# Patient Record
Sex: MALE | Race: OTHER | Employment: OTHER | URBAN - METROPOLITAN AREA
[De-identification: names, ages, dates, MRNs, and addresses within clinical notes are randomized per-mention and may not be internally consistent; named-entity substitution may affect disease eponyms.]

---

## 2017-02-03 ENCOUNTER — APPOINTMENT (OUTPATIENT)
Dept: GENERAL RADIOLOGY | Age: 67
End: 2017-02-03
Payer: MEDICARE

## 2017-02-03 ENCOUNTER — HOSPITAL ENCOUNTER (EMERGENCY)
Age: 67
Discharge: HOME | End: 2017-02-03
Payer: MEDICARE

## 2017-02-03 VITALS
RESPIRATION RATE: 16 BRPM | BODY MASS INDEX: 25.49 KG/M2 | TEMPERATURE: 98.2 F | SYSTOLIC BLOOD PRESSURE: 163 MMHG | OXYGEN SATURATION: 100 % | WEIGHT: 153 LBS | HEART RATE: 73 BPM | DIASTOLIC BLOOD PRESSURE: 89 MMHG | HEIGHT: 65 IN

## 2017-02-03 DIAGNOSIS — F41.1 ANXIETY STATE: Primary | ICD-10-CM

## 2017-02-03 LAB
ALBUMIN SERPL-MCNC: 4.7 G/DL (ref 3.9–4.9)
ALP BLD-CCNC: 73 U/L (ref 35–104)
ALT SERPL-CCNC: 28 U/L (ref 0–41)
AMPHETAMINE SCREEN, URINE: ABNORMAL
ANION GAP SERPL CALCULATED.3IONS-SCNC: 12 MEQ/L (ref 7–13)
APTT: 26.6 SEC (ref 21.6–35.4)
AST SERPL-CCNC: 16 U/L (ref 0–40)
BARBITURATE SCREEN URINE: POSITIVE
BASOPHILS ABSOLUTE: 0.1 K/UL (ref 0–0.2)
BASOPHILS RELATIVE PERCENT: 0.9 %
BENZODIAZEPINE SCREEN, URINE: ABNORMAL
BILIRUB SERPL-MCNC: 0.6 MG/DL (ref 0–1.2)
BILIRUBIN URINE: NEGATIVE
BLOOD, URINE: NEGATIVE
BUN BLDV-MCNC: 23 MG/DL (ref 8–23)
CALCIUM SERPL-MCNC: 9.8 MG/DL (ref 8.6–10.2)
CANNABINOID SCREEN URINE: ABNORMAL
CHLORIDE BLD-SCNC: 103 MEQ/L (ref 98–107)
CK MB: 2.2 NG/ML (ref 0–6.7)
CLARITY: CLEAR
CO2: 24 MEQ/L (ref 22–29)
COCAINE METABOLITE SCREEN URINE: ABNORMAL
COLOR: YELLOW
CREAT SERPL-MCNC: 1.04 MG/DL (ref 0.7–1.2)
CREATINE KINASE-MB INDEX: 2.3 % (ref 0–3.5)
EOSINOPHILS ABSOLUTE: 0.1 K/UL (ref 0–0.7)
EOSINOPHILS RELATIVE PERCENT: 0.8 %
ETHANOL PERCENT: NORMAL G/DL
ETHANOL: <10 MG/DL (ref 0–0.08)
GFR AFRICAN AMERICAN: >60
GFR NON-AFRICAN AMERICAN: >60
GLOBULIN: 2.6 G/DL (ref 2.3–3.5)
GLUCOSE BLD-MCNC: 126 MG/DL (ref 74–109)
GLUCOSE URINE: 250 MG/DL
HCT VFR BLD CALC: 46.5 % (ref 42–52)
HEMOGLOBIN: 16 G/DL (ref 14–18)
INR BLD: 1
KETONES, URINE: NEGATIVE MG/DL
LEUKOCYTE ESTERASE, URINE: NEGATIVE
LYMPHOCYTES ABSOLUTE: 1.1 K/UL (ref 1–4.8)
LYMPHOCYTES RELATIVE PERCENT: 16.4 %
Lab: ABNORMAL
MCH RBC QN AUTO: 30.2 PG (ref 27–31.3)
MCHC RBC AUTO-ENTMCNC: 34.3 % (ref 33–37)
MCV RBC AUTO: 87.9 FL (ref 80–100)
MONOCYTES ABSOLUTE: 0.4 K/UL (ref 0.2–0.8)
MONOCYTES RELATIVE PERCENT: 6.6 %
NEUTROPHILS ABSOLUTE: 4.9 K/UL (ref 1.4–6.5)
NEUTROPHILS RELATIVE PERCENT: 75.3 %
NITRITE, URINE: NEGATIVE
OPIATE SCREEN URINE: ABNORMAL
PDW BLD-RTO: 14.3 % (ref 11.5–14.5)
PH UA: 6 (ref 5–9)
PHENCYCLIDINE SCREEN URINE: ABNORMAL
PLATELET # BLD: 198 K/UL (ref 130–400)
POTASSIUM SERPL-SCNC: 4.3 MEQ/L (ref 3.5–5.1)
PROTEIN UA: NEGATIVE MG/DL
PROTHROMBIN TIME: 11.1 SEC (ref 8.1–13.7)
RBC # BLD: 5.29 M/UL (ref 4.7–6.1)
SODIUM BLD-SCNC: 139 MEQ/L (ref 132–144)
SPECIFIC GRAVITY UA: 1.02 (ref 1–1.03)
TOTAL CK: 96 U/L (ref 0–190)
TOTAL PROTEIN: 7.3 G/DL (ref 6.4–8.1)
TROPONIN: <0.01 NG/ML (ref 0–0.01)
TSH SERPL DL<=0.05 MIU/L-ACNC: 2.29 UIU/ML (ref 0.27–4.2)
UROBILINOGEN, URINE: 1 E.U./DL
WBC # BLD: 6.5 K/UL (ref 4.8–10.8)

## 2017-02-03 PROCEDURE — 81003 URINALYSIS AUTO W/O SCOPE: CPT

## 2017-02-03 PROCEDURE — 82550 ASSAY OF CK (CPK): CPT

## 2017-02-03 PROCEDURE — 82553 CREATINE MB FRACTION: CPT

## 2017-02-03 PROCEDURE — 84443 ASSAY THYROID STIM HORMONE: CPT

## 2017-02-03 PROCEDURE — 6370000000 HC RX 637 (ALT 250 FOR IP): Performed by: PHYSICIAN ASSISTANT

## 2017-02-03 PROCEDURE — 99285 EMERGENCY DEPT VISIT HI MDM: CPT

## 2017-02-03 PROCEDURE — 80307 DRUG TEST PRSMV CHEM ANLYZR: CPT

## 2017-02-03 PROCEDURE — 93005 ELECTROCARDIOGRAM TRACING: CPT

## 2017-02-03 PROCEDURE — 85025 COMPLETE CBC W/AUTO DIFF WBC: CPT

## 2017-02-03 PROCEDURE — 36415 COLL VENOUS BLD VENIPUNCTURE: CPT

## 2017-02-03 PROCEDURE — 71010 XR CHEST PORTABLE: CPT

## 2017-02-03 PROCEDURE — G0480 DRUG TEST DEF 1-7 CLASSES: HCPCS

## 2017-02-03 PROCEDURE — 85730 THROMBOPLASTIN TIME PARTIAL: CPT

## 2017-02-03 PROCEDURE — 80053 COMPREHEN METABOLIC PANEL: CPT

## 2017-02-03 PROCEDURE — 85610 PROTHROMBIN TIME: CPT

## 2017-02-03 PROCEDURE — 84484 ASSAY OF TROPONIN QUANT: CPT

## 2017-02-03 RX ORDER — HYDROXYZINE PAMOATE 25 MG/1
25 CAPSULE ORAL 3 TIMES DAILY PRN
Qty: 30 CAPSULE | Refills: 0 | Status: SHIPPED | OUTPATIENT
Start: 2017-02-03 | End: 2017-02-17

## 2017-02-03 RX ORDER — HYDROXYZINE PAMOATE 25 MG/1
25 CAPSULE ORAL ONCE
Status: COMPLETED | OUTPATIENT
Start: 2017-02-03 | End: 2017-02-03

## 2017-02-03 RX ADMIN — HYDROXYZINE PAMOATE 25 MG: 25 CAPSULE ORAL at 12:45

## 2017-02-03 ASSESSMENT — ENCOUNTER SYMPTOMS
VOMITING: 0
RHINORRHEA: 0
COLOR CHANGE: 0
ABDOMINAL DISTENTION: 0
VOICE CHANGE: 0
CONSTIPATION: 0
SINUS PRESSURE: 0
ABDOMINAL PAIN: 0
BACK PAIN: 0
NAUSEA: 0
SORE THROAT: 0
TROUBLE SWALLOWING: 0
DIARRHEA: 0
EYE DISCHARGE: 0
RECTAL PAIN: 0
SHORTNESS OF BREATH: 0

## 2017-02-03 ASSESSMENT — PATIENT HEALTH QUESTIONNAIRE - PHQ9: SUM OF ALL RESPONSES TO PHQ QUESTIONS 1-9: 14

## 2017-02-03 ASSESSMENT — LIFESTYLE VARIABLES: HISTORY_ALCOHOL_USE: NO

## 2017-02-09 LAB
EKG ATRIAL RATE: 63 BPM
EKG P AXIS: 37 DEGREES
EKG P-R INTERVAL: 182 MS
EKG Q-T INTERVAL: 406 MS
EKG QRS DURATION: 104 MS
EKG QTC CALCULATION (BAZETT): 415 MS
EKG R AXIS: 36 DEGREES
EKG T AXIS: 63 DEGREES
EKG VENTRICULAR RATE: 63 BPM

## 2018-02-01 ENCOUNTER — HOSPITAL ENCOUNTER (EMERGENCY)
Age: 68
Discharge: PSYCHIATRIC OR SUBSTANCE USE FACILITY | End: 2018-02-02
Payer: MEDICARE

## 2018-02-01 ENCOUNTER — APPOINTMENT (OUTPATIENT)
Dept: CT IMAGING | Age: 68
End: 2018-02-01
Payer: MEDICARE

## 2018-02-01 DIAGNOSIS — F33.9 EPISODE OF RECURRENT MAJOR DEPRESSIVE DISORDER, UNSPECIFIED DEPRESSION EPISODE SEVERITY (HCC): Primary | ICD-10-CM

## 2018-02-01 LAB
ALBUMIN SERPL-MCNC: 4.6 G/DL (ref 3.9–4.9)
ALP BLD-CCNC: 91 U/L (ref 35–104)
ALT SERPL-CCNC: 17 U/L (ref 0–41)
AMPHETAMINE SCREEN, URINE: NORMAL
ANION GAP SERPL CALCULATED.3IONS-SCNC: 13 MEQ/L (ref 7–13)
AST SERPL-CCNC: 15 U/L (ref 0–40)
BARBITURATE SCREEN URINE: NORMAL
BASOPHILS ABSOLUTE: 0.2 K/UL (ref 0–0.2)
BASOPHILS RELATIVE PERCENT: 2 %
BENZODIAZEPINE SCREEN, URINE: NORMAL
BILIRUB SERPL-MCNC: 0.5 MG/DL (ref 0–1.2)
BILIRUBIN URINE: NEGATIVE
BLOOD, URINE: NEGATIVE
BUN BLDV-MCNC: 21 MG/DL (ref 8–23)
CALCIUM SERPL-MCNC: 9.8 MG/DL (ref 8.6–10.2)
CANNABINOID SCREEN URINE: NORMAL
CHLORIDE BLD-SCNC: 102 MEQ/L (ref 98–107)
CLARITY: CLEAR
CO2: 26 MEQ/L (ref 22–29)
COCAINE METABOLITE SCREEN URINE: NORMAL
COLOR: YELLOW
CREAT SERPL-MCNC: 0.93 MG/DL (ref 0.7–1.2)
EKG ATRIAL RATE: 56 BPM
EKG P AXIS: 62 DEGREES
EKG P-R INTERVAL: 186 MS
EKG Q-T INTERVAL: 436 MS
EKG QRS DURATION: 104 MS
EKG QTC CALCULATION (BAZETT): 420 MS
EKG R AXIS: 61 DEGREES
EKG T AXIS: 62 DEGREES
EKG VENTRICULAR RATE: 56 BPM
EOSINOPHILS ABSOLUTE: 0.1 K/UL (ref 0–0.7)
EOSINOPHILS RELATIVE PERCENT: 1.6 %
ETHANOL PERCENT: NORMAL G/DL
ETHANOL: <10 MG/DL (ref 0–0.08)
GFR AFRICAN AMERICAN: >60
GFR NON-AFRICAN AMERICAN: >60
GLOBULIN: 2.8 G/DL (ref 2.3–3.5)
GLUCOSE BLD-MCNC: 114 MG/DL (ref 74–109)
GLUCOSE BLD-MCNC: 179 MG/DL (ref 60–115)
GLUCOSE URINE: NEGATIVE MG/DL
HCT VFR BLD CALC: 46.5 % (ref 42–52)
HEMOGLOBIN: 15.9 G/DL (ref 14–18)
KETONES, URINE: NEGATIVE MG/DL
LEUKOCYTE ESTERASE, URINE: NEGATIVE
LYMPHOCYTES ABSOLUTE: 1.9 K/UL (ref 1–4.8)
LYMPHOCYTES RELATIVE PERCENT: 22.8 %
Lab: NORMAL
MCH RBC QN AUTO: 30.4 PG (ref 27–31.3)
MCHC RBC AUTO-ENTMCNC: 34.3 % (ref 33–37)
MCV RBC AUTO: 88.8 FL (ref 80–100)
MONOCYTES ABSOLUTE: 0.4 K/UL (ref 0.2–0.8)
MONOCYTES RELATIVE PERCENT: 5 %
NEUTROPHILS ABSOLUTE: 5.6 K/UL (ref 1.4–6.5)
NEUTROPHILS RELATIVE PERCENT: 68.6 %
NITRITE, URINE: NEGATIVE
OPIATE SCREEN URINE: NORMAL
PDW BLD-RTO: 14.1 % (ref 11.5–14.5)
PERFORMED ON: ABNORMAL
PH UA: 6 (ref 5–9)
PHENCYCLIDINE SCREEN URINE: NORMAL
PLATELET # BLD: 200 K/UL (ref 130–400)
POTASSIUM SERPL-SCNC: 4.4 MEQ/L (ref 3.5–5.1)
PROTEIN UA: NEGATIVE MG/DL
RBC # BLD: 5.23 M/UL (ref 4.7–6.1)
SODIUM BLD-SCNC: 141 MEQ/L (ref 132–144)
SPECIFIC GRAVITY UA: 1.02 (ref 1–1.03)
TOTAL CK: 45 U/L (ref 0–190)
TOTAL PROTEIN: 7.4 G/DL (ref 6.4–8.1)
TSH SERPL DL<=0.05 MIU/L-ACNC: 3.46 UIU/ML (ref 0.27–4.2)
URINE REFLEX TO CULTURE: NORMAL
UROBILINOGEN, URINE: 0.2 E.U./DL
WBC # BLD: 8.1 K/UL (ref 4.8–10.8)

## 2018-02-01 PROCEDURE — G0480 DRUG TEST DEF 1-7 CLASSES: HCPCS

## 2018-02-01 PROCEDURE — 82550 ASSAY OF CK (CPK): CPT

## 2018-02-01 PROCEDURE — 6370000000 HC RX 637 (ALT 250 FOR IP): Performed by: PHYSICIAN ASSISTANT

## 2018-02-01 PROCEDURE — 85025 COMPLETE CBC W/AUTO DIFF WBC: CPT

## 2018-02-01 PROCEDURE — 93005 ELECTROCARDIOGRAM TRACING: CPT

## 2018-02-01 PROCEDURE — 80053 COMPREHEN METABOLIC PANEL: CPT

## 2018-02-01 PROCEDURE — 81003 URINALYSIS AUTO W/O SCOPE: CPT

## 2018-02-01 PROCEDURE — 84443 ASSAY THYROID STIM HORMONE: CPT

## 2018-02-01 PROCEDURE — 99285 EMERGENCY DEPT VISIT HI MDM: CPT

## 2018-02-01 PROCEDURE — 70450 CT HEAD/BRAIN W/O DYE: CPT

## 2018-02-01 PROCEDURE — 80307 DRUG TEST PRSMV CHEM ANLYZR: CPT

## 2018-02-01 PROCEDURE — 36415 COLL VENOUS BLD VENIPUNCTURE: CPT

## 2018-02-01 RX ORDER — ZOLPIDEM TARTRATE 5 MG/1
5 TABLET ORAL ONCE
Status: COMPLETED | OUTPATIENT
Start: 2018-02-01 | End: 2018-02-02

## 2018-02-01 RX ORDER — CLONAZEPAM 0.5 MG/1
0.5 TABLET ORAL ONCE
Status: COMPLETED | OUTPATIENT
Start: 2018-02-01 | End: 2018-02-01

## 2018-02-01 RX ORDER — ZOLPIDEM TARTRATE 5 MG/1
5 TABLET ORAL NIGHTLY PRN
COMMUNITY
End: 2018-04-27 | Stop reason: ALTCHOICE

## 2018-02-01 RX ORDER — HYDROXYZINE PAMOATE 25 MG/1
25 CAPSULE ORAL ONCE
Status: COMPLETED | OUTPATIENT
Start: 2018-02-01 | End: 2018-02-01

## 2018-02-01 RX ORDER — CLONAZEPAM 0.5 MG/1
0.25 TABLET ORAL 2 TIMES DAILY PRN
Status: ON HOLD | COMMUNITY
End: 2018-03-30 | Stop reason: HOSPADM

## 2018-02-01 RX ADMIN — HYDROXYZINE PAMOATE 25 MG: 25 CAPSULE ORAL at 21:03

## 2018-02-01 RX ADMIN — CLONAZEPAM 0.5 MG: 0.5 TABLET ORAL at 21:58

## 2018-02-01 ASSESSMENT — ENCOUNTER SYMPTOMS
CHEST TIGHTNESS: 1
EYE DISCHARGE: 0
APNEA: 0
PHOTOPHOBIA: 0
VOICE CHANGE: 0
ANAL BLEEDING: 0
COUGH: 0
VOMITING: 0
SHORTNESS OF BREATH: 0
ABDOMINAL PAIN: 0
NAUSEA: 0
ABDOMINAL DISTENTION: 0

## 2018-02-02 VITALS
RESPIRATION RATE: 16 BRPM | BODY MASS INDEX: 23.32 KG/M2 | HEART RATE: 57 BPM | TEMPERATURE: 98.5 F | WEIGHT: 140 LBS | DIASTOLIC BLOOD PRESSURE: 71 MMHG | SYSTOLIC BLOOD PRESSURE: 114 MMHG | OXYGEN SATURATION: 96 % | HEIGHT: 65 IN

## 2018-02-02 PROCEDURE — 6370000000 HC RX 637 (ALT 250 FOR IP): Performed by: PHYSICIAN ASSISTANT

## 2018-02-02 RX ADMIN — ZOLPIDEM TARTRATE 5 MG: 5 TABLET ORAL at 00:05

## 2018-02-02 NOTE — ED NOTES
[facility] ambulance here to transport patient. Patient in no acute distress at discharge. All belongings sent with patient.      Abelardo Ross RN  02/02/18 9682

## 2018-02-02 NOTE — ED NOTES
Pt did not get any relief for anxiety from the Vistaril. Pt medicated with Klonopin 0.5 mg PO.  Will continue to monitor     Fabián Liu RN  02/01/18 8780

## 2018-02-02 NOTE — ED PROVIDER NOTES
[facility] ED  eMERGENCY dEPARTMENT eNCOUnter      Pt Name: Subha Michael  MRN: [id number]  [place] 1950  Date of evaluation: 2/1/2018  Provider: Shen Collier PA-C    CHIEF COMPLAINT       Chief Complaint   Patient presents with    Mental Health Problem         HISTORY OF PRESENT ILLNESS   (Location/Symptom, Timing/Onset, Context/Setting, Quality, Duration, Modifying Factors, Severity)  Note limiting factors. Subha Michael is a 69 y.o. male who presents to the emergency department Presents with increased stress and anxiety states his wife is ill he's been having increasing thoughts was seen at [facility] they stated it was hallucinations patient states spots have hallucinations. He does note he had a headache recently and his anxiety went out and had some chest pressure with it. Patient does state anxiety medicine and wanted [place] to change his medication or increased medication. For his anxiety and stress his wife is ill. Symptoms are mild to moderate in severity nothing improves or worsens symptoms patient's and [facility] emergency department. HPI    Nursing Notes were reviewed. REVIEW OF SYSTEMS    (2-9 systems for level 4, 10 or more for level 5)     Review of Systems   Constitutional: Negative for activity change, appetite change, chills, fever and unexpected weight change. HENT: Negative for ear discharge, nosebleeds and voice change. Eyes: Negative for photophobia and discharge. Respiratory: Positive for chest tightness. Negative for apnea, cough and shortness of breath. Cardiovascular: Negative for chest pain, palpitations and leg swelling. Gastrointestinal: Negative for abdominal distention, abdominal pain, anal bleeding, nausea and vomiting. Endocrine: Negative for cold intolerance, heat intolerance and polyphagia. Genitourinary: Negative for genital sores. Musculoskeletal: Negative for joint swelling. Skin: Negative for pallor.    Allergic/Immunologic: dictations but occasionally words are mis-transcribed.)    Paresh Arthur PA-C (electronically signed)  Attending Emergency Physician         Paresh Arthur PA-C  02/02/18 Jonathan Malave PA-C  02/02/18 5789

## 2018-02-02 NOTE — ED NOTES
Jan Escalona PA-C at bedside to see pt     Cristiane Palomo RN  02/01/18 2018       Cristiane Palomo RN  02/01/18 2019

## 2018-03-29 ENCOUNTER — HOSPITAL ENCOUNTER (OUTPATIENT)
Age: 68
Setting detail: OBSERVATION
Discharge: HOME | End: 2018-03-30
Attending: INTERNAL MEDICINE | Admitting: INTERNAL MEDICINE
Payer: MEDICARE

## 2018-03-29 ENCOUNTER — APPOINTMENT (OUTPATIENT)
Dept: GENERAL RADIOLOGY | Age: 68
End: 2018-03-29
Payer: MEDICARE

## 2018-03-29 DIAGNOSIS — R07.2 PRECORDIAL PAIN: ICD-10-CM

## 2018-03-29 DIAGNOSIS — R07.9 CHEST PAIN, UNSPECIFIED TYPE: Primary | ICD-10-CM

## 2018-03-29 PROBLEM — I10 HYPERTENSION: Status: ACTIVE | Noted: 2018-03-29

## 2018-03-29 LAB
ALBUMIN SERPL-MCNC: 4.7 G/DL (ref 3.9–4.9)
ALP BLD-CCNC: 74 U/L (ref 35–104)
ALT SERPL-CCNC: 22 U/L (ref 0–41)
AMPHETAMINE SCREEN, URINE: NORMAL
ANION GAP SERPL CALCULATED.3IONS-SCNC: 15 MEQ/L (ref 7–13)
APTT: 27.8 SEC (ref 21.6–35.4)
AST SERPL-CCNC: 17 U/L (ref 0–40)
BARBITURATE SCREEN URINE: NORMAL
BASOPHILS ABSOLUTE: 0.1 K/UL (ref 0–0.2)
BASOPHILS RELATIVE PERCENT: 1 %
BENZODIAZEPINE SCREEN, URINE: NORMAL
BILIRUB SERPL-MCNC: 0.7 MG/DL (ref 0–1.2)
BILIRUBIN URINE: NEGATIVE
BLOOD, URINE: NEGATIVE
BUN BLDV-MCNC: 17 MG/DL (ref 8–23)
CALCIUM SERPL-MCNC: 9.5 MG/DL (ref 8.6–10.2)
CANNABINOID SCREEN URINE: NORMAL
CHLORIDE BLD-SCNC: 100 MEQ/L (ref 98–107)
CLARITY: CLEAR
CO2: 20 MEQ/L (ref 22–29)
COCAINE METABOLITE SCREEN URINE: NORMAL
COLOR: YELLOW
CREAT SERPL-MCNC: 0.81 MG/DL (ref 0.7–1.2)
D DIMER: <0.19 MG/L FEU (ref 0–0.5)
EKG ATRIAL RATE: 64 BPM
EKG P AXIS: 61 DEGREES
EKG P-R INTERVAL: 182 MS
EKG Q-T INTERVAL: 430 MS
EKG QRS DURATION: 92 MS
EKG QTC CALCULATION (BAZETT): 443 MS
EKG R AXIS: 58 DEGREES
EKG T AXIS: 65 DEGREES
EKG VENTRICULAR RATE: 64 BPM
EOSINOPHILS ABSOLUTE: 0 K/UL (ref 0–0.7)
EOSINOPHILS RELATIVE PERCENT: 0.4 %
GFR AFRICAN AMERICAN: >60
GFR NON-AFRICAN AMERICAN: >60
GLOBULIN: 2.4 G/DL (ref 2.3–3.5)
GLUCOSE BLD-MCNC: 131 MG/DL (ref 60–115)
GLUCOSE BLD-MCNC: 161 MG/DL (ref 74–109)
GLUCOSE URINE: NEGATIVE MG/DL
HCT VFR BLD CALC: 44.8 % (ref 42–52)
HEMOGLOBIN: 15.7 G/DL (ref 14–18)
INR BLD: 1.1
KETONES, URINE: NEGATIVE MG/DL
LEUKOCYTE ESTERASE, URINE: NEGATIVE
LIPASE: 20 U/L (ref 13–60)
LYMPHOCYTES ABSOLUTE: 1.1 K/UL (ref 1–4.8)
LYMPHOCYTES RELATIVE PERCENT: 14.4 %
Lab: NORMAL
MCH RBC QN AUTO: 30.3 PG (ref 27–31.3)
MCHC RBC AUTO-ENTMCNC: 35 % (ref 33–37)
MCV RBC AUTO: 86.7 FL (ref 80–100)
MONOCYTES ABSOLUTE: 0.4 K/UL (ref 0.2–0.8)
MONOCYTES RELATIVE PERCENT: 5.1 %
NEUTROPHILS ABSOLUTE: 6.1 K/UL (ref 1.4–6.5)
NEUTROPHILS RELATIVE PERCENT: 79.1 %
NITRITE, URINE: NEGATIVE
OPIATE SCREEN URINE: NORMAL
PDW BLD-RTO: 14.5 % (ref 11.5–14.5)
PERFORMED ON: ABNORMAL
PH UA: 7.5 (ref 5–9)
PHENCYCLIDINE SCREEN URINE: NORMAL
PLATELET # BLD: 176 K/UL (ref 130–400)
POTASSIUM SERPL-SCNC: 3.9 MEQ/L (ref 3.5–5.1)
PROTEIN UA: NEGATIVE MG/DL
PROTHROMBIN TIME: 11.4 SEC (ref 8.1–13.7)
RBC # BLD: 5.16 M/UL (ref 4.7–6.1)
SODIUM BLD-SCNC: 135 MEQ/L (ref 132–144)
SPECIFIC GRAVITY UA: 1.01 (ref 1–1.03)
TOTAL CK: 103 U/L (ref 0–190)
TOTAL PROTEIN: 7.1 G/DL (ref 6.4–8.1)
TROPONIN: <0.01 NG/ML (ref 0–0.01)
TSH SERPL DL<=0.05 MIU/L-ACNC: 3.52 UIU/ML (ref 0.27–4.2)
URINE REFLEX TO CULTURE: NORMAL
UROBILINOGEN, URINE: 1 E.U./DL
WBC # BLD: 7.6 K/UL (ref 4.8–10.8)

## 2018-03-29 PROCEDURE — 80053 COMPREHEN METABOLIC PANEL: CPT

## 2018-03-29 PROCEDURE — 99285 EMERGENCY DEPT VISIT HI MDM: CPT

## 2018-03-29 PROCEDURE — 96374 THER/PROPH/DIAG INJ IV PUSH: CPT

## 2018-03-29 PROCEDURE — 84484 ASSAY OF TROPONIN QUANT: CPT

## 2018-03-29 PROCEDURE — 82550 ASSAY OF CK (CPK): CPT

## 2018-03-29 PROCEDURE — 6370000000 HC RX 637 (ALT 250 FOR IP): Performed by: HOSPITALIST

## 2018-03-29 PROCEDURE — 6360000002 HC RX W HCPCS: Performed by: PHYSICIAN ASSISTANT

## 2018-03-29 PROCEDURE — 71045 X-RAY EXAM CHEST 1 VIEW: CPT

## 2018-03-29 PROCEDURE — 80307 DRUG TEST PRSMV CHEM ANLYZR: CPT

## 2018-03-29 PROCEDURE — 85025 COMPLETE CBC W/AUTO DIFF WBC: CPT

## 2018-03-29 PROCEDURE — 85730 THROMBOPLASTIN TIME PARTIAL: CPT

## 2018-03-29 PROCEDURE — 2580000003 HC RX 258: Performed by: HOSPITALIST

## 2018-03-29 PROCEDURE — 84443 ASSAY THYROID STIM HORMONE: CPT

## 2018-03-29 PROCEDURE — 93005 ELECTROCARDIOGRAM TRACING: CPT

## 2018-03-29 PROCEDURE — 6360000002 HC RX W HCPCS: Performed by: HOSPITALIST

## 2018-03-29 PROCEDURE — G0378 HOSPITAL OBSERVATION PER HR: HCPCS

## 2018-03-29 PROCEDURE — 6370000000 HC RX 637 (ALT 250 FOR IP): Performed by: PHYSICIAN ASSISTANT

## 2018-03-29 PROCEDURE — 85610 PROTHROMBIN TIME: CPT

## 2018-03-29 PROCEDURE — 81003 URINALYSIS AUTO W/O SCOPE: CPT

## 2018-03-29 PROCEDURE — 96372 THER/PROPH/DIAG INJ SC/IM: CPT

## 2018-03-29 PROCEDURE — 83690 ASSAY OF LIPASE: CPT

## 2018-03-29 PROCEDURE — 36415 COLL VENOUS BLD VENIPUNCTURE: CPT

## 2018-03-29 PROCEDURE — 85379 FIBRIN DEGRADATION QUANT: CPT

## 2018-03-29 RX ORDER — M-VIT,TX,IRON,MINS/CALC/FOLIC 27MG-0.4MG
1 TABLET ORAL DAILY
Status: ON HOLD | COMMUNITY
End: 2019-01-25 | Stop reason: SDDI

## 2018-03-29 RX ORDER — DOCUSATE SODIUM 100 MG/1
100 CAPSULE, LIQUID FILLED ORAL 2 TIMES DAILY
Status: DISCONTINUED | OUTPATIENT
Start: 2018-03-29 | End: 2018-03-30

## 2018-03-29 RX ORDER — ZOLPIDEM TARTRATE 5 MG/1
5 TABLET ORAL NIGHTLY PRN
Status: DISCONTINUED | OUTPATIENT
Start: 2018-03-29 | End: 2018-03-30 | Stop reason: HOSPADM

## 2018-03-29 RX ORDER — FLUVOXAMINE MALEATE 100 MG
50 TABLET ORAL 3 TIMES DAILY
Status: ON HOLD | COMMUNITY
End: 2018-03-30 | Stop reason: HOSPADM

## 2018-03-29 RX ORDER — ACETAMINOPHEN 325 MG/1
650 TABLET ORAL EVERY 4 HOURS PRN
Status: DISCONTINUED | OUTPATIENT
Start: 2018-03-29 | End: 2018-03-30 | Stop reason: HOSPADM

## 2018-03-29 RX ORDER — MAGNESIUM OXIDE 400 MG/1
400 TABLET ORAL DAILY
Status: ON HOLD | COMMUNITY
End: 2021-09-07

## 2018-03-29 RX ORDER — ASPIRIN 81 MG/1
324 TABLET, CHEWABLE ORAL ONCE
Status: COMPLETED | OUTPATIENT
Start: 2018-03-29 | End: 2018-03-29

## 2018-03-29 RX ORDER — MORPHINE SULFATE 2 MG/ML
2 INJECTION, SOLUTION INTRAMUSCULAR; INTRAVENOUS EVERY 4 HOURS PRN
Status: DISCONTINUED | OUTPATIENT
Start: 2018-03-29 | End: 2018-03-30 | Stop reason: HOSPADM

## 2018-03-29 RX ORDER — LORAZEPAM 2 MG/ML
0.5 INJECTION INTRAMUSCULAR ONCE
Status: COMPLETED | OUTPATIENT
Start: 2018-03-29 | End: 2018-03-29

## 2018-03-29 RX ORDER — CLONAZEPAM 0.5 MG/1
0.25 TABLET ORAL 2 TIMES DAILY PRN
Status: DISCONTINUED | OUTPATIENT
Start: 2018-03-29 | End: 2018-03-30

## 2018-03-29 RX ORDER — ONDANSETRON 2 MG/ML
4 INJECTION INTRAMUSCULAR; INTRAVENOUS EVERY 6 HOURS PRN
Status: DISCONTINUED | OUTPATIENT
Start: 2018-03-29 | End: 2018-03-30 | Stop reason: HOSPADM

## 2018-03-29 RX ORDER — MULTIVIT-MIN/IRON/FOLIC ACID/K 18-600-40
5000 CAPSULE ORAL DAILY
COMMUNITY
End: 2021-11-05

## 2018-03-29 RX ORDER — NITROGLYCERIN 0.4 MG/1
0.4 TABLET SUBLINGUAL EVERY 5 MIN PRN
Status: DISCONTINUED | OUTPATIENT
Start: 2018-03-29 | End: 2018-03-30 | Stop reason: HOSPADM

## 2018-03-29 RX ORDER — FLUVOXAMINE MALEATE 50 MG/1
50 TABLET, COATED ORAL 3 TIMES DAILY
Status: DISCONTINUED | OUTPATIENT
Start: 2018-03-29 | End: 2018-03-30 | Stop reason: HOSPADM

## 2018-03-29 RX ORDER — M-VIT,TX,IRON,MINS/CALC/FOLIC 27MG-0.4MG
1 TABLET ORAL DAILY
Status: DISCONTINUED | OUTPATIENT
Start: 2018-03-29 | End: 2018-03-30 | Stop reason: HOSPADM

## 2018-03-29 RX ORDER — SODIUM CHLORIDE 0.9 % (FLUSH) 0.9 %
10 SYRINGE (ML) INJECTION PRN
Status: DISCONTINUED | OUTPATIENT
Start: 2018-03-29 | End: 2018-03-30 | Stop reason: HOSPADM

## 2018-03-29 RX ORDER — LORAZEPAM 0.5 MG/1
0.5 TABLET ORAL EVERY 6 HOURS PRN
Status: DISCONTINUED | OUTPATIENT
Start: 2018-03-29 | End: 2018-03-30

## 2018-03-29 RX ORDER — SODIUM CHLORIDE 0.9 % (FLUSH) 0.9 %
10 SYRINGE (ML) INJECTION EVERY 12 HOURS SCHEDULED
Status: DISCONTINUED | OUTPATIENT
Start: 2018-03-29 | End: 2018-03-30 | Stop reason: HOSPADM

## 2018-03-29 RX ORDER — CHLORAL HYDRATE 500 MG
2000 CAPSULE ORAL DAILY
Status: DISCONTINUED | OUTPATIENT
Start: 2018-03-29 | End: 2018-03-30 | Stop reason: HOSPADM

## 2018-03-29 RX ORDER — CHLORAL HYDRATE 500 MG
2000 CAPSULE ORAL DAILY
Status: ON HOLD | COMMUNITY
End: 2019-01-25

## 2018-03-29 RX ADMIN — VITAMIN D, TAB 1000IU (100/BT) 2000 UNITS: 25 TAB at 23:47

## 2018-03-29 RX ADMIN — ASPIRIN 81 MG 324 MG: 81 TABLET ORAL at 18:55

## 2018-03-29 RX ADMIN — ENOXAPARIN SODIUM 40 MG: 40 INJECTION SUBCUTANEOUS at 23:47

## 2018-03-29 RX ADMIN — NITROGLYCERIN 1 INCH: 20 OINTMENT TOPICAL at 19:11

## 2018-03-29 RX ADMIN — CLONAZEPAM 0.25 MG: 0.5 TABLET ORAL at 23:47

## 2018-03-29 RX ADMIN — LORAZEPAM 0.5 MG: 0.5 TABLET ORAL at 23:47

## 2018-03-29 RX ADMIN — METFORMIN HYDROCHLORIDE 500 MG: 500 TABLET, FILM COATED ORAL at 23:47

## 2018-03-29 RX ADMIN — ZOLPIDEM TARTRATE 5 MG: 5 TABLET ORAL at 23:47

## 2018-03-29 RX ADMIN — LORAZEPAM 0.5 MG: 2 INJECTION INTRAMUSCULAR; INTRAVENOUS at 17:48

## 2018-03-29 RX ADMIN — Medication 400 MG: at 23:47

## 2018-03-29 RX ADMIN — Medication 10 ML: at 23:49

## 2018-03-29 RX ADMIN — MULTIPLE VITAMINS W/ MINERALS TAB 1 TABLET: TAB at 23:47

## 2018-03-29 RX ADMIN — NITROGLYCERIN 0.4 MG: 0.4 TABLET SUBLINGUAL at 18:56

## 2018-03-29 ASSESSMENT — ENCOUNTER SYMPTOMS
CONSTIPATION: 0
NAUSEA: 1
COUGH: 1
RECTAL PAIN: 0
EYE DISCHARGE: 0
SHORTNESS OF BREATH: 1
COLOR CHANGE: 0
RHINORRHEA: 0
CHEST TIGHTNESS: 1
VOMITING: 0
ABDOMINAL PAIN: 0
SORE THROAT: 0
ABDOMINAL DISTENTION: 0

## 2018-03-29 ASSESSMENT — HEART SCORE: ECG: 0

## 2018-03-29 ASSESSMENT — PAIN DESCRIPTION - PROGRESSION: CLINICAL_PROGRESSION: GRADUALLY IMPROVING

## 2018-03-29 ASSESSMENT — PAIN SCALES - GENERAL
PAINLEVEL_OUTOF10: 7
PAINLEVEL_OUTOF10: 7

## 2018-03-29 ASSESSMENT — PAIN DESCRIPTION - LOCATION
LOCATION: CHEST
LOCATION: CHEST;HEAD

## 2018-03-29 ASSESSMENT — PAIN DESCRIPTION - DESCRIPTORS
DESCRIPTORS: PRESSURE
DESCRIPTORS: PRESSURE

## 2018-03-29 ASSESSMENT — PAIN DESCRIPTION - FREQUENCY: FREQUENCY: INTERMITTENT

## 2018-03-29 ASSESSMENT — PAIN DESCRIPTION - ONSET: ONSET: ON-GOING

## 2018-03-30 VITALS
BODY MASS INDEX: 23.78 KG/M2 | WEIGHT: 142.7 LBS | TEMPERATURE: 99.1 F | RESPIRATION RATE: 16 BRPM | HEART RATE: 63 BPM | OXYGEN SATURATION: 99 % | HEIGHT: 65 IN | DIASTOLIC BLOOD PRESSURE: 80 MMHG | SYSTOLIC BLOOD PRESSURE: 142 MMHG

## 2018-03-30 LAB
ANION GAP SERPL CALCULATED.3IONS-SCNC: 14 MEQ/L (ref 7–13)
BASOPHILS ABSOLUTE: 0.1 K/UL (ref 0–0.2)
BASOPHILS RELATIVE PERCENT: 0.9 %
BUN BLDV-MCNC: 17 MG/DL (ref 8–23)
CALCIUM SERPL-MCNC: 8.9 MG/DL (ref 8.6–10.2)
CHLORIDE BLD-SCNC: 102 MEQ/L (ref 98–107)
CK MB: 1.4 NG/ML (ref 0–6.7)
CK MB: 1.5 NG/ML (ref 0–6.7)
CO2: 23 MEQ/L (ref 22–29)
CREAT SERPL-MCNC: 0.83 MG/DL (ref 0.7–1.2)
CREATINE KINASE-MB INDEX: 2.1 % (ref 0–3.5)
CREATINE KINASE-MB INDEX: 2.3 % (ref 0–3.5)
EOSINOPHILS ABSOLUTE: 0.2 K/UL (ref 0–0.7)
EOSINOPHILS RELATIVE PERCENT: 2.8 %
GFR AFRICAN AMERICAN: >60
GFR NON-AFRICAN AMERICAN: >60
GLUCOSE BLD-MCNC: 119 MG/DL (ref 60–115)
GLUCOSE BLD-MCNC: 134 MG/DL (ref 60–115)
GLUCOSE BLD-MCNC: 90 MG/DL (ref 60–115)
GLUCOSE BLD-MCNC: 90 MG/DL (ref 74–109)
HCT VFR BLD CALC: 42.7 % (ref 42–52)
HEMOGLOBIN: 14.9 G/DL (ref 14–18)
LYMPHOCYTES ABSOLUTE: 0.9 K/UL (ref 1–4.8)
LYMPHOCYTES RELATIVE PERCENT: 13.3 %
MAGNESIUM: 2.3 MG/DL (ref 1.7–2.3)
MCH RBC QN AUTO: 30.5 PG (ref 27–31.3)
MCHC RBC AUTO-ENTMCNC: 34.8 % (ref 33–37)
MCV RBC AUTO: 87.7 FL (ref 80–100)
MONOCYTES ABSOLUTE: 0.5 K/UL (ref 0.2–0.8)
MONOCYTES RELATIVE PERCENT: 6.8 %
NEUTROPHILS ABSOLUTE: 5.2 K/UL (ref 1.4–6.5)
NEUTROPHILS RELATIVE PERCENT: 76.2 %
PDW BLD-RTO: 14.6 % (ref 11.5–14.5)
PERFORMED ON: ABNORMAL
PERFORMED ON: ABNORMAL
PERFORMED ON: NORMAL
PLATELET # BLD: 149 K/UL (ref 130–400)
POTASSIUM REFLEX MAGNESIUM: 4.1 MEQ/L (ref 3.5–5.1)
RBC # BLD: 4.87 M/UL (ref 4.7–6.1)
SODIUM BLD-SCNC: 139 MEQ/L (ref 132–144)
TOTAL CK: 66 U/L (ref 0–190)
TOTAL CK: 68 U/L (ref 0–190)
TROPONIN: <0.01 NG/ML (ref 0–0.01)
TROPONIN: <0.01 NG/ML (ref 0–0.01)
WBC # BLD: 6.9 K/UL (ref 4.8–10.8)

## 2018-03-30 PROCEDURE — 99204 OFFICE O/P NEW MOD 45 MIN: CPT | Performed by: CLINICAL NURSE SPECIALIST

## 2018-03-30 PROCEDURE — 6370000000 HC RX 637 (ALT 250 FOR IP): Performed by: HOSPITALIST

## 2018-03-30 PROCEDURE — 80048 BASIC METABOLIC PNL TOTAL CA: CPT

## 2018-03-30 PROCEDURE — 82550 ASSAY OF CK (CPK): CPT

## 2018-03-30 PROCEDURE — 2580000003 HC RX 258: Performed by: HOSPITALIST

## 2018-03-30 PROCEDURE — 82553 CREATINE MB FRACTION: CPT

## 2018-03-30 PROCEDURE — 99220 PR INITIAL OBSERVATION CARE/DAY 70 MINUTES: CPT | Performed by: INTERNAL MEDICINE

## 2018-03-30 PROCEDURE — 84484 ASSAY OF TROPONIN QUANT: CPT

## 2018-03-30 PROCEDURE — G0378 HOSPITAL OBSERVATION PER HR: HCPCS

## 2018-03-30 PROCEDURE — 85025 COMPLETE CBC W/AUTO DIFF WBC: CPT

## 2018-03-30 PROCEDURE — 83735 ASSAY OF MAGNESIUM: CPT

## 2018-03-30 PROCEDURE — 6370000000 HC RX 637 (ALT 250 FOR IP): Performed by: INTERNAL MEDICINE

## 2018-03-30 PROCEDURE — 36415 COLL VENOUS BLD VENIPUNCTURE: CPT

## 2018-03-30 PROCEDURE — 6360000002 HC RX W HCPCS: Performed by: HOSPITALIST

## 2018-03-30 PROCEDURE — 96372 THER/PROPH/DIAG INJ SC/IM: CPT

## 2018-03-30 RX ORDER — FAMOTIDINE 20 MG/1
20 TABLET, FILM COATED ORAL 2 TIMES DAILY
Status: DISCONTINUED | OUTPATIENT
Start: 2018-03-30 | End: 2018-03-30 | Stop reason: HOSPADM

## 2018-03-30 RX ORDER — CLONAZEPAM 0.5 MG/1
0.5 TABLET ORAL 2 TIMES DAILY PRN
Qty: 28 TABLET | Refills: 0 | Status: SHIPPED | OUTPATIENT
Start: 2018-03-30 | End: 2018-04-27 | Stop reason: SDUPTHER

## 2018-03-30 RX ORDER — CLONAZEPAM 0.5 MG/1
0.5 TABLET ORAL 2 TIMES DAILY PRN
Status: DISCONTINUED | OUTPATIENT
Start: 2018-03-30 | End: 2018-03-30 | Stop reason: HOSPADM

## 2018-03-30 RX ORDER — FAMOTIDINE 20 MG/1
20 TABLET, FILM COATED ORAL DAILY
Qty: 14 TABLET | Refills: 0 | Status: SHIPPED | OUTPATIENT
Start: 2018-03-30 | End: 2019-01-24 | Stop reason: ALTCHOICE

## 2018-03-30 RX ORDER — LISINOPRIL 10 MG/1
10 TABLET ORAL DAILY
Qty: 30 TABLET | Refills: 3 | Status: SHIPPED | OUTPATIENT
Start: 2018-03-30 | End: 2018-05-01

## 2018-03-30 RX ORDER — CLONAZEPAM 0.5 MG/1
0.5 TABLET ORAL 2 TIMES DAILY
Status: DISCONTINUED | OUTPATIENT
Start: 2018-03-30 | End: 2018-03-30 | Stop reason: HOSPADM

## 2018-03-30 RX ORDER — NITROGLYCERIN 0.4 MG/1
TABLET SUBLINGUAL
Qty: 25 TABLET | Refills: 3 | Status: SHIPPED | OUTPATIENT
Start: 2018-03-30 | End: 2018-06-16 | Stop reason: ALTCHOICE

## 2018-03-30 RX ORDER — ATORVASTATIN CALCIUM 20 MG/1
20 TABLET, FILM COATED ORAL DAILY
Qty: 30 TABLET | Refills: 3 | Status: SHIPPED | OUTPATIENT
Start: 2018-03-30 | End: 2018-05-09

## 2018-03-30 RX ADMIN — Medication 10 ML: at 08:02

## 2018-03-30 RX ADMIN — VITAMIN D, TAB 1000IU (100/BT) 2000 UNITS: 25 TAB at 08:01

## 2018-03-30 RX ADMIN — FAMOTIDINE 20 MG: 20 TABLET, FILM COATED ORAL at 11:21

## 2018-03-30 RX ADMIN — METFORMIN HYDROCHLORIDE 500 MG: 500 TABLET, FILM COATED ORAL at 16:23

## 2018-03-30 RX ADMIN — LORAZEPAM 0.5 MG: 0.5 TABLET ORAL at 08:06

## 2018-03-30 RX ADMIN — Medication 2000 MG: at 08:01

## 2018-03-30 RX ADMIN — METOPROLOL TARTRATE 12.5 MG: 25 TABLET ORAL at 11:21

## 2018-03-30 RX ADMIN — CLONAZEPAM 0.5 MG: 0.5 TABLET ORAL at 12:41

## 2018-03-30 RX ADMIN — METFORMIN HYDROCHLORIDE 500 MG: 500 TABLET, FILM COATED ORAL at 08:01

## 2018-03-30 RX ADMIN — MULTIPLE VITAMINS W/ MINERALS TAB 1 TABLET: TAB at 08:01

## 2018-03-30 RX ADMIN — ENOXAPARIN SODIUM 40 MG: 40 INJECTION SUBCUTANEOUS at 08:02

## 2018-03-30 RX ADMIN — Medication 400 MG: at 08:02

## 2018-03-30 RX ADMIN — FLUVOXAMINE MALEATE 50 MG: 50 TABLET, FILM COATED ORAL at 08:07

## 2018-03-30 RX ADMIN — FLUVOXAMINE MALEATE 50 MG: 50 TABLET, FILM COATED ORAL at 12:41

## 2018-03-30 ASSESSMENT — ENCOUNTER SYMPTOMS
SHORTNESS OF BREATH: 0
VOMITING: 0
EYES NEGATIVE: 1
ABDOMINAL PAIN: 0
HEMOPTYSIS: 0
ORTHOPNEA: 0
GASTROINTESTINAL NEGATIVE: 1
NAUSEA: 0
WHEEZING: 0

## 2018-03-30 ASSESSMENT — PAIN SCALES - GENERAL
PAINLEVEL_OUTOF10: 0

## 2018-04-02 PROCEDURE — 93010 ELECTROCARDIOGRAM REPORT: CPT | Performed by: INTERNAL MEDICINE

## 2018-04-17 ENCOUNTER — HOSPITAL ENCOUNTER (OUTPATIENT)
Dept: NON INVASIVE DIAGNOSTICS | Age: 68
Discharge: HOME | End: 2018-04-17
Payer: MEDICARE

## 2018-04-17 ENCOUNTER — HOSPITAL ENCOUNTER (OUTPATIENT)
Dept: NUCLEAR MEDICINE | Age: 68
Discharge: HOME | End: 2018-04-19
Payer: MEDICARE

## 2018-04-17 DIAGNOSIS — R07.2 PRECORDIAL PAIN: ICD-10-CM

## 2018-04-17 LAB
LV EF: 60 %
LVEF MODALITY: NORMAL

## 2018-04-17 PROCEDURE — 3430000000 HC RX DIAGNOSTIC RADIOPHARMACEUTICAL: Performed by: INTERNAL MEDICINE

## 2018-04-17 PROCEDURE — 2580000003 HC RX 258: Performed by: INTERNAL MEDICINE

## 2018-04-17 PROCEDURE — 93306 TTE W/DOPPLER COMPLETE: CPT

## 2018-04-17 PROCEDURE — A9502 TC99M TETROFOSMIN: HCPCS | Performed by: INTERNAL MEDICINE

## 2018-04-17 PROCEDURE — 78452 HT MUSCLE IMAGE SPECT MULT: CPT

## 2018-04-17 PROCEDURE — 93017 CV STRESS TEST TRACING ONLY: CPT

## 2018-04-17 RX ORDER — SODIUM CHLORIDE 0.9 % (FLUSH) 0.9 %
10 SYRINGE (ML) INJECTION PRN
Status: DISCONTINUED | OUTPATIENT
Start: 2018-04-17 | End: 2018-04-20 | Stop reason: HOSPADM

## 2018-04-17 RX ADMIN — TETROFOSMIN 12 MILLICURIE: 0.23 INJECTION, POWDER, LYOPHILIZED, FOR SOLUTION INTRAVENOUS at 07:55

## 2018-04-17 RX ADMIN — Medication 10 ML: at 09:54

## 2018-04-17 RX ADMIN — TETROFOSMIN 33.03 MILLICURIE: 0.23 INJECTION, POWDER, LYOPHILIZED, FOR SOLUTION INTRAVENOUS at 09:35

## 2018-04-17 RX ADMIN — Medication 10 ML: at 07:55

## 2018-04-19 PROCEDURE — 93018 CV STRESS TEST I&R ONLY: CPT | Performed by: INTERNAL MEDICINE

## 2018-04-27 ENCOUNTER — OFFICE VISIT (OUTPATIENT)
Dept: CARDIOLOGY CLINIC | Age: 68
End: 2018-04-27
Payer: MEDICARE

## 2018-04-27 ENCOUNTER — HOSPITAL ENCOUNTER (EMERGENCY)
Age: 68
Discharge: HOME | End: 2018-04-27
Attending: EMERGENCY MEDICINE
Payer: MEDICARE

## 2018-04-27 VITALS
SYSTOLIC BLOOD PRESSURE: 128 MMHG | DIASTOLIC BLOOD PRESSURE: 86 MMHG | HEIGHT: 65 IN | BODY MASS INDEX: 23.26 KG/M2 | HEART RATE: 67 BPM | TEMPERATURE: 97.5 F | OXYGEN SATURATION: 98 % | WEIGHT: 139.6 LBS | RESPIRATION RATE: 20 BRPM

## 2018-04-27 VITALS
RESPIRATION RATE: 18 BRPM | SYSTOLIC BLOOD PRESSURE: 166 MMHG | DIASTOLIC BLOOD PRESSURE: 88 MMHG | HEART RATE: 59 BPM | HEIGHT: 65 IN | BODY MASS INDEX: 23.32 KG/M2 | WEIGHT: 140 LBS | OXYGEN SATURATION: 99 % | TEMPERATURE: 98.2 F

## 2018-04-27 DIAGNOSIS — R07.2 PRECORDIAL PAIN: ICD-10-CM

## 2018-04-27 DIAGNOSIS — F41.9 ANXIETY: Primary | ICD-10-CM

## 2018-04-27 DIAGNOSIS — F41.1 ANXIETY STATE: Primary | ICD-10-CM

## 2018-04-27 DIAGNOSIS — I10 ESSENTIAL HYPERTENSION: ICD-10-CM

## 2018-04-27 LAB
ALBUMIN SERPL-MCNC: 4.7 G/DL (ref 3.9–4.9)
ALP BLD-CCNC: 86 U/L (ref 35–104)
ALT SERPL-CCNC: 15 U/L (ref 0–41)
ANION GAP SERPL CALCULATED.3IONS-SCNC: 17 MEQ/L (ref 7–13)
AST SERPL-CCNC: 13 U/L (ref 0–40)
BILIRUB SERPL-MCNC: 1.1 MG/DL (ref 0–1.2)
BUN BLDV-MCNC: 13 MG/DL (ref 8–23)
CALCIUM SERPL-MCNC: 9.4 MG/DL (ref 8.6–10.2)
CHLORIDE BLD-SCNC: 102 MEQ/L (ref 98–107)
CO2: 19 MEQ/L (ref 22–29)
CREAT SERPL-MCNC: 0.83 MG/DL (ref 0.7–1.2)
EKG ATRIAL RATE: 55 BPM
EKG ATRIAL RATE: 56 BPM
EKG P AXIS: 1 DEGREES
EKG P AXIS: 55 DEGREES
EKG P-R INTERVAL: 162 MS
EKG P-R INTERVAL: 172 MS
EKG Q-T INTERVAL: 466 MS
EKG Q-T INTERVAL: 484 MS
EKG QRS DURATION: 100 MS
EKG QRS DURATION: 98 MS
EKG QTC CALCULATION (BAZETT): 449 MS
EKG QTC CALCULATION (BAZETT): 463 MS
EKG R AXIS: 12 DEGREES
EKG R AXIS: 43 DEGREES
EKG T AXIS: -7 DEGREES
EKG T AXIS: 66 DEGREES
EKG VENTRICULAR RATE: 55 BPM
EKG VENTRICULAR RATE: 56 BPM
GFR AFRICAN AMERICAN: >60
GFR NON-AFRICAN AMERICAN: >60
GLOBULIN: 2.7 G/DL (ref 2.3–3.5)
GLUCOSE BLD-MCNC: 101 MG/DL (ref 74–109)
HCT VFR BLD CALC: 44.9 % (ref 42–52)
HEMOGLOBIN: 16.1 G/DL (ref 14–18)
MCH RBC QN AUTO: 30.8 PG (ref 27–31.3)
MCHC RBC AUTO-ENTMCNC: 35.9 % (ref 33–37)
MCV RBC AUTO: 85.6 FL (ref 80–100)
PDW BLD-RTO: 14.3 % (ref 11.5–14.5)
PLATELET # BLD: 213 K/UL (ref 130–400)
POTASSIUM SERPL-SCNC: 3.9 MEQ/L (ref 3.5–5.1)
RBC # BLD: 5.25 M/UL (ref 4.7–6.1)
SODIUM BLD-SCNC: 138 MEQ/L (ref 132–144)
TOTAL PROTEIN: 7.4 G/DL (ref 6.4–8.1)
TROPONIN: <0.01 NG/ML (ref 0–0.01)
WBC # BLD: 8.8 K/UL (ref 4.8–10.8)

## 2018-04-27 PROCEDURE — 36415 COLL VENOUS BLD VENIPUNCTURE: CPT

## 2018-04-27 PROCEDURE — 93005 ELECTROCARDIOGRAM TRACING: CPT

## 2018-04-27 PROCEDURE — 80053 COMPREHEN METABOLIC PANEL: CPT

## 2018-04-27 PROCEDURE — 93000 ELECTROCARDIOGRAM COMPLETE: CPT | Performed by: INTERNAL MEDICINE

## 2018-04-27 PROCEDURE — 85027 COMPLETE CBC AUTOMATED: CPT

## 2018-04-27 PROCEDURE — 99215 OFFICE O/P EST HI 40 MIN: CPT | Performed by: INTERNAL MEDICINE

## 2018-04-27 PROCEDURE — 6360000002 HC RX W HCPCS: Performed by: EMERGENCY MEDICINE

## 2018-04-27 PROCEDURE — 84484 ASSAY OF TROPONIN QUANT: CPT

## 2018-04-27 PROCEDURE — 96374 THER/PROPH/DIAG INJ IV PUSH: CPT

## 2018-04-27 PROCEDURE — 99283 EMERGENCY DEPT VISIT LOW MDM: CPT

## 2018-04-27 PROCEDURE — 93010 ELECTROCARDIOGRAM REPORT: CPT | Performed by: INTERNAL MEDICINE

## 2018-04-27 RX ORDER — CLONAZEPAM 0.5 MG/1
0.25 TABLET ORAL 2 TIMES DAILY PRN
Qty: 14 TABLET | Refills: 0 | Status: SHIPPED | OUTPATIENT
Start: 2018-04-27 | End: 2019-01-24 | Stop reason: SDUPTHER

## 2018-04-27 RX ORDER — LORAZEPAM 2 MG/ML
1 INJECTION INTRAMUSCULAR ONCE
Status: COMPLETED | OUTPATIENT
Start: 2018-04-27 | End: 2018-04-27

## 2018-04-27 RX ORDER — ISOSORBIDE MONONITRATE 30 MG/1
30 TABLET, EXTENDED RELEASE ORAL DAILY
Qty: 30 TABLET | Refills: 3 | Status: SHIPPED | OUTPATIENT
Start: 2018-04-27 | End: 2018-05-11

## 2018-04-27 RX ADMIN — LORAZEPAM 1 MG: 2 INJECTION INTRAMUSCULAR; INTRAVENOUS at 11:16

## 2018-04-27 ASSESSMENT — PAIN DESCRIPTION - DESCRIPTORS: DESCRIPTORS: TIGHTNESS

## 2018-04-27 ASSESSMENT — PAIN DESCRIPTION - LOCATION: LOCATION: CHEST

## 2018-04-27 ASSESSMENT — ENCOUNTER SYMPTOMS
COLOR CHANGE: 0
RHINORRHEA: 0
EYE DISCHARGE: 0
VOMITING: 0
CHEST TIGHTNESS: 1
SHORTNESS OF BREATH: 0
ABDOMINAL PAIN: 0
FACIAL SWELLING: 0

## 2018-04-27 ASSESSMENT — PAIN DESCRIPTION - PAIN TYPE: TYPE: ACUTE PAIN

## 2018-04-27 ASSESSMENT — PAIN DESCRIPTION - ORIENTATION: ORIENTATION: MID

## 2018-04-27 ASSESSMENT — PAIN SCALES - GENERAL: PAINLEVEL_OUTOF10: 10

## 2018-05-01 ENCOUNTER — OFFICE VISIT (OUTPATIENT)
Dept: INTERNAL MEDICINE CLINIC | Age: 68
End: 2018-05-01
Payer: MEDICARE

## 2018-05-01 VITALS
TEMPERATURE: 97.6 F | HEIGHT: 65 IN | HEART RATE: 59 BPM | OXYGEN SATURATION: 100 % | BODY MASS INDEX: 23.82 KG/M2 | SYSTOLIC BLOOD PRESSURE: 128 MMHG | DIASTOLIC BLOOD PRESSURE: 74 MMHG | WEIGHT: 143 LBS

## 2018-05-01 DIAGNOSIS — F41.1 GAD (GENERALIZED ANXIETY DISORDER): Primary | ICD-10-CM

## 2018-05-01 DIAGNOSIS — J34.89 NASAL CONGESTION WITH RHINORRHEA: ICD-10-CM

## 2018-05-01 DIAGNOSIS — G47.00 INSOMNIA, UNSPECIFIED TYPE: ICD-10-CM

## 2018-05-01 DIAGNOSIS — Z00.00 HEALTH MAINTENANCE EXAMINATION: ICD-10-CM

## 2018-05-01 DIAGNOSIS — R09.81 NASAL CONGESTION WITH RHINORRHEA: ICD-10-CM

## 2018-05-01 DIAGNOSIS — Z86.59 HISTORY OF PANIC ATTACKS: ICD-10-CM

## 2018-05-01 PROCEDURE — 99214 OFFICE O/P EST MOD 30 MIN: CPT | Performed by: FAMILY MEDICINE

## 2018-05-01 RX ORDER — ZOLPIDEM TARTRATE 5 MG/1
5 TABLET ORAL NIGHTLY PRN
COMMUNITY
End: 2018-05-09

## 2018-05-01 RX ORDER — LANCETS 33 GAUGE
EACH MISCELLANEOUS
Status: ON HOLD | COMMUNITY
Start: 2018-04-27

## 2018-05-01 RX ORDER — ESCITALOPRAM OXALATE 10 MG/1
TABLET ORAL
Qty: 30 TABLET | Refills: 0 | Status: SHIPPED | OUTPATIENT
Start: 2018-05-01 | End: 2018-05-29 | Stop reason: SDUPTHER

## 2018-05-01 RX ORDER — LORAZEPAM 0.5 MG/1
TABLET ORAL
Qty: 28 TABLET | Refills: 0 | Status: SHIPPED | OUTPATIENT
Start: 2018-05-01 | End: 2018-06-01

## 2018-05-01 RX ORDER — LORAZEPAM 0.5 MG/1
TABLET ORAL
Qty: 35 TABLET | Refills: 0 | Status: SHIPPED | OUTPATIENT
Start: 2018-05-01 | End: 2018-05-01 | Stop reason: SDUPTHER

## 2018-05-01 RX ORDER — HYDROXYZINE PAMOATE 25 MG/1
25 CAPSULE ORAL NIGHTLY PRN
Qty: 90 CAPSULE | Refills: 0 | Status: SHIPPED | OUTPATIENT
Start: 2018-05-01 | End: 2018-06-16 | Stop reason: ALTCHOICE

## 2018-05-01 RX ORDER — BLOOD SUGAR DIAGNOSTIC
STRIP MISCELLANEOUS
Status: ON HOLD | COMMUNITY
Start: 2018-04-27

## 2018-05-01 RX ORDER — FLUTICASONE PROPIONATE 50 MCG
2 SPRAY, SUSPENSION (ML) NASAL DAILY
Qty: 1 BOTTLE | Refills: 2 | Status: SHIPPED | OUTPATIENT
Start: 2018-05-01 | End: 2019-08-06 | Stop reason: ALTCHOICE

## 2018-05-01 RX ORDER — BLOOD-GLUCOSE METER
EACH MISCELLANEOUS
Status: ON HOLD | COMMUNITY
Start: 2018-04-27

## 2018-05-01 RX ORDER — LORAZEPAM 0.5 MG/1
TABLET ORAL
Qty: 28 TABLET | Refills: 0 | Status: SHIPPED | OUTPATIENT
Start: 2018-05-01 | End: 2018-05-01 | Stop reason: SDUPTHER

## 2018-05-01 ASSESSMENT — PATIENT HEALTH QUESTIONNAIRE - PHQ9
3. TROUBLE FALLING OR STAYING ASLEEP: 3
10. IF YOU CHECKED OFF ANY PROBLEMS, HOW DIFFICULT HAVE THESE PROBLEMS MADE IT FOR YOU TO DO YOUR WORK, TAKE CARE OF THINGS AT HOME, OR GET ALONG WITH OTHER PEOPLE: 0
4. FEELING TIRED OR HAVING LITTLE ENERGY: 0
1. LITTLE INTEREST OR PLEASURE IN DOING THINGS: 2
6. FEELING BAD ABOUT YOURSELF - OR THAT YOU ARE A FAILURE OR HAVE LET YOURSELF OR YOUR FAMILY DOWN: 0
2. FEELING DOWN, DEPRESSED OR HOPELESS: 0
7. TROUBLE CONCENTRATING ON THINGS, SUCH AS READING THE NEWSPAPER OR WATCHING TELEVISION: 1
8. MOVING OR SPEAKING SO SLOWLY THAT OTHER PEOPLE COULD HAVE NOTICED. OR THE OPPOSITE, BEING SO FIGETY OR RESTLESS THAT YOU HAVE BEEN MOVING AROUND A LOT MORE THAN USUAL: 0
9. THOUGHTS THAT YOU WOULD BE BETTER OFF DEAD, OR OF HURTING YOURSELF: 0
SUM OF ALL RESPONSES TO PHQ QUESTIONS 1-9: 6
SUM OF ALL RESPONSES TO PHQ9 QUESTIONS 1 & 2: 2
5. POOR APPETITE OR OVEREATING: 0

## 2018-05-01 ASSESSMENT — ENCOUNTER SYMPTOMS
CHEST TIGHTNESS: 1
SORE THROAT: 0
RHINORRHEA: 1
SINUS PRESSURE: 0
VOMITING: 0
EYE DISCHARGE: 0
SHORTNESS OF BREATH: 0

## 2018-05-09 ENCOUNTER — OFFICE VISIT (OUTPATIENT)
Dept: INTERNAL MEDICINE CLINIC | Age: 68
End: 2018-05-09
Payer: MEDICARE

## 2018-05-09 VITALS
BODY MASS INDEX: 23.93 KG/M2 | DIASTOLIC BLOOD PRESSURE: 77 MMHG | HEART RATE: 76 BPM | HEIGHT: 65 IN | TEMPERATURE: 98.5 F | WEIGHT: 143.6 LBS | OXYGEN SATURATION: 99 % | SYSTOLIC BLOOD PRESSURE: 116 MMHG

## 2018-05-09 DIAGNOSIS — G47.00 INSOMNIA, UNSPECIFIED TYPE: ICD-10-CM

## 2018-05-09 DIAGNOSIS — J30.9 ALLERGIC CONJUNCTIVITIS AND RHINITIS, BILATERAL: ICD-10-CM

## 2018-05-09 DIAGNOSIS — H61.21 RIGHT EAR IMPACTED CERUMEN: ICD-10-CM

## 2018-05-09 DIAGNOSIS — H10.13 ALLERGIC CONJUNCTIVITIS AND RHINITIS, BILATERAL: ICD-10-CM

## 2018-05-09 DIAGNOSIS — F41.1 GAD (GENERALIZED ANXIETY DISORDER): ICD-10-CM

## 2018-05-09 DIAGNOSIS — Z00.00 ENCOUNTER FOR HEALTH MAINTENANCE EXAMINATION: ICD-10-CM

## 2018-05-09 DIAGNOSIS — Z86.59 HISTORY OF PANIC ATTACKS: ICD-10-CM

## 2018-05-09 DIAGNOSIS — G47.00 INSOMNIA, UNSPECIFIED TYPE: Primary | ICD-10-CM

## 2018-05-09 DIAGNOSIS — F41.1 GENERALIZED ANXIETY DISORDER: ICD-10-CM

## 2018-05-09 LAB
AMPHETAMINE SCREEN, URINE: NORMAL
BARBITURATE SCREEN URINE: NORMAL
BENZODIAZEPINE SCREEN, URINE: NORMAL
CANNABINOID SCREEN URINE: NORMAL
COCAINE METABOLITE SCREEN URINE: NORMAL
Lab: NORMAL
OPIATE SCREEN URINE: NORMAL
PHENCYCLIDINE SCREEN URINE: NORMAL

## 2018-05-09 PROCEDURE — 99214 OFFICE O/P EST MOD 30 MIN: CPT | Performed by: FAMILY MEDICINE

## 2018-05-09 RX ORDER — OLOPATADINE HYDROCHLORIDE 2 MG/ML
1 SOLUTION/ DROPS OPHTHALMIC DAILY
Qty: 1 BOTTLE | Refills: 1 | Status: SHIPPED | OUTPATIENT
Start: 2018-05-09 | End: 2022-10-14

## 2018-05-09 ASSESSMENT — ENCOUNTER SYMPTOMS
COUGH: 1
SINUS PAIN: 0
SHORTNESS OF BREATH: 0
SORE THROAT: 0
SINUS PRESSURE: 0
RHINORRHEA: 1

## 2018-05-11 ENCOUNTER — OFFICE VISIT (OUTPATIENT)
Dept: CARDIOLOGY CLINIC | Age: 68
End: 2018-05-11
Payer: MEDICARE

## 2018-05-11 VITALS
SYSTOLIC BLOOD PRESSURE: 132 MMHG | BODY MASS INDEX: 23.49 KG/M2 | WEIGHT: 141 LBS | DIASTOLIC BLOOD PRESSURE: 74 MMHG | HEART RATE: 62 BPM | OXYGEN SATURATION: 94 % | HEIGHT: 65 IN | RESPIRATION RATE: 16 BRPM

## 2018-05-11 DIAGNOSIS — I10 ESSENTIAL HYPERTENSION: ICD-10-CM

## 2018-05-11 DIAGNOSIS — F41.9 ANXIETY: ICD-10-CM

## 2018-05-11 DIAGNOSIS — R07.2 PRECORDIAL PAIN: Primary | ICD-10-CM

## 2018-05-11 PROCEDURE — 99213 OFFICE O/P EST LOW 20 MIN: CPT | Performed by: INTERNAL MEDICINE

## 2018-05-14 ENCOUNTER — HOSPITAL ENCOUNTER (EMERGENCY)
Age: 68
Discharge: HOME | End: 2018-05-14
Attending: EMERGENCY MEDICINE
Payer: MEDICARE

## 2018-05-14 VITALS
DIASTOLIC BLOOD PRESSURE: 76 MMHG | RESPIRATION RATE: 16 BRPM | HEIGHT: 66 IN | BODY MASS INDEX: 22.5 KG/M2 | HEART RATE: 70 BPM | TEMPERATURE: 98 F | SYSTOLIC BLOOD PRESSURE: 105 MMHG | OXYGEN SATURATION: 95 % | WEIGHT: 140 LBS

## 2018-05-14 DIAGNOSIS — F41.1 ANXIETY STATE: Primary | ICD-10-CM

## 2018-05-14 LAB
ALBUMIN SERPL-MCNC: 4.6 G/DL (ref 3.9–4.9)
ALP BLD-CCNC: 84 U/L (ref 35–104)
ALT SERPL-CCNC: 12 U/L (ref 0–41)
ANION GAP SERPL CALCULATED.3IONS-SCNC: 17 MEQ/L (ref 7–13)
AST SERPL-CCNC: 12 U/L (ref 0–40)
BASOPHILS ABSOLUTE: 0.1 K/UL (ref 0–0.2)
BASOPHILS RELATIVE PERCENT: 0.9 %
BILIRUB SERPL-MCNC: 1.3 MG/DL (ref 0–1.2)
BUN BLDV-MCNC: 15 MG/DL (ref 8–23)
CALCIUM SERPL-MCNC: 9.1 MG/DL (ref 8.6–10.2)
CHLORIDE BLD-SCNC: 104 MEQ/L (ref 98–107)
CO2: 19 MEQ/L (ref 22–29)
CREAT SERPL-MCNC: 0.95 MG/DL (ref 0.7–1.2)
EOSINOPHILS ABSOLUTE: 0 K/UL (ref 0–0.7)
EOSINOPHILS RELATIVE PERCENT: 0.6 %
GFR AFRICAN AMERICAN: >60
GFR NON-AFRICAN AMERICAN: >60
GLOBULIN: 2.7 G/DL (ref 2.3–3.5)
GLUCOSE BLD-MCNC: 125 MG/DL (ref 74–109)
HCT VFR BLD CALC: 45 % (ref 42–52)
HEMOGLOBIN: 16 G/DL (ref 14–18)
LYMPHOCYTES ABSOLUTE: 1.3 K/UL (ref 1–4.8)
LYMPHOCYTES RELATIVE PERCENT: 16.9 %
MCH RBC QN AUTO: 31 PG (ref 27–31.3)
MCHC RBC AUTO-ENTMCNC: 35.6 % (ref 33–37)
MCV RBC AUTO: 87 FL (ref 80–100)
MONOCYTES ABSOLUTE: 0.5 K/UL (ref 0.2–0.8)
MONOCYTES RELATIVE PERCENT: 6.7 %
NEUTROPHILS ABSOLUTE: 5.8 K/UL (ref 1.4–6.5)
NEUTROPHILS RELATIVE PERCENT: 74.9 %
PDW BLD-RTO: 14.4 % (ref 11.5–14.5)
PLATELET # BLD: 216 K/UL (ref 130–400)
POTASSIUM SERPL-SCNC: 4 MEQ/L (ref 3.5–5.1)
RBC # BLD: 5.18 M/UL (ref 4.7–6.1)
SODIUM BLD-SCNC: 140 MEQ/L (ref 132–144)
TOTAL PROTEIN: 7.3 G/DL (ref 6.4–8.1)
WBC # BLD: 7.7 K/UL (ref 4.8–10.8)

## 2018-05-14 PROCEDURE — 96374 THER/PROPH/DIAG INJ IV PUSH: CPT

## 2018-05-14 PROCEDURE — 99283 EMERGENCY DEPT VISIT LOW MDM: CPT

## 2018-05-14 PROCEDURE — 6360000002 HC RX W HCPCS: Performed by: EMERGENCY MEDICINE

## 2018-05-14 PROCEDURE — 36415 COLL VENOUS BLD VENIPUNCTURE: CPT

## 2018-05-14 PROCEDURE — 85025 COMPLETE CBC W/AUTO DIFF WBC: CPT

## 2018-05-14 PROCEDURE — 80053 COMPREHEN METABOLIC PANEL: CPT

## 2018-05-14 RX ORDER — LORAZEPAM 2 MG/ML
2 INJECTION INTRAMUSCULAR ONCE
Status: COMPLETED | OUTPATIENT
Start: 2018-05-14 | End: 2018-05-14

## 2018-05-14 RX ADMIN — LORAZEPAM 2 MG: 2 INJECTION INTRAMUSCULAR; INTRAVENOUS at 03:32

## 2018-05-14 ASSESSMENT — ENCOUNTER SYMPTOMS
ABDOMINAL DISTENTION: 0
SHORTNESS OF BREATH: 0
SORE THROAT: 0
VOMITING: 0
PHOTOPHOBIA: 0
CHEST TIGHTNESS: 0
EYE DISCHARGE: 0
WHEEZING: 0
COUGH: 0
ABDOMINAL PAIN: 0

## 2018-05-15 ENCOUNTER — OFFICE VISIT (OUTPATIENT)
Dept: INTERNAL MEDICINE CLINIC | Age: 68
End: 2018-05-15
Payer: MEDICARE

## 2018-05-15 VITALS
OXYGEN SATURATION: 98 % | HEART RATE: 93 BPM | DIASTOLIC BLOOD PRESSURE: 86 MMHG | BODY MASS INDEX: 24.59 KG/M2 | HEIGHT: 65 IN | TEMPERATURE: 99 F | SYSTOLIC BLOOD PRESSURE: 138 MMHG | WEIGHT: 147.6 LBS

## 2018-05-15 DIAGNOSIS — E11.9 TYPE 2 DIABETES MELLITUS WITHOUT COMPLICATION, WITHOUT LONG-TERM CURRENT USE OF INSULIN (HCC): ICD-10-CM

## 2018-05-15 DIAGNOSIS — F41.1 GAD (GENERALIZED ANXIETY DISORDER): ICD-10-CM

## 2018-05-15 DIAGNOSIS — R09.81 NASAL CONGESTION WITH RHINORRHEA: ICD-10-CM

## 2018-05-15 DIAGNOSIS — F41.1 GAD (GENERALIZED ANXIETY DISORDER): Primary | ICD-10-CM

## 2018-05-15 DIAGNOSIS — G47.00 INSOMNIA, UNSPECIFIED TYPE: ICD-10-CM

## 2018-05-15 DIAGNOSIS — J34.89 NASAL CONGESTION WITH RHINORRHEA: ICD-10-CM

## 2018-05-15 PROCEDURE — 82948 REAGENT STRIP/BLOOD GLUCOSE: CPT | Performed by: FAMILY MEDICINE

## 2018-05-15 PROCEDURE — 1111F DSCHRG MED/CURRENT MED MERGE: CPT | Performed by: FAMILY MEDICINE

## 2018-05-15 PROCEDURE — 99214 OFFICE O/P EST MOD 30 MIN: CPT | Performed by: FAMILY MEDICINE

## 2018-05-15 RX ORDER — CLONAZEPAM 0.5 MG/1
0.5 TABLET ORAL 2 TIMES DAILY PRN
Qty: 30 TABLET | Refills: 0 | Status: SHIPPED | OUTPATIENT
Start: 2018-05-15 | End: 2018-05-29 | Stop reason: SDUPTHER

## 2018-05-16 ENCOUNTER — OFFICE VISIT (OUTPATIENT)
Dept: INTERNAL MEDICINE CLINIC | Age: 68
End: 2018-05-16
Payer: MEDICARE

## 2018-05-16 VITALS
BODY MASS INDEX: 22.49 KG/M2 | OXYGEN SATURATION: 98 % | HEART RATE: 88 BPM | TEMPERATURE: 99.2 F | SYSTOLIC BLOOD PRESSURE: 151 MMHG | DIASTOLIC BLOOD PRESSURE: 95 MMHG | WEIGHT: 135 LBS | HEIGHT: 65 IN

## 2018-05-16 DIAGNOSIS — J30.9 ALLERGIC CONJUNCTIVITIS AND RHINITIS, BILATERAL: ICD-10-CM

## 2018-05-16 DIAGNOSIS — F41.1 GAD (GENERALIZED ANXIETY DISORDER): ICD-10-CM

## 2018-05-16 DIAGNOSIS — F41.0 PANIC ATTACKS: Primary | ICD-10-CM

## 2018-05-16 DIAGNOSIS — H10.13 ALLERGIC CONJUNCTIVITIS AND RHINITIS, BILATERAL: ICD-10-CM

## 2018-05-16 PROCEDURE — 99213 OFFICE O/P EST LOW 20 MIN: CPT | Performed by: FAMILY MEDICINE

## 2018-05-16 RX ORDER — OLOPATADINE HYDROCHLORIDE 2 MG/ML
1 SOLUTION/ DROPS OPHTHALMIC DAILY
Qty: 1 BOTTLE | Refills: 1 | Status: CANCELLED | OUTPATIENT
Start: 2018-05-16

## 2018-05-16 ASSESSMENT — ENCOUNTER SYMPTOMS
EYE REDNESS: 1
EYE DISCHARGE: 1
COUGH: 0
EYE ITCHING: 1

## 2018-05-27 ENCOUNTER — HOSPITAL ENCOUNTER (EMERGENCY)
Age: 68
Discharge: HOME | End: 2018-05-27
Attending: EMERGENCY MEDICINE
Payer: MEDICARE

## 2018-05-27 ENCOUNTER — APPOINTMENT (OUTPATIENT)
Dept: GENERAL RADIOLOGY | Age: 68
End: 2018-05-27
Payer: MEDICARE

## 2018-05-27 VITALS
RESPIRATION RATE: 18 BRPM | DIASTOLIC BLOOD PRESSURE: 91 MMHG | OXYGEN SATURATION: 99 % | TEMPERATURE: 97.9 F | HEART RATE: 72 BPM | SYSTOLIC BLOOD PRESSURE: 164 MMHG

## 2018-05-27 DIAGNOSIS — F41.1 ANXIETY STATE: ICD-10-CM

## 2018-05-27 DIAGNOSIS — R07.9 CHEST PAIN, UNSPECIFIED TYPE: Primary | ICD-10-CM

## 2018-05-27 LAB
ALBUMIN SERPL-MCNC: 4.1 G/DL (ref 3.9–4.9)
ALP BLD-CCNC: 76 U/L (ref 35–104)
ALT SERPL-CCNC: 13 U/L (ref 0–41)
AMPHETAMINE SCREEN, URINE: NORMAL
ANION GAP SERPL CALCULATED.3IONS-SCNC: 19 MEQ/L (ref 7–13)
AST SERPL-CCNC: 12 U/L (ref 0–40)
BARBITURATE SCREEN URINE: NORMAL
BASOPHILS ABSOLUTE: 0.1 K/UL (ref 0–0.2)
BASOPHILS RELATIVE PERCENT: 0.7 %
BENZODIAZEPINE SCREEN, URINE: NORMAL
BILIRUB SERPL-MCNC: 0.7 MG/DL (ref 0–1.2)
BUN BLDV-MCNC: 16 MG/DL (ref 8–23)
CALCIUM SERPL-MCNC: 8.8 MG/DL (ref 8.6–10.2)
CANNABINOID SCREEN URINE: NORMAL
CHLORIDE BLD-SCNC: 98 MEQ/L (ref 98–107)
CO2: 19 MEQ/L (ref 22–29)
COCAINE METABOLITE SCREEN URINE: NORMAL
CREAT SERPL-MCNC: 0.97 MG/DL (ref 0.7–1.2)
EKG ATRIAL RATE: 66 BPM
EKG P AXIS: 25 DEGREES
EKG P-R INTERVAL: 144 MS
EKG Q-T INTERVAL: 418 MS
EKG QRS DURATION: 106 MS
EKG QTC CALCULATION (BAZETT): 438 MS
EKG R AXIS: 70 DEGREES
EKG T AXIS: 55 DEGREES
EKG VENTRICULAR RATE: 66 BPM
EOSINOPHILS ABSOLUTE: 0 K/UL (ref 0–0.7)
EOSINOPHILS RELATIVE PERCENT: 0.3 %
ETHANOL PERCENT: NORMAL G/DL
ETHANOL: <10 MG/DL (ref 0–0.08)
GFR AFRICAN AMERICAN: >60
GFR NON-AFRICAN AMERICAN: >60
GLOBULIN: 2.4 G/DL (ref 2.3–3.5)
GLUCOSE BLD-MCNC: 186 MG/DL (ref 74–109)
HCT VFR BLD CALC: 44.8 % (ref 42–52)
HEMOGLOBIN: 15.3 G/DL (ref 14–18)
LYMPHOCYTES ABSOLUTE: 0.8 K/UL (ref 1–4.8)
LYMPHOCYTES RELATIVE PERCENT: 9.4 %
Lab: NORMAL
MCH RBC QN AUTO: 29.9 PG (ref 27–31.3)
MCHC RBC AUTO-ENTMCNC: 34.2 % (ref 33–37)
MCV RBC AUTO: 87.6 FL (ref 80–100)
MONOCYTES ABSOLUTE: 0.4 K/UL (ref 0.2–0.8)
MONOCYTES RELATIVE PERCENT: 4.3 %
NEUTROPHILS ABSOLUTE: 7.2 K/UL (ref 1.4–6.5)
NEUTROPHILS RELATIVE PERCENT: 85.3 %
OPIATE SCREEN URINE: NORMAL
PDW BLD-RTO: 14.5 % (ref 11.5–14.5)
PHENCYCLIDINE SCREEN URINE: NORMAL
PLATELET # BLD: 188 K/UL (ref 130–400)
POTASSIUM SERPL-SCNC: 3.9 MEQ/L (ref 3.5–5.1)
RBC # BLD: 5.11 M/UL (ref 4.7–6.1)
SODIUM BLD-SCNC: 136 MEQ/L (ref 132–144)
TOTAL PROTEIN: 6.5 G/DL (ref 6.4–8.1)
TROPONIN: <0.01 NG/ML (ref 0–0.01)
TSH SERPL DL<=0.05 MIU/L-ACNC: 4.14 UIU/ML (ref 0.27–4.2)
WBC # BLD: 8.4 K/UL (ref 4.8–10.8)

## 2018-05-27 PROCEDURE — 84484 ASSAY OF TROPONIN QUANT: CPT

## 2018-05-27 PROCEDURE — 6360000002 HC RX W HCPCS: Performed by: EMERGENCY MEDICINE

## 2018-05-27 PROCEDURE — G0480 DRUG TEST DEF 1-7 CLASSES: HCPCS

## 2018-05-27 PROCEDURE — 71045 X-RAY EXAM CHEST 1 VIEW: CPT

## 2018-05-27 PROCEDURE — 96375 TX/PRO/DX INJ NEW DRUG ADDON: CPT

## 2018-05-27 PROCEDURE — 80307 DRUG TEST PRSMV CHEM ANLYZR: CPT

## 2018-05-27 PROCEDURE — 80053 COMPREHEN METABOLIC PANEL: CPT

## 2018-05-27 PROCEDURE — 36415 COLL VENOUS BLD VENIPUNCTURE: CPT

## 2018-05-27 PROCEDURE — 84443 ASSAY THYROID STIM HORMONE: CPT

## 2018-05-27 PROCEDURE — 99285 EMERGENCY DEPT VISIT HI MDM: CPT

## 2018-05-27 PROCEDURE — 6370000000 HC RX 637 (ALT 250 FOR IP): Performed by: EMERGENCY MEDICINE

## 2018-05-27 PROCEDURE — 85025 COMPLETE CBC W/AUTO DIFF WBC: CPT

## 2018-05-27 PROCEDURE — 93005 ELECTROCARDIOGRAM TRACING: CPT

## 2018-05-27 PROCEDURE — 96374 THER/PROPH/DIAG INJ IV PUSH: CPT

## 2018-05-27 RX ORDER — KETOROLAC TROMETHAMINE 30 MG/ML
30 INJECTION, SOLUTION INTRAMUSCULAR; INTRAVENOUS ONCE
Status: COMPLETED | OUTPATIENT
Start: 2018-05-27 | End: 2018-05-27

## 2018-05-27 RX ORDER — LORAZEPAM 2 MG/ML
1 INJECTION INTRAMUSCULAR ONCE
Status: COMPLETED | OUTPATIENT
Start: 2018-05-27 | End: 2018-05-27

## 2018-05-27 RX ORDER — LORAZEPAM 1 MG/1
1 TABLET ORAL ONCE
Status: COMPLETED | OUTPATIENT
Start: 2018-05-27 | End: 2018-05-27

## 2018-05-27 RX ORDER — ASPIRIN 81 MG/1
324 TABLET, CHEWABLE ORAL ONCE
Status: COMPLETED | OUTPATIENT
Start: 2018-05-27 | End: 2018-05-27

## 2018-05-27 RX ADMIN — LORAZEPAM 1 MG: 2 INJECTION INTRAMUSCULAR; INTRAVENOUS at 16:49

## 2018-05-27 RX ADMIN — LORAZEPAM 1 MG: 1 TABLET ORAL at 18:16

## 2018-05-27 RX ADMIN — ASPIRIN 81 MG 324 MG: 81 TABLET ORAL at 16:48

## 2018-05-27 RX ADMIN — KETOROLAC TROMETHAMINE 30 MG: 30 INJECTION, SOLUTION INTRAMUSCULAR at 17:30

## 2018-05-27 ASSESSMENT — ENCOUNTER SYMPTOMS
EYE PAIN: 0
SHORTNESS OF BREATH: 0
VOMITING: 0
CHEST TIGHTNESS: 0
NAUSEA: 0
COUGH: 0
ABDOMINAL PAIN: 0
SORE THROAT: 0

## 2018-05-27 ASSESSMENT — PAIN DESCRIPTION - LOCATION
LOCATION: CHEST
LOCATION: CHEST

## 2018-05-27 ASSESSMENT — PAIN SCALES - GENERAL
PAINLEVEL_OUTOF10: 9
PAINLEVEL_OUTOF10: 6
PAINLEVEL_OUTOF10: 9

## 2018-05-27 ASSESSMENT — PAIN DESCRIPTION - PAIN TYPE
TYPE: ACUTE PAIN;CHRONIC PAIN
TYPE: CHRONIC PAIN;ACUTE PAIN

## 2018-05-27 ASSESSMENT — PAIN DESCRIPTION - ORIENTATION
ORIENTATION: MID
ORIENTATION: MID

## 2018-05-29 ENCOUNTER — OFFICE VISIT (OUTPATIENT)
Dept: INTERNAL MEDICINE CLINIC | Age: 68
End: 2018-05-29
Payer: MEDICARE

## 2018-05-29 VITALS
BODY MASS INDEX: 23.19 KG/M2 | HEIGHT: 65 IN | DIASTOLIC BLOOD PRESSURE: 62 MMHG | OXYGEN SATURATION: 100 % | TEMPERATURE: 98.6 F | SYSTOLIC BLOOD PRESSURE: 112 MMHG | WEIGHT: 139.2 LBS | HEART RATE: 77 BPM

## 2018-05-29 DIAGNOSIS — F41.1 GAD (GENERALIZED ANXIETY DISORDER): ICD-10-CM

## 2018-05-29 DIAGNOSIS — F33.40 RECURRENT MAJOR DEPRESSIVE DISORDER, IN REMISSION (HCC): ICD-10-CM

## 2018-05-29 DIAGNOSIS — F41.9 INSOMNIA SECONDARY TO ANXIETY: ICD-10-CM

## 2018-05-29 DIAGNOSIS — F51.05 INSOMNIA SECONDARY TO ANXIETY: ICD-10-CM

## 2018-05-29 DIAGNOSIS — R06.02 SOB (SHORTNESS OF BREATH): Primary | ICD-10-CM

## 2018-05-29 PROCEDURE — 99214 OFFICE O/P EST MOD 30 MIN: CPT | Performed by: FAMILY MEDICINE

## 2018-05-29 RX ORDER — ESCITALOPRAM OXALATE 10 MG/1
10 TABLET ORAL DAILY
Qty: 30 TABLET | Refills: 0 | Status: SHIPPED | OUTPATIENT
Start: 2018-05-29 | End: 2019-01-24 | Stop reason: ALTCHOICE

## 2018-05-29 RX ORDER — CLONAZEPAM 0.5 MG/1
0.5 TABLET ORAL 2 TIMES DAILY PRN
Qty: 30 TABLET | Refills: 0 | Status: ON HOLD | OUTPATIENT
Start: 2018-05-29 | End: 2021-09-09 | Stop reason: HOSPADM

## 2018-05-29 ASSESSMENT — ENCOUNTER SYMPTOMS
NAUSEA: 0
COUGH: 0
DIARRHEA: 0
CONSTIPATION: 0
SHORTNESS OF BREATH: 1

## 2018-05-30 PROCEDURE — 93010 ELECTROCARDIOGRAM REPORT: CPT | Performed by: INTERNAL MEDICINE

## 2018-06-16 ENCOUNTER — HOSPITAL ENCOUNTER (EMERGENCY)
Age: 68
Discharge: HOME | End: 2018-06-16
Attending: STUDENT IN AN ORGANIZED HEALTH CARE EDUCATION/TRAINING PROGRAM
Payer: MEDICARE

## 2018-06-16 ENCOUNTER — APPOINTMENT (OUTPATIENT)
Dept: GENERAL RADIOLOGY | Age: 68
End: 2018-06-16
Payer: MEDICARE

## 2018-06-16 VITALS
HEIGHT: 65 IN | SYSTOLIC BLOOD PRESSURE: 124 MMHG | BODY MASS INDEX: 23.16 KG/M2 | DIASTOLIC BLOOD PRESSURE: 70 MMHG | WEIGHT: 139 LBS | RESPIRATION RATE: 16 BRPM | HEART RATE: 57 BPM | TEMPERATURE: 98.7 F | OXYGEN SATURATION: 98 %

## 2018-06-16 DIAGNOSIS — I20.9 ANGINA PECTORIS (HCC): Primary | ICD-10-CM

## 2018-06-16 DIAGNOSIS — R94.31 ABNORMAL EKG: ICD-10-CM

## 2018-06-16 DIAGNOSIS — F13.20 BENZODIAZEPINE DEPENDENCE, CONTINUOUS (HCC): ICD-10-CM

## 2018-06-16 LAB
ALBUMIN SERPL-MCNC: 4.3 G/DL (ref 3.9–4.9)
ALP BLD-CCNC: 81 U/L (ref 35–104)
ALT SERPL-CCNC: 14 U/L (ref 0–41)
ANION GAP SERPL CALCULATED.3IONS-SCNC: 17 MEQ/L (ref 7–13)
APTT: 26.4 SEC (ref 21.6–35.4)
AST SERPL-CCNC: 12 U/L (ref 0–40)
BASOPHILS ABSOLUTE: 0 K/UL (ref 0–0.2)
BASOPHILS RELATIVE PERCENT: 0.7 %
BILIRUB SERPL-MCNC: 0.9 MG/DL (ref 0–1.2)
BUN BLDV-MCNC: 18 MG/DL (ref 8–23)
C-REACTIVE PROTEIN, HIGH SENSITIVITY: 1.8 MG/L (ref 0–5)
CALCIUM SERPL-MCNC: 9.6 MG/DL (ref 8.6–10.2)
CHLORIDE BLD-SCNC: 101 MEQ/L (ref 98–107)
CHOLESTEROL, TOTAL: 196 MG/DL (ref 0–199)
CO2: 20 MEQ/L (ref 22–29)
CREAT SERPL-MCNC: 0.83 MG/DL (ref 0.7–1.2)
EKG ATRIAL RATE: 56 BPM
EKG ATRIAL RATE: 73 BPM
EKG P AXIS: 48 DEGREES
EKG P AXIS: 57 DEGREES
EKG P-R INTERVAL: 178 MS
EKG P-R INTERVAL: 194 MS
EKG Q-T INTERVAL: 432 MS
EKG Q-T INTERVAL: 480 MS
EKG QRS DURATION: 102 MS
EKG QRS DURATION: 106 MS
EKG QTC CALCULATION (BAZETT): 463 MS
EKG QTC CALCULATION (BAZETT): 475 MS
EKG R AXIS: 72 DEGREES
EKG R AXIS: 78 DEGREES
EKG T AXIS: 102 DEGREES
EKG T AXIS: 93 DEGREES
EKG VENTRICULAR RATE: 56 BPM
EKG VENTRICULAR RATE: 73 BPM
EOSINOPHILS ABSOLUTE: 0 K/UL (ref 0–0.7)
EOSINOPHILS RELATIVE PERCENT: 0.6 %
GFR AFRICAN AMERICAN: >60
GFR NON-AFRICAN AMERICAN: >60
GLOBULIN: 2.9 G/DL (ref 2.3–3.5)
GLUCOSE BLD-MCNC: 187 MG/DL (ref 74–109)
HCT VFR BLD CALC: 43.5 % (ref 42–52)
HDLC SERPL-MCNC: 34 MG/DL (ref 40–59)
HEMOGLOBIN: 15.3 G/DL (ref 14–18)
INR BLD: 1.1
LDL CHOLESTEROL CALCULATED: 124 MG/DL (ref 0–129)
LYMPHOCYTES ABSOLUTE: 1.1 K/UL (ref 1–4.8)
LYMPHOCYTES RELATIVE PERCENT: 16.8 %
MAGNESIUM: 2 MG/DL (ref 1.7–2.3)
MCH RBC QN AUTO: 31 PG (ref 27–31.3)
MCHC RBC AUTO-ENTMCNC: 35.2 % (ref 33–37)
MCV RBC AUTO: 88.2 FL (ref 80–100)
MONOCYTES ABSOLUTE: 0.4 K/UL (ref 0.2–0.8)
MONOCYTES RELATIVE PERCENT: 5.9 %
NEUTROPHILS ABSOLUTE: 5.1 K/UL (ref 1.4–6.5)
NEUTROPHILS RELATIVE PERCENT: 76 %
PDW BLD-RTO: 14.8 % (ref 11.5–14.5)
PLATELET # BLD: 196 K/UL (ref 130–400)
POTASSIUM SERPL-SCNC: 3.8 MEQ/L (ref 3.5–5.1)
PRO-BNP: 792 PG/ML
PROTHROMBIN TIME: 11 SEC (ref 9.6–12.3)
RBC # BLD: 4.94 M/UL (ref 4.7–6.1)
SODIUM BLD-SCNC: 138 MEQ/L (ref 132–144)
TOTAL CK: 46 U/L (ref 0–190)
TOTAL PROTEIN: 7.2 G/DL (ref 6.4–8.1)
TRIGL SERPL-MCNC: 190 MG/DL (ref 0–200)
TROPONIN: <0.01 NG/ML (ref 0–0.01)
TROPONIN: <0.01 NG/ML (ref 0–0.01)
TSH SERPL DL<=0.05 MIU/L-ACNC: 2.71 UIU/ML (ref 0.27–4.2)
WBC # BLD: 6.7 K/UL (ref 4.8–10.8)

## 2018-06-16 PROCEDURE — 83735 ASSAY OF MAGNESIUM: CPT

## 2018-06-16 PROCEDURE — 6370000000 HC RX 637 (ALT 250 FOR IP): Performed by: STUDENT IN AN ORGANIZED HEALTH CARE EDUCATION/TRAINING PROGRAM

## 2018-06-16 PROCEDURE — 99285 EMERGENCY DEPT VISIT HI MDM: CPT

## 2018-06-16 PROCEDURE — 6360000002 HC RX W HCPCS: Performed by: STUDENT IN AN ORGANIZED HEALTH CARE EDUCATION/TRAINING PROGRAM

## 2018-06-16 PROCEDURE — 80053 COMPREHEN METABOLIC PANEL: CPT

## 2018-06-16 PROCEDURE — 85610 PROTHROMBIN TIME: CPT

## 2018-06-16 PROCEDURE — 36415 COLL VENOUS BLD VENIPUNCTURE: CPT

## 2018-06-16 PROCEDURE — 71046 X-RAY EXAM CHEST 2 VIEWS: CPT

## 2018-06-16 PROCEDURE — 96375 TX/PRO/DX INJ NEW DRUG ADDON: CPT

## 2018-06-16 PROCEDURE — 80061 LIPID PANEL: CPT

## 2018-06-16 PROCEDURE — 93005 ELECTROCARDIOGRAM TRACING: CPT

## 2018-06-16 PROCEDURE — 96374 THER/PROPH/DIAG INJ IV PUSH: CPT

## 2018-06-16 PROCEDURE — 84484 ASSAY OF TROPONIN QUANT: CPT

## 2018-06-16 PROCEDURE — 85730 THROMBOPLASTIN TIME PARTIAL: CPT

## 2018-06-16 PROCEDURE — 86141 C-REACTIVE PROTEIN HS: CPT

## 2018-06-16 PROCEDURE — 85025 COMPLETE CBC W/AUTO DIFF WBC: CPT

## 2018-06-16 PROCEDURE — 82550 ASSAY OF CK (CPK): CPT

## 2018-06-16 PROCEDURE — 2500000003 HC RX 250 WO HCPCS: Performed by: STUDENT IN AN ORGANIZED HEALTH CARE EDUCATION/TRAINING PROGRAM

## 2018-06-16 PROCEDURE — 84443 ASSAY THYROID STIM HORMONE: CPT

## 2018-06-16 PROCEDURE — 83880 ASSAY OF NATRIURETIC PEPTIDE: CPT

## 2018-06-16 RX ORDER — ASPIRIN 81 MG/1
81 TABLET, CHEWABLE ORAL DAILY
Qty: 30 TABLET | Refills: 0 | Status: SHIPPED | OUTPATIENT
Start: 2018-06-16 | End: 2019-06-25 | Stop reason: ALTCHOICE

## 2018-06-16 RX ORDER — NITROGLYCERIN 0.4 MG/1
TABLET SUBLINGUAL
Qty: 25 TABLET | Refills: 0 | Status: ON HOLD | OUTPATIENT
Start: 2018-06-16 | End: 2021-10-28 | Stop reason: HOSPADM

## 2018-06-16 RX ORDER — ASPIRIN 81 MG/1
324 TABLET, CHEWABLE ORAL ONCE
Status: COMPLETED | OUTPATIENT
Start: 2018-06-16 | End: 2018-06-16

## 2018-06-16 RX ORDER — LORAZEPAM 2 MG/ML
0.5 INJECTION INTRAMUSCULAR ONCE
Status: COMPLETED | OUTPATIENT
Start: 2018-06-16 | End: 2018-06-16

## 2018-06-16 RX ORDER — HYDROXYZINE PAMOATE 25 MG/1
25 CAPSULE ORAL 3 TIMES DAILY PRN
Qty: 14 CAPSULE | Refills: 0 | Status: SHIPPED | OUTPATIENT
Start: 2018-06-16 | End: 2019-01-24 | Stop reason: ALTCHOICE

## 2018-06-16 RX ORDER — NITROGLYCERIN 0.4 MG/1
0.4 TABLET SUBLINGUAL EVERY 5 MIN PRN
Status: DISCONTINUED | OUTPATIENT
Start: 2018-06-16 | End: 2018-06-16 | Stop reason: HOSPADM

## 2018-06-16 RX ORDER — TRAZODONE HYDROCHLORIDE 100 MG/1
100 TABLET ORAL NIGHTLY
COMMUNITY
End: 2019-01-24 | Stop reason: ALTCHOICE

## 2018-06-16 RX ORDER — METOPROLOL TARTRATE 5 MG/5ML
5 INJECTION INTRAVENOUS ONCE
Status: COMPLETED | OUTPATIENT
Start: 2018-06-16 | End: 2018-06-16

## 2018-06-16 RX ADMIN — METOROPROLOL TARTRATE 5 MG: 5 INJECTION, SOLUTION INTRAVENOUS at 15:58

## 2018-06-16 RX ADMIN — LORAZEPAM 0.5 MG: 2 INJECTION INTRAMUSCULAR; INTRAVENOUS at 16:55

## 2018-06-16 RX ADMIN — ASPIRIN 324 MG: 81 TABLET, CHEWABLE ORAL at 15:31

## 2018-06-16 RX ADMIN — NITROGLYCERIN 0.4 MG: 0.4 TABLET SUBLINGUAL at 15:34

## 2018-06-16 ASSESSMENT — ENCOUNTER SYMPTOMS
COUGH: 0
ABDOMINAL PAIN: 0
BACK PAIN: 0
VOMITING: 0
TROUBLE SWALLOWING: 0
SHORTNESS OF BREATH: 0
CHEST TIGHTNESS: 0
SINUS PRESSURE: 0
DIARRHEA: 0

## 2018-06-16 ASSESSMENT — HEART SCORE: ECG: 1

## 2018-06-16 ASSESSMENT — PAIN SCALES - GENERAL
PAINLEVEL_OUTOF10: 0
PAINLEVEL_OUTOF10: 5
PAINLEVEL_OUTOF10: 0

## 2018-06-16 ASSESSMENT — PAIN DESCRIPTION - LOCATION: LOCATION: CHEST

## 2018-06-20 PROCEDURE — 93010 ELECTROCARDIOGRAM REPORT: CPT | Performed by: INTERNAL MEDICINE

## 2018-07-05 ENCOUNTER — HOSPITAL ENCOUNTER (EMERGENCY)
Age: 68
Discharge: HOME | End: 2018-07-05
Attending: EMERGENCY MEDICINE
Payer: MEDICARE

## 2018-07-05 VITALS
DIASTOLIC BLOOD PRESSURE: 81 MMHG | BODY MASS INDEX: 22.18 KG/M2 | HEART RATE: 66 BPM | TEMPERATURE: 98.4 F | WEIGHT: 138 LBS | OXYGEN SATURATION: 99 % | SYSTOLIC BLOOD PRESSURE: 149 MMHG | RESPIRATION RATE: 16 BRPM | HEIGHT: 66 IN

## 2018-07-05 DIAGNOSIS — F41.1 ANXIETY STATE: Primary | ICD-10-CM

## 2018-07-05 LAB
EKG ATRIAL RATE: 66 BPM
EKG P AXIS: 55 DEGREES
EKG P-R INTERVAL: 168 MS
EKG Q-T INTERVAL: 446 MS
EKG QRS DURATION: 94 MS
EKG QTC CALCULATION (BAZETT): 467 MS
EKG R AXIS: 76 DEGREES
EKG T AXIS: 95 DEGREES
EKG VENTRICULAR RATE: 66 BPM

## 2018-07-05 PROCEDURE — 93005 ELECTROCARDIOGRAM TRACING: CPT

## 2018-07-05 PROCEDURE — 99284 EMERGENCY DEPT VISIT MOD MDM: CPT

## 2018-07-05 RX ORDER — CLONAZEPAM 0.5 MG/1
0.5 TABLET ORAL 2 TIMES DAILY PRN
Qty: 8 TABLET | Refills: 0 | Status: SHIPPED | OUTPATIENT
Start: 2018-07-05 | End: 2018-07-05 | Stop reason: ALTCHOICE

## 2018-07-05 ASSESSMENT — ENCOUNTER SYMPTOMS
NAUSEA: 0
EYE PAIN: 0
SHORTNESS OF BREATH: 0
VOMITING: 0
SORE THROAT: 0
CHEST TIGHTNESS: 1
ABDOMINAL PAIN: 0

## 2018-07-05 NOTE — ED PROVIDER NOTES
AM      PATIENT REFERRED TO:  Morenita Joiner for assessment on July 9th          Micky Donald, [address]  [phone]    Schedule an appointment as soon as possible for a visit         DISCHARGE MEDICATIONS:  Current Discharge Medication List             (Please note that portions of this note were completed with a voice recognition program.  Efforts were made to edit the dictations but occasionally words are mis-transcribed.)    Yarelis Vega DO (electronically signed)  Attending Emergency Physician          Yarelis Vega DO  07/05/18 2120       Yarelis Vega DO  07/05/18 7586       Yarelis Vega DO  07/05/18 8583       Yarelis Vega DO  07/05/18 7913

## 2018-07-07 PROCEDURE — 93010 ELECTROCARDIOGRAM REPORT: CPT | Performed by: INTERNAL MEDICINE

## 2018-07-11 ENCOUNTER — HOSPITAL ENCOUNTER (EMERGENCY)
Age: 68
Discharge: ANOTHER HOSPITAL | End: 2018-07-12
Attending: EMERGENCY MEDICINE
Payer: MEDICARE

## 2018-07-11 DIAGNOSIS — F33.2 SEVERE EPISODE OF RECURRENT MAJOR DEPRESSIVE DISORDER, WITHOUT PSYCHOTIC FEATURES (HCC): Primary | ICD-10-CM

## 2018-07-11 LAB
ACETAMINOPHEN LEVEL: <5 UG/ML (ref 10–30)
ALBUMIN SERPL-MCNC: 4.9 G/DL (ref 3.9–4.9)
ALP BLD-CCNC: 93 U/L (ref 35–104)
ALT SERPL-CCNC: 10 U/L (ref 0–41)
AMPHETAMINE SCREEN, URINE: NORMAL
ANION GAP SERPL CALCULATED.3IONS-SCNC: 14 MEQ/L (ref 7–13)
AST SERPL-CCNC: 13 U/L (ref 0–40)
BARBITURATE SCREEN URINE: NORMAL
BASOPHILS ABSOLUTE: 0.1 K/UL (ref 0–0.2)
BASOPHILS RELATIVE PERCENT: 0.7 %
BENZODIAZEPINE SCREEN, URINE: NORMAL
BILIRUB SERPL-MCNC: 1.5 MG/DL (ref 0–1.2)
BUN BLDV-MCNC: 22 MG/DL (ref 8–23)
CALCIUM SERPL-MCNC: 10.1 MG/DL (ref 8.6–10.2)
CANNABINOID SCREEN URINE: NORMAL
CHLORIDE BLD-SCNC: 105 MEQ/L (ref 98–107)
CO2: 21 MEQ/L (ref 22–29)
COCAINE METABOLITE SCREEN URINE: NORMAL
CREAT SERPL-MCNC: 1.1 MG/DL (ref 0.7–1.2)
EKG ATRIAL RATE: 75 BPM
EKG P AXIS: 55 DEGREES
EKG P-R INTERVAL: 162 MS
EKG Q-T INTERVAL: 404 MS
EKG QRS DURATION: 94 MS
EKG QTC CALCULATION (BAZETT): 451 MS
EKG R AXIS: 76 DEGREES
EKG T AXIS: 84 DEGREES
EKG VENTRICULAR RATE: 75 BPM
EOSINOPHILS ABSOLUTE: 0 K/UL (ref 0–0.7)
EOSINOPHILS RELATIVE PERCENT: 0.3 %
ETHANOL PERCENT: NORMAL G/DL
ETHANOL: <10 MG/DL (ref 0–0.08)
GFR AFRICAN AMERICAN: >60
GFR NON-AFRICAN AMERICAN: >60
GLOBULIN: 3.6 G/DL (ref 2.3–3.5)
GLUCOSE BLD-MCNC: 117 MG/DL (ref 60–115)
GLUCOSE BLD-MCNC: 131 MG/DL (ref 74–109)
HCT VFR BLD CALC: 52.3 % (ref 42–52)
HEMOGLOBIN: 18.4 G/DL (ref 14–18)
LYMPHOCYTES ABSOLUTE: 1.4 K/UL (ref 1–4.8)
LYMPHOCYTES RELATIVE PERCENT: 13.8 %
Lab: NORMAL
MCH RBC QN AUTO: 31.2 PG (ref 27–31.3)
MCHC RBC AUTO-ENTMCNC: 35.1 % (ref 33–37)
MCV RBC AUTO: 88.9 FL (ref 80–100)
MONOCYTES ABSOLUTE: 0.6 K/UL (ref 0.2–0.8)
MONOCYTES RELATIVE PERCENT: 5.9 %
NEUTROPHILS ABSOLUTE: 8.3 K/UL (ref 1.4–6.5)
NEUTROPHILS RELATIVE PERCENT: 79.3 %
OPIATE SCREEN URINE: NORMAL
PDW BLD-RTO: 14.7 % (ref 11.5–14.5)
PERFORMED ON: ABNORMAL
PHENCYCLIDINE SCREEN URINE: NORMAL
PLATELET # BLD: 252 K/UL (ref 130–400)
POTASSIUM SERPL-SCNC: 4 MEQ/L (ref 3.5–5.1)
RBC # BLD: 5.88 M/UL (ref 4.7–6.1)
SALICYLATE, SERUM: <0.3 MG/DL (ref 15–30)
SODIUM BLD-SCNC: 140 MEQ/L (ref 132–144)
TOTAL PROTEIN: 8.5 G/DL (ref 6.4–8.1)
TSH SERPL DL<=0.05 MIU/L-ACNC: 2.76 UIU/ML (ref 0.27–4.2)
WBC # BLD: 10.5 K/UL (ref 4.8–10.8)

## 2018-07-11 PROCEDURE — 80307 DRUG TEST PRSMV CHEM ANLYZR: CPT

## 2018-07-11 PROCEDURE — 36415 COLL VENOUS BLD VENIPUNCTURE: CPT

## 2018-07-11 PROCEDURE — 84443 ASSAY THYROID STIM HORMONE: CPT

## 2018-07-11 PROCEDURE — 93005 ELECTROCARDIOGRAM TRACING: CPT

## 2018-07-11 PROCEDURE — 80053 COMPREHEN METABOLIC PANEL: CPT

## 2018-07-11 PROCEDURE — 99285 EMERGENCY DEPT VISIT HI MDM: CPT

## 2018-07-11 PROCEDURE — 85025 COMPLETE CBC W/AUTO DIFF WBC: CPT

## 2018-07-11 PROCEDURE — G0480 DRUG TEST DEF 1-7 CLASSES: HCPCS

## 2018-07-11 PROCEDURE — 6370000000 HC RX 637 (ALT 250 FOR IP): Performed by: PHYSICIAN ASSISTANT

## 2018-07-11 RX ORDER — CLONAZEPAM 0.5 MG/1
0.5 TABLET ORAL ONCE
Status: COMPLETED | OUTPATIENT
Start: 2018-07-11 | End: 2018-07-11

## 2018-07-11 RX ADMIN — METFORMIN HYDROCHLORIDE 500 MG: 500 TABLET, FILM COATED ORAL at 17:54

## 2018-07-11 RX ADMIN — CLONAZEPAM 0.5 MG: 0.5 TABLET ORAL at 17:53

## 2018-07-11 ASSESSMENT — ENCOUNTER SYMPTOMS
SORE THROAT: 0
NAUSEA: 0
EYE PAIN: 0
SHORTNESS OF BREATH: 0
CHEST TIGHTNESS: 0
VOMITING: 0
ABDOMINAL PAIN: 0

## 2018-07-11 ASSESSMENT — PAIN DESCRIPTION - ORIENTATION: ORIENTATION: MID

## 2018-07-11 ASSESSMENT — PAIN SCALES - GENERAL
PAINLEVEL_OUTOF10: 8
PAINLEVEL_OUTOF10: 0

## 2018-07-11 ASSESSMENT — PAIN DESCRIPTION - PAIN TYPE: TYPE: ACUTE PAIN

## 2018-07-11 ASSESSMENT — PATIENT HEALTH QUESTIONNAIRE - PHQ9: SUM OF ALL RESPONSES TO PHQ QUESTIONS 1-9: 17

## 2018-07-11 ASSESSMENT — PAIN DESCRIPTION - LOCATION: LOCATION: CHEST

## 2018-07-11 ASSESSMENT — PAIN DESCRIPTION - DESCRIPTORS: DESCRIPTORS: DISCOMFORT

## 2018-07-11 NOTE — ED NOTES
Report given to [facility] and Kvng Bailey RN on pt coming from zone 2 at [phone] and he needs his assessments completed and the psychiatrist called.   He is suicidal he wants to hit his head until he kills himself, Dr. Arleth Villegas aware he has chest pain and no new orders were ordered by Dr. Arleth Villegas  Pt did have an EKG completed and was medically cleared for psych     Marla Deleon RN  07/11/18 6935
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
## 2018-07-11 NOTE — ED TRIAGE NOTES
Pt resting in bed. No distress noted.  Electronically signed by Miky Shen LPN on 7/30/2818 at 3:29 PM

## 2018-07-11 NOTE — ED NOTES
Pt here from room 19 and is medically cleared. Pt was C/O chest pain and requested Ativan for anxiety. Dr. Bakari Taylor was consulted on pt and she stated he had an EKG and the doctor did not feel he needed Ativan no further orders, no medications ordered by Dr. Bakari Taylor.   Pt is aware of doctor not ordering Ativan for him  Pt was medically cleared by Dr. Bakari Taylor and his EKG was WNL per Dr. Bakari Taylor  Pt in bed area quiet and cooperative with no problems noted     Bobby Calle, RN  07/11/18

## 2018-07-11 NOTE — ED PROVIDER NOTES
[facility] ED  eMERGENCY dEPARTMENT eNCOUnter      Pt Name: Carlos Baptiste  MRN: [id number]  [place] 1950  Date of evaluation: 7/11/2018  Provider: Amy Jean Baptiste MD    [address] [place] [address]       Chief Complaint   Patient presents with    Abdominal Pain     upset stomach for 4 days, states he was here for same complaint, states also with chest pain.  Mental Health Problem     states he also called [facility] for having thoughts of harming himself, states he wants to hit himself in his head so he's unconscience         HISTORY OF PRESENT ILLNESS   (Location/Symptom, Timing/Onset, Context/Setting, Quality, Duration, Modifying Factors, Severity)  Note limiting factors. Carlos Baptiste is a 69 y.o. male who presents to the emergency department . Patient was sent here by [facility]. He called [facility] and told them that he was having bad thoughts and wanted to hit himself in the head with a pan. He wants to kill himself. He is also having some chest pain which he gets often with his anxiety. No history of suicide attempts. On standing history of anxiety and has not seen psychiatry yet. HPI    Nursing Notes were reviewed. REVIEW OF SYSTEMS    (2-9 systems for level 4, 10 or more for level 5)     Review of Systems   Constitutional: Negative for activity change, appetite change and fatigue. HENT: Negative for congestion and sore throat. Eyes: Negative for pain and visual disturbance. Respiratory: Negative for chest tightness and shortness of breath. Cardiovascular: Positive for chest pain. Gastrointestinal: Negative for abdominal pain, nausea and vomiting. Endocrine: Negative for polydipsia. Genitourinary: Negative for flank pain and urgency. Musculoskeletal: Negative for gait problem and neck stiffness. Skin: Negative for rash. Neurological: Negative for weakness, light-headedness and headaches. Psychiatric/Behavioral: Positive for sleep disturbance and suicidal ideas.  Negative for confusion. The patient is nervous/anxious. Except as noted above the remainder of the review of systems was reviewed and negative. PAST MEDICAL HISTORY     Past Medical History:   Diagnosis Date    Anxiety     Diabetes mellitus ([facility]     Hyperlipidemia     Hypertension     Type 2 diabetes mellitus without complication ([facility]          SURGICAL HISTORY       Past Surgical History:   Procedure Laterality Date    CARDIAC SURGERY  1973    EYE SURGERY           CURRENT MEDICATIONS       Previous Medications    ASPIRIN (ASPIRIN CHILDRENS) 81 MG CHEWABLE TABLET    Take 1 tablet by mouth daily    BLOOD GLUCOSE MONITORING SUPPL (ONETOUCH VERIO FLEX SYSTEM) W/DEVICE KIT        CHOLECALCIFEROL (VITAMIN D) 2000 UNITS CAPS CAPSULE    Take 2,000 Units by mouth daily    CLONAZEPAM (KLONOPIN) 0.5 MG TABLET    Take 0.5 tablets by mouth 2 times daily as needed for Anxiety for up to 7 days. Pepito Moser CLONAZEPAM (KLONOPIN) 0.5 MG TABLET    Take 1 tablet by mouth 2 times daily as needed for Anxiety for up to 15 days. .    ESCITALOPRAM (LEXAPRO) 10 MG TABLET    Take 1 tablet by mouth daily    FAMOTIDINE (PEPCID) 20 MG TABLET    Take 1 tablet by mouth daily for 14 days    FLUTICASONE (FLONASE) 50 MCG/ACT NASAL SPRAY    2 sprays by Nasal route daily    HYDROXYZINE (VISTARIL) 25 MG CAPSULE    Take 1 capsule by mouth 3 times daily as needed for Anxiety    MAGNESIUM OXIDE (MAG-OX) 400 MG TABLET    Take 400 mg by mouth daily    METFORMIN (GLUCOPHAGE) 500 MG TABLET    Take 500 mg by mouth 2 times daily (with meals) Pt states he takes only as needed. No record at [facility]. METOPROLOL TARTRATE (LOPRESSOR) 25 MG TABLET    Take 1 tablet by mouth 2 times daily    MULTIPLE VITAMINS-MINERALS (THERAPEUTIC MULTIVITAMIN-MINERALS) TABLET    Take 1 tablet by mouth daily    NITROGLYCERIN (NITROSTAT) 0.4 MG SL TABLET    up to max of 3 total doses. If no relief after 1 dose, call 911.     OLOPATADINE (PATADAY) 0.2 % SOLN OPHTHALMIC SOLUTION    Apply 1 drop to eye daily For allergies    OMEGA-3 FATTY ACIDS (FISH OIL) 1000 MG CAPS    Take 2,000 mg by mouth daily    ONETOUCH DELICA LANCETS 61L MISC        ONETOUCH VERIO STRIP        TRAZODONE (DESYREL) 100 MG TABLET    Take 100 mg by mouth nightly No record at [facility]       Family History   Problem Relation Age of Onset    Heart Disease Maternal Aunt     Cancer Maternal Aunt           SOCIAL HISTORY       Social History     Social History    Marital status:      Spouse name: N/A    Number of children: N/A    Years of education: N/A     Social History Main Topics    Smoking status: Never Smoker    Smokeless tobacco: Never Used    Alcohol use No    Drug use: No    Sexual activity: Not Asked     Other Topics Concern    None     Social History Narrative    None       SCREENINGS             PHYSICAL EXAM    (up to 7 for level 4, 8 or more for level 5)     ED Triage Vitals [07/11/18 1126]   BP Temp Temp src Pulse Resp SpO2 Height Weight   (!) 145/92 98.4 °F (36.9 °C) -- 84 -- 97 % 5' 6\" (1.676 m) 134 lb (60.8 kg)       Physical Exam   Constitutional: He is oriented to person, place, and time. He appears well-developed and well-nourished. No distress. HENT:   Head: Normocephalic and atraumatic. Right Ear: External ear normal.   Left Ear: External ear normal.   Mouth/Throat: No oropharyngeal exudate. Eyes: Conjunctivae are normal. Pupils are equal, round, and reactive to light. Neck: Normal range of motion. Neck supple. No JVD present. No tracheal deviation present. No thyromegaly present. Cardiovascular: Normal rate and normal heart sounds. No murmur heard. Pulmonary/Chest: Effort normal and breath sounds normal. No respiratory distress. He has no wheezes. Abdominal: Soft. Bowel sounds are normal. There is no tenderness. There is no guarding. Musculoskeletal: Normal range of motion.  He exhibits no edema. Neurological: He is alert and oriented to person, place, and time. No cranial nerve deficit. Skin: Skin is warm and dry. No rash noted. He is not diaphoretic. Psychiatric: He has a normal mood and affect. His behavior is normal.       DIAGNOSTIC RESULTS     EKG: All EKG's are interpreted by the Emergency Department Physician who either signs or Co-signs this chart in the absence of a cardiologist.    Normal sinus rhythm  Q waves laterally. No acute ischemia. 75 bpm    RADIOLOGY:   Non-plain film images such as CT, Ultrasound and MRI are read by the radiologist. Plain radiographic images are visualized and preliminarily interpreted by the emergency physician with the below findings:        Interpretation per the Radiologist below, if available at the time of this note:    No orders to display         ED BEDSIDE ULTRASOUND:   Performed by ED Physician - none    LABS:  Labs Reviewed   COMPREHENSIVE METABOLIC PANEL - Abnormal; Notable for the following:        Result Value    CO2 21 (*)     Anion Gap 14 (*)     Glucose 131 (*)     Total Protein 8.5 (*)     Total Bilirubin 1.5 (*)     Globulin 3.6 (*)     All other components within normal limits   CBC WITH AUTO DIFFERENTIAL - Abnormal; Notable for the following:     Hemoglobin 18.4 (*)     Hematocrit 52.3 (*)     RDW 14.7 (*)     Neutrophils # 8.3 (*)     All other components within normal limits   ACETAMINOPHEN LEVEL - Abnormal; Notable for the following:     Acetaminophen Level <5 (*)     All other components within normal limits   SALICYLATE LEVEL - Abnormal; Notable for the following:     Salicylate, Serum <1.8 (*)     All other components within normal limits   POCT GLUCOSE - Abnormal; Notable for the following:     POC Glucose 117 (*)     All other components within normal limits   URINE DRUG SCREEN   ETHANOL   TSH WITHOUT REFLEX       All other labs were within normal range or not returned as of this dictation.     EMERGENCY DEPARTMENT COURSE and DIFFERENTIAL DIAGNOSIS/MDM:   Vitals:    Vitals:    07/11/18 1126 07/11/18 1234 07/11/18 1415 07/11/18 1700   BP: (!) 145/92 137/79 123/84 (!) 146/87   Pulse: 84 79 84 71   Resp:  16 24 18   Temp: 98.4 °F (36.9 °C)  98.4 °F (36.9 °C) 98.7 °F (37.1 °C)   TempSrc:   Oral Oral   SpO2: 97% 99% 99% 97%   Weight: 134 lb (60.8 kg)      Height: 5' 6\" (1.676 m)          Patient medically cleared for behavioral health evaluation    MDM     Differential included major depression, anxiety      REASSESSMENT          CRITICAL CARE TIME   Total Critical Care time was  minutes, excluding separately reportable procedures. There was a high probability of clinically significant/life threatening deterioration in the patient's condition which required my urgent intervention. CONSULTS:  None    PROCEDURES:  Unless otherwise noted below, none     Procedures    FINAL IMPRESSION      1. Severe episode of recurrent major depressive disorder, without psychotic features [facility])          DISPOSITION/PLAN   DISPOSITION Decision To Transfer 07/12/2018 01:46:22 AM      PATIENT REFERRED TO:  No follow-up provider specified.     DISCHARGE MEDICATIONS:  New Prescriptions    No medications on file          (Please note that portions of this note were completed with a voice recognition program.  Efforts were made to edit the dictations but occasionally words are mis-transcribed.)    Jeffy Huddleston MD (electronically signed)  Attending Emergency Physician          Jeffy Huddleston MD  07/12/18 4013 [facility] Ezio Herndon MD  07/12/18 7103

## 2018-07-12 VITALS
HEART RATE: 73 BPM | TEMPERATURE: 97.8 F | DIASTOLIC BLOOD PRESSURE: 88 MMHG | SYSTOLIC BLOOD PRESSURE: 134 MMHG | OXYGEN SATURATION: 98 % | HEIGHT: 66 IN | RESPIRATION RATE: 20 BRPM | WEIGHT: 134 LBS | BODY MASS INDEX: 21.53 KG/M2

## 2018-07-12 PROCEDURE — 93010 ELECTROCARDIOGRAM REPORT: CPT | Performed by: INTERNAL MEDICINE

## 2018-07-12 PROCEDURE — 6370000000 HC RX 637 (ALT 250 FOR IP): Performed by: EMERGENCY MEDICINE

## 2018-07-12 RX ORDER — DIPHENHYDRAMINE HCL 25 MG
50 TABLET ORAL
Status: COMPLETED | OUTPATIENT
Start: 2018-07-12 | End: 2018-07-12

## 2018-07-12 RX ADMIN — DIPHENHYDRAMINE HCL 50 MG: 25 TABLET ORAL at 02:45

## 2018-07-12 NOTE — ED NOTES
Pt resting in bed. No distress noted.  Electronically signed by Elizabeth Albarado LPN on 6/84/5433 at 7:77 PM     Elizabeth Albarado LPN  62/81/93 7652

## 2018-07-12 NOTE — ED NOTES
Pt is alert and social with male peer. Wants nurse to know that \"the pill wore off and now I am anxious again\". Dr Armida Wolff had instructed pt to have no benzos; will offer alternatives.      Dolores French RN  07/11/18 0265

## 2018-07-12 NOTE — ED NOTES
Pt resting in bed; no distress noted.  Electronically signed by Maya Sharma LPN on 9/50/2394 at 7:79 PM     Maya Sharma LPN  90/42/09 9359

## 2018-07-12 NOTE — ED NOTES
Pt has positive effect from benadryl and is asleep with regular respirations and no S/S distress.      Agustín Peña RN  07/12/18 2630

## 2018-07-12 NOTE — ED NOTES
Per Peg at [facility], the pt is accepted by Dr Rl Cruz at [facility], to room 204.      Ericka Garcia RN  07/12/18 5524

## 2018-07-12 NOTE — ED NOTES
Pt is very anxious and asks for any kind of medication possible, other than trazadone and vistaril, which pt reports does not work.      Dolores French RN  07/12/18 9246

## 2018-07-12 NOTE — ED NOTES
Per [facility], message left with [facility] & have not heard back @ this time. Requested [facility] to attempt other facilities for placement.      Micky Chand RN  07/11/18 3111

## 2019-01-23 ENCOUNTER — APPOINTMENT (OUTPATIENT)
Dept: GENERAL RADIOLOGY | Age: 69
End: 2019-01-23
Payer: MEDICARE

## 2019-01-23 ENCOUNTER — HOSPITAL ENCOUNTER (EMERGENCY)
Age: 69
Discharge: HOME | End: 2019-01-23
Payer: MEDICARE

## 2019-01-23 VITALS
TEMPERATURE: 97.1 F | DIASTOLIC BLOOD PRESSURE: 87 MMHG | RESPIRATION RATE: 16 BRPM | SYSTOLIC BLOOD PRESSURE: 116 MMHG | HEIGHT: 65 IN | WEIGHT: 130 LBS | HEART RATE: 61 BPM | OXYGEN SATURATION: 98 % | BODY MASS INDEX: 21.66 KG/M2

## 2019-01-23 DIAGNOSIS — R07.9 CHEST PAIN, UNSPECIFIED TYPE: Primary | ICD-10-CM

## 2019-01-23 LAB
ALBUMIN SERPL-MCNC: 4.2 G/DL (ref 3.9–4.9)
ALP BLD-CCNC: 68 U/L (ref 35–104)
ALT SERPL-CCNC: 10 U/L (ref 0–41)
ANION GAP SERPL CALCULATED.3IONS-SCNC: 8 MEQ/L (ref 7–13)
AST SERPL-CCNC: 15 U/L (ref 0–40)
BASOPHILS ABSOLUTE: 0.1 K/UL (ref 0–0.2)
BASOPHILS RELATIVE PERCENT: 1 %
BILIRUB SERPL-MCNC: 0.7 MG/DL (ref 0–1.2)
BUN BLDV-MCNC: 24 MG/DL (ref 8–23)
CALCIUM SERPL-MCNC: 9.1 MG/DL (ref 8.6–10.2)
CHLORIDE BLD-SCNC: 104 MEQ/L (ref 98–107)
CO2: 24 MEQ/L (ref 22–29)
CREAT SERPL-MCNC: 0.86 MG/DL (ref 0.7–1.2)
EKG ATRIAL RATE: 67 BPM
EKG P AXIS: 62 DEGREES
EKG P-R INTERVAL: 176 MS
EKG Q-T INTERVAL: 410 MS
EKG QRS DURATION: 98 MS
EKG QTC CALCULATION (BAZETT): 433 MS
EKG R AXIS: 62 DEGREES
EKG T AXIS: 72 DEGREES
EKG VENTRICULAR RATE: 67 BPM
EOSINOPHILS ABSOLUTE: 0.1 K/UL (ref 0–0.7)
EOSINOPHILS RELATIVE PERCENT: 1.4 %
GFR AFRICAN AMERICAN: >60
GFR NON-AFRICAN AMERICAN: >60
GLOBULIN: 2.5 G/DL (ref 2.3–3.5)
GLUCOSE BLD-MCNC: 152 MG/DL (ref 74–109)
HCT VFR BLD CALC: 42.1 % (ref 42–52)
HEMOGLOBIN: 14.8 G/DL (ref 14–18)
INR BLD: 1.1
LYMPHOCYTES ABSOLUTE: 1.1 K/UL (ref 1–4.8)
LYMPHOCYTES RELATIVE PERCENT: 18.3 %
MAGNESIUM: 1.9 MG/DL (ref 1.7–2.3)
MCH RBC QN AUTO: 30.9 PG (ref 27–31.3)
MCHC RBC AUTO-ENTMCNC: 35.1 % (ref 33–37)
MCV RBC AUTO: 87.9 FL (ref 80–100)
MONOCYTES ABSOLUTE: 0.4 K/UL (ref 0.2–0.8)
MONOCYTES RELATIVE PERCENT: 6.3 %
NEUTROPHILS ABSOLUTE: 4.4 K/UL (ref 1.4–6.5)
NEUTROPHILS RELATIVE PERCENT: 73 %
PDW BLD-RTO: 14.7 % (ref 11.5–14.5)
PLATELET # BLD: 166 K/UL (ref 130–400)
POTASSIUM SERPL-SCNC: 4.1 MEQ/L (ref 3.5–5.1)
PROTHROMBIN TIME: 10.8 SEC (ref 9–11.5)
RBC # BLD: 4.79 M/UL (ref 4.7–6.1)
SODIUM BLD-SCNC: 136 MEQ/L (ref 132–144)
TOTAL CK: 55 U/L (ref 0–190)
TOTAL PROTEIN: 6.7 G/DL (ref 6.4–8.1)
TROPONIN: <0.01 NG/ML (ref 0–0.01)
WBC # BLD: 6 K/UL (ref 4.8–10.8)

## 2019-01-23 PROCEDURE — 6370000000 HC RX 637 (ALT 250 FOR IP): Performed by: NURSE PRACTITIONER

## 2019-01-23 PROCEDURE — 80053 COMPREHEN METABOLIC PANEL: CPT

## 2019-01-23 PROCEDURE — 99285 EMERGENCY DEPT VISIT HI MDM: CPT

## 2019-01-23 PROCEDURE — 83735 ASSAY OF MAGNESIUM: CPT

## 2019-01-23 PROCEDURE — 71045 X-RAY EXAM CHEST 1 VIEW: CPT

## 2019-01-23 PROCEDURE — 93005 ELECTROCARDIOGRAM TRACING: CPT

## 2019-01-23 PROCEDURE — 36415 COLL VENOUS BLD VENIPUNCTURE: CPT

## 2019-01-23 PROCEDURE — 85610 PROTHROMBIN TIME: CPT

## 2019-01-23 PROCEDURE — 84484 ASSAY OF TROPONIN QUANT: CPT

## 2019-01-23 PROCEDURE — 85025 COMPLETE CBC W/AUTO DIFF WBC: CPT

## 2019-01-23 PROCEDURE — 82550 ASSAY OF CK (CPK): CPT

## 2019-01-23 RX ORDER — NITROGLYCERIN 0.4 MG/1
0.4 TABLET SUBLINGUAL EVERY 5 MIN PRN
Status: DISCONTINUED | OUTPATIENT
Start: 2019-01-23 | End: 2019-01-23 | Stop reason: HOSPADM

## 2019-01-23 RX ORDER — ASPIRIN 81 MG/1
324 TABLET, CHEWABLE ORAL ONCE
Status: COMPLETED | OUTPATIENT
Start: 2019-01-23 | End: 2019-01-23

## 2019-01-23 RX ADMIN — ASPIRIN 81 MG 324 MG: 81 TABLET ORAL at 13:03

## 2019-01-23 RX ADMIN — NITROGLYCERIN 0.5 INCH: 20 OINTMENT TOPICAL at 13:04

## 2019-01-23 RX ADMIN — NITROGLYCERIN 0.4 MG: 0.4 TABLET, ORALLY DISINTEGRATING SUBLINGUAL at 13:48

## 2019-01-23 ASSESSMENT — ENCOUNTER SYMPTOMS
DIARRHEA: 0
SHORTNESS OF BREATH: 0
VOMITING: 0
NAUSEA: 0
COUGH: 0
SORE THROAT: 0
RHINORRHEA: 0
ABDOMINAL PAIN: 0
PHOTOPHOBIA: 0
EYE PAIN: 0
BACK PAIN: 0

## 2019-01-23 ASSESSMENT — PAIN SCALES - GENERAL
PAINLEVEL_OUTOF10: 8
PAINLEVEL_OUTOF10: 6

## 2019-01-23 ASSESSMENT — PAIN DESCRIPTION - DESCRIPTORS
DESCRIPTORS: TIGHTNESS
DESCRIPTORS: SQUEEZING

## 2019-01-23 ASSESSMENT — PAIN DESCRIPTION - FREQUENCY
FREQUENCY: INTERMITTENT
FREQUENCY: INTERMITTENT

## 2019-01-23 ASSESSMENT — PAIN DESCRIPTION - PAIN TYPE: TYPE: ACUTE PAIN

## 2019-01-23 ASSESSMENT — PAIN DESCRIPTION - PROGRESSION
CLINICAL_PROGRESSION: NOT CHANGED
CLINICAL_PROGRESSION: NOT CHANGED

## 2019-01-23 ASSESSMENT — PAIN DESCRIPTION - ONSET
ONSET: ON-GOING
ONSET: ON-GOING

## 2019-01-23 ASSESSMENT — PAIN DESCRIPTION - LOCATION
LOCATION: CHEST
LOCATION: CHEST

## 2019-01-23 ASSESSMENT — PAIN SCALES - WONG BAKER: WONGBAKER_NUMERICALRESPONSE: 2

## 2019-01-24 ENCOUNTER — OFFICE VISIT (OUTPATIENT)
Dept: CARDIOLOGY CLINIC | Age: 69
End: 2019-01-24
Payer: MEDICARE

## 2019-01-24 VITALS
DIASTOLIC BLOOD PRESSURE: 80 MMHG | WEIGHT: 133 LBS | BODY MASS INDEX: 22.16 KG/M2 | HEIGHT: 65 IN | SYSTOLIC BLOOD PRESSURE: 120 MMHG | HEART RATE: 66 BPM | OXYGEN SATURATION: 98 %

## 2019-01-24 DIAGNOSIS — I10 ESSENTIAL HYPERTENSION: ICD-10-CM

## 2019-01-24 DIAGNOSIS — R07.9 CHEST PAIN, UNSPECIFIED TYPE: Primary | ICD-10-CM

## 2019-01-24 PROCEDURE — 93010 ELECTROCARDIOGRAM REPORT: CPT | Performed by: INTERNAL MEDICINE

## 2019-01-24 PROCEDURE — 99214 OFFICE O/P EST MOD 30 MIN: CPT | Performed by: INTERNAL MEDICINE

## 2019-01-25 ENCOUNTER — HOSPITAL ENCOUNTER (OUTPATIENT)
Dept: CARDIAC CATH/INVASIVE PROCEDURES | Age: 69
Setting detail: OUTPATIENT SURGERY
Discharge: HOME | End: 2019-01-25
Attending: INTERNAL MEDICINE | Admitting: INTERNAL MEDICINE
Payer: MEDICARE

## 2019-01-25 VITALS
DIASTOLIC BLOOD PRESSURE: 70 MMHG | HEART RATE: 58 BPM | BODY MASS INDEX: 21.83 KG/M2 | RESPIRATION RATE: 13 BRPM | SYSTOLIC BLOOD PRESSURE: 115 MMHG | WEIGHT: 131 LBS | HEIGHT: 65 IN | TEMPERATURE: 97.5 F

## 2019-01-25 PROCEDURE — 6360000002 HC RX W HCPCS

## 2019-01-25 PROCEDURE — C1874 STENT, COATED/COV W/DEL SYS: HCPCS

## 2019-01-25 PROCEDURE — 93458 L HRT ARTERY/VENTRICLE ANGIO: CPT | Performed by: INTERNAL MEDICINE

## 2019-01-25 PROCEDURE — C1725 CATH, TRANSLUMIN NON-LASER: HCPCS

## 2019-01-25 PROCEDURE — 2580000003 HC RX 258

## 2019-01-25 PROCEDURE — 2709999900 HC NON-CHARGEABLE SUPPLY

## 2019-01-25 PROCEDURE — 2580000003 HC RX 258: Performed by: INTERNAL MEDICINE

## 2019-01-25 PROCEDURE — 2500000003 HC RX 250 WO HCPCS

## 2019-01-25 PROCEDURE — C1769 GUIDE WIRE: HCPCS

## 2019-01-25 PROCEDURE — 6370000000 HC RX 637 (ALT 250 FOR IP): Performed by: INTERNAL MEDICINE

## 2019-01-25 PROCEDURE — 6370000000 HC RX 637 (ALT 250 FOR IP)

## 2019-01-25 PROCEDURE — 92928 PRQ TCAT PLMT NTRAC ST 1 LES: CPT | Performed by: INTERNAL MEDICINE

## 2019-01-25 PROCEDURE — C1894 INTRO/SHEATH, NON-LASER: HCPCS

## 2019-01-25 PROCEDURE — C1887 CATHETER, GUIDING: HCPCS

## 2019-01-25 RX ORDER — RANOLAZINE 500 MG/1
500 TABLET, EXTENDED RELEASE ORAL ONCE
Status: DISCONTINUED | OUTPATIENT
Start: 2019-01-25 | End: 2019-01-27 | Stop reason: HOSPADM

## 2019-01-25 RX ORDER — ASPIRIN 81 MG/1
81 TABLET, CHEWABLE ORAL ONCE
Status: COMPLETED | OUTPATIENT
Start: 2019-01-25 | End: 2019-01-25

## 2019-01-25 RX ORDER — LISINOPRIL 5 MG/1
5 TABLET ORAL DAILY
Qty: 30 TABLET | Refills: 5 | Status: SHIPPED | OUTPATIENT
Start: 2019-01-25 | End: 2019-01-25 | Stop reason: SDUPTHER

## 2019-01-25 RX ORDER — ONDANSETRON 2 MG/ML
4 INJECTION INTRAMUSCULAR; INTRAVENOUS EVERY 6 HOURS PRN
Status: CANCELLED | OUTPATIENT
Start: 2019-01-25

## 2019-01-25 RX ORDER — ACETAMINOPHEN 325 MG/1
650 TABLET ORAL EVERY 4 HOURS PRN
Status: CANCELLED | OUTPATIENT
Start: 2019-01-25

## 2019-01-25 RX ORDER — SODIUM CHLORIDE 0.9 % (FLUSH) 0.9 %
10 SYRINGE (ML) INJECTION EVERY 12 HOURS SCHEDULED
Status: DISCONTINUED | OUTPATIENT
Start: 2019-01-25 | End: 2019-01-27 | Stop reason: HOSPADM

## 2019-01-25 RX ORDER — DIAZEPAM 5 MG/1
5 TABLET ORAL ONCE
Status: COMPLETED | OUTPATIENT
Start: 2019-01-25 | End: 2019-01-25

## 2019-01-25 RX ORDER — RANOLAZINE 500 MG/1
500 TABLET, EXTENDED RELEASE ORAL 2 TIMES DAILY
Status: ON HOLD | COMMUNITY
End: 2019-01-25 | Stop reason: HOSPADM

## 2019-01-25 RX ORDER — HYDRALAZINE HYDROCHLORIDE 20 MG/ML
10 INJECTION INTRAMUSCULAR; INTRAVENOUS EVERY 10 MIN PRN
Status: CANCELLED | OUTPATIENT
Start: 2019-01-25

## 2019-01-25 RX ORDER — LABETALOL HYDROCHLORIDE 5 MG/ML
10 INJECTION, SOLUTION INTRAVENOUS EVERY 30 MIN PRN
Status: CANCELLED | OUTPATIENT
Start: 2019-01-25

## 2019-01-25 RX ORDER — ATORVASTATIN CALCIUM 40 MG/1
40 TABLET, FILM COATED ORAL DAILY
Qty: 30 TABLET | Refills: 6 | Status: SHIPPED | OUTPATIENT
Start: 2019-01-25 | End: 2019-01-25 | Stop reason: SDUPTHER

## 2019-01-25 RX ORDER — SODIUM CHLORIDE 9 MG/ML
INJECTION, SOLUTION INTRAVENOUS CONTINUOUS
Status: DISCONTINUED | OUTPATIENT
Start: 2019-01-25 | End: 2019-01-27 | Stop reason: HOSPADM

## 2019-01-25 RX ORDER — SODIUM CHLORIDE 9 MG/ML
INJECTION, SOLUTION INTRAVENOUS CONTINUOUS
Status: CANCELLED | OUTPATIENT
Start: 2019-01-25 | End: 2019-01-25

## 2019-01-25 RX ADMIN — DIAZEPAM 5 MG: 5 TABLET ORAL at 13:07

## 2019-01-25 RX ADMIN — SODIUM CHLORIDE: 9 INJECTION, SOLUTION INTRAVENOUS at 13:08

## 2019-01-25 RX ADMIN — ASPIRIN 81 MG 81 MG: 81 TABLET ORAL at 13:07

## 2019-01-25 RX ADMIN — METOPROLOL TARTRATE 25 MG: 25 TABLET ORAL at 13:07

## 2019-01-27 ENCOUNTER — APPOINTMENT (OUTPATIENT)
Dept: GENERAL RADIOLOGY | Age: 69
End: 2019-01-27
Payer: MEDICARE

## 2019-01-27 ENCOUNTER — HOSPITAL ENCOUNTER (EMERGENCY)
Age: 69
Discharge: HOME | End: 2019-01-27
Attending: FAMILY MEDICINE
Payer: MEDICARE

## 2019-01-27 VITALS
TEMPERATURE: 98 F | BODY MASS INDEX: 21.63 KG/M2 | OXYGEN SATURATION: 100 % | RESPIRATION RATE: 16 BRPM | DIASTOLIC BLOOD PRESSURE: 71 MMHG | HEART RATE: 54 BPM | WEIGHT: 130 LBS | SYSTOLIC BLOOD PRESSURE: 109 MMHG

## 2019-01-27 DIAGNOSIS — F41.9 ANXIETY: Primary | ICD-10-CM

## 2019-01-27 DIAGNOSIS — R07.9 CHEST PAIN, UNSPECIFIED TYPE: ICD-10-CM

## 2019-01-27 LAB
ALBUMIN SERPL-MCNC: 4.2 G/DL (ref 3.9–4.9)
ALP BLD-CCNC: 73 U/L (ref 35–104)
ALT SERPL-CCNC: 11 U/L (ref 0–41)
ANION GAP SERPL CALCULATED.3IONS-SCNC: 12 MEQ/L (ref 7–13)
AST SERPL-CCNC: 12 U/L (ref 0–40)
BILIRUB SERPL-MCNC: 0.7 MG/DL (ref 0–1.2)
BUN BLDV-MCNC: 21 MG/DL (ref 8–23)
CALCIUM SERPL-MCNC: 8.8 MG/DL (ref 8.6–10.2)
CHLORIDE BLD-SCNC: 101 MEQ/L (ref 98–107)
CO2: 23 MEQ/L (ref 22–29)
CREAT SERPL-MCNC: 0.97 MG/DL (ref 0.7–1.2)
EKG ATRIAL RATE: 54 BPM
EKG P AXIS: 29 DEGREES
EKG P-R INTERVAL: 148 MS
EKG Q-T INTERVAL: 452 MS
EKG QRS DURATION: 92 MS
EKG QTC CALCULATION (BAZETT): 428 MS
EKG R AXIS: 44 DEGREES
EKG T AXIS: 75 DEGREES
EKG VENTRICULAR RATE: 54 BPM
GFR AFRICAN AMERICAN: >60
GFR NON-AFRICAN AMERICAN: >60
GLOBULIN: 2.4 G/DL (ref 2.3–3.5)
GLUCOSE BLD-MCNC: 95 MG/DL (ref 74–109)
POTASSIUM REFLEX MAGNESIUM: 4.1 MEQ/L (ref 3.5–5.1)
PRO-BNP: 161 PG/ML
SODIUM BLD-SCNC: 136 MEQ/L (ref 132–144)
TOTAL CK: 51 U/L (ref 0–190)
TOTAL PROTEIN: 6.6 G/DL (ref 6.4–8.1)
TROPONIN: <0.01 NG/ML (ref 0–0.01)
TSH SERPL DL<=0.05 MIU/L-ACNC: 4.68 UIU/ML (ref 0.27–4.2)

## 2019-01-27 PROCEDURE — 6370000000 HC RX 637 (ALT 250 FOR IP): Performed by: FAMILY MEDICINE

## 2019-01-27 PROCEDURE — 83880 ASSAY OF NATRIURETIC PEPTIDE: CPT

## 2019-01-27 PROCEDURE — 36415 COLL VENOUS BLD VENIPUNCTURE: CPT

## 2019-01-27 PROCEDURE — 84443 ASSAY THYROID STIM HORMONE: CPT

## 2019-01-27 PROCEDURE — 99285 EMERGENCY DEPT VISIT HI MDM: CPT

## 2019-01-27 PROCEDURE — 93005 ELECTROCARDIOGRAM TRACING: CPT

## 2019-01-27 PROCEDURE — 82550 ASSAY OF CK (CPK): CPT

## 2019-01-27 PROCEDURE — 84484 ASSAY OF TROPONIN QUANT: CPT

## 2019-01-27 PROCEDURE — 80053 COMPREHEN METABOLIC PANEL: CPT

## 2019-01-27 PROCEDURE — 71046 X-RAY EXAM CHEST 2 VIEWS: CPT

## 2019-01-27 RX ORDER — ALPRAZOLAM 0.5 MG/1
0.5 TABLET ORAL ONCE
Status: COMPLETED | OUTPATIENT
Start: 2019-01-27 | End: 2019-01-27

## 2019-01-27 RX ORDER — ASPIRIN 81 MG/1
81 TABLET, CHEWABLE ORAL ONCE
Status: COMPLETED | OUTPATIENT
Start: 2019-01-27 | End: 2019-01-27

## 2019-01-27 RX ORDER — ASPIRIN 81 MG/1
81 TABLET, CHEWABLE ORAL DAILY
Status: DISCONTINUED | OUTPATIENT
Start: 2019-01-28 | End: 2019-01-27

## 2019-01-27 RX ADMIN — ASPIRIN 81 MG 81 MG: 81 TABLET ORAL at 16:38

## 2019-01-27 RX ADMIN — ALPRAZOLAM 0.5 MG: 0.5 TABLET ORAL at 16:55

## 2019-01-27 ASSESSMENT — PAIN DESCRIPTION - LOCATION: LOCATION: CHEST

## 2019-01-27 ASSESSMENT — ENCOUNTER SYMPTOMS
APNEA: 0
SHORTNESS OF BREATH: 0
RESPIRATORY NEGATIVE: 1
GASTROINTESTINAL NEGATIVE: 1
COUGH: 0
CHEST TIGHTNESS: 0
WHEEZING: 0
STRIDOR: 0
CHOKING: 0
EYES NEGATIVE: 1

## 2019-01-27 ASSESSMENT — PAIN DESCRIPTION - PAIN TYPE: TYPE: ACUTE PAIN

## 2019-01-27 ASSESSMENT — PAIN SCALES - GENERAL: PAINLEVEL_OUTOF10: 5

## 2019-01-28 RX ORDER — ATORVASTATIN CALCIUM 40 MG/1
40 TABLET, FILM COATED ORAL DAILY
Qty: 90 TABLET | Refills: 3 | Status: SHIPPED | OUTPATIENT
Start: 2019-01-28 | End: 2019-02-15

## 2019-01-28 RX ORDER — LISINOPRIL 5 MG/1
5 TABLET ORAL DAILY
Qty: 90 TABLET | Refills: 3 | Status: SHIPPED | OUTPATIENT
Start: 2019-01-28 | End: 2019-06-25 | Stop reason: ALTCHOICE

## 2019-01-29 PROCEDURE — 93010 ELECTROCARDIOGRAM REPORT: CPT | Performed by: INTERNAL MEDICINE

## 2019-01-31 ENCOUNTER — TELEPHONE (OUTPATIENT)
Dept: CARDIOLOGY CLINIC | Age: 69
End: 2019-01-31

## 2019-02-06 ENCOUNTER — CLINICAL DOCUMENTATION (OUTPATIENT)
Dept: CARDIOLOGY CLINIC | Age: 69
End: 2019-02-06

## 2019-02-06 ENCOUNTER — TELEPHONE (OUTPATIENT)
Dept: CARDIOLOGY CLINIC | Age: 69
End: 2019-02-06

## 2019-02-06 DIAGNOSIS — I20.9 ANGINA, CLASS IV (HCC): Primary | ICD-10-CM

## 2019-02-06 RX ORDER — RANOLAZINE 500 MG/1
500 TABLET, EXTENDED RELEASE ORAL 2 TIMES DAILY
Qty: 60 TABLET | Refills: 3 | Status: SHIPPED | OUTPATIENT
Start: 2019-02-06 | End: 2019-02-15

## 2019-02-15 ENCOUNTER — OFFICE VISIT (OUTPATIENT)
Dept: CARDIOLOGY CLINIC | Age: 69
End: 2019-02-15
Payer: MEDICARE

## 2019-02-15 VITALS
HEIGHT: 65 IN | OXYGEN SATURATION: 99 % | DIASTOLIC BLOOD PRESSURE: 78 MMHG | SYSTOLIC BLOOD PRESSURE: 122 MMHG | BODY MASS INDEX: 21.73 KG/M2 | HEART RATE: 78 BPM | RESPIRATION RATE: 20 BRPM | WEIGHT: 130.4 LBS

## 2019-02-15 DIAGNOSIS — I20.9 ANGINA, CLASS IV (HCC): ICD-10-CM

## 2019-02-15 DIAGNOSIS — I10 ESSENTIAL HYPERTENSION: ICD-10-CM

## 2019-02-15 DIAGNOSIS — R07.9 CHEST PAIN, UNSPECIFIED TYPE: Primary | ICD-10-CM

## 2019-02-15 DIAGNOSIS — I25.10 CORONARY ARTERY DISEASE INVOLVING NATIVE CORONARY ARTERY OF NATIVE HEART WITHOUT ANGINA PECTORIS: ICD-10-CM

## 2019-02-15 PROCEDURE — 99214 OFFICE O/P EST MOD 30 MIN: CPT | Performed by: INTERNAL MEDICINE

## 2019-02-15 RX ORDER — LATANOPROST 50 UG/ML
SOLUTION/ DROPS OPHTHALMIC
COMMUNITY
Start: 2019-01-30 | End: 2022-10-14

## 2019-02-26 ENCOUNTER — TELEPHONE (OUTPATIENT)
Dept: CARDIOLOGY CLINIC | Age: 69
End: 2019-02-26

## 2019-03-06 ENCOUNTER — TELEPHONE (OUTPATIENT)
Dept: CARDIOLOGY CLINIC | Age: 69
End: 2019-03-06

## 2019-03-13 ENCOUNTER — HOSPITAL ENCOUNTER (OUTPATIENT)
Dept: CARDIAC REHAB | Age: 69
Setting detail: THERAPIES SERIES
Discharge: HOME | End: 2019-03-13
Payer: MEDICARE

## 2019-03-13 PROCEDURE — 93798 PHYS/QHP OP CAR RHAB W/ECG: CPT

## 2019-03-15 ENCOUNTER — HOSPITAL ENCOUNTER (OUTPATIENT)
Dept: CARDIAC REHAB | Age: 69
Setting detail: THERAPIES SERIES
Discharge: HOME | End: 2019-03-15
Payer: MEDICARE

## 2019-03-15 PROCEDURE — 93798 PHYS/QHP OP CAR RHAB W/ECG: CPT

## 2019-03-18 ENCOUNTER — HOSPITAL ENCOUNTER (OUTPATIENT)
Dept: CARDIAC REHAB | Age: 69
Setting detail: THERAPIES SERIES
Discharge: HOME | End: 2019-03-18
Payer: MEDICARE

## 2019-03-18 PROCEDURE — 93798 PHYS/QHP OP CAR RHAB W/ECG: CPT

## 2019-03-25 ENCOUNTER — HOSPITAL ENCOUNTER (OUTPATIENT)
Dept: CARDIAC REHAB | Age: 69
Setting detail: THERAPIES SERIES
Discharge: HOME | End: 2019-03-25
Payer: MEDICARE

## 2019-03-25 PROCEDURE — 93798 PHYS/QHP OP CAR RHAB W/ECG: CPT

## 2019-03-27 ENCOUNTER — HOSPITAL ENCOUNTER (OUTPATIENT)
Dept: CARDIAC REHAB | Age: 69
Setting detail: THERAPIES SERIES
Discharge: HOME | End: 2019-03-27
Payer: MEDICARE

## 2019-03-27 PROCEDURE — 93798 PHYS/QHP OP CAR RHAB W/ECG: CPT

## 2019-04-01 ENCOUNTER — HOSPITAL ENCOUNTER (OUTPATIENT)
Dept: CARDIAC REHAB | Age: 69
Setting detail: THERAPIES SERIES
Discharge: HOME | End: 2019-04-01
Payer: MEDICARE

## 2019-04-01 PROCEDURE — 93798 PHYS/QHP OP CAR RHAB W/ECG: CPT

## 2019-04-03 ENCOUNTER — HOSPITAL ENCOUNTER (OUTPATIENT)
Dept: CARDIAC REHAB | Age: 69
Setting detail: THERAPIES SERIES
Discharge: HOME | End: 2019-04-03
Payer: MEDICARE

## 2019-04-03 PROCEDURE — 93798 PHYS/QHP OP CAR RHAB W/ECG: CPT

## 2019-04-04 ENCOUNTER — HOSPITAL ENCOUNTER (EMERGENCY)
Age: 69
Discharge: HOME | End: 2019-04-04
Payer: MEDICARE

## 2019-04-04 ENCOUNTER — APPOINTMENT (OUTPATIENT)
Dept: GENERAL RADIOLOGY | Age: 69
End: 2019-04-04
Payer: MEDICARE

## 2019-04-04 VITALS
SYSTOLIC BLOOD PRESSURE: 127 MMHG | HEART RATE: 63 BPM | DIASTOLIC BLOOD PRESSURE: 71 MMHG | OXYGEN SATURATION: 95 % | RESPIRATION RATE: 19 BRPM | TEMPERATURE: 98 F

## 2019-04-04 DIAGNOSIS — R07.89 CHEST TIGHTNESS: Primary | ICD-10-CM

## 2019-04-04 DIAGNOSIS — F41.1 ANXIETY STATE: ICD-10-CM

## 2019-04-04 LAB
ALBUMIN SERPL-MCNC: 4.6 G/DL (ref 3.5–4.6)
ALP BLD-CCNC: 77 U/L (ref 35–104)
ALT SERPL-CCNC: 12 U/L (ref 0–41)
ANION GAP SERPL CALCULATED.3IONS-SCNC: 13 MEQ/L (ref 9–15)
APTT: 26.7 SEC (ref 21.6–35.4)
AST SERPL-CCNC: 17 U/L (ref 0–40)
BASOPHILS ABSOLUTE: 0 K/UL (ref 0–0.2)
BASOPHILS RELATIVE PERCENT: 0.5 %
BILIRUB SERPL-MCNC: 0.8 MG/DL (ref 0.2–0.7)
BUN BLDV-MCNC: 24 MG/DL (ref 8–23)
CALCIUM SERPL-MCNC: 9.8 MG/DL (ref 8.5–9.9)
CHLORIDE BLD-SCNC: 101 MEQ/L (ref 95–107)
CO2: 27 MEQ/L (ref 20–31)
CREAT SERPL-MCNC: 0.86 MG/DL (ref 0.7–1.2)
EKG ATRIAL RATE: 55 BPM
EKG P AXIS: 51 DEGREES
EKG P-R INTERVAL: 180 MS
EKG Q-T INTERVAL: 434 MS
EKG QRS DURATION: 102 MS
EKG QTC CALCULATION (BAZETT): 415 MS
EKG R AXIS: 66 DEGREES
EKG T AXIS: 76 DEGREES
EKG VENTRICULAR RATE: 55 BPM
EOSINOPHILS ABSOLUTE: 0.1 K/UL (ref 0–0.7)
EOSINOPHILS RELATIVE PERCENT: 1.2 %
GFR AFRICAN AMERICAN: >60
GFR NON-AFRICAN AMERICAN: >60
GLOBULIN: 2.8 G/DL (ref 2.3–3.5)
GLUCOSE BLD-MCNC: 103 MG/DL (ref 70–99)
HCT VFR BLD CALC: 44.7 % (ref 42–52)
HEMOGLOBIN: 15.7 G/DL (ref 14–18)
INR BLD: 1.1
LYMPHOCYTES ABSOLUTE: 1.3 K/UL (ref 1–4.8)
LYMPHOCYTES RELATIVE PERCENT: 15.6 %
MCH RBC QN AUTO: 31.5 PG (ref 27–31.3)
MCHC RBC AUTO-ENTMCNC: 35.1 % (ref 33–37)
MCV RBC AUTO: 89.6 FL (ref 80–100)
MONOCYTES ABSOLUTE: 0.5 K/UL (ref 0.2–0.8)
MONOCYTES RELATIVE PERCENT: 6 %
NEUTROPHILS ABSOLUTE: 6.3 K/UL (ref 1.4–6.5)
NEUTROPHILS RELATIVE PERCENT: 76.7 %
PDW BLD-RTO: 14.3 % (ref 11.5–14.5)
PLATELET # BLD: 171 K/UL (ref 130–400)
POTASSIUM SERPL-SCNC: 4.7 MEQ/L (ref 3.4–4.9)
PROTHROMBIN TIME: 11 SEC (ref 9–11.5)
RBC # BLD: 4.99 M/UL (ref 4.7–6.1)
SODIUM BLD-SCNC: 141 MEQ/L (ref 135–144)
TOTAL CK: 78 U/L (ref 0–190)
TOTAL PROTEIN: 7.4 G/DL (ref 6.3–8)
TROPONIN: <0.01 NG/ML (ref 0–0.01)
TROPONIN: <0.01 NG/ML (ref 0–0.01)
WBC # BLD: 8.2 K/UL (ref 4.8–10.8)

## 2019-04-04 PROCEDURE — 99285 EMERGENCY DEPT VISIT HI MDM: CPT

## 2019-04-04 PROCEDURE — 71045 X-RAY EXAM CHEST 1 VIEW: CPT

## 2019-04-04 PROCEDURE — 96374 THER/PROPH/DIAG INJ IV PUSH: CPT

## 2019-04-04 PROCEDURE — 93005 ELECTROCARDIOGRAM TRACING: CPT

## 2019-04-04 PROCEDURE — 82550 ASSAY OF CK (CPK): CPT

## 2019-04-04 PROCEDURE — 6360000002 HC RX W HCPCS: Performed by: NURSE PRACTITIONER

## 2019-04-04 PROCEDURE — 80053 COMPREHEN METABOLIC PANEL: CPT

## 2019-04-04 PROCEDURE — 84484 ASSAY OF TROPONIN QUANT: CPT

## 2019-04-04 PROCEDURE — 85730 THROMBOPLASTIN TIME PARTIAL: CPT

## 2019-04-04 PROCEDURE — 85025 COMPLETE CBC W/AUTO DIFF WBC: CPT

## 2019-04-04 PROCEDURE — 36415 COLL VENOUS BLD VENIPUNCTURE: CPT

## 2019-04-04 PROCEDURE — 85610 PROTHROMBIN TIME: CPT

## 2019-04-04 PROCEDURE — 6370000000 HC RX 637 (ALT 250 FOR IP): Performed by: NURSE PRACTITIONER

## 2019-04-04 RX ORDER — SODIUM CHLORIDE 0.9 % (FLUSH) 0.9 %
3 SYRINGE (ML) INJECTION EVERY 8 HOURS
Status: DISCONTINUED | OUTPATIENT
Start: 2019-04-04 | End: 2019-04-04 | Stop reason: HOSPADM

## 2019-04-04 RX ORDER — NITROGLYCERIN 0.4 MG/1
0.4 TABLET SUBLINGUAL EVERY 5 MIN PRN
Status: DISCONTINUED | OUTPATIENT
Start: 2019-04-04 | End: 2019-04-04 | Stop reason: HOSPADM

## 2019-04-04 RX ORDER — LORAZEPAM 2 MG/ML
1 INJECTION INTRAMUSCULAR ONCE
Status: COMPLETED | OUTPATIENT
Start: 2019-04-04 | End: 2019-04-04

## 2019-04-04 RX ORDER — LORAZEPAM 2 MG/ML
0.5 INJECTION INTRAMUSCULAR ONCE
Status: DISCONTINUED | OUTPATIENT
Start: 2019-04-04 | End: 2019-04-04

## 2019-04-04 RX ORDER — ASPIRIN 81 MG/1
324 TABLET, CHEWABLE ORAL ONCE
Status: COMPLETED | OUTPATIENT
Start: 2019-04-04 | End: 2019-04-04

## 2019-04-04 RX ADMIN — NITROGLYCERIN 0.4 MG: 0.4 TABLET, ORALLY DISINTEGRATING SUBLINGUAL at 15:54

## 2019-04-04 RX ADMIN — NITROGLYCERIN 0.4 MG: 0.4 TABLET, ORALLY DISINTEGRATING SUBLINGUAL at 15:59

## 2019-04-04 RX ADMIN — ASPIRIN 81 MG 324 MG: 81 TABLET ORAL at 15:53

## 2019-04-04 RX ADMIN — LORAZEPAM 1 MG: 2 INJECTION INTRAMUSCULAR; INTRAVENOUS at 17:49

## 2019-04-04 ASSESSMENT — ENCOUNTER SYMPTOMS
TROUBLE SWALLOWING: 0
DIARRHEA: 0
RHINORRHEA: 0
BACK PAIN: 0
EYE REDNESS: 0
ABDOMINAL PAIN: 0
EYE ITCHING: 0
EYE PAIN: 0
NAUSEA: 0
SORE THROAT: 0
COUGH: 0
WHEEZING: 0
VOICE CHANGE: 0
VOMITING: 0
SHORTNESS OF BREATH: 0
CHEST TIGHTNESS: 1
COLOR CHANGE: 0

## 2019-04-04 ASSESSMENT — HEART SCORE: ECG: 0

## 2019-04-04 ASSESSMENT — PAIN SCALES - GENERAL
PAINLEVEL_OUTOF10: 4
PAINLEVEL_OUTOF10: 0

## 2019-04-04 NOTE — ED NOTES
Pt remains alert, oriented  Continues to have pain in lump in his chest     April L Lilian Blank, RN  04/04/19 3490

## 2019-04-04 NOTE — ED PROVIDER NOTES
[facility] ED  eMERGENCY dEPARTMENT eNCOUnter      Pt Name: Pricilla Hernandez  MRN: [id number]  [place] 1950  Date of evaluation: 4/4/2019  Provider: RAVEN Garcia CNP    CHIEF COMPLAINT       Chief Complaint   Patient presents with    Other     pt states he has a \"lump in his chest\"  when questionbed further  he  said \" ITS LIKE A TIGHTNESS \"         HISTORY OF PRESENT ILLNESS   (Location/Symptom, Timing/Onset,Context/Setting, Quality, Duration, Modifying Factors, Severity)  Note limiting factors. Pricilla Hernandez is a 77 y.o. male who presents to the emergency department with complaints of left-sided \"tightness\" in his chest that he reports \"feels like a lump in his chest.\"  He states that it started this morning and became worse when he started getting nervous and worried and panicked about it. He reports that he has had this in the past intermittently for the last year. Upon chart review, patient has been seen in the ER for this and in the cardiology office for the same complaints of \"lump in his chest and tightness. \"  Patient does have a history of coronary artery disease and recently had a stent put in. Has been following cardiology for this. He does have a history of anxiety. In the past Klonopin has helped this tightness in his chest.  He states that he did take one this morning and only felt a little bit of relief and got worse as he started to get worried about it not being resolved with the Klonopin. He does report that he has been more increasingly anxious lately, especially today and has worsened as he became to get worried about the tightness in his chest.  He states that he has not taken any aspirin today despite his med list stating that he is supposed take 1 in the morning. He denies any shortness of breath or wheezing with this. Denies any dizziness or lightheadedness. Denies any headache or known changes in his blood pressure.   He states that the pain is constant and does not radiate. He states that the tightness there is in intensity But has been constant all day. Denies any nausea, vomiting, or diaphoresis. Denies any numbness or tingling in his extremities. Denies any visual changes. Denies any history of heart attack, stroke, DVT/PE, or irregular heartbeats. No known abnormal EKGs in the past per patient. Denies palpitations. Nursing Notes were reviewed. REVIEW OF SYSTEMS    (2-9 systems for level 4, 10 or more for level 5)     Review of Systems   Constitutional: Negative for activity change, appetite change, diaphoresis, fatigue and fever. HENT: Negative for congestion, ear discharge, ear pain, rhinorrhea, sore throat, trouble swallowing and voice change. Eyes: Negative for pain, redness and itching. Respiratory: Positive for chest tightness. Negative for cough, shortness of breath and wheezing. Cardiovascular: Positive for chest pain. Negative for palpitations. Gastrointestinal: Negative for abdominal pain, diarrhea, nausea and vomiting. Genitourinary: Negative for dysuria, flank pain and hematuria. Musculoskeletal: Negative for back pain. Skin: Negative for color change and rash. Neurological: Negative for dizziness, seizures, light-headedness, numbness and headaches. All other systems reviewed and are negative. Except as noted above the remainder of the review of systems was reviewed and negative.        PAST MEDICAL HISTORY     Past Medical History:   Diagnosis Date    Anxiety     Coronary artery disease involving native coronary artery of native heart without angina pectoris 2/15/2019    Diabetes mellitus ([facility]     Hyperlipidemia     Hypertension     Type 2 diabetes mellitus without complication ([facility]      Past Surgical History:   Procedure Laterality Date    CARDIAC SURGERY  1973    EYE SURGERY       Social History     Socioeconomic History    Marital status:      Spouse name: Not on file    Number of children: Not on file    Years of education: Not on file    Highest education level: Not on file   Occupational History    Not on file   Social Needs    Financial resource strain: Not on file    Food insecurity:     Worry: Not on file     Inability: Not on file    Transportation needs:     Medical: Not on file     Non-medical: Not on file   Tobacco Use    Smoking status: Never Smoker    Smokeless tobacco: Never Used   Substance and Sexual Activity    Alcohol use: No    Drug use: No    Sexual activity: Not on file   Lifestyle    Physical activity:     Days per week: Not on file     Minutes per session: Not on file    Stress: Not on file   Relationships    Social connections:     Talks on phone: Not on file     Gets together: Not on file     Attends Congregation service: Not on file     Active member of club or organization: Not on file     Attends meetings of clubs or organizations: Not on file     Relationship status: Not on file    Intimate partner violence:     Fear of current or ex partner: Not on file     Emotionally abused: Not on file     Physically abused: Not on file     Forced sexual activity: Not on file   Other Topics Concern    Not on file   Social History Narrative    Not on file       SCREENINGS    Tommy Coma Scale  Eye Opening: Spontaneous  Best Verbal Response: Oriented  Best Motor Response: Obeys commands  [place] Coma Scale Score: 15 @FLOW([id number])@      PHYSICAL EXAM    (up to 7 for level 4, 8 or more for level 5)     ED Triage Vitals [04/04/19 1456]   BP Temp Temp Source Pulse Resp SpO2 Height Weight   121/72 98 °F (36.7 °C) Oral 60 16 98 % -- --       Physical Exam   Constitutional: He is oriented to person, place, and time. He appears well-developed and well-nourished. No distress. HENT:   Head: Normocephalic and atraumatic. Eyes: Pupils are equal, round, and reactive to light. No scleral icterus. Neck: Normal range of motion. Neck supple.    Cardiovascular: Normal rate, regular rhythm, normal heart sounds and intact distal pulses. Exam reveals no gallop and no friction rub. No murmur heard. Pulses:       Radial pulses are 2+ on the right side. Femoral pulses are 2+ on the right side. Pulmonary/Chest: Effort normal and breath sounds normal. No stridor. No respiratory distress. He has no decreased breath sounds. He has no wheezes. He has no rhonchi. He has no rales. Abdominal: Soft. Bowel sounds are normal. He exhibits no distension. There is no tenderness. There is no rebound and no guarding. Musculoskeletal: He exhibits no edema. Neurological: He is alert and oriented to person, place, and time. Skin: Skin is warm and dry. Capillary refill takes less than 2 seconds. He is not diaphoretic. No pallor. Psychiatric: He has a normal mood and affect. His behavior is normal. Judgment and thought content normal.   Nursing note and vitals reviewed. DIAGNOSTIC RESULTS     EKG: All EKG's are interpreted by the Emergency Department Physician who either signs or Co-signsthis chart in the absence of a cardiologist.    EKG normal sinus rhythm with heart rate 55 bpm.  No ST segment elevation.     RADIOLOGY:   Non-plain filmimages such as CT, Ultrasound and MRI are read by the radiologist. Plain radiographic images are visualized and preliminarily interpreted by the emergency physician with the below findings:      Interpretation per the Radiologist below, if available at the time ofthis note:    XR CHEST PORTABLE   Final Result   NO ACUTE ACTIVE CARDIOPULMONARY PROCESS            ED BEDSIDE ULTRASOUND:   Performed by ED Physician - none    LABS:  Labs Reviewed   COMPREHENSIVE METABOLIC PANEL - Abnormal; Notable for the following components:       Result Value    Glucose 103 (*)     BUN 24 (*)     Total Bilirubin 0.8 (*)     All other components within normal limits   CBC WITH AUTO DIFFERENTIAL - Abnormal; Notable for the following components:    MCH 31.5 (*)     All other components within normal limits   TROPONIN   CK   APTT   PROTIME-INR   TROPONIN       All other labs were within normal range or not returned as of this dictation. EMERGENCY DEPARTMENT COURSE and DIFFERENTIAL DIAGNOSIS/MDM:   Vitals:    Vitals:    04/04/19 1456 04/04/19 1645 04/04/19 1757   BP: 121/72 106/73 134/74   Pulse: 60 59 58   Resp: 16 16    Temp: 98 °F (36.7 °C)     TempSrc: Oral     SpO2: 98% 100% 100%         ED Course as of Apr 04 1848   Thu Apr 04, 2019   1652 Per nursing staff, patient with no relief and chest tightness with nitro. EKG, chest x-ray, and lab work including cardiac enzymes all within normal limits. Suspect anxiety related chest discomfort. Will order IV Ativan and reevaluate.    [SB]   0278 Spoke with Dr. Francesco Berg, [facility] cardiology on-call, and discussed patient complaints and status. He was advised of patient's complaint of tightness in his chest without much relief after being given aspirin, nitro sublingual, and Ativan IV. He is instructed of patient's prior cardiac history and was advised of patient's heart score 4. Dr. Francesco Berg reviewed patient's chart and history an appendectomy as well on the phone with him. Dr. Francesco Berg does not feel the patient needs to be admitted at this time unless his repeat troponin is elevated. He advised the patient should follow-up with Dr. Nilay Nicholson in the office if his repeat troponin is negative and we'll discharge home. We'll admit if his repeat troponin is elevated. Patient remains resting comfortably in bed.  was notified of need for stat repeat troponin at this time    [SB]      ED Course User Index  [SB] Harper Vera, APRN - CNP     MDM     Patient presented the ER today with complaints of chest tightness/lump in his chest since this a.m. This has been an ongoing intermittent problem the patient is complained of and has been evaluated and seen in the ER for this and in the cardiology office before. Given patient's history, I will work him up to rule out any kind of cardiac etiology but anxiety related chest tightness is highly suspicious. Will medicate with 324 aspirin and give nitro for chest discomfort and will reevaluate. If no change or only minimal change with nitro, I will order Ativan to see if that changes his discomfort in his chest.    Patient with only mild improvement in chest pain after being given aspirin, nitro, and Ativan. I do feel like his symptoms could be related to anxiety given that his cardiac enzymes were all normal including a 2 hour cardiac enzyme as well as normal EKG and chest x-ray and remainder of laboratory. However, patient does have a cardiac history. Case was discussed with Dr. Alison Higgins who advised to do a 2 hour troponin and if was negative patient will be discharged and sent home with instructions to follow up Dr. Vishnu Farrell in office in the next 1-2 days. Patient was instructed on these discharge instructions and advised to return the ER if he develops any new or worsening chest pain, or development of shortness of breath, palpitations, dizziness, lightheadedness, fainting, or any other symptom he deems emerge. Patient was also advised to follow-up with family doctor to discuss his continued anxiety that is occasionally not resolved with his Klonopin. Patient verbalizes understanding of discharge planning care and denies any further questions at this time. CRITICAL CARE TIME       CONSULTS:  None    PROCEDURES:  Unless otherwise noted below, none     Procedures    FINAL IMPRESSION      1. Chest tightness    2.  Anxiety state          DISPOSITION/PLAN   DISPOSITION        PATIENT REFERRED TO:  Bakari Boyd MD  [address]  [phone]    Go in 2 days      Marly Argueta DO  [address] [place] [address] [place] [id number]  [phone]    Go in 1 day        DISCHARGE MEDICATIONS:  New Prescriptions    No medications on file          (Please notethat portions of this note were completed with a voice recognition program.  Efforts were made to edit the dictations but occasionally words are mis-transcribed.)    RAVEN Medina CNP (electronically signed)  Attending Emergency Physician          RAVEN Medina CNP  04/04/19 7048

## 2019-04-04 NOTE — ED NOTES
Pt was outside talking on phone   Came back in  [address] [facility] back to triage      Lewis Bedolla, RN  04/04/19 9014

## 2019-04-04 NOTE — DISCHARGE INSTR - COC
Continuity of Care Form    Patient Name: Rosetta Francois   :  [birth year]  MRN:  [id number]    Admit date:  2019  Discharge date:  ***    Code Status Order: Prior   Advance Directives:     Admitting Physician:  No admitting provider for patient encounter. PCP: Raquel Singleton MD    Discharging Nurse: [facility] Unit/Room#:   Discharging Unit Phone Number: ***    Emergency Contact:   Extended Emergency Contact Information  Primary Emergency Contact: Eva Rios  Address: [address] [place] [address], [address] [place] [address] Phone: [phone]  Work Phone: [phone]  Mobile Phone: [phone]  Relation: Spouse  Secondary Emergency Contact: Emory Gallo [address] Phone: [phone]  Work Phone: [phone]  Mobile Phone: [phone]  Relation: Child    Past Surgical History:  Past Surgical History:   Procedure Laterality Date    CARDIAC SURGERY      EYE SURGERY         Immunization History: There is no immunization history on file for this patient.     Active Problems:  Patient Active Problem List   Diagnosis Code    Chest pain R07.9    Hypertension I10    Anxiety F41.9    Recurrent major depressive disorder, in remission ([address] F33.40    Angina, class IV ([facility] I20.9    Coronary artery disease involving native coronary artery of native heart without angina pectoris I25.10       Isolation/Infection:   Isolation          No Isolation            Nurse Assessment:  Last Vital Signs: /74   Pulse 58   Temp 98 °F (36.7 °C) (Oral)   Resp 16   SpO2 100%     Last documented pain score (0-10 scale): Pain Level: 4  Last Weight:   Wt Readings from Last 1 Encounters:   02/15/19 130 lb 6.4 oz (59.1 kg)     Mental Status:  {IP PT MENTAL STATUS:32392}    IV Access:  { HAILY IV ACCESS:691805465}    Nursing Mobility/ADLs:  Walking   {CHP DME OWCK:703375470}  Transfer  {CHP DME JRPW:874889107}  Bathing  {CHP DME ZKHD:969679817}  Dressing {CHP DME KHXT:595071804}  Toileting  {CHP DME AQEX:355457522}  Feeding  {CHP DME XUAQ:118956911}  Med Admin  {P DME SYVC:549812362}  Med Delivery   { HAILY MED Delivery:463648041}    Wound Care Documentation and Therapy:        Elimination:  Continence:   · Bowel: {YES / TP:98320}  · Bladder: {YES / CC:56057}  Urinary Catheter: {Urinary Catheter:213040141}   Colostomy/Ileostomy/Ileal Conduit: {YES / UL:17766}       Date of Last BM: ***  No intake or output data in the 24 hours ending 19 1846  No intake/output data recorded.     Safety Concerns:     508 ROIÂ² Safety Concerns:086399733}    Impairments/Disabilities:      508 ROIÂ² Impairments/Disabilities:692148444}    Nutrition Therapy:  Current Nutrition Therapy:   508 ROIÂ² Diet List:840040634}    Routes of Feeding: {[facility] DME Other Feedings:593894747}  Liquids: {Slp liquid thickness:39869}  Daily Fluid Restriction: {CHP DME Yes amt example:905592111}  Last Modified Barium Swallow with Video (Video Swallowing Test): {Done Not Done ZNNL:925987324}    Treatments at the Time of Hospital Discharge:   Respiratory Treatments: ***  Oxygen Therapy:  {Therapy; copd oxygen:53849}  Ventilator:    { CC Vent RPLL:753278989}    Rehab Therapies: {THERAPEUTIC INTERVENTION:5712335469}  Weight Bearing Status/Restrictions: [address] [employer]  Weight Bearin}  Other Medical Equipment (for information only, NOT a DME order):  {EQUIPMENT:614237187}  Other Treatments: ***    Patient's personal belongings (please select all that are sent with patient):  {[facility] DME Belongings:932020925}    RN SIGNATURE:  {Esignature:309520941}    CASE MANAGEMENT/SOCIAL WORK SECTION    Inpatient Status Date: ***    Readmission Risk Assessment Score:  Readmission Risk              Risk of Unplanned Readmission:        0           Discharging to Facility/ Agency   · Name:   · Address:  · Phone:  · Fax:    Dialysis Facility (if applicable)   · Name:  · Address:  · Dialysis Schedule:  · Phone:  · Fax:    Case Manager/ signature: {Esignature:828371893}    PHYSICIAN SECTION    Prognosis: {Prognosis:7430279569}    Condition at Discharge: [address] Patient Condition:641571530}    Rehab Potential (if transferring to Rehab): {Prognosis:4113697963}    Recommended Labs or Other Treatments After Discharge: ***    Physician Certification: I certify the above information and transfer of Hollie Dawson  is necessary for the continuing treatment of the diagnosis listed and that he requires {Admit to Appropriate Level of Care:81255} for {GREATER/LESS:372615507} 30 days.      Update Admission H&P: {CHP DME Changes in WQCKX:520993510}    PHYSICIAN SIGNATURE:  {Esignature:849028562}

## 2019-04-04 NOTE — ED NOTES
Pt states that  Pain is now down to a  4 and he feels \" little bit better. \"     Nargis Witt RN  04/04/19 1749

## 2019-04-04 NOTE — ED NOTES
Labs sent     [facility] MENTAL [facility], RN  04/04/19 2523

## 2019-04-04 NOTE — ED NOTES
Called for patient  Patient on phone in the [facility] waived me off said he would be back in a minute      Lewis Carson RN  04/04/19 4883

## 2019-04-04 NOTE — ED NOTES
Pt texting on phone. No distress noted. Respirations remain unlabored. Skin warm dry color wnl. Pt remains on cardiac monitor.   nsb     April ROLANDA Garcia RN  04/04/19 9801

## 2019-04-05 PROCEDURE — 93010 ELECTROCARDIOGRAM REPORT: CPT | Performed by: INTERNAL MEDICINE

## 2019-04-15 ENCOUNTER — HOSPITAL ENCOUNTER (OUTPATIENT)
Dept: CARDIAC REHAB | Age: 69
Setting detail: THERAPIES SERIES
Discharge: HOME | End: 2019-04-15
Payer: MEDICARE

## 2019-04-15 PROCEDURE — 93798 PHYS/QHP OP CAR RHAB W/ECG: CPT

## 2019-04-17 ENCOUNTER — HOSPITAL ENCOUNTER (OUTPATIENT)
Dept: CARDIAC REHAB | Age: 69
Setting detail: THERAPIES SERIES
Discharge: HOME | End: 2019-04-17
Payer: MEDICARE

## 2019-04-17 PROCEDURE — 93798 PHYS/QHP OP CAR RHAB W/ECG: CPT

## 2019-04-19 ENCOUNTER — HOSPITAL ENCOUNTER (OUTPATIENT)
Dept: CARDIAC REHAB | Age: 69
Setting detail: THERAPIES SERIES
Discharge: HOME | End: 2019-04-19
Payer: MEDICARE

## 2019-04-19 PROCEDURE — 93798 PHYS/QHP OP CAR RHAB W/ECG: CPT

## 2019-04-24 ENCOUNTER — HOSPITAL ENCOUNTER (OUTPATIENT)
Dept: CARDIAC REHAB | Age: 69
Setting detail: THERAPIES SERIES
Discharge: HOME | End: 2019-04-24
Payer: MEDICARE

## 2019-04-24 PROCEDURE — 93798 PHYS/QHP OP CAR RHAB W/ECG: CPT

## 2019-04-26 ENCOUNTER — HOSPITAL ENCOUNTER (OUTPATIENT)
Dept: CARDIAC REHAB | Age: 69
Setting detail: THERAPIES SERIES
Discharge: HOME | End: 2019-04-26
Payer: MEDICARE

## 2019-04-26 PROCEDURE — 93798 PHYS/QHP OP CAR RHAB W/ECG: CPT

## 2019-04-29 ENCOUNTER — HOSPITAL ENCOUNTER (OUTPATIENT)
Dept: CARDIAC REHAB | Age: 69
Setting detail: THERAPIES SERIES
Discharge: HOME | End: 2019-04-29
Payer: MEDICARE

## 2019-04-29 PROCEDURE — 93798 PHYS/QHP OP CAR RHAB W/ECG: CPT

## 2019-05-01 ENCOUNTER — HOSPITAL ENCOUNTER (OUTPATIENT)
Dept: CARDIAC REHAB | Age: 69
Setting detail: THERAPIES SERIES
Discharge: HOME | End: 2019-05-01
Payer: MEDICARE

## 2019-05-01 PROCEDURE — 93798 PHYS/QHP OP CAR RHAB W/ECG: CPT

## 2019-05-10 ENCOUNTER — OFFICE VISIT (OUTPATIENT)
Dept: CARDIOLOGY CLINIC | Age: 69
End: 2019-05-10
Payer: MEDICARE

## 2019-05-10 VITALS
DIASTOLIC BLOOD PRESSURE: 70 MMHG | WEIGHT: 132.2 LBS | SYSTOLIC BLOOD PRESSURE: 130 MMHG | HEIGHT: 65 IN | BODY MASS INDEX: 22.02 KG/M2 | HEART RATE: 54 BPM

## 2019-05-10 DIAGNOSIS — R07.2 PRECORDIAL PAIN: Primary | ICD-10-CM

## 2019-05-10 DIAGNOSIS — I25.10 CORONARY ARTERY DISEASE INVOLVING NATIVE CORONARY ARTERY OF NATIVE HEART WITHOUT ANGINA PECTORIS: ICD-10-CM

## 2019-05-10 DIAGNOSIS — I10 ESSENTIAL HYPERTENSION: ICD-10-CM

## 2019-05-10 PROCEDURE — 99213 OFFICE O/P EST LOW 20 MIN: CPT | Performed by: INTERNAL MEDICINE

## 2019-05-10 PROCEDURE — 93000 ELECTROCARDIOGRAM COMPLETE: CPT | Performed by: INTERNAL MEDICINE

## 2019-05-10 RX ORDER — RANOLAZINE 500 MG/1
500 TABLET, EXTENDED RELEASE ORAL 2 TIMES DAILY
Qty: 60 TABLET | Refills: 6 | Status: SHIPPED | OUTPATIENT
Start: 2019-05-10 | End: 2019-06-10

## 2019-05-10 NOTE — PROGRESS NOTES
discoloration or ulcers. No LE edema. No CHF type symptoms. Lipid profile is normal. No recent hospitalization. No change in meds. 1-24-19: was in ER yesterday for midsternal chest pain, which was relieved with nitro and ASA. Does have anxiety as well, takes Klonopin for that. Pain occurred at rest, lasting more than 20 min. Had re occurrence of his CP today on the way over to my office. He gets tired with walking. Some SOB. + hx of DM, HTN and HLP. Pt denies nausea, vomiting, diarrhea, constipation, motor weakness, insomnia, weight loss, syncope, dizziness, lightheadedness, palpitations, PND, orthopnea, or claudication. no hx of smoking. 2-15-19: s/p LHC with PCI of 90% CX stenosis. Mild LAD and RCA disease, normal LVF. On DAPT, no bleeding issues. Pt denies chest pain, dyspnea, dyspnea on exertion, change in exercise capacity, fatigue,  nausea, vomiting, diarrhea, constipation, motor weakness, insomnia, weight loss, syncope, dizziness, lightheadedness, palpitations, PND, orthopnea. No nitro use. BP and hr are good. CAD is stable. No LE discoloration or ulcers. No LE edema. No CHF type symptoms. Lipid profile is normal. No recent hospitalization. No change in meds. Seeing an alternative medicine physician. Patient has underlying anxiety. 5-10-19: has anxiety issues. Does have some atypical CP from time to time. On Brilinta, not taking ASA. Pt denies dyspnea, dyspnea on exertion, change in exercise capacity, fatigue,  nausea, vomiting, diarrhea, constipation, motor weakness, insomnia, weight loss, syncope, dizziness, lightheadedness, palpitations, PND, orthopnea, or claudication. No nitro use. BP and hr are good. CAD is stable. No LE discoloration or ulcers. No LE edema. No CHF type symptoms. Lipid profile is normal. No recent hospitalization. No change in meds.              Patient Active Problem List   Diagnosis    Chest pain    Hypertension    Anxiety    [facility]urrent major depressive disorder, [facility] [address]    Angina, class IV ([facility] [address]    Coronary artery disease involving native coronary artery of native heart without angina pectoris       Past Surgical History:   Procedure Laterality Date    CARDIAC SURGERY  1973    EYE SURGERY         Social History     Socioeconomic History    Marital status:      Spouse name: Not on file    Number of children: Not on file    Years of education: Not on file    Highest education level: Not on file   Occupational History    Not on file   Social Needs    Financial resource strain: Not on file    Food insecurity:     Worry: Not on file     Inability: Not on file    Transportation needs:     Medical: Not on file     Non-medical: Not on file   Tobacco Use    Smoking status: Never Smoker    Smokeless tobacco: Never Used   Substance and Sexual Activity    Alcohol use: No    Drug use: No    Sexual activity: Not on file   Lifestyle    Physical activity:     Days per week: Not on file     Minutes per session: Not on file    Stress: Not on file   Relationships    Social connections:     Talks on phone: Not on file     Gets together: Not on file     Attends Worship service: Not on file     Active member of club or organization: Not on file     Attends meetings of clubs or organizations: Not on file     Relationship status: Not on file    Intimate partner violence:     Fear of current or ex partner: Not on file     Emotionally abused: Not on file     Physically abused: Not on file     Forced sexual activity: Not on file   Other Topics Concern    Not on file   Social History Narrative    Not on file       Family History   Problem Relation Age of Onset    Heart Disease Maternal Aunt     Cancer Maternal Aunt        Current Outpatient Medications   Medication Sig Dispense Refill    ticagrelor (BRILINTA) 90 MG TABS tablet Take 1 tablet by mouth 2 times daily 60 tablet 5    ranolazine (RANEXA) 500 MG extended release tablet Take 1 tablet by mouth 2 times daily 60 tablet 6    latanoprost (XALATAN) 0.005 % ophthalmic solution Use 1 Drop in both eyes daily at bedtime.  lisinopril (PRINIVIL;ZESTRIL) 5 MG tablet TAKE 1 TABLET BY MOUTH DAILY 90 tablet 3    metoprolol tartrate (LOPRESSOR) 25 MG tablet Take 1 tablet by mouth 2 times daily 60 tablet 5    aspirin (ASPIRIN CHILDRENS) 81 MG chewable tablet Take 1 tablet by mouth daily 30 tablet 0    nitroGLYCERIN (NITROSTAT) 0.4 MG SL tablet up to max of 3 total doses. If no relief after 1 dose, call 911. 25 tablet 0    olopatadine (PATADAY) 0.2 % SOLN ophthalmic solution Apply 1 drop to eye daily For allergies 1 Bottle 1    Blood Glucose Monitoring Suppl (ONETOUCH VERIO FLEX SYSTEM) w/Device KIT       ONETOUCH VERIO strip       ONETOUCH DELICA LANCETS 18Q MISC       fluticasone (FLONASE) 50 MCG/ACT nasal spray 2 sprays by Nasal route daily 1 Bottle 2    magnesium oxide (MAG-OX) 400 MG tablet Take 400 mg by mouth daily      Cholecalciferol (VITAMIN D) 2000 units CAPS capsule Take 2,000 Units by mouth daily      clonazePAM (KLONOPIN) 0.5 MG tablet Take 1 tablet by mouth 2 times daily as needed for Anxiety for up to 15 days. . 30 tablet 0     No current facility-administered medications for this visit. Seroquel [quetiapine]    Review of Systems:  General ROS: negative  Psychological ROS: negative  Hematological and Lymphatic ROS: No history of blood clots or bleeding disorder. Respiratory ROS: no cough, shortness of breath, or wheezing  Cardiovascular ROS: positive for - chest pain  Gastrointestinal ROS: no abdominal pain, change in bowel habits, or black or bloody stools  Genito-Urinary ROS: no dysuria, trouble voiding, or hematuria  Musculoskeletal ROS: negative  Neurological ROS: no TIA or stroke symptoms  Dermatological ROS: negative    VITALS:  Blood pressure 130/70, pulse 54, height 5' 5\" (1.651 m), weight 132 lb 3.2 oz (60 kg). Body mass index is 22 kg/m².     Physical Examination:  General appearance - alert, well appearing, and in no distress  Mental status - alert, oriented to person, place, and time  Neck - Neck is supple, no JVD or carotid bruits. No thyromegaly or adenopathy. Chest - clear to auscultation, no wheezes, rales or rhonchi, symmetric air entry  Heart - normal rate, regular rhythm, normal S1, S2, no murmurs, rubs, clicks or gallops  Abdomen - soft, nontender, nondistended, no masses or organomegaly  Neurological - alert, oriented, normal speech, no focal findings or movement disorder noted  Extremities - peripheral pulses normal, no pedal edema, no clubbing or cyanosis  Skin - normal coloration and turgor, no rashes, no suspicious skin lesions noted      EKG: normal sinus rhythm, nonspecific ST and T waves changes    Orders Placed This Encounter   Procedures    EKG 12 Lead       ASSESSMENT:     Diagnosis Orders   1. Precordial pain  EKG 12 Lead   2. Coronary artery disease involving native coronary artery of native heart without angina pectoris     3. Essential hypertension           PLAN:     Patient will need to continue to follow up with you for their general medical care     As always, aggressive risk factor modification is strongly recommended. We should adhere to the JNC VIII guidelines for HTN management and the NCEP ATP III guidelines for LDL-C management. Cardiac diet is always recommended with low fat, cholesterol, calories and sodium. Will continue to monitor patient clinically, if symptoms develop or worsen, they are to let me know ASAP or head to the nearest emergeny room. Continue medications at current doses. TAKE ASA as well. No  Statin per patient request.     Start Ranexa 500mg BID. Stressed importance of adhering to DAPT for one year. If he stops he is aware of high risk of stent thrombosis and death.      Will continue to monitor patient clinically, if symptoms develop or worsen, they are to let me know ASAP or head to the nearest emergeny

## 2019-05-14 ENCOUNTER — TELEPHONE (OUTPATIENT)
Dept: CARDIOLOGY CLINIC | Age: 69
End: 2019-05-14

## 2019-05-14 NOTE — TELEPHONE ENCOUNTER
Spoke with pt advised these medications would not cause palpitations. Asked patient if he is taking metoprolol 25mg bid and he said no because he is taking brilinta. Advised him that one does not replace the other and that is most likely why he is having palpitations. I asked him who advised him to stop and he said nobody he thought since he was given brilinta he did not have to take it.  Please advise

## 2019-05-14 NOTE — TELEPHONE ENCOUNTER
PT CALLED IN TO THE OFFICE BECAUSE HE IS HAVING PALPITATIONS. HE STATES DR HOLIDAY PUT HIM ON RANEXA 500 MG AND BRILINTA 90MG. HE IS ASKING IF IT IS FROM THIS MEDICATION. LOV-5/10/19      PLEASE ADVISE.

## 2019-05-15 ENCOUNTER — APPOINTMENT (OUTPATIENT)
Dept: GENERAL RADIOLOGY | Age: 69
End: 2019-05-15
Payer: MEDICARE

## 2019-05-15 ENCOUNTER — HOSPITAL ENCOUNTER (OUTPATIENT)
Age: 69
Setting detail: OBSERVATION
Discharge: HOME | End: 2019-05-16
Attending: EMERGENCY MEDICINE | Admitting: INTERNAL MEDICINE
Payer: MEDICARE

## 2019-05-15 DIAGNOSIS — R07.9 CHEST PAIN, UNSPECIFIED TYPE: Primary | ICD-10-CM

## 2019-05-15 LAB
ALBUMIN SERPL-MCNC: 4.6 G/DL (ref 3.5–4.6)
ALP BLD-CCNC: 75 U/L (ref 35–104)
ALT SERPL-CCNC: 12 U/L (ref 0–41)
ANION GAP SERPL CALCULATED.3IONS-SCNC: 16 MEQ/L (ref 9–15)
AST SERPL-CCNC: 11 U/L (ref 0–40)
BASOPHILS ABSOLUTE: 0.1 K/UL (ref 0–0.2)
BASOPHILS RELATIVE PERCENT: 0.9 %
BILIRUB SERPL-MCNC: 1.1 MG/DL (ref 0.2–0.7)
BUN BLDV-MCNC: 12 MG/DL (ref 8–23)
CALCIUM SERPL-MCNC: 9.5 MG/DL (ref 8.5–9.9)
CHLORIDE BLD-SCNC: 101 MEQ/L (ref 95–107)
CO2: 21 MEQ/L (ref 20–31)
CREAT SERPL-MCNC: 0.96 MG/DL (ref 0.7–1.2)
EKG ATRIAL RATE: 55 BPM
EKG ATRIAL RATE: 55 BPM
EKG ATRIAL RATE: 66 BPM
EKG P AXIS: 47 DEGREES
EKG P AXIS: 53 DEGREES
EKG P AXIS: 65 DEGREES
EKG P-R INTERVAL: 174 MS
EKG P-R INTERVAL: 180 MS
EKG P-R INTERVAL: 186 MS
EKG Q-T INTERVAL: 426 MS
EKG Q-T INTERVAL: 452 MS
EKG Q-T INTERVAL: 458 MS
EKG QRS DURATION: 102 MS
EKG QRS DURATION: 96 MS
EKG QRS DURATION: 96 MS
EKG QTC CALCULATION (BAZETT): 432 MS
EKG QTC CALCULATION (BAZETT): 438 MS
EKG QTC CALCULATION (BAZETT): 446 MS
EKG R AXIS: 60 DEGREES
EKG R AXIS: 64 DEGREES
EKG R AXIS: 65 DEGREES
EKG T AXIS: 66 DEGREES
EKG T AXIS: 69 DEGREES
EKG T AXIS: 72 DEGREES
EKG VENTRICULAR RATE: 55 BPM
EKG VENTRICULAR RATE: 55 BPM
EKG VENTRICULAR RATE: 66 BPM
EOSINOPHILS ABSOLUTE: 0 K/UL (ref 0–0.7)
EOSINOPHILS RELATIVE PERCENT: 0.4 %
GFR AFRICAN AMERICAN: >60
GFR NON-AFRICAN AMERICAN: >60
GLOBULIN: 3 G/DL (ref 2.3–3.5)
GLUCOSE BLD-MCNC: 108 MG/DL (ref 70–99)
GLUCOSE BLD-MCNC: 124 MG/DL (ref 60–115)
GLUCOSE BLD-MCNC: 129 MG/DL (ref 60–115)
HBA1C MFR BLD: 5.6 % (ref 4.8–5.9)
HCT VFR BLD CALC: 46.7 % (ref 42–52)
HEMOGLOBIN: 16.3 G/DL (ref 14–18)
LYMPHOCYTES ABSOLUTE: 0.8 K/UL (ref 1–4.8)
LYMPHOCYTES RELATIVE PERCENT: 11 %
MCH RBC QN AUTO: 30.6 PG (ref 27–31.3)
MCHC RBC AUTO-ENTMCNC: 34.8 % (ref 33–37)
MCV RBC AUTO: 88 FL (ref 80–100)
MONOCYTES ABSOLUTE: 0.4 K/UL (ref 0.2–0.8)
MONOCYTES RELATIVE PERCENT: 5.4 %
NEUTROPHILS ABSOLUTE: 6 K/UL (ref 1.4–6.5)
NEUTROPHILS RELATIVE PERCENT: 82.3 %
PDW BLD-RTO: 14.1 % (ref 11.5–14.5)
PERFORMED ON: ABNORMAL
PERFORMED ON: ABNORMAL
PLATELET # BLD: 183 K/UL (ref 130–400)
POTASSIUM SERPL-SCNC: 3.9 MEQ/L (ref 3.4–4.9)
PRO-BNP: 223 PG/ML
RBC # BLD: 5.31 M/UL (ref 4.7–6.1)
SODIUM BLD-SCNC: 138 MEQ/L (ref 135–144)
TOTAL PROTEIN: 7.6 G/DL (ref 6.3–8)
TROPONIN: <0.01 NG/ML (ref 0–0.01)
WBC # BLD: 7.2 K/UL (ref 4.8–10.8)

## 2019-05-15 PROCEDURE — 6370000000 HC RX 637 (ALT 250 FOR IP): Performed by: EMERGENCY MEDICINE

## 2019-05-15 PROCEDURE — 99219 PR INITIAL OBSERVATION CARE/DAY 50 MINUTES: CPT | Performed by: INTERNAL MEDICINE

## 2019-05-15 PROCEDURE — 93005 ELECTROCARDIOGRAM TRACING: CPT | Performed by: EMERGENCY MEDICINE

## 2019-05-15 PROCEDURE — 83880 ASSAY OF NATRIURETIC PEPTIDE: CPT

## 2019-05-15 PROCEDURE — G0378 HOSPITAL OBSERVATION PER HR: HCPCS

## 2019-05-15 PROCEDURE — 83036 HEMOGLOBIN GLYCOSYLATED A1C: CPT

## 2019-05-15 PROCEDURE — 93005 ELECTROCARDIOGRAM TRACING: CPT

## 2019-05-15 PROCEDURE — 71045 X-RAY EXAM CHEST 1 VIEW: CPT

## 2019-05-15 PROCEDURE — 84484 ASSAY OF TROPONIN QUANT: CPT

## 2019-05-15 PROCEDURE — 85025 COMPLETE CBC W/AUTO DIFF WBC: CPT

## 2019-05-15 PROCEDURE — 96372 THER/PROPH/DIAG INJ SC/IM: CPT

## 2019-05-15 PROCEDURE — 36415 COLL VENOUS BLD VENIPUNCTURE: CPT

## 2019-05-15 PROCEDURE — APPNB45 APP NON BILLABLE 31-45 MINUTES: Performed by: NURSE PRACTITIONER

## 2019-05-15 PROCEDURE — 99285 EMERGENCY DEPT VISIT HI MDM: CPT

## 2019-05-15 PROCEDURE — 2580000003 HC RX 258: Performed by: NURSE PRACTITIONER

## 2019-05-15 PROCEDURE — 6370000000 HC RX 637 (ALT 250 FOR IP): Performed by: NURSE PRACTITIONER

## 2019-05-15 PROCEDURE — 6360000002 HC RX W HCPCS: Performed by: NURSE PRACTITIONER

## 2019-05-15 PROCEDURE — 80053 COMPREHEN METABOLIC PANEL: CPT

## 2019-05-15 RX ORDER — LORAZEPAM 1 MG/1
0.5 TABLET ORAL ONCE
Status: COMPLETED | OUTPATIENT
Start: 2019-05-15 | End: 2019-05-15

## 2019-05-15 RX ORDER — TERAZOSIN 5 MG/1
5 CAPSULE ORAL NIGHTLY
Status: DISCONTINUED | OUTPATIENT
Start: 2019-05-15 | End: 2019-05-16 | Stop reason: HOSPADM

## 2019-05-15 RX ORDER — ALPRAZOLAM 0.5 MG/1
0.5 TABLET ORAL 3 TIMES DAILY PRN
Status: DISCONTINUED | OUTPATIENT
Start: 2019-05-15 | End: 2019-05-16 | Stop reason: HOSPADM

## 2019-05-15 RX ORDER — ASPIRIN 81 MG/1
324 TABLET, CHEWABLE ORAL ONCE
Status: COMPLETED | OUTPATIENT
Start: 2019-05-15 | End: 2019-05-15

## 2019-05-15 RX ORDER — SERTRALINE HYDROCHLORIDE 100 MG/1
100 TABLET, FILM COATED ORAL DAILY
Status: DISCONTINUED | OUTPATIENT
Start: 2019-05-15 | End: 2019-05-16

## 2019-05-15 RX ORDER — ONDANSETRON 2 MG/ML
4 INJECTION INTRAMUSCULAR; INTRAVENOUS EVERY 6 HOURS PRN
Status: DISCONTINUED | OUTPATIENT
Start: 2019-05-15 | End: 2019-05-16 | Stop reason: HOSPADM

## 2019-05-15 RX ORDER — DEXTROSE MONOHYDRATE 50 MG/ML
100 INJECTION, SOLUTION INTRAVENOUS PRN
Status: DISCONTINUED | OUTPATIENT
Start: 2019-05-15 | End: 2019-05-16 | Stop reason: HOSPADM

## 2019-05-15 RX ORDER — ASPIRIN 81 MG/1
81 TABLET, CHEWABLE ORAL DAILY
Status: DISCONTINUED | OUTPATIENT
Start: 2019-05-15 | End: 2019-05-16 | Stop reason: HOSPADM

## 2019-05-15 RX ORDER — RANOLAZINE 500 MG/1
500 TABLET, EXTENDED RELEASE ORAL 2 TIMES DAILY
Status: DISCONTINUED | OUTPATIENT
Start: 2019-05-15 | End: 2019-05-16 | Stop reason: HOSPADM

## 2019-05-15 RX ORDER — DEXTROSE MONOHYDRATE 25 G/50ML
12.5 INJECTION, SOLUTION INTRAVENOUS PRN
Status: DISCONTINUED | OUTPATIENT
Start: 2019-05-15 | End: 2019-05-16 | Stop reason: HOSPADM

## 2019-05-15 RX ORDER — LEVETIRACETAM 250 MG/1
250 TABLET ORAL 2 TIMES DAILY
Status: DISCONTINUED | OUTPATIENT
Start: 2019-05-15 | End: 2019-05-16

## 2019-05-15 RX ORDER — ACETAMINOPHEN 325 MG/1
650 TABLET ORAL EVERY 4 HOURS PRN
Status: DISCONTINUED | OUTPATIENT
Start: 2019-05-15 | End: 2019-05-16 | Stop reason: HOSPADM

## 2019-05-15 RX ORDER — SODIUM CHLORIDE 0.9 % (FLUSH) 0.9 %
10 SYRINGE (ML) INJECTION EVERY 12 HOURS SCHEDULED
Status: DISCONTINUED | OUTPATIENT
Start: 2019-05-15 | End: 2019-05-16 | Stop reason: HOSPADM

## 2019-05-15 RX ORDER — LISINOPRIL 5 MG/1
5 TABLET ORAL DAILY
Status: DISCONTINUED | OUTPATIENT
Start: 2019-05-15 | End: 2019-05-16 | Stop reason: HOSPADM

## 2019-05-15 RX ORDER — NITROGLYCERIN 0.4 MG/1
0.4 TABLET SUBLINGUAL EVERY 5 MIN PRN
Status: DISCONTINUED | OUTPATIENT
Start: 2019-05-15 | End: 2019-05-16 | Stop reason: HOSPADM

## 2019-05-15 RX ORDER — NICOTINE POLACRILEX 4 MG
15 LOZENGE BUCCAL PRN
Status: DISCONTINUED | OUTPATIENT
Start: 2019-05-15 | End: 2019-05-16 | Stop reason: HOSPADM

## 2019-05-15 RX ORDER — SODIUM CHLORIDE 0.9 % (FLUSH) 0.9 %
10 SYRINGE (ML) INJECTION PRN
Status: DISCONTINUED | OUTPATIENT
Start: 2019-05-15 | End: 2019-05-16 | Stop reason: HOSPADM

## 2019-05-15 RX ORDER — LATANOPROST 50 UG/ML
1 SOLUTION/ DROPS OPHTHALMIC NIGHTLY
Status: DISCONTINUED | OUTPATIENT
Start: 2019-05-15 | End: 2019-05-16 | Stop reason: HOSPADM

## 2019-05-15 RX ADMIN — LORAZEPAM 0.5 MG: 1 TABLET ORAL at 09:45

## 2019-05-15 RX ADMIN — ENOXAPARIN SODIUM 40 MG: 40 INJECTION SUBCUTANEOUS at 23:05

## 2019-05-15 RX ADMIN — Medication 10 ML: at 23:01

## 2019-05-15 RX ADMIN — TICAGRELOR 90 MG: 90 TABLET ORAL at 23:00

## 2019-05-15 RX ADMIN — ALPRAZOLAM 0.5 MG: 0.5 TABLET ORAL at 14:35

## 2019-05-15 RX ADMIN — RANOLAZINE 500 MG: 500 TABLET, FILM COATED, EXTENDED RELEASE ORAL at 23:00

## 2019-05-15 RX ADMIN — LATANOPROST 1 DROP: 50 SOLUTION OPHTHALMIC at 23:00

## 2019-05-15 RX ADMIN — ALPRAZOLAM 0.5 MG: 0.5 TABLET ORAL at 22:59

## 2019-05-15 RX ADMIN — ASPIRIN 81 MG 324 MG: 81 TABLET ORAL at 10:59

## 2019-05-15 RX ADMIN — NITROGLYCERIN 0.5 INCH: 20 OINTMENT TOPICAL at 10:59

## 2019-05-15 ASSESSMENT — ENCOUNTER SYMPTOMS
TROUBLE SWALLOWING: 0
WHEEZING: 0
ALLERGIC/IMMUNOLOGIC NEGATIVE: 1
EYES NEGATIVE: 1
SHORTNESS OF BREATH: 0
RHINORRHEA: 0
NAUSEA: 0
VOMITING: 0
COUGH: 0
ABDOMINAL PAIN: 0
APNEA: 0
GASTROINTESTINAL NEGATIVE: 1
CHOKING: 0
STRIDOR: 0
CHEST TIGHTNESS: 0

## 2019-05-15 ASSESSMENT — PAIN SCALES - GENERAL
PAINLEVEL_OUTOF10: 0
PAINLEVEL_OUTOF10: 9

## 2019-05-15 ASSESSMENT — PAIN DESCRIPTION - FREQUENCY: FREQUENCY: CONTINUOUS

## 2019-05-15 ASSESSMENT — PAIN DESCRIPTION - PAIN TYPE: TYPE: ACUTE PAIN

## 2019-05-15 ASSESSMENT — PAIN DESCRIPTION - DESCRIPTORS: DESCRIPTORS: TIGHTNESS

## 2019-05-15 ASSESSMENT — PAIN DESCRIPTION - LOCATION: LOCATION: CHEST

## 2019-05-15 NOTE — ED TRIAGE NOTES
A & Ox4. Skin pink warm and dry. States left sided chest tightness x 3-4 days. States hasn't slept in 3 days. Points to left side of chest for tightness. States gets sweaty at times. Denies N/V. States gets slight SOB at times. Lungs clear bilat. No pedal edema noted. No acute distress noted at this time.

## 2019-05-15 NOTE — ED NOTES
No change in patients complaints yet. Made aware the medication takes some time to kick in. Lights turned off. [place] given. Call light in hand.      Cynthia Phoenix RN  05/15/19 [id number]

## 2019-05-15 NOTE — ED PROVIDER NOTES
[facility] ED  eMERGENCY dEPARTMENT eNCOUnter      Pt Name: Matt Ascencio  MRN: [id number]  [place] 1950  Date of evaluation: 5/15/2019  Provider: Beatriz Card MD    [address] [place] [address]       Chief Complaint   Patient presents with    Chest Pain     x 2-3 days         HISTORY OF PRESENT ILLNESS   (Location/Symptom, Timing/Onset,Context/Setting, Quality, Duration, Modifying Factors, Severity)  Note limiting factors. Matt Ascencio is a 77 y.o. male who presents to the emergency department complaint of panic attack and chest pain. Patient admits to seem to have chest pain as starting last day or so. Patient describes an aching sensation over left chest.  Patient admits he has a hard time really describing what is that he is experiencing. Patient admits however this is the sensation seemed to increase over the night and was associated with panic and anxiety. Patient said he felt tremulous. Patient admits he took his routine medications without any overall improvement of symptoms. Patient denies acute shortness of breath. Patient denies nausea or vomiting. Patient admits he has a history of anxiety disorder. Patient admits his last stent placement was 2 months ago. HPI    NursingNotes were reviewed. REVIEW OF SYSTEMS    (2-9 systems for level 4, 10 or more for level 5)     Review of Systems   Constitutional: Positive for activity change and appetite change. Negative for chills and fever. HENT: Negative for congestion, ear pain, rhinorrhea and trouble swallowing. Eyes: Negative. Respiratory: Negative for shortness of breath, wheezing and stridor. Cardiovascular: Positive for chest pain. Negative for leg swelling. Gastrointestinal: Negative for abdominal pain, nausea and vomiting. Endocrine: Negative. Genitourinary: Negative for dysuria, frequency and hematuria. Musculoskeletal: Negative for gait problem and neck pain. Skin: Negative.     Allergic/Immunologic: Negative. Neurological: Negative for seizures, syncope, speech difficulty and light-headedness. Hematological: Negative. Psychiatric/Behavioral: Negative for hallucinations, self-injury and suicidal ideas. The patient is nervous/anxious. Except as noted above the remainder of the review of systems was reviewed and negative. PAST MEDICAL HISTORY     Past Medical History:   Diagnosis Date    Anxiety     Chest pain 3/29/2018    Coronary artery disease involving native coronary artery of native heart without angina pectoris 2/15/2019    Diabetes mellitus [facility] [address]     Hyperlipidemia     Hypertension     Type 2 diabetes mellitus without complication ([facility] [address]          SURGICALHISTORY       Past Surgical History:   Procedure Laterality Date    CARDIAC SURGERY  1973    EYE SURGERY           CURRENT MEDICATIONS       Previous Medications    ASPIRIN (ASPIRIN CHILDRENS) 81 MG CHEWABLE TABLET    Take 1 tablet by mouth daily    BLOOD GLUCOSE MONITORING SUPPL (ONETOUCH VERIO FLEX SYSTEM) W/DEVICE KIT        CHOLECALCIFEROL (VITAMIN D) 2000 UNITS CAPS CAPSULE    Take 2,000 Units by mouth daily    CLONAZEPAM (KLONOPIN) 0.5 MG TABLET    Take 1 tablet by mouth 2 times daily as needed for Anxiety for up to 15 days. Cydne Gui FLUTICASONE (FLONASE) 50 MCG/ACT NASAL SPRAY    2 sprays by Nasal route daily    LATANOPROST (XALATAN) 0.005 % OPHTHALMIC SOLUTION    Use 1 Drop in both eyes daily at bedtime. LEVETIRACETAM PO    Take by mouth    LISINOPRIL (PRINIVIL;ZESTRIL) 5 MG TABLET    TAKE 1 TABLET BY MOUTH DAILY    MAGNESIUM OXIDE (MAG-OX) 400 MG TABLET    Take 400 mg by mouth daily    METOPROLOL TARTRATE (LOPRESSOR) 25 MG TABLET    Take 1 tablet by mouth 2 times daily    NITROGLYCERIN (NITROSTAT) 0.4 MG SL TABLET    up to max of 3 total doses. If no relief after 1 dose, call 911.     OLOPATADINE (PATADAY) 0.2 % SOLN OPHTHALMIC SOLUTION    Apply 1 drop to eye daily For allergies    ONETOUCH DELICA LANCETS 75Y MISC Result Value    Lymphocytes # 0.8 (*)     All other components within normal limits   COMPREHENSIVE METABOLIC PANEL - Abnormal; Notable for the following components:    Anion Gap 16 (*)     Glucose 108 (*)     Total Bilirubin 1.1 (*)     All other components within normal limits   TROPONIN       All other labs were within normal range or not returned as of this dictation. EMERGENCY DEPARTMENT COURSE and DIFFERENTIAL DIAGNOSIS/MDM:   Vitals:    Vitals:    05/15/19 0934 05/15/19 1000   BP: (!) 147/84 (!) 140/79   Pulse: 53 53   Resp: 18 20   Temp: 98.1 °F (36.7 °C)    TempSrc: Oral    SpO2: 100% 98%   Weight: 130 lb (59 kg)    Height: 5' 6\" (1.676 m)        Consultation made with cardiology. Consideration for anxiety disorder versus cardiac disorder entertained. Patient notes that despite Ativan therapy he still has ongoing sense of heaviness over his left chest.  Given comorbid risk factors, age, previous stenting and known coronary disease, cardiology will assume care patient for further evaluation. MDM    CRITICAL CARE TIME   Total Critical Care time was - minutes, excluding separately reportableprocedures. There was a high probability of clinicallysignificant/life threatening deterioration in the patient's condition which required my urgent intervention.  -    CONSULTS:  None    PROCEDURES:  Unless otherwise noted below, none     Procedures    FINAL IMPRESSION      1. Chest pain, unspecified type        DISPOSITION/PLAN   DISPOSITION Decision To Admit 05/15/2019 10:27:22 AM      PATIENT REFERRED TO:  No follow-up provider specified.     DISCHARGE MEDICATIONS:  New Prescriptions    No medications on file          (Please note that portions of this note were completed with a voice recognitionprogram.  Efforts were made to edit the dictations but occasionally words are mis-transcribed.)    Chris Aguilar MD (electronically signed)  Attending Emergency Physician          Chris Aguilar MD  05/15/19

## 2019-05-15 NOTE — H&P
s/p normal ECHO     s/p nuclear stress test.    IMPRESSION:  1.  Slightly reduced exercise tolerance. 2.  Homogenous myocardial perfusion with no evidence of prior myocardial  infarction or ischemia. 3.  Normal left ventricular ejection fraction at 66%. 4.  TID ratio 0.9 which is within normal limits.        5-11-18: was sent to ER last office visit for anxiety. Was placed on meds and feeling a little better overall. Was also placed on Imdur and gave him a bid headache and left arm pain, so he stopped it. No more CP. Pt denies chest pain, dyspnea, dyspnea on exertion, change in exercise capacity, fatigue,  nausea, vomiting, diarrhea, constipation, motor weakness, insomnia, weight loss, syncope, dizziness, lightheadedness, palpitations, PND, orthopnea, or claudication. BP and hr are good. No LE discoloration or ulcers. No LE edema. No CHF type symptoms. Lipid profile is normal. No recent hospitalization. No change in meds.      1-24-19: was in ER yesterday for midsternal chest pain, which was relieved with nitro and ASA. Does have anxiety as well, takes Klonopin for that. Pain occurred at rest, lasting more than 20 min. Had re occurrence of his CP today on the way over to my office. He gets tired with walking. Some SOB. + hx of DM, HTN and HLP. Pt denies nausea, vomiting, diarrhea, constipation, motor weakness, insomnia, weight loss, syncope, dizziness, lightheadedness, palpitations, PND, orthopnea, or claudication. no hx of smoking.         2-15-19: s/p LHC with PCI of 90% CX stenosis. Mild LAD and RCA disease, normal LVF. On DAPT, no bleeding issues. Pt denies chest pain, dyspnea, dyspnea on exertion, change in exercise capacity, fatigue,  nausea, vomiting, diarrhea, constipation, motor weakness, insomnia, weight loss, syncope, dizziness, lightheadedness, palpitations, PND, orthopnea. No nitro use. BP and hr are good. CAD is stable. No LE discoloration or ulcers. No LE edema. No CHF type symptoms.  Lipid profile is normal. No recent hospitalization. No change in meds. Seeing an alternative medicine physician. Patient has underlying anxiety.      5-10-19: has anxiety issues. Does have some atypical CP from time to time. On Brilinta, not taking ASA. Pt denies dyspnea, dyspnea on exertion, change in exercise capacity, fatigue,  nausea, vomiting, diarrhea, constipation, motor weakness, insomnia, weight loss, syncope, dizziness, lightheadedness, palpitations, PND, orthopnea, or claudication. No nitro use. BP and hr are good. CAD is stable. No LE discoloration or ulcers. No LE edema. No CHF type symptoms. Lipid profile is normal. No recent hospitalization. No change in meds.        Allergies   Allergen Reactions    Seroquel [Quetiapine] Other (See Comments)     lethargy  weak       No current facility-administered medications for this encounter. Current Outpatient Medications   Medication Sig Dispense Refill    LEVETIRACETAM PO Take by mouth      SERTRALINE HCL PO Take by mouth      TERAZOSIN HCL PO Take by mouth      ticagrelor (BRILINTA) 90 MG TABS tablet Take 1 tablet by mouth 2 times daily 60 tablet 5    ranolazine (RANEXA) 500 MG extended release tablet Take 1 tablet by mouth 2 times daily 60 tablet 6    metoprolol tartrate (LOPRESSOR) 25 MG tablet Take 1 tablet by mouth 2 times daily 60 tablet 5    magnesium oxide (MAG-OX) 400 MG tablet Take 400 mg by mouth daily      latanoprost (XALATAN) 0.005 % ophthalmic solution Use 1 Drop in both eyes daily at bedtime.  lisinopril (PRINIVIL;ZESTRIL) 5 MG tablet TAKE 1 TABLET BY MOUTH DAILY 90 tablet 3    aspirin (ASPIRIN CHILDRENS) 81 MG chewable tablet Take 1 tablet by mouth daily 30 tablet 0    nitroGLYCERIN (NITROSTAT) 0.4 MG SL tablet up to max of 3 total doses. If no relief after 1 dose, call 911. 25 tablet 0    clonazePAM (KLONOPIN) 0.5 MG tablet Take 1 tablet by mouth 2 times daily as needed for Anxiety for up to 15 days. . 30 tablet 0    olopatadine (PATADAY) 0.2 % SOLN ophthalmic solution Apply 1 drop to eye daily For allergies 1 Bottle 1    Blood Glucose Monitoring Suppl (ONETOUCH VERIO FLEX SYSTEM) w/Device KIT       ONETOUCH VERIO strip       ONETOUCH DELICA LANCETS 26G MISC       fluticasone (FLONASE) 50 MCG/ACT nasal spray 2 sprays by Nasal route daily 1 Bottle 2    Cholecalciferol (VITAMIN D) 2000 units CAPS capsule Take 2,000 Units by mouth daily         PMHx:  Past Medical History:   Diagnosis Date    Anxiety     Chest pain 3/29/2018    Coronary artery disease involving native coronary artery of native heart without angina pectoris 2/15/2019    Diabetes mellitus ([facility] [address]     Hyperlipidemia     Hypertension     Type 2 diabetes mellitus without complication ([facility])        PSHx:  Past Surgical History:   Procedure Laterality Date    CARDIAC SURGERY  1973    EYE SURGERY         Social Hx:     Social History     Socioeconomic History    Marital status:      Spouse name: None    Number of children: None    Years of education: None    Highest education level: None   Occupational History    None   Social Needs    Financial resource strain: None    Food insecurity:     Worry: None     Inability: None    Transportation needs:     Medical: None     Non-medical: None   Tobacco Use    Smoking status: Never Smoker    Smokeless tobacco: Never Used   Substance and Sexual Activity    Alcohol use: No    Drug use: Never    Sexual activity: None   Lifestyle    Physical activity:     Days per week: None     Minutes per session: None    Stress: None   Relationships    Social connections:     Talks on phone: None     Gets together: None     Attends Faith service: None     Active member of club or organization: None     Attends meetings of clubs or organizations: None     Relationship status: None    Intimate partner violence:     Fear of current or ex partner: None     Emotionally abused: None     Physically abused: None Forced sexual activity: None   Other Topics Concern    None   Social History Narrative    None       Family Hx:  Family History   Problem Relation Age of Onset    Heart Disease Maternal Aunt     Cancer Maternal Aunt        Review ofSystems:   Review of Systems   Constitutional: Positive for fatigue. Negative for appetite change, chills, diaphoresis and fever. HENT: Negative. Eyes: Negative. Respiratory: Negative for apnea, cough, choking, chest tightness, shortness of breath, wheezing and stridor. Cardiovascular: Positive for chest pain. Negative for palpitations and leg swelling. Gastrointestinal: Negative. Endocrine: Negative. Genitourinary: Negative. Musculoskeletal: Negative. Allergic/Immunologic: Negative. Neurological: Negative. Hematological: Negative. Psychiatric/Behavioral: The patient is nervous/anxious. Physical Examination:    BP (!) 145/80   Pulse 53   Temp 98.1 °F (36.7 °C) (Oral)   Resp 25   Ht 5' 6\" (1.676 m)   Wt 130 lb (59 kg)   SpO2 100%   BMI 20.98 kg/m²    Physical Exam   Constitutional: He is oriented to person, place, and time. He appears well-developed and well-nourished. HENT:   Head: Normocephalic. Eyes: Pupils are equal, round, and reactive to light. Neck: No JVD present. No tracheal deviation present. No thyromegaly present. Cardiovascular: Normal rate, regular rhythm, normal heart sounds and intact distal pulses. Exam reveals no gallop and no friction rub. No murmur heard. Pulmonary/Chest: Effort normal and breath sounds normal. No stridor. No respiratory distress. He has no wheezes. He has no rales. He exhibits no tenderness. Abdominal: Soft. Bowel sounds are normal.   Musculoskeletal: Normal range of motion. He exhibits no edema or tenderness. Lymphadenopathy:     He has no cervical adenopathy. Neurological: He is alert and oriented to person, place, and time. Skin: Skin is warm and dry.    Psychiatric: He has a normal mood and affect. LABS:  CBC:   Lab Results   Component Value Date    WBC 7.2 05/15/2019    RBC 5.31 05/15/2019    HGB 16.3 05/15/2019    HCT 46.7 05/15/2019    MCV 88.0 05/15/2019    MCH 30.6 05/15/2019    MCHC 34.8 05/15/2019    RDW 14.1 05/15/2019     05/15/2019     CBC with Differential:   Lab Results   Component Value Date    WBC 7.2 05/15/2019    RBC 5.31 05/15/2019    HGB 16.3 05/15/2019    HCT 46.7 05/15/2019     05/15/2019    MCV 88.0 05/15/2019    MCH 30.6 05/15/2019    MCHC 34.8 05/15/2019    RDW 14.1 05/15/2019    LYMPHOPCT 11.0 05/15/2019    MONOPCT 5.4 05/15/2019    BASOPCT 0.9 05/15/2019    MONOSABS 0.4 05/15/2019    LYMPHSABS 0.8 05/15/2019    EOSABS 0.0 05/15/2019    BASOSABS 0.1 05/15/2019     CMP:    Lab Results   Component Value Date     05/15/2019    K 3.9 05/15/2019    K 4.1 01/27/2019     05/15/2019    CO2 21 05/15/2019    BUN 12 05/15/2019    CREATININE 0.96 05/15/2019    GFRAA >60.0 05/15/2019    LABGLOM >60.0 05/15/2019    GLUCOSE 108 05/15/2019    PROT 7.6 05/15/2019    LABALBU 4.6 05/15/2019    CALCIUM 9.5 05/15/2019    BILITOT 1.1 05/15/2019    ALKPHOS 75 05/15/2019    AST 11 05/15/2019    ALT 12 05/15/2019     BMP:    Lab Results   Component Value Date     05/15/2019    K 3.9 05/15/2019    K 4.1 01/27/2019     05/15/2019    CO2 21 05/15/2019    BUN 12 05/15/2019    LABALBU 4.6 05/15/2019    CREATININE 0.96 05/15/2019    CALCIUM 9.5 05/15/2019    GFRAA >60.0 05/15/2019    LABGLOM >60.0 05/15/2019    GLUCOSE 108 05/15/2019     Magnesium:    Lab Results   Component Value Date    MG 1.9 01/23/2019     Troponin:    Lab Results   Component Value Date    TROPONINI <0.010 05/15/2019     No results for input(s): PROBNP in the last 72 hours. No results for input(s): INR in the last 72 hours. EKG: sinus bradycardia      Assessment:    Active Hospital Problems    Diagnosis Date Noted    Chest pain [R07.9] 03/29/2018     1.   chest pain r/o 5/15/19 at 3:12 PM

## 2019-05-15 NOTE — ED NOTES
Pt speaking with nurse preoccupied with getting more Ativan asking multiple questions regarding Ativan      Onesimo Ayers RN  05/15/19 7402

## 2019-05-16 VITALS
WEIGHT: 128.7 LBS | OXYGEN SATURATION: 100 % | DIASTOLIC BLOOD PRESSURE: 62 MMHG | SYSTOLIC BLOOD PRESSURE: 119 MMHG | RESPIRATION RATE: 18 BRPM | HEART RATE: 50 BPM | TEMPERATURE: 97.9 F | BODY MASS INDEX: 20.68 KG/M2 | HEIGHT: 66 IN

## 2019-05-16 LAB
CHOLESTEROL, TOTAL: 179 MG/DL (ref 0–199)
GLUCOSE BLD-MCNC: 104 MG/DL (ref 60–115)
GLUCOSE BLD-MCNC: 108 MG/DL (ref 60–115)
GLUCOSE BLD-MCNC: 114 MG/DL (ref 60–115)
HCT VFR BLD CALC: 48.7 % (ref 42–52)
HDLC SERPL-MCNC: 34 MG/DL (ref 40–59)
HEMOGLOBIN: 16.6 G/DL (ref 14–18)
LDL CHOLESTEROL CALCULATED: 123 MG/DL (ref 0–129)
MAGNESIUM: 2.1 MG/DL (ref 1.7–2.4)
MCH RBC QN AUTO: 30.7 PG (ref 27–31.3)
MCHC RBC AUTO-ENTMCNC: 34 % (ref 33–37)
MCV RBC AUTO: 90.5 FL (ref 80–100)
PDW BLD-RTO: 14 % (ref 11.5–14.5)
PERFORMED ON: NORMAL
PLATELET # BLD: 178 K/UL (ref 130–400)
RBC # BLD: 5.39 M/UL (ref 4.7–6.1)
TRIGL SERPL-MCNC: 111 MG/DL (ref 0–150)
WBC # BLD: 6.7 K/UL (ref 4.8–10.8)

## 2019-05-16 PROCEDURE — 80061 LIPID PANEL: CPT

## 2019-05-16 PROCEDURE — 93005 ELECTROCARDIOGRAM TRACING: CPT

## 2019-05-16 PROCEDURE — 6370000000 HC RX 637 (ALT 250 FOR IP): Performed by: NURSE PRACTITIONER

## 2019-05-16 PROCEDURE — 36415 COLL VENOUS BLD VENIPUNCTURE: CPT

## 2019-05-16 PROCEDURE — 83735 ASSAY OF MAGNESIUM: CPT

## 2019-05-16 PROCEDURE — 85027 COMPLETE CBC AUTOMATED: CPT

## 2019-05-16 PROCEDURE — APPNB15 APP NON BILLABLE TIME 0-15 MINS: Performed by: NURSE PRACTITIONER

## 2019-05-16 PROCEDURE — G0378 HOSPITAL OBSERVATION PER HR: HCPCS

## 2019-05-16 PROCEDURE — 99225 PR SBSQ OBSERVATION CARE/DAY 25 MINUTES: CPT | Performed by: PSYCHIATRY & NEUROLOGY

## 2019-05-16 PROCEDURE — 6360000002 HC RX W HCPCS: Performed by: NURSE PRACTITIONER

## 2019-05-16 PROCEDURE — 96372 THER/PROPH/DIAG INJ SC/IM: CPT

## 2019-05-16 RX ORDER — TRAZODONE HYDROCHLORIDE 50 MG/1
50 TABLET ORAL NIGHTLY
Status: DISCONTINUED | OUTPATIENT
Start: 2019-05-16 | End: 2019-05-16 | Stop reason: HOSPADM

## 2019-05-16 RX ORDER — TRAZODONE HYDROCHLORIDE 50 MG/1
50 TABLET ORAL NIGHTLY
Qty: 30 TABLET | Refills: 1 | Status: SHIPPED | OUTPATIENT
Start: 2019-05-16 | End: 2019-05-16 | Stop reason: SDUPTHER

## 2019-05-16 RX ADMIN — ALPRAZOLAM 0.5 MG: 0.5 TABLET ORAL at 09:33

## 2019-05-16 RX ADMIN — METOPROLOL TARTRATE 25 MG: 25 TABLET ORAL at 09:33

## 2019-05-16 RX ADMIN — LEVETIRACETAM 250 MG: 250 TABLET ORAL at 09:33

## 2019-05-16 RX ADMIN — MAGNESIUM OXIDE TAB 400 MG (241.3 MG ELEMENTAL MG) 400 MG: 400 (241.3 MG) TAB at 09:33

## 2019-05-16 RX ADMIN — RANOLAZINE 500 MG: 500 TABLET, FILM COATED, EXTENDED RELEASE ORAL at 09:33

## 2019-05-16 RX ADMIN — SERTRALINE 100 MG: 100 TABLET, FILM COATED ORAL at 09:33

## 2019-05-16 RX ADMIN — ENOXAPARIN SODIUM 40 MG: 40 INJECTION SUBCUTANEOUS at 09:33

## 2019-05-16 RX ADMIN — TICAGRELOR 90 MG: 90 TABLET ORAL at 09:33

## 2019-05-16 RX ADMIN — ASPIRIN 81 MG 81 MG: 81 TABLET ORAL at 09:33

## 2019-05-16 RX ADMIN — LISINOPRIL 5 MG: 5 TABLET ORAL at 09:33

## 2019-05-16 NOTE — DISCHARGE INSTR - COC
Continuity of Care Form    Patient Name: Rei Medrano   :    MRN:  [id number]    Admit date:  5/15/2019  Discharge date:  ***    Code Status Order: Full Code   Advance Directives:   [facility] Documentation     Date/Time Healthcare Directive Type of Healthcare Directive Copy in [facility] [address] Agent's Name Healthcare Agent's Phone Number    05/15/19 1206  No, patient does not have an advance directive for healthcare treatment -- -- -- -- --          Admitting Physician:  Casey Melgar DO  PCP: Miranda Goodpasture, MD    Discharging Nurse: [facility] Phone Number: ***    Emergency Contact:   Extended Emergency Contact Information  Primary Emergency Contact: Eva Rios  Address: [address] [place] [address] Phone: [phone]  Work Phone: [phone]  Mobile Phone: [phone]  Relation: Spouse  Secondary Emergency Contact: Rhett Manzanares [address] Phone: [phone]  Work Phone: [phone]  Mobile Phone: [phone]  Relation: Child    Past Surgical History:  Past Surgical History:   Procedure Laterality Date    CARDIAC SURGERY      EYE SURGERY         Immunization History: There is no immunization history on file for this patient.     Active Problems:  Patient Active Problem List   Diagnosis Code    Chest pain R07.9    Hypertension I10    Anxiety F41.9    Recurrent major depressive disorder, in remission ([address] F33.40    Angina, class IV ([address] I20.9    Coronary artery disease involving native coronary artery of native heart without angina pectoris I25.10       Isolation/Infection:   Isolation          No Isolation            Nurse Assessment:  Last Vital Signs: BP (!) 141/82   Pulse 55   Temp 97.7 °F (36.5 °C)   Resp 18   Ht 5' 6\" (1.676 m)   Wt 128 lb 11.2 oz (58.4 kg)   SpO2 100%   BMI 20.77 kg/m²     Last documented pain score (0-10 scale): Pain Level: 0  Last Weight:   Wt Readings from Last 1 Encounters:   19 128 lb 11.2 oz (58.4 kg)     Mental Status:  {IP PT MENTAL STATUS:54980}    IV Access:  { HAILY IV ACCESS:445553110}    Nursing Mobility/ADLs:  Walking   {P DME XIFF:405071365}  Transfer  {CHP DME IIXT:167379782}  Bathing  {CHP DME DJX}  Dressing  {CHP DME ARLX:099283260}  Toileting  {CHP DME ZKNS:165742359}  Feeding  {P DME ZOUZ:248898182}  Med Admin  {P DME GCWZ:063270783}  Med Delivery   { HAILY MED Delivery:290902844}    Wound Care Documentation and Therapy:        Elimination:  Continence:   · Bowel: {YES / CA:12023}  · Bladder: {YES / BROWN:43463}  Urinary Catheter: {Urinary Catheter:168109799}   Colostomy/Ileostomy/Ileal Conduit: {YES / GM:08967}       Date of Last BM: ***  No intake or output data in the 24 hours ending 19 1709  No intake/output data recorded.     Safety Concerns:     [employer] Safety Concerns:485876282}    Impairments/Disabilities:      [employer] Impairments/Disabilities:691012473}    Nutrition Therapy:  Current Nutrition Therapy:   [employer] Diet List:228657170}    Routes of Feeding: {[facility] DME Other Feedings:856629682}  Liquids: {Slp liquid thickness:80083}  Daily Fluid Restriction: {CHP DME Yes amt example:952757231}  Last Modified Barium Swallow with Video (Video Swallowing Test): {Done Not Done FNBC:541617767}    Treatments at the Time of Hospital Discharge:   Respiratory Treatments: ***  Oxygen Therapy:  {Therapy; copd oxygen:32797}  Ventilator:    {[facility] Vent HLYS:997046806}    Rehab Therapies: {THERAPEUTIC INTERVENTION:8401894224}  Weight Bearing Status/Restrictions: [employer]  Weight Bearin}  Other Medical Equipment (for information only, NOT a DME order):  {EQUIPMENT:567387200}  Other Treatments: ***    Patient's personal belongings (please select all that are sent with patient):  {CRISTIAN DME Belongings:531270389}    RN SIGNATURE:  {Esignature:725272644}    CASE

## 2019-05-16 NOTE — CONSULTS
Department of Psychiatry  Behavioral Health Consult    REASON FOR CONSULT: Obsession, depression    CONSULTING PHYSICIAN:     History obtained from: Patient    HISTORY OF PRESENT ILLNESS:    The patient is a 77 y.o. male with significant past psychiatric history of OCD  Pt has been having severe obsessive thoughts recently  Blasphemous content  Try to distract his mind with little success  Pt has been feeling depressed  Not suicidal   No hopeless and worthless feelign  Pt is focused on getting his medication straight and get home  He is worried about his wife who is mentally ill and he is the main carer        The patient is currently receiving care for the above psychiatric illness.       Psychiatric Review of Systems       Depression: yes     Bernadette or Hypomania:  no     Panic Attacks:  no     Phobias:  no     Obsessions and Compulsions:  yes      PTSD : no     Hallucinations:  no     Delusions:  no      Substance Abuse History:  ETOH: no  Marijuana: no  Opiates: no  Other Drugs: no      Past Psychiatric History:  Prior Diagnosis: OCD  Psychiatrist: no  Therapist:no  Hospitalization: no  Hx of Suicidal Attempts: no  Hx of violence:  no  ECT: no    Past Medical History:        Diagnosis Date    Anxiety     Chest pain 3/29/2018    Coronary artery disease involving native coronary artery of native heart without angina pectoris 2/15/2019    [facility] [address]     Hyperlipidemia     Hypertension     Type 2 diabetes mellitus without complication ([facility] [address]        Past Surgical History:        Procedure Laterality Date    CARDIAC SURGERY  1973    EYE SURGERY         Medications Prior to Admission:   Medications Prior to Admission: LEVETIRACETAM PO, Take by mouth  SERTRALINE HCL PO, Take by mouth  TERAZOSIN HCL PO, Take by mouth  ticagrelor (BRILINTA) 90 MG TABS tablet, Take 1 tablet by mouth 2 times daily  ranolazine (RANEXA) 500 MG extended release tablet, Take 1 tablet by mouth 2 times daily  metoprolol tartrate toward examiner:  cooperative  Speech:  normal rate   Mood: anxious  Affect:  mood congruent  Thought processes:  slow   Thought content:  Obsessions/instrusive thoughts:  Cognition:  oriented to person, place, and time   Concentration intact  Memory intact  Mini Mental Status 30/30  Insight fair   Judgement fair   Fund of Knowledge adequate      DIAGNOSIS:     Obsessive-compulsive disorder   Depression NOS           RISK ASSESSMENT:        LABS: REVIEWED TODAY:  Recent Labs     05/15/19  0945 05/16/19  0608   WBC 7.2 6.7   HGB 16.3 16.6    178     Recent Labs     05/15/19  0945      K 3.9      CO2 21   BUN 12   CREATININE 0.96   GLUCOSE 108*     Recent Labs     05/15/19  0945   BILITOT 1.1*   ALKPHOS 75   AST 11   ALT 12     Lab Results   Component Value Date    LABAMPH Neg 07/11/2018    BARBSCNU Neg 07/11/2018    LABBENZ Neg 07/11/2018    OPIATESCREENURINE Neg 07/11/2018    PHENCYCLIDINESCREENURINE Neg 07/11/2018    ETOH <10 07/11/2018     Lab Results   Component Value Date    TSH 4.680 01/27/2019     No results found for: LITHIUM  No results found for: VALPROATE, CBMZ  No results found for: LITHIUM, VALPROATE    FURTHER LABS ORDERED :      Radiology   Xr Chest Portable    Result Date: 5/15/2019  EXAMINATION: XR CHEST PORTABLE CLINICAL HISTORY:  chest pain COMPARISONS: April 4, 2019 FINDINGS: Single AP portable view the chest was obtained on May 15, 2019 at 1001 hours. The heart is not enlarged. Mediastinum is not widened or shifted. Lungs are clear. The chest wall is unremarkable. No changes have occurred.  CONCLUSION: NO ACUTE PROCESS COMPARED TO APRIL 4, 2019      EKG: TRACING REVIEWED    RECOMMENDATIONS    Risk Management:  routine:  no special precautions necessary    Medications:  Zoloft increased to 150 mg  Discussed with the treating physician/ team about the patient and treatment plan  Reviewed the chart    Discussed with the patient risk, benefit, alternative and common side effects for the  proposed medication treatment. Patient is consenting to the treatment. F/U with [facility] or [facility] recommended  Thanks for the consult. Please call me if needed.     Electronically signed by Juan Carlos Pina MD on 5/16/2019 at 3:27 PM

## 2019-05-16 NOTE — FLOWSHEET NOTE
Discharge inst reviewed with the pt, understanding voiced. Pt drove himself here-is awaiting transport.

## 2019-05-16 NOTE — DISCHARGE SUMMARY
Cardiology Discharge Summary      Patient Identification:  Latonya Murphy  :   MRN: [id number]   Account: [de-identified]     Admit date: 5/15/2019  Discharge date: 19   Attending provider: Jayden Montana DO        Primary care provider: Сергей Medrano MD     Admission Diagnoses:  Chest pain         Discharge Diagnoses: Active Hospital Problems    Diagnosis Date Noted    Chest pain [R07.9] 2018          Hospital Course: Latonya Murphy is a63 y.o. male admitted to [facility] on 5/15/2019 for chest pain. Patient extremely anxious  trops negative  No ekg changes  Psych to see patient       Procedures:   none     Consults:   psych    Examination:  BP (!) 141/82   Pulse 55   Temp 97.7 °F (36.5 °C)   Resp 18   Ht 5' 6\" (1.676 m)   Wt 128 lb 11.2 oz (58.4 kg)   SpO2 100%   BMI 20.77 kg/m²    Physical Exam   Constitutional: He is oriented to person, place, and time. He appears well-developed and well-nourished. HENT:   Head: Normocephalic. Eyes: Pupils are equal, round, and reactive to light. Neck: No JVD present. No tracheal deviation present. No thyromegaly present. Cardiovascular: Normal rate, regular rhythm, normal heart sounds and intact distal pulses. Exam reveals no gallop and no friction rub. No murmur heard. Pulmonary/Chest: Effort normal and breath sounds normal. No respiratory distress. He has no wheezes. He has no rales. He exhibits no tenderness. Abdominal: Soft. Bowel sounds are normal.   Musculoskeletal: Normal range of motion. He exhibits no edema or tenderness. Lymphadenopathy:     He has no cervical adenopathy. Neurological: He is alert and oriented to person, place, and time. Skin: Skin is warm and dry. Psychiatric: His mood appears anxious.        Medications:  Current Discharge Medication List      CONTINUE these medications which have NOT CHANGED    Details   LEVETIRACETAM PO Take by mouth      SERTRALINE HCL PO Take by mouth      TERAZOSIN HCL PO Take by mouth      ticagrelor (BRILINTA) 90 MG TABS tablet Take 1 tablet by mouth 2 times daily  Qty: 60 tablet, Refills: 5      ranolazine (RANEXA) 500 MG extended release tablet Take 1 tablet by mouth 2 times daily  Qty: 60 tablet, Refills: 6      metoprolol tartrate (LOPRESSOR) 25 MG tablet Take 1 tablet by mouth 2 times daily  Qty: 60 tablet, Refills: 5      magnesium oxide (MAG-OX) 400 MG tablet Take 400 mg by mouth daily      latanoprost (XALATAN) 0.005 % ophthalmic solution Use 1 Drop in both eyes daily at bedtime. lisinopril (PRINIVIL;ZESTRIL) 5 MG tablet TAKE 1 TABLET BY MOUTH DAILY  Qty: 90 tablet, Refills: 3    Comments: **Patient requests 90 days supply**      aspirin (ASPIRIN CHILDRENS) 81 MG chewable tablet Take 1 tablet by mouth daily  Qty: 30 tablet, Refills: 0      nitroGLYCERIN (NITROSTAT) 0.4 MG SL tablet up to max of 3 total doses. If no relief after 1 dose, call 911. Qty: 25 tablet, Refills: 0      clonazePAM (KLONOPIN) 0.5 MG tablet Take 1 tablet by mouth 2 times daily as needed for Anxiety for up to 15 days. Charla Kraftes: 30 tablet, Refills: 0    Associated Diagnoses: SANDIP (generalized anxiety disorder); Insomnia secondary to anxiety; SOB (shortness of breath)      olopatadine (PATADAY) 0.2 % SOLN ophthalmic solution Apply 1 drop to eye daily For allergies  Qty: 1 Bottle, Refills: 1    Associated Diagnoses:  Allergic conjunctivitis and rhinitis, bilateral      Blood Glucose Monitoring Suppl (ONETOUCH VERIO FLEX SYSTEM) w/Device KIT       ONETOUCH VERIO strip       ONETOUCH DELICA LANCETS 71N MISC       fluticasone (FLONASE) 50 MCG/ACT nasal spray 2 sprays by Nasal route daily  Qty: 1 Bottle, Refills: 2    Associated Diagnoses: Nasal congestion with rhinorrhea      Cholecalciferol (VITAMIN D) 2000 units CAPS capsule Take 2,000 Units by mouth daily             Significant Diagnostics:   Radiology: Xr Chest Portable    Result Date: Pro- pg/mL   Hemoglobin A1c    Collection Time: 05/15/19 12:27 PM   Result Value Ref Range    Hemoglobin A1C 5.6 4.8 - 5.9 %   Troponin    Collection Time: 05/15/19 12:28 PM   Result Value Ref Range    Troponin <0.010 0.000 - 0.010 ng/mL   Troponin    Collection Time: 05/15/19  3:38 PM   Result Value Ref Range    Troponin <0.010 0.000 - 0.010 ng/mL   POCT Glucose    Collection Time: 05/15/19  3:51 PM   Result Value Ref Range    POC Glucose 124 (H) 60 - 115 mg/dl    Performed on ACCU-CHEK    EKG 12 Lead    Collection Time: 05/15/19  8:04 PM   Result Value Ref Range    Ventricular Rate 55 BPM    Atrial Rate 55 BPM    P-R Interval 180 ms    QRS Duration 96 ms    Q-T Interval 458 ms    QTc Calculation (Bazett) 438 ms    P Axis 47 degrees    R Axis 65 degrees    T Axis 66 degrees   POCT Glucose    Collection Time: 05/15/19  8:06 PM   Result Value Ref Range    POC Glucose 129 (H) 60 - 115 mg/dl    Performed on ACCU-CHEK    EKG 12 lead    Collection Time: 05/16/19  2:07 AM   Result Value Ref Range    Ventricular Rate 66 BPM    Atrial Rate 66 BPM    P-R Interval 186 ms    QRS Duration 102 ms    Q-T Interval 426 ms    QTc Calculation (Bazett) 446 ms    P Axis 65 degrees    R Axis 64 degrees    T Axis 69 degrees   POCT Glucose    Collection Time: 05/16/19  5:51 AM   Result Value Ref Range    POC Glucose 104 60 - 115 mg/dl    Performed on ACCU-CHEK    Magnesium    Collection Time: 05/16/19  6:08 AM   Result Value Ref Range    Magnesium 2.1 1.7 - 2.4 mg/dL   Lipid panel - fasting    Collection Time: 05/16/19  6:08 AM   Result Value Ref Range    Cholesterol, Total 179 0 - 199 mg/dL    Triglycerides 111 0 - 150 mg/dL    HDL 34 (L) 40 - 59 mg/dL    LDL Calculated 123 0 - 129 mg/dL   CBC    Collection Time: 05/16/19  6:08 AM   Result Value Ref Range    WBC 6.7 4.8 - 10.8 K/uL    RBC 5.39 4.70 - 6.10 M/uL    Hemoglobin 16.6 14.0 - 18.0 g/dL    Hematocrit 48.7 42.0 - 52.0 %    MCV 90.5 80.0 - 100.0 fL    MCH 30.7 27.0 - 31.3 pg    MCHC 34.0 33.0 - 37.0 %    RDW 14.0 11.5 - 14.5 %    Platelets 049 864 - 190 K/uL          normal sinus rhythm, nonspecific ST and T waves changes    Follow-up visits:   Dodie Robles MD  [place] [address] [address] [facility]  [address]  [address] [facility]  [phone]    In 2 weeks         Assessment:  Active Hospital Problems    Diagnosis Date Noted    Chest pain [R07.9] 03/29/2018   chest pain atypical  Anxiety  HTN  GERD      Plan:   1.  Okay to discharge home        Electronically signed by RAVEN York 5/16/2019 at 9:48 AM

## 2019-05-16 NOTE — DISCHARGE INSTR - DIET

## 2019-05-16 NOTE — BH NOTE
Pt meds adjusted.   Can go home from psych standpoint  Full report to be completed  Electronically signed by Sri Alexis MD on 5/16/2019 at 3:26 PM

## 2019-05-17 RX ORDER — TRAZODONE HYDROCHLORIDE 50 MG/1
TABLET ORAL
Qty: 90 TABLET | Refills: 1 | Status: SHIPPED | OUTPATIENT
Start: 2019-05-17 | End: 2019-08-06 | Stop reason: SINTOL

## 2019-05-20 ENCOUNTER — HOSPITAL ENCOUNTER (EMERGENCY)
Age: 69
Discharge: HOME | End: 2019-05-20
Payer: MEDICARE

## 2019-05-20 VITALS
TEMPERATURE: 99.6 F | BODY MASS INDEX: 20.89 KG/M2 | SYSTOLIC BLOOD PRESSURE: 143 MMHG | WEIGHT: 130 LBS | HEIGHT: 66 IN | OXYGEN SATURATION: 97 % | HEART RATE: 86 BPM | DIASTOLIC BLOOD PRESSURE: 87 MMHG | RESPIRATION RATE: 18 BRPM

## 2019-05-20 DIAGNOSIS — F41.1 ANXIETY STATE: Primary | ICD-10-CM

## 2019-05-20 LAB
EKG ATRIAL RATE: 75 BPM
EKG P AXIS: 62 DEGREES
EKG P-R INTERVAL: 160 MS
EKG Q-T INTERVAL: 416 MS
EKG QRS DURATION: 96 MS
EKG QTC CALCULATION (BAZETT): 464 MS
EKG R AXIS: 66 DEGREES
EKG T AXIS: 62 DEGREES
EKG VENTRICULAR RATE: 75 BPM

## 2019-05-20 PROCEDURE — 93005 ELECTROCARDIOGRAM TRACING: CPT

## 2019-05-20 PROCEDURE — 6370000000 HC RX 637 (ALT 250 FOR IP): Performed by: NURSE PRACTITIONER

## 2019-05-20 PROCEDURE — 99283 EMERGENCY DEPT VISIT LOW MDM: CPT

## 2019-05-20 RX ORDER — ALPRAZOLAM 0.5 MG/1
0.5 TABLET ORAL NIGHTLY PRN
Qty: 5 TABLET | Refills: 0 | Status: SHIPPED | OUTPATIENT
Start: 2019-05-20 | End: 2019-06-10

## 2019-05-20 RX ORDER — ALPRAZOLAM 0.5 MG/1
0.5 TABLET ORAL ONCE
Status: COMPLETED | OUTPATIENT
Start: 2019-05-20 | End: 2019-05-20

## 2019-05-20 RX ADMIN — ALPRAZOLAM 0.5 MG: 0.5 TABLET ORAL at 12:13

## 2019-05-20 ASSESSMENT — PAIN DESCRIPTION - DESCRIPTORS: DESCRIPTORS: DISCOMFORT

## 2019-05-20 ASSESSMENT — PAIN DESCRIPTION - PAIN TYPE: TYPE: ACUTE PAIN

## 2019-05-20 ASSESSMENT — PAIN SCALES - GENERAL: PAINLEVEL_OUTOF10: 8

## 2019-05-20 ASSESSMENT — ENCOUNTER SYMPTOMS
DIARRHEA: 0
RHINORRHEA: 0
ABDOMINAL PAIN: 0
VOMITING: 0
COUGH: 0
BACK PAIN: 0
SHORTNESS OF BREATH: 0
SORE THROAT: 0
PHOTOPHOBIA: 0
NAUSEA: 0
EYE PAIN: 0

## 2019-05-20 ASSESSMENT — PAIN DESCRIPTION - FREQUENCY: FREQUENCY: CONTINUOUS

## 2019-05-20 NOTE — ED NOTES
EKG completed and shown to Dr Taras Collet. Ok to wait in [place]. No protocol labs to be ordered per her until patient is seen. Pt appears anxious. Pt ambulated to waiting room to await open bed. Aware to advise us if he gets worse.      Royal Camilo RN  05/20/19 [id number]

## 2019-05-20 NOTE — ED TRIAGE NOTES
Pt states he was having increased anxiety after discharge from hospital. Pt states he was taking xanax in hospital but discharged with trazadone and states that med is making hime more anxious.  Pt skin warm pink and dry pt appears in no distress pt denies any chest pain or sob at this time pt denies any n/v/d fever or chills

## 2019-05-20 NOTE — ED PROVIDER NOTES
[address] [facility] ED  eMERGENCY dEPARTMENT eNCOUnter      Pt Name: Pepe Johnson  MRN: [id number]  Jessica 1950  Date of evaluation: 5/20/2019  Provider: RAVEN Lowe [id number]       Chief Complaint   Patient presents with    Other     Heat in his chest and twitching in abdomen. Discharged from here on 5/10/19 with dx of anxiety. The medication they gave him made him more anxious. Has appt tomorrow with psychiatrist but was told to come to ER today. Hasn't slept in 8 days         HISTORY OF PRESENT ILLNESS   (Location/Symptom, Timing/Onset,Context/Setting, Quality, Duration, Modifying Factors, Severity)  Note limiting factors. Pepe Johnson is a 77 y.o. male who presents to the emergency department with complaints of anxiety. He reports recent hospitalization in which he was provided xanax and did really well on it. He reports that he was discharged on trazadone which has made his anxiety worse. He has not slept well and feels shaky/nervous before sleep. He also reports odd dreams on trazadone. He does report previous warm sensation on his chest, this has not reoccurred. No sob or other c/o. Location/Symptom - anxiety  Timing/Onset - ongoing  Context/Setting - as above  Quality - anxious  Duration - ongoing  Modifying Factors - none, worse with trazadone  Severity - moderate    Nursing Notes were reviewed. REVIEW OF SYSTEMS    (2-9 systems for level 4, 10 or more for level 5)     Review of Systems   Constitutional: Negative for chills, diaphoresis, fatigue and fever. HENT: Negative for congestion, rhinorrhea and sore throat. Eyes: Negative for photophobia and pain. Respiratory: Negative for cough and shortness of breath. Cardiovascular: Negative for chest pain and palpitations. Gastrointestinal: Negative for abdominal pain, diarrhea, nausea and vomiting. Genitourinary: Negative for dysuria and flank pain. Musculoskeletal: Negative for back pain. ULTRASOUND:   Performed by ED Physician - none    LABS:  Labs Reviewed - No data to display    All other labs were within normal range or not returned as of this dictation. EMERGENCY DEPARTMENT COURSE and DIFFERENTIAL DIAGNOSIS/MDM:   Vitals:    Vitals:    05/20/19 1103   BP: (!) 143/87   Pulse: 86   Resp: 18   Temp: 99.6 °F (37.6 °C)   TempSrc: Oral   SpO2: 97%   Weight: 130 lb (59 kg)   Height: 5' 6\" (1.676 m)            MDM pt requesting to be placed on xanax, he has apmnt with psych tomorrow. States trazadone making anxiety worse. Will provide limited supply of xanax, advised to discuss with psych. Standard anticipatory guidance given to patient upon discharge. Have given them a specific time frame in which to follow-up and who to follow-up with. I have also advised them that they should return to the emergency department if they get worse, or not getting better or develop any new or concerning symptoms. Patient demonstrates understanding and all questions were answered. CRITICAL CARE TIME       CONSULTS:  None    PROCEDURES:  Unless otherwise noted below, none     Procedures    FINAL IMPRESSION      1. Anxiety state          DISPOSITION/PLAN   DISPOSITION Decision To Discharge 05/20/2019 12:05:16 PM      PATIENT REFERRED TO:  Dodie Robles MD  [address] [place] [address]  [phone]    Call in 1 day  For continued evaluation and management    pyschiatrist    In 1 day  As scheduled, For continued evaluation and management      DISCHARGE MEDICATIONS:  New Prescriptions    ALPRAZOLAM (XANAX) 0.5 MG TABLET    Take 1 tablet by mouth nightly as needed for Sleep for up to 30 days.           (Please notethat portions of this note were completed with a voice recognition program.  Efforts were made to edit the dictations but occasionally words are mis-transcribed.)    RAVEN Patterson CNP (electronically signed)  Attending Emergency Physician          RAVEN Patterson CNP  05/20/19

## 2019-05-22 ENCOUNTER — TELEPHONE (OUTPATIENT)
Dept: CARDIOLOGY CLINIC | Age: 69
End: 2019-05-22

## 2019-05-22 NOTE — TELEPHONE ENCOUNTER
PT CALLED IN TO THE OFFICE BECAUSE HE WANTS TO STOP HIS RANEXA BECAUSE IT IS NOT HELPING. HE ALSO TAKES XANAX AND THIS DOES NOT HELP HIM. HE STATES HE HAS A LOT OF ANXIETY AND DEPRESSION. HE WANTS TO START TAKING CBD OIL AND TO MAKE SURE THIS IS OKAY WITH DR HOLIDAY.       PT IS ALSO ASKING FOR DR ZACARIAS TO REVIEW HIS LABS FROM 5/16/19 WHEN HE WAS IN THE HOSP.        LOV-5/10/19      PLEASE ADVISE

## 2019-05-24 PROCEDURE — 93010 ELECTROCARDIOGRAM REPORT: CPT | Performed by: INTERNAL MEDICINE

## 2019-06-10 ENCOUNTER — OFFICE VISIT (OUTPATIENT)
Dept: CARDIOLOGY CLINIC | Age: 69
End: 2019-06-10
Payer: MEDICARE

## 2019-06-10 VITALS
RESPIRATION RATE: 18 BRPM | BODY MASS INDEX: 20.41 KG/M2 | HEART RATE: 60 BPM | WEIGHT: 127 LBS | SYSTOLIC BLOOD PRESSURE: 129 MMHG | DIASTOLIC BLOOD PRESSURE: 75 MMHG | HEIGHT: 66 IN | OXYGEN SATURATION: 99 %

## 2019-06-10 DIAGNOSIS — I25.10 CORONARY ARTERY DISEASE INVOLVING NATIVE CORONARY ARTERY OF NATIVE HEART WITHOUT ANGINA PECTORIS: ICD-10-CM

## 2019-06-10 DIAGNOSIS — K21.9 GASTROESOPHAGEAL REFLUX DISEASE WITHOUT ESOPHAGITIS: ICD-10-CM

## 2019-06-10 DIAGNOSIS — I10 ESSENTIAL HYPERTENSION: ICD-10-CM

## 2019-06-10 DIAGNOSIS — F41.9 ANXIETY: ICD-10-CM

## 2019-06-10 DIAGNOSIS — I20.9 ANGINA, CLASS IV (HCC): Primary | ICD-10-CM

## 2019-06-10 PROCEDURE — 99214 OFFICE O/P EST MOD 30 MIN: CPT | Performed by: NURSE PRACTITIONER

## 2019-06-10 RX ORDER — OMEPRAZOLE 20 MG/1
20 CAPSULE, DELAYED RELEASE ORAL
Qty: 30 CAPSULE | Refills: 5 | Status: ON HOLD | OUTPATIENT
Start: 2019-06-10 | End: 2021-09-07

## 2019-06-10 ASSESSMENT — ENCOUNTER SYMPTOMS
COUGH: 0
WHEEZING: 0
CHOKING: 0
APNEA: 0
GASTROINTESTINAL NEGATIVE: 1
STRIDOR: 0
SHORTNESS OF BREATH: 0
ALLERGIC/IMMUNOLOGIC NEGATIVE: 1
EYES NEGATIVE: 1
CHEST TIGHTNESS: 0

## 2019-06-10 NOTE — PROGRESS NOTES
Patient: Joshua Rios  YOB: 1950  MRN: [id number]    Chief Complaint:  Chief Complaint   Patient presents with    Follow-Up from Hospital    Chest Pain    Anxiety    Coronary Artery Disease    Hypertension         Subjective/HPI   70-year-old male recent was admitted to the hospital for chest pain. He back in January had a drug-eluting stent placed to his circumflex artery. He does have a lot of anxiety and he has a lot of issues discussing taking any medications including statins at this time. He does have acid reflux he was complaining more of stomach pains. I did make an agreement that we would stop his Ranexa but is very important and we can start him on a cholesterol. No fever, chills, nausea vomiting, PND, orthopnea, claudication      Allergies   Allergen Reactions    Seroquel [Quetiapine] Other (See Comments)     lethargy  weak       Current Outpatient Medications   Medication Sig Dispense Refill    pitavastatin (LIVALO) 1 MG TABS tablet Take 1 tablet by mouth nightly 30 tablet 5    omeprazole (PRILOSEC) 20 MG delayed release capsule Take 1 capsule by mouth every morning (before breakfast) 30 capsule 5    traZODone (DESYREL) 50 MG tablet TAKE 1 TABLET BY MOUTH EVERY NIGHT 90 tablet 1    LEVETIRACETAM PO Take by mouth      TERAZOSIN HCL PO Take by mouth      ticagrelor (BRILINTA) 90 MG TABS tablet Take 1 tablet by mouth 2 times daily 60 tablet 5    latanoprost (XALATAN) 0.005 % ophthalmic solution Use 1 Drop in both eyes daily at bedtime.  aspirin (ASPIRIN CHILDRENS) 81 MG chewable tablet Take 1 tablet by mouth daily 30 tablet 0    nitroGLYCERIN (NITROSTAT) 0.4 MG SL tablet up to max of 3 total doses.  If no relief after 1 dose, call 911. 25 tablet 0    olopatadine (PATADAY) 0.2 % SOLN ophthalmic solution Apply 1 drop to eye daily For allergies 1 Bottle 1    Blood Glucose Monitoring Suppl (ONETOUCH VERIO FLEX SYSTEM) w/Device KIT       ONETOUCH VERIO strip       ONETOUCH DELICA LANCETS 13A MISC       fluticasone (FLONASE) 50 MCG/ACT nasal spray 2 sprays by Nasal route daily 1 Bottle 2    magnesium oxide (MAG-OX) 400 MG tablet Take 400 mg by mouth daily      Cholecalciferol (VITAMIN D) 2000 units CAPS capsule Take 2,000 Units by mouth daily      sertraline (ZOLOFT) 50 MG tablet TAKE 3 TABLETS BY MOUTH DAILY 270 tablet 2    lisinopril (PRINIVIL;ZESTRIL) 5 MG tablet TAKE 1 TABLET BY MOUTH DAILY 90 tablet 3    metoprolol tartrate (LOPRESSOR) 25 MG tablet Take 1 tablet by mouth 2 times daily 60 tablet 5    clonazePAM (KLONOPIN) 0.5 MG tablet Take 1 tablet by mouth 2 times daily as needed for Anxiety for up to 15 days. . 30 tablet 0     No current facility-administered medications for this visit.         Past Medical History:   Diagnosis Date    Anxiety     Chest pain 3/29/2018    Coronary artery disease involving native coronary artery of native heart without angina pectoris 2/15/2019    Diabetes mellitus ([facility] [address]     Hyperlipidemia     Hypertension     Type 2 diabetes mellitus without complication ([facility])        Past Surgical History:   Procedure Laterality Date    CARDIAC SURGERY  1973    EYE SURGERY         Social History     Socioeconomic History    Marital status:      Spouse name: Not on file    Number of children: Not on file    Years of education: Not on file    Highest education level: Not on file   Occupational History    Not on file   Social Needs    Financial resource strain: Not on file    Food insecurity:     Worry: Not on file     Inability: Not on file    Transportation needs:     Medical: Not on file     Non-medical: Not on file   Tobacco Use    Smoking status: Never Smoker    Smokeless tobacco: Never Used   Substance and Sexual Activity    Alcohol use: No    Drug use: Never    Sexual activity: Not on file   Lifestyle    Physical activity:     Days per week: Not on file     Minutes per session: Not on file    Stress: Not on file Relationships    Social connections:     Talks on phone: Not on file     Gets together: Not on file     Attends [employer] service: Not on file     Active member of club or organization: Not on file     Attends meetings of clubs or organizations: Not on file     Relationship status: Not on file    Intimate partner violence:     Fear of current or ex partner: Not on file     Emotionally abused: Not on file     Physically abused: Not on file     Forced sexual activity: Not on file   Other Topics Concern    Not on file   Social History Narrative    Not on file       Family History   Problem Relation Age of Onset    Heart Disease Maternal Aunt     Cancer Maternal Aunt          Review of Systems:   Review of Systems   Constitutional: Positive for fatigue. Negative for appetite change, chills, diaphoresis and fever. HENT: Negative. Eyes: Negative. Respiratory: Negative for apnea, cough, choking, chest tightness, shortness of breath, wheezing and stridor. Cardiovascular: Negative for chest pain, palpitations and leg swelling. Gastrointestinal: Negative. Endocrine: Negative. Genitourinary: Negative. Musculoskeletal: Negative. Allergic/Immunologic: Negative. Neurological: Negative. Hematological: Negative. Psychiatric/Behavioral: The patient is nervous/anxious. Physical Examination:    /75 (Site: Right Upper Arm, Position: Sitting, Cuff Size: Medium Adult)   Pulse 60   Resp 18   Ht 5' 6\" (1.676 m)   Wt 127 lb (57.6 kg)   SpO2 99%   BMI 20.50 kg/m²    Physical Exam   Constitutional: He is oriented to person, place, and time. He appears well-developed and well-nourished. HENT:   Head: Normocephalic. Eyes: Pupils are equal, round, and reactive to light. Neck: No JVD present. No tracheal deviation present. No thyromegaly present. Cardiovascular: Normal rate, regular rhythm, normal heart sounds and intact distal pulses.  Exam reveals no gallop and no friction rub.   No murmur heard. Pulmonary/Chest: Effort normal and breath sounds normal. No stridor. No respiratory distress. He has no wheezes. He has no rales. He exhibits no tenderness. Abdominal: Soft. Bowel sounds are normal.   Musculoskeletal: Normal range of motion. He exhibits no edema or tenderness. Lymphadenopathy:     He has no cervical adenopathy. Neurological: He is alert and oriented to person, place, and time. Skin: Skin is warm and dry. Psychiatric: He has a normal mood and affect.        LABS:  CBC:   Lab Results   Component Value Date    WBC 6.7 05/16/2019    RBC 5.39 05/16/2019    HGB 16.6 05/16/2019    HCT 48.7 05/16/2019    MCV 90.5 05/16/2019    MCH 30.7 05/16/2019    MCHC 34.0 05/16/2019    RDW 14.0 05/16/2019     05/16/2019     Lipids:  Lab Results   Component Value Date    CHOL 179 05/16/2019    CHOL 196 06/16/2018     Lab Results   Component Value Date    TRIG 111 05/16/2019    TRIG 190 06/16/2018     Lab Results   Component Value Date    HDL 34 (L) 05/16/2019    HDL 34 (L) 06/16/2018     Lab Results   Component Value Date    LDLCALC 123 05/16/2019    LDLCALC 124 06/16/2018     No results found for: LABVLDL, VLDL  No results found for: CHOLHDLRATIO  CMP:    Lab Results   Component Value Date     05/15/2019    K 3.9 05/15/2019    K 4.1 01/27/2019     05/15/2019    CO2 21 05/15/2019    BUN 12 05/15/2019    CREATININE 0.96 05/15/2019    GFRAA >60.0 05/15/2019    LABGLOM >60.0 05/15/2019    GLUCOSE 108 05/15/2019    PROT 7.6 05/15/2019    LABALBU 4.6 05/15/2019    CALCIUM 9.5 05/15/2019    BILITOT 1.1 05/15/2019    ALKPHOS 75 05/15/2019    AST 11 05/15/2019    ALT 12 05/15/2019     BMP:    Lab Results   Component Value Date     05/15/2019    K 3.9 05/15/2019    K 4.1 01/27/2019     05/15/2019    CO2 21 05/15/2019    BUN 12 05/15/2019    LABALBU 4.6 05/15/2019    CREATININE 0.96 05/15/2019    CALCIUM 9.5 05/15/2019    GFRAA >60.0 05/15/2019    LABGLOM >60.0 05/15/2019    GLUCOSE 108 05/15/2019     Magnesium:    Lab Results   Component Value Date    MG 2.1 05/16/2019     TSH:  Lab Results   Component Value Date    TSH 4.680 (H) 01/27/2019     . result  No results for input(s): PROBNP in the last 72 hours. No results for input(s): INR in the last 72 hours. Patient Active Problem List   Diagnosis    Chest pain    Hypertension    Anxiety    Recurrent major depressive disorder, in remission ([address]    Angina, class IV ([address]    Coronary artery disease involving native coronary artery of native heart without angina pectoris    Mixed obsessional thoughts and acts       Assessment/Plan:     Diagnosis Orders   1. Angina, class IV ([address]  stable   2. Essential hypertension  stable   3. Anxiety  controlled   4. Coronary artery disease involving native coronary artery of native heart without angina pectoris  No chest pain   5. Gastroesophageal reflux disease without esophagitis  prilosec 20 mg daily   Dyslipidemia- add livalo 1 mg daily    Counseling: The importance of daily weights, dietary sodium restriction, and contact with cardiology if weight is increased more than 3 lbs in any 48 hour period was stressed. The patient has been advised to contact us if theyexperience progressive SOB, orthopnea, PND or progressive edema.

## 2019-06-18 ENCOUNTER — OFFICE VISIT (OUTPATIENT)
Dept: ENDOCRINOLOGY | Age: 69
End: 2019-06-18
Payer: MEDICARE

## 2019-06-18 VITALS
HEIGHT: 66 IN | SYSTOLIC BLOOD PRESSURE: 127 MMHG | BODY MASS INDEX: 19.93 KG/M2 | DIASTOLIC BLOOD PRESSURE: 75 MMHG | HEART RATE: 63 BPM | WEIGHT: 124 LBS

## 2019-06-18 DIAGNOSIS — E29.1 HYPOGONADISM MALE: Primary | ICD-10-CM

## 2019-06-18 PROCEDURE — 99202 OFFICE O/P NEW SF 15 MIN: CPT | Performed by: INTERNAL MEDICINE

## 2019-06-18 ASSESSMENT — ENCOUNTER SYMPTOMS: RESPIRATORY NEGATIVE: 1

## 2019-06-18 NOTE — PROGRESS NOTES
Subjective:      Patient ID: Raúl Alex is a 77 y.o. male. And referred here for hypogonadism  Other   This is a new (hypogonadism) problem. The current episode started more than 1 year ago. The problem has been gradually improving. Associated symptoms include chest pain and fatigue. Pertinent negatives include no anorexia or headaches. Nothing aggravates the symptoms. He has tried nothing for the symptoms.          Reviewed most current testosterone level that was normal at 411 did have low testosterone done in 2011 at [facility] it was 274 patient has not had been on replacement    Lab work done 5/23/2019 testosterone total was 411    She still complains of chest pain angina could be from anxiety has seen cardiologist in the past also sees psychologist psychiatrist    Patient Active Problem List   Diagnosis    Chest pain    Hypertension    Anxiety    Recurrent major depressive disorder, in remission ([address]    Angina, class IV ([address]    Coronary artery disease involving native coronary artery of native heart without angina pectoris    Mixed obsessional thoughts and acts     Social History     Socioeconomic History    Marital status:      Spouse name: Not on file    Number of children: Not on file    Years of education: Not on file    Highest education level: Not on file   Occupational History    Not on file   Social Needs    Financial resource strain: Not on file    Food insecurity:     Worry: Not on file     Inability: Not on file    Transportation needs:     Medical: Not on file     Non-medical: Not on file   Tobacco Use    Smoking status: Never Smoker    Smokeless tobacco: Never Used   Substance and Sexual Activity    Alcohol use: No    Drug use: Never    Sexual activity: Not on file   Lifestyle    Physical activity:     Days per week: Not on file     Minutes per session: Not on file    Stress: Not on file   Relationships    Social connections:     Talks on phone: Not on file Gets together: Not on file     Attends Judaism service: Not on file     Active member of club or organization: Not on file     Attends meetings of clubs or organizations: Not on file     Relationship status: Not on file    Intimate partner violence:     Fear of current or ex partner: Not on file     Emotionally abused: Not on file     Physically abused: Not on file     Forced sexual activity: Not on file   Other Topics Concern    Not on file   Social History Narrative    Not on file     Past Surgical History:   Procedure Laterality Date   [address]       Family History   Problem Relation Age of Onset    Heart Disease Maternal Aunt     Cancer Maternal Aunt      Allergies   Allergen Reactions    Seroquel [Quetiapine] Other (See Comments)     lethargy  weak       Current Outpatient Medications:     pitavastatin (LIVALO) 1 MG TABS tablet, Take 1 tablet by mouth nightly, Disp: 30 tablet, Rfl: 5    omeprazole (PRILOSEC) 20 MG delayed release capsule, Take 1 capsule by mouth every morning (before breakfast), Disp: 30 capsule, Rfl: 5    traZODone (DESYREL) 50 MG tablet, TAKE 1 TABLET BY MOUTH EVERY NIGHT, Disp: 90 tablet, Rfl: 1    sertraline (ZOLOFT) 50 MG tablet, TAKE 3 TABLETS BY MOUTH DAILY, Disp: 270 tablet, Rfl: 2    LEVETIRACETAM PO, Take by mouth, Disp: , Rfl:     TERAZOSIN HCL PO, Take by mouth, Disp: , Rfl:     ticagrelor (BRILINTA) 90 MG TABS tablet, Take 1 tablet by mouth 2 times daily, Disp: 60 tablet, Rfl: 5    latanoprost (XALATAN) 0.005 % ophthalmic solution, Use 1 Drop in both eyes daily at bedtime. , Disp: , Rfl:     lisinopril (PRINIVIL;ZESTRIL) 5 MG tablet, TAKE 1 TABLET BY MOUTH DAILY, Disp: 90 tablet, Rfl: 3    metoprolol tartrate (LOPRESSOR) 25 MG tablet, Take 1 tablet by mouth 2 times daily, Disp: 60 tablet, Rfl: 5    aspirin (ASPIRIN CHILDRENS) 81 MG chewable tablet, Take 1 tablet by mouth daily, Disp: 30 tablet, Rfl: 0    nitroGLYCERIN (NITROSTAT) 0.4 MG SL tablet, up to max of 3 total doses. If no relief after 1 dose, call 911., Disp: 25 tablet, Rfl: 0    olopatadine (PATADAY) 0.2 % SOLN ophthalmic solution, Apply 1 drop to eye daily For allergies, Disp: 1 Bottle, Rfl: 1    Blood Glucose Monitoring Suppl (ONETOUCH VERIO FLEX SYSTEM) w/Device KIT, , Disp: , Rfl:     ONETOUCH VERIO strip, , Disp: , Rfl:     ONETOUCH DELICA LANCETS 07O MISC, , Disp: , Rfl:     fluticasone (FLONASE) 50 MCG/ACT nasal spray, 2 sprays by Nasal route daily, Disp: 1 Bottle, Rfl: 2    magnesium oxide (MAG-OX) 400 MG tablet, Take 400 mg by mouth daily, Disp: , Rfl:     Cholecalciferol (VITAMIN D) 2000 units CAPS capsule, Take 2,000 Units by mouth daily, Disp: , Rfl:     clonazePAM (KLONOPIN) 0.5 MG tablet, Take 1 tablet by mouth 2 times daily as needed for Anxiety for up to 15 days. ., Disp: 30 tablet, Rfl: 0      Review of Systems   Constitutional: Positive for fatigue. Respiratory: Negative. Cardiovascular: Positive for chest pain. Gastrointestinal: Negative for anorexia. Endocrine: Negative. Allergic/Immunologic: Positive for environmental allergies. Neurological: Negative for headaches. Psychiatric/Behavioral: Positive for dysphoric mood. All other systems reviewed and are negative. Vitals:    06/18/19 1129   BP: 127/75   Pulse: 63   Weight: 124 lb (56.2 kg)   Height: 5' 6\" (1.676 m)       Objective:   Physical Exam   Constitutional: He appears well-developed and well-nourished. HENT:   Head: Normocephalic and atraumatic. Right Ear: External ear normal.   Left Ear: External ear normal.   Eyes: EOM are normal.   Neck: Neck supple. No thyromegaly present. Cardiovascular: Normal rate, regular rhythm and normal heart sounds. Pulmonary/Chest: Breath sounds normal.   Musculoskeletal: Normal range of motion. Lymphadenopathy:     He has no cervical adenopathy. Neurological: He is alert. Skin: Skin is warm.    Psychiatric: His mood appears anxious. Assessment:       Diagnosis Orders   1.  Hypogonadism male  Testosterone Free And Total Male           Plan:      Orders Placed This Encounter   Procedures    Testosterone Free And Total Male     Standing Status:   Future     Standing Expiration Date:   6/18/2020       Will hold off any testosterone replacement in view of his age comorbid condition including cardiovascular disease follow-up only as needed more than 50% of 20 minutes spent in patient education counseling thank you for the referral        Cristel So MD

## 2019-06-25 ENCOUNTER — OFFICE VISIT (OUTPATIENT)
Dept: CARDIOLOGY CLINIC | Age: 69
End: 2019-06-25
Payer: MEDICARE

## 2019-06-25 ENCOUNTER — OFFICE VISIT (OUTPATIENT)
Dept: GASTROENTEROLOGY | Age: 69
End: 2019-06-25
Payer: MEDICARE

## 2019-06-25 VITALS
WEIGHT: 130 LBS | SYSTOLIC BLOOD PRESSURE: 124 MMHG | OXYGEN SATURATION: 98 % | HEART RATE: 52 BPM | RESPIRATION RATE: 18 BRPM | DIASTOLIC BLOOD PRESSURE: 82 MMHG | HEIGHT: 66 IN | BODY MASS INDEX: 20.89 KG/M2

## 2019-06-25 VITALS
BODY MASS INDEX: 20.79 KG/M2 | WEIGHT: 129.4 LBS | RESPIRATION RATE: 12 BRPM | HEIGHT: 66 IN | OXYGEN SATURATION: 98 % | HEART RATE: 52 BPM

## 2019-06-25 DIAGNOSIS — I20.9 ANGINA, CLASS IV (HCC): ICD-10-CM

## 2019-06-25 DIAGNOSIS — I25.10 CORONARY ARTERY DISEASE INVOLVING NATIVE CORONARY ARTERY OF NATIVE HEART WITHOUT ANGINA PECTORIS: ICD-10-CM

## 2019-06-25 DIAGNOSIS — R07.9 CHEST PAIN, UNSPECIFIED TYPE: Primary | ICD-10-CM

## 2019-06-25 DIAGNOSIS — R10.13 DYSPEPSIA: Primary | ICD-10-CM

## 2019-06-25 DIAGNOSIS — I10 ESSENTIAL HYPERTENSION: ICD-10-CM

## 2019-06-25 PROCEDURE — 99203 OFFICE O/P NEW LOW 30 MIN: CPT | Performed by: SPECIALIST

## 2019-06-25 PROCEDURE — 99214 OFFICE O/P EST MOD 30 MIN: CPT | Performed by: INTERNAL MEDICINE

## 2019-06-25 RX ORDER — SIMVASTATIN 10 MG
10 TABLET ORAL NIGHTLY
Qty: 90 TABLET | Refills: 1 | Status: SHIPPED | OUTPATIENT
Start: 2019-06-25 | End: 2019-07-08 | Stop reason: SINTOL

## 2019-06-25 ASSESSMENT — ENCOUNTER SYMPTOMS
ABDOMINAL PAIN: 1
CHEST TIGHTNESS: 1
CONSTIPATION: 0
VOMITING: 0
ANAL BLEEDING: 0
BLOOD IN STOOL: 0
RECTAL PAIN: 0
ABDOMINAL DISTENTION: 0
EYES NEGATIVE: 1
DIARRHEA: 0
NAUSEA: 0

## 2019-06-25 NOTE — PROGRESS NOTES
Chief Complaint   Patient presents with    Follow-up     EKG 5/10/19    Chest Pain     Chest Tightness - Still has tightness from May and just has not gone away       3-30-18: Patient is a 69 y.o. male who presents with a chief complaint of chest pain. Patient is followed on a regular basis by Dr. Ayesha Page primary care provider on file. . States he felt a lump in his chest and a burning sensation in his esophagus while at home resting. Relived with klonopin he states. His BP was high on arrival in 170's. Denies any associated SOB, N/V, or diaphoresis. No hx of MI, CHF or arrhythmia. Top are negative and EKG without any acute ischemia. Hx of [facility] in 1970[facility] he states. CP free at this time. Does admit to having DM, HTN and HLP. No smoking. 4-27-18: Patient presents for initial medical evaluation. Patient is followed on a regular basis by Dr. Miranda Goodpasture, MD. Having extreme anxiety and panic attack now. He was seen in ER yesterday and was sent to [facility]. He was not given any meds. Has not slept in 4 days. He says only klonopin works for him. Feels a heaviness in his chest that is terrible per patient, says he almost passed out. His wife is very sick and states he cannot stay in the hospital.     s/p normal ECHO    s/p nuclear stress test.    IMPRESSION:  1. Slightly reduced exercise tolerance. 2.  Homogenous myocardial perfusion with no evidence of prior myocardial  infarction or ischemia. 3.  Normal left ventricular ejection fraction at 66%. 4.  TID ratio 0.9 which is within normal limits. 5-11-18: was sent to ER last office visit for anxiety. Was placed on meds and feeling a little better overall. Was also placed on Imdur and gave him a bid headache and left arm pain, so he stopped it. No more CP.  Pt denies chest pain, dyspnea, dyspnea on exertion, change in exercise capacity, fatigue,  nausea, vomiting, diarrhea, constipation, motor weakness, insomnia, weight loss, syncope, dizziness, lightheadedness, palpitations, PND, orthopnea, or claudication. BP and hr are good. No LE discoloration or ulcers. No LE edema. No CHF type symptoms. Lipid profile is normal. No recent hospitalization. No change in meds. 1-24-19: was in ER yesterday for midsternal chest pain, which was relieved with nitro and ASA. Does have anxiety as well, takes Klonopin for that. Pain occurred at rest, lasting more than 20 min. Had re occurrence of his CP today on the way over to my office. He gets tired with walking. Some SOB. + hx of DM, HTN and HLP. Pt denies nausea, vomiting, diarrhea, constipation, motor weakness, insomnia, weight loss, syncope, dizziness, lightheadedness, palpitations, PND, orthopnea, or claudication. no hx of smoking. 2-15-19: s/p LHC with PCI of 90% CX stenosis. Mild LAD and RCA disease, normal LVF. On DAPT, no bleeding issues. Pt denies chest pain, dyspnea, dyspnea on exertion, change in exercise capacity, fatigue,  nausea, vomiting, diarrhea, constipation, motor weakness, insomnia, weight loss, syncope, dizziness, lightheadedness, palpitations, PND, orthopnea. No nitro use. BP and hr are good. CAD is stable. No LE discoloration or ulcers. No LE edema. No CHF type symptoms. Lipid profile is normal. No recent hospitalization. No change in meds. Seeing an alternative medicine physician. Patient has underlying anxiety. 5-10-19: has anxiety issues. Does have some atypical CP from time to time. On Brilinta, not taking ASA. Pt denies dyspnea, dyspnea on exertion, change in exercise capacity, fatigue,  nausea, vomiting, diarrhea, constipation, motor weakness, insomnia, weight loss, syncope, dizziness, lightheadedness, palpitations, PND, orthopnea, or claudication. No nitro use. BP and hr are good. CAD is stable. No LE discoloration or ulcers. No LE edema. No CHF type symptoms. Lipid profile is normal. No recent hospitalization. No change in meds.        1115-19:  23-year-old male who is very well-known to our cardiac service presented to the emergency Department complaint left-sided chest pain atypical in nature states is been in the left side of his chest for about 3 days constantly states at 9 out of 10 on the pain scale. He is extremely anxious he states that he is not sure if it does heart orifice anxiety. He's got a history of CAD, diabetes, hypertension, dyslipidemia, anxiety. His last ejection fraction year ago was normal.  He denies fever, chills, PND, thymic, claudications. He recently back in February had a stent placed to his circumflex artery      6-25-19: s/p recent hospitalization for CP, was thought to be related to anxiety and treated medically. On Brilinta, not taking aspirin. Was tried on Livalo but denied by insurance. Not on Ranexa.              Patient Active Problem List   Diagnosis    Chest pain    Hypertension    Anxiety    Recurrent major depressive disorder, in remission ([address]    Angina, class IV ([address]    Coronary artery disease involving native coronary artery of native heart without angina pectoris    Mixed obsessional thoughts and acts       Past Surgical History:   Procedure Laterality Date    CARDIAC SURGERY  1973    EYE SURGERY         Social History     Socioeconomic History    Marital status:      Spouse name: None    Number of children: None    Years of education: None    Highest education level: None   Occupational History    None   Social Needs    Financial resource strain: None    Food insecurity:     Worry: None     Inability: None    Transportation needs:     Medical: None     Non-medical: None   Tobacco Use    Smoking status: Never Smoker    Smokeless tobacco: Never Used   Substance and Sexual Activity    Alcohol use: No    Drug use: Never    Sexual activity: None   Lifestyle    Physical activity:     Days per week: None     Minutes per session: None    Stress: None   Relationships    Social connections:     Talks on phone: None     Gets together: None     Attends Cheondoism service: None     Active member of club or organization: None     Attends meetings of clubs or organizations: None     Relationship status: None    Intimate partner violence:     Fear of current or ex partner: None     Emotionally abused: None     Physically abused: None     Forced sexual activity: None   Other Topics Concern    None   Social History Narrative    None       Family History   Problem Relation Age of Onset    Heart Disease Maternal Aunt     Cancer Maternal Aunt        Current Outpatient Medications   Medication Sig Dispense Refill    simvastatin (ZOCOR) 10 MG tablet Take 1 tablet by mouth nightly 90 tablet 1    omeprazole (PRILOSEC) 20 MG delayed release capsule Take 1 capsule by mouth every morning (before breakfast) 30 capsule 5    traZODone (DESYREL) 50 MG tablet TAKE 1 TABLET BY MOUTH EVERY NIGHT 90 tablet 1    sertraline (ZOLOFT) 50 MG tablet TAKE 3 TABLETS BY MOUTH DAILY 270 tablet 2    LEVETIRACETAM PO Take by mouth      TERAZOSIN HCL PO Take by mouth      ticagrelor (BRILINTA) 90 MG TABS tablet Take 1 tablet by mouth 2 times daily 60 tablet 5    latanoprost (XALATAN) 0.005 % ophthalmic solution Use 1 Drop in both eyes daily at bedtime.  nitroGLYCERIN (NITROSTAT) 0.4 MG SL tablet up to max of 3 total doses. If no relief after 1 dose, call 911. 25 tablet 0    clonazePAM (KLONOPIN) 0.5 MG tablet Take 1 tablet by mouth 2 times daily as needed for Anxiety for up to 15 days. . (Patient taking differently: Take 2 mg by mouth 2 times daily as needed for Anxiety. ) 30 tablet 0    olopatadine (PATADAY) 0.2 % SOLN ophthalmic solution Apply 1 drop to eye daily For allergies 1 Bottle 1    Blood Glucose Monitoring Suppl (ONETOUCH VERIO FLEX SYSTEM) w/Device KIT       ONETOUCH VERIO strip       ONETOUCH DELICA LANCETS 08Z MISC       fluticasone (FLONASE) 50 MCG/ACT nasal spray 2 sprays by Nasal route daily 1 Bottle 2    magnesium oxide

## 2019-06-25 NOTE — PROGRESS NOTES
Results   Component Value Date    ALT 12 05/15/2019    AST 11 05/15/2019    ALKPHOS 75 05/15/2019    BILITOT 1.1 (H) 05/15/2019     No results found. Endoscopic investigations:     Assessment and Plan:  Shaista Lai 77 y.o. male for follow up. History of dyspepsia. Rule out ulcer disease or gastritis. Will schedule for EGD if the patient can come off Brilinta. He will discuss this with his cardiologist and then we can schedule EGD after that. Will  Call us after he discuss this with his cardiologist.   Diagnosis Orders   1. Dyspepsia         Return if symptoms worsen or fail to improve, for Follow Up Visit. Serjio Michael MD   StaffGastroenterologist  [facility]    Please note this report has been partially produced using speech recognitionsoftware  and may cause contain errors related to that system including grammar, punctuation and spelling as well as words andphrases that may seem inappropriate. If there are questions or concerns please feel free to contact me to clarify.

## 2019-06-26 ENCOUNTER — TELEPHONE (OUTPATIENT)
Dept: CARDIOLOGY CLINIC | Age: 69
End: 2019-06-26

## 2019-06-26 DIAGNOSIS — I25.10 CORONARY ARTERY DISEASE INVOLVING NATIVE CORONARY ARTERY OF NATIVE HEART WITHOUT ANGINA PECTORIS: Primary | ICD-10-CM

## 2019-06-27 ENCOUNTER — TELEPHONE (OUTPATIENT)
Dept: GASTROENTEROLOGY | Age: 69
End: 2019-06-27

## 2019-06-28 ENCOUNTER — HOSPITAL ENCOUNTER (OUTPATIENT)
Dept: NUCLEAR MEDICINE | Age: 69
Discharge: HOME | End: 2019-06-30
Payer: MEDICARE

## 2019-06-28 DIAGNOSIS — R07.9 CHEST PAIN, UNSPECIFIED TYPE: ICD-10-CM

## 2019-06-28 PROCEDURE — 3430000000 HC RX DIAGNOSTIC RADIOPHARMACEUTICAL: Performed by: INTERNAL MEDICINE

## 2019-06-28 PROCEDURE — 2580000003 HC RX 258: Performed by: INTERNAL MEDICINE

## 2019-06-28 PROCEDURE — 93017 CV STRESS TEST TRACING ONLY: CPT

## 2019-06-28 PROCEDURE — 6360000002 HC RX W HCPCS: Performed by: INTERNAL MEDICINE

## 2019-06-28 PROCEDURE — 78452 HT MUSCLE IMAGE SPECT MULT: CPT

## 2019-06-28 PROCEDURE — A9502 TC99M TETROFOSMIN: HCPCS | Performed by: INTERNAL MEDICINE

## 2019-06-28 RX ORDER — SODIUM CHLORIDE 0.9 % (FLUSH) 0.9 %
10 SYRINGE (ML) INJECTION PRN
Status: DISCONTINUED | OUTPATIENT
Start: 2019-06-28 | End: 2019-07-01 | Stop reason: HOSPADM

## 2019-06-28 RX ADMIN — TETROFOSMIN 10.1 MILLICURIE: 1.38 INJECTION, POWDER, LYOPHILIZED, FOR SOLUTION INTRAVENOUS at 08:52

## 2019-06-28 RX ADMIN — Medication 10 ML: at 08:52

## 2019-06-28 RX ADMIN — TETROFOSMIN 30 MILLICURIE: 1.38 INJECTION, POWDER, LYOPHILIZED, FOR SOLUTION INTRAVENOUS at 10:18

## 2019-06-28 RX ADMIN — Medication 10 ML: at 10:17

## 2019-06-28 RX ADMIN — Medication 10 ML: at 10:18

## 2019-06-28 RX ADMIN — REGADENOSON 0.4 MG: 0.08 INJECTION, SOLUTION INTRAVENOUS at 10:17

## 2019-06-30 PROCEDURE — 93018 CV STRESS TEST I&R ONLY: CPT | Performed by: INTERNAL MEDICINE

## 2019-07-03 ENCOUNTER — TELEPHONE (OUTPATIENT)
Dept: CARDIOLOGY CLINIC | Age: 69
End: 2019-07-03

## 2019-07-08 ENCOUNTER — TELEPHONE (OUTPATIENT)
Dept: CARDIOLOGY CLINIC | Age: 69
End: 2019-07-08

## 2019-07-08 RX ORDER — CLOPIDOGREL BISULFATE 75 MG/1
75 TABLET ORAL DAILY
Qty: 90 TABLET | Refills: 1 | Status: ON HOLD | OUTPATIENT
Start: 2019-07-08 | End: 2019-08-01

## 2019-07-08 NOTE — TELEPHONE ENCOUNTER
----- Message from Marry Albarado MA sent at 6/27/2019  1:23 PM EDT -----  PER DR HOLIDAY NOTIFY OF STRESS TEST

## 2019-07-31 ENCOUNTER — ANESTHESIA EVENT (OUTPATIENT)
Dept: ENDOSCOPY | Age: 69
End: 2019-07-31
Payer: MEDICARE

## 2019-08-01 ENCOUNTER — ANESTHESIA (OUTPATIENT)
Dept: ENDOSCOPY | Age: 69
End: 2019-08-01
Payer: MEDICARE

## 2019-08-01 ENCOUNTER — HOSPITAL ENCOUNTER (OUTPATIENT)
Age: 69
Setting detail: OUTPATIENT SURGERY
Discharge: HOME | End: 2019-08-01
Attending: SPECIALIST | Admitting: SPECIALIST
Payer: MEDICARE

## 2019-08-01 VITALS
OXYGEN SATURATION: 95 % | DIASTOLIC BLOOD PRESSURE: 73 MMHG | RESPIRATION RATE: 9 BRPM | SYSTOLIC BLOOD PRESSURE: 128 MMHG

## 2019-08-01 VITALS
HEART RATE: 64 BPM | OXYGEN SATURATION: 96 % | SYSTOLIC BLOOD PRESSURE: 117 MMHG | HEIGHT: 65 IN | DIASTOLIC BLOOD PRESSURE: 74 MMHG | BODY MASS INDEX: 20.83 KG/M2 | TEMPERATURE: 98.3 F | WEIGHT: 125 LBS | RESPIRATION RATE: 16 BRPM

## 2019-08-01 PROCEDURE — 3700000000 HC ANESTHESIA ATTENDED CARE: Performed by: SPECIALIST

## 2019-08-01 PROCEDURE — 2500000003 HC RX 250 WO HCPCS: Performed by: NURSE ANESTHETIST, CERTIFIED REGISTERED

## 2019-08-01 PROCEDURE — 3609017100 HC EGD: Performed by: SPECIALIST

## 2019-08-01 PROCEDURE — 6360000002 HC RX W HCPCS: Performed by: NURSE ANESTHETIST, CERTIFIED REGISTERED

## 2019-08-01 PROCEDURE — 43239 EGD BIOPSY SINGLE/MULTIPLE: CPT | Performed by: SPECIALIST

## 2019-08-01 PROCEDURE — 2580000003 HC RX 258: Performed by: SPECIALIST

## 2019-08-01 PROCEDURE — 7100000010 HC PHASE II RECOVERY - FIRST 15 MIN: Performed by: SPECIALIST

## 2019-08-01 PROCEDURE — 7100000011 HC PHASE II RECOVERY - ADDTL 15 MIN: Performed by: SPECIALIST

## 2019-08-01 PROCEDURE — 88342 IMHCHEM/IMCYTCHM 1ST ANTB: CPT

## 2019-08-01 PROCEDURE — 88305 TISSUE EXAM BY PATHOLOGIST: CPT

## 2019-08-01 RX ORDER — SODIUM CHLORIDE 0.9 % (FLUSH) 0.9 %
10 SYRINGE (ML) INJECTION PRN
Status: DISCONTINUED | OUTPATIENT
Start: 2019-08-01 | End: 2019-08-01 | Stop reason: HOSPADM

## 2019-08-01 RX ORDER — GLYCOPYRROLATE 1 MG/5 ML
SYRINGE (ML) INTRAVENOUS PRN
Status: DISCONTINUED | OUTPATIENT
Start: 2019-08-01 | End: 2019-08-01 | Stop reason: SDUPTHER

## 2019-08-01 RX ORDER — LIDOCAINE HYDROCHLORIDE 20 MG/ML
INJECTION, SOLUTION INFILTRATION; PERINEURAL PRN
Status: DISCONTINUED | OUTPATIENT
Start: 2019-08-01 | End: 2019-08-01 | Stop reason: SDUPTHER

## 2019-08-01 RX ORDER — SODIUM CHLORIDE 9 MG/ML
INJECTION, SOLUTION INTRAVENOUS CONTINUOUS
Status: DISCONTINUED | OUTPATIENT
Start: 2019-08-01 | End: 2019-08-01 | Stop reason: HOSPADM

## 2019-08-01 RX ORDER — SODIUM CHLORIDE 0.9 % (FLUSH) 0.9 %
10 SYRINGE (ML) INJECTION EVERY 12 HOURS SCHEDULED
Status: DISCONTINUED | OUTPATIENT
Start: 2019-08-01 | End: 2019-08-01 | Stop reason: HOSPADM

## 2019-08-01 RX ORDER — LIDOCAINE HYDROCHLORIDE 10 MG/ML
1 INJECTION, SOLUTION EPIDURAL; INFILTRATION; INTRACAUDAL; PERINEURAL
Status: DISCONTINUED | OUTPATIENT
Start: 2019-08-01 | End: 2019-08-01 | Stop reason: HOSPADM

## 2019-08-01 RX ORDER — PROPOFOL 10 MG/ML
INJECTION, EMULSION INTRAVENOUS PRN
Status: DISCONTINUED | OUTPATIENT
Start: 2019-08-01 | End: 2019-08-01 | Stop reason: SDUPTHER

## 2019-08-01 RX ADMIN — LIDOCAINE HYDROCHLORIDE 50 MG: 20 INJECTION, SOLUTION INFILTRATION; PERINEURAL at 10:24

## 2019-08-01 RX ADMIN — SODIUM CHLORIDE: 9 INJECTION, SOLUTION INTRAVENOUS at 09:16

## 2019-08-01 RX ADMIN — PROPOFOL 100 MG: 10 INJECTION, EMULSION INTRAVENOUS at 10:24

## 2019-08-01 RX ADMIN — Medication 0.2 MG: at 10:22

## 2019-08-01 NOTE — ANESTHESIA PRE PROCEDURE
Department of Anesthesiology  Preprocedure Note       Name:  Aziza Chinchilla   Age:  77 y.o.  :  1950                                          MRN:  [id number]         Date:  2019      Surgeon: Lana Gonsales):  Olimpia Ku MD    Procedure: EGD ESOPHAGOGASTRODUODENOSCOPY (N/A )    Medications prior to admission:   Prior to Admission medications    Medication Sig Start Date End Date Taking? Authorizing Provider   clopidogrel (PLAVIX) 75 MG tablet Take 1 tablet by mouth daily 19   Rian Vega, DO   aspirin 81 MG tablet Take 81 mg by mouth daily    Historical Provider, MD   omeprazole (PRILOSEC) 20 MG delayed release capsule Take 1 capsule by mouth every morning (before breakfast) 6/10/19   RAVEN Chin CNP   traZODone (DESYREL) 50 MG tablet TAKE 1 TABLET BY MOUTH EVERY NIGHT 19   Mayur Franco MD   sertraline (ZOLOFT) 50 MG tablet TAKE 3 TABLETS BY MOUTH DAILY 19   Mayur Franco MD   LEVETIRACETAM PO Take by mouth    Historical Provider, MD   TERAZOSIN HCL PO Take by mouth    Historical Provider, MD   latanoprost (XALATAN) 0.005 % ophthalmic solution Use 1 Drop in both eyes daily at bedtime. 19   Historical Provider, MD   nitroGLYCERIN (NITROSTAT) 0.4 MG SL tablet up to max of 3 total doses. If no relief after 1 dose, call 911. 18   Crystal Maki, DO   clonazePAM (KLONOPIN) 0.5 MG tablet Take 1 tablet by mouth 2 times daily as needed for Anxiety for up to 15 days. .  Patient taking differently: Take 2 mg by mouth 2 times daily as needed for Anxiety.   18  Yoel Slater MD   olopatadine (PATADAY) 0.2 % SOLN ophthalmic solution Apply 1 drop to eye daily For allergies 18   Yoel Slater MD   Blood Glucose Monitoring Suppl ([address]) w/Device KIT  18   Historical Provider, MD Gonzales Allen strip  18   Historical Provider, MD Kory Fisher [facility] [id number] [facility]  18   Historical Provider, MD   fluticasone lb (59 kg)   06/18/19 124 lb (56.2 kg)     There is no height or weight on file to calculate BMI.    CBC:   Lab Results   Component Value Date    WBC 6.7 05/16/2019    RBC 5.39 05/16/2019    HGB 16.6 05/16/2019    HCT 48.7 05/16/2019    MCV 90.5 05/16/2019    RDW 14.0 05/16/2019     05/16/2019       CMP:   Lab Results   Component Value Date     05/15/2019    K 3.9 05/15/2019    K 4.1 01/27/2019     05/15/2019    CO2 21 05/15/2019    BUN 12 05/15/2019    CREATININE 0.96 05/15/2019    GFRAA >60.0 05/15/2019    LABGLOM >60.0 05/15/2019    GLUCOSE 108 05/15/2019    PROT 7.6 05/15/2019    CALCIUM 9.5 05/15/2019    BILITOT 1.1 05/15/2019    ALKPHOS 75 05/15/2019    AST 11 05/15/2019    ALT 12 05/15/2019       POC Tests: No results for input(s): POCGLU, POCNA, POCK, POCCL, POCBUN, POCHEMO, POCHCT in the last 72 hours. Coags:   Lab Results   Component Value Date    PROTIME 11.0 04/04/2019    INR 1.1 04/04/2019    APTT 26.7 04/04/2019       HCG (If Applicable): No results found for: PREGTESTUR, PREGSERUM, HCG, HCGQUANT     ABGs: No results found for: PHART, PO2ART, OEM3VAW, QBX4AQP, BEART, A0CHMTQX     Type & Screen (If Applicable):  No results found for: LABABO, [address]    Anesthesia Evaluation  Patient summary reviewed and Nursing notes reviewed  Airway: Mallampati: II  TM distance: >3 FB   Neck ROM: full  Mouth opening: > = 3 FB Dental: normal exam         Pulmonary:Negative Pulmonary ROS and normal exam                               Cardiovascular:    (+) hypertension:, CAD:,                   Neuro/Psych:   Negative Neuro/Psych ROS  (+) psychiatric history: stable with treatment            GI/Hepatic/Renal: Neg GI/Hepatic/Renal ROS            Endo/Other:    (+) DiabetesType II DM, well controlled, , .                 Abdominal:           Vascular: negative vascular ROS. Anesthesia Plan      MAC     ASA 3       Induction: intravenous.       Anesthetic plan and risks

## 2019-08-02 ENCOUNTER — OFFICE VISIT (OUTPATIENT)
Dept: UROLOGY | Age: 69
End: 2019-08-02
Payer: MEDICARE

## 2019-08-02 ENCOUNTER — HOSPITAL ENCOUNTER (EMERGENCY)
Age: 69
Discharge: HOME | End: 2019-08-02
Attending: EMERGENCY MEDICINE
Payer: MEDICARE

## 2019-08-02 VITALS
HEART RATE: 50 BPM | WEIGHT: 125 LBS | DIASTOLIC BLOOD PRESSURE: 68 MMHG | BODY MASS INDEX: 20.09 KG/M2 | SYSTOLIC BLOOD PRESSURE: 102 MMHG | HEIGHT: 66 IN

## 2019-08-02 VITALS
OXYGEN SATURATION: 100 % | RESPIRATION RATE: 16 BRPM | WEIGHT: 125 LBS | HEART RATE: 51 BPM | TEMPERATURE: 97.3 F | HEIGHT: 66 IN | SYSTOLIC BLOOD PRESSURE: 117 MMHG | BODY MASS INDEX: 20.09 KG/M2 | DIASTOLIC BLOOD PRESSURE: 70 MMHG

## 2019-08-02 DIAGNOSIS — N52.9 ERECTILE DYSFUNCTION, UNSPECIFIED ERECTILE DYSFUNCTION TYPE: Primary | ICD-10-CM

## 2019-08-02 DIAGNOSIS — R00.1 BRADYCARDIA: Primary | ICD-10-CM

## 2019-08-02 LAB
ALBUMIN SERPL-MCNC: 4.1 G/DL (ref 3.5–4.6)
ALP BLD-CCNC: 83 U/L (ref 35–104)
ALT SERPL-CCNC: 8 U/L (ref 0–41)
ANION GAP SERPL CALCULATED.3IONS-SCNC: 11 MEQ/L (ref 9–15)
AST SERPL-CCNC: 9 U/L (ref 0–40)
BILIRUB SERPL-MCNC: 0.5 MG/DL (ref 0.2–0.7)
BILIRUBIN, POC: ABNORMAL
BLOOD URINE, POC: ABNORMAL
BUN BLDV-MCNC: 18 MG/DL (ref 8–23)
CALCIUM SERPL-MCNC: 9.6 MG/DL (ref 8.5–9.9)
CHLORIDE BLD-SCNC: 103 MEQ/L (ref 95–107)
CLARITY, POC: CLEAR
CO2: 28 MEQ/L (ref 20–31)
COLOR, POC: YELLOW
CREAT SERPL-MCNC: 0.93 MG/DL (ref 0.7–1.2)
EKG ATRIAL RATE: 44 BPM
EKG P AXIS: 59 DEGREES
EKG P-R INTERVAL: 188 MS
EKG Q-T INTERVAL: 480 MS
EKG QRS DURATION: 104 MS
EKG QTC CALCULATION (BAZETT): 410 MS
EKG R AXIS: 71 DEGREES
EKG T AXIS: 78 DEGREES
EKG VENTRICULAR RATE: 44 BPM
GFR AFRICAN AMERICAN: >60
GFR NON-AFRICAN AMERICAN: >60
GLOBULIN: 3.3 G/DL (ref 2.3–3.5)
GLUCOSE BLD-MCNC: 119 MG/DL (ref 70–99)
GLUCOSE URINE, POC: ABNORMAL
HCT VFR BLD CALC: 42.4 % (ref 42–52)
HEMOGLOBIN: 14.9 G/DL (ref 14–18)
KETONES, POC: ABNORMAL
LEUKOCYTE EST, POC: ABNORMAL
MCH RBC QN AUTO: 31.5 PG (ref 27–31.3)
MCHC RBC AUTO-ENTMCNC: 35.2 % (ref 33–37)
MCV RBC AUTO: 89.4 FL (ref 80–100)
NITRITE, POC: ABNORMAL
PDW BLD-RTO: 14.1 % (ref 11.5–14.5)
PH, POC: 7.5
PLATELET # BLD: 175 K/UL (ref 130–400)
POTASSIUM SERPL-SCNC: 4.2 MEQ/L (ref 3.4–4.9)
PROTEIN, POC: ABNORMAL
RBC # BLD: 4.74 M/UL (ref 4.7–6.1)
SODIUM BLD-SCNC: 142 MEQ/L (ref 135–144)
SPECIFIC GRAVITY, POC: 1.02
TOTAL CK: 38 U/L (ref 0–190)
TOTAL PROTEIN: 7.4 G/DL (ref 6.3–8)
TROPONIN: <0.01 NG/ML (ref 0–0.01)
UROBILINOGEN, POC: 1
WBC # BLD: 6.1 K/UL (ref 4.8–10.8)

## 2019-08-02 PROCEDURE — 81003 URINALYSIS AUTO W/O SCOPE: CPT | Performed by: UROLOGY

## 2019-08-02 PROCEDURE — 99284 EMERGENCY DEPT VISIT MOD MDM: CPT

## 2019-08-02 PROCEDURE — 84484 ASSAY OF TROPONIN QUANT: CPT

## 2019-08-02 PROCEDURE — 93005 ELECTROCARDIOGRAM TRACING: CPT | Performed by: EMERGENCY MEDICINE

## 2019-08-02 PROCEDURE — 93010 ELECTROCARDIOGRAM REPORT: CPT | Performed by: INTERNAL MEDICINE

## 2019-08-02 PROCEDURE — 85027 COMPLETE CBC AUTOMATED: CPT

## 2019-08-02 PROCEDURE — 99204 OFFICE O/P NEW MOD 45 MIN: CPT | Performed by: UROLOGY

## 2019-08-02 PROCEDURE — 80053 COMPREHEN METABOLIC PANEL: CPT

## 2019-08-02 PROCEDURE — 82550 ASSAY OF CK (CPK): CPT

## 2019-08-02 PROCEDURE — 36415 COLL VENOUS BLD VENIPUNCTURE: CPT

## 2019-08-02 ASSESSMENT — ENCOUNTER SYMPTOMS
VOMITING: 0
DIARRHEA: 0
SHORTNESS OF BREATH: 0
COLOR CHANGE: 0
ABDOMINAL PAIN: 0
ALLERGIC/IMMUNOLOGIC NEGATIVE: 1
FACIAL SWELLING: 0
EYE DISCHARGE: 0
VOMITING: 0
RHINORRHEA: 0
ABDOMINAL PAIN: 0
SHORTNESS OF BREATH: 0
NAUSEA: 0
ABDOMINAL DISTENTION: 0
RESPIRATORY NEGATIVE: 1

## 2019-08-02 ASSESSMENT — PAIN SCALES - GENERAL: PAINLEVEL_OUTOF10: 8

## 2019-08-02 ASSESSMENT — PAIN DESCRIPTION - PAIN TYPE: TYPE: CHRONIC PAIN

## 2019-08-02 ASSESSMENT — PAIN DESCRIPTION - LOCATION: LOCATION: CHEST

## 2019-08-02 NOTE — PROGRESS NOTES
Sexual Activity    Alcohol use: No    Drug use: Never    Sexual activity: None   Lifestyle    Physical activity:     Days per week: None     Minutes per session: None    Stress: None   Relationships    Social connections:     Talks on phone: None     Gets together: None     Attends Jain service: None     Active member of club or organization: None     Attends meetings of clubs or organizations: None     Relationship status: None    Intimate partner violence:     Fear of current or ex partner: None     Emotionally abused: None     Physically abused: None     Forced sexual activity: None   Other Topics Concern    None   Social History Narrative    None     Family History   Problem Relation Age of Onset    Heart Disease Maternal Aunt     Cancer Maternal Aunt      Current Outpatient Medications   Medication Sig Dispense Refill    ticagrelor (BRILINTA) 90 MG TABS tablet Take 90 mg by mouth 2 times daily      omeprazole (PRILOSEC) 20 MG delayed release capsule Take 1 capsule by mouth every morning (before breakfast) 30 capsule 5    traZODone (DESYREL) 50 MG tablet TAKE 1 TABLET BY MOUTH EVERY NIGHT 90 tablet 1    latanoprost (XALATAN) 0.005 % ophthalmic solution Use 1 Drop in both eyes daily at bedtime.  nitroGLYCERIN (NITROSTAT) 0.4 MG SL tablet up to max of 3 total doses.  If no relief after 1 dose, call 911. 25 tablet 0    olopatadine (PATADAY) 0.2 % SOLN ophthalmic solution Apply 1 drop to eye daily For allergies 1 Bottle 1    Blood Glucose Monitoring Suppl (ONETOUCH VERIO FLEX SYSTEM) w/Device KIT       ONETOUCH VERIO strip       ONETOUCH DELICA LANCETS 36I MISC       magnesium oxide (MAG-OX) 400 MG tablet Take 400 mg by mouth daily      Cholecalciferol (VITAMIN D) 2000 units CAPS capsule Take 2,000 Units by mouth daily      aspirin 81 MG tablet Take 81 mg by mouth daily      sertraline (ZOLOFT) 50 MG tablet TAKE 3 TABLETS BY MOUTH DAILY 270 tablet 2    LEVETIRACETAM PO Take by mouth confirmed negative in the right inguinal area and confirmed negative in the left inguinal area. Genitourinary: Penis normal. Rectal exam shows no external hemorrhoid. Prostate is enlarged. Prostate is not tender. Right testis shows no mass, no swelling and no tenderness. Right testis is descended. Left testis shows no mass, no swelling and no tenderness. Left testis is descended. No phimosis, paraphimosis, hypospadias, penile erythema or penile tenderness. No discharge found. Genitourinary Comments: The Lt testicle is slightly smaller than the Rt but non-tender. The prostate is 2 + and without nodules   Neurological: He is alert. Skin: Skin is warm. Psychiatric: He has a normal mood and affect. His behavior is normal.         8/2/2019 10:01 AM - [facility], [facility]     Component Collected Lab   Color, UA 08/02/2019 10:00 AM Unknown   yellow    Clarity, UA 08/02/2019 10:00 AM Unknown   clear    Glucose, UA POC 08/02/2019 10:00 AM Unknown   1000 mg/dL    Bilirubin, UA 08/02/2019 10:00 AM Unknown   neg    Ketones, UA 08/02/2019 10:00 AM Unknown   neg    Spec Grav, UA 08/02/2019 10:00 AM Unknown   1.020    Blood, UA POC 08/02/2019 10:00 AM Unknown   neg    pH, UA 08/02/2019 10:00 AM Unknown   7.5    Protein, UA POC 08/02/2019 10:00 AM Unknown   neg    Urobilinogen, UA 08/02/2019 10:00 AM Unknown   1.0    Leukocytes, UA 08/02/2019 10:00 AM Unknown   neg    Nitrite, UA 08/02/2019 10:00 AM Unknown   neg    Lab and Collection         Assessment: This is a 76 yo male with h/o DM, CAD with recent stent on Brilinta (Dr Lucita Holguin),  HTN, Anxiety/depression and with ED for some time which is likely of organic etiology given risk factors. I reviewed the available treatment options for ED in detail including risks/benefits of oral ED meds, BRADLEY, Penile Injections, Muse and Penile Prothesis. He would like to consider Viagra but need the cardiac ok for this and he understands can not use with NTG.  He is having chest pain

## 2019-08-02 NOTE — ED PROVIDER NOTES
[address] [facility] ED  eMERGENCY dEPARTMENT eNCOUnter      Pt Name: Carolina Arauz  MRN: [id number]  [place] 1950  Date of evaluation: 8/2/2019  Provider: Court Nyhan, [address]       Chief Complaint   Patient presents with    Chest Pain     pt states he is having the same chest discomfort as he has had for the last 2 years. pt states he took his klonopin this morning but stats it did not help today         HISTORY OF PRESENT ILLNESS   (Location/Symptom, Timing/Onset,Context/Setting, Quality, Duration, Modifying Factors, Severity)  Note limiting factors. Carolina Arauz is a 77 y.o. male who presents to the emergency department chief complaint of chest discomfort. Patient has been having this chest discomfort consistently for 2 years and typically gets relief with Klonopin. He had taken his Klonopin this morning and went to a routine appointment at his neurologist office. He told the intake nurse that he was having chest discomfort when she asked and she sent him to the ER. He denies any change in the chest discomfort from previous and personally was not concerned. He does state that since starting a new prescription medicine for depression, Trintellix, the last couple of days, he has noticed a slow heart rate. Denies any lightheadedness, dizziness, nausea, sweats, or other symptoms including any increased fatigue or otherwise. HPI    NursingNotes were reviewed. REVIEW OF SYSTEMS    (2-9 systems for level 4, 10 or more for level 5)     Review of Systems   Constitutional: Negative for activity change and appetite change. HENT: Negative for congestion, facial swelling and rhinorrhea. Eyes: Negative for discharge. Respiratory: Negative for shortness of breath. Cardiovascular: Negative for chest pain. Gastrointestinal: Negative for abdominal pain and vomiting. Endocrine: Negative for cold intolerance. Musculoskeletal: Negative for arthralgias and myalgias.    Skin: mouth every morning (before breakfast)    ONETOUCH DELICA LANCETS 21Q MISC        ONETOUCH VERIO STRIP        SERTRALINE (ZOLOFT) 50 MG TABLET    TAKE 3 TABLETS BY MOUTH DAILY    TERAZOSIN HCL PO    Take by mouth    TICAGRELOR (BRILINTA) 90 MG TABS TABLET    Take 90 mg by mouth 2 times daily    TRAZODONE (DESYREL) 50 MG TABLET    TAKE 1 TABLET BY MOUTH EVERY NIGHT    VORTIOXETINE (TRINTELLIX) 5 MG TABLET    Take 5 mg by mouth daily       ALLERGIES     Seroquel [quetiapine]    FAMILY HISTORY       Family History   Problem Relation Age of Onset    Heart Disease Maternal Aunt     Cancer Maternal Aunt           SOCIAL HISTORY       Social History     Socioeconomic History    Marital status:      Spouse name: None    Number of children: None    Years of education: None    Highest education level: None   Occupational History    None   Social Needs    Financial resource strain: None    Food insecurity:     Worry: None     Inability: None    Transportation needs:     Medical: None     Non-medical: None   Tobacco Use    Smoking status: Never Smoker    Smokeless tobacco: Never Used   Substance and Sexual Activity    Alcohol use: No    Drug use: Never    Sexual activity: None   Lifestyle    Physical activity:     Days per week: None     Minutes per session: None    Stress: None   Relationships    Social connections:     Talks on phone: None     Gets together: None     Attends Advent service: None     Active member of club or organization: None     Attends meetings of clubs or organizations: None     Relationship status: None    Intimate partner violence:     Fear of current or ex partner: None     Emotionally abused: None     Physically abused: None     Forced sexual activity: None   Other Topics Concern    None   Social History Narrative    None       SCREENINGS      @FLOW([id number])@      PHYSICAL EXAM    (up to 7 for level 4, 8 or more for level 5)     ED Triage Vitals [08/02/19 1044]   BP Temp Temp Source Pulse Resp SpO2 Height Weight   111/68 97.3 °F (36.3 °C) Oral (!) 46 16 100 % 5' 6\" (1.676 m) 125 lb (56.7 kg)       Physical Exam   Constitutional: He is oriented to person, place, and time. He appears well-developed and well-nourished. HENT:   Head: Normocephalic and atraumatic. Eyes: Pupils are equal, round, and reactive to light. Conjunctivae and EOM are normal.   Neck: Normal range of motion. Neck supple. Cardiovascular: Exam reveals no gallop and no friction rub. No murmur heard. bradycardic   Pulmonary/Chest: Effort normal.   Abdominal: Soft. Bowel sounds are normal.   Musculoskeletal: Normal range of motion. Neurological: He is alert and oriented to person, place, and time. He has normal reflexes. Skin: Skin is warm and dry. Psychiatric: He has a normal mood and affect. DIAGNOSTIC RESULTS     EKG: All EKG's are interpreted by the Emergency Department Physician who either signs or Co-signsthis chart in the absence of a cardiologist.    Sinus bradycardia at 44 bpm with no acute ST segment elevation or T wave inversion    RADIOLOGY:   Non-plain filmimages such as CT, Ultrasound and MRI are read by the radiologist. Plain radiographic images are visualized and preliminarily interpreted by the emergency physician with the below findings:        Interpretation per the Radiologist below, if available at the time ofthis note:    No orders to display         ED BEDSIDE ULTRASOUND:   Performed by ED Physician - none    LABS:  Labs Reviewed   CBC - Abnormal; Notable for the following components:       Result Value    MCH 31.5 (*)     All other components within normal limits   COMPREHENSIVE METABOLIC PANEL - Abnormal; Notable for the following components:    Glucose 119 (*)     All other components within normal limits   TROPONIN   CK       All other labs were within normal range or not returned as of this dictation.     EMERGENCY DEPARTMENT COURSE and DIFFERENTIAL DIAGNOSIS/MDM: Vitals:    Vitals:    08/02/19 1044   BP: 111/68   Pulse: (!) 46   Resp: 16   Temp: 97.3 °F (36.3 °C)   TempSrc: Oral   SpO2: 100%   Weight: 125 lb (56.7 kg)   Height: 5' 6\" (1.676 m)       Patient really has no complaints while he is here other than his routine chest pain. I discuss his bradycardia with the cardiology on call for [facility] and he is asymptomatic and safe for discharge. Patient is to follow up with them next week. He is also to bring up the medicine side effects with his psychiatrist in the event that this may be contributing. I do look it up and can't find any literature relating Trintella with slow heart rate. He is discharged in stable condition without any medication changes instituted at this time. MDM    CRITICAL CARE TIME   Total Critical Care time was 0 minutes, excluding separately reportableprocedures. There was a high probability of clinicallysignificant/life threatening deterioration in the patient's condition which required my urgent intervention. CONSULTS:  None    PROCEDURES:  Unless otherwise noted below, none     Procedures    FINAL IMPRESSION      1.  Bradycardia          DISPOSITION/PLAN   DISPOSITION Decision To Discharge 08/02/2019 01:38:43 PM      PATIENT REFERRED TO:  [address] DO  [address] [place] [address]  [phone]      call Monday for an appointment      DISCHARGE MEDICATIONS:  New Prescriptions    No medications on file          (Please note that portions of this note were completed with a voice recognition program.  Efforts were made to edit the dictations but occasionally words are mis-transcribed.)    Evangelist Dunaway DO (electronically signed)  Attending Emergency Physician          Evangelist Dunaway DO  08/02/19 7512

## 2019-08-05 DIAGNOSIS — E29.1 HYPOGONADISM MALE: ICD-10-CM

## 2019-08-05 DIAGNOSIS — R10.13 DYSPEPSIA: ICD-10-CM

## 2019-08-06 ENCOUNTER — OFFICE VISIT (OUTPATIENT)
Dept: CARDIOLOGY CLINIC | Age: 69
End: 2019-08-06
Payer: MEDICARE

## 2019-08-06 VITALS
DIASTOLIC BLOOD PRESSURE: 78 MMHG | BODY MASS INDEX: 20.66 KG/M2 | OXYGEN SATURATION: 98 % | RESPIRATION RATE: 14 BRPM | SYSTOLIC BLOOD PRESSURE: 118 MMHG | WEIGHT: 128 LBS | HEART RATE: 58 BPM

## 2019-08-06 DIAGNOSIS — I25.10 CORONARY ARTERY DISEASE INVOLVING NATIVE CORONARY ARTERY OF NATIVE HEART WITHOUT ANGINA PECTORIS: ICD-10-CM

## 2019-08-06 DIAGNOSIS — I10 ESSENTIAL HYPERTENSION: ICD-10-CM

## 2019-08-06 DIAGNOSIS — I20.9 ANGINA, CLASS IV (HCC): Primary | ICD-10-CM

## 2019-08-06 PROCEDURE — 99214 OFFICE O/P EST MOD 30 MIN: CPT | Performed by: INTERNAL MEDICINE

## 2019-08-06 RX ORDER — RANOLAZINE 1000 MG/1
500 TABLET, EXTENDED RELEASE ORAL 2 TIMES DAILY
Qty: 60 TABLET | Refills: 5 | Status: SHIPPED | OUTPATIENT
Start: 2019-08-06 | End: 2021-06-22 | Stop reason: DRUGHIGH

## 2019-08-06 RX ORDER — RANOLAZINE 500 MG/1
500 TABLET, EXTENDED RELEASE ORAL 2 TIMES DAILY
COMMUNITY
End: 2019-08-06 | Stop reason: DRUGHIGH

## 2019-08-06 RX ORDER — RANOLAZINE 1000 MG/1
500 TABLET, EXTENDED RELEASE ORAL 2 TIMES DAILY
COMMUNITY
End: 2019-08-06 | Stop reason: SDUPTHER

## 2019-08-06 NOTE — PROGRESS NOTES
ESOPHAGOGASTRODUODENOSCOPY performed by Cristhian Kline MD at [facility] Marital status:      Spouse name: None    Number of children: None    Years of education: None    Highest education level: None   Occupational History    None   Social Needs    Financial resource strain: None    Food insecurity:     Worry: None     Inability: None    Transportation needs:     Medical: None     Non-medical: None   Tobacco Use    Smoking status: Never Smoker    Smokeless tobacco: Never Used   Substance and Sexual Activity    Alcohol use: No    Drug use: Never    Sexual activity: None   Lifestyle    Physical activity:     Days per week: None     Minutes per session: None    Stress: None   Relationships    Social connections:     Talks on phone: None     Gets together: None     Attends Rastafari service: None     Active member of club or organization: None     Attends meetings of clubs or organizations: None     Relationship status: None    Intimate partner violence:     Fear of current or ex partner: None     Emotionally abused: None     Physically abused: None     Forced sexual activity: None   Other Topics Concern    None   Social History Narrative    None       Family History   Problem Relation Age of Onset    Heart Disease Maternal Aunt     Cancer Maternal Aunt        Current Outpatient Medications   Medication Sig Dispense Refill    ranolazine (RANEXA) 500 MG extended release tablet Take 500 mg by mouth 2 times daily      VORTIoxetine (TRINTELLIX) 5 MG tablet Take 5 mg by mouth daily      ticagrelor (BRILINTA) 90 MG TABS tablet Take 90 mg by mouth 2 times daily      aspirin 81 MG tablet Take 81 mg by mouth daily      latanoprost (XALATAN) 0.005 % ophthalmic solution Use 1 Drop in both eyes daily at bedtime.  nitroGLYCERIN (NITROSTAT) 0.4 MG SL tablet up to max of 3 total doses.  If no relief after 1 dose, call 721. 25 tablet 0    S2, no murmurs, rubs, clicks or gallops  Abdomen - soft, nontender, nondistended, no masses or organomegaly  Neurological - alert, oriented, normal speech, no focal findings or movement disorder noted  Extremities - peripheral pulses normal, no pedal edema, no clubbing or cyanosis  Skin - normal coloration and turgor, no rashes, no suspicious skin lesions noted      EKG: normal sinus rhythm, nonspecific ST and T waves changes    No orders of the defined types were placed in this encounter. ASSESSMENT:     Diagnosis Orders   1. Angina, class IV ([address]     2. Essential hypertension     3. Coronary artery disease involving native coronary artery of native heart without angina pectoris           PLAN:     Patient will need to continue to follow up with you for their general medical care     As always, aggressive risk factor modification is strongly recommended. We should adhere to the JNC VIII guidelines for HTN management and the NCEP ATP III guidelines for LDL-C management. Cardiac diet is always recommended with low fat, cholesterol, calories and sodium. Will continue to monitor patient clinically, if symptoms develop or worsen, they are to let me know ASAP or head to the nearest emergeny room. Continue medications at current doses. TAKE ASA as well.     zocor 10mg daily. Stressed importance of adhering to DAPT for one year. If he stops he is aware of high risk of stent thrombosis and death. ranexa to 1000mg BID    Had a long talk with the patient regarding their symptoms, test results and the different treatment options available which include cardiac cath, alternate imaging modality and medical therapy. Patient would like to proceed with cardiac catheterization and understands the risks and benefits of the procedure. Will continue to monitor patient clinically, if symptoms develop or worsen, they are to let me know ASAP or head to the nearest emergeny room.     Patient was advised and

## 2019-08-07 LAB
SEX HORMONE BINDING GLOBULIN: 39 NMOL/L (ref 11–80)
TESTOSTERONE FREE PERCENT: 1.7 % (ref 1.6–2.9)
TESTOSTERONE FREE, CALC: 72 PG/ML (ref 47–244)
TESTOSTERONE TOTAL-MALE: 424 NG/DL (ref 300–720)

## 2019-08-12 ENCOUNTER — HOSPITAL ENCOUNTER (OUTPATIENT)
Dept: CARDIAC CATH/INVASIVE PROCEDURES | Age: 69
Discharge: HOME | End: 2019-08-12
Attending: INTERNAL MEDICINE | Admitting: INTERNAL MEDICINE
Payer: MEDICARE

## 2019-08-12 VITALS
SYSTOLIC BLOOD PRESSURE: 133 MMHG | TEMPERATURE: 96.6 F | DIASTOLIC BLOOD PRESSURE: 78 MMHG | HEART RATE: 52 BPM | HEIGHT: 66 IN | RESPIRATION RATE: 10 BRPM | OXYGEN SATURATION: 96 % | BODY MASS INDEX: 19.93 KG/M2 | WEIGHT: 124 LBS

## 2019-08-12 PROCEDURE — C1769 GUIDE WIRE: HCPCS

## 2019-08-12 PROCEDURE — 2709999900 HC NON-CHARGEABLE SUPPLY

## 2019-08-12 PROCEDURE — 6370000000 HC RX 637 (ALT 250 FOR IP)

## 2019-08-12 PROCEDURE — 93459 L HRT ART/GRFT ANGIO: CPT | Performed by: INTERNAL MEDICINE

## 2019-08-12 PROCEDURE — 93458 L HRT ARTERY/VENTRICLE ANGIO: CPT | Performed by: INTERNAL MEDICINE

## 2019-08-12 PROCEDURE — C1725 CATH, TRANSLUMIN NON-LASER: HCPCS

## 2019-08-12 PROCEDURE — 6370000000 HC RX 637 (ALT 250 FOR IP): Performed by: INTERNAL MEDICINE

## 2019-08-12 PROCEDURE — C1894 INTRO/SHEATH, NON-LASER: HCPCS

## 2019-08-12 PROCEDURE — 2580000003 HC RX 258

## 2019-08-12 PROCEDURE — 6360000002 HC RX W HCPCS

## 2019-08-12 PROCEDURE — 6360000004 HC RX CONTRAST MEDICATION: Performed by: INTERNAL MEDICINE

## 2019-08-12 PROCEDURE — 2500000003 HC RX 250 WO HCPCS

## 2019-08-12 RX ORDER — ACETAMINOPHEN 325 MG/1
650 TABLET ORAL EVERY 4 HOURS PRN
Status: CANCELLED | OUTPATIENT
Start: 2019-08-12

## 2019-08-12 RX ORDER — LABETALOL 20 MG/4 ML (5 MG/ML) INTRAVENOUS SYRINGE
10 EVERY 30 MIN PRN
Status: CANCELLED | OUTPATIENT
Start: 2019-08-12

## 2019-08-12 RX ORDER — ASPIRIN 81 MG/1
81 TABLET, CHEWABLE ORAL ONCE
Status: COMPLETED | OUTPATIENT
Start: 2019-08-12 | End: 2019-08-12

## 2019-08-12 RX ORDER — SODIUM CHLORIDE 9 MG/ML
INJECTION, SOLUTION INTRAVENOUS CONTINUOUS
Status: DISCONTINUED | OUTPATIENT
Start: 2019-08-12 | End: 2019-08-12 | Stop reason: HOSPADM

## 2019-08-12 RX ORDER — HYDRALAZINE HYDROCHLORIDE 20 MG/ML
10 INJECTION INTRAMUSCULAR; INTRAVENOUS EVERY 10 MIN PRN
Status: CANCELLED | OUTPATIENT
Start: 2019-08-12

## 2019-08-12 RX ORDER — ONDANSETRON 2 MG/ML
4 INJECTION INTRAMUSCULAR; INTRAVENOUS EVERY 6 HOURS PRN
Status: CANCELLED | OUTPATIENT
Start: 2019-08-12

## 2019-08-12 RX ORDER — SODIUM CHLORIDE 0.9 % (FLUSH) 0.9 %
10 SYRINGE (ML) INJECTION EVERY 12 HOURS SCHEDULED
Status: DISCONTINUED | OUTPATIENT
Start: 2019-08-12 | End: 2019-08-12 | Stop reason: HOSPADM

## 2019-08-12 RX ORDER — SODIUM CHLORIDE 0.9 % (FLUSH) 0.9 %
10 SYRINGE (ML) INJECTION PRN
Status: DISCONTINUED | OUTPATIENT
Start: 2019-08-12 | End: 2019-08-12 | Stop reason: HOSPADM

## 2019-08-12 RX ADMIN — IOVERSOL 70 ML: 678 INJECTION INTRA-ARTERIAL; INTRAVENOUS at 16:38

## 2019-08-12 RX ADMIN — TICAGRELOR 90 MG: 90 TABLET ORAL at 13:05

## 2019-08-12 RX ADMIN — ASPIRIN 81 MG 81 MG: 81 TABLET ORAL at 13:05

## 2019-08-12 NOTE — DISCHARGE INSTR - ACTIVITY
No Driving for 24 hours. No lifting greater than 5-8 pounds for 48 hours. May shower tomorrow and remove dressing at right wrist. Cleanse with soap and water. If bleeding, sit down and apply pressure for 15 min. May need to return to hospital.  If pain/numbness in right fingers or swelling at right wrist or large area of bruising to wrist, call Dr. Cecilio Lee at #[phone].  May need to return to hospital.

## 2019-08-12 NOTE — BRIEF OP NOTE
Section of Cardiology  Adult Brief Cardiac Cath Procedure Note        Procedure(s):  LHC, b/l coronary angio    Pre-operative Diagnosis:  Class IV angina    H&P Status: Completed and reviewed. Post-operative Diagnosis:  Patent CX stent, ostial LAD 30%, mid RCA 40%. Normal LVF, dilated aortic root. Findings:  See full report    Complications:  none    Primary Proceduralist:   Dr.Wes Vega DO    Plan    Max med rx  CTA of chest in future for dilated aortic root.        Full procedure note to follow

## 2019-08-22 ENCOUNTER — TELEPHONE (OUTPATIENT)
Dept: CARDIOLOGY CLINIC | Age: 69
End: 2019-08-22

## 2019-08-22 ENCOUNTER — OFFICE VISIT (OUTPATIENT)
Dept: GASTROENTEROLOGY | Age: 69
End: 2019-08-22
Payer: MEDICARE

## 2019-08-22 VITALS
RESPIRATION RATE: 16 BRPM | WEIGHT: 123 LBS | BODY MASS INDEX: 19.85 KG/M2 | HEART RATE: 63 BPM | OXYGEN SATURATION: 98 %

## 2019-08-22 DIAGNOSIS — K29.60 OTHER GASTRITIS WITHOUT HEMORRHAGE, UNSPECIFIED CHRONICITY: Primary | ICD-10-CM

## 2019-08-22 PROCEDURE — 99212 OFFICE O/P EST SF 10 MIN: CPT | Performed by: SPECIALIST

## 2019-08-22 RX ORDER — METRONIDAZOLE 500 MG/1
500 TABLET ORAL 2 TIMES DAILY
Qty: 28 TABLET | Refills: 0 | Status: ON HOLD | OUTPATIENT
Start: 2019-08-22 | End: 2019-09-06

## 2019-08-22 RX ORDER — AMOXICILLIN 250 MG/1
500 CAPSULE ORAL 4 TIMES DAILY
Qty: 56 CAPSULE | Refills: 0 | Status: SHIPPED | OUTPATIENT
Start: 2019-08-22 | End: 2019-09-05 | Stop reason: ALTCHOICE

## 2019-08-22 RX ORDER — OMEPRAZOLE 20 MG/1
20 CAPSULE, DELAYED RELEASE ORAL
Qty: 60 CAPSULE | Refills: 0 | Status: SHIPPED | OUTPATIENT
Start: 2019-08-22 | End: 2019-09-05 | Stop reason: SDUPTHER

## 2019-08-22 ASSESSMENT — ENCOUNTER SYMPTOMS
BLOOD IN STOOL: 0
CONSTIPATION: 0
NAUSEA: 0
DIARRHEA: 0
ABDOMINAL PAIN: 1
VOMITING: 0
EYES NEGATIVE: 1
ANAL BLEEDING: 0
ABDOMINAL DISTENTION: 0
RESPIRATORY NEGATIVE: 1
RECTAL PAIN: 0

## 2019-08-23 ENCOUNTER — OFFICE VISIT (OUTPATIENT)
Dept: UROLOGY | Age: 69
End: 2019-08-23
Payer: MEDICARE

## 2019-08-23 ENCOUNTER — OFFICE VISIT (OUTPATIENT)
Dept: CARDIOLOGY CLINIC | Age: 69
End: 2019-08-23

## 2019-08-23 VITALS
HEART RATE: 59 BPM | DIASTOLIC BLOOD PRESSURE: 70 MMHG | WEIGHT: 124 LBS | HEIGHT: 66 IN | SYSTOLIC BLOOD PRESSURE: 104 MMHG | BODY MASS INDEX: 19.93 KG/M2

## 2019-08-23 DIAGNOSIS — I25.10 CORONARY ARTERY DISEASE INVOLVING NATIVE CORONARY ARTERY OF NATIVE HEART WITHOUT ANGINA PECTORIS: Primary | ICD-10-CM

## 2019-08-23 DIAGNOSIS — N52.9 ERECTILE DYSFUNCTION, UNSPECIFIED ERECTILE DYSFUNCTION TYPE: Primary | ICD-10-CM

## 2019-08-23 PROCEDURE — 99214 OFFICE O/P EST MOD 30 MIN: CPT | Performed by: UROLOGY

## 2019-08-23 RX ORDER — CLOPIDOGREL BISULFATE 75 MG/1
75 TABLET ORAL DAILY
Qty: 90 TABLET | Refills: 1 | Status: SHIPPED | OUTPATIENT
Start: 2019-08-23 | End: 2020-04-06 | Stop reason: SDUPTHER

## 2019-08-23 RX ORDER — SILDENAFIL 50 MG/1
50 TABLET, FILM COATED ORAL PRN
Qty: 6 TABLET | Refills: 0 | Status: ON HOLD | OUTPATIENT
Start: 2019-08-23 | End: 2019-09-05

## 2019-08-23 ASSESSMENT — ENCOUNTER SYMPTOMS: ABDOMINAL PAIN: 0

## 2019-08-23 NOTE — PROGRESS NOTES
None    Intimate partner violence:     Fear of current or ex partner: None     Emotionally abused: None     Physically abused: None     Forced sexual activity: None   Other Topics Concern    None   Social History Narrative    None     Family History   Problem Relation Age of Onset    Heart Disease Maternal Aunt     Cancer Maternal Aunt      Current Outpatient Medications   Medication Sig Dispense Refill    sildenafil (VIAGRA) 50 MG tablet Take 1 tablet by mouth as needed for Erectile Dysfunction 6 tablet 0    omeprazole (PRILOSEC) 20 MG delayed release capsule Take 1 capsule by mouth 2 times daily (before meals) 60 capsule 0    ranolazine (RANEXA) 1000 MG extended release tablet Take 1 tablet by mouth 2 times daily 60 tablet 5    VORTIoxetine (TRINTELLIX) 5 MG tablet Take 5 mg by mouth daily      ticagrelor (BRILINTA) 90 MG TABS tablet Take 90 mg by mouth 2 times daily      aspirin 81 MG tablet Take 81 mg by mouth daily      omeprazole (PRILOSEC) 20 MG delayed release capsule Take 1 capsule by mouth every morning (before breakfast) 30 capsule 5    LEVETIRACETAM PO Take by mouth      latanoprost (XALATAN) 0.005 % ophthalmic solution Use 1 Drop in both eyes daily at bedtime.  nitroGLYCERIN (NITROSTAT) 0.4 MG SL tablet up to max of 3 total doses.  If no relief after 1 dose, call 911. 25 tablet 0    olopatadine (PATADAY) 0.2 % SOLN ophthalmic solution Apply 1 drop to eye daily For allergies 1 Bottle 1    Blood Glucose Monitoring Suppl (ONETOUCH VERIO FLEX SYSTEM) w/Device KIT       ONETOUCH VERIO strip       ONETOUCH DELICA LANCETS 22B MISC       magnesium oxide (MAG-OX) 400 MG tablet Take 400 mg by mouth daily      Cholecalciferol (VITAMIN D) 2000 units CAPS capsule Take 2,000 Units by mouth daily      amoxicillin (AMOXIL) 250 MG capsule Take 2 capsules by mouth 4 times daily for 14 days (Patient not taking: Reported on 8/23/2019) 56 capsule 0    metroNIDAZOLE (FLAGYL) 500 MG tablet Take 1 REFERENCE INTERVAL: Sex Hormone Binding Globulin   Access complete set of age- and/or gender-specific reference intervals   for   this test in the [facility] Test Directory ([employer]). Testing Performed By     ChemaGeoffrey Vacay Robyn Name Director Address Valid Date Range   [facility]          Assessment: This is a 76 yo male with h/o DM, CAD with recent stent on Brilinta (Dr Sandeep Osorio),  HTN, Anxiety/depression and ED with normal testosterone. I again reviewed the available treatment options for ED in detail including risks/benefits of oral ED meds, BRADLEY, Penile Injections, Muse and Penile Prothesis. He wants to try Viagra and will start at 25 mg dosing. The proper dosing and SE of Viagra were reviewed including but not limited to facial flushing, headache, stomach upset, priapism, blind and deaf and pt wants to proceed. He knows not to use Viagra and NTG at the same time as well. He will call for a long term script. Will have him seen at [facility] office today for the SOB. I spent 25 minutes of which > 50% of the visit was spent counseling and coordinating care wrt the ED and treatment options. Plan:      1. F/U prn  2.    Orders Placed This Encounter   Medications    sildenafil (VIAGRA) 50 MG tablet     Sig: Take 1 tablet by mouth as needed for Erectile Dysfunction     Dispense:  6 tablet     Refill:  0             Zina Raygoza MD

## 2019-09-04 ENCOUNTER — TELEPHONE (OUTPATIENT)
Dept: GASTROENTEROLOGY | Age: 69
End: 2019-09-04

## 2019-09-05 ENCOUNTER — HOSPITAL ENCOUNTER (OUTPATIENT)
Age: 69
Setting detail: OBSERVATION
Discharge: HOME | End: 2019-09-06
Attending: STUDENT IN AN ORGANIZED HEALTH CARE EDUCATION/TRAINING PROGRAM | Admitting: INTERNAL MEDICINE
Payer: MEDICARE

## 2019-09-05 ENCOUNTER — APPOINTMENT (OUTPATIENT)
Dept: CT IMAGING | Age: 69
End: 2019-09-05
Payer: MEDICARE

## 2019-09-05 DIAGNOSIS — R06.09 EXERTIONAL DYSPNEA: Primary | ICD-10-CM

## 2019-09-05 DIAGNOSIS — I20.8 ANGINAL EQUIVALENT (HCC): ICD-10-CM

## 2019-09-05 PROBLEM — I20.89 ANGINAL EQUIVALENT: Status: ACTIVE | Noted: 2019-09-05

## 2019-09-05 LAB
ALBUMIN SERPL-MCNC: 4 G/DL (ref 3.5–4.6)
ALP BLD-CCNC: 59 U/L (ref 35–104)
ALT SERPL-CCNC: 13 U/L (ref 0–41)
ANION GAP SERPL CALCULATED.3IONS-SCNC: 11 MEQ/L (ref 9–15)
APTT: 30.1 SEC (ref 24.4–36.8)
AST SERPL-CCNC: 13 U/L (ref 0–40)
BASOPHILS ABSOLUTE: 0.1 K/UL (ref 0–0.2)
BASOPHILS RELATIVE PERCENT: 1.3 %
BILIRUB SERPL-MCNC: 0.8 MG/DL (ref 0.2–0.7)
BUN BLDV-MCNC: 24 MG/DL (ref 8–23)
C-REACTIVE PROTEIN, HIGH SENSITIVITY: 0.5 MG/L (ref 0–5)
CALCIUM SERPL-MCNC: 9.2 MG/DL (ref 8.5–9.9)
CHLORIDE BLD-SCNC: 108 MEQ/L (ref 95–107)
CHOLESTEROL, TOTAL: 161 MG/DL (ref 0–199)
CO2: 19 MEQ/L (ref 20–31)
CREAT SERPL-MCNC: 1.3 MG/DL (ref 0.7–1.2)
EOSINOPHILS ABSOLUTE: 0.1 K/UL (ref 0–0.7)
EOSINOPHILS RELATIVE PERCENT: 1.3 %
GFR AFRICAN AMERICAN: >60
GFR AFRICAN AMERICAN: >60
GFR NON-AFRICAN AMERICAN: 50
GFR NON-AFRICAN AMERICAN: 54.4
GLOBULIN: 3.2 G/DL (ref 2.3–3.5)
GLUCOSE BLD-MCNC: 93 MG/DL (ref 70–99)
HCT VFR BLD CALC: 42.9 % (ref 42–52)
HDLC SERPL-MCNC: 37 MG/DL (ref 40–59)
HEMOGLOBIN: 15.1 G/DL (ref 14–18)
INR BLD: 1.1
LACTIC ACID: 1.5 MMOL/L (ref 0.5–2.2)
LDL CHOLESTEROL CALCULATED: 84 MG/DL (ref 0–129)
LYMPHOCYTES ABSOLUTE: 1.3 K/UL (ref 1–4.8)
LYMPHOCYTES RELATIVE PERCENT: 27.4 %
MAGNESIUM: 2.1 MG/DL (ref 1.7–2.4)
MCH RBC QN AUTO: 31.5 PG (ref 27–31.3)
MCHC RBC AUTO-ENTMCNC: 35.2 % (ref 33–37)
MCV RBC AUTO: 89.4 FL (ref 80–100)
MONOCYTES ABSOLUTE: 0.4 K/UL (ref 0.2–0.8)
MONOCYTES RELATIVE PERCENT: 7.9 %
NEUTROPHILS ABSOLUTE: 3 K/UL (ref 1.4–6.5)
NEUTROPHILS RELATIVE PERCENT: 62.1 %
PDW BLD-RTO: 14.3 % (ref 11.5–14.5)
PERFORMED ON: ABNORMAL
PLATELET # BLD: 185 K/UL (ref 130–400)
POC CREATININE WHOLE BLOOD: 1.4
POC CREATININE: 1.4 MG/DL (ref 0.8–1.3)
POC SAMPLE TYPE: ABNORMAL
POTASSIUM SERPL-SCNC: 3.8 MEQ/L (ref 3.4–4.9)
PRO-BNP: 138 PG/ML
PRO-BNP: 149 PG/ML
PROTHROMBIN TIME: 14.3 SEC (ref 12.3–14.9)
RBC # BLD: 4.8 M/UL (ref 4.7–6.1)
SODIUM BLD-SCNC: 138 MEQ/L (ref 135–144)
TOTAL CK: 54 U/L (ref 0–190)
TOTAL PROTEIN: 7.2 G/DL (ref 6.3–8)
TRIGL SERPL-MCNC: 199 MG/DL (ref 0–150)
TROPONIN: <0.01 NG/ML (ref 0–0.01)
TSH SERPL DL<=0.05 MIU/L-ACNC: 3.06 UIU/ML (ref 0.44–3.86)
WBC # BLD: 4.8 K/UL (ref 4.8–10.8)

## 2019-09-05 PROCEDURE — 93005 ELECTROCARDIOGRAM TRACING: CPT | Performed by: STUDENT IN AN ORGANIZED HEALTH CARE EDUCATION/TRAINING PROGRAM

## 2019-09-05 PROCEDURE — 71275 CT ANGIOGRAPHY CHEST: CPT

## 2019-09-05 PROCEDURE — G0378 HOSPITAL OBSERVATION PER HR: HCPCS

## 2019-09-05 PROCEDURE — 2580000003 HC RX 258: Performed by: INTERNAL MEDICINE

## 2019-09-05 PROCEDURE — 83880 ASSAY OF NATRIURETIC PEPTIDE: CPT

## 2019-09-05 PROCEDURE — 6370000000 HC RX 637 (ALT 250 FOR IP): Performed by: INTERNAL MEDICINE

## 2019-09-05 PROCEDURE — 99215 OFFICE O/P EST HI 40 MIN: CPT | Performed by: INTERNAL MEDICINE

## 2019-09-05 PROCEDURE — 82550 ASSAY OF CK (CPK): CPT

## 2019-09-05 PROCEDURE — 84484 ASSAY OF TROPONIN QUANT: CPT

## 2019-09-05 PROCEDURE — 6360000002 HC RX W HCPCS: Performed by: STUDENT IN AN ORGANIZED HEALTH CARE EDUCATION/TRAINING PROGRAM

## 2019-09-05 PROCEDURE — 85730 THROMBOPLASTIN TIME PARTIAL: CPT

## 2019-09-05 PROCEDURE — 96372 THER/PROPH/DIAG INJ SC/IM: CPT

## 2019-09-05 PROCEDURE — 86141 C-REACTIVE PROTEIN HS: CPT

## 2019-09-05 PROCEDURE — 83735 ASSAY OF MAGNESIUM: CPT

## 2019-09-05 PROCEDURE — 85610 PROTHROMBIN TIME: CPT

## 2019-09-05 PROCEDURE — 99285 EMERGENCY DEPT VISIT HI MDM: CPT

## 2019-09-05 PROCEDURE — 87040 BLOOD CULTURE FOR BACTERIA: CPT

## 2019-09-05 PROCEDURE — 6360000004 HC RX CONTRAST MEDICATION: Performed by: STUDENT IN AN ORGANIZED HEALTH CARE EDUCATION/TRAINING PROGRAM

## 2019-09-05 PROCEDURE — 36415 COLL VENOUS BLD VENIPUNCTURE: CPT

## 2019-09-05 PROCEDURE — 83605 ASSAY OF LACTIC ACID: CPT

## 2019-09-05 PROCEDURE — 80053 COMPREHEN METABOLIC PANEL: CPT

## 2019-09-05 PROCEDURE — 80061 LIPID PANEL: CPT

## 2019-09-05 PROCEDURE — 85025 COMPLETE CBC W/AUTO DIFF WBC: CPT

## 2019-09-05 PROCEDURE — 6370000000 HC RX 637 (ALT 250 FOR IP): Performed by: STUDENT IN AN ORGANIZED HEALTH CARE EDUCATION/TRAINING PROGRAM

## 2019-09-05 PROCEDURE — 84443 ASSAY THYROID STIM HORMONE: CPT

## 2019-09-05 RX ORDER — CHLORAL HYDRATE 500 MG
3000 CAPSULE ORAL EVERY OTHER DAY
Status: DISCONTINUED | OUTPATIENT
Start: 2019-09-05 | End: 2019-09-06 | Stop reason: HOSPADM

## 2019-09-05 RX ORDER — LATANOPROST 50 UG/ML
1 SOLUTION/ DROPS OPHTHALMIC DAILY
Status: DISCONTINUED | OUTPATIENT
Start: 2019-09-05 | End: 2019-09-06 | Stop reason: HOSPADM

## 2019-09-05 RX ORDER — CLOPIDOGREL BISULFATE 75 MG/1
75 TABLET ORAL DAILY
Status: DISCONTINUED | OUTPATIENT
Start: 2019-09-05 | End: 2019-09-06 | Stop reason: HOSPADM

## 2019-09-05 RX ORDER — ATORVASTATIN CALCIUM 20 MG/1
20 TABLET, FILM COATED ORAL NIGHTLY
Status: DISCONTINUED | OUTPATIENT
Start: 2019-09-05 | End: 2019-09-06 | Stop reason: HOSPADM

## 2019-09-05 RX ORDER — ONDANSETRON 2 MG/ML
4 INJECTION INTRAMUSCULAR; INTRAVENOUS EVERY 6 HOURS PRN
Status: DISCONTINUED | OUTPATIENT
Start: 2019-09-05 | End: 2019-09-06 | Stop reason: HOSPADM

## 2019-09-05 RX ORDER — SODIUM CHLORIDE 0.9 % (FLUSH) 0.9 %
10 SYRINGE (ML) INJECTION PRN
Status: DISCONTINUED | OUTPATIENT
Start: 2019-09-05 | End: 2019-09-06 | Stop reason: HOSPADM

## 2019-09-05 RX ORDER — CLONAZEPAM 0.5 MG/1
0.5 TABLET ORAL 2 TIMES DAILY PRN
Status: DISCONTINUED | OUTPATIENT
Start: 2019-09-05 | End: 2019-09-06 | Stop reason: HOSPADM

## 2019-09-05 RX ORDER — ASPIRIN 81 MG/1
81 TABLET, CHEWABLE ORAL DAILY
Status: DISCONTINUED | OUTPATIENT
Start: 2019-09-06 | End: 2019-09-06 | Stop reason: HOSPADM

## 2019-09-05 RX ORDER — CHLORAL HYDRATE 500 MG
3000 CAPSULE ORAL EVERY OTHER DAY
COMMUNITY
End: 2022-05-06 | Stop reason: ALTCHOICE

## 2019-09-05 RX ORDER — SODIUM CHLORIDE 0.9 % (FLUSH) 0.9 %
10 SYRINGE (ML) INJECTION EVERY 12 HOURS SCHEDULED
Status: DISCONTINUED | OUTPATIENT
Start: 2019-09-05 | End: 2019-09-06 | Stop reason: HOSPADM

## 2019-09-05 RX ORDER — RANOLAZINE 500 MG/1
500 TABLET, EXTENDED RELEASE ORAL 2 TIMES DAILY
Status: DISCONTINUED | OUTPATIENT
Start: 2019-09-05 | End: 2019-09-06 | Stop reason: HOSPADM

## 2019-09-05 RX ORDER — OLOPATADINE HYDROCHLORIDE 1 MG/ML
1 SOLUTION/ DROPS OPHTHALMIC DAILY
Status: DISCONTINUED | OUTPATIENT
Start: 2019-09-05 | End: 2019-09-06 | Stop reason: HOSPADM

## 2019-09-05 RX ORDER — NITROGLYCERIN 0.4 MG/1
0.4 TABLET SUBLINGUAL EVERY 5 MIN PRN
Status: DISCONTINUED | OUTPATIENT
Start: 2019-09-05 | End: 2019-09-06 | Stop reason: HOSPADM

## 2019-09-05 RX ORDER — METRONIDAZOLE 500 MG/1
500 TABLET ORAL 2 TIMES DAILY
Status: DISCONTINUED | OUTPATIENT
Start: 2019-09-05 | End: 2019-09-06 | Stop reason: HOSPADM

## 2019-09-05 RX ORDER — NITROGLYCERIN 0.4 MG/1
0.4 TABLET SUBLINGUAL ONCE
Status: COMPLETED | OUTPATIENT
Start: 2019-09-05 | End: 2019-09-05

## 2019-09-05 RX ADMIN — ENOXAPARIN SODIUM 60 MG: 60 INJECTION SUBCUTANEOUS at 13:56

## 2019-09-05 RX ADMIN — RANOLAZINE 500 MG: 500 TABLET, EXTENDED RELEASE ORAL at 20:46

## 2019-09-05 RX ADMIN — NITROGLYCERIN 0.4 MG: 0.4 TABLET, ORALLY DISINTEGRATING SUBLINGUAL at 13:30

## 2019-09-05 RX ADMIN — Medication 10 ML: at 20:46

## 2019-09-05 RX ADMIN — IOPAMIDOL 100 ML: 612 INJECTION, SOLUTION INTRAVENOUS at 12:01

## 2019-09-05 RX ADMIN — CLONAZEPAM 0.5 MG: 0.5 TABLET ORAL at 20:46

## 2019-09-05 RX ADMIN — METRONIDAZOLE 500 MG: 500 TABLET ORAL at 20:46

## 2019-09-05 ASSESSMENT — PAIN DESCRIPTION - DESCRIPTORS: DESCRIPTORS: ACHING

## 2019-09-05 ASSESSMENT — ENCOUNTER SYMPTOMS
STRIDOR: 0
COUGH: 0
CHEST TIGHTNESS: 1
SHORTNESS OF BREATH: 0
DIARRHEA: 0
ABDOMINAL PAIN: 0
NAUSEA: 0
TROUBLE SWALLOWING: 0
BLOOD IN STOOL: 0
SHORTNESS OF BREATH: 1
RESPIRATORY NEGATIVE: 1
CHEST TIGHTNESS: 0
GASTROINTESTINAL NEGATIVE: 1
SINUS PRESSURE: 0
VOMITING: 0
WHEEZING: 0
BACK PAIN: 0
EYES NEGATIVE: 1

## 2019-09-05 ASSESSMENT — PAIN DESCRIPTION - FREQUENCY: FREQUENCY: INTERMITTENT

## 2019-09-05 ASSESSMENT — PAIN DESCRIPTION - ONSET: ONSET: ON-GOING

## 2019-09-05 ASSESSMENT — PAIN DESCRIPTION - PAIN TYPE: TYPE: ACUTE PAIN

## 2019-09-05 ASSESSMENT — PAIN DESCRIPTION - LOCATION: LOCATION: CHEST

## 2019-09-05 ASSESSMENT — PAIN DESCRIPTION - PROGRESSION: CLINICAL_PROGRESSION: GRADUALLY WORSENING

## 2019-09-05 ASSESSMENT — PAIN DESCRIPTION - ORIENTATION: ORIENTATION: LEFT;RIGHT

## 2019-09-05 ASSESSMENT — PAIN SCALES - GENERAL: PAINLEVEL_OUTOF10: 7

## 2019-09-05 ASSESSMENT — HEART SCORE: ECG: 1

## 2019-09-05 NOTE — H&P
 Stress: Not on file   Relationships    Social connections:     Talks on phone: Not on file     Gets together: Not on file     Attends Caodaism service: Not on file     Active member of club or organization: Not on file     Attends meetings of clubs or organizations: Not on file     Relationship status: Not on file    Intimate partner violence:     Fear of current or ex partner: Not on file     Emotionally abused: Not on file     Physically abused: Not on file     Forced sexual activity: Not on file   Other Topics Concern    Not on file   Social History Narrative    Not on file       Family Hx:  Family History   Problem Relation Age of Onset    Heart Disease Maternal Aunt     Cancer Maternal Aunt        Allergies   Allergen Reactions    Seroquel [Quetiapine] Other (See Comments)     lethargy  weak       Current Facility-Administered Medications   Medication Dose Route Frequency Provider Last Rate Last Dose    magnesium oxide (MAG-OX) tablet 400 mg  400 mg Oral Daily Grant Escalera MD        vitamin D (CHOLECALCIFEROL) tablet 2,000 Units  2,000 Units Oral Daily Grant Escalera MD        olopatadine (PATANOL) 0.1 % ophthalmic solution 1 drop  1 drop Ophthalmic Daily Grant Escalera MD        clonazePAM Anmol Fred) tablet 0.5 mg  0.5 mg Oral BID PRN Grant Escalera MD        latanoprost (XALATAN) 0.005 % ophthalmic solution 1 drop  1 drop Both Eyes Daily Grant Escalera MD        ranolazine [facility]) extended release tablet 500 mg  500 mg Oral BID Grant Escalera MD        metroNIDAZOLE (FLAGYL) tablet 500 mg  500 mg Oral BID Grant Escalera MD        clopidogrel (PLAVIX) tablet 75 mg  75 mg Oral Daily Grant Escalera MD        fish oil capsule 3,000 mg  3,000 mg Oral Every Other Day Grant Escalera MD        sodium chloride flush 0.9 % injection 10 mL  10 mL Intravenous 2 times per day Grant Escalera MD        sodium chloride flush 0.9 % injection 10 mL  10 mL Intravenous PRN Grant Escalera MD        magnesium hydroxide (MILK OF MAGNESIA) 400 MG/5ML suspension 30 mL  30 mL Oral Daily PRN Constantin Gallagher MD        ondansetron [facility]) injection 4 mg  4 mg Intravenous Q6H PRN Constantin Gallagher MD        atorvastatin (LIPITOR) tablet 20 mg  20 mg Oral Nightly Constantin Gallagher MD        [START ON 9/6/2019] aspirin chewable tablet 81 mg  81 mg Oral Daily Constantin Gallagher MD        nitroGLYCERIN (NITROSTAT) SL tablet 0.4 mg  0.4 mg Sublingual Q5 Min PRN Constantin Gallagher MD        enoxaparin (LOVENOX) injection 60 mg  1 mg/kg Subcutaneous BID Constantin Gallagher MD           Review of Systems:   Review of Systems   Constitutional: Negative. Negative for diaphoresis and fatigue. HENT: Negative. Eyes: Negative. Respiratory: Negative. Negative for cough, chest tightness, shortness of breath, wheezing and stridor. Cardiovascular: Positive for chest pain. Negative for palpitations and leg swelling. Gastrointestinal: Negative. Negative for blood in stool and nausea. Genitourinary: Negative. Musculoskeletal: Negative. Skin: Negative. Neurological: Negative. Negative for dizziness, syncope, weakness and light-headedness. Hematological: Negative. Psychiatric/Behavioral: Positive for behavioral problems. Physical Examination:    /87   Pulse 61   Temp 98.2 °F (36.8 °C) (Oral)   Resp 18   Ht 5' 2.5\" (1.588 m)   Wt 130 lb (59 kg)   SpO2 100%   BMI 23.40 kg/m²    Physical Exam   Constitutional: He appears healthy. No distress. HENT:   Normal cephalic and Atraumatic   Eyes: Pupils are equal, round, and reactive to light. Neck: Normal range of motion and thyroid normal. Neck supple. No JVD present. No neck adenopathy. No thyromegaly present. Cardiovascular: Normal rate, regular rhythm, intact distal pulses and normal pulses. Murmur heard. Pulmonary/Chest: Effort normal and breath sounds normal. He has no wheezes. He has no rales. He exhibits no tenderness. Abdominal: Soft.  Bowel sounds are normal. There is no tenderness. Musculoskeletal: Normal range of motion. He exhibits no edema or tenderness. Neurological: He is alert and oriented to person, place, and time. Skin: Skin is warm. No cyanosis. Nails show no clubbing.          LABS:  CBC:   Lab Results   Component Value Date    WBC 4.8 09/05/2019    RBC 4.80 09/05/2019    HGB 15.1 09/05/2019    HCT 42.9 09/05/2019    MCV 89.4 09/05/2019    MCH 31.5 09/05/2019    MCHC 35.2 09/05/2019    RDW 14.3 09/05/2019     09/05/2019     CBC with Differential:    Lab Results   Component Value Date    WBC 4.8 09/05/2019    RBC 4.80 09/05/2019    HGB 15.1 09/05/2019    HCT 42.9 09/05/2019     09/05/2019    MCV 89.4 09/05/2019    MCH 31.5 09/05/2019    MCHC 35.2 09/05/2019    RDW 14.3 09/05/2019    LYMPHOPCT 27.4 09/05/2019    MONOPCT 7.9 09/05/2019    BASOPCT 1.3 09/05/2019    MONOSABS 0.4 09/05/2019    LYMPHSABS 1.3 09/05/2019    EOSABS 0.1 09/05/2019    BASOSABS 0.1 09/05/2019     CMP:    Lab Results   Component Value Date     09/05/2019    K 3.8 09/05/2019    K 4.1 01/27/2019     09/05/2019    CO2 19 09/05/2019    BUN 24 09/05/2019    CREATININE 1.30 09/05/2019    GFRAA >60.0 09/05/2019    LABGLOM 54.4 09/05/2019    GLUCOSE 93 09/05/2019    PROT 7.2 09/05/2019    LABALBU 4.0 09/05/2019    CALCIUM 9.2 09/05/2019    BILITOT 0.8 09/05/2019    ALKPHOS 59 09/05/2019    AST 13 09/05/2019    ALT 13 09/05/2019     BMP:    Lab Results   Component Value Date     09/05/2019    K 3.8 09/05/2019    K 4.1 01/27/2019     09/05/2019    CO2 19 09/05/2019    BUN 24 09/05/2019    LABALBU 4.0 09/05/2019    CREATININE 1.30 09/05/2019    CALCIUM 9.2 09/05/2019    GFRAA >60.0 09/05/2019    LABGLOM 54.4 09/05/2019    GLUCOSE 93 09/05/2019     Magnesium:    Lab Results   Component Value Date    MG 2.1 09/05/2019     Troponin:    Lab Results   Component Value Date    TROPONINI <0.010 09/05/2019       Active Hospital Problems    Diagnosis Date Noted    Anginal equivalent ([facility] [address] [I20.8] 09/05/2019     Priority: Low        Assessment/Plan:  1. CP- assess for ACS  2. CAD CX FERNANDO Mild ostial LAD and Mid RCA 40%  3. Relative Sandra Deal and therefore no   4. EF normal 60%   5. Anxiety  6.  HTN stable      Electronically signed by Claudia Silva MD on 9/5/2019 at 5:10 PM

## 2019-09-06 VITALS
SYSTOLIC BLOOD PRESSURE: 123 MMHG | OXYGEN SATURATION: 99 % | RESPIRATION RATE: 18 BRPM | WEIGHT: 117.5 LBS | BODY MASS INDEX: 20.82 KG/M2 | HEART RATE: 66 BPM | DIASTOLIC BLOOD PRESSURE: 74 MMHG | HEIGHT: 63 IN | TEMPERATURE: 98 F

## 2019-09-06 LAB
CHOLESTEROL, TOTAL: 156 MG/DL (ref 0–199)
EKG ATRIAL RATE: 61 BPM
EKG ATRIAL RATE: 66 BPM
EKG P AXIS: 67 DEGREES
EKG P AXIS: 69 DEGREES
EKG P-R INTERVAL: 192 MS
EKG P-R INTERVAL: 204 MS
EKG Q-T INTERVAL: 442 MS
EKG Q-T INTERVAL: 486 MS
EKG QRS DURATION: 102 MS
EKG QRS DURATION: 96 MS
EKG QTC CALCULATION (BAZETT): 463 MS
EKG QTC CALCULATION (BAZETT): 489 MS
EKG R AXIS: 74 DEGREES
EKG R AXIS: 76 DEGREES
EKG T AXIS: 76 DEGREES
EKG T AXIS: 87 DEGREES
EKG VENTRICULAR RATE: 61 BPM
EKG VENTRICULAR RATE: 66 BPM
GLUCOSE BLD-MCNC: 90 MG/DL (ref 60–115)
HCT VFR BLD CALC: 41.8 % (ref 42–52)
HDLC SERPL-MCNC: 35 MG/DL (ref 40–59)
HEMOGLOBIN: 14.3 G/DL (ref 14–18)
LDL CHOLESTEROL CALCULATED: 99 MG/DL (ref 0–129)
MAGNESIUM: 2 MG/DL (ref 1.7–2.4)
MCH RBC QN AUTO: 31.4 PG (ref 27–31.3)
MCHC RBC AUTO-ENTMCNC: 34.3 % (ref 33–37)
MCV RBC AUTO: 91.7 FL (ref 80–100)
PDW BLD-RTO: 14.5 % (ref 11.5–14.5)
PERFORMED ON: NORMAL
PLATELET # BLD: 176 K/UL (ref 130–400)
RBC # BLD: 4.55 M/UL (ref 4.7–6.1)
TRIGL SERPL-MCNC: 109 MG/DL (ref 0–150)
WBC # BLD: 4.6 K/UL (ref 4.8–10.8)

## 2019-09-06 PROCEDURE — 6370000000 HC RX 637 (ALT 250 FOR IP): Performed by: INTERNAL MEDICINE

## 2019-09-06 PROCEDURE — 99217 PR OBSERVATION CARE DISCHARGE MANAGEMENT: CPT | Performed by: INTERNAL MEDICINE

## 2019-09-06 PROCEDURE — 6360000002 HC RX W HCPCS: Performed by: INTERNAL MEDICINE

## 2019-09-06 PROCEDURE — 36415 COLL VENOUS BLD VENIPUNCTURE: CPT

## 2019-09-06 PROCEDURE — G0378 HOSPITAL OBSERVATION PER HR: HCPCS

## 2019-09-06 PROCEDURE — 83735 ASSAY OF MAGNESIUM: CPT

## 2019-09-06 PROCEDURE — 2580000003 HC RX 258: Performed by: INTERNAL MEDICINE

## 2019-09-06 PROCEDURE — 93005 ELECTROCARDIOGRAM TRACING: CPT | Performed by: INTERNAL MEDICINE

## 2019-09-06 PROCEDURE — 96372 THER/PROPH/DIAG INJ SC/IM: CPT

## 2019-09-06 PROCEDURE — 80061 LIPID PANEL: CPT

## 2019-09-06 PROCEDURE — 85027 COMPLETE CBC AUTOMATED: CPT

## 2019-09-06 RX ORDER — CALCIUM CARBONATE 200(500)MG
500 TABLET,CHEWABLE ORAL 3 TIMES DAILY PRN
Status: DISCONTINUED | OUTPATIENT
Start: 2019-09-06 | End: 2019-09-06 | Stop reason: HOSPADM

## 2019-09-06 RX ORDER — METRONIDAZOLE 500 MG/1
500 TABLET ORAL 2 TIMES DAILY
Qty: 28 TABLET | Refills: 0 | Status: SHIPPED | OUTPATIENT
Start: 2019-09-06 | End: 2019-09-20

## 2019-09-06 RX ORDER — ATORVASTATIN CALCIUM 20 MG/1
20 TABLET, FILM COATED ORAL NIGHTLY
Qty: 30 TABLET | Refills: 3 | Status: SHIPPED | OUTPATIENT
Start: 2019-09-06 | End: 2019-09-06 | Stop reason: SDUPTHER

## 2019-09-06 RX ORDER — PANTOPRAZOLE SODIUM 40 MG/1
40 TABLET, DELAYED RELEASE ORAL
Status: DISCONTINUED | OUTPATIENT
Start: 2019-09-06 | End: 2019-09-06 | Stop reason: HOSPADM

## 2019-09-06 RX ADMIN — ENOXAPARIN SODIUM 60 MG: 40 INJECTION SUBCUTANEOUS at 05:24

## 2019-09-06 RX ADMIN — RANOLAZINE 500 MG: 500 TABLET, EXTENDED RELEASE ORAL at 08:52

## 2019-09-06 RX ADMIN — CLONAZEPAM 0.5 MG: 0.5 TABLET ORAL at 09:09

## 2019-09-06 RX ADMIN — ASPIRIN 81 MG 81 MG: 81 TABLET ORAL at 08:57

## 2019-09-06 RX ADMIN — PANTOPRAZOLE SODIUM 40 MG: 40 TABLET, DELAYED RELEASE ORAL at 05:25

## 2019-09-06 RX ADMIN — CLOPIDOGREL BISULFATE 75 MG: 75 TABLET ORAL at 08:53

## 2019-09-06 RX ADMIN — Medication 10 ML: at 08:53

## 2019-09-06 RX ADMIN — MAGNESIUM OXIDE TAB 400 MG (241.3 MG ELEMENTAL MG) 400 MG: 400 (241.3 MG) TAB at 08:57

## 2019-09-06 RX ADMIN — VITAMIN D, TAB 1000IU (100/BT) 2000 UNITS: 25 TAB at 08:57

## 2019-09-06 RX ADMIN — ANTACID TABLETS 500 MG: 500 TABLET, CHEWABLE ORAL at 01:49

## 2019-09-06 RX ADMIN — METRONIDAZOLE 500 MG: 500 TABLET ORAL at 08:52

## 2019-09-06 NOTE — DISCHARGE SUMMARY
tablet by mouth daily  Qty: 90 tablet, Refills: 1    Associated Diagnoses: Coronary artery disease involving native coronary artery of native heart without angina pectoris      ranolazine (RANEXA) 1000 MG extended release tablet Take 1 tablet by mouth 2 times daily  Qty: 60 tablet, Refills: 5      omeprazole (PRILOSEC) 20 MG delayed release capsule Take 1 capsule by mouth every morning (before breakfast)  Qty: 30 capsule, Refills: 5      latanoprost (XALATAN) 0.005 % ophthalmic solution Use 1 Drop in both eyes daily at bedtime. olopatadine (PATADAY) 0.2 % SOLN ophthalmic solution Apply 1 drop to eye daily For allergies  Qty: 1 Bottle, Refills: 1    Associated Diagnoses: Allergic conjunctivitis and rhinitis, bilateral      Blood Glucose Monitoring Suppl (ONETOUCH VERIO FLEX SYSTEM) w/Device KIT       ONETOUCH VERIO strip       ONETOUCH DELICA LANCETS 41R MISC       magnesium oxide (MAG-OX) 400 MG tablet Take 400 mg by mouth daily      Cholecalciferol (VITAMIN D) 2000 units CAPS capsule Take 5,000 Units by mouth daily       VORTIoxetine (TRINTELLIX) 5 MG tablet Take 5 mg by mouth daily      aspirin 81 MG tablet Take 81 mg by mouth daily      LEVETIRACETAM PO Take by mouth      nitroGLYCERIN (NITROSTAT) 0.4 MG SL tablet up to max of 3 total doses. If no relief after 1 dose, call 911. Qty: 25 tablet, Refills: 0      clonazePAM (KLONOPIN) 0.5 MG tablet Take 1 tablet by mouth 2 times daily as needed for Anxiety for up to 15 days. Carley Adam: 30 tablet, Refills: 0    Associated Diagnoses: SANDIP (generalized anxiety disorder);  Insomnia secondary to anxiety; SOB (shortness of breath)         STOP taking these medications       metroNIDAZOLE (FLAGYL) 500 MG tablet Comments:   Reason for Stopping:         sildenafil (VIAGRA) 50 MG tablet Comments:   Reason for Stopping:               Significant Diagnostics:   Radiology: Cta Chest W Wo Contrast    Result Date: 9/5/2019  EXAM: CT of the chest with contrast History: Shortness of

## 2019-09-06 NOTE — FLOWSHEET NOTE
PM assessment completed. VSS. Patient denies complaints of chest pain or shortness of breath. Patient resting in bed. Voices no needs at this time.

## 2019-09-06 NOTE — PROGRESS NOTES
Nutrition Education    Type and Reason for Visit: Consult, Patient Education    Nutrition Assessment:  Pt request : diabetes. Pt reports he was diagnosed with diabetes, but has been able to keep his glucose controlled by diet. Reports recent HgbA1C < 6, however, he has lost ~30# due to his dietary restricions. Reports SMBG 3 x day at home with readings < 120, unless he ' over-eats' beans and rice. Discussed goal of 3-4 smaller meals per day and proper portion control of CHO containing foods, discussed the addition of heart healthy fats ( oilve oil, avacodo, nuts/seeds/nut butters) adn lean proteins to increases calories. Pt eats a very healthful diet, and seeks out nutrition information to make healthier choices. Reviewed label reading guidlines, and encouraged to eat 45 -60 g CHO per meal, Pt previously eating < 30 g CHO. Questions answered. Malnutrition assessment negative, d/c planned later today    · Verbally reviewed information with pt: ' diabetes meal planning guidelines ' ~ 1800 kcals for weight gain  · Written educational materials provided. · Contact name and number provided. · Refer to Patient Education activity for more details.     Electronically signed by Akhil Jenkins RD, LD on 9/6/19 at 11:50 AM

## 2019-09-08 PROCEDURE — 93010 ELECTROCARDIOGRAM REPORT: CPT | Performed by: INTERNAL MEDICINE

## 2019-09-10 LAB
BLOOD CULTURE, ROUTINE: NORMAL
CULTURE, BLOOD 2: NORMAL

## 2019-10-01 ENCOUNTER — HOSPITAL ENCOUNTER (OUTPATIENT)
Dept: GENERAL RADIOLOGY | Age: 69
Discharge: HOME | End: 2019-10-03
Payer: MEDICARE

## 2019-10-01 DIAGNOSIS — R63.4 ABNORMAL WEIGHT LOSS: ICD-10-CM

## 2019-10-01 DIAGNOSIS — K29.70 HELICOBACTER PYLORI GASTRITIS: Primary | ICD-10-CM

## 2019-10-01 DIAGNOSIS — B96.81 HELICOBACTER PYLORI GASTRITIS: Primary | ICD-10-CM

## 2019-10-01 PROCEDURE — 71046 X-RAY EXAM CHEST 2 VIEWS: CPT

## 2019-10-02 ENCOUNTER — TELEPHONE (OUTPATIENT)
Dept: OPERATING ROOM | Age: 69
End: 2019-10-02

## 2019-10-02 DIAGNOSIS — B96.81 HELICOBACTER PYLORI GASTRITIS: ICD-10-CM

## 2019-10-02 DIAGNOSIS — K29.70 HELICOBACTER PYLORI GASTRITIS: ICD-10-CM

## 2019-10-05 LAB — H PYLORI ANTIGEN STOOL: NEGATIVE

## 2019-10-11 ENCOUNTER — TELEPHONE (OUTPATIENT)
Dept: CARDIOLOGY CLINIC | Age: 69
End: 2019-10-11

## 2019-10-11 ENCOUNTER — TELEPHONE (OUTPATIENT)
Dept: UROLOGY | Age: 69
End: 2019-10-11

## 2019-10-11 RX ORDER — SILDENAFIL 100 MG/1
100 TABLET, FILM COATED ORAL PRN
Qty: 30 TABLET | Refills: 0 | Status: ON HOLD | OUTPATIENT
Start: 2019-10-11 | End: 2019-10-17

## 2019-10-17 ENCOUNTER — HOSPITAL ENCOUNTER (OUTPATIENT)
Age: 69
Setting detail: OUTPATIENT SURGERY
Discharge: HOME | End: 2019-10-17
Attending: SPECIALIST | Admitting: SPECIALIST
Payer: MEDICARE

## 2019-10-17 ENCOUNTER — ANESTHESIA EVENT (OUTPATIENT)
Dept: ENDOSCOPY | Age: 69
End: 2019-10-17
Payer: MEDICARE

## 2019-10-17 ENCOUNTER — ANESTHESIA (OUTPATIENT)
Dept: ENDOSCOPY | Age: 69
End: 2019-10-17
Payer: MEDICARE

## 2019-10-17 VITALS
HEART RATE: 60 BPM | BODY MASS INDEX: 20.16 KG/M2 | WEIGHT: 121 LBS | SYSTOLIC BLOOD PRESSURE: 116 MMHG | TEMPERATURE: 98.5 F | DIASTOLIC BLOOD PRESSURE: 78 MMHG | HEIGHT: 65 IN | OXYGEN SATURATION: 97 % | RESPIRATION RATE: 16 BRPM

## 2019-10-17 VITALS
OXYGEN SATURATION: 100 % | DIASTOLIC BLOOD PRESSURE: 66 MMHG | TEMPERATURE: 97.7 F | RESPIRATION RATE: 10 BRPM | SYSTOLIC BLOOD PRESSURE: 116 MMHG

## 2019-10-17 PROCEDURE — 45385 COLONOSCOPY W/LESION REMOVAL: CPT | Performed by: SPECIALIST

## 2019-10-17 PROCEDURE — 7100000010 HC PHASE II RECOVERY - FIRST 15 MIN: Performed by: SPECIALIST

## 2019-10-17 PROCEDURE — 3700000000 HC ANESTHESIA ATTENDED CARE: Performed by: SPECIALIST

## 2019-10-17 PROCEDURE — 7100000011 HC PHASE II RECOVERY - ADDTL 15 MIN: Performed by: SPECIALIST

## 2019-10-17 PROCEDURE — 2500000003 HC RX 250 WO HCPCS: Performed by: NURSE ANESTHETIST, CERTIFIED REGISTERED

## 2019-10-17 PROCEDURE — 2580000003 HC RX 258: Performed by: SPECIALIST

## 2019-10-17 PROCEDURE — 3609027000 HC COLONOSCOPY: Performed by: SPECIALIST

## 2019-10-17 PROCEDURE — 88305 TISSUE EXAM BY PATHOLOGIST: CPT

## 2019-10-17 PROCEDURE — 6360000002 HC RX W HCPCS: Performed by: NURSE ANESTHETIST, CERTIFIED REGISTERED

## 2019-10-17 PROCEDURE — 3700000001 HC ADD 15 MINUTES (ANESTHESIA): Performed by: SPECIALIST

## 2019-10-17 RX ORDER — SODIUM CHLORIDE 9 MG/ML
INJECTION, SOLUTION INTRAVENOUS CONTINUOUS
Status: DISCONTINUED | OUTPATIENT
Start: 2019-10-17 | End: 2019-10-17 | Stop reason: HOSPADM

## 2019-10-17 RX ORDER — LIDOCAINE HYDROCHLORIDE 10 MG/ML
1 INJECTION, SOLUTION EPIDURAL; INFILTRATION; INTRACAUDAL; PERINEURAL
Status: DISCONTINUED | OUTPATIENT
Start: 2019-10-17 | End: 2019-10-17 | Stop reason: HOSPADM

## 2019-10-17 RX ORDER — SODIUM CHLORIDE 0.9 % (FLUSH) 0.9 %
10 SYRINGE (ML) INJECTION PRN
Status: DISCONTINUED | OUTPATIENT
Start: 2019-10-17 | End: 2019-10-17 | Stop reason: HOSPADM

## 2019-10-17 RX ORDER — PROPOFOL 10 MG/ML
INJECTION, EMULSION INTRAVENOUS PRN
Status: DISCONTINUED | OUTPATIENT
Start: 2019-10-17 | End: 2019-10-17 | Stop reason: SDUPTHER

## 2019-10-17 RX ORDER — SODIUM CHLORIDE 0.9 % (FLUSH) 0.9 %
10 SYRINGE (ML) INJECTION EVERY 12 HOURS SCHEDULED
Status: DISCONTINUED | OUTPATIENT
Start: 2019-10-17 | End: 2019-10-17 | Stop reason: HOSPADM

## 2019-10-17 RX ORDER — LIDOCAINE HYDROCHLORIDE 20 MG/ML
INJECTION, SOLUTION INFILTRATION; PERINEURAL PRN
Status: DISCONTINUED | OUTPATIENT
Start: 2019-10-17 | End: 2019-10-17 | Stop reason: SDUPTHER

## 2019-10-17 RX ADMIN — SODIUM CHLORIDE: 9 INJECTION, SOLUTION INTRAVENOUS at 08:40

## 2019-10-17 RX ADMIN — PROPOFOL 50 MG: 10 INJECTION, EMULSION INTRAVENOUS at 09:34

## 2019-10-17 RX ADMIN — PROPOFOL 50 MG: 10 INJECTION, EMULSION INTRAVENOUS at 09:42

## 2019-10-17 RX ADMIN — LIDOCAINE HYDROCHLORIDE 50 MG: 20 INJECTION, SOLUTION INFILTRATION; PERINEURAL at 09:27

## 2019-10-17 RX ADMIN — PROPOFOL 100 MG: 10 INJECTION, EMULSION INTRAVENOUS at 09:27

## 2019-10-17 ASSESSMENT — PAIN - FUNCTIONAL ASSESSMENT: PAIN_FUNCTIONAL_ASSESSMENT: 0-10

## 2019-11-04 ENCOUNTER — OFFICE VISIT (OUTPATIENT)
Dept: GASTROENTEROLOGY | Age: 69
End: 2019-11-04
Payer: COMMERCIAL

## 2019-11-04 VITALS
OXYGEN SATURATION: 97 % | WEIGHT: 121 LBS | DIASTOLIC BLOOD PRESSURE: 72 MMHG | HEIGHT: 66 IN | HEART RATE: 65 BPM | SYSTOLIC BLOOD PRESSURE: 118 MMHG | BODY MASS INDEX: 19.44 KG/M2

## 2019-11-04 DIAGNOSIS — R10.13 EPIGASTRIC PAIN: Primary | ICD-10-CM

## 2019-11-04 PROCEDURE — 99213 OFFICE O/P EST LOW 20 MIN: CPT | Performed by: SPECIALIST

## 2019-11-04 ASSESSMENT — ENCOUNTER SYMPTOMS
ABDOMINAL PAIN: 1
ANAL BLEEDING: 0
VOMITING: 0
RECTAL PAIN: 0
NAUSEA: 0
BLOOD IN STOOL: 0
CONSTIPATION: 0
DIARRHEA: 0
ABDOMINAL DISTENTION: 0
EYES NEGATIVE: 1
RESPIRATORY NEGATIVE: 1

## 2019-11-11 ENCOUNTER — HOSPITAL ENCOUNTER (OUTPATIENT)
Dept: ULTRASOUND IMAGING | Age: 69
Discharge: HOME | End: 2019-11-13
Payer: COMMERCIAL

## 2019-11-11 ENCOUNTER — HOSPITAL ENCOUNTER (OUTPATIENT)
Dept: GENERAL RADIOLOGY | Age: 69
Discharge: HOME | End: 2019-11-13
Payer: COMMERCIAL

## 2019-11-11 DIAGNOSIS — R10.13 EPIGASTRIC PAIN: ICD-10-CM

## 2019-11-11 DIAGNOSIS — R63.4 LOSS OF WEIGHT: ICD-10-CM

## 2019-11-11 LAB
ALBUMIN SERPL-MCNC: 4.5 G/DL (ref 3.5–4.6)
ALP BLD-CCNC: 57 U/L (ref 35–104)
ALT SERPL-CCNC: 8 U/L (ref 0–41)
AMYLASE: 67 U/L (ref 22–93)
AST SERPL-CCNC: 9 U/L (ref 0–40)
BILIRUB SERPL-MCNC: 0.9 MG/DL (ref 0.2–0.7)
BILIRUBIN DIRECT: <0.2 MG/DL (ref 0–0.4)
BILIRUBIN, INDIRECT: ABNORMAL MG/DL (ref 0–0.6)
LIPASE: 19 U/L (ref 12–95)
TOTAL PROTEIN: 7.1 G/DL (ref 6.3–8)

## 2019-11-11 PROCEDURE — 71046 X-RAY EXAM CHEST 2 VIEWS: CPT

## 2019-11-11 PROCEDURE — 76705 ECHO EXAM OF ABDOMEN: CPT

## 2019-11-15 ENCOUNTER — OFFICE VISIT (OUTPATIENT)
Dept: CARDIOLOGY CLINIC | Age: 69
End: 2019-11-15
Payer: COMMERCIAL

## 2019-11-15 VITALS
DIASTOLIC BLOOD PRESSURE: 80 MMHG | WEIGHT: 122.2 LBS | HEART RATE: 59 BPM | BODY MASS INDEX: 19.72 KG/M2 | OXYGEN SATURATION: 97 % | SYSTOLIC BLOOD PRESSURE: 110 MMHG

## 2019-11-15 DIAGNOSIS — R07.9 CHEST PAIN, UNSPECIFIED TYPE: Primary | ICD-10-CM

## 2019-11-15 PROCEDURE — G8427 DOCREV CUR MEDS BY ELIG CLIN: HCPCS | Performed by: INTERNAL MEDICINE

## 2019-11-15 PROCEDURE — G8484 FLU IMMUNIZE NO ADMIN: HCPCS | Performed by: INTERNAL MEDICINE

## 2019-11-15 PROCEDURE — 99213 OFFICE O/P EST LOW 20 MIN: CPT | Performed by: INTERNAL MEDICINE

## 2019-11-15 PROCEDURE — G8598 ASA/ANTIPLAT THER USED: HCPCS | Performed by: INTERNAL MEDICINE

## 2019-11-15 PROCEDURE — 3017F COLORECTAL CA SCREEN DOC REV: CPT | Performed by: INTERNAL MEDICINE

## 2019-11-15 PROCEDURE — G8420 CALC BMI NORM PARAMETERS: HCPCS | Performed by: INTERNAL MEDICINE

## 2019-11-15 PROCEDURE — 1036F TOBACCO NON-USER: CPT | Performed by: INTERNAL MEDICINE

## 2019-11-15 PROCEDURE — 1123F ACP DISCUSS/DSCN MKR DOCD: CPT | Performed by: INTERNAL MEDICINE

## 2019-11-15 PROCEDURE — 4040F PNEUMOC VAC/ADMIN/RCVD: CPT | Performed by: INTERNAL MEDICINE

## 2019-11-27 ENCOUNTER — TELEPHONE (OUTPATIENT)
Dept: CARDIOLOGY CLINIC | Age: 69
End: 2019-11-27

## 2019-11-27 ENCOUNTER — OFFICE VISIT (OUTPATIENT)
Dept: UROLOGY | Age: 69
End: 2019-11-27
Payer: COMMERCIAL

## 2019-11-27 VITALS
BODY MASS INDEX: 20.33 KG/M2 | WEIGHT: 122 LBS | SYSTOLIC BLOOD PRESSURE: 120 MMHG | HEART RATE: 68 BPM | HEIGHT: 65 IN | DIASTOLIC BLOOD PRESSURE: 62 MMHG

## 2019-11-27 DIAGNOSIS — N52.9 ERECTILE DYSFUNCTION, UNSPECIFIED ERECTILE DYSFUNCTION TYPE: Primary | ICD-10-CM

## 2019-11-27 PROCEDURE — 99214 OFFICE O/P EST MOD 30 MIN: CPT | Performed by: UROLOGY

## 2019-11-27 PROCEDURE — G8598 ASA/ANTIPLAT THER USED: HCPCS | Performed by: UROLOGY

## 2019-11-27 PROCEDURE — 3017F COLORECTAL CA SCREEN DOC REV: CPT | Performed by: UROLOGY

## 2019-11-27 PROCEDURE — 1123F ACP DISCUSS/DSCN MKR DOCD: CPT | Performed by: UROLOGY

## 2019-11-27 PROCEDURE — 1036F TOBACCO NON-USER: CPT | Performed by: UROLOGY

## 2019-11-27 PROCEDURE — G8427 DOCREV CUR MEDS BY ELIG CLIN: HCPCS | Performed by: UROLOGY

## 2019-11-27 PROCEDURE — 4040F PNEUMOC VAC/ADMIN/RCVD: CPT | Performed by: UROLOGY

## 2019-11-27 PROCEDURE — G8484 FLU IMMUNIZE NO ADMIN: HCPCS | Performed by: UROLOGY

## 2019-11-27 PROCEDURE — G8420 CALC BMI NORM PARAMETERS: HCPCS | Performed by: UROLOGY

## 2019-11-27 ASSESSMENT — ENCOUNTER SYMPTOMS: SHORTNESS OF BREATH: 0

## 2019-11-30 ENCOUNTER — APPOINTMENT (OUTPATIENT)
Dept: GENERAL RADIOLOGY | Age: 69
End: 2019-11-30
Payer: COMMERCIAL

## 2019-11-30 ENCOUNTER — HOSPITAL ENCOUNTER (EMERGENCY)
Age: 69
Discharge: HOME | End: 2019-11-30
Attending: EMERGENCY MEDICINE
Payer: COMMERCIAL

## 2019-11-30 VITALS
TEMPERATURE: 96.1 F | OXYGEN SATURATION: 99 % | SYSTOLIC BLOOD PRESSURE: 131 MMHG | WEIGHT: 122 LBS | HEART RATE: 68 BPM | HEIGHT: 61 IN | DIASTOLIC BLOOD PRESSURE: 74 MMHG | RESPIRATION RATE: 16 BRPM | BODY MASS INDEX: 23.03 KG/M2

## 2019-11-30 DIAGNOSIS — F41.1 ANXIETY STATE: ICD-10-CM

## 2019-11-30 DIAGNOSIS — R07.89 CHEST WALL PAIN: Primary | ICD-10-CM

## 2019-11-30 LAB
ALBUMIN SERPL-MCNC: 4.4 G/DL (ref 3.5–4.6)
ALP BLD-CCNC: 63 U/L (ref 35–104)
ALT SERPL-CCNC: 10 U/L (ref 0–41)
ANION GAP SERPL CALCULATED.3IONS-SCNC: 12 MEQ/L (ref 9–15)
AST SERPL-CCNC: 10 U/L (ref 0–40)
BASOPHILS ABSOLUTE: 0.1 K/UL (ref 0–0.2)
BASOPHILS RELATIVE PERCENT: 0.9 %
BILIRUB SERPL-MCNC: 0.7 MG/DL (ref 0.2–0.7)
BUN BLDV-MCNC: 23 MG/DL (ref 8–23)
CALCIUM SERPL-MCNC: 9.6 MG/DL (ref 8.5–9.9)
CHLORIDE BLD-SCNC: 106 MEQ/L (ref 95–107)
CO2: 23 MEQ/L (ref 20–31)
CREAT SERPL-MCNC: 1.24 MG/DL (ref 0.7–1.2)
EKG ATRIAL RATE: 64 BPM
EKG P AXIS: 83 DEGREES
EKG P-R INTERVAL: 184 MS
EKG Q-T INTERVAL: 432 MS
EKG QRS DURATION: 92 MS
EKG QTC CALCULATION (BAZETT): 445 MS
EKG R AXIS: 74 DEGREES
EKG T AXIS: 75 DEGREES
EKG VENTRICULAR RATE: 64 BPM
EOSINOPHILS ABSOLUTE: 0.1 K/UL (ref 0–0.7)
EOSINOPHILS RELATIVE PERCENT: 1.8 %
GFR AFRICAN AMERICAN: >60
GFR NON-AFRICAN AMERICAN: 57.4
GLOBULIN: 2.8 G/DL (ref 2.3–3.5)
GLUCOSE BLD-MCNC: 107 MG/DL (ref 70–99)
HCT VFR BLD CALC: 44.6 % (ref 42–52)
HEMOGLOBIN: 15.4 G/DL (ref 14–18)
LYMPHOCYTES ABSOLUTE: 1.2 K/UL (ref 1–4.8)
LYMPHOCYTES RELATIVE PERCENT: 20.7 %
MCH RBC QN AUTO: 32 PG (ref 27–31.3)
MCHC RBC AUTO-ENTMCNC: 34.6 % (ref 33–37)
MCV RBC AUTO: 92.3 FL (ref 80–100)
MONOCYTES ABSOLUTE: 0.4 K/UL (ref 0.2–0.8)
MONOCYTES RELATIVE PERCENT: 7 %
NEUTROPHILS ABSOLUTE: 4.1 K/UL (ref 1.4–6.5)
NEUTROPHILS RELATIVE PERCENT: 69.6 %
PDW BLD-RTO: 13.5 % (ref 11.5–14.5)
PLATELET # BLD: 188 K/UL (ref 130–400)
POTASSIUM SERPL-SCNC: 4.2 MEQ/L (ref 3.4–4.9)
RBC # BLD: 4.83 M/UL (ref 4.7–6.1)
SODIUM BLD-SCNC: 141 MEQ/L (ref 135–144)
TOTAL PROTEIN: 7.2 G/DL (ref 6.3–8)
TROPONIN: <0.01 NG/ML (ref 0–0.01)
WBC # BLD: 5.9 K/UL (ref 4.8–10.8)

## 2019-11-30 PROCEDURE — 80053 COMPREHEN METABOLIC PANEL: CPT

## 2019-11-30 PROCEDURE — 6370000000 HC RX 637 (ALT 250 FOR IP): Performed by: EMERGENCY MEDICINE

## 2019-11-30 PROCEDURE — 6360000002 HC RX W HCPCS: Performed by: EMERGENCY MEDICINE

## 2019-11-30 PROCEDURE — 85025 COMPLETE CBC W/AUTO DIFF WBC: CPT

## 2019-11-30 PROCEDURE — 36415 COLL VENOUS BLD VENIPUNCTURE: CPT

## 2019-11-30 PROCEDURE — 99284 EMERGENCY DEPT VISIT MOD MDM: CPT

## 2019-11-30 PROCEDURE — 84484 ASSAY OF TROPONIN QUANT: CPT

## 2019-11-30 PROCEDURE — 96375 TX/PRO/DX INJ NEW DRUG ADDON: CPT

## 2019-11-30 PROCEDURE — 71045 X-RAY EXAM CHEST 1 VIEW: CPT

## 2019-11-30 PROCEDURE — 96374 THER/PROPH/DIAG INJ IV PUSH: CPT

## 2019-11-30 RX ORDER — AZITHROMYCIN 500 MG/1
500 TABLET, FILM COATED ORAL ONCE
Status: COMPLETED | OUTPATIENT
Start: 2019-11-30 | End: 2019-11-30

## 2019-11-30 RX ORDER — LORAZEPAM 2 MG/ML
0.5 INJECTION INTRAMUSCULAR ONCE
Status: COMPLETED | OUTPATIENT
Start: 2019-11-30 | End: 2019-11-30

## 2019-11-30 RX ORDER — ALPRAZOLAM 0.25 MG/1
0.25 TABLET ORAL 3 TIMES DAILY PRN
Qty: 8 TABLET | Refills: 0 | Status: SHIPPED | OUTPATIENT
Start: 2019-11-30 | End: 2019-12-30

## 2019-11-30 RX ORDER — METHYLPREDNISOLONE SODIUM SUCCINATE 125 MG/2ML
40 INJECTION, POWDER, LYOPHILIZED, FOR SOLUTION INTRAMUSCULAR; INTRAVENOUS ONCE
Status: COMPLETED | OUTPATIENT
Start: 2019-11-30 | End: 2019-11-30

## 2019-11-30 RX ORDER — SODIUM CHLORIDE 0.9 % (FLUSH) 0.9 %
3 SYRINGE (ML) INJECTION EVERY 8 HOURS
Status: DISCONTINUED | OUTPATIENT
Start: 2019-11-30 | End: 2019-11-30 | Stop reason: HOSPADM

## 2019-11-30 RX ADMIN — AZITHROMYCIN MONOHYDRATE 500 MG: 500 TABLET ORAL at 15:04

## 2019-11-30 RX ADMIN — METHYLPREDNISOLONE SODIUM SUCCINATE 40 MG: 125 INJECTION, POWDER, FOR SOLUTION INTRAMUSCULAR; INTRAVENOUS at 14:54

## 2019-11-30 RX ADMIN — LORAZEPAM 0.5 MG: 2 INJECTION INTRAMUSCULAR; INTRAVENOUS at 14:54

## 2019-12-02 ENCOUNTER — OFFICE VISIT (OUTPATIENT)
Dept: GASTROENTEROLOGY | Age: 69
End: 2019-12-02
Payer: COMMERCIAL

## 2019-12-02 VITALS
HEIGHT: 65 IN | BODY MASS INDEX: 20.33 KG/M2 | OXYGEN SATURATION: 98 % | HEART RATE: 71 BPM | RESPIRATION RATE: 12 BRPM | WEIGHT: 122 LBS

## 2019-12-02 DIAGNOSIS — K59.04 CHRONIC IDIOPATHIC CONSTIPATION: Primary | ICD-10-CM

## 2019-12-02 PROCEDURE — 4040F PNEUMOC VAC/ADMIN/RCVD: CPT | Performed by: SPECIALIST

## 2019-12-02 PROCEDURE — G8482 FLU IMMUNIZE ORDER/ADMIN: HCPCS | Performed by: SPECIALIST

## 2019-12-02 PROCEDURE — 3017F COLORECTAL CA SCREEN DOC REV: CPT | Performed by: SPECIALIST

## 2019-12-02 PROCEDURE — 1123F ACP DISCUSS/DSCN MKR DOCD: CPT | Performed by: SPECIALIST

## 2019-12-02 PROCEDURE — 99212 OFFICE O/P EST SF 10 MIN: CPT | Performed by: SPECIALIST

## 2019-12-02 PROCEDURE — 1036F TOBACCO NON-USER: CPT | Performed by: SPECIALIST

## 2019-12-02 PROCEDURE — G8427 DOCREV CUR MEDS BY ELIG CLIN: HCPCS | Performed by: SPECIALIST

## 2019-12-02 PROCEDURE — G8598 ASA/ANTIPLAT THER USED: HCPCS | Performed by: SPECIALIST

## 2019-12-02 PROCEDURE — G8420 CALC BMI NORM PARAMETERS: HCPCS | Performed by: SPECIALIST

## 2019-12-02 ASSESSMENT — ENCOUNTER SYMPTOMS
NAUSEA: 0
RECTAL PAIN: 0
DIARRHEA: 0
ABDOMINAL PAIN: 0
EYES NEGATIVE: 1
ABDOMINAL DISTENTION: 0
ANAL BLEEDING: 0
VOMITING: 0
RESPIRATORY NEGATIVE: 1
BLOOD IN STOOL: 0
CONSTIPATION: 1

## 2020-01-31 RX ORDER — RANOLAZINE 500 MG/1
TABLET, EXTENDED RELEASE ORAL
Qty: 60 TABLET | Refills: 5 | Status: SHIPPED | OUTPATIENT
Start: 2020-01-31 | End: 2020-11-23

## 2020-02-28 ENCOUNTER — OFFICE VISIT (OUTPATIENT)
Dept: CARDIOLOGY CLINIC | Age: 70
End: 2020-02-28
Payer: COMMERCIAL

## 2020-02-28 VITALS
OXYGEN SATURATION: 98 % | SYSTOLIC BLOOD PRESSURE: 110 MMHG | WEIGHT: 122.4 LBS | HEART RATE: 64 BPM | DIASTOLIC BLOOD PRESSURE: 70 MMHG | BODY MASS INDEX: 20.37 KG/M2

## 2020-02-28 PROCEDURE — G8482 FLU IMMUNIZE ORDER/ADMIN: HCPCS | Performed by: INTERNAL MEDICINE

## 2020-02-28 PROCEDURE — 1123F ACP DISCUSS/DSCN MKR DOCD: CPT | Performed by: INTERNAL MEDICINE

## 2020-02-28 PROCEDURE — G8427 DOCREV CUR MEDS BY ELIG CLIN: HCPCS | Performed by: INTERNAL MEDICINE

## 2020-02-28 PROCEDURE — G8420 CALC BMI NORM PARAMETERS: HCPCS | Performed by: INTERNAL MEDICINE

## 2020-02-28 PROCEDURE — 99213 OFFICE O/P EST LOW 20 MIN: CPT | Performed by: INTERNAL MEDICINE

## 2020-02-28 PROCEDURE — 1036F TOBACCO NON-USER: CPT | Performed by: INTERNAL MEDICINE

## 2020-02-28 PROCEDURE — 4040F PNEUMOC VAC/ADMIN/RCVD: CPT | Performed by: INTERNAL MEDICINE

## 2020-02-28 PROCEDURE — 3017F COLORECTAL CA SCREEN DOC REV: CPT | Performed by: INTERNAL MEDICINE

## 2020-02-28 RX ORDER — DOXEPIN HYDROCHLORIDE 6 MG/1
TABLET ORAL 3 TIMES DAILY PRN
Status: ON HOLD | COMMUNITY
End: 2021-09-12 | Stop reason: HOSPADM

## 2020-02-28 NOTE — PROGRESS NOTES
Chief Complaint   Patient presents with   Kathey Channel     P.A.EDY.       3-30-18: Patient is a 69 y.o. male who presents with a chief complaint of chest pain. Patient is followed on a regular basis by Dr. Dunia Franklin primary care provider on file. . States he felt a lump in his chest and a burning sensation in his esophagus while at home resting. Relived with klonopin he states. His BP was high on arrival in 170's. Denies any associated SOB, N/V, or diaphoresis. No hx of MI, CHF or arrhythmia. Top are negative and EKG without any acute ischemia. Hx of [facility] in 1970's he states. CP free at this time. Does admit to having DM, HTN and HLP. No smoking. 4-27-18: Patient presents for initial medical evaluation. Patient is followed on a regular basis by Dr. Carmen Cui. Having extreme anxiety and panic attack now. He was seen in ER yesterday and was sent to [facility]. He was not given any meds. Has not slept in 4 days. He says only klonopin works for him. Feels a heaviness in his chest that is terrible per patient, says he almost passed out. His wife is very sick and states he cannot stay in the hospital.     s/p normal ECHO    s/p nuclear stress test.    IMPRESSION:  1. Slightly reduced exercise tolerance. 2.  Homogenous myocardial perfusion with no evidence of prior myocardial  infarction or ischemia. 3.  Normal left ventricular ejection fraction at 66%. 4.  TID ratio 0.9 which is within normal limits. 5-11-18: was sent to ER last office visit for anxiety. Was placed on meds and feeling a little better overall. Was also placed on Imdur and gave him a bid headache and left arm pain, so he stopped it. No more CP. Pt denies chest pain, dyspnea, dyspnea on exertion, change in exercise capacity, fatigue,  nausea, vomiting, diarrhea, constipation, motor weakness, insomnia, weight loss, syncope, dizziness, lightheadedness, palpitations, PND, orthopnea, or claudication. BP and hr are good.   No LE discoloration or ulcers. No LE edema. No CHF type symptoms. Lipid profile is normal. No recent hospitalization. No change in meds. 1-24-19: was in ER yesterday for midsternal chest pain, which was relieved with nitro and ASA. Does have anxiety as well, takes Klonopin for that. Pain occurred at rest, lasting more than 20 min. Had re occurrence of his CP today on the way over to my office. He gets tired with walking. Some SOB. + hx of DM, HTN and HLP. Pt denies nausea, vomiting, diarrhea, constipation, motor weakness, insomnia, weight loss, syncope, dizziness, lightheadedness, palpitations, PND, orthopnea, or claudication. no hx of smoking. 2-15-19: s/p LHC with PCI of 90% CX stenosis. Mild LAD and RCA disease, normal LVF. On DAPT, no bleeding issues. Pt denies chest pain, dyspnea, dyspnea on exertion, change in exercise capacity, fatigue,  nausea, vomiting, diarrhea, constipation, motor weakness, insomnia, weight loss, syncope, dizziness, lightheadedness, palpitations, PND, orthopnea. No nitro use. BP and hr are good. CAD is stable. No LE discoloration or ulcers. No LE edema. No CHF type symptoms. Lipid profile is normal. No recent hospitalization. No change in meds. Seeing an alternative medicine physician. Patient has underlying anxiety. 5-10-19: has anxiety issues. Does have some atypical CP from time to time. On Brilinta, not taking ASA. Pt denies dyspnea, dyspnea on exertion, change in exercise capacity, fatigue,  nausea, vomiting, diarrhea, constipation, motor weakness, insomnia, weight loss, syncope, dizziness, lightheadedness, palpitations, PND, orthopnea, or claudication. No nitro use. BP and hr are good. CAD is stable. No LE discoloration or ulcers. No LE edema. No CHF type symptoms. Lipid profile is normal. No recent hospitalization. No change in meds.        1115-19:  68-year-old male who is very well-known to our cardiac service presented to the emergency Department complaint left-sided chest pain atypical in nature states is been in the left side of his chest for about 3 days constantly states at 9 out of 10 on the pain scale. He is extremely anxious he states that he is not sure if it does heart orifice anxiety. He's got a history of CAD, diabetes, hypertension, dyslipidemia, anxiety. His last ejection fraction year ago was normal.  He denies fever, chills, PND, thymic, claudications. He recently back in February had a stent placed to his circumflex artery      6-25-19: s/p recent hospitalization for CP, was thought to be related to anxiety and treated medically. On Brilinta, not taking aspirin. Was tried on Livalo but denied by insurance. Not on Ranexa. 8-6-19: s/p normal nuclear stress test. States he continues to have CP that concerns him. Was in the ER and noted to have HR of 40's. Was taking too much klonopin. States he continues to have CP despite taking his meds ranexa. Feels like a discomfort/tightness. Pt denies dyspnea, dyspnea on exertion, change in exercise capacity, fatigue, nausea, vomiting, diarrhea, constipation, motor weakness, insomnia, weight loss, syncope, dizziness, lightheadedness, palpitations, PND, orthopnea, or claudication. BP and hr are good. No LE discoloration or ulcers. No LE edema. No CHF type symptoms. Lipid profile is normal.     11-15-19: Pt denies chest pain, dyspnea, dyspnea on exertion, change in exercise capacity, fatigue,  nausea, vomiting, diarrhea, constipation, motor weakness, insomnia, weight loss, syncope, dizziness, lightheadedness, palpitations, PND, orthopnea, or claudication. No nitro use. BP and hr are good. CAD is stable. No LE discoloration or ulcers. No LE edema. No CHF type symptoms. Lipid profile is normal. No recent hospitalization. No change in meds. Hx of stents. On DAPT. S/p endoscope which was negative. S/p LHC in 8/19 with patent CX stents, 30% ostial LAD, mid RCA 40%.  Normal LVF      2-28-20: was told he has PAD by home visiting nurse Not on file   Tobacco Use    Smoking status: Never Smoker    Smokeless tobacco: Never Used   Substance and Sexual Activity    Alcohol use: No    Drug use: Never    Sexual activity: Not on file   Lifestyle    Physical activity:     Days per week: Not on file     Minutes per session: Not on file    Stress: Not on file   Relationships    Social connections:     Talks on phone: Not on file     Gets together: Not on file     Attends [employer] service: Not on file     Active member of club or organization: Not on file     Attends meetings of clubs or organizations: Not on file     Relationship status: Not on file    Intimate partner violence:     Fear of current or ex partner: Not on file     Emotionally abused: Not on file     Physically abused: Not on file     Forced sexual activity: Not on file   Other Topics Concern    Not on file   Social History Narrative    Not on file       Family History   Problem Relation Age of Onset    Heart Disease Maternal Aunt     Cancer Maternal Aunt     Colon Cancer Maternal Aunt        Current Outpatient Medications   Medication Sig Dispense Refill    Doxepin HCl (SILENOR) 6 MG TABS Take by mouth nightly      ranolazine (RANEXA) 500 MG extended release tablet TAKE 1 TABLET BY MOUTH TWICE DAILY 60 tablet 5    Omega-3 Fatty Acids (FISH OIL) 1000 MG CAPS Take 3,000 mg by mouth every other day      clopidogrel (PLAVIX) 75 MG tablet Take 1 tablet by mouth daily 90 tablet 1    ranolazine (RANEXA) 1000 MG extended release tablet Take 1 tablet by mouth 2 times daily 60 tablet 5    VORTIoxetine (TRINTELLIX) 5 MG tablet Take 5 mg by mouth daily      omeprazole (PRILOSEC) 20 MG delayed release capsule Take 1 capsule by mouth every morning (before breakfast) 30 capsule 5    latanoprost (XALATAN) 0.005 % ophthalmic solution Use 1 Drop in both eyes daily at bedtime.  nitroGLYCERIN (NITROSTAT) 0.4 MG SL tablet up to max of 3 total doses. If no relief after 1 dose, call 911. peripheral pulses normal, no pedal edema, no clubbing or cyanosis  Skin - normal coloration and turgor, no rashes, no suspicious skin lesions noted      EKG: normal sinus rhythm, nonspecific ST and T waves changes    No orders of the defined types were placed in this encounter. ASSESSMENT:     Diagnosis Orders   1. Coronary artery disease involving native coronary artery of native heart without angina pectoris     2. Essential hypertension           PLAN:     Patient will need to continue to follow up with you for their general medical care     As always, aggressive risk factor modification is strongly recommended. We should adhere to the JNC VIII guidelines for HTN management and the NCEP ATP III guidelines for LDL-C management. Cardiac diet is always recommended with low fat, cholesterol, calories and sodium. Will continue to monitor patient clinically, if symptoms develop or worsen, they are to let me know ASAP or head to the nearest emergeny room. Continue medications at current doses. TAKE ASA as well.     zocor 10mg daily. Stressed importance of adhering to DAPT for one year. If he stops he is aware of high risk of stent thrombosis and death. Will continue to monitor patient clinically, if symptoms develop or worsen, they are to let me know ASAP or head to the nearest emergeny room. Patient was advised and encouraged to check blood pressure at home or at a pharmacy, maintain a logbook, and also call us back if blood pressure are above the target ranges or if it is low. Patient clearly understands and agrees to the instructions. We will need to continue to monitor muscle and liver enzymes, BUN, CR, and electrolytes. Details of medical condition explained and patient was warned about adverse consequences of uncontrolled medical conditions and possible side effects of prescribed medications. Chief Complaint   Patient presents with   Luis Eduardo SPENCE 3-30-18: Patient is a 69 y.o. male who presents with a chief complaint of chest pain. Patient is followed on a regular basis by Dr. Lee Whatley primary care provider on file. . States he felt a lump in his chest and a burning sensation in his esophagus while at home resting. Relived with klonopin he states. His BP was high on arrival in 170's. Denies any associated SOB, N/V, or diaphoresis. No hx of MI, CHF or arrhythmia. Top are negative and EKG without any acute ischemia. Hx of [facility] in 1970's he states. CP free at this time. Does admit to having DM, HTN and HLP. No smoking. 4-27-18: Patient presents for initial medical evaluation. Patient is followed on a regular basis by Dr. Heather Tirado. Having extreme anxiety and panic attack now. He was seen in ER yesterday and was sent to [facility]. He was not given any meds. Has not slept in 4 days. He says only klonopin works for him. Feels a heaviness in his chest that is terrible per patient, says he almost passed out. His wife is very sick and states he cannot stay in the hospital.     s/p normal ECHO    s/p nuclear stress test.    IMPRESSION:  1. Slightly reduced exercise tolerance. 2.  Homogenous myocardial perfusion with no evidence of prior myocardial  infarction or ischemia. 3.  Normal left ventricular ejection fraction at 66%. 4.  TID ratio 0.9 which is within normal limits. 5-11-18: was sent to ER last office visit for anxiety. Was placed on meds and feeling a little better overall. Was also placed on Imdur and gave him a bid headache and left arm pain, so he stopped it. No more CP. Pt denies chest pain, dyspnea, dyspnea on exertion, change in exercise capacity, fatigue,  nausea, vomiting, diarrhea, constipation, motor weakness, insomnia, weight loss, syncope, dizziness, lightheadedness, palpitations, PND, orthopnea, or claudication. BP and hr are good. No LE discoloration or ulcers. No LE edema. No CHF type symptoms.  Lipid profile is normal. No recent hospitalization. No change in meds. 1-24-19: was in ER yesterday for midsternal chest pain, which was relieved with nitro and ASA. Does have anxiety as well, takes Klonopin for that. Pain occurred at rest, lasting more than 20 min. Had re occurrence of his CP today on the way over to my office. He gets tired with walking. Some SOB. + hx of DM, HTN and HLP. Pt denies nausea, vomiting, diarrhea, constipation, motor weakness, insomnia, weight loss, syncope, dizziness, lightheadedness, palpitations, PND, orthopnea, or claudication. no hx of smoking. 2-15-19: s/p LHC with PCI of 90% CX stenosis. Mild LAD and RCA disease, normal LVF. On DAPT, no bleeding issues. Pt denies chest pain, dyspnea, dyspnea on exertion, change in exercise capacity, fatigue,  nausea, vomiting, diarrhea, constipation, motor weakness, insomnia, weight loss, syncope, dizziness, lightheadedness, palpitations, PND, orthopnea. No nitro use. BP and hr are good. CAD is stable. No LE discoloration or ulcers. No LE edema. No CHF type symptoms. Lipid profile is normal. No recent hospitalization. No change in meds. Seeing an alternative medicine physician. Patient has underlying anxiety. 5-10-19: has anxiety issues. Does have some atypical CP from time to time. On Brilinta, not taking ASA. Pt denies dyspnea, dyspnea on exertion, change in exercise capacity, fatigue,  nausea, vomiting, diarrhea, constipation, motor weakness, insomnia, weight loss, syncope, dizziness, lightheadedness, palpitations, PND, orthopnea, or claudication. No nitro use. BP and hr are good. CAD is stable. No LE discoloration or ulcers. No LE edema. No CHF type symptoms. Lipid profile is normal. No recent hospitalization. No change in meds.        1115-19:  68-year-old male who is very well-known to our cardiac service presented to the emergency Department complaint left-sided chest pain atypical in nature states is been in the left side of his chest for about in remission ([facility]    Angina, class IV ([facility]    Coronary artery disease involving native coronary artery of native heart without angina pectoris    Mixed obsessional thoughts and acts    Chronic gastritis without bleeding    Dyspepsia    Anginal equivalent (HCC)    Adenomatous polyp of sigmoid colon       Past Surgical History:   Procedure Laterality Date    CARDIAC SURGERY  1973    PTCA     COLONOSCOPY      COLONOSCOPY N/A 10/17/2019    COLORECTAL CANCER SCREENING, HIGH RISK performed by Bryson Farias MD at [facility] OTHER SURGICAL HISTORY      patent foramen ovale    TONSILLECTOMY      UPPER GASTROINTESTINAL ENDOSCOPY N/A 8/1/2019    EGD ESOPHAGOGASTRODUODENOSCOPY performed by Bryson Farias MD at [address] [place] [address] Marital status:      Spouse name: Not on file    Number of children: Not on file    Years of education: Not on file    Highest education level: Not on file   Occupational History    Not on file   Social Needs    Financial resource strain: Not on file    Food insecurity:     Worry: Not on file     Inability: Not on file    Transportation needs:     Medical: Not on file     Non-medical: Not on file   Tobacco Use    Smoking status: Never Smoker    Smokeless tobacco: Never Used   Substance and Sexual Activity    Alcohol use: No    Drug use: Never    Sexual activity: Not on file   Lifestyle    Physical activity:     Days per week: Not on file     Minutes per session: Not on file    Stress: Not on file   Relationships    Social connections:     Talks on phone: Not on file     Gets together: Not on file     Attends [employer] service: Not on file     Active member of club or organization: Not on file     Attends meetings of clubs or organizations: Not on file     Relationship status: Not on file    Intimate partner violence:     Fear of current this visit. Seroquel [quetiapine]    Review of Systems:  General ROS: negative  Psychological ROS: negative  Hematological and Lymphatic ROS: No history of blood clots or bleeding disorder. Respiratory ROS: no cough, shortness of breath, or wheezing  Cardiovascular ROS: positive for - chest pain  Gastrointestinal ROS: no abdominal pain, change in bowel habits, or black or bloody stools  Genito-Urinary ROS: no dysuria, trouble voiding, or hematuria  Musculoskeletal ROS: negative  Neurological ROS: no TIA or stroke symptoms  Dermatological ROS: negative    VITALS:  Blood pressure 110/70, pulse 64, weight 122 lb 6.4 oz (55.5 kg), SpO2 98 %. Body mass index is 20.37 kg/m². Physical Examination:  General appearance - alert, well appearing, and in no distress  Mental status - alert, oriented to person, place, and time  Neck - Neck is supple, no JVD or carotid bruits. No thyromegaly or adenopathy. Chest - clear to auscultation, no wheezes, rales or rhonchi, symmetric air entry  Heart - normal rate, regular rhythm, normal S1, S2, no murmurs, rubs, clicks or gallops  Abdomen - soft, nontender, nondistended, no masses or organomegaly  Neurological - alert, oriented, normal speech, no focal findings or movement disorder noted  Extremities - peripheral pulses abnormal, no pedal edema, no clubbing or cyanosis  Skin - normal coloration and turgor, no rashes, no suspicious skin lesions noted      EKG: normal sinus rhythm, nonspecific ST and T waves changes    No orders of the defined types were placed in this encounter. ASSESSMENT:     Diagnosis Orders   1. Coronary artery disease involving native coronary artery of native heart without angina pectoris     2. Essential hypertension           PLAN:     Patient will need to continue to follow up with you for their general medical care     As always, aggressive risk factor modification is strongly recommended.  We should adhere to the JNC VIII guidelines for HTN management and the NCEP ATP III guidelines for LDL-C management. Cardiac diet is always recommended with low fat, cholesterol, calories and sodium. Will continue to monitor patient clinically, if symptoms develop or worsen, they are to let me know ASAP or head to the nearest emergeny room. Continue medications at current doses. TAKE ASA as well.     zocor 10mg daily. Stressed importance of adhering to DAPT for one year. If he stops he is aware of high risk of stent thrombosis and death. Check MARGARET/PVR    Will continue to monitor patient clinically, if symptoms develop or worsen, they are to let me know ASAP or head to the nearest emergeny room. Patient was advised and encouraged to check blood pressure at home or at a pharmacy, maintain a logbook, and also call us back if blood pressure are above the target ranges or if it is low. Patient clearly understands and agrees to the instructions. We will need to continue to monitor muscle and liver enzymes, BUN, CR, and electrolytes. Details of medical condition explained and patient was warned about adverse consequences of uncontrolled medical conditions and possible side effects of prescribed medications.

## 2020-03-13 ENCOUNTER — HOSPITAL ENCOUNTER (EMERGENCY)
Age: 70
Discharge: HOME | End: 2020-03-13
Payer: COMMERCIAL

## 2020-03-13 VITALS
OXYGEN SATURATION: 96 % | HEIGHT: 65 IN | TEMPERATURE: 98.1 F | WEIGHT: 122 LBS | HEART RATE: 85 BPM | DIASTOLIC BLOOD PRESSURE: 73 MMHG | BODY MASS INDEX: 20.33 KG/M2 | RESPIRATION RATE: 19 BRPM | SYSTOLIC BLOOD PRESSURE: 108 MMHG

## 2020-03-13 LAB
INFLUENZA A BY PCR: NEGATIVE
INFLUENZA B BY PCR: NEGATIVE
STREP GRP A PCR: NEGATIVE

## 2020-03-13 PROCEDURE — 87651 STREP A DNA AMP PROBE: CPT

## 2020-03-13 PROCEDURE — 99283 EMERGENCY DEPT VISIT LOW MDM: CPT

## 2020-03-13 PROCEDURE — 87502 INFLUENZA DNA AMP PROBE: CPT

## 2020-03-13 RX ORDER — GUAIFENESIN 600 MG/1
600 TABLET, EXTENDED RELEASE ORAL 2 TIMES DAILY
Qty: 30 TABLET | Refills: 0 | Status: SHIPPED | OUTPATIENT
Start: 2020-03-13 | End: 2020-03-28

## 2020-03-13 RX ORDER — BENZONATATE 100 MG/1
100 CAPSULE ORAL 3 TIMES DAILY PRN
Qty: 20 CAPSULE | Refills: 0 | Status: SHIPPED | OUTPATIENT
Start: 2020-03-13 | End: 2020-11-24

## 2020-03-13 ASSESSMENT — ENCOUNTER SYMPTOMS
ABDOMINAL PAIN: 0
NAUSEA: 0
TROUBLE SWALLOWING: 0
EYE PAIN: 0
VOMITING: 0
SORE THROAT: 1
COUGH: 1
RHINORRHEA: 1
COLOR CHANGE: 0
BLOOD IN STOOL: 0
SHORTNESS OF BREATH: 0
EYE DISCHARGE: 0
WHEEZING: 0
CONSTIPATION: 0
BACK PAIN: 0
EYE REDNESS: 0
DIARRHEA: 0

## 2020-03-13 ASSESSMENT — PAIN SCALES - GENERAL
PAINLEVEL_OUTOF10: 8
PAINLEVEL_OUTOF10: 8

## 2020-03-13 ASSESSMENT — PAIN DESCRIPTION - LOCATION
LOCATION: GENERALIZED
LOCATION: GENERALIZED

## 2020-03-13 NOTE — ED TRIAGE NOTES
Pt to ER with c/o cough, sore throat and generalized body aches, pt states pain is 8/10, pt a&ox4, resp even and unlabored, pt afebrile

## 2020-03-13 NOTE — ED PROVIDER NOTES
[address] [facility] ED  EMERGENCY DEPARTMENT ENCOUNTER      Pt Name: Chandan Gaitan  MRN: [id number]  [place] 1950  Date of evaluation: 3/13/2020  Provider: Alvah Hammans, APRN - CNP    CHIEF COMPLAINT       Chief Complaint   Patient presents with    Cough     with generalized body aches, headache    Pharyngitis         HISTORY OF PRESENT ILLNESS   (Location/Symptom, Timing/Onset,Context/Setting, Quality, Duration, Modifying Factors, Severity)  Note limiting factors. Chandan Gaitan is a 68 y.o. male who presents to the emergency department with a chart reviewed past medical history of chest pain, hypertension, anxiety, coronary artery disease, and dyspepsia for chief complaint of sudden onset cough, fevers, body aches and chills that has been ongoing along with a sore throat and headaches for the past 2 days. Patient reports his cough have been dry with no productive sputum. He rates his headache as a 4 out of 10 describes it as a generalized aching sensation that extends down to the rest of his body. The patient denies having any nausea or vomiting. He denies having any diarrhea or constipation. Denies any other alleviating modifying factors. Nursing Notes were reviewed. REVIEW OF SYSTEMS    (2-9 systems for level 4, 10 or more for level 5)     Review of Systems   Constitutional: Negative for activity change, appetite change, fatigue and fever. HENT: Positive for congestion, rhinorrhea, sneezing and sore throat. Negative for ear pain and trouble swallowing. Eyes: Negative for pain, discharge and redness. Respiratory: Positive for cough. Negative for shortness of breath and wheezing. Cardiovascular: Negative for chest pain and palpitations. Gastrointestinal: Negative for abdominal pain, blood in stool, constipation, diarrhea, nausea and vomiting. Endocrine: Negative for polydipsia and polyuria. Genitourinary: Negative for decreased urine volume, dysuria, flank pain and hematuria. Musculoskeletal: Negative for arthralgias, back pain and myalgias. Skin: Negative for color change, rash and wound. Neurological: Negative for dizziness, syncope, weakness, light-headedness and headaches. Psychiatric/Behavioral: Negative for behavioral problems. All other systems reviewed and are negative. Except as noted above the remainder of the review of systems was reviewed and negative.        PAST MEDICAL HISTORY     Past Medical History:   Diagnosis Date    Anxiety     Chest pain 3/29/2018    Coronary artery disease involving native coronary artery of native heart without angina pectoris 2/15/2019    Diabetes mellitus ([facility] [address]     no meds    History of colon polyps     Hyperlipidemia     Hypertension     Type 2 diabetes mellitus without complication [facility])      Past Surgical History:   Procedure Laterality Date    CARDIAC SURGERY  1973    PTCA     COLONOSCOPY      COLONOSCOPY N/A 10/17/2019    COLORECTAL CANCER SCREENING, HIGH RISK performed by Gera Szymanski MD at [facility] OTHER SURGICAL HISTORY      patent foramen ovale    TONSILLECTOMY      UPPER GASTROINTESTINAL ENDOSCOPY N/A 8/1/2019    EGD ESOPHAGOGASTRODUODENOSCOPY performed by Gera Szymanski MD at [address] [place] [address] Marital status:      Spouse name: None    Number of children: None    Years of education: None    Highest education level: None   Occupational History    None   Social Needs    Financial resource strain: None    Food insecurity     Worry: None     Inability: None    Transportation needs     Medical: None     Non-medical: None   Tobacco Use    Smoking status: Never Smoker    Smokeless tobacco: Never Used   Substance and Sexual Activity    Alcohol use: No    Drug use: Never    Sexual activity: None   Lifestyle    Physical activity     Days per week: None     Minutes per

## 2020-03-18 ENCOUNTER — HOSPITAL ENCOUNTER (OUTPATIENT)
Dept: ULTRASOUND IMAGING | Age: 70
Discharge: HOME | End: 2020-03-20
Payer: COMMERCIAL

## 2020-03-18 PROCEDURE — 93923 UPR/LXTR ART STDY 3+ LVLS: CPT

## 2020-03-18 PROCEDURE — 93923 UPR/LXTR ART STDY 3+ LVLS: CPT | Performed by: INTERNAL MEDICINE

## 2020-03-26 ENCOUNTER — TELEPHONE (OUTPATIENT)
Dept: CARDIOLOGY CLINIC | Age: 70
End: 2020-03-26

## 2020-04-06 RX ORDER — CLOPIDOGREL BISULFATE 75 MG/1
75 TABLET ORAL DAILY
Qty: 90 TABLET | Refills: 2 | Status: SHIPPED | OUTPATIENT
Start: 2020-04-06 | End: 2020-06-05 | Stop reason: ALTCHOICE

## 2020-06-05 ENCOUNTER — VIRTUAL VISIT (OUTPATIENT)
Dept: CARDIOLOGY CLINIC | Age: 70
End: 2020-06-05
Payer: COMMERCIAL

## 2020-06-05 PROBLEM — I20.89 ANGINAL EQUIVALENT: Status: RESOLVED | Noted: 2019-09-05 | Resolved: 2020-06-05

## 2020-06-05 PROBLEM — I20.8 ANGINAL EQUIVALENT (HCC): Status: RESOLVED | Noted: 2019-09-05 | Resolved: 2020-06-05

## 2020-06-05 PROCEDURE — 4040F PNEUMOC VAC/ADMIN/RCVD: CPT | Performed by: INTERNAL MEDICINE

## 2020-06-05 PROCEDURE — 1036F TOBACCO NON-USER: CPT | Performed by: INTERNAL MEDICINE

## 2020-06-05 PROCEDURE — 3017F COLORECTAL CA SCREEN DOC REV: CPT | Performed by: INTERNAL MEDICINE

## 2020-06-05 PROCEDURE — 1123F ACP DISCUSS/DSCN MKR DOCD: CPT | Performed by: INTERNAL MEDICINE

## 2020-06-05 PROCEDURE — G8420 CALC BMI NORM PARAMETERS: HCPCS | Performed by: INTERNAL MEDICINE

## 2020-06-05 PROCEDURE — G8428 CUR MEDS NOT DOCUMENT: HCPCS | Performed by: INTERNAL MEDICINE

## 2020-06-05 PROCEDURE — 99213 OFFICE O/P EST LOW 20 MIN: CPT | Performed by: INTERNAL MEDICINE

## 2020-06-05 ASSESSMENT — ENCOUNTER SYMPTOMS
EYES NEGATIVE: 1
WHEEZING: 0
NAUSEA: 0
VOMITING: 0
ABDOMINAL PAIN: 0
SHORTNESS OF BREATH: 0
GASTROINTESTINAL NEGATIVE: 1

## 2020-06-05 NOTE — PROGRESS NOTES
loss, syncope, dizziness, lightheadedness, palpitations, PND, orthopnea, or claudication. No nitro use. BP and hr are good. CAD is stable. No LE discoloration or ulcers. No LE edema. No CHF type symptoms. Lipid profile is normal. No recent hospitalization. No change in meds.         1115-19:  68-year-old male who is very well-known to our cardiac service presented to the emergency Department complaint left-sided chest pain atypical in nature states is been in the left side of his chest for about 3 days constantly states at 9 out of 10 on the pain scale.  He is extremely anxious he states that he is not sure if it does heart orifice anxiety.  He's got a history of CAD, diabetes, hypertension, dyslipidemia, anxiety.  His last ejection fraction year ago was normal.  He denies fever, chills, PND, thymic, claudications.  He recently back in February had a stent placed to his circumflex artery        6-25-19: s/p recent hospitalization for CP, was thought to be related to anxiety and treated medically. On Brilinta, not taking aspirin. Was tried on Livalo but denied by insurance. Not on Ranexa.      8-6-19: s/p normal nuclear stress test. States he continues to have CP that concerns him. Was in the ER and noted to have HR of 40's. Was taking too much klonopin. States he continues to have CP despite taking his meds ranexa. Feels like a discomfort/tightness. Pt denies dyspnea, dyspnea on exertion, change in exercise capacity, fatigue, nausea, vomiting, diarrhea, constipation, motor weakness, insomnia, weight loss, syncope, dizziness, lightheadedness, palpitations, PND, orthopnea, or claudication. BP and hr are good. No LE discoloration or ulcers. No LE edema. No CHF type symptoms.  Lipid profile is normal.      11-15-19: Pt denies chest pain, dyspnea, dyspnea on exertion, change in exercise capacity, fatigue,  nausea, vomiting, diarrhea, constipation, motor weakness, insomnia, weight loss, syncope, dizziness, Neurological: Negative for dizziness, weakness and headaches. Prior to Visit Medications    Medication Sig Taking? Authorizing Provider   benzonatate (TESSALON PERLES) 100 MG capsule Take 1 capsule by mouth 3 times daily as needed for Cough  [place] RAVEN Jernigan CNP   Doxepin HCl (SILENOR) 6 MG TABS Take by mouth nightly  Historical Provider, MD   ranolazine (RANEXA) 500 MG extended release tablet TAKE 1 TABLET BY MOUTH TWICE DAILY  Rian J Holiday, DO   Omega-3 Fatty Acids (FISH OIL) 1000 MG CAPS Take 3,000 mg by mouth every other day  Historical Provider, MD   ranolazine (RANEXA) 1000 MG extended release tablet Take 1 tablet by mouth 2 times daily  Rian J Holiday, DO   VORTIoxetine (TRINTELLIX) 5 MG tablet Take 5 mg by mouth daily  Historical Provider, MD   omeprazole (PRILOSEC) 20 MG delayed release capsule Take 1 capsule by mouth every morning (before breakfast)  RAVEN Myers CNP   latanoprost (XALATAN) 0.005 % ophthalmic solution Use 1 Drop in both eyes daily at bedtime. Historical Provider, MD   nitroGLYCERIN (NITROSTAT) 0.4 MG SL tablet up to max of 3 total doses. If no relief after 1 dose, call 911. Lillian Maki,    clonazePAM (KLONOPIN) 0.5 MG tablet Take 1 tablet by mouth 2 times daily as needed for Anxiety for up to 15 days. .  Patient taking differently: Take 1 mg by mouth nightly.    Joanna Knott MD   olopatadine (PATADAY) 0.2 % SOLN ophthalmic solution Apply 1 drop to eye daily For allergies  Joanna Knott MD   Blood Glucose Monitoring Suppl ([address]) w/Device KIT   Historical Provider, MD   Verline Tarrs strip   Historical Provider, MD Rubin Sell LANCETS 36L [address] [facility]   Historical Provider, MD   magnesium oxide (MAG-OX) 400 MG tablet Take 400 mg by mouth daily  Historical Provider, MD   Cholecalciferol (VITAMIN D) 2000 units CAPS capsule Take 5,000 Units by mouth daily   Historical Provider, MD       Social History     Tobacco Use    Smoking status: Never Smoker    Smokeless tobacco: Never Used   Substance Use Topics    Alcohol use: No    Drug use: Never        Allergies   Allergen Reactions    Seroquel [Quetiapine] Other (See Comments)     lethargy  weak   ,   Past Medical History:   Diagnosis Date    Anxiety     Chest pain 3/29/2018    Coronary artery disease involving native coronary artery of native heart without angina pectoris 2/15/2019    Diabetes mellitus ([address]     no meds    History of colon polyps     Hyperlipidemia     Hypertension     Type 2 diabetes mellitus without complication (HCC)    ,   Past Surgical History:   Procedure Laterality Date    CARDIAC SURGERY  1973    PTCA     COLONOSCOPY      COLONOSCOPY N/A 10/17/2019    COLORECTAL CANCER SCREENING, HIGH RISK performed by Shannan Harrington MD at [address] [place] [address]      OTHER SURGICAL HISTORY      patent foramen ovale    TONSILLECTOMY      UPPER GASTROINTESTINAL ENDOSCOPY N/A 8/1/2019    EGD ESOPHAGOGASTRODUODENOSCOPY performed by [facility]rington MD at [facility]   ,   Family History   Problem Relation Age of Onset    Heart Disease Maternal Aunt     Cancer Maternal Aunt     Colon Cancer Maternal Aunt        PHYSICAL EXAMINATION:  [ INSTRUCTIONS:  \"[x]\" Indicates a positive item  \"[]\" Indicates a negative item  -- DELETE ALL ITEMS NOT EXAMINED]  [x] Alert  [x] Oriented to person/place/time    [x] No apparent distress  [] Toxic appearing    [] Face flushed appearing [x] Sclera clear  [] Lips are cyanotic      [x] Breathing appears normal  [] Appears tachypneic      [] Rash on visible skin    [] Cranial Nerves II-XII grossly intact    [] Motor grossly intact in visible upper extremities    [] Motor grossly intact in visible lower extremities    [x] Normal Mood  [] Anxious appearing    [] Depressed appearing  [] Confused appearing      [] Poor short term memory  [] Poor long term memory    [] OTHER:      Due to this being a TeleHealth encounter, evaluation of the following organ systems is limited: Vitals/Constitutional/EENT/Resp/CV/GI//MS/Neuro/Skin/Heme-Lymph-Imm. ASSESSMENT:        Diagnosis Orders   1. Essential hypertension     2. Coronary artery disease involving native coronary artery of native heart without angina pectoris     3. Chest pain, unspecified type         PLAN:    Patient will need to continue to follow up with you for their general medical care      As always, aggressive risk factor modification is strongly recommended. We should adhere to the JNC VIII guidelines for HTN management and the NCEP ATP III guidelines for LDL-C management.     Cardiac diet is always recommended with low fat, cholesterol, calories and sodium.     Will continue to monitor patient clinically, if symptoms develop or worsen, they are to let me know ASAP or head to the nearest emergeny room.     Continue medications at current doses.      Ok to stop Plavix.         Will continue to monitor patient clinically, if symptoms develop or worsen, they are to let me know ASAP or head to the nearest emergeny room.     Patient was advised and encouraged to check blood pressure at home or at a pharmacy, maintain a logbook, and also call us back if blood pressure are above the target ranges or if it is low. Patient clearly understands and agrees to the instructions.      We will need to continue to monitor muscle and liver enzymes, BUN, CR, and electrolytes.     Details of medical condition explained and patient was warned about adverse consequences of uncontrolled medical conditions and possible side effects of prescribed medications. No follow-ups on file. An  electronic signature was used to authenticate this note.     --Saba Escobar, DO on 6/5/2020 at 12:12 PM  9}    Pursuant to the emergency declaration under the [facility] waiver authority and the Coronavirus Preparedness and Response Supplemental Appropriations Act, this Virtual  Visit was conducted, with patient's consent, to reduce the patient's risk of exposure to COVID-19 and provide continuity of care for an established patient. Services were provided through a video synchronous discussion virtually to substitute for in-person clinic visit. Total Virtual Visit time spent:10 min. Please note this report has been partially produced using speech recognition software and may cause contain errors related to that system including grammar, punctuation and spelling as well as words and phrases that may seem inappropriate. If there are questions or concerns please feel free to contact me to clarify.

## 2020-08-30 ENCOUNTER — APPOINTMENT (OUTPATIENT)
Dept: GENERAL RADIOLOGY | Age: 70
End: 2020-08-30
Payer: COMMERCIAL

## 2020-08-30 ENCOUNTER — HOSPITAL ENCOUNTER (EMERGENCY)
Age: 70
Discharge: HOME | End: 2020-08-30
Payer: COMMERCIAL

## 2020-08-30 VITALS
TEMPERATURE: 98 F | WEIGHT: 134 LBS | RESPIRATION RATE: 18 BRPM | DIASTOLIC BLOOD PRESSURE: 73 MMHG | HEIGHT: 65 IN | SYSTOLIC BLOOD PRESSURE: 114 MMHG | HEART RATE: 59 BPM | OXYGEN SATURATION: 96 % | BODY MASS INDEX: 22.33 KG/M2

## 2020-08-30 LAB
ALBUMIN SERPL-MCNC: 4 G/DL (ref 3.5–4.6)
ALP BLD-CCNC: 53 U/L (ref 35–104)
ALT SERPL-CCNC: 10 U/L (ref 0–41)
ANION GAP SERPL CALCULATED.3IONS-SCNC: 10 MEQ/L (ref 9–15)
AST SERPL-CCNC: 13 U/L (ref 0–40)
BASOPHILS ABSOLUTE: 0 K/UL (ref 0–0.2)
BASOPHILS RELATIVE PERCENT: 0.8 %
BILIRUB SERPL-MCNC: 0.7 MG/DL (ref 0.2–0.7)
BUN BLDV-MCNC: 14 MG/DL (ref 8–23)
CALCIUM SERPL-MCNC: 9.3 MG/DL (ref 8.5–9.9)
CHLORIDE BLD-SCNC: 104 MEQ/L (ref 95–107)
CO2: 24 MEQ/L (ref 20–31)
CREAT SERPL-MCNC: 0.89 MG/DL (ref 0.7–1.2)
D DIMER: 0.37 MG/L FEU (ref 0–0.5)
EKG ATRIAL RATE: 59 BPM
EKG P AXIS: 49 DEGREES
EKG P-R INTERVAL: 194 MS
EKG Q-T INTERVAL: 438 MS
EKG QRS DURATION: 100 MS
EKG QTC CALCULATION (BAZETT): 433 MS
EKG R AXIS: 62 DEGREES
EKG T AXIS: 67 DEGREES
EKG VENTRICULAR RATE: 59 BPM
EOSINOPHILS ABSOLUTE: 0.1 K/UL (ref 0–0.7)
EOSINOPHILS RELATIVE PERCENT: 2.4 %
GFR AFRICAN AMERICAN: >60
GFR NON-AFRICAN AMERICAN: >60
GLOBULIN: 2.7 G/DL (ref 2.3–3.5)
GLUCOSE BLD-MCNC: 122 MG/DL (ref 70–99)
HCT VFR BLD CALC: 45 % (ref 42–52)
HEMOGLOBIN: 15.4 G/DL (ref 14–18)
LYMPHOCYTES ABSOLUTE: 1.4 K/UL (ref 1–4.8)
LYMPHOCYTES RELATIVE PERCENT: 26 %
MAGNESIUM: 2 MG/DL (ref 1.7–2.4)
MCH RBC QN AUTO: 31.1 PG (ref 27–31.3)
MCHC RBC AUTO-ENTMCNC: 34.2 % (ref 33–37)
MCV RBC AUTO: 90.8 FL (ref 80–100)
MONOCYTES ABSOLUTE: 0.4 K/UL (ref 0.2–0.8)
MONOCYTES RELATIVE PERCENT: 8.4 %
NEUTROPHILS ABSOLUTE: 3.3 K/UL (ref 1.4–6.5)
NEUTROPHILS RELATIVE PERCENT: 62.4 %
PDW BLD-RTO: 14.6 % (ref 11.5–14.5)
PLATELET # BLD: 163 K/UL (ref 130–400)
POTASSIUM SERPL-SCNC: 4.6 MEQ/L (ref 3.4–4.9)
PRO-BNP: 59 PG/ML
RBC # BLD: 4.95 M/UL (ref 4.7–6.1)
SODIUM BLD-SCNC: 138 MEQ/L (ref 135–144)
TOTAL CK: 69 U/L (ref 0–190)
TOTAL PROTEIN: 6.7 G/DL (ref 6.3–8)
TROPONIN: <0.01 NG/ML (ref 0–0.01)
TSH SERPL DL<=0.05 MIU/L-ACNC: 2.73 UIU/ML (ref 0.44–3.86)
WBC # BLD: 5.2 K/UL (ref 4.8–10.8)

## 2020-08-30 PROCEDURE — 6370000000 HC RX 637 (ALT 250 FOR IP): Performed by: PHYSICIAN ASSISTANT

## 2020-08-30 PROCEDURE — 83880 ASSAY OF NATRIURETIC PEPTIDE: CPT

## 2020-08-30 PROCEDURE — 82550 ASSAY OF CK (CPK): CPT

## 2020-08-30 PROCEDURE — 85379 FIBRIN DEGRADATION QUANT: CPT

## 2020-08-30 PROCEDURE — 84484 ASSAY OF TROPONIN QUANT: CPT

## 2020-08-30 PROCEDURE — 83735 ASSAY OF MAGNESIUM: CPT

## 2020-08-30 PROCEDURE — 85025 COMPLETE CBC W/AUTO DIFF WBC: CPT

## 2020-08-30 PROCEDURE — 99285 EMERGENCY DEPT VISIT HI MDM: CPT

## 2020-08-30 PROCEDURE — 36415 COLL VENOUS BLD VENIPUNCTURE: CPT

## 2020-08-30 PROCEDURE — 71045 X-RAY EXAM CHEST 1 VIEW: CPT

## 2020-08-30 PROCEDURE — 80053 COMPREHEN METABOLIC PANEL: CPT

## 2020-08-30 PROCEDURE — 84443 ASSAY THYROID STIM HORMONE: CPT

## 2020-08-30 PROCEDURE — 93005 ELECTROCARDIOGRAM TRACING: CPT | Performed by: EMERGENCY MEDICINE

## 2020-08-30 RX ORDER — ASPIRIN 81 MG/1
81 TABLET, CHEWABLE ORAL ONCE
Status: COMPLETED | OUTPATIENT
Start: 2020-08-30 | End: 2020-08-30

## 2020-08-30 RX ADMIN — ASPIRIN 81 MG: 81 TABLET, CHEWABLE ORAL at 16:07

## 2020-08-30 ASSESSMENT — ENCOUNTER SYMPTOMS
COUGH: 0
NAUSEA: 0
VOMITING: 0
EYE DISCHARGE: 0
APNEA: 0
PHOTOPHOBIA: 0
VOICE CHANGE: 0
ANAL BLEEDING: 0
SHORTNESS OF BREATH: 1
ABDOMINAL DISTENTION: 0

## 2020-08-30 ASSESSMENT — PAIN DESCRIPTION - ORIENTATION: ORIENTATION: LEFT;MID

## 2020-08-30 ASSESSMENT — PAIN SCALES - GENERAL: PAINLEVEL_OUTOF10: 5

## 2020-08-30 ASSESSMENT — PAIN DESCRIPTION - LOCATION: LOCATION: CHEST

## 2020-08-30 NOTE — ED PROVIDER NOTES
[address] [facility] ED  eMERGENCY dEPARTMENT eNCOUnter      Pt Name: Sonali Peres  MRN: [id number]  [place] 1950  Date of evaluation: 8/30/2020  Provider: Francisco Kim PA-C    CHIEF COMPLAINT       Chief Complaint   Patient presents with    Shortness of Breath     x 3 weeks         HISTORY OF PRESENT ILLNESS   (Location/Symptom, Timing/Onset,Context/Setting, Quality, Duration, Modifying Factors, Severity)  Note limiting factors. Sonali Peres is a 68 y.o. male who presents to the emergency department patient has 3-week history of shortness of breath with left-sided chest discomfort that started today. Patient states he is easily out of breath with motion denies fever chills nausea vomiting denies rectal bleeding urinary bleeding like stools hematemesis. He denies cough congestion. Is mild to moderate severity worse with motion chest pain has been there before he states situationally worsened by anxiety or stress. HPI    NursingNotes were reviewed. REVIEW OF SYSTEMS    (2-9 systems for level 4, 10 or more for level 5)     Review of Systems   Constitutional: Negative for activity change, appetite change, chills, fever and unexpected weight change. HENT: Negative for ear discharge, nosebleeds and voice change. Eyes: Negative for photophobia and discharge. Respiratory: Positive for shortness of breath. Negative for apnea and cough. Cardiovascular: Positive for chest pain. Negative for leg swelling. Gastrointestinal: Negative for abdominal distention, anal bleeding, nausea and vomiting. Endocrine: Negative for cold intolerance, heat intolerance and polyphagia. Genitourinary: Negative for dysuria, genital sores and hematuria. Musculoskeletal: Negative for joint swelling, neck pain and neck stiffness. Skin: Negative for pallor. Allergic/Immunologic: Negative for immunocompromised state. Neurological: Negative for seizures, facial asymmetry and headaches.    Hematological: Does not bruise/bleed easily. Psychiatric/Behavioral: Negative for behavioral problems, self-injury and sleep disturbance. All other systems reviewed and are negative. Except as noted above the remainder of the review of systems was reviewed and negative. PAST MEDICAL HISTORY     Past Medical History:   Diagnosis Date    Anxiety     Chest pain 3/29/2018    Coronary artery disease involving native coronary artery of native heart without angina pectoris 2/15/2019    [facility]     no meds    History of colon polyps     Hyperlipidemia     Hypertension     Type 2 diabetes mellitus without complication ([facility] [address]          SURGICALHISTORY       Past Surgical History:   Procedure Laterality Date    CARDIAC SURGERY  1973    PTCA     COLONOSCOPY      COLONOSCOPY N/A 10/17/2019    COLORECTAL CANCER SCREENING, HIGH RISK performed by Kristen Forbes MD at [address]      OTHER SURGICAL HISTORY      patent foramen ovale    TONSILLECTOMY      UPPER GASTROINTESTINAL ENDOSCOPY N/A 8/1/2019    EGD ESOPHAGOGASTRODUODENOSCOPY performed by Kristen Forbes MD at [address]       Previous Medications    BENZONATATE (TESSALON PERLES) 100 MG CAPSULE    Take 1 capsule by mouth 3 times daily as needed for Cough    BLOOD GLUCOSE MONITORING SUPPL (ONETOUCH VERIO FLEX SYSTEM) W/DEVICE KIT        CHOLECALCIFEROL (VITAMIN D) 2000 UNITS CAPS CAPSULE    Take 5,000 Units by mouth daily     CLONAZEPAM (KLONOPIN) 0.5 MG TABLET    Take 1 tablet by mouth 2 times daily as needed for Anxiety for up to 15 days. Maggie Morales DOXEPIN HCL (SILENOR) 6 MG TABS    Take by mouth nightly    LATANOPROST (XALATAN) 0.005 % OPHTHALMIC SOLUTION    Use 1 Drop in both eyes daily at bedtime. MAGNESIUM OXIDE (MAG-OX) 400 MG TABLET    Take 400 mg by mouth daily    NITROGLYCERIN (NITROSTAT) 0.4 MG SL TABLET    up to max of 3 total doses. If no relief after 1 dose, call 911.     OLOPATADINE (PATADAY) 0.2 % SOLN OPHTHALMIC SOLUTION    Apply 1 drop to eye daily For allergies    OMEGA-3 FATTY ACIDS (FISH OIL) 1000 MG CAPS    Take 3,000 mg by mouth every other day    OMEPRAZOLE (PRILOSEC) 20 MG DELAYED RELEASE CAPSULE    Take 1 capsule by mouth every morning (before breakfast)    ONETOUCH DELICA LANCETS 79X MISC        ONETOUCH VERIO STRIP        RANOLAZINE (RANEXA) 1000 MG EXTENDED RELEASE TABLET    Take 1 tablet by mouth 2 times daily    RANOLAZINE (RANEXA) 500 MG EXTENDED RELEASE TABLET    TAKE 1 TABLET BY MOUTH TWICE DAILY    VORTIOXETINE (TRINTELLIX) 5 MG TABLET    Take 5 mg by mouth daily       ALLERGIES     Seroquel [quetiapine]    FAMILY HISTORY       Family History   Problem Relation Age of Onset    Heart Disease Maternal Aunt     Cancer Maternal Aunt     Colon Cancer Maternal Aunt           SOCIAL HISTORY       Social History     Socioeconomic History    Marital status:      Spouse name: None    Number of children: None    Years of education: None    Highest education level: None   Occupational History    None   Social Needs    Financial resource strain: None    Food insecurity     Worry: None     Inability: None    Transportation needs     Medical: None     Non-medical: None   Tobacco Use    Smoking status: Never Smoker    Smokeless tobacco: Never Used   Substance and Sexual Activity    Alcohol use: No    Drug use: Never    Sexual activity: None   Lifestyle    Physical activity     Days per week: None     Minutes per session: None    Stress: None   Relationships    Social connections     Talks on phone: None     Gets together: None     Attends Christianity service: None     Active member of club or organization: None     Attends meetings of clubs or organizations: None     Relationship status: None    Intimate partner violence     Fear of current or ex partner: None     Emotionally abused: None     Physically abused: cardiologist.    EKG sinus bradycardia rate 59 neg ST segment elevation. RADIOLOGY:   Non-plain filmimages such as CT, Ultrasound and MRI are read by the radiologist. Plain radiographic images are visualized and preliminarily interpreted by the emergency physician with the below findings:    See rad report    Interpretation per the Radiologist below, if available at the time ofthis note:    XR CHEST PORTABLE   Final Result      NO EVIDENCE OF ACTIVE CARDIOPULMONARY DISEASE. ED BEDSIDE ULTRASOUND:   Performed by ED Physician - none    LABS:  Labs Reviewed   COMPREHENSIVE METABOLIC PANEL - Abnormal; Notable for the following components:       Result Value    Glucose 122 (*)     All other components within normal limits   CBC WITH AUTO DIFFERENTIAL - Abnormal; Notable for the following components:    RDW 14.6 (*)     All other components within normal limits   MAGNESIUM   TROPONIN   CK   TSH WITHOUT REFLEX   BRAIN NATRIURETIC PEPTIDE   D-DIMER, QUANTITATIVE       All other labs were within normal range or not returned as of this dictation. EMERGENCY DEPARTMENT COURSE and DIFFERENTIAL DIAGNOSIS/MDM:   Vitals:    Vitals:    08/30/20 1510   BP: 114/73   Pulse: 59   Resp: 18   Temp: 98 °F (36.7 °C)   TempSrc: Oral   SpO2: 96%   Weight: 134 lb (60.8 kg)   Height: 5' 5\" (1.651 m)            MDM  Number of Diagnoses or Management Options  Chest pain, unspecified type:   Shortness of breath:   Diagnosis management comments: Patient has history of a stent in the past no chest pain started today short of breath with exertion x3. States pain started at 930 this morning. patient's cath was from 8/2019. Feels better and is requesting discharge to home. He has to leave and  his medicines from [facility] clinic before 7 pm.       Discussed with Dr. Prince  covering for [facility] cardiology. Discussed with Dr. Prince  including EKG blood work patient is 3-week history of shortness of breath.   He notes that this is worse with walking and he wants to go home tonight he states his chest pain is better he states it usually comes when he becomes anxious we discussed his EKG x-ray lab work per Dr. Donovan Mesa okay to disposition home follow-up with cardiology tomorrow. Discussed with patient is to follow-up with cardiology, Dr. flores tomorrow he is to return to if any symptoms worsen or new symptoms develop. Amount and/or Complexity of Data Reviewed  Clinical lab tests: ordered  Tests in the radiology section of CPT®: ordered        CRITICAL CARE TIME       CONSULTS:  None    PROCEDURES:  Unless otherwise noted below, none     Procedures    FINAL IMPRESSION      1. Chest pain, unspecified type    2. Shortness of breath          DISPOSITION/PLAN   DISPOSITION        PATIENT REFERRED TO:  Giovanni Cage  [address] [facility] [address] [address]  [phone]    Call in 1 day      [facility], [address]  [phone]    Call in 1 day      [facility]) ED  [address] [facility]  [phone]    If symptoms worsen      DISCHARGE MEDICATIONS:  New Prescriptions    No medications on file          (Please note that portions of this note were completed with a voice recognition program.  Efforts were made to edit the dictations but occasionally words are mis-transcribed.)    Denisse Roger PA-C (electronically signed)  Attending Emergency Physician       Denisse Roger PA-C  08/30/20 5946    Attending Supervisory Note/Shared Visit   I have personally performed a face to face diagnostic evaluation on this patient. I have reviewed the mid-levels findings and agree.   History and Exam by me shows chest pain and shortness of breath      Emperatriz Olivares DO  Attending Emergency Physician         Emperatriz Olivares DO  09/02/20 1521

## 2020-08-31 ENCOUNTER — CARE COORDINATION (OUTPATIENT)
Dept: CARE COORDINATION | Age: 70
End: 2020-08-31

## 2020-08-31 PROCEDURE — 93010 ELECTROCARDIOGRAM REPORT: CPT | Performed by: INTERNAL MEDICINE

## 2020-08-31 NOTE — CARE COORDINATION
Call placed to patient for COVID-19 Risk Monitoring. Unable to reach patient by phone. Message left regarding the purpose of the call. Requested call back. Provided call back number. Utilized  Services to leave message in [facility] [place] No further outreach scheduled by this CCSW. Episode of care resolved.

## 2020-10-28 ENCOUNTER — HOSPITAL ENCOUNTER (EMERGENCY)
Age: 70
Discharge: HOME | End: 2020-10-28
Attending: EMERGENCY MEDICINE
Payer: COMMERCIAL

## 2020-10-28 ENCOUNTER — APPOINTMENT (OUTPATIENT)
Dept: GENERAL RADIOLOGY | Age: 70
End: 2020-10-28
Payer: COMMERCIAL

## 2020-10-28 VITALS
TEMPERATURE: 97.7 F | BODY MASS INDEX: 23.32 KG/M2 | SYSTOLIC BLOOD PRESSURE: 118 MMHG | OXYGEN SATURATION: 97 % | HEIGHT: 65 IN | HEART RATE: 60 BPM | RESPIRATION RATE: 18 BRPM | DIASTOLIC BLOOD PRESSURE: 77 MMHG | WEIGHT: 140 LBS

## 2020-10-28 PROCEDURE — 73552 X-RAY EXAM OF FEMUR 2/>: CPT

## 2020-10-28 PROCEDURE — 99283 EMERGENCY DEPT VISIT LOW MDM: CPT

## 2020-10-28 ASSESSMENT — PAIN DESCRIPTION - LOCATION: LOCATION: LEG

## 2020-10-28 ASSESSMENT — ENCOUNTER SYMPTOMS
COUGH: 0
ABDOMINAL PAIN: 0
SHORTNESS OF BREATH: 0
BACK PAIN: 0

## 2020-10-28 ASSESSMENT — PAIN DESCRIPTION - PAIN TYPE: TYPE: ACUTE PAIN

## 2020-10-28 ASSESSMENT — PAIN DESCRIPTION - PROGRESSION: CLINICAL_PROGRESSION: NOT CHANGED

## 2020-10-28 ASSESSMENT — PAIN DESCRIPTION - ORIENTATION: ORIENTATION: LEFT;RIGHT

## 2020-10-28 ASSESSMENT — PAIN DESCRIPTION - ONSET: ONSET: ON-GOING

## 2020-10-28 NOTE — ED PROVIDER NOTES
[facility] ED  eMERGENCY dEPARTMENT eNCOUnter      Pt Name: Mohini De Los Santos  MRN: [id number]  [place] 1950  Date of evaluation: 10/28/2020  Provider: RAVEN Orozco CNP      HISTORY OF PRESENT ILLNESS    Mohini De Los Santos is a 65 y.o. male who presents to the Emergency Department with bilateral leg pain that started today. Patient was laying on the ground trying to fix his car and had trouble trying to get up. He did get up and had no pain after that. This morning he was having pain in his thighs with movement. Pain was worse when he was trying to go up the stairs. Patient is ambulating without difficulty. Pain is mild. He did not take anything for the pain. REVIEW OF SYSTEMS       Review of Systems   Constitutional: Negative for activity change, appetite change and fever. HENT: Negative for congestion. Respiratory: Negative for cough and shortness of breath. Cardiovascular: Negative for chest pain. Gastrointestinal: Negative for abdominal pain. Genitourinary: Negative for dysuria. Musculoskeletal: Negative for arthralgias and back pain. Anterior thigh pain, bilateral.   Skin: Negative for rash. All other systems reviewed and are negative.         PAST MEDICAL HISTORY     Past Medical History:   Diagnosis Date    Anxiety     Chest pain 3/29/2018    Coronary artery disease involving native coronary artery of native heart without angina pectoris 2/15/2019    Diabetes mellitus ([address]     no meds    History of colon polyps     Hyperlipidemia     Hypertension     Type 2 diabetes mellitus without complication ([address]          SURGICAL HISTORY       Past Surgical History:   Procedure Laterality Date    CARDIAC SURGERY  1973    PTCA     COLONOSCOPY      COLONOSCOPY N/A 10/17/2019    COLORECTAL CANCER SCREENING, HIGH RISK performed by Linda Morales MD at [address] HISTORY      patent foramen ovale    TONSILLECTOMY      UPPER GASTROINTESTINAL ENDOSCOPY N/A 8/1/2019    EGD ESOPHAGOGASTRODUODENOSCOPY performed by Paloma Wallace MD at [address]       Previous Medications    BENZONATATE (TESSALON PERLES) 100 MG CAPSULE    Take 1 capsule by mouth 3 times daily as needed for Cough    BLOOD GLUCOSE MONITORING SUPPL (ONETOUCH VERIO FLEX SYSTEM) W/DEVICE KIT        CHOLECALCIFEROL (VITAMIN D) 2000 UNITS CAPS CAPSULE    Take 5,000 Units by mouth daily     CLONAZEPAM (KLONOPIN) 0.5 MG TABLET    Take 1 tablet by mouth 2 times daily as needed for Anxiety for up to 15 days. Miroslava Dye DOXEPIN HCL (SILENOR) 6 MG TABS    Take by mouth nightly    LATANOPROST (XALATAN) 0.005 % OPHTHALMIC SOLUTION    Use 1 Drop in both eyes daily at bedtime. MAGNESIUM OXIDE (MAG-OX) 400 MG TABLET    Take 400 mg by mouth daily    NITROGLYCERIN (NITROSTAT) 0.4 MG SL TABLET    up to max of 3 total doses. If no relief after 1 dose, call 911.     OLOPATADINE (PATADAY) 0.2 % SOLN OPHTHALMIC SOLUTION    Apply 1 drop to eye daily For allergies    OMEGA-3 FATTY ACIDS (FISH OIL) 1000 MG CAPS    Take 3,000 mg by mouth every other day    OMEPRAZOLE (PRILOSEC) 20 MG DELAYED RELEASE CAPSULE    Take 1 capsule by mouth every morning (before breakfast)    ONETOUCH DELICA LANCETS 61B MISC        ONETOUCH VERIO STRIP        RANOLAZINE (RANEXA) 1000 MG EXTENDED RELEASE TABLET    Take 1 tablet by mouth 2 times daily    RANOLAZINE (RANEXA) 500 MG EXTENDED RELEASE TABLET    TAKE 1 TABLET BY MOUTH TWICE DAILY    VORTIOXETINE (TRINTELLIX) 5 MG TABLET    Take 5 mg by mouth daily       ALLERGIES     Seroquel [quetiapine]    FAMILY HISTORY       Family History   Problem Relation Age of Onset    Heart Disease Maternal Aunt     Cancer Maternal Aunt     Colon Cancer Maternal Aunt           SOCIAL HISTORY       Social History     Socioeconomic History    Marital status:      Spouse name: None    General: Bowel sounds are normal. There is no distension. Palpations: Abdomen is soft. Tenderness: There is no abdominal tenderness. Musculoskeletal: Normal range of motion. Right upper leg: He exhibits tenderness. He exhibits no swelling. Left upper leg: He exhibits tenderness. He exhibits no swelling. Legs:    Skin:     General: Skin is warm and dry. Neurological:      Mental Status: He is alert and oriented to person, place, and time. Deep Tendon Reflexes: Reflexes are normal and symmetric. Psychiatric:         Judgment: Judgment normal.           All other labs were within normal range or not returned as of this dictation. EMERGENCY DEPARTMENT COURSE and DIFFERENTIALDIAGNOSIS/MDM:   Vitals:    Vitals:    10/28/20 1252   BP: 118/77   Pulse: 60   Resp: 18   Temp: 97.7 °F (36.5 °C)   TempSrc: Oral   SpO2: 97%   Weight: 140 lb (63.5 kg)   Height: 5' 5\" (1.651 m)            65 yr old male with bilateral quadriceps strain. Xrays were negative. F/U With PCP in 2 days. Patient is comfortable in the room and verbalizes understanding. PROCEDURES:  Unless otherwise noted below, none     Procedures      FINAL IMPRESSION      1. Strain of quadriceps muscle, unspecified laterality, initial encounter          DISPOSITION/PLAN   DISPOSITION Decision To Discharge 10/28/2020 02:09:06 PM          Claude Alice, APRN - CNP (electronically signed)  Attending Emergency Physician     Claude Alice, APRN - CNP  10/28/20 1410    Attending Supervisory Note/Shared Visit   I have personally performed a face to face diagnostic evaluation on this patient. I have reviewed the mid-levels findings and agree.   History and Exam by me shows quadriceps strain      Yoana Benito DO  Attending Emergency Physician         Yoana Benito DO  10/28/20 6587

## 2020-10-28 NOTE — ED TRIAGE NOTES
Pt to ed from home via triage with c/o bilateral leg pain, resolved at rest   PT reports that he was kneeling at car yesterday and has difficulty pulling self to stand. PT reports that he went back to ground and landed on left hip, pt denies pain to left hip on arrival.   PT able to pull self up   Pt state she woke today with bilateral leg pain, wore when doing stairs. PT denies any other injury. Amb with slow steady gait. Skin WDI.

## 2020-11-23 RX ORDER — RANOLAZINE 500 MG/1
TABLET, EXTENDED RELEASE ORAL
Qty: 60 TABLET | Refills: 5 | Status: SHIPPED | OUTPATIENT
Start: 2020-11-23 | End: 2021-06-22 | Stop reason: SDUPTHER

## 2020-11-24 ENCOUNTER — OFFICE VISIT (OUTPATIENT)
Dept: UROLOGY | Age: 70
End: 2020-11-24
Payer: COMMERCIAL

## 2020-11-24 VITALS
HEART RATE: 60 BPM | HEIGHT: 65 IN | BODY MASS INDEX: 23.32 KG/M2 | SYSTOLIC BLOOD PRESSURE: 112 MMHG | DIASTOLIC BLOOD PRESSURE: 70 MMHG | WEIGHT: 140 LBS

## 2020-11-24 PROCEDURE — G8420 CALC BMI NORM PARAMETERS: HCPCS | Performed by: UROLOGY

## 2020-11-24 PROCEDURE — 99214 OFFICE O/P EST MOD 30 MIN: CPT | Performed by: UROLOGY

## 2020-11-24 PROCEDURE — 1036F TOBACCO NON-USER: CPT | Performed by: UROLOGY

## 2020-11-24 PROCEDURE — G8427 DOCREV CUR MEDS BY ELIG CLIN: HCPCS | Performed by: UROLOGY

## 2020-11-24 PROCEDURE — 4040F PNEUMOC VAC/ADMIN/RCVD: CPT | Performed by: UROLOGY

## 2020-11-24 PROCEDURE — 3017F COLORECTAL CA SCREEN DOC REV: CPT | Performed by: UROLOGY

## 2020-11-24 PROCEDURE — 1123F ACP DISCUSS/DSCN MKR DOCD: CPT | Performed by: UROLOGY

## 2020-11-24 PROCEDURE — G8484 FLU IMMUNIZE NO ADMIN: HCPCS | Performed by: UROLOGY

## 2020-11-24 RX ORDER — ASCORBIC ACID 500 MG
500 TABLET ORAL DAILY
COMMUNITY
End: 2022-10-14

## 2020-11-24 RX ORDER — PHYTONADIONE 5 MG/1
5 TABLET ORAL ONCE
COMMUNITY
End: 2022-10-14

## 2020-11-24 RX ORDER — MULTIVIT WITH MINERALS/LUTEIN
1000 TABLET ORAL DAILY
COMMUNITY
End: 2022-10-14

## 2020-11-24 RX ORDER — TAMSULOSIN HYDROCHLORIDE 0.4 MG/1
0.4 CAPSULE ORAL DAILY
Qty: 90 CAPSULE | Refills: 3 | Status: SHIPPED | OUTPATIENT
Start: 2020-11-24 | End: 2022-06-01 | Stop reason: ALTCHOICE

## 2020-11-24 ASSESSMENT — ENCOUNTER SYMPTOMS
BACK PAIN: 0
ABDOMINAL DISTENTION: 0
ABDOMINAL PAIN: 0

## 2020-11-24 NOTE — PROGRESS NOTES
Subjective:      Patient ID: Davis Chavez is a 65 y.o. male. HPI  This is a 64 yo male with h/o DM, CAD with stent on Brilinta (Dr Susannah Benites, Anxiety/depression and back for continued evaluation of ED (BRADLEY failed). When last seen on 11/27/19, he tried Viagra at 100 mg and this did not help much. He now has new LUts with a decreased flow and some intermittency and PVD. He is bothered by these symptoms. He has no hematuria and dysuria. He gets NF 0-2 and has no urgency or UI. He reports that he had a prior and recent PSA done by his PCP that was \"normal\". Dr Faisal Gupta provided the ok for use of Doreatha Lawton has no other new complaints or surgical problems.      Past Medical History:   Diagnosis Date    Anxiety     Chest pain 3/29/2018    Coronary artery disease involving native coronary artery of native heart without angina pectoris 2/15/2019    Diabetes mellitus ([facility]     no meds    History of colon polyps     Hyperlipidemia     Hypertension     Type 2 diabetes mellitus without complication ([facility]      Past Surgical History:   Procedure Laterality Date    CARDIAC SURGERY  1973    PTCA     COLONOSCOPY      COLONOSCOPY N/A 10/17/2019    COLORECTAL CANCER SCREENING, HIGH RISK performed by Mahogany Goddard MD at [facility] OTHER SURGICAL HISTORY      patent foramen ovale    TONSILLECTOMY      UPPER GASTROINTESTINAL ENDOSCOPY N/A 8/1/2019    EGD ESOPHAGOGASTRODUODENOSCOPY performed by Mahogany Goddard MD at [address] [place] [address] Marital status:      Spouse name: None    Number of children: None    Years of education: None    Highest education level: None   Occupational History    None   Social Needs    Financial resource strain: None    Food insecurity     Worry: None     Inability: None    Transportation needs     Medical: None     Non-medical: None   Tobacco Use    Smoking status: Never Smoker    Smokeless tobacco: Never Used   Substance and Sexual Activity    Alcohol use: No    Drug use: Never    Sexual activity: None   Lifestyle    Physical activity     Days per week: None     Minutes per session: None    Stress: None   Relationships    Social connections     Talks on phone: None     Gets together: None     Attends Worship service: None     Active member of club or organization: None     Attends meetings of clubs or organizations: None     Relationship status: None    Intimate partner violence     Fear of current or ex partner: None     Emotionally abused: None     Physically abused: None     Forced sexual activity: None   Other Topics Concern    None   Social History Narrative    None     Family History   Problem Relation Age of Onset    Heart Disease Maternal Aunt     Cancer Maternal Aunt     Colon Cancer Maternal Aunt      Current Outpatient Medications   Medication Sig Dispense Refill    vitamin C (ASCORBIC ACID) 500 MG tablet Take 500 mg by mouth daily      phytonadione (VITAMIN K) 5 MG tablet Take 5 mg by mouth once      vitamin E 1000 units capsule Take 1,000 Units by mouth daily      tamsulosin (FLOMAX) 0.4 MG capsule Take 1 capsule by mouth daily 90 capsule 3    ranolazine (RANEXA) 500 MG extended release tablet TAKE 1 TABLET BY MOUTH TWICE DAILY 60 tablet 5    Doxepin HCl (SILENOR) 6 MG TABS Take by mouth nightly      Omega-3 Fatty Acids (FISH OIL) 1000 MG CAPS Take 3,000 mg by mouth every other day      ranolazine (RANEXA) 1000 MG extended release tablet Take 1 tablet by mouth 2 times daily 60 tablet 5    VORTIoxetine (TRINTELLIX) 5 MG tablet Take 5 mg by mouth daily      omeprazole (PRILOSEC) 20 MG delayed release capsule Take 1 capsule by mouth every morning (before breakfast) 30 capsule 5    latanoprost (XALATAN) 0.005 % ophthalmic solution Use 1 Drop in both eyes daily at bedtime.       nitroGLYCERIN (NITROSTAT) 0.4 MG SL tablet up to Orders Placed This Encounter   Medications    tamsulosin (FLOMAX) 0.4 MG capsule     Sig: Take 1 capsule by mouth daily     Dispense:  90 capsule     Refill:  3             Genet Martinez MD

## 2021-06-10 ENCOUNTER — APPOINTMENT (OUTPATIENT)
Dept: GENERAL RADIOLOGY | Age: 71
End: 2021-06-10
Payer: COMMERCIAL

## 2021-06-10 ENCOUNTER — HOSPITAL ENCOUNTER (EMERGENCY)
Age: 71
Discharge: HOME | End: 2021-06-10
Payer: COMMERCIAL

## 2021-06-10 VITALS
WEIGHT: 140 LBS | TEMPERATURE: 97.8 F | DIASTOLIC BLOOD PRESSURE: 98 MMHG | HEIGHT: 60 IN | SYSTOLIC BLOOD PRESSURE: 144 MMHG | OXYGEN SATURATION: 95 % | BODY MASS INDEX: 27.48 KG/M2 | RESPIRATION RATE: 21 BRPM | HEART RATE: 60 BPM

## 2021-06-10 DIAGNOSIS — R07.9 CHEST PAIN, UNSPECIFIED TYPE: Primary | ICD-10-CM

## 2021-06-10 LAB
ALBUMIN SERPL-MCNC: 4.3 G/DL (ref 3.5–4.6)
ALP BLD-CCNC: 54 U/L (ref 35–104)
ALT SERPL-CCNC: 10 U/L (ref 0–41)
ANION GAP SERPL CALCULATED.3IONS-SCNC: 8 MEQ/L (ref 9–15)
AST SERPL-CCNC: 11 U/L (ref 0–40)
BASOPHILS ABSOLUTE: 0 K/UL (ref 0–0.2)
BASOPHILS RELATIVE PERCENT: 0.7 %
BILIRUB SERPL-MCNC: 0.8 MG/DL (ref 0.2–0.7)
BUN BLDV-MCNC: 20 MG/DL (ref 8–23)
CALCIUM SERPL-MCNC: 9.7 MG/DL (ref 8.5–9.9)
CHLORIDE BLD-SCNC: 108 MEQ/L (ref 95–107)
CO2: 24 MEQ/L (ref 20–31)
CREAT SERPL-MCNC: 1.06 MG/DL (ref 0.7–1.2)
EKG ATRIAL RATE: 61 BPM
EKG P AXIS: 47 DEGREES
EKG P-R INTERVAL: 196 MS
EKG Q-T INTERVAL: 436 MS
EKG QRS DURATION: 102 MS
EKG QTC CALCULATION (BAZETT): 438 MS
EKG R AXIS: 62 DEGREES
EKG T AXIS: 72 DEGREES
EKG VENTRICULAR RATE: 61 BPM
EOSINOPHILS ABSOLUTE: 0.1 K/UL (ref 0–0.7)
EOSINOPHILS RELATIVE PERCENT: 2.5 %
GFR AFRICAN AMERICAN: >60
GFR NON-AFRICAN AMERICAN: >60
GLOBULIN: 2.8 G/DL (ref 2.3–3.5)
GLUCOSE BLD-MCNC: 143 MG/DL (ref 70–99)
HCT VFR BLD CALC: 42.8 % (ref 42–52)
HEMOGLOBIN: 15.1 G/DL (ref 14–18)
LYMPHOCYTES ABSOLUTE: 1.2 K/UL (ref 1–4.8)
LYMPHOCYTES RELATIVE PERCENT: 23.1 %
MAGNESIUM: 1.9 MG/DL (ref 1.7–2.4)
MCH RBC QN AUTO: 31.6 PG (ref 27–31.3)
MCHC RBC AUTO-ENTMCNC: 35.3 % (ref 33–37)
MCV RBC AUTO: 89.4 FL (ref 80–100)
MONOCYTES ABSOLUTE: 0.4 K/UL (ref 0.2–0.8)
MONOCYTES RELATIVE PERCENT: 7.2 %
NEUTROPHILS ABSOLUTE: 3.3 K/UL (ref 1.4–6.5)
NEUTROPHILS RELATIVE PERCENT: 66.5 %
PDW BLD-RTO: 14 % (ref 11.5–14.5)
PLATELET # BLD: 166 K/UL (ref 130–400)
POTASSIUM SERPL-SCNC: 4.7 MEQ/L (ref 3.4–4.9)
RBC # BLD: 4.78 M/UL (ref 4.7–6.1)
SODIUM BLD-SCNC: 140 MEQ/L (ref 135–144)
TOTAL PROTEIN: 7.1 G/DL (ref 6.3–8)
TROPONIN: <0.01 NG/ML (ref 0–0.01)
WBC # BLD: 5 K/UL (ref 4.8–10.8)

## 2021-06-10 PROCEDURE — 93005 ELECTROCARDIOGRAM TRACING: CPT | Performed by: EMERGENCY MEDICINE

## 2021-06-10 PROCEDURE — 93010 ELECTROCARDIOGRAM REPORT: CPT | Performed by: INTERNAL MEDICINE

## 2021-06-10 PROCEDURE — 36415 COLL VENOUS BLD VENIPUNCTURE: CPT

## 2021-06-10 PROCEDURE — 80053 COMPREHEN METABOLIC PANEL: CPT

## 2021-06-10 PROCEDURE — 85025 COMPLETE CBC W/AUTO DIFF WBC: CPT

## 2021-06-10 PROCEDURE — 99283 EMERGENCY DEPT VISIT LOW MDM: CPT

## 2021-06-10 PROCEDURE — 71045 X-RAY EXAM CHEST 1 VIEW: CPT

## 2021-06-10 PROCEDURE — 84484 ASSAY OF TROPONIN QUANT: CPT

## 2021-06-10 PROCEDURE — 83735 ASSAY OF MAGNESIUM: CPT

## 2021-06-10 ASSESSMENT — ENCOUNTER SYMPTOMS
SHORTNESS OF BREATH: 1
EYE PAIN: 0
CHEST TIGHTNESS: 1
ABDOMINAL PAIN: 0
RHINORRHEA: 0
BLOOD IN STOOL: 0
COLOR CHANGE: 0
NAUSEA: 0
WHEEZING: 0
COUGH: 0
VOMITING: 0
SORE THROAT: 0
BACK PAIN: 0
DIARRHEA: 0
TROUBLE SWALLOWING: 0
EYE DISCHARGE: 0
EYE REDNESS: 0
CONSTIPATION: 0

## 2021-06-10 ASSESSMENT — PAIN DESCRIPTION - DESCRIPTORS: DESCRIPTORS: DISCOMFORT;HEAVINESS

## 2021-06-10 ASSESSMENT — PAIN SCALES - GENERAL: PAINLEVEL_OUTOF10: 6

## 2021-06-10 ASSESSMENT — PAIN DESCRIPTION - LOCATION: LOCATION: CHEST

## 2021-06-10 NOTE — ED NOTES
Discharge instructions reviewed with patient including follow up care. Verbalized understanding. Denies additional questions. Denies chest pain/SOB at discharge. Sean Solorzano RN  06/10/21 1586

## 2021-06-10 NOTE — ED PROVIDER NOTES
[address] [facility] ED  EMERGENCY DEPARTMENT ENCOUNTER      Pt Name: Sachi Blue  MRN: [id number]  [place] 1950  Date of evaluation: 6/10/2021  Provider: RAVEN Diaz CNP    CHIEF COMPLAINT       Chief Complaint   Patient presents with    Chest Pain     CP for the last several weeks    Extremity Weakness     ble weakness 3-4 weeks         HISTORY OF PRESENT ILLNESS   (Location/Symptom, Timing/Onset,Context/Setting, Quality, Duration, Modifying Factors, Severity)  Note limiting factors. Sachi Blue is a 65 y.o. male who presents to the emergency department with a chart with past medical history of hypertension, anxiety, major depressive disorder, coronary artery disease, dyspepsia for chief complaint of gradual onset 6 out of 10 midsternal chest pain ongoing for the past few months, that comes on intermittent episodes. Patient did not want to come to the hospital to be evaluated for it and waited till now. At this time he reports that he is chest pain-free but he will let us know if it comes on while he is here. He reports some mild shortness of breath as well and chest tightness. Denies diaphoresis, dizziness, nausea or vomiting. Reports generalized weakness, mainly in the lower extremity region. He has no alleviating or modifying factors at this time. Nursing Notes were reviewed. REVIEW OF SYSTEMS    (2-9 systems for level 4, 10 or more for level 5)     Review of Systems   Constitutional: Negative for activity change, appetite change, fatigue and fever. HENT: Negative for congestion, ear pain, rhinorrhea, sore throat and trouble swallowing. Eyes: Negative for pain, discharge and redness. Respiratory: Positive for chest tightness and shortness of breath. Negative for cough and wheezing. Cardiovascular: Positive for chest pain. Negative for palpitations. Gastrointestinal: Negative for abdominal pain, blood in stool, constipation, diarrhea, nausea and vomiting. Endocrine: Negative for polydipsia and polyuria. Genitourinary: Negative for decreased urine volume, dysuria, flank pain and hematuria. Musculoskeletal: Positive for myalgias. Negative for arthralgias and back pain. Skin: Negative for color change, rash and wound. Neurological: Positive for weakness. Negative for dizziness, syncope, light-headedness and headaches. Psychiatric/Behavioral: Negative for behavioral problems. All other systems reviewed and are negative. Except as noted above the remainder of the review of systems was reviewed and negative.        PAST MEDICAL HISTORY     Past Medical History:   Diagnosis Date    Anxiety     Chest pain 3/29/2018    Coronary artery disease involving native coronary artery of native heart without angina pectoris 2/15/2019    Diabetes mellitus ([facility]     no meds    History of colon polyps     Hyperlipidemia     Hypertension     Type 2 diabetes mellitus without complication [facility])      Past Surgical History:   Procedure Laterality Date    CARDIAC SURGERY  1973    PTCA     COLONOSCOPY      COLONOSCOPY N/A 10/17/2019    COLORECTAL CANCER SCREENING, HIGH RISK performed by Lisa Flores MD at [facility] OTHER SURGICAL HISTORY      patent foramen ovale    TONSILLECTOMY      UPPER GASTROINTESTINAL ENDOSCOPY N/A 8/1/2019    EGD ESOPHAGOGASTRODUODENOSCOPY performed by Lisa Flores MD at [address] [place] [address] Marital status:      Spouse name: Not on file    Number of children: Not on file    Years of education: Not on file    Highest education level: Not on file   Occupational History    Not on file   Tobacco Use    Smoking status: Never Smoker    Smokeless tobacco: Never Used   Vaping Use    Vaping Use: Never used   Substance and Sexual Activity    Alcohol use: No    Drug use: Never    Sexual activity: Not on file   Other Topics Concern    Not on file   Social History Narrative    Not on file     Social Determinants of Health     Financial Resource Strain:     Difficulty of Paying Living Expenses:    Food Insecurity:     Worried About Running Out of Food in the Last Year:     [address] in the Last Year:    Transportation Needs:     Lack of Transportation (Medical):  Lack of Transportation (Non-Medical):    Physical Activity:     Days of Exercise per Week:     Minutes of Exercise per Session:    Stress:     Feeling of Stress :    Social Connections:     Frequency of Communication with Friends and Family:     Frequency of Social Gatherings with Friends and Family:     Attends Jew Services:     Active Member of Clubs or Organizations:     Attends Club or Organization Meetings:     Marital Status:    Intimate Partner Violence:     Fear of Current or Ex-Partner:     Emotionally Abused:     Physically Abused:     Sexually Abused:        SCREENINGS             PHYSICAL EXAM    (up to 7 for level 4, 8 or more for level 5)     ED Triage Vitals [06/10/21 1301]   BP Temp Temp Source Pulse Resp SpO2 Height Weight   120/76 97.8 °F (36.6 °C) Temporal 63 18 95 % 5' (1.524 m) 140 lb (63.5 kg)       Physical Exam  Vitals and nursing note reviewed. Constitutional:       General: He is not in acute distress. Appearance: He is well-developed. He is not diaphoretic. HENT:      Head: Normocephalic and atraumatic. Nose: Nose normal.   Eyes:      Conjunctiva/sclera: Conjunctivae normal.      Pupils: Pupils are equal, round, and reactive to light. Cardiovascular:      Rate and Rhythm: Normal rate and regular rhythm. Heart sounds: Normal heart sounds. Pulmonary:      Effort: Pulmonary effort is normal. No respiratory distress. Breath sounds: Normal breath sounds. No wheezing. Abdominal:      General: Bowel sounds are normal.      Palpations: Abdomen is soft. Tenderness:  There is Weight: 140 lb (63.5 kg)     Height: 5' (1.524 m)              MDM   Patient is a 68-year-old male presenting to the ER with a chief complaint of chest pain and chest tightness for few months. Hemodynamically stable nontoxic-appearing. Cardiac work-up initiated and patient will be reevaluated. Lab work unremarkable. Chest xray normal. EKG WNL. Patient dc to f/u with Dr. Paula Pagan in an outpatient setting and return back to the ER if worse in anyway or any new symptoms develop. Patient discharged in a stable condition with stable vital signs. CRITICAL CARE TIME       CONSULTS:  None    PROCEDURES:  Unless otherwise noted below, none     Procedures    FINAL IMPRESSION      1.  Chest pain, unspecified type          DISPOSITION/PLAN   DISPOSITION Decision To Discharge 06/10/2021 02:22:09 PM      PATIENT REFERRED TO:  Sindy Adkins MD  [phone]    Schedule an appointment as soon as possible for a visit in 1 day      [address]  [phone]    Schedule an appointment as soon as possible for a visit in 1 day        [address] Makenna Carlson:  Discharge Medication List as of 6/10/2021  2:23 PM             (Please notethat portions of this note were completed with a voice recognition program.  Efforts were made to edit the dictations but occasionally words are mis-transcribed.)    RAVEN Lorenzo CNP (electronically signed)  Attending Emergency Physician          RAVEN Box CNP  06/10/21 2946

## 2021-06-22 ENCOUNTER — OFFICE VISIT (OUTPATIENT)
Dept: CARDIOLOGY CLINIC | Age: 71
End: 2021-06-22
Payer: COMMERCIAL

## 2021-06-22 VITALS
SYSTOLIC BLOOD PRESSURE: 130 MMHG | OXYGEN SATURATION: 100 % | BODY MASS INDEX: 29.84 KG/M2 | WEIGHT: 152.8 LBS | TEMPERATURE: 97.1 F | DIASTOLIC BLOOD PRESSURE: 76 MMHG | HEART RATE: 72 BPM | RESPIRATION RATE: 14 BRPM

## 2021-06-22 DIAGNOSIS — I20.9 ANGINA, CLASS IV (HCC): ICD-10-CM

## 2021-06-22 DIAGNOSIS — R07.9 CHEST PAIN, UNSPECIFIED TYPE: Primary | ICD-10-CM

## 2021-06-22 DIAGNOSIS — F41.9 ANXIETY: ICD-10-CM

## 2021-06-22 DIAGNOSIS — I25.10 CORONARY ARTERY DISEASE INVOLVING NATIVE CORONARY ARTERY OF NATIVE HEART WITHOUT ANGINA PECTORIS: ICD-10-CM

## 2021-06-22 DIAGNOSIS — I10 ESSENTIAL HYPERTENSION: ICD-10-CM

## 2021-06-22 PROCEDURE — G8417 CALC BMI ABV UP PARAM F/U: HCPCS | Performed by: INTERNAL MEDICINE

## 2021-06-22 PROCEDURE — 99214 OFFICE O/P EST MOD 30 MIN: CPT | Performed by: INTERNAL MEDICINE

## 2021-06-22 PROCEDURE — 1036F TOBACCO NON-USER: CPT | Performed by: INTERNAL MEDICINE

## 2021-06-22 PROCEDURE — 3017F COLORECTAL CA SCREEN DOC REV: CPT | Performed by: INTERNAL MEDICINE

## 2021-06-22 PROCEDURE — 4040F PNEUMOC VAC/ADMIN/RCVD: CPT | Performed by: INTERNAL MEDICINE

## 2021-06-22 PROCEDURE — G8427 DOCREV CUR MEDS BY ELIG CLIN: HCPCS | Performed by: INTERNAL MEDICINE

## 2021-06-22 PROCEDURE — 1123F ACP DISCUSS/DSCN MKR DOCD: CPT | Performed by: INTERNAL MEDICINE

## 2021-06-22 RX ORDER — RANOLAZINE 1000 MG/1
1000 TABLET, EXTENDED RELEASE ORAL 2 TIMES DAILY
Qty: 60 TABLET | Refills: 6 | Status: SHIPPED | OUTPATIENT
Start: 2021-06-22 | End: 2021-07-07 | Stop reason: SDUPTHER

## 2021-06-22 ASSESSMENT — ENCOUNTER SYMPTOMS
SHORTNESS OF BREATH: 0
GASTROINTESTINAL NEGATIVE: 1
ABDOMINAL PAIN: 0
VOMITING: 0
NAUSEA: 0
WHEEZING: 0
EYES NEGATIVE: 1

## 2021-06-22 NOTE — PROGRESS NOTES
6/22/2021        HPI:    3-30-18: Patient is a 69 y. o. male who presents with a chief complaint of chest pain. Patient is followed on a regular basis by Dr. Lupe Soto primary care provider on file. . States he felt a lump in his chest and a burning sensation in his esophagus while at home resting. Relived with klonopin he states. His BP was high on arrival in 170's. Denies any associated SOB, N/V, or diaphoresis. No hx of MI, CHF or arrhythmia. Top are negative and EKG without any acute ischemia. Hx of [facility] in 1970's he states. CP free at this time. Does admit to having DM, HTN and HLP. No smoking.         4-27-18: Patient presents for initial medical evaluation. Patient is followed on a regular basis by Dr. Grace Bailey. Having extreme anxiety and panic attack now. He was seen in ER yesterday and was sent to [facility]. He was not given any meds. Has not slept in 4 days. He says only klonopin works for him. Feels a heaviness in his chest that is terrible per patient, says he almost passed out. His wife is very sick and states he cannot stay in the hospital.      s/p normal ECHO     s/p nuclear stress test.    IMPRESSION:  1.  Slightly reduced exercise tolerance. 2.  Homogenous myocardial perfusion with no evidence of prior myocardial  infarction or ischemia. 3.  Normal left ventricular ejection fraction at 66%. 4.  TID ratio 0.9 which is within normal limits.        5-11-18: was sent to ER last office visit for anxiety. Was placed on meds and feeling a little better overall. Was also placed on Imdur and gave him a bid headache and left arm pain, so he stopped it. No more CP. Pt denies chest pain, dyspnea, dyspnea on exertion, change in exercise capacity, fatigue,  nausea, vomiting, diarrhea, constipation, motor weakness, insomnia, weight loss, syncope, dizziness, lightheadedness, palpitations, PND, orthopnea, or claudication. BP and hr are good. No LE discoloration or ulcers. No LE edema. No CHF type symptoms. Lipid profile is normal. No recent hospitalization. No change in meds.      1-24-19: was in ER yesterday for midsternal chest pain, which was relieved with nitro and ASA. Does have anxiety as well, takes Klonopin for that. Pain occurred at rest, lasting more than 20 min. Had re occurrence of his CP today on the way over to my office. He gets tired with walking. Some SOB. + hx of DM, HTN and HLP. Pt denies nausea, vomiting, diarrhea, constipation, motor weakness, insomnia, weight loss, syncope, dizziness, lightheadedness, palpitations, PND, orthopnea, or claudication. no hx of smoking.         2-15-19: s/p LHC with PCI of 90% CX stenosis. Mild LAD and RCA disease, normal LVF. On DAPT, no bleeding issues. Pt denies chest pain, dyspnea, dyspnea on exertion, change in exercise capacity, fatigue,  nausea, vomiting, diarrhea, constipation, motor weakness, insomnia, weight loss, syncope, dizziness, lightheadedness, palpitations, PND, orthopnea. No nitro use. BP and hr are good. CAD is stable. No LE discoloration or ulcers. No LE edema. No CHF type symptoms. Lipid profile is normal. No recent hospitalization. No change in meds. Seeing an alternative medicine physician. Patient has underlying anxiety.      5-10-19: has anxiety issues. Does have some atypical CP from time to time. On Brilinta, not taking ASA. Pt denies dyspnea, dyspnea on exertion, change in exercise capacity, fatigue,  nausea, vomiting, diarrhea, constipation, motor weakness, insomnia, weight loss, syncope, dizziness, lightheadedness, palpitations, PND, orthopnea, or claudication. No nitro use. BP and hr are good. CAD is stable. No LE discoloration or ulcers. No LE edema. No CHF type symptoms. Lipid profile is normal. No recent hospitalization.  No change in meds.         1115-19:  77-year-old male who is very well-known to our cardiac service presented to the emergency Department complaint left-sided chest pain atypical in nature states is been in the chest pain, dyspnea, dyspnea on exertion, change in exercise capacity, fatigue,  nausea, vomiting, diarrhea, constipation, motor weakness, insomnia, weight loss, syncope, dizziness, lightheadedness, palpitations, PND, orthopnea, or claudication. No nitro use. BP and hr are good. CAD is stable. No LE discoloration or ulcers. No LE edema. No CHF type symptoms. Lipid profile is normal. No recent hospitalization. No change in meds. S/p LHC in  with patent CX stents, 30% ostial LAD, mid RCA 40%. Normal LVF     20: Sachi Blue (:  1950) has requested an audio/video evaluation for the following concern(s):     Pt denies chest pain, dyspnea, dyspnea on exertion, change in exercise capacity, fatigue,  nausea, vomiting, diarrhea, constipation, motor weakness, insomnia, weight loss, syncope, dizziness, lightheadedness, palpitations, PND, orthopnea, or claudication. Hx of CAD s/p remote PCI. On DAPT. No bleeding issues. S/p normal MARGARET  Impression       NORMAL BILATERAL ANKLE-BRACHIAL INDEX.       MILD BILATERAL DISTAL/TIBIAL PAD SUGGESTED BY TBI LESS THAN 0.7.     2021: Patient status post ER visit to [facility] for chest pain had negative work-up. Cardiac enzymes were negative and EKG was benign. He was discharged home. He states he is compliant with his medications. Patient with history of CAD status post remote PCI. Last cardiac catheterization in 2019 with patent circumflex stents 30% ostial LAD mid RCA 40% and normal LV function. Review of Systems   Constitutional: Negative. Negative for chills and fever. Eyes: Negative. Respiratory: Negative for shortness of breath and wheezing. Cardiovascular: Negative for chest pain, palpitations and leg swelling. Gastrointestinal: Negative. Negative for abdominal pain, nausea and vomiting. Skin: Negative. Negative for rash. Neurological: Negative for dizziness, weakness and headaches.        Prior to Visit Medications Medication Sig Taking? Authorizing Provider   ranolazine (RANEXA) 1000 MG extended release tablet Take 1 tablet by mouth 2 times daily Yes Rian JONES Holiday, DO   vitamin C (ASCORBIC ACID) 500 MG tablet Take 500 mg by mouth daily Yes Historical Provider, MD   phytonadione (VITAMIN K) 5 MG tablet Take 5 mg by mouth once Yes Historical Provider, MD   vitamin E 1000 units capsule Take 1,000 Units by mouth daily Yes Historical Provider, MD   tamsulosin (FLOMAX) 0.4 MG capsule Take 1 capsule by mouth daily Yes Juan J Bryant MD   Doxepin HCl (SILENOR) 6 MG TABS Take by mouth nightly Yes Historical Provider, MD   Omega-3 Fatty Acids (FISH OIL) 1000 MG CAPS Take 3,000 mg by mouth every other day Yes Historical Provider, MD   VORTIoxetine (TRINTELLIX) 5 MG tablet Take 5 mg by mouth daily Yes Historical Provider, MD   omeprazole (PRILOSEC) 20 MG delayed release capsule Take 1 capsule by mouth every morning (before breakfast) Yes RAVEN Marie - CNP   latanoprost (XALATAN) 0.005 % ophthalmic solution Use 1 Drop in both eyes daily at bedtime. Yes Historical Provider, MD   nitroGLYCERIN (NITROSTAT) 0.4 MG SL tablet up to max of 3 total doses. If no relief after 1 dose, call 911. Yes Chato Maki,    olopatadine (PATADAY) 0.2 % SOLN ophthalmic solution Apply 1 drop to eye daily For allergies Yes Danilo Willard MD   Blood Glucose Monitoring Suppl ([address]) w/Device KIT  Yes Historical Provider, MD [place] strip  Yes Historical Provider, MD [place] [facility] [address]  Yes Historical Provider, MD   magnesium oxide (MAG-OX) 400 MG tablet Take 400 mg by mouth daily Yes Historical Provider, MD   Cholecalciferol (VITAMIN D) 2000 units CAPS capsule Take 5,000 Units by mouth daily  Yes Historical Provider, MD   clonazePAM (KLONOPIN) 0.5 MG tablet Take 1 tablet by mouth 2 times daily as needed for Anxiety for up to 15 days. .  Patient taking differently: Take 1 mg by mouth nightly. Belkis Granados MD       Social History     Tobacco Use    Smoking status: Never Smoker    Smokeless tobacco: Never Used   Vaping Use    Vaping Use: Never used   Substance Use Topics    Alcohol use: No    Drug use: Never        Allergies   Allergen Reactions    Seroquel [Quetiapine] Other (See Comments)     lethargy  weak   ,   Past Medical History:   Diagnosis Date    Anxiety     Chest pain 3/29/2018    Coronary artery disease involving native coronary artery of native heart without angina pectoris 2/15/2019    Diabetes mellitus ([address]     no meds    History of colon polyps     Hyperlipidemia     Hypertension     Type 2 diabetes mellitus without complication (HCC)    ,   Past Surgical History:   Procedure Laterality Date    CARDIAC SURGERY  1973    PTCA     COLONOSCOPY      COLONOSCOPY N/A 10/17/2019    COLORECTAL CANCER SCREENING, HIGH RISK performed by Bruno Martinez MD at [facility] OTHER SURGICAL HISTORY      patent foramen ovale    TONSILLECTOMY      UPPER GASTROINTESTINAL ENDOSCOPY N/A 8/1/2019    EGD ESOPHAGOGASTRODUODENOSCOPY performed by Bruno [facility] MD at [facility]   ,   Family History   Problem Relation Age of Onset    Heart Disease Maternal Aunt     Cancer Maternal Aunt     Colon Cancer Maternal Aunt           Physical Examination:  General appearance - alert, well appearing, and in no distress  Mental status - alert, oriented to person, place, and time  Neck - Neck is supple, no JVD or carotid bruits. No thyromegaly or adenopathy.    Chest - clear to auscultation, no wheezes, rales or rhonchi, symmetric air entry  Heart - normal rate, regular rhythm, normal S1, S2, no murmurs, rubs, clicks or gallops  Abdomen - soft, nontender, nondistended, no masses or organomegaly  Neurological - alert, oriented, normal speech, no focal findings or movement disorder noted  Extremities - peripheral pulses normal, no pedal edema, no clubbing or cyanosis  Skin - normal coloration and turgor, no rashes, no suspicious skin lesions noted    ASSESSMENT:        Diagnosis Orders   1. Chest pain, unspecified type     2. Essential hypertension     3. Anxiety     4. Coronary artery disease involving native coronary artery of native heart without angina pectoris     5. Angina, class IV (HCC)  ranolazine (RANEXA) 1000 MG extended release tablet    NM MYOCARDIAL SPECT REST EXERCISE OR RX       PLAN:    Patient will need to continue to follow up with you for their general medical care      As always, aggressive risk factor modification is strongly recommended. We should adhere to the JNC VIII guidelines for HTN management and the NCEP ATP III guidelines for LDL-C management.     Cardiac diet is always recommended with low fat, cholesterol, calories and sodium.     Will continue to monitor patient clinically, if symptoms develop or worsen, they are to let me know ASAP or head to the nearest emergeny room.     Continue medications at current doses. Obtain a nuclear myocardial perfusion stress test to r/o cardiac ischemia. Increase ranexa to 1000mg BID.      Will continue to monitor patient clinically, if symptoms develop or worsen, they are to let me know ASAP or head to the nearest emergeny room.     Patient was advised and encouraged to check blood pressure at home or at a pharmacy, maintain a logbook, and also call us back if blood pressure are above the target ranges or if it is low. Patient clearly understands and agrees to the instructions.      We will need to continue to monitor muscle and liver enzymes, BUN, CR, and electrolytes.     Details of medical condition explained and patient was warned about adverse consequences of uncontrolled medical conditions and possible side effects of prescribed medications. No follow-ups on file. An  electronic signature was used to authenticate this note.     --Carlos Collins Holiday, DO on 6/22/2021 at 11:46 AM  9}

## 2021-07-07 DIAGNOSIS — I20.9 ANGINA, CLASS IV (HCC): ICD-10-CM

## 2021-07-07 RX ORDER — RANOLAZINE 1000 MG/1
1000 TABLET, EXTENDED RELEASE ORAL 2 TIMES DAILY
Qty: 60 TABLET | Refills: 6 | Status: ON HOLD | OUTPATIENT
Start: 2021-07-07 | End: 2021-10-26

## 2021-08-15 ENCOUNTER — APPOINTMENT (OUTPATIENT)
Dept: CT IMAGING | Age: 71
End: 2021-08-15
Payer: COMMERCIAL

## 2021-08-15 ENCOUNTER — HOSPITAL ENCOUNTER (EMERGENCY)
Age: 71
Discharge: HOME | End: 2021-08-15
Attending: EMERGENCY MEDICINE
Payer: COMMERCIAL

## 2021-08-15 ENCOUNTER — APPOINTMENT (OUTPATIENT)
Dept: GENERAL RADIOLOGY | Age: 71
End: 2021-08-15
Payer: COMMERCIAL

## 2021-08-15 VITALS
OXYGEN SATURATION: 98 % | HEIGHT: 66 IN | DIASTOLIC BLOOD PRESSURE: 85 MMHG | HEART RATE: 60 BPM | RESPIRATION RATE: 17 BRPM | BODY MASS INDEX: 22.5 KG/M2 | SYSTOLIC BLOOD PRESSURE: 137 MMHG | WEIGHT: 140 LBS | TEMPERATURE: 97.9 F

## 2021-08-15 DIAGNOSIS — R42 DIZZINESS: Primary | ICD-10-CM

## 2021-08-15 LAB
ALBUMIN SERPL-MCNC: 4 G/DL (ref 3.5–4.6)
ALP BLD-CCNC: 62 U/L (ref 35–104)
ALT SERPL-CCNC: 9 U/L (ref 0–41)
ANION GAP SERPL CALCULATED.3IONS-SCNC: 9 MEQ/L (ref 9–15)
AST SERPL-CCNC: 11 U/L (ref 0–40)
BASOPHILS ABSOLUTE: 0 K/UL (ref 0–0.2)
BASOPHILS RELATIVE PERCENT: 0.7 %
BILIRUB SERPL-MCNC: 1.3 MG/DL (ref 0.2–0.7)
BILIRUBIN URINE: NEGATIVE
BLOOD, URINE: NEGATIVE
BUN BLDV-MCNC: 16 MG/DL (ref 8–23)
CALCIUM SERPL-MCNC: 9.2 MG/DL (ref 8.5–9.9)
CHLORIDE BLD-SCNC: 106 MEQ/L (ref 95–107)
CLARITY: CLEAR
CO2: 24 MEQ/L (ref 20–31)
COLOR: YELLOW
CREAT SERPL-MCNC: 1 MG/DL (ref 0.7–1.2)
EOSINOPHILS ABSOLUTE: 0.1 K/UL (ref 0–0.7)
EOSINOPHILS RELATIVE PERCENT: 2.4 %
GFR AFRICAN AMERICAN: >60
GFR NON-AFRICAN AMERICAN: >60
GLOBULIN: 2.5 G/DL (ref 2.3–3.5)
GLUCOSE BLD-MCNC: 111 MG/DL (ref 70–99)
GLUCOSE URINE: NEGATIVE MG/DL
HCT VFR BLD CALC: 41.5 % (ref 42–52)
HEMOGLOBIN: 14.6 G/DL (ref 14–18)
KETONES, URINE: NEGATIVE MG/DL
LEUKOCYTE ESTERASE, URINE: NEGATIVE
LYMPHOCYTES ABSOLUTE: 1 K/UL (ref 1–4.8)
LYMPHOCYTES RELATIVE PERCENT: 19.9 %
MAGNESIUM: 1.9 MG/DL (ref 1.7–2.4)
MCH RBC QN AUTO: 31.5 PG (ref 27–31.3)
MCHC RBC AUTO-ENTMCNC: 35.2 % (ref 33–37)
MCV RBC AUTO: 89.5 FL (ref 80–100)
MONOCYTES ABSOLUTE: 0.4 K/UL (ref 0.2–0.8)
MONOCYTES RELATIVE PERCENT: 7.3 %
NEUTROPHILS ABSOLUTE: 3.3 K/UL (ref 1.4–6.5)
NEUTROPHILS RELATIVE PERCENT: 69.7 %
NITRITE, URINE: NEGATIVE
PDW BLD-RTO: 14.1 % (ref 11.5–14.5)
PH UA: 7 (ref 5–9)
PLATELET # BLD: 155 K/UL (ref 130–400)
POTASSIUM SERPL-SCNC: 3.7 MEQ/L (ref 3.4–4.9)
PROTEIN UA: NEGATIVE MG/DL
RBC # BLD: 4.63 M/UL (ref 4.7–6.1)
SODIUM BLD-SCNC: 139 MEQ/L (ref 135–144)
SPECIFIC GRAVITY UA: 1.01 (ref 1–1.03)
TOTAL PROTEIN: 6.5 G/DL (ref 6.3–8)
TROPONIN: <0.01 NG/ML (ref 0–0.01)
UROBILINOGEN, URINE: 1 E.U./DL
WBC # BLD: 4.8 K/UL (ref 4.8–10.8)

## 2021-08-15 PROCEDURE — 70450 CT HEAD/BRAIN W/O DYE: CPT

## 2021-08-15 PROCEDURE — 96374 THER/PROPH/DIAG INJ IV PUSH: CPT

## 2021-08-15 PROCEDURE — 6360000002 HC RX W HCPCS: Performed by: EMERGENCY MEDICINE

## 2021-08-15 PROCEDURE — 2580000003 HC RX 258: Performed by: EMERGENCY MEDICINE

## 2021-08-15 PROCEDURE — 83735 ASSAY OF MAGNESIUM: CPT

## 2021-08-15 PROCEDURE — 93005 ELECTROCARDIOGRAM TRACING: CPT | Performed by: EMERGENCY MEDICINE

## 2021-08-15 PROCEDURE — 36415 COLL VENOUS BLD VENIPUNCTURE: CPT

## 2021-08-15 PROCEDURE — 6370000000 HC RX 637 (ALT 250 FOR IP): Performed by: EMERGENCY MEDICINE

## 2021-08-15 PROCEDURE — 85025 COMPLETE CBC W/AUTO DIFF WBC: CPT

## 2021-08-15 PROCEDURE — 99285 EMERGENCY DEPT VISIT HI MDM: CPT

## 2021-08-15 PROCEDURE — 71045 X-RAY EXAM CHEST 1 VIEW: CPT

## 2021-08-15 PROCEDURE — 80053 COMPREHEN METABOLIC PANEL: CPT

## 2021-08-15 PROCEDURE — 84484 ASSAY OF TROPONIN QUANT: CPT

## 2021-08-15 PROCEDURE — 81003 URINALYSIS AUTO W/O SCOPE: CPT

## 2021-08-15 RX ORDER — ONDANSETRON 4 MG/1
4 TABLET, ORALLY DISINTEGRATING ORAL 3 TIMES DAILY PRN
Qty: 21 TABLET | Refills: 0 | Status: SHIPPED | OUTPATIENT
Start: 2021-08-15 | End: 2022-05-06 | Stop reason: ALTCHOICE

## 2021-08-15 RX ORDER — MECLIZINE HYDROCHLORIDE 25 MG/1
25 TABLET ORAL ONCE
Status: COMPLETED | OUTPATIENT
Start: 2021-08-15 | End: 2021-08-15

## 2021-08-15 RX ORDER — 0.9 % SODIUM CHLORIDE 0.9 %
1000 INTRAVENOUS SOLUTION INTRAVENOUS ONCE
Status: COMPLETED | OUTPATIENT
Start: 2021-08-15 | End: 2021-08-15

## 2021-08-15 RX ORDER — ONDANSETRON 2 MG/ML
4 INJECTION INTRAMUSCULAR; INTRAVENOUS ONCE
Status: COMPLETED | OUTPATIENT
Start: 2021-08-15 | End: 2021-08-15

## 2021-08-15 RX ORDER — MECLIZINE HYDROCHLORIDE 25 MG/1
25 TABLET ORAL 2 TIMES DAILY PRN
Qty: 60 TABLET | Refills: 0 | Status: SHIPPED | OUTPATIENT
Start: 2021-08-15 | End: 2021-09-14

## 2021-08-15 RX ADMIN — ONDANSETRON 4 MG: 2 INJECTION INTRAMUSCULAR; INTRAVENOUS at 07:16

## 2021-08-15 RX ADMIN — MECLIZINE HYDROCHLORIDE 25 MG: 25 TABLET ORAL at 07:16

## 2021-08-15 RX ADMIN — SODIUM CHLORIDE 1000 ML: 9 INJECTION, SOLUTION INTRAVENOUS at 07:16

## 2021-08-15 ASSESSMENT — ENCOUNTER SYMPTOMS
VOMITING: 0
SHORTNESS OF BREATH: 0
SORE THROAT: 0
DIARRHEA: 0
NAUSEA: 1
COUGH: 0
ABDOMINAL PAIN: 0
BACK PAIN: 0

## 2021-08-15 NOTE — ED NOTES
States has feeling of chest heaviness at the epigastric area and lower chest. C/o increased nausea and feeling of gas/ belching.       [address], RN  08/15/21 [id number]

## 2021-08-15 NOTE — ED TRIAGE NOTES
Arrived to the ER by [facility] for dizziness. States woke up in the morning to use the restroom when felt dizziness. States dizziness occurs when sits up and relieves when lies back. Dizziness is described as sensation of swaying. Denies CP,SOB, or changes in vision. +nausea, without vomiting.

## 2021-08-15 NOTE — ED NOTES
Orthostatic VS completed. Denies vertigo.  Lying 133/85-60, sitting 143/80-59, standing 145/86-63     [facility]  08/15/21 1855

## 2021-08-15 NOTE — ED NOTES
Pt sitting upright in ED cot w/no s/s of distress noted. Resp even and unlabored. D/C pending IVF. Will continue to monitor pt.       Radha Dawson RN  08/15/21 0900

## 2021-08-15 NOTE — ED PROVIDER NOTES
[facility] ED  eMERGENCYdEPARTMENT eNCOUnter      Pt Name: Albaro Baptiste  MRN: [id number]  [place] 1950  Date of evaluation: 8/15/2021  Schuyler Nguyễn MD    CHIEF COMPLAINT           HPI  Albaro Baptiste is a 65 y.o. male per chart review has a h/o CAD, DM II, HTN, Hpl presents to the ED with dizziness. Pt notes this morning he went from laying down to standing up and noted gradual onset, moderate, constant, dizziness. +N/-v.  Pt denies headache, cp, sob, ab pain, dysuria, diarrhea      ROS  Review of Systems   Constitutional: Negative for activity change, chills and fever. HENT: Negative for ear pain and sore throat. Eyes: Negative for visual disturbance. Respiratory: Negative for cough and shortness of breath. Cardiovascular: Negative for chest pain, palpitations and leg swelling. Gastrointestinal: Positive for nausea. Negative for abdominal pain, diarrhea and vomiting. Genitourinary: Negative for dysuria. Musculoskeletal: Negative for back pain. Skin: Negative for rash. Neurological: Positive for dizziness. Negative for weakness. Except as noted above the remainder of the review of systems was reviewed and negative.        PAST MEDICAL HISTORY     Past Medical History:   Diagnosis Date    Anxiety     Chest pain 3/29/2018    Coronary artery disease involving native coronary artery of native heart without angina pectoris 2/15/2019    Diabetes mellitus [address] [address]     no meds    History of colon polyps     Hyperlipidemia     Hypertension     Type 2 diabetes mellitus without complication ([address]     Vertigo          SURGICAL HISTORY       Past Surgical History:   Procedure Laterality Date    CARDIAC SURGERY  1973    PTCA     COLONOSCOPY      COLONOSCOPY N/A 10/17/2019    COLORECTAL CANCER SCREENING, HIGH RISK performed by Princess Vela MD at [facility] OTHER SURGICAL HISTORY      patent foramen ovale    TONSILLECTOMY      UPPER GASTROINTESTINAL ENDOSCOPY N/A 8/1/2019    EGD ESOPHAGOGASTRODUODENOSCOPY performed by Mahogany Goddard MD at [facility]. (ONETOUCH VERIO FLEX SYSTEM) W/DEVICE KIT        CHOLECALCIFEROL (VITAMIN D) 2000 UNITS CAPS CAPSULE    Take 5,000 Units by mouth daily     CLONAZEPAM (KLONOPIN) 0.5 MG TABLET    Take 1 tablet by mouth 2 times daily as needed for Anxiety for up to 15 days. Faisal Contreras DOXEPIN HCL (SILENOR) 6 MG TABS    Take by mouth nightly    LATANOPROST (XALATAN) 0.005 % OPHTHALMIC SOLUTION    Use 1 Drop in both eyes daily at bedtime. MAGNESIUM OXIDE (MAG-OX) 400 MG TABLET    Take 400 mg by mouth daily    NITROGLYCERIN (NITROSTAT) 0.4 MG SL TABLET    up to max of 3 total doses. If no relief after 1 dose, call 911.     OLOPATADINE (PATADAY) 0.2 % SOLN OPHTHALMIC SOLUTION    Apply 1 drop to eye daily For allergies    OMEGA-3 FATTY ACIDS (FISH OIL) 1000 MG CAPS    Take 3,000 mg by mouth every other day    OMEPRAZOLE (PRILOSEC) 20 MG DELAYED RELEASE CAPSULE    Take 1 capsule by mouth every morning (before breakfast)    ONETOUCH DELICA LANCETS 68G MISC        ONETOUCH VERIO STRIP        PHYTONADIONE (VITAMIN K) 5 MG TABLET    Take 5 mg by mouth once    RANOLAZINE (RANEXA) 1000 MG EXTENDED RELEASE TABLET    Take 1 tablet by mouth 2 times daily    TAMSULOSIN (FLOMAX) 0.4 MG CAPSULE    Take 1 capsule by mouth daily    VITAMIN C (ASCORBIC ACID) 500 MG TABLET    Take 500 mg by mouth daily    VITAMIN E 1000 UNITS CAPSULE    Take 1,000 Units by mouth daily    VORTIOXETINE (TRINTELLIX) 5 MG TABLET    Take 5 mg by mouth daily       ALLERGIES     Seroquel [quetiapine]    FAMILY HISTORY       Family History   Problem Relation Age of Onset    Heart Disease Maternal Aunt     Cancer Maternal Aunt     Colon Cancer Maternal Aunt           SOCIAL HISTORY       Social History     Socioeconomic History Heart sounds: Normal heart sounds. Pulmonary:      Effort: Pulmonary effort is normal.      Breath sounds: Normal breath sounds. Abdominal:      General: Bowel sounds are normal. There is no distension. Palpations: Abdomen is soft. Tenderness: There is no abdominal tenderness. Musculoskeletal:         General: Normal range of motion. Cervical back: Normal range of motion and neck supple. Skin:     General: Skin is warm and dry. Neurological:      Mental Status: He is alert and oriented to person, place, and time. Psychiatric:         Mood and Affect: Mood normal.           MDM  64 yo male presents to the ED with dizziness, nausea. Pt is afebrile, hemodynamically stable. Pt given 1 L NS, IV zofran, PO antivert in the ED. EKG shows sinus forrest with HR 57, normal axis, normal intervals, no ST changes. Labs unremarkable. CT head, CXR negative. Pt likely with orthostatic hypotension given that pt went from sitting to standing and got dizzy. Pt educated about dizziness and orthostatic hypotension. Pt given PO food and ginger ale. Pt given prescription for zofran, antivert. Pt given dizziness warning signs and will f/u with pcp. Pt understands plan. FINAL IMPRESSION      1.  Dizziness          DISPOSITION/PLAN   DISPOSITION Decision To Discharge 08/15/2021 08:07:20 AM        DISCHARGE MEDICATIONS:  [unfilled]         Woodrow Bauer MD(electronically signed)  Attending Emergency Physician            Woodrow Bauer MD  08/15/21 2464

## 2021-08-16 LAB
EKG ATRIAL RATE: 57 BPM
EKG P AXIS: 56 DEGREES
EKG P-R INTERVAL: 190 MS
EKG Q-T INTERVAL: 466 MS
EKG QRS DURATION: 110 MS
EKG QTC CALCULATION (BAZETT): 453 MS
EKG R AXIS: 35 DEGREES
EKG T AXIS: 68 DEGREES
EKG VENTRICULAR RATE: 57 BPM

## 2021-08-16 PROCEDURE — 93010 ELECTROCARDIOGRAM REPORT: CPT | Performed by: INTERNAL MEDICINE

## 2021-09-06 ENCOUNTER — HOSPITAL ENCOUNTER (EMERGENCY)
Age: 71
Discharge: HOME | DRG: 871 | End: 2021-09-06
Payer: COMMERCIAL

## 2021-09-06 ENCOUNTER — APPOINTMENT (OUTPATIENT)
Dept: GENERAL RADIOLOGY | Age: 71
DRG: 871 | End: 2021-09-06
Payer: COMMERCIAL

## 2021-09-06 VITALS
SYSTOLIC BLOOD PRESSURE: 132 MMHG | RESPIRATION RATE: 16 BRPM | HEIGHT: 66 IN | WEIGHT: 140 LBS | HEART RATE: 99 BPM | BODY MASS INDEX: 22.5 KG/M2 | DIASTOLIC BLOOD PRESSURE: 78 MMHG | OXYGEN SATURATION: 96 % | TEMPERATURE: 99.2 F

## 2021-09-06 DIAGNOSIS — U07.1 COVID-19: ICD-10-CM

## 2021-09-06 DIAGNOSIS — R53.1 GENERALIZED WEAKNESS: Primary | ICD-10-CM

## 2021-09-06 LAB
ALBUMIN SERPL-MCNC: 3.9 G/DL (ref 3.5–4.6)
ALP BLD-CCNC: 65 U/L (ref 35–104)
ALT SERPL-CCNC: 13 U/L (ref 0–41)
ANION GAP SERPL CALCULATED.3IONS-SCNC: 9 MEQ/L (ref 9–15)
AST SERPL-CCNC: 19 U/L (ref 0–40)
BILIRUB SERPL-MCNC: 0.9 MG/DL (ref 0.2–0.7)
BUN BLDV-MCNC: 14 MG/DL (ref 8–23)
CALCIUM SERPL-MCNC: 9 MG/DL (ref 8.5–9.9)
CHLORIDE BLD-SCNC: 104 MEQ/L (ref 95–107)
CO2: 25 MEQ/L (ref 20–31)
CREAT SERPL-MCNC: 1.02 MG/DL (ref 0.7–1.2)
ETHANOL PERCENT: NORMAL G/DL
ETHANOL: <10 MG/DL (ref 0–0.08)
GFR AFRICAN AMERICAN: >60
GFR NON-AFRICAN AMERICAN: >60
GLOBULIN: 2.6 G/DL (ref 2.3–3.5)
GLUCOSE BLD-MCNC: 151 MG/DL (ref 70–99)
HCT VFR BLD CALC: 45.7 % (ref 42–52)
HEMOGLOBIN: 16 G/DL (ref 14–18)
INFLUENZA A BY PCR: NEGATIVE
INFLUENZA B BY PCR: NEGATIVE
MAGNESIUM: 1.7 MG/DL (ref 1.7–2.4)
MCH RBC QN AUTO: 31.6 PG (ref 27–31.3)
MCHC RBC AUTO-ENTMCNC: 35 % (ref 33–37)
MCV RBC AUTO: 90.1 FL (ref 80–100)
PDW BLD-RTO: 13.9 % (ref 11.5–14.5)
PLATELET # BLD: 162 K/UL (ref 130–400)
POTASSIUM SERPL-SCNC: 4 MEQ/L (ref 3.4–4.9)
RBC # BLD: 5.07 M/UL (ref 4.7–6.1)
SARS-COV-2, NAAT: DETECTED
SODIUM BLD-SCNC: 138 MEQ/L (ref 135–144)
TOTAL PROTEIN: 6.5 G/DL (ref 6.3–8)
TROPONIN: <0.01 NG/ML (ref 0–0.01)
WBC # BLD: 7.6 K/UL (ref 4.8–10.8)

## 2021-09-06 PROCEDURE — 36415 COLL VENOUS BLD VENIPUNCTURE: CPT

## 2021-09-06 PROCEDURE — 99284 EMERGENCY DEPT VISIT MOD MDM: CPT

## 2021-09-06 PROCEDURE — 6360000002 HC RX W HCPCS: Performed by: PHYSICIAN ASSISTANT

## 2021-09-06 PROCEDURE — 85027 COMPLETE CBC AUTOMATED: CPT

## 2021-09-06 PROCEDURE — 87635 SARS-COV-2 COVID-19 AMP PRB: CPT

## 2021-09-06 PROCEDURE — 80053 COMPREHEN METABOLIC PANEL: CPT

## 2021-09-06 PROCEDURE — 93005 ELECTROCARDIOGRAM TRACING: CPT | Performed by: PHYSICIAN ASSISTANT

## 2021-09-06 PROCEDURE — 96374 THER/PROPH/DIAG INJ IV PUSH: CPT

## 2021-09-06 PROCEDURE — 84484 ASSAY OF TROPONIN QUANT: CPT

## 2021-09-06 PROCEDURE — 71045 X-RAY EXAM CHEST 1 VIEW: CPT

## 2021-09-06 PROCEDURE — 83735 ASSAY OF MAGNESIUM: CPT

## 2021-09-06 PROCEDURE — 87502 INFLUENZA DNA AMP PROBE: CPT

## 2021-09-06 PROCEDURE — 82077 ASSAY SPEC XCP UR&BREATH IA: CPT

## 2021-09-06 RX ORDER — DEXAMETHASONE 6 MG/1
6 TABLET ORAL
Qty: 10 TABLET | Refills: 0 | Status: ON HOLD | OUTPATIENT
Start: 2021-09-06 | End: 2021-09-12 | Stop reason: HOSPADM

## 2021-09-06 RX ORDER — DEXAMETHASONE SODIUM PHOSPHATE 10 MG/ML
6 INJECTION INTRAMUSCULAR; INTRAVENOUS ONCE
Status: COMPLETED | OUTPATIENT
Start: 2021-09-06 | End: 2021-09-06

## 2021-09-06 RX ADMIN — DEXAMETHASONE SODIUM PHOSPHATE 6 MG: 10 INJECTION INTRAMUSCULAR; INTRAVENOUS at 07:12

## 2021-09-06 ASSESSMENT — ENCOUNTER SYMPTOMS
SHORTNESS OF BREATH: 0
VOMITING: 0
CONSTIPATION: 0
WHEEZING: 0
NAUSEA: 0
ABDOMINAL PAIN: 0
CHEST TIGHTNESS: 0
SINUS PRESSURE: 0
RHINORRHEA: 0
COUGH: 0
SORE THROAT: 0

## 2021-09-06 NOTE — ED TRIAGE NOTES
Patient present to the ED from home with a complaint of generalized weakness. Patient report that he got up to go to the bathroom and felt weak. Patient report that he lowered himself to the floor. Patient report dry cough.   Patient is A/O X3

## 2021-09-06 NOTE — ED PROVIDER NOTES
[address] [facility] ED  eMERGENCY dEPARTMENT eNCOUnter      Pt Name: Handy Piedra  MRN: [id number]  [place] 1950  Date of evaluation: 9/6/2021  Provider: ALBA Burnett        HISTORY OF PRESENT ILLNESS    Handy Piedra is a 65 y.o. male per chart review has a h/o HTN, CAD s/p stent placement (~3 years prior), depression, dyspepsia presents to the ED with c/o sudden onset, moderate, constant LE weakness that he noted upon waking up and walking to the bathroom. Weakness was so severe it caused him to have to sit on the ground (he notes he lowered himself, and did not fall or hit his head). No chronic AC. No fevers/chills, chest pain, shortness of breath, abdominal pain, or further associated symptoms. Known sick contact, daughter is COVID+ and was previously over at his house. Denies new medications. REVIEW OF SYSTEMS       Review of Systems   Constitutional: Negative for activity change, appetite change, chills, diaphoresis, fatigue and fever. HENT: Negative for congestion, postnasal drip, rhinorrhea, sinus pressure and sore throat. Respiratory: Negative for cough, chest tightness, shortness of breath and wheezing. Cardiovascular: Negative for chest pain, palpitations and leg swelling. Gastrointestinal: Negative for abdominal pain, constipation, nausea and vomiting. Genitourinary: Negative for dysuria, frequency and urgency. Musculoskeletal: Negative for arthralgias and myalgias. Neurological: Positive for weakness. Negative for dizziness, syncope, light-headedness, numbness and headaches. All other systems reviewed and are negative. Except as noted above the remainder of the review of systems was reviewed and negative.        PAST MEDICAL HISTORY     Past Medical History:   Diagnosis Date    Anxiety     Chest pain 3/29/2018    Coronary artery disease involving native coronary artery of native heart without angina pectoris 2/15/2019    Diabetes mellitus ([facility] [address]     no meds  History of colon polyps     Hyperlipidemia     Hypertension     Type 2 diabetes mellitus without complication ([facility] [address]     Vertigo          SURGICAL HISTORY       Past Surgical History:   Procedure Laterality Date    CARDIAC SURGERY  1973    PTCA     COLONOSCOPY      COLONOSCOPY N/A 10/17/2019    COLORECTAL CANCER SCREENING, HIGH RISK performed by Sylvain De La Torre MD at [address]      OTHER SURGICAL HISTORY      patent foramen ovale    TONSILLECTOMY      UPPER GASTROINTESTINAL ENDOSCOPY N/A 8/1/2019    EGD ESOPHAGOGASTRODUODENOSCOPY performed by Sylvain De La Torre MD at [address]       Previous Medications    BLOOD GLUCOSE MONITORING SUPPL (ONETOUCH VERIO FLEX SYSTEM) W/DEVICE KIT        CHOLECALCIFEROL (VITAMIN D) 2000 UNITS CAPS CAPSULE    Take 5,000 Units by mouth daily     CLONAZEPAM (KLONOPIN) 0.5 MG TABLET    Take 1 tablet by mouth 2 times daily as needed for Anxiety for up to 15 days. Garry Reinier DOXEPIN HCL (SILENOR) 6 MG TABS    Take by mouth nightly    LATANOPROST (XALATAN) 0.005 % OPHTHALMIC SOLUTION    Use 1 Drop in both eyes daily at bedtime. MAGNESIUM OXIDE (MAG-OX) 400 MG TABLET    Take 400 mg by mouth daily    MECLIZINE (ANTIVERT) 25 MG TABLET    Take 1 tablet by mouth 2 times daily as needed for Dizziness    NITROGLYCERIN (NITROSTAT) 0.4 MG SL TABLET    up to max of 3 total doses. If no relief after 1 dose, call 911.     OLOPATADINE (PATADAY) 0.2 % SOLN OPHTHALMIC SOLUTION    Apply 1 drop to eye daily For allergies    OMEGA-3 FATTY ACIDS (FISH OIL) 1000 MG CAPS    Take 3,000 mg by mouth every other day    OMEPRAZOLE (PRILOSEC) 20 MG DELAYED RELEASE CAPSULE    Take 1 capsule by mouth every morning (before breakfast)    ONDANSETRON (ZOFRAN-ODT) 4 MG DISINTEGRATING TABLET    Take 1 tablet by mouth 3 times daily as needed for Nausea or Vomiting    ONEUCH DELICA LANCETS 56V MISC ONETOUCH VERIO STRIP        PHYTONADIONE (VITAMIN K) 5 MG TABLET    Take 5 mg by mouth once    RANOLAZINE (RANEXA) 1000 MG EXTENDED RELEASE TABLET    Take 1 tablet by mouth 2 times daily    TAMSULOSIN (FLOMAX) 0.4 MG CAPSULE    Take 1 capsule by mouth daily    VITAMIN C (ASCORBIC ACID) 500 MG TABLET    Take 500 mg by mouth daily    VITAMIN E 1000 UNITS CAPSULE    Take 1,000 Units by mouth daily    VORTIOXETINE (TRINTELLIX) 5 MG TABLET    Take 5 mg by mouth daily       ALLERGIES     Seroquel [quetiapine]    FAMILY HISTORY       Family History   Problem Relation Age of Onset    Heart Disease Maternal Aunt     Cancer Maternal Aunt     Colon Cancer Maternal Aunt           SOCIAL HISTORY       Social History     Socioeconomic History    Marital status:      Spouse name: None    Number of children: None    Years of education: None    Highest education level: None   Occupational History    None   Tobacco Use    Smoking status: Never Smoker    Smokeless tobacco: Never Used   Vaping Use    Vaping Use: Never used   Substance and Sexual Activity    Alcohol use: No    Drug use: Never    Sexual activity: None   Other Topics Concern    None   Social History Narrative    None     Social Determinants of Health     Financial Resource Strain:     Difficulty of Paying Living Expenses:    Food Insecurity:     Worried About Running Out of Food in the Last Year:     Ran Out of Food in the Last Year:    Transportation Needs:     Lack of Transportation (Medical):      Lack of Transportation (Non-Medical):    Physical Activity:     Days of Exercise per Week:     Minutes of Exercise per Session:    Stress:     Feeling of Stress :    Social Connections:     Frequency of Communication with Friends and Family:     Frequency of Social Gatherings with Friends and Family:     Attends Ep[employer] Services:     Active Member of Clubs or Organizations:     Attends Club or Organization Meetings:     Marital Status: Intimate Partner Violence:     Fear of Current or Ex-Partner:     Emotionally Abused:     Physically Abused:     Sexually Abused:          PHYSICAL EXAM        ED Triage Vitals   BP Temp Temp Source Pulse Resp SpO2 Height Weight   09/06/21 0627 09/06/21 0627 09/06/21 0627 09/06/21 0627 09/06/21 8905 09/06/21 0632 09/06/21 0627 09/06/21 0627   132/78 99.2 °F (37.3 °C) Oral 99 16 96 % 5' 6\" (1.676 m) 140 lb (63.5 kg)       Physical Exam  Vitals and nursing note reviewed. Constitutional:       General: He is not in acute distress. Appearance: Normal appearance. He is normal weight. He is not ill-appearing, toxic-appearing or diaphoretic. HENT:      Head: Normocephalic and atraumatic. Right Ear: Tympanic membrane normal.      Left Ear: Tympanic membrane normal.      Nose: Nose normal. No congestion. Mouth/Throat:      Mouth: Mucous membranes are moist.      Pharynx: Oropharynx is clear. No oropharyngeal exudate. Eyes:      Extraocular Movements: Extraocular movements intact. Pupils: Pupils are equal, round, and reactive to light. Cardiovascular:      Rate and Rhythm: Regular rhythm. Tachycardia present. Pulses: Normal pulses. Heart sounds: Normal heart sounds. No murmur heard. Pulmonary:      Effort: Pulmonary effort is normal. No respiratory distress. Breath sounds: Normal breath sounds. No wheezing. Abdominal:      General: Abdomen is flat. Bowel sounds are normal. There is no distension. Palpations: Abdomen is soft. Tenderness: There is no abdominal tenderness. There is no guarding. Musculoskeletal:         General: No swelling, tenderness, deformity or signs of injury. Normal range of motion. Cervical back: Normal range of motion and neck supple. No rigidity. No muscular tenderness. Lymphadenopathy:      Cervical: No cervical adenopathy. Skin:     General: Skin is warm and dry.       Capillary Refill: Capillary refill takes less than 2 seconds. Neurological:      General: No focal deficit present. Mental Status: He is alert and oriented to person, place, and time. Mental status is at baseline. Motor: No weakness. Comments: Subjectively reported weakness to b/l LE, 4/5 and symmetric with straight leg raise and effort against gravity and weight. NVI, VALENCIA. Psychiatric:         Mood and Affect: Mood normal.         Behavior: Behavior normal.         Thought Content: Thought content normal.         Judgment: Judgment normal.           LABS:  Labs Reviewed   COVID-19, RAPID - Abnormal; Notable for the following components:       Result Value    SARS-CoV-2, NAAT DETECTED (*)     All other components within normal limits    Narrative:     [place] tel. [phone],  called aleksandralice burris, 09/06/2021 06:57, by Slim [place] 6 - Abnormal; Notable for the following components:    Glucose 151 (*)     Total Bilirubin 0.9 (*)     All other components within normal limits   CBC - Abnormal; Notable for the following components:    MCH 31.6 (*)     All other components within normal limits   RAPID INFLUENZA A/B ANTIGENS   ETHANOL   MAGNESIUM   TROPONIN   URINE RT REFLEX TO CULTURE         MDM:   Vitals:    Vitals:    09/06/21 0627 09/06/21 0632   BP: 132/78    Pulse: 99    Resp:  16   Temp: 99.2 °F (37.3 °C)    TempSrc: Oral    SpO2:  96%   Weight: 140 lb (63.5 kg)    Height: 5' 6\" (1.676 m)        65year old male presents to the ED with c/o b/l LE weakness. Triage records were reviewed. Medical records were reviewed. Nursing notes were reviewed and incorporated. Patient afebrile, hemodynamically stable, non-toxic in appearance upon initial evaluation. Labs/Imaging:   Labs reviewed, CBC/CMP/Mag/trop WNL. Ethanol negative. COVID+, Flu negative. CXR stable. EKG shows sinus tachycardia with HR 90, normal axis, normal intervals, no ST changes.       Meds given in ED:   Decadron given in ED, rx for OP decadron given with return instructions advised and instruction to follow up with PCP, with isolation/quarantine. Patient completed ambulation trial in the ED, notes he feels safe to go home. On re-evaluation, patient remains hemodynamically stable without supplemental oxygenation requirement. CRITICAL CARE TIME   Total CriticalCare time was 0 minutes, excluding separately reportable procedures. There was a high probability of clinically significant/life threatening deterioration in the patient's condition which required my urgent intervention. PROCEDURES:  Unlessotherwise noted below, none      Procedures      FINAL IMPRESSION      1. Generalized weakness    2.  COVID-19          DISPOSITION/PLAN   DISPOSITION Decision To Discharge 09/06/2021 07:54:08 AM          ALBA Trinh (electronically signed)  Attending Emergency Physician          ALBA Trinh  09/06/21 2127

## 2021-09-07 ENCOUNTER — HOSPITAL ENCOUNTER (INPATIENT)
Age: 71
LOS: 2 days | Discharge: HOME | DRG: 871 | End: 2021-09-09
Attending: INTERNAL MEDICINE | Admitting: INTERNAL MEDICINE
Payer: COMMERCIAL

## 2021-09-07 ENCOUNTER — CARE COORDINATION (OUTPATIENT)
Dept: CARE COORDINATION | Age: 71
End: 2021-09-07

## 2021-09-07 ENCOUNTER — APPOINTMENT (OUTPATIENT)
Dept: CT IMAGING | Age: 71
DRG: 871 | End: 2021-09-07
Payer: COMMERCIAL

## 2021-09-07 DIAGNOSIS — U07.1 ENCEPHALOPATHY DUE TO COVID-19 VIRUS: Primary | ICD-10-CM

## 2021-09-07 DIAGNOSIS — G93.49 ENCEPHALOPATHY DUE TO COVID-19 VIRUS: Primary | ICD-10-CM

## 2021-09-07 DIAGNOSIS — R79.89 ABNORMAL TSH: ICD-10-CM

## 2021-09-07 DIAGNOSIS — F42.2 MIXED OBSESSIONAL THOUGHTS AND ACTS: ICD-10-CM

## 2021-09-07 DIAGNOSIS — F41.9 ANXIETY: ICD-10-CM

## 2021-09-07 LAB
ALBUMIN SERPL-MCNC: 4.1 G/DL (ref 3.5–4.6)
ALP BLD-CCNC: 61 U/L (ref 35–104)
ALT SERPL-CCNC: 21 U/L (ref 0–41)
ANION GAP SERPL CALCULATED.3IONS-SCNC: 13 MEQ/L (ref 9–15)
AST SERPL-CCNC: 35 U/L (ref 0–40)
BACTERIA: NEGATIVE /HPF
BASE EXCESS ARTERIAL: -1 (ref -3–3)
BASOPHILS ABSOLUTE: 0 K/UL (ref 0–0.2)
BASOPHILS RELATIVE PERCENT: 0.2 %
BILIRUB SERPL-MCNC: 0.6 MG/DL (ref 0.2–0.7)
BILIRUBIN URINE: NEGATIVE
BLOOD, URINE: ABNORMAL
BUN BLDV-MCNC: 20 MG/DL (ref 8–23)
CALCIUM IONIZED: 1.15 MMOL/L (ref 1.12–1.32)
CALCIUM SERPL-MCNC: 9.1 MG/DL (ref 8.5–9.9)
CHLORIDE BLD-SCNC: 102 MEQ/L (ref 95–107)
CLARITY: CLEAR
CO2: 23 MEQ/L (ref 20–31)
COLOR: ABNORMAL
CREAT SERPL-MCNC: 1.07 MG/DL (ref 0.7–1.2)
EOSINOPHILS ABSOLUTE: 0 K/UL (ref 0–0.7)
EOSINOPHILS RELATIVE PERCENT: 0.1 %
EPITHELIAL CELLS, UA: ABNORMAL /HPF (ref 0–5)
GFR AFRICAN AMERICAN: >60
GFR AFRICAN AMERICAN: >60
GFR NON-AFRICAN AMERICAN: 59
GFR NON-AFRICAN AMERICAN: >60
GLOBULIN: 2.9 G/DL (ref 2.3–3.5)
GLUCOSE BLD-MCNC: 119 MG/DL (ref 70–99)
GLUCOSE BLD-MCNC: 124 MG/DL (ref 60–115)
GLUCOSE URINE: NEGATIVE MG/DL
HCO3 ARTERIAL: 22.8 MMOL/L (ref 21–29)
HCT VFR BLD CALC: 45.1 % (ref 42–52)
HEMOGLOBIN: 15.2 GM/DL (ref 13.5–17.5)
HEMOGLOBIN: 15.7 G/DL (ref 14–18)
HYALINE CASTS: ABNORMAL /HPF (ref 0–5)
KETONES, URINE: NEGATIVE MG/DL
LACTATE: 0.84 MMOL/L (ref 0.4–2)
LACTIC ACID: 1.8 MMOL/L (ref 0.5–2.2)
LEUKOCYTE ESTERASE, URINE: ABNORMAL
LYMPHOCYTES ABSOLUTE: 0.2 K/UL (ref 1–4.8)
LYMPHOCYTES RELATIVE PERCENT: 3.1 %
MAGNESIUM: 1.7 MG/DL (ref 1.7–2.4)
MCH RBC QN AUTO: 31.4 PG (ref 27–31.3)
MCHC RBC AUTO-ENTMCNC: 34.8 % (ref 33–37)
MCV RBC AUTO: 90.1 FL (ref 80–100)
MONOCYTES ABSOLUTE: 0.5 K/UL (ref 0.2–0.8)
MONOCYTES RELATIVE PERCENT: 6.8 %
NEUTROPHILS ABSOLUTE: 6.6 K/UL (ref 1.4–6.5)
NEUTROPHILS RELATIVE PERCENT: 89.8 %
NITRITE, URINE: NEGATIVE
O2 SAT, ARTERIAL: 92 % (ref 93–100)
PCO2 ARTERIAL: 31 MM HG (ref 35–45)
PDW BLD-RTO: 14.1 % (ref 11.5–14.5)
PERFORMED ON: ABNORMAL
PH ARTERIAL: 7.48 (ref 7.35–7.45)
PH UA: 5.5 (ref 5–9)
PLATELET # BLD: 163 K/UL (ref 130–400)
PO2 ARTERIAL: 59 MM HG (ref 75–108)
POC CHLORIDE: 104 MEQ/L (ref 99–110)
POC CREATININE WHOLE BLOOD: 1.1
POC CREATININE: 1.2 MG/DL (ref 0.8–1.3)
POC HEMATOCRIT: 45 % (ref 41–53)
POC POTASSIUM: 3.6 MEQ/L (ref 3.5–5.1)
POC SAMPLE TYPE: ABNORMAL
POC SODIUM: 137 MEQ/L (ref 136–145)
POTASSIUM SERPL-SCNC: 3.9 MEQ/L (ref 3.4–4.9)
PROCALCITONIN: 0.18 NG/ML (ref 0–0.15)
PROTEIN UA: 30 MG/DL
RBC # BLD: 5.01 M/UL (ref 4.7–6.1)
RBC UA: ABNORMAL /HPF (ref 0–5)
SODIUM BLD-SCNC: 138 MEQ/L (ref 135–144)
SPECIFIC GRAVITY UA: 1.02 (ref 1–1.03)
TCO2 ARTERIAL: 24 (ref 22–29)
TOTAL PROTEIN: 7 G/DL (ref 6.3–8)
TROPONIN: <0.01 NG/ML (ref 0–0.01)
URINE REFLEX TO CULTURE: ABNORMAL
UROBILINOGEN, URINE: 1 E.U./DL
WBC # BLD: 7.4 K/UL (ref 4.8–10.8)
WBC UA: ABNORMAL /HPF (ref 0–5)

## 2021-09-07 PROCEDURE — 82803 BLOOD GASES ANY COMBINATION: CPT

## 2021-09-07 PROCEDURE — 82435 ASSAY OF BLOOD CHLORIDE: CPT

## 2021-09-07 PROCEDURE — 84145 PROCALCITONIN (PCT): CPT

## 2021-09-07 PROCEDURE — 84484 ASSAY OF TROPONIN QUANT: CPT

## 2021-09-07 PROCEDURE — 82330 ASSAY OF CALCIUM: CPT

## 2021-09-07 PROCEDURE — 96361 HYDRATE IV INFUSION ADD-ON: CPT

## 2021-09-07 PROCEDURE — 82565 ASSAY OF CREATININE: CPT

## 2021-09-07 PROCEDURE — 6370000000 HC RX 637 (ALT 250 FOR IP): Performed by: PHYSICIAN ASSISTANT

## 2021-09-07 PROCEDURE — 6360000002 HC RX W HCPCS: Performed by: INTERNAL MEDICINE

## 2021-09-07 PROCEDURE — 81001 URINALYSIS AUTO W/SCOPE: CPT

## 2021-09-07 PROCEDURE — 71275 CT ANGIOGRAPHY CHEST: CPT

## 2021-09-07 PROCEDURE — 87040 BLOOD CULTURE FOR BACTERIA: CPT

## 2021-09-07 PROCEDURE — 2580000003 HC RX 258: Performed by: STUDENT IN AN ORGANIZED HEALTH CARE EDUCATION/TRAINING PROGRAM

## 2021-09-07 PROCEDURE — 93005 ELECTROCARDIOGRAM TRACING: CPT | Performed by: STUDENT IN AN ORGANIZED HEALTH CARE EDUCATION/TRAINING PROGRAM

## 2021-09-07 PROCEDURE — 84132 ASSAY OF SERUM POTASSIUM: CPT

## 2021-09-07 PROCEDURE — 83735 ASSAY OF MAGNESIUM: CPT

## 2021-09-07 PROCEDURE — 96374 THER/PROPH/DIAG INJ IV PUSH: CPT

## 2021-09-07 PROCEDURE — 1210000000 HC MED SURG R&B

## 2021-09-07 PROCEDURE — 36415 COLL VENOUS BLD VENIPUNCTURE: CPT

## 2021-09-07 PROCEDURE — 36600 WITHDRAWAL OF ARTERIAL BLOOD: CPT

## 2021-09-07 PROCEDURE — 80053 COMPREHEN METABOLIC PANEL: CPT

## 2021-09-07 PROCEDURE — 99285 EMERGENCY DEPT VISIT HI MDM: CPT

## 2021-09-07 PROCEDURE — 70450 CT HEAD/BRAIN W/O DYE: CPT

## 2021-09-07 PROCEDURE — 6360000004 HC RX CONTRAST MEDICATION: Performed by: STUDENT IN AN ORGANIZED HEALTH CARE EDUCATION/TRAINING PROGRAM

## 2021-09-07 PROCEDURE — 85014 HEMATOCRIT: CPT

## 2021-09-07 PROCEDURE — 83605 ASSAY OF LACTIC ACID: CPT

## 2021-09-07 PROCEDURE — 85025 COMPLETE CBC W/AUTO DIFF WBC: CPT

## 2021-09-07 PROCEDURE — 6360000002 HC RX W HCPCS: Performed by: STUDENT IN AN ORGANIZED HEALTH CARE EDUCATION/TRAINING PROGRAM

## 2021-09-07 PROCEDURE — 84295 ASSAY OF SERUM SODIUM: CPT

## 2021-09-07 RX ORDER — LANOLIN ALCOHOL/MO/W.PET/CERES
3 CREAM (GRAM) TOPICAL NIGHTLY PRN
Status: ON HOLD | COMMUNITY
End: 2021-09-12 | Stop reason: SDUPTHER

## 2021-09-07 RX ORDER — DEXAMETHASONE 6 MG/1
6 TABLET ORAL DAILY
Status: DISCONTINUED | OUTPATIENT
Start: 2021-09-07 | End: 2021-09-08

## 2021-09-07 RX ORDER — ACETAMINOPHEN 650 MG/1
650 SUPPOSITORY RECTAL EVERY 6 HOURS PRN
Status: DISCONTINUED | OUTPATIENT
Start: 2021-09-07 | End: 2021-09-09 | Stop reason: HOSPADM

## 2021-09-07 RX ORDER — LABETALOL HYDROCHLORIDE 5 MG/ML
10 INJECTION, SOLUTION INTRAVENOUS EVERY 10 MIN PRN
Status: DISCONTINUED | OUTPATIENT
Start: 2021-09-07 | End: 2021-09-09 | Stop reason: HOSPADM

## 2021-09-07 RX ORDER — ACETAMINOPHEN 325 MG/1
650 TABLET ORAL EVERY 6 HOURS PRN
Status: DISCONTINUED | OUTPATIENT
Start: 2021-09-07 | End: 2021-09-09 | Stop reason: HOSPADM

## 2021-09-07 RX ORDER — 0.9 % SODIUM CHLORIDE 0.9 %
1000 INTRAVENOUS SOLUTION INTRAVENOUS ONCE
Status: COMPLETED | OUTPATIENT
Start: 2021-09-07 | End: 2021-09-07

## 2021-09-07 RX ORDER — ROSUVASTATIN CALCIUM 40 MG/1
40 TABLET, COATED ORAL NIGHTLY
Status: DISCONTINUED | OUTPATIENT
Start: 2021-09-07 | End: 2021-09-09 | Stop reason: HOSPADM

## 2021-09-07 RX ORDER — ASPIRIN 81 MG/1
81 TABLET ORAL DAILY
Status: DISCONTINUED | OUTPATIENT
Start: 2021-09-08 | End: 2021-09-09 | Stop reason: HOSPADM

## 2021-09-07 RX ORDER — ONDANSETRON 4 MG/1
4 TABLET, ORALLY DISINTEGRATING ORAL EVERY 8 HOURS PRN
Status: DISCONTINUED | OUTPATIENT
Start: 2021-09-07 | End: 2021-09-09 | Stop reason: HOSPADM

## 2021-09-07 RX ORDER — ASPIRIN 300 MG/1
300 SUPPOSITORY RECTAL DAILY
Status: DISCONTINUED | OUTPATIENT
Start: 2021-09-08 | End: 2021-09-09 | Stop reason: HOSPADM

## 2021-09-07 RX ORDER — ONDANSETRON 2 MG/ML
4 INJECTION INTRAMUSCULAR; INTRAVENOUS EVERY 6 HOURS PRN
Status: DISCONTINUED | OUTPATIENT
Start: 2021-09-07 | End: 2021-09-09 | Stop reason: HOSPADM

## 2021-09-07 RX ORDER — DEXAMETHASONE SODIUM PHOSPHATE 10 MG/ML
6 INJECTION INTRAMUSCULAR; INTRAVENOUS ONCE
Status: COMPLETED | OUTPATIENT
Start: 2021-09-07 | End: 2021-09-07

## 2021-09-07 RX ORDER — ACETAMINOPHEN 500 MG
1000 TABLET ORAL ONCE
Status: COMPLETED | OUTPATIENT
Start: 2021-09-07 | End: 2021-09-07

## 2021-09-07 RX ADMIN — SODIUM CHLORIDE 1000 ML: 9 INJECTION, SOLUTION INTRAVENOUS at 18:32

## 2021-09-07 RX ADMIN — ENOXAPARIN SODIUM 30 MG: 30 INJECTION SUBCUTANEOUS at 23:46

## 2021-09-07 RX ADMIN — IOPAMIDOL 100 ML: 755 INJECTION, SOLUTION INTRAVENOUS at 20:06

## 2021-09-07 RX ADMIN — ACETAMINOPHEN 1000 MG: 500 TABLET ORAL at 18:08

## 2021-09-07 RX ADMIN — DEXAMETHASONE SODIUM PHOSPHATE 6 MG: 10 INJECTION INTRAMUSCULAR; INTRAVENOUS at 18:32

## 2021-09-07 ASSESSMENT — PAIN SCALES - GENERAL
PAINLEVEL_OUTOF10: 0
PAINLEVEL_OUTOF10: 0

## 2021-09-07 NOTE — ED TRIAGE NOTES
Pt arrived via EMS with c/o weakness and AMS  Per EMS pt laid down on the ground and could not get up  Pt denies falling  Per EMS the family also stated that he has had new onset confusion today  The pt was not able to tell me the year or who the president is but could answer all other questions  Pt does have a fever at 103.3, HR is 115, RR are 27, SPO2 is 95% on RA

## 2021-09-07 NOTE — CARE COORDINATION
ACM CALLED PATIENT, NOT PATIENT CONTACT NUMBER. ACM CALLED EC  LEFT MESSAGE REQUESTING RETURN CALL FROM PATIENT  FOR ED FOLLOW UP COVID MONITORING. ACM SUPPLIED CONTACT INFORMATION.

## 2021-09-07 NOTE — ED NOTES
[facility] notified of tachycardia and fever  Tylenol orders received and given to pt     Shantelle Funk RN  09/07/21 9797

## 2021-09-08 ENCOUNTER — APPOINTMENT (OUTPATIENT)
Dept: MRI IMAGING | Age: 71
DRG: 871 | End: 2021-09-08
Payer: COMMERCIAL

## 2021-09-08 ENCOUNTER — APPOINTMENT (OUTPATIENT)
Dept: ULTRASOUND IMAGING | Age: 71
DRG: 871 | End: 2021-09-08
Payer: COMMERCIAL

## 2021-09-08 PROBLEM — G93.49 ENCEPHALOPATHY DUE TO COVID-19 VIRUS: Status: RESOLVED | Noted: 2021-09-07 | Resolved: 2021-09-08

## 2021-09-08 PROBLEM — U07.1 ENCEPHALOPATHY DUE TO COVID-19 VIRUS: Status: RESOLVED | Noted: 2021-09-07 | Resolved: 2021-09-08

## 2021-09-08 LAB
ALBUMIN SERPL-MCNC: 3.7 G/DL (ref 3.5–4.6)
ALP BLD-CCNC: 60 U/L (ref 35–104)
ALT SERPL-CCNC: 26 U/L (ref 0–41)
ANION GAP SERPL CALCULATED.3IONS-SCNC: 17 MEQ/L (ref 9–15)
APTT: 35.8 SEC (ref 24.4–36.8)
AST SERPL-CCNC: 44 U/L (ref 0–40)
BASOPHILS ABSOLUTE: 0 K/UL (ref 0–0.2)
BASOPHILS RELATIVE PERCENT: 0.1 %
BILIRUB SERPL-MCNC: 0.4 MG/DL (ref 0.2–0.7)
BILIRUBIN DIRECT: <0.2 MG/DL (ref 0–0.4)
BILIRUBIN, INDIRECT: ABNORMAL MG/DL (ref 0–0.6)
BUN BLDV-MCNC: 25 MG/DL (ref 8–23)
C-REACTIVE PROTEIN: 88.2 MG/L (ref 0–5)
CALCIUM SERPL-MCNC: 8.9 MG/DL (ref 8.5–9.9)
CHLORIDE BLD-SCNC: 102 MEQ/L (ref 95–107)
CHOLESTEROL, TOTAL: 162 MG/DL (ref 0–199)
CO2: 19 MEQ/L (ref 20–31)
CREAT SERPL-MCNC: 1.13 MG/DL (ref 0.7–1.2)
D DIMER: 0.37 MG/L FEU (ref 0–0.5)
EKG ATRIAL RATE: 108 BPM
EKG ATRIAL RATE: 90 BPM
EKG P AXIS: 53 DEGREES
EKG P AXIS: 57 DEGREES
EKG P-R INTERVAL: 174 MS
EKG P-R INTERVAL: 192 MS
EKG Q-T INTERVAL: 334 MS
EKG Q-T INTERVAL: 372 MS
EKG QRS DURATION: 88 MS
EKG QRS DURATION: 88 MS
EKG QTC CALCULATION (BAZETT): 447 MS
EKG QTC CALCULATION (BAZETT): 455 MS
EKG R AXIS: 48 DEGREES
EKG R AXIS: 52 DEGREES
EKG T AXIS: 58 DEGREES
EKG T AXIS: 72 DEGREES
EKG VENTRICULAR RATE: 108 BPM
EKG VENTRICULAR RATE: 90 BPM
EOSINOPHILS ABSOLUTE: 0 K/UL (ref 0–0.7)
EOSINOPHILS RELATIVE PERCENT: 0 %
FERRITIN: 448.4 NG/ML (ref 30–400)
FIBRINOGEN: 544 MG/DL (ref 235–507)
GFR AFRICAN AMERICAN: >60
GFR NON-AFRICAN AMERICAN: >60
GLUCOSE BLD-MCNC: 208 MG/DL (ref 60–115)
GLUCOSE BLD-MCNC: 217 MG/DL (ref 60–115)
GLUCOSE BLD-MCNC: 277 MG/DL (ref 60–115)
GLUCOSE BLD-MCNC: 283 MG/DL (ref 70–99)
HBA1C MFR BLD: 5.8 % (ref 4.8–5.9)
HCT VFR BLD CALC: 42.8 % (ref 42–52)
HDLC SERPL-MCNC: 41 MG/DL (ref 40–59)
HEMOGLOBIN: 14.6 G/DL (ref 14–18)
INR BLD: 1.1
LACTATE DEHYDROGENASE: 181 U/L (ref 135–225)
LACTIC ACID: 2.9 MMOL/L (ref 0.5–2.2)
LDL CHOLESTEROL CALCULATED: 99 MG/DL (ref 0–129)
LYMPHOCYTES ABSOLUTE: 0.4 K/UL (ref 1–4.8)
LYMPHOCYTES RELATIVE PERCENT: 6.9 %
MCH RBC QN AUTO: 31.1 PG (ref 27–31.3)
MCHC RBC AUTO-ENTMCNC: 34.1 % (ref 33–37)
MCV RBC AUTO: 91.3 FL (ref 80–100)
MONOCYTES ABSOLUTE: 0.2 K/UL (ref 0.2–0.8)
MONOCYTES RELATIVE PERCENT: 3.9 %
NEUTROPHILS ABSOLUTE: 5.4 K/UL (ref 1.4–6.5)
NEUTROPHILS RELATIVE PERCENT: 89.1 %
PDW BLD-RTO: 13.9 % (ref 11.5–14.5)
PERFORMED ON: ABNORMAL
PLATELET # BLD: 161 K/UL (ref 130–400)
POTASSIUM REFLEX MAGNESIUM: 4.3 MEQ/L (ref 3.4–4.9)
PROTHROMBIN TIME: 14.1 SEC (ref 12.3–14.9)
RBC # BLD: 4.69 M/UL (ref 4.7–6.1)
REASON FOR REJECTION: NORMAL
REJECTED TEST: NORMAL
SODIUM BLD-SCNC: 138 MEQ/L (ref 135–144)
TOTAL PROTEIN: 6.3 G/DL (ref 6.3–8)
TRIGL SERPL-MCNC: 108 MG/DL (ref 0–150)
TROPONIN: <0.01 NG/ML (ref 0–0.01)
WBC # BLD: 6.1 K/UL (ref 4.8–10.8)

## 2021-09-08 PROCEDURE — 95816 EEG AWAKE AND DROWSY: CPT

## 2021-09-08 PROCEDURE — 97165 OT EVAL LOW COMPLEX 30 MIN: CPT

## 2021-09-08 PROCEDURE — 86480 TB TEST CELL IMMUN MEASURE: CPT

## 2021-09-08 PROCEDURE — 93010 ELECTROCARDIOGRAM REPORT: CPT | Performed by: INTERNAL MEDICINE

## 2021-09-08 PROCEDURE — 82728 ASSAY OF FERRITIN: CPT

## 2021-09-08 PROCEDURE — 85730 THROMBOPLASTIN TIME PARTIAL: CPT

## 2021-09-08 PROCEDURE — 80076 HEPATIC FUNCTION PANEL: CPT

## 2021-09-08 PROCEDURE — 97161 PT EVAL LOW COMPLEX 20 MIN: CPT

## 2021-09-08 PROCEDURE — 84484 ASSAY OF TROPONIN QUANT: CPT

## 2021-09-08 PROCEDURE — 85379 FIBRIN DEGRADATION QUANT: CPT

## 2021-09-08 PROCEDURE — 92523 SPEECH SOUND LANG COMPREHEN: CPT

## 2021-09-08 PROCEDURE — 83605 ASSAY OF LACTIC ACID: CPT

## 2021-09-08 PROCEDURE — 86140 C-REACTIVE PROTEIN: CPT

## 2021-09-08 PROCEDURE — 93880 EXTRACRANIAL BILAT STUDY: CPT

## 2021-09-08 PROCEDURE — 80048 BASIC METABOLIC PNL TOTAL CA: CPT

## 2021-09-08 PROCEDURE — 85025 COMPLETE CBC W/AUTO DIFF WBC: CPT

## 2021-09-08 PROCEDURE — 92610 EVALUATE SWALLOWING FUNCTION: CPT

## 2021-09-08 PROCEDURE — 82306 VITAMIN D 25 HYDROXY: CPT

## 2021-09-08 PROCEDURE — 83036 HEMOGLOBIN GLYCOSYLATED A1C: CPT

## 2021-09-08 PROCEDURE — 85384 FIBRINOGEN ACTIVITY: CPT

## 2021-09-08 PROCEDURE — 80061 LIPID PANEL: CPT

## 2021-09-08 PROCEDURE — 1210000000 HC MED SURG R&B

## 2021-09-08 PROCEDURE — 85610 PROTHROMBIN TIME: CPT

## 2021-09-08 PROCEDURE — 83615 LACTATE (LD) (LDH) ENZYME: CPT

## 2021-09-08 PROCEDURE — 6370000000 HC RX 637 (ALT 250 FOR IP): Performed by: INTERNAL MEDICINE

## 2021-09-08 PROCEDURE — 99222 1ST HOSP IP/OBS MODERATE 55: CPT | Performed by: PSYCHIATRY & NEUROLOGY

## 2021-09-08 PROCEDURE — 6360000002 HC RX W HCPCS: Performed by: INTERNAL MEDICINE

## 2021-09-08 PROCEDURE — 70551 MRI BRAIN STEM W/O DYE: CPT

## 2021-09-08 PROCEDURE — 36415 COLL VENOUS BLD VENIPUNCTURE: CPT

## 2021-09-08 RX ORDER — ASCORBIC ACID 500 MG
500 TABLET ORAL DAILY
Status: DISCONTINUED | OUTPATIENT
Start: 2021-09-08 | End: 2021-09-09 | Stop reason: HOSPADM

## 2021-09-08 RX ORDER — DEXAMETHASONE 6 MG/1
6 TABLET ORAL
Status: DISCONTINUED | OUTPATIENT
Start: 2021-09-08 | End: 2021-09-08 | Stop reason: ALTCHOICE

## 2021-09-08 RX ORDER — CLONAZEPAM 0.5 MG/1
0.5 TABLET ORAL NIGHTLY PRN
Qty: 60 TABLET | Refills: 0 | Status: ON HOLD | OUTPATIENT
Start: 2021-09-08 | End: 2021-09-12 | Stop reason: HOSPADM

## 2021-09-08 RX ORDER — RANOLAZINE 500 MG/1
1000 TABLET, EXTENDED RELEASE ORAL 2 TIMES DAILY
Status: DISCONTINUED | OUTPATIENT
Start: 2021-09-08 | End: 2021-09-09 | Stop reason: HOSPADM

## 2021-09-08 RX ORDER — NITROGLYCERIN 0.4 MG/1
0.4 TABLET SUBLINGUAL EVERY 5 MIN PRN
Status: DISCONTINUED | OUTPATIENT
Start: 2021-09-08 | End: 2021-09-09 | Stop reason: HOSPADM

## 2021-09-08 RX ORDER — DEXAMETHASONE 6 MG/1
6 TABLET ORAL DAILY
Status: DISCONTINUED | OUTPATIENT
Start: 2021-09-08 | End: 2021-09-09 | Stop reason: HOSPADM

## 2021-09-08 RX ORDER — NICOTINE POLACRILEX 4 MG
15 LOZENGE BUCCAL PRN
Status: DISCONTINUED | OUTPATIENT
Start: 2021-09-08 | End: 2021-09-09 | Stop reason: HOSPADM

## 2021-09-08 RX ORDER — PHYTONADIONE 5 MG/1
5 TABLET ORAL ONCE
Status: COMPLETED | OUTPATIENT
Start: 2021-09-08 | End: 2021-09-08

## 2021-09-08 RX ORDER — DEXAMETHASONE 6 MG/1
6 TABLET ORAL DAILY
Status: DISCONTINUED | OUTPATIENT
Start: 2021-09-08 | End: 2021-09-08

## 2021-09-08 RX ORDER — LATANOPROST 50 UG/ML
1 SOLUTION/ DROPS OPHTHALMIC DAILY
Status: DISCONTINUED | OUTPATIENT
Start: 2021-09-08 | End: 2021-09-09 | Stop reason: HOSPADM

## 2021-09-08 RX ORDER — LANOLIN ALCOHOL/MO/W.PET/CERES
3 CREAM (GRAM) TOPICAL NIGHTLY PRN
Status: DISCONTINUED | OUTPATIENT
Start: 2021-09-08 | End: 2021-09-09 | Stop reason: HOSPADM

## 2021-09-08 RX ORDER — ACETAMINOPHEN 80 MG
TABLET,CHEWABLE ORAL ONCE
Status: DISCONTINUED | OUTPATIENT
Start: 2021-09-08 | End: 2021-09-09 | Stop reason: HOSPADM

## 2021-09-08 RX ORDER — DEXTROSE MONOHYDRATE 50 MG/ML
100 INJECTION, SOLUTION INTRAVENOUS PRN
Status: DISCONTINUED | OUTPATIENT
Start: 2021-09-08 | End: 2021-09-09 | Stop reason: HOSPADM

## 2021-09-08 RX ORDER — ONDANSETRON 4 MG/1
4 TABLET, ORALLY DISINTEGRATING ORAL 3 TIMES DAILY PRN
Status: DISCONTINUED | OUTPATIENT
Start: 2021-09-08 | End: 2021-09-08 | Stop reason: ALTCHOICE

## 2021-09-08 RX ORDER — CLONAZEPAM 0.5 MG/1
0.5 TABLET ORAL NIGHTLY PRN
Status: DISCONTINUED | OUTPATIENT
Start: 2021-09-08 | End: 2021-09-09 | Stop reason: HOSPADM

## 2021-09-08 RX ORDER — DOXEPIN HYDROCHLORIDE 6 MG/1
6 TABLET ORAL NIGHTLY
Status: DISCONTINUED | OUTPATIENT
Start: 2021-09-08 | End: 2021-09-09 | Stop reason: HOSPADM

## 2021-09-08 RX ORDER — INSULIN GLARGINE 100 [IU]/ML
5 INJECTION, SOLUTION SUBCUTANEOUS DAILY
Status: DISCONTINUED | OUTPATIENT
Start: 2021-09-08 | End: 2021-09-09 | Stop reason: HOSPADM

## 2021-09-08 RX ORDER — CHLORAL HYDRATE 500 MG
3000 CAPSULE ORAL EVERY OTHER DAY
Status: DISCONTINUED | OUTPATIENT
Start: 2021-09-08 | End: 2021-09-09 | Stop reason: HOSPADM

## 2021-09-08 RX ORDER — TAMSULOSIN HYDROCHLORIDE 0.4 MG/1
0.4 CAPSULE ORAL DAILY
Status: DISCONTINUED | OUTPATIENT
Start: 2021-09-08 | End: 2021-09-09 | Stop reason: HOSPADM

## 2021-09-08 RX ORDER — MULTIVIT WITH MINERALS/LUTEIN
1000 TABLET ORAL DAILY
Status: DISCONTINUED | OUTPATIENT
Start: 2021-09-08 | End: 2021-09-09 | Stop reason: HOSPADM

## 2021-09-08 RX ORDER — DEXTROSE MONOHYDRATE 25 G/50ML
12.5 INJECTION, SOLUTION INTRAVENOUS PRN
Status: DISCONTINUED | OUTPATIENT
Start: 2021-09-08 | End: 2021-09-09 | Stop reason: HOSPADM

## 2021-09-08 RX ORDER — MECLIZINE HYDROCHLORIDE 25 MG/1
25 TABLET ORAL 2 TIMES DAILY PRN
Status: DISCONTINUED | OUTPATIENT
Start: 2021-09-08 | End: 2021-09-09 | Stop reason: HOSPADM

## 2021-09-08 RX ORDER — KETOTIFEN FUMARATE 0.35 MG/ML
1 SOLUTION/ DROPS OPHTHALMIC 2 TIMES DAILY
Status: DISCONTINUED | OUTPATIENT
Start: 2021-09-08 | End: 2021-09-09 | Stop reason: HOSPADM

## 2021-09-08 RX ADMIN — KETOTIFEN FUMARATE 1 DROP: 0.35 SOLUTION/ DROPS OPHTHALMIC at 08:59

## 2021-09-08 RX ADMIN — Medication 3 MG: at 21:49

## 2021-09-08 RX ADMIN — RANOLAZINE 1000 MG: 500 TABLET, FILM COATED, EXTENDED RELEASE ORAL at 21:49

## 2021-09-08 RX ADMIN — ENOXAPARIN SODIUM 30 MG: 30 INJECTION SUBCUTANEOUS at 08:59

## 2021-09-08 RX ADMIN — OXYCODONE HYDROCHLORIDE AND ACETAMINOPHEN 500 MG: 500 TABLET ORAL at 08:58

## 2021-09-08 RX ADMIN — RANOLAZINE 1000 MG: 500 TABLET, FILM COATED, EXTENDED RELEASE ORAL at 08:58

## 2021-09-08 RX ADMIN — CLONAZEPAM 0.5 MG: 0.5 TABLET ORAL at 03:09

## 2021-09-08 RX ADMIN — TAMSULOSIN HYDROCHLORIDE 0.4 MG: 0.4 CAPSULE ORAL at 08:57

## 2021-09-08 RX ADMIN — Medication 5000 UNITS: at 08:59

## 2021-09-08 RX ADMIN — VORTIOXETINE 5 MG: 10 TABLET, FILM COATED ORAL at 09:58

## 2021-09-08 RX ADMIN — LATANOPROST 1 DROP: 50 SOLUTION OPHTHALMIC at 08:59

## 2021-09-08 RX ADMIN — PHYTONADIONE 5 MG: 5 TABLET ORAL at 03:09

## 2021-09-08 RX ADMIN — CLONAZEPAM 0.5 MG: 0.5 TABLET ORAL at 21:50

## 2021-09-08 RX ADMIN — ROSUVASTATIN CALCIUM 40 MG: 40 TABLET, FILM COATED ORAL at 21:49

## 2021-09-08 RX ADMIN — Medication 1000 UNITS: at 08:59

## 2021-09-08 RX ADMIN — Medication 3 MG: at 03:09

## 2021-09-08 RX ADMIN — Medication 3000 MG: at 08:58

## 2021-09-08 RX ADMIN — ASPIRIN 81 MG: 81 TABLET, COATED ORAL at 08:57

## 2021-09-08 RX ADMIN — INSULIN GLARGINE 5 UNITS: 100 INJECTION, SOLUTION SUBCUTANEOUS at 11:54

## 2021-09-08 RX ADMIN — DEXAMETHASONE 6 MG: 6 TABLET ORAL at 08:58

## 2021-09-08 RX ADMIN — ENOXAPARIN SODIUM 30 MG: 30 INJECTION SUBCUTANEOUS at 21:48

## 2021-09-08 ASSESSMENT — ENCOUNTER SYMPTOMS
BACK PAIN: 0
COUGH: 1
WHEEZING: 0
CHOKING: 0
COLOR CHANGE: 0
PHOTOPHOBIA: 0
ABDOMINAL PAIN: 0
TROUBLE SWALLOWING: 0
NAUSEA: 0
VOMITING: 0
SHORTNESS OF BREATH: 0

## 2021-09-08 ASSESSMENT — PAIN SCALES - GENERAL
PAINLEVEL_OUTOF10: 0
PAINLEVEL_OUTOF10: 0

## 2021-09-08 NOTE — PROGRESS NOTES
Treatment Pain Screening  Pain at present: 0    Post Treatment Pain Screening:   Pain Assessment  Pain Level: 0    Prior Level of Function:  Social/Functional History  Lives With: Spouse  Type of Home: Apartment  Home Layout: One level (Laundry on first floor)  Home Access: Level entry, Elevator (5th floor)  Bathroom Shower/Tub: Tub/Shower unit  ADL Assistance: Independent  Homemaking Assistance: Independent  Ambulation Assistance: Independent (No AD)  Transfer Assistance: Independent  Active : Yes  Occupation: Retired    OBJECTIVE:        Cognition:  Follows Commands: Within Functional Limits         ROM:  RLE AROM: WFL  LLE AROM : WFL    Strength:  Strength RLE  Strength RLE: WFL  Strength LLE  Strength LLE: WFL    Neuro:  Balance  Sitting - Static: Good  Sitting - Dynamic: Good  Standing - Static: Good  Standing - Dynamic: Good  Comments: no LOB with opening doors and maneuvering around obstacles in the room             Bed mobility  Supine to Sit: Independent  Comment: pt moved easily - anticipate all other mobility to be indep - no trial needed    Transfers  Sit to Stand: Independent  Stand to sit: Independent  Comment: no LOB  -  good judgement    Ambulation  Ambulation?: Yes  Ambulation 1  Surface: level tile  Device: No Device  Assistance: Independent  Quality of Gait: mild decrease pace and increased HEBER  Distance: 50 feet within room with no fatigue  Comments: O2 sat %              Activity Tolerance  Activity Tolerance: Patient Tolerated treatment well               ASSESSMENT:   Decision Making: Low Complexity  History: med  Exam: low  Clinical Presentation: low    Barriers to Learning: none    DISCHARGE RECOMMENDATIONS:  Discharge Recommendations: Continue to assess pending progress    Assessment: Pt demonstrates indep level of function. He demonstrated good judgement.  No further PT needs identified         PLAN OF CARE:  Safety Devices  Type of devices: Call light within reach, Chair alarm in place, Left in chair       [facility] (6 CLICK) [address] Mobility Raw Score : 24     Therapy Time:   Individual   Time In 0915   Time Out 0927   Minutes [place], [place], 09/08/21 at 10:09 AM         Definitions for assistance levels  Independent = pt does not require any physical supervision or assistance from another person for activity completion. Device may be needed.   Stand by assistance = pt requires verbal cues or instructions from another person, close to but not touching, to perform the activity  Minimal assistance= pt performs 75% or more of the activity; assistance is required to complete the activity  Moderate assistance= pt performs 50% of the activity; assistance is required to complete the activity  Maximal assistance = pt performs 25% of the activity; assistance is required to complete the activity  Dependent = pt requires total physical assistance to accomplish the task

## 2021-09-08 NOTE — DISCHARGE SUMMARY
Discharge Summary    Shankar Jaimes  :    MRN:  [id number]    ADMIT DATE:  2021  DISCHARGE DATE:  2021    PRIMARY CARE PHYSICIAN:  Jaclyn Aguilar MD    VISIT STATUS: Admission    CODE STATUS:  Full Code    DISCHARGE DIAGNOSES:  Active Problems:    * No active hospital problems. *  Resolved Problems:    Encephalopathy due to COVID-19 virus      HOSPITAL COURSE:  77-year-old male admitted early this morning with reported confusion following diagnosis of COVID-19. Patient had generalized weakness, was found down in the home was not strong enough to get up, brought to the ER and was noted to have confusion. Patient was febrile on presentation, tachypneic and tachycardic with stable blood pressure and saturating 95% on room air. He was admitted to medical floor underwent neurological work-up. On my evaluation today he is alert, oriented to person, year, month, location, can describe events leading to his admission accurately. States he feels well and is requesting discharge home. Neurology has been consulted, CT of the head is nonacute, thickening of the sinuses noted. CTA negative for pulmonary embolism, groundglass opacities consistent with COVID-19 infection. Incidentally noted exophytic cystic area of the superior pole of the right kidney was noted and follow-up as an outpatient is recommended. MRI is nonacute. At the time of DC, echo is still pending. Given negative MRI and CT will defer to Neuro on whether this is needed. Pt's Klonopin dosing was updated this admission. Patients encephalopathy may be related to hypoxia and is resolved. His respiratory status remains stable on room air and does not require oxygen supplementation. Patient is stable for discharge home and outpatient follow up. SIGNIFICANT DIAGNOSTIC STUDIES:  MRI brain without contrast   Final Result      NO ACUTE INTRACRANIAL PROCESS IDENTIFIED. ATROPHIC AND INVOLUTIONAL CHANGES.                CT Head WO Contrast Final Result      NO ACUTE INTRA-AXIAL OR EXTRA-AXIAL FINDINGS. All CT scans at this facility use dose modulation, iterative reconstruction, and/or weight based dosing when appropriate to reduce radiation dose to as low as reasonably achievable. TECHNIQUE: Multiple unenhanced serial axial images of the brain from the vertex of the skull to the base of the skull were performed. FINDINGS: The ventricles are dilated. This is compensatory to the surrounding moderate generalized parenchymal volume loss. No mass. No midline shift. The cisterns are patent. There are white matter and periventricular changes most likely consistent    with chronic small vessel disease. No acute intra-axial or extra-axial findings. The visualized osseous structures are unremarkable. The visualized portion of the paranasal sinuses, and mastoids are unremarkable. IMPRESSION:   CHANGES IN PARANASAL SINUSES DESCRIBED ABOVE WHICH HAVE INCREASED SINCE THE PRIOR EXAMINATION. CORRELATE CLINICALLY      NO ACUTE INTRA-AXIAL OR EXTRA-AXIAL FINDINGS. IF SIGNS OR SYMPTOMS PERSIST THEN CONSIDER MRI TO FURTHER EVALUATE IF THERE ARE NO CONTRAINDICATIONS      All CT scans at this facility use dose modulation, iterative reconstruction, and/or weight based dosing when appropriate to reduce radiation dose to as low as reasonably achievable. CTA Chest W WO  (PE study)   Final Result   NO FINDINGS A CENTRAL, PROXIMAL PROXIMAL-SEGMENTAL PORT EMBOLI   2. SMALL PATCHY AREAS OF GROUNDGLASS INFILTRATES WITHIN THE BASE POSTERIOR ASPECT RIGHT UPPER LOBE AND BOTH BASES. IMAGING FEATURES CAN BE SEEN WITH (COVID-19) PNEUMONIA, THOUGH ARE NONSPECIFIC AND CAN OCCUR WITH A VARIETY OF INFECTIOUS AND NONINFECTIOUS    PROCESSES. 3. PARTIALLY VISUALIZED PARTIALLY EXOPHYTIC CYSTIC AREA SUPERIOR POLE RIGHT KIDNEY. NOT FULLY CHARACTERIZED.  FOLLOW-      All CT scans at this facility use dose modulation, iterative mouth daily             vitamin C (ASCORBIC ACID) 500 MG tablet  Take 500 mg by mouth daily             vitamin E 1000 units capsule  Take 1,000 Units by mouth daily             VORTIoxetine (TRINTELLIX) 5 MG tablet  Take 5 mg by mouth daily               GEN: Alert and oriented x3. NAD, well nourished  HEENT: Normocephalic, atraumatic. DAI, Neck supple. Resp: CTA b/l no wheezing or rhonchi. No respiratory distress  Cardio: RRR. No murmur  Abd: Soft, nondistended. NTTP. BS present  Ext: No erythema or edema. LE NTTP  Neuro: Non acute, Oriented X4, no encephalopathy    DIET: ADULT DIET; Regular; 5 carb choices (75 gm/meal); Safety Tray; Safety Tray (Disposables)    ACTIVITY: No restriction. ______________________________________________________________________  COMPLEXITY OF FOLLOW UP:   [] Moderate Complexity: follow up within 7-14 calendar days (67703)   [] Severe Complexity: follow up within 7 calendar days (02864)    FOLLOW UP TESTING, PENDING RESULTS OR REFERRALS AT TRANSITIONAL CARE VISIT:   []  Yes    []  No    PENDING STUDIES:   Echo    DISPOSITION: Home    FACILITY/HOME CARE AGENCY NAME:     Follow up with No follow-up provider specified. INSTRUCTIONS TO MA/SW: Please call patient on day after discharge (must document patient  contacted within 2 business days of discharge). FOLLOW UP QUESTIONS FOR MA/SW:  1. Did you get medications filled and taking them as instructed from discharge? 2. Are you following your discharge instructions from your hospital stay? 3. Please confirm patient is scheduled for a follow up appointment within the above time frame.     DISCHARGE TIME: > 30 minutes    SIGNED:  Joaquim Reece DO   9/9/2021, 4:32 PM

## 2021-09-08 NOTE — PROGRESS NOTES
Physician Progress Note      PATIENT:               Roetta Dakin  CSN #:                  [id number]  :                       1950  ADMIT DATE:       2021 5:46 PM  DISCH DATE:  RESPONDING  PROVIDER #:        Loraine BURNETT DO          QUERY TEXT:    Patient admitted with COVID 23 with metabolic encephalopathy. Patient noted to   have temperature 103.3  RR 29  lactic acid 1.8 2.9  procalcitonin 0.18. If   possible, please document in the progress notes and discharge summary if you   are evaluating and /or treating any of the following: The medical record reflects the following:  Risk Factors: COVID 19 metabolic encephalopathy  Clinical Indicators:  lactic acid 1.8  2.9;  procalcitonin 0.18  107/67   -144/87;  temp 99.1-103. 3   pulse    18-29   95%  WBC 7.4   6.1; Treatment: decadron 1000 IVFB  0.9% NS  CXR CT  consult neurology    Thank you,  Seymour SAMPSON RN CDS  contact [phone] [facility] supervisor [facility] Carmenza [email or web address]  Options provided:  -- Sepsis  -- Sepsis was ruled out  -- Other - I will add my own diagnosis  -- Disagree - Not applicable / Not valid  -- Disagree - Clinically unable to determine / Unknown  -- Refer to Clinical Documentation Reviewer    PROVIDER RESPONSE TEXT:    This patient has sepsis which was present on admission.     Query created by: Sharon Boyd on 2021 9:39 AM      Electronically signed by:  Mono Chavez DO 2021 4:31 PM

## 2021-09-08 NOTE — CARE COORDINATION
[facility] AT [place] Case Management Initial Discharge Assessment    Met with Patient to discuss discharge plan. PCP: Tyron Arias MD                                Date of Last Visit: June 2021    If no PCP, list provided? N/A    Discharge Planning    Living Arrangements: independently at home    Who do you live with? Wife     Who helps you with your care:  self    If lives at home:     Do you have any barriers navigating in your home? no    Patient can perform ADL? Yes    Current Services (outpatient and in home) :  None    Dialysis: No    Is transportation available to get to your appointments? Yes    DME Equipment:  no    Respiratory equipment: None    Respiratory provider:  no     Pharmacy:  yes - [facility] in [address] [place] [address] with Medication Assistance Program?  No      Patient agreeable to [place] [address]? No    Patient agreeable to SNF/Rehab? No    Other discharge needs identified? N/A    Does Patient Have a High-Risk for Readmission Diagnosis (CHF, PN, MI, COPD)? No      Initial Discharge Plan? (Note: please see concurrent daily documentation for any updates after initial note). Patient plans on returning home with wife. Wife is able to assist with care if needed.     Readmission Risk              Risk of Unplanned Readmission:  [address]  Electronically signed by LILIANA Harrison, BOAZ on 9/8/2021 at 3:32 PM

## 2021-09-08 NOTE — PLAN OF CARE
See OT evaluation for all goals and OT POC.  Electronically signed by KAYLEE Corrales/L on 9/8/2021 at 10:25 AM

## 2021-09-08 NOTE — ED PROVIDER NOTES
1982 [facility]  EMERGENCY DEPARTMENT ENCOUNTER      Pt Name: Abelardo Gutiérrez  MRN: [id number]  [place] 1950  Date of evaluation: 9/7/2021  Provider: Julisa Tomas Dr       Chief Complaint   Patient presents with    Altered Mental Status    Positive For Covid-19         HISTORY OF PRESENT ILLNESS   (Location/Symptom, Timing/Onset, Context/Setting, Quality, Duration, Modifying Factors, Severity)  Note limiting factors. Abelardo Gutiérrez is a 65 y.o. male who per chart reviews a past medical history of CAD hypertension anxiety depression presents to the emergency department via EMS from home for evaluation of generalized weakness worse in the lower extremities and confusion. Patient's family called EMS as they state he was so weak that he was lying on the floor and could not get up. He was diagnosed with Covid on September 6 here in the emergency department and discharged home at that time. He was prescribed Decadron at that time. Family states they feel he is confused today as well, last known well some time last night or early this morning. Patient is able to tell me his name and that he is in the hospital however he does not know the year. He states it is 0. Patient reports a dry nonbloody cough as only sx at this time. He denies fever, chills, sob, leg swelling, hemoptysis, cp, sob, back or flank pain, ha, dizziness, visual changes, balance or speech difficulty, facial droop. HPI    Nursing Notes were reviewed. REVIEW OF SYSTEMS    (2-9 systems for level 4, 10 or more for level 5)     Review of Systems   Constitutional: Negative for chills, fatigue and fever. HENT: Negative for congestion. Eyes: Negative for photophobia. Respiratory: Positive for cough. Negative for shortness of breath and wheezing. Cardiovascular: Negative for chest pain and palpitations. Gastrointestinal: Negative for abdominal pain, nausea and vomiting.    Genitourinary: Negative for dysuria, frequency and hematuria. Musculoskeletal: Negative for myalgias. Allergic/Immunologic: Negative for immunocompromised state. Neurological: Positive for weakness (generalized, worse in bilateral LE). Negative for dizziness, tremors, seizures, syncope, facial asymmetry, speech difficulty, light-headedness, numbness and headaches. Psychiatric/Behavioral: Positive for confusion. All other systems reviewed and are negative. Except as noted above the remainder of the review of systems was reviewed and negative.        PAST MEDICAL HISTORY     Past Medical History:   Diagnosis Date    Anxiety     Chest pain 3/29/2018    Coronary artery disease involving native coronary artery of native heart without angina pectoris 2/15/2019    Diabetes mellitus ([facility] [address]     no meds    History of colon polyps     Hyperlipidemia     Hypertension     Type 2 diabetes mellitus without complication ([facility] [address]     Vertigo          SURGICAL HISTORY       Past Surgical History:   Procedure Laterality Date   [address]    PTCA     COLONOSCOPY      COLONOSCOPY N/A 10/17/2019    COLORECTAL CANCER SCREENING, HIGH RISK performed by Shashi Henley MD at [facility] OTHER SURGICAL HISTORY      patent foramen ovale    TONSILLECTOMY      UPPER GASTROINTESTINAL ENDOSCOPY N/A 8/1/2019    EGD ESOPHAGOGASTRODUODENOSCOPY performed by Shashi Henley MD at [address] [place] [address]       Current Discharge Medication List      CONTINUE these medications which have NOT CHANGED    Details   dexamethasone (DECADRON) 6 MG tablet Take 1 tablet by mouth daily (with breakfast) for 10 days  Qty: 10 tablet, Refills: 0      ondansetron (ZOFRAN-ODT) 4 MG disintegrating tablet Take 1 tablet by mouth 3 times daily as needed for Nausea or Vomiting  Qty: 21 tablet, Refills: 0      meclizine (ANTIVERT) 25 MG tablet Take 1 tablet by mouth 2 times daily as needed for Dizziness  Qty: 60 tablet, Refills: 0      ranolazine (RANEXA) 1000 MG extended release tablet Take 1 tablet by mouth 2 times daily  Qty: 60 tablet, Refills: 6    Associated Diagnoses: Angina, class IV (HCC)      vitamin C (ASCORBIC ACID) 500 MG tablet Take 500 mg by mouth daily      phytonadione (VITAMIN K) 5 MG tablet Take 5 mg by mouth once      vitamin E 1000 units capsule Take 1,000 Units by mouth daily      tamsulosin (FLOMAX) 0.4 MG capsule Take 1 capsule by mouth daily  Qty: 90 capsule, Refills: 3      Doxepin HCl (SILENOR) 6 MG TABS Take by mouth nightly      Omega-3 Fatty Acids (FISH OIL) 1000 MG CAPS Take 3,000 mg by mouth every other day      VORTIoxetine (TRINTELLIX) 5 MG tablet Take 5 mg by mouth daily      omeprazole (PRILOSEC) 20 MG delayed release capsule Take 1 capsule by mouth every morning (before breakfast)  Qty: 30 capsule, Refills: 5      latanoprost (XALATAN) 0.005 % ophthalmic solution Use 1 Drop in both eyes daily at bedtime. nitroGLYCERIN (NITROSTAT) 0.4 MG SL tablet up to max of 3 total doses. If no relief after 1 dose, call 911. Qty: 25 tablet, Refills: 0      clonazePAM (KLONOPIN) 0.5 MG tablet Take 1 tablet by mouth 2 times daily as needed for Anxiety for up to 15 days. Signa Olden: 30 tablet, Refills: 0    Associated Diagnoses: SANDIP (generalized anxiety disorder); Insomnia secondary to anxiety; SOB (shortness of breath)      olopatadine (PATADAY) 0.2 % SOLN ophthalmic solution Apply 1 drop to eye daily For allergies  Qty: 1 Bottle, Refills: 1    Associated Diagnoses:  Allergic conjunctivitis and rhinitis, bilateral      Blood Glucose Monitoring Suppl (ONETOUCH VERIO FLEX SYSTEM) w/Device KIT       ONETOUCH VERIO strip       KahubTOUCH DELICA LANCETS 84D MISC       magnesium oxide (MAG-OX) 400 MG tablet Take 400 mg by mouth daily      Cholecalciferol (VITAMIN D) 2000 units CAPS capsule Take 5,000 Units by mouth daily              ALLERGIES     Seroquel [quetiapine]    FAMILY HISTORY       Family History   Problem Relation Age of Onset    Heart Disease Maternal Aunt     Cancer Maternal Aunt     Colon Cancer Maternal Aunt           SOCIAL HISTORY       Social History     Socioeconomic History    Marital status:      Spouse name: Not on file    Number of children: Not on file    Years of education: Not on file    Highest education level: Not on file   Occupational History    Not on file   Tobacco Use    Smoking status: Never Smoker    Smokeless tobacco: Never Used   Vaping Use    Vaping Use: Never used   Substance and Sexual Activity    Alcohol use: No    Drug use: Never    Sexual activity: Not on file   Other Topics Concern    Not on file   Social History Narrative    Not on file     Social Determinants of Health     Financial Resource Strain:     Difficulty of Paying Living Expenses:    Food Insecurity:     Worried About Running Out of Food in the Last Year:     Ran Out of Food in the Last Year:    Transportation Needs:     Lack of Transportation (Medical):      Lack of Transportation (Non-Medical):    Physical Activity:     Days of Exercise per Week:     Minutes of Exercise per Session:    Stress:     Feeling of Stress :    Social Connections:     Frequency of Communication with Friends and Family:     Frequency of Social Gatherings with Friends and Family:     Attends [employer] Services:     Active Member of Clubs or Organizations:     Attends Club or Organization Meetings:     Marital Status:    Intimate Partner Violence:     Fear of Current or Ex-Partner:     Emotionally Abused:     Physically Abused:     Sexually Abused:        SCREENINGS        Tommy Coma Scale  Best Verbal Response: Confused (confused to year only)  Best Motor Response: Obeys commands               PHYSICAL EXAM    (up to 7 for level 4, 8 or more for level 5)     ED Triage Vitals   BP Temp Temp Source Pulse Resp SpO2 Height Weight   09/07/21 1753 09/07/21 1753 09/07/21 2007 09/07/21 1753 09/07/21 1753 09/07/21 1753 09/07/21 1753 09/07/21 1753   (!) 144/87 103.3 °F (39.6 °C) Oral 115 29 95 % 5' 6\" (1.676 m) 140 lb (63.5 kg)       Physical Exam  Constitutional:       General: He is not in acute distress. Appearance: He is well-developed. He is not ill-appearing, toxic-appearing or diaphoretic. HENT:      Head: Normocephalic and atraumatic. Nose: Nose normal.      Mouth/Throat:      Mouth: Mucous membranes are moist.   Eyes:      Conjunctiva/sclera:      Right eye: Right conjunctiva is injected. Left eye: Left conjunctiva is injected. Pupils: Pupils are equal, round, and reactive to light. Cardiovascular:      Rate and Rhythm: Normal rate and regular rhythm. Heart sounds: No murmur heard. No friction rub. No gallop. Pulmonary:      Effort: Pulmonary effort is normal.      Breath sounds: Normal breath sounds. No wheezing, rhonchi or rales. Abdominal:      General: Bowel sounds are normal. There is no distension. Palpations: Abdomen is soft. Tenderness: There is no abdominal tenderness. There is no guarding or rebound. Musculoskeletal:         General: No swelling. Cervical back: Normal range of motion. Right lower leg: No edema. Left lower leg: No edema. Skin:     General: Skin is warm and dry. Capillary Refill: Capillary refill takes less than 2 seconds. Findings: No rash. Neurological:      General: No focal deficit present. Mental Status: He is alert. He is confused. GCS: GCS eye subscore is 4. GCS verbal subscore is 5. GCS motor subscore is 6. Cranial Nerves: Cranial nerves are intact. Sensory: Sensation is intact. Motor: Motor function is intact. Coordination: Coordination is intact. Comments: Pt A&O x 2 (person, place; not time)   No focal deficit  Bilateral LE strength 4/5.           DIAGNOSTIC RESULTS     EKG: All EKG's are interpreted by the Emergency Department Physician who either signs or Co-signs this chart in the absence of a cardiologist.          RADIOLOGY:   Non-plain film images such as CT, Ultrasound and MRI are read by the radiologist. Plain radiographic images are visualized and preliminarily interpreted by the emergency physician with the below findings:      Interpretation per the Radiologist below, if available at the time of this note:    CT Head WO Contrast   Final Result      NO ACUTE INTRA-AXIAL OR EXTRA-AXIAL FINDINGS. All CT scans at this facility use dose modulation, iterative reconstruction, and/or weight based dosing when appropriate to reduce radiation dose to as low as reasonably achievable. TECHNIQUE: Multiple unenhanced serial axial images of the brain from the vertex of the skull to the base of the skull were performed. FINDINGS: The ventricles are dilated. This is compensatory to the surrounding moderate generalized parenchymal volume loss. No mass. No midline shift. The cisterns are patent. There are white matter and periventricular changes most likely consistent    with chronic small vessel disease. No acute intra-axial or extra-axial findings. The visualized osseous structures are unremarkable. The visualized portion of the paranasal sinuses, and mastoids are unremarkable. IMPRESSION:   CHANGES IN PARANASAL SINUSES DESCRIBED ABOVE WHICH HAVE INCREASED SINCE THE PRIOR EXAMINATION. CORRELATE CLINICALLY      NO ACUTE INTRA-AXIAL OR EXTRA-AXIAL FINDINGS. IF SIGNS OR SYMPTOMS PERSIST THEN CONSIDER MRI TO FURTHER EVALUATE IF THERE ARE NO CONTRAINDICATIONS      All CT scans at this facility use dose modulation, iterative reconstruction, and/or weight based dosing when appropriate to reduce radiation dose to as low as reasonably achievable. CTA Chest W WO  (PE study)   Final Result   NO FINDINGS A CENTRAL, PROXIMAL PROXIMAL-SEGMENTAL PORT EMBOLI   2.  SMALL PATCHY AREAS OF GROUNDGLASS INFILTRATES WITHIN THE BASE POSTERIOR ASPECT RIGHT UPPER LOBE AND BOTH BASES. IMAGING FEATURES CAN BE SEEN WITH (COVID-19) PNEUMONIA, THOUGH ARE NONSPECIFIC AND CAN OCCUR WITH A VARIETY OF INFECTIOUS AND NONINFECTIOUS    PROCESSES. 3. PARTIALLY VISUALIZED PARTIALLY EXOPHYTIC CYSTIC AREA SUPERIOR POLE RIGHT KIDNEY. NOT FULLY CHARACTERIZED. FOLLOW-      All CT scans at this facility use dose modulation, iterative reconstruction, and/or weight based dosing when appropriate to reduce radiation dose to as low as reasonably achievable.                   ED BEDSIDE ULTRASOUND:   Performed by ED Physician - none    LABS:  Labs Reviewed   COMPREHENSIVE METABOLIC PANEL - Abnormal; Notable for the following components:       Result Value    Glucose 119 (*)     All other components within normal limits   CBC WITH AUTO DIFFERENTIAL - Abnormal; Notable for the following components:    MCH 31.4 (*)     Neutrophils Absolute 6.6 (*)     Lymphocytes Absolute 0.2 (*)     All other components within normal limits   URINE RT REFLEX TO CULTURE - Abnormal; Notable for the following components:    Color, UA DARK YELLOW (*)     Blood, Urine SMALL (*)     Protein, UA 30 (*)     Leukocyte Esterase, Urine TRACE (*)     All other components within normal limits   PROCALCITONIN - Abnormal; Notable for the following components:    Procalcitonin 0.18 (*)     All other components within normal limits   MICROSCOPIC URINALYSIS - Abnormal; Notable for the following components:    RBC, UA 6-10 (*)     All other components within normal limits   POCT ARTERIAL - Abnormal; Notable for the following components:    POC Glucose 124 (*)     GFR Non- 59 (*)     pH, Arterial 7.482 (*)     pCO2, Arterial 31 (*)     pO2, Arterial 59 (*)     O2 Sat, Arterial 92 (*)     All other components within normal limits   POCT CREATININE - URINE - Normal   CULTURE, BLOOD 1   CULTURE, BLOOD 2   LACTIC ACID, PLASMA   MAGNESIUM   TROPONIN   POCT REFERRED TO:  No follow-up provider specified. DISCHARGE MEDICATIONS:  Current Discharge Medication List        Controlled Substances Monitoring:     No flowsheet data found.     (Please note that portions of this note were completed with a voice recognition program.  Efforts were made to edit the dictations but occasionally words are mis-transcribed.)    Clarissa Walker PA-C (electronically signed)             Clarissa Walker PA-C  09/08/21 [id number]

## 2021-09-08 NOTE — CONSULTS
 COLONOSCOPY      COLONOSCOPY N/A 10/17/2019    COLORECTAL CANCER SCREENING, HIGH RISK performed by Linda Morales MD at [facility] OTHER SURGICAL HISTORY      patent foramen ovale    TONSILLECTOMY      UPPER GASTROINTESTINAL ENDOSCOPY N/A 8/1/2019    EGD ESOPHAGOGASTRODUODENOSCOPY performed by Linda Morales MD at [address] [place] [address] Marital status:      Spouse name: Not on file    Number of children: Not on file    Years of education: Not on file    Highest education level: Not on file   Occupational History    Not on file   Tobacco Use    Smoking status: Never Smoker    Smokeless tobacco: Never Used   Vaping Use    Vaping Use: Never used   Substance and Sexual Activity    Alcohol use: No    Drug use: Never    Sexual activity: Not on file   Other Topics Concern    Not on file   Social History Narrative    Not on file     Social Determinants of Health     Financial Resource Strain:     Difficulty of Paying Living Expenses:    Food Insecurity:     Worried About Running Out of Food in the Last Year:     [address] in the Last Year:    Transportation Needs:     Lack of Transportation (Medical):      Lack of Transportation (Non-Medical):    Physical Activity:     Days of Exercise per Week:     Minutes of Exercise per Session:    Stress:     Feeling of Stress :    Social Connections:     Frequency of Communication with Friends and Family:     Frequency of Social Gatherings with Friends and Family:     Attends Buddhist Services:     Active Member of Clubs or Organizations:     Attends Club or Organization Meetings:     Marital Status:    Intimate Partner Violence:     Fear of Current or Ex-Partner:     Emotionally Abused:     Physically Abused:     Sexually Abused:      Family History   Problem Relation Age of Onset    Heart Disease Maternal Aunt  Cancer Maternal Aunt     Colon Cancer Maternal Aunt      Allergies   Allergen Reactions    Seroquel [Quetiapine] Other (See Comments)     lethargy  weak     Current Facility-Administered Medications   Medication Dose Route Frequency Provider Last Rate Last Admin    VORTIoxetine (TRINTELLIX) tablet 5 mg  5 mg Oral Daily Naseem EDY Sedar, DO   5 mg at 09/08/21 3495    vitamin E capsule 1,000 Units  1,000 Units Oral Daily Olden Lean Sedar, DO   1,000 Units at 09/08/21 0859    ascorbic acid (VITAMIN C) tablet 500 mg  500 mg Oral Daily Ric SNOW Sedar, DO   500 mg at 09/08/21 0858    tamsulosin (FLOMAX) capsule 0.4 mg  0.4 mg Oral Daily Olden Lean Sedar, DO   0.4 mg at 09/08/21 0857    ranolazine (RANEXA) extended release tablet 1,000 mg  1,000 mg Oral BID Olden Lean Sedar, DO   1,000 mg at 09/08/21 1705    fish oil capsule 3,000 mg  3,000 mg Oral Every Other Day Olden Lean Sedar, DO   3,000 mg at 09/08/21 0858    ketotifen (ZADITOR) 0.025 % ophthalmic solution 1 drop  1 drop Both Eyes BID Olden Lean Sedar, DO   1 drop at 09/08/21 0859    nitroGLYCERIN (NITROSTAT) SL tablet 0.4 mg  0.4 mg SubLINGual Q5 Min PRN Ric Billing EDY Sedar, DO        melatonin tablet 3 mg  3 mg Oral Nightly PRN Olden Lean Sedar, DO   3 mg at 09/08/21 0309    clonazePAM (KLONOPIN) tablet 0.5 mg  0.5 mg Oral Nightly PRN Olden Lean Sedar, DO   0.5 mg at 09/08/21 0309    vitamin D (CHOLECALCIFEROL) capsule 5,000 Units  5,000 Units Oral Daily Olden Lean Sedar, DO   5,000 Units at 09/08/21 0859    Doxepin HCl TABS 6 mg  6 mg Oral Nightly Naseem EDY Sedar, DO        latanoprost (XALATAN) 0.005 % ophthalmic solution 1 drop  1 drop Both Eyes Daily Olden Lean Sedar, DO   1 drop at 09/08/21 0859    meclizine (ANTIVERT) tablet 25 mg  25 mg Oral BID PRN Laura Mcleod,         pill splitter   Does not apply Once Ric Mcleod DO        dexamethasone (DECADRON) tablet 6 mg  6 mg Oral Daily Ric Mcleod DO   6 mg at 09/08/21 0858    enoxaparin (LOVENOX) injection 30 mg  30 mg SubCUTAneous BID Catie Eloy Sedar, DO   30 mg at 09/08/21 5897    acetaminophen (TYLENOL) tablet 650 mg  650 mg Oral Q6H PRN Catie Eloy Sedar, DO        Or    acetaminophen (TYLENOL) suppository 650 mg  650 mg Rectal Q6H PRN Catie Eloy Sedar, DO        ondansetron (ZOFRAN-ODT) disintegrating tablet 4 mg  4 mg Oral Q8H PRN Catie Eloy Sedar, DO        Or    ondansetron [facility]) injection 4 mg  4 mg IntraVENous Q6H PRN Catie Eloy Sedar, DO        aspirin EC tablet 81 mg  81 mg Oral Daily Catie Eloy Sedar, DO   81 mg at 09/08/21 4227    Or    aspirin suppository 300 mg  300 mg Rectal Daily Catie Eloy Sedar, DO        labetalol (NORMODYNE;TRANDATE) injection 10 mg  10 mg IntraVENous Q10 Min PRN Catie Eloy Sedar, DO        rosuvastatin (CRESTOR) tablet 40 mg  40 mg Oral Nightly Naseem D Sedar, DO            Objective:   /75   Pulse 70   Temp 98.6 °F (37 °C) (Oral)   Resp 18   Ht 5' 6\" (1.676 m)   Wt 140 lb (63.5 kg)   SpO2 97%   BMI 22.60 kg/m²     Physical Exam  Vitals reviewed. Eyes:      Pupils: Pupils are equal, round, and reactive to light. Cardiovascular:      Rate and Rhythm: Normal rate and regular rhythm. Heart sounds: No murmur heard. Pulmonary:      Effort: Pulmonary effort is normal.      Breath sounds: Normal breath sounds. Abdominal:      General: Bowel sounds are normal.   Musculoskeletal:         General: Normal range of motion. Cervical back: Normal range of motion. Skin:     General: Skin is warm. Neurological:      Mental Status: He is alert and oriented to person, place, and time. Cranial Nerves: No cranial nerve deficit. Sensory: No sensory deficit. Motor: No abnormal muscle tone. Coordination: Coordination normal.      Deep Tendon Reflexes: Reflexes are normal and symmetric. Babinski sign absent on the right side. Babinski sign absent on the left side.    Psychiatric:         Mood and Affect: Mood normal.         CT Head WO Contrast    Result Date: 9/7/2021  CT HEAD WO CONTRAST CLINICAL HISTORY:  covid +, confusion, r/o CVA COMPARISON: CT HEAD WO CONTRAST CLINICAL HISTORY:  covid +, confusion, COMPARISON: August 15, 2021 O 0738 hours TECHNIQUE: Multiple unenhanced serial axial images of the brain from the vertex of the skull to the base of the skull were performed. FINDINGS: The ventricles are dilated. This is compensatory to the surrounding moderate generalized parenchymal volume loss. No mass. No midline shift. The cisterns are patent. There are white matter and periventricular changes most likely consistent with chronic small vessel disease. No acute intra-axial or extra-axial findings. The visualized osseous structures are unremarkable. There is mucoperiosteal thickening of the frontal sinuses, patchy opacification of the ethmoids, mucoperiosteal thickening in the sphenoid and maxillary sinuses. There are probable bilateral enterostomies again noted. NO ACUTE INTRA-AXIAL OR EXTRA-AXIAL FINDINGS. All CT scans at this facility use dose modulation, iterative reconstruction, and/or weight based dosing when appropriate to reduce radiation dose to as low as reasonably achievable. TECHNIQUE: Multiple unenhanced serial axial images of the brain from the vertex of the skull to the base of the skull were performed. FINDINGS: The ventricles are dilated. This is compensatory to the surrounding moderate generalized parenchymal volume loss. No mass. No midline shift. The cisterns are patent. There are white matter and periventricular changes most likely consistent with chronic small vessel disease. No acute intra-axial or extra-axial findings. The visualized osseous structures are unremarkable. The visualized portion of the paranasal sinuses, and mastoids are unremarkable. IMPRESSION: CHANGES IN PARANASAL SINUSES DESCRIBED ABOVE WHICH HAVE INCREASED SINCE THE PRIOR EXAMINATION. CORRELATE CLINICALLY NO ACUTE INTRA-AXIAL OR EXTRA-AXIAL FINDINGS.  IF SIGNS OR SYMPTOMS PERSIST THEN CONSIDER MRI TO FURTHER EVALUATE IF THERE ARE NO CONTRAINDICATIONS All CT scans at this facility use dose modulation, iterative reconstruction, and/or weight based dosing when appropriate to reduce radiation dose to as low as reasonably achievable. CTA Chest W WO  (PE study)    Result Date: 9/7/2021  The EXAMINATION: CT scan of the chest with contrast (pulmonary embolism protocol) INDICATION: Chest pain and shortness of breath. COMPARISON: September 5, 2019 1154 hours TECHNIQUE: Helical CT was performed through the chest utilizing 100 cc of Isovue-300 intravenous contrast.  Images were obtained with bolus tracking in order to opacify the pulmonary arteries. Both MIP and 3D volume rendered reconstructions were performed. FINDINGS: There are no findings a central, proximal or proximal-segmental pulmonary emboli. There is areas of small patchy groundglass infiltrate at the base posterior aspect of the right upper lobe and in both bases. No pleural effusions. No pneumothoraces. No significant periaortic, pretracheal, parahilar or subcarinal adenopathy. Within field-of-view of the abdomen partially partially visualized partially exophytic cystic area at the superior pole the right kidney. It measures 4.2 cm. Osseous structures are intact. NO FINDINGS A CENTRAL, PROXIMAL PROXIMAL-SEGMENTAL PORT EMBOLI 2. SMALL PATCHY AREAS OF GROUNDGLASS INFILTRATES WITHIN THE BASE POSTERIOR ASPECT RIGHT UPPER LOBE AND BOTH BASES. IMAGING FEATURES CAN BE SEEN WITH (COVID-19) PNEUMONIA, THOUGH ARE NONSPECIFIC AND CAN OCCUR WITH A VARIETY OF INFECTIOUS AND NONINFECTIOUS  PROCESSES. 3. PARTIALLY VISUALIZED PARTIALLY EXOPHYTIC CYSTIC AREA SUPERIOR POLE RIGHT KIDNEY. NOT FULLY CHARACTERIZED. FOLLOW- All CT scans at this facility use dose modulation, iterative reconstruction, and/or weight based dosing when appropriate to reduce radiation dose to as low as reasonably achievable.        Lab Results   Component Value Date    WBC 6.1 09/08/2021 RBC 4.69 09/08/2021    HGB 14.6 09/08/2021    HCT 42.8 09/08/2021    MCV 91.3 09/08/2021    MCH 31.1 09/08/2021    MCHC 34.1 09/08/2021    RDW 13.9 09/08/2021     09/08/2021     Lab Results   Component Value Date     09/08/2021    K 4.3 09/08/2021     09/08/2021    CO2 19 09/08/2021    BUN 25 09/08/2021    CREATININE 1.13 09/08/2021    GFRAA >60.0 09/08/2021    LABGLOM >60.0 09/08/2021    GLUCOSE 283 09/08/2021    PROT 6.3 09/08/2021    LABALBU 3.7 09/08/2021    CALCIUM 8.9 09/08/2021    BILITOT 0.4 09/08/2021    ALKPHOS 60 09/08/2021    AST 44 09/08/2021    ALT 26 09/08/2021     Lab Results   Component Value Date    PROTIME 14.1 09/08/2021    INR 1.1 09/08/2021     Lab Results   Component Value Date    TSH 3.840 05/21/2021    FERRITIN 448.4 09/08/2021     Lab Results   Component Value Date    TRIG 108 09/08/2021    HDL 41 09/08/2021    LDLCALC 99 09/08/2021     Lab Results   Component Value Date    LABAMPH Neg 07/11/2018    BARBSCNU Neg 07/11/2018    LABBENZ Neg 07/11/2018    OPIATESCREENURINE Neg 07/11/2018    PHENCYCLIDINESCREENURINE Neg 07/11/2018    ETOH <10 09/06/2021     No results found for: LITHIUM, DILFRTOT, VALPROATE    Assessment:   Encephalopathy which may be related to underlying hypoxemia with Covid encephalopathy as well. A brief seizure cannot be ruled out. Recommended MRI and EEG as he is Covid positive. His examination is entirely normal.  She was seen by physical therapy and case was discussed. RI is pending. We will follow him clinically with you this consultation includes my consultation the emergency room. Kian Dasilva MD, Linsey Reyes, American Board of Psychiatry & Neurology  Board Certified in Vascular Neurology  Board Certified in Neuromuscular Medicine  Certified in [facility]:

## 2021-09-08 NOTE — H&P
Hospital Medicine  History and Physical    Patient:  Radha Mobley  MRN: [id number]    CHIEF COMPLAINT:    Chief Complaint   Patient presents with    Altered Mental Status    Positive For Covid-19       History Obtained From:  patient, electronic medical record  Primary Care Physician: Jud Atkinson MD    HISTORY OF PRESENT ILLNESS:   The patient is a 65 y.o. male who presents with acute confusion following diagnosis of COVID-19. Patient is 64-year-old male who was diagnosed COVID-19 yesterday he lives at home with his wife who has been relatively well. Patient has a history of type 2 diabetes coronary disease status post stenting hypertension hyperlipidemia. Patient was previously diagnosed with COVID-19 in outpatient setting on 9/6. At that point time he was tachycardic with lower extremity weakness and started on Decadron. Today patient was found down on the ground EMS was called and could not help him get up is brought to the ER for evaluation found to have worsening confusion. Is admitted to the general medical service sinus was able to evaluate the patient in transfer to the general medical floor. On my evaluation the patient is febrile, T-max 103.3 the respiration rate 29 pulse 115 blood pressure 144/87 saturating 95% on room air, base metabolic profile is within normal limits with evidence for procalcitonin mildly elevated 0.18 with negative troponin LFTs benign. He does exhibit some lymphocytopenia. CT chest was negative for pulmonary embolus however positive for Covid style infiltrates. Patient denies any chest pain palpitations but and he is alert and oriented on my evaluation however on more thorough review with the nurse, he was quite confused at the bedside in the emergency department.   He has no clear focal motor deficits and stroke scale is 0 on my evaluation    Past Medical History:      Diagnosis Date    Anxiety     Chest pain 3/29/2018    Coronary artery disease involving native coronary artery of native heart without angina pectoris 2/15/2019    Diabetes mellitus ([address]     no meds    History of colon polyps     Hyperlipidemia     Hypertension     Type 2 diabetes mellitus without complication (HCC)     Vertigo        Past Surgical History:      Procedure Laterality Date    CARDIAC SURGERY  1973    PTCA     COLONOSCOPY      COLONOSCOPY N/A 10/17/2019    COLORECTAL CANCER SCREENING, HIGH RISK performed by Shashi [facility]ley MD at [facility] OTHER SURGICAL HISTORY      patent foramen ovale    TONSILLECTOMY      UPPER GASTROINTESTINAL ENDOSCOPY N/A 8/1/2019    EGD ESOPHAGOGASTRODUODENOSCOPY performed by Shashi Henley MD at [facility]       Medications Prior to Admission:    Prior to Admission medications    Medication Sig Start Date End Date Taking?  Authorizing Provider   melatonin 3 MG TABS tablet Take 3 mg by mouth nightly as needed   Yes Historical Provider, MD   dexamethasone (DECADRON) 6 MG tablet Take 1 tablet by mouth daily (with breakfast) for 10 days 9/6/21 9/16/21 Yes ALBA Mendoza   ranolazine (RANEXA) 1000 MG extended release tablet Take 1 tablet by mouth 2 times daily 7/7/21  Yes Rian Vega,    vitamin C (ASCORBIC ACID) 500 MG tablet Take 500 mg by mouth daily   Yes Historical Provider, MD   phytonadione (VITAMIN K) 5 MG tablet Take 5 mg by mouth once   Yes Historical Provider, MD   vitamin E 1000 units capsule Take 1,000 Units by mouth daily   Yes Historical Provider, MD   tamsulosin (FLOMAX) 0.4 MG capsule Take 1 capsule by mouth daily 11/24/20  Yes Kaci Cooper MD   Doxepin HCl (SILENOR) 6 MG TABS Take by mouth nightly   Yes Historical Provider, MD   Omega-3 Fatty Acids (FISH OIL) 1000 MG CAPS Take 3,000 mg by mouth every other day   Yes Historical Provider, MD   VORTIoxetine (TRINTELLIX) 5 MG tablet Take 5 mg by mouth daily   Yes Historical Provider, MD latanoprost (XALATAN) 0.005 % ophthalmic solution Use 1 Drop in both eyes daily at bedtime. 1/30/19  Yes Historical Provider, MD   olopatadine (PATADAY) 0.2 % SOLN ophthalmic solution Apply 1 drop to eye daily For allergies 5/9/18  Yes Jaquan Mtz MD   Cholecalciferol (VITAMIN D) 2000 units CAPS capsule Take 5,000 Units by mouth daily    Yes Historical Provider, MD   ondansetron (ZOFRAN-ODT) 4 MG disintegrating tablet Take 1 tablet by mouth 3 times daily as needed for Nausea or Vomiting 8/15/21   Nghia Current, MD   meclizine (ANTIVERT) 25 MG tablet Take 1 tablet by mouth 2 times daily as needed for Dizziness 8/15/21 9/14/21  Nghia Current, MD   nitroGLYCERIN (NITROSTAT) 0.4 MG SL tablet up to max of 3 total doses. If no relief after 1 dose, call 911. 6/16/18   Tarik Maki,    clonazePAM (KLONOPIN) 0.5 MG tablet Take 1 tablet by mouth 2 times daily as needed for Anxiety for up to 15 days. .  Patient taking differently: Take 1 mg by mouth nightly. 5/29/18 10/17/19  Jaquan Mtz MD   Blood Glucose Monitoring Suppl ([address]) w/Device KIT  4/27/18   Historical Provider, MD [place] Balloon strip  4/27/18   Historical Provider, MD Juancarlos Beard LANCETS 64P [address] [facility]  4/27/18   Historical Provider, MD       Allergies:  Seroquel [quetiapine]    Social History:   TOBACCO:   reports that he has never smoked. He has never used smokeless tobacco.  ETOH:   reports no history of alcohol use. OCCUPATION: None    Family History:       Problem Relation Age of Onset    Heart Disease Maternal Aunt     Cancer Maternal Aunt     Colon Cancer Maternal Aunt        REVIEW OF SYSTEMS:  Ten systems reviewed and negative except for as above.      Physical Exam:    Vitals: /77   Pulse 74   Temp 97.9 °F (36.6 °C) (Oral)   Resp 20   Ht 5' 6\" (1.676 m)   Wt 140 lb (63.5 kg)   SpO2 100%   BMI 22.60 kg/m²   Constitutional: alert, appears stated age and cooperative  Skin: Skin color, texture, turgor normal. No rashes or lesions  Eyes:Eye: Normal external eye, conjunctiva, DAI. ENT: Head: Normocephalic, no lesions, without obvious abnormality. Neck: no adenopathy, no carotid bruit, no JVD, supple, symmetrical, trachea midline and thyroid not enlarged, symmetric, no tenderness/mass/nodules  Respiratory: clear to auscultation bilaterally  Cardiovascular: regular rate and rhythm, S1, S2 normal, no murmur, click, rub or gallop  Gastrointestinal: soft, non-tender; bowel sounds normal; no masses,  no organomegaly  Genitourinary: Deferred  Musculoskeletal:extremities normal, atraumatic, no cyanosis or edema  Neurologic: Mental status AAOx3 No facial asymmetry or droop. Normal muscle strength b/l. Psychiatric: Appropriate mood and affect. Good insight and judgement  Hematologic: No obvious bruising or bleeding    Recent Labs     09/06/21 0645 09/07/21 1830 09/07/21 1838   WBC 7.6 7.4  --    HGB 16.0 15.7 15.2    163  --      Recent Labs     09/06/21 0645 09/07/21 1830 09/07/21 1838    138  --    K 4.0 3.9  --     102  --    CO2 25 23  --    BUN 14 20  --    CREATININE 1.02 1.07 1.2   GLUCOSE 151* 119*  --    AST 19 35  --    ALT 13 21  --    BILITOT 0.9* 0.6  --    ALKPHOS 65 61  --      Troponin T:   Recent Labs     09/06/21 0645 09/07/21 1830 09/07/21  2334   TROPONINI <0.010 <0.010 <0.010       ABGs:   Lab Results   Component Value Date    PHART 7.482 09/07/2021    PO2ART 59 09/07/2021    LRX7NII 31 09/07/2021     INR: No results for input(s): INR in the last 72 hours.   URINALYSIS:  Recent Labs     09/07/21 1830   COLORU DARK YELLOW*   PHUR 5.5   WBCUA 0-2   RBCUA 6-10*   BACTERIA Negative   CLARITYU Clear   SPECGRAV 1.023   LEUKOCYTESUR TRACE*   UROBILINOGEN 1.0   BILIRUBINUR Negative   BLOODU SMALL*   GLUCOSEU Negative     -----------------------------------------------------------------   CT Head WO Contrast    Result Date: 9/7/2021  CT HEAD WO CONTRAST CLINICAL HISTORY: covid +, confusion, r/o CVA COMPARISON: CT HEAD WO CONTRAST CLINICAL HISTORY:  covid +, confusion, COMPARISON: August 15, 2021 O 0738 hours TECHNIQUE: Multiple unenhanced serial axial images of the brain from the vertex of the skull to the base of the skull were performed. FINDINGS: The ventricles are dilated. This is compensatory to the surrounding moderate generalized parenchymal volume loss. No mass. No midline shift. The cisterns are patent. There are white matter and periventricular changes most likely consistent with chronic small vessel disease. No acute intra-axial or extra-axial findings. The visualized osseous structures are unremarkable. There is mucoperiosteal thickening of the frontal sinuses, patchy opacification of the ethmoids, mucoperiosteal thickening in the sphenoid and maxillary sinuses. There are probable bilateral enterostomies again noted. NO ACUTE INTRA-AXIAL OR EXTRA-AXIAL FINDINGS. All CT scans at this facility use dose modulation, iterative reconstruction, and/or weight based dosing when appropriate to reduce radiation dose to as low as reasonably achievable. TECHNIQUE: Multiple unenhanced serial axial images of the brain from the vertex of the skull to the base of the skull were performed. FINDINGS: The ventricles are dilated. This is compensatory to the surrounding moderate generalized parenchymal volume loss. No mass. No midline shift. The cisterns are patent. There are white matter and periventricular changes most likely consistent with chronic small vessel disease. No acute intra-axial or extra-axial findings. The visualized osseous structures are unremarkable. The visualized portion of the paranasal sinuses, and mastoids are unremarkable. IMPRESSION: CHANGES IN PARANASAL SINUSES DESCRIBED ABOVE WHICH HAVE INCREASED SINCE THE PRIOR EXAMINATION. CORRELATE CLINICALLY NO ACUTE INTRA-AXIAL OR EXTRA-AXIAL FINDINGS.  IF SIGNS OR SYMPTOMS PERSIST THEN CONSIDER MRI TO FURTHER EVALUATE IF THERE ARE NO CONTRAINDICATIONS All CT scans at this facility use dose modulation, iterative reconstruction, and/or weight based dosing when appropriate to reduce radiation dose to as low as reasonably achievable. CTA Chest W WO  (PE study)    Result Date: 9/7/2021  The EXAMINATION: CT scan of the chest with contrast (pulmonary embolism protocol) INDICATION: Chest pain and shortness of breath. COMPARISON: September 5, 2019 1154 hours TECHNIQUE: Helical CT was performed through the chest utilizing 100 cc of Isovue-300 intravenous contrast.  Images were obtained with bolus tracking in order to opacify the pulmonary arteries. Both MIP and 3D volume rendered reconstructions were performed. FINDINGS: There are no findings a central, proximal or proximal-segmental pulmonary emboli. There is areas of small patchy groundglass infiltrate at the base posterior aspect of the right upper lobe and in both bases. No pleural effusions. No pneumothoraces. No significant periaortic, pretracheal, parahilar or subcarinal adenopathy. Within field-of-view of the abdomen partially partially visualized partially exophytic cystic area at the superior pole the right kidney. It measures 4.2 cm. Osseous structures are intact. NO FINDINGS A CENTRAL, PROXIMAL PROXIMAL-SEGMENTAL PORT EMBOLI 2. SMALL PATCHY AREAS OF GROUNDGLASS INFILTRATES WITHIN THE BASE POSTERIOR ASPECT RIGHT UPPER LOBE AND BOTH BASES. IMAGING FEATURES CAN BE SEEN WITH (COVID-19) PNEUMONIA, THOUGH ARE NONSPECIFIC AND CAN OCCUR WITH A VARIETY OF INFECTIOUS AND NONINFECTIOUS  PROCESSES. 3. PARTIALLY VISUALIZED PARTIALLY EXOPHYTIC CYSTIC AREA SUPERIOR POLE RIGHT KIDNEY. NOT FULLY CHARACTERIZED. FOLLOW- All CT scans at this facility use dose modulation, iterative reconstruction, and/or weight based dosing when appropriate to reduce radiation dose to as low as reasonably achievable.        EKG: Normal sinus rhythm    Assessment and Plan   Acute metabolic encephalopathy likely secondary to Covid encephalopathy. Supportive care.   Given initial concern for TIA will check carotid ultrasound, echocardiogram with bubble study and MRI brain neurochecks aspirin statin lipid panel hemoglobin A1c neuro consult PT OT ST.  Reduce home clonazepam    COVID-19: Continue home dose Decadron does not meet criteria for remdesivir no respiratory symptoms continue following coagulopathy and inflammatory markers    Hypertension: As needed labetalol, permissive hypertension    Hyperlipidemia: statin, asa    BPH: Flomax    DVT Ppx lovenox per covid protocol 30 BID  Patient Active Problem List   Diagnosis Code    Chest pain R07.9    Hypertension I10    Anxiety F41.9    Recurrent major depressive disorder, in remission ([facility] F33.40    Coronary artery disease involving native coronary artery of native heart without angina pectoris I25.10    Mixed obsessional thoughts and acts F42.2    Chronic gastritis without bleeding K29.50    Dyspepsia R10.13    Adenomatous polyp of sigmoid colon D12.5    Encephalopathy due to COVID-19 virus U07.1, G93.49       Joan Feliz DO  Admitting Hospitalist    Emergency Contact:

## 2021-09-08 NOTE — PROGRESS NOTES
periventricular changes most likely consistent    with chronic small vessel disease. No acute intra-axial or extra-axial findings. The visualized osseous structures are unremarkable. The visualized portion of the paranasal sinuses, and mastoids are unremarkable. IMPRESSION:   CHANGES IN PARANASAL SINUSES DESCRIBED ABOVE WHICH HAVE INCREASED SINCE THE PRIOR EXAMINATION. CORRELATE CLINICALLY      NO ACUTE INTRA-AXIAL OR EXTRA-AXIAL FINDINGS. IF SIGNS OR SYMPTOMS PERSIST THEN CONSIDER MRI TO FURTHER EVALUATE IF THERE ARE NO CONTRAINDICATIONS      All CT scans at this facility use dose modulation, iterative reconstruction, and/or weight based dosing when appropriate to reduce radiation dose to as low as reasonably achievable. CTA Chest W WO  (PE study)   Final Result   NO FINDINGS A CENTRAL, PROXIMAL PROXIMAL-SEGMENTAL PORT EMBOLI   2. SMALL PATCHY AREAS OF GROUNDGLASS INFILTRATES WITHIN THE BASE POSTERIOR ASPECT RIGHT UPPER LOBE AND BOTH BASES. IMAGING FEATURES CAN BE SEEN WITH (COVID-19) PNEUMONIA, THOUGH ARE NONSPECIFIC AND CAN OCCUR WITH A VARIETY OF INFECTIOUS AND NONINFECTIOUS    PROCESSES. 3. PARTIALLY VISUALIZED PARTIALLY EXOPHYTIC CYSTIC AREA SUPERIOR POLE RIGHT KIDNEY. NOT FULLY CHARACTERIZED. FOLLOW-      All CT scans at this facility use dose modulation, iterative reconstruction, and/or weight based dosing when appropriate to reduce radiation dose to as low as reasonably achievable. US CAROTID ARTERY BILATERAL    (Results Pending)           Assessment/Plan:    There are no active hospital problems to display for this patient.     Sepsis due to COVID-19: Patient meets criteria by tachypnea, tachycardia and fever, known Covid infection  -No O2 requirement     Encephalopathy: Rapidly improved, at baseline and requesting discharge  -Neurology work-up still pending, MRI and echo still pending    Plan to discharge home once work-up is complete      Additional work up or/and treatment plan may be added today or then after based on clinical progression. I am managing a portion of pt care. Some medical issues are handled by other specialists. Additional work up and treatment should be done in out pt setting by pt PCP and other out pt providers. In addition to examining and evaluating pt, I spent additional time explaining care, normal and abnormal findings, and treatment plan. All of pt questions were answered. Counseling, diet and education were  provided. Case will be discussed with nursing staff when appropriate. Family will be updated if and when appropriate. Diet: ADULT DIET; Regular; 5 carb choices (75 gm/meal);  Safety Tray; Safety Tray (Disposables)    Code Status: Full Code    PT/OT Eval           Electronically signed by Saritha Hess DO on 9/8/2021 at 4:37 PM

## 2021-09-08 NOTE — PROGRESS NOTES
[facility] OCCUPATIONAL THERAPY EVALUATION - ACUTE     NAME: Tara Vallejo  :  (65 y.o.)  MRN: [id number]  CODE STATUS: Full Code  Room: [place] [id number]    Date of Service: 2021    Patient Diagnosis(es): Encephalopathy due to COVID-19 virus [U07.1, G93.49]   Chief Complaint   Patient presents with    Altered Mental Status    Positive For Covid-19     Patient Active Problem List    Diagnosis Date Noted    Encephalopathy due to COVID-19 virus 2021    Adenomatous polyp of sigmoid colon     Chronic gastritis without bleeding     Dyspepsia     Mixed obsessional thoughts and acts     Coronary artery disease involving native coronary artery of native heart without angina pectoris 02/15/2019    Anxiety     [facility]     Chest pain 2018    Hypertension 2018        Past Medical History:   Diagnosis Date    Anxiety     Chest pain 3/29/2018    Coronary artery disease involving native coronary artery of native heart without angina pectoris 2/15/2019    Diabetes mellitus ([facility] [address]     no meds    History of colon polyps     Hyperlipidemia     Hypertension     Type 2 diabetes mellitus without complication (HCC)     Vertigo      Past Surgical History:   Procedure Laterality Date    CARDIAC SURGERY      PTCA     COLONOSCOPY      COLONOSCOPY N/A 10/17/2019    COLORECTAL CANCER SCREENING, HIGH RISK performed by Serenity Chavez MD at [address] [place] [address]      OTHER SURGICAL HISTORY      patent foramen ovale    TONSILLECTOMY      UPPER GASTROINTESTINAL ENDOSCOPY N/A 2019    EGD ESOPHAGOGASTRODUODENOSCOPY performed by Se[facility]ity [facility] MD at [facility]        Restrictions  Restrictions/Precautions: Fall Risk, Isolation (Medium per alberto, covid-19)     Safety Devices: Safety Devices  Safety Devices in place: Yes  Type of devices:  All fall risk precautions in place Subjective  Pre Treatment Pain Screening  Pain at present: 0  Scale Used: Numeric Score  Intervention List: Patient able to continue with treatment, Patient declined any intervention    Pain Reassessment:   Pain Assessment  Patient Currently in Pain: No  Pain Assessment: 0-10  Pain Level: 0       Prior Level of Function:  Social/Functional History  Lives With: Spouse  Type of Home: Apartment  Home Layout: One level (Laundry on first floor)  Home Access: Level entry, Elevator (5th floor)  Bathroom Shower/Tub: Tub/Shower unit  ADL Assistance: Independent  Homemaking Assistance: Independent  Ambulation Assistance: Independent (No AD)  Transfer Assistance: Independent  Active : Yes  Occupation: Retired    OBJECTIVE:     Orientation Status:  Orientation  Overall Orientation Status: Within Functional Limits    Observation:  Observation/Palpation  Posture: Good  Observation: Pt alert and attentive, pleasant, no acute distress, satting 98% on RA    Cognition Status:  Cognition  Overall Cognitive Status: WNL    Perception Status:  Perception  Overall Perceptual Status: WFL    Sensation Status:  Sensation  Overall Sensation Status: WFL    Vision and Hearing Status:  Vision  Vision: Impaired  Vision Exceptions: Wears glasses for reading  Hearing  Hearing: Within functional limits     ROM:   LUE AROM (degrees)  LUE AROM : WNL  Left Hand AROM (degrees)  Left Hand AROM: WNL  RUE AROM (degrees)  RUE AROM : WNL  Right Hand AROM (degrees)  Right Hand AROM: WNL    Strength:  LUE Strength  Gross LUE Strength: WFL  L Hand General: 4+/5  LUE Strength Comment: 4+/5 all planes  RUE Strength  Gross RUE Strength: WFL  R Hand General: 4+/5  RUE Strength Comment: 4+/5 all planes    Coordination, Tone, Quality of Movement:    Tone RUE  RUE Tone: Normotonic  Tone LUE  LUE Tone: Normotonic  Coordination  Movements Are Fluid And Coordinated: Yes    Hand Dominance:  Hand Dominance  Hand Dominance: Right    ADL Status:  ADL  Feeding:

## 2021-09-08 NOTE — PROGRESS NOTES
[facility]   Facility/Department: [facility] MED SURG UNIT  Speech Language Pathology  Clinical Bedside Swallow Evaluation    NAME:Jb Theodore  : [birth year] (65 y.o.)   MRN: [id number]  ROOM: [place] [address]  ADMISSION DATE: 2021  PATIENT DIAGNOSIS(ES): Encephalopathy due to COVID-19 virus [U07.1, G93.49]  Chief Complaint   Patient presents with    Altered Mental Status    Positive For Covid-19     Patient Active Problem List    Diagnosis Date Noted    Adenomatous polyp of sigmoid colon     Chronic gastritis without bleeding     Dyspepsia     Mixed obsessional thoughts and acts     Coronary artery disease involving native coronary artery of native heart without angina pectoris 02/15/2019    Anxiety     Recurrent major depressive disorder, in remission ([facility]     Chest pain 2018    Hypertension 2018     Past Medical History:   Diagnosis Date    Anxiety     Chest pain 3/29/2018    Coronary artery disease involving native coronary artery of native heart without angina pectoris 2/15/2019    Diabetes mellitus ([facility]     no meds    History of colon polyps     Hyperlipidemia     Hypertension     Type 2 diabetes mellitus without complication ([facility]     Vertigo      Past Surgical History:   Procedure Laterality Date    CARDIAC SURGERY  1973    PTCA     COLONOSCOPY      COLONOSCOPY N/A 10/17/2019    COLORECTAL CANCER SCREENING, HIGH RISK performed by Melissa Salinas MD at [facility] OTHER SURGICAL HISTORY      patent foramen ovale    TONSILLECTOMY      UPPER GASTROINTESTINAL ENDOSCOPY N/A 2019    EGD ESOPHAGOGASTRODUODENOSCOPY performed by Melissa Salinas MD at [facility] [address] Seroquel [Quetiapine] Other (See Comments)     lethargy  weak       DATE ONSET: 21    Date of Evaluation: 2021   Evaluating Therapist: Leonardo Riedel, JESSICA    Recommended Diet and Intervention  Diet Solids Recommendation: Regular  Liquid Consistency Recommendation: Thin  Recommended Form of Meds: PO     Compensatory Swallowing Strategies  Compensatory Swallowing Strategies: Upright as possible for all oral intake    Reason for Referral  Daniela Healy was referred for a bedside swallow evaluation to assess the efficiency of his swallow function, identify signs and symptoms of aspiration and make recommendations regarding safe dietary consistencies, effective compensatory strategies, and safe eating environment. General  Chart Reviewed: Yes  Behavior/Cognition: Alert; Cooperative;Pleasant mood  Respiratory Status: Room air  O2 Device: None (Room air)  Communication Observation: Functional  Follows Directions: Simple  Dentition: Adequate  Patient Positioning: Upright in chair  Prior Dysphagia History: Pt denies, none per chart review  Consistencies Administered: Reg solid; Thin - cup    Current Diet level:  Current Diet : Regular  Current Liquid Diet : Thin    Oral Motor Deficits  Oral/Motor  Oral Motor: Within functional limits    Oral Phase Dysfunction  Oral Phase  Oral Phase: WFL     Indicators of Pharyngeal Phase Dysfunction   Pharyngeal Phase  Pharyngeal Phase: WFL    Impression  Dysphagia Diagnosis: Swallow function appears grossly intact  Dysphagia Impression : Pt presents with functional swallow at bedside this date characterized by adequate mastication, A-P transfer, with no observed pocketing and good oral clearance on trials of regular and thin. overt s/s of aspiration on trials of thin, puree, or regular. Hyolaryngeal elevation present observed via palpation.   Dysphagia Outcome Severity Scale: Level 7: Normal in all situations     Treatment Plan  Requires SLP Intervention: No  Duration/Frequency of Treatment: n/a    Prognosis  Individuals consulted  Consulted and agree with results and recommendations: Patient;RN (ISABEL Gomez)    Education  Patient Education: results and recommendations  Patient Education Response: Verbalizes understanding  Safety Devices in place: Yes  Type of devices: All fall risk precautions in place    Pain Assessment:  Pre-Treatment  Pain assessment: 0-10  Pain level: 0  Intervention:  Patient denies pain. Post-Treatment  Pain assessment: 0-10  Pain level: 0  Intervention:  Patient denies pain.          Therapy Time  SLP Individual Minutes  Time In: 2650  Time Out: [facility]  Minutes: 8              Signature: Electronically signed by JESSICA Bhatti on 9/8/2021 at 4:27 PM

## 2021-09-08 NOTE — PROGRESS NOTES
Admission and assessment complete. Patient is alert and oriented X4. Denies any SOB, N/V, dizziness or pain at this time. Vital signs are stable. Skin is intact. Oriented to room and call light. Bed alarm is on. Denies any other needs at this time and call light is within reach.   Electronically signed by Neymar Maddox RN on 9/8/2021 at 12:05 AM

## 2021-09-08 NOTE — PROGRESS NOTES
[facility]   [facility]  Speech Language Pathology  Initial Speech/Language/Cognitive Assessment    NAME:Jb Valdovinos  :  (65 y.o.)   MRN: [id number]  ROOM: [place] [id number]  ADMISSION DATE: 2021  PATIENT DIAGNOSIS(ES): Encephalopathy due to COVID-19 virus [U07.1, G93.49]  Chief Complaint   Patient presents with    Altered Mental Status    Positive For Covid-19     Patient Active Problem List    Diagnosis Date Noted    Adenomatous polyp of sigmoid colon     Chronic gastritis without bleeding     Dyspepsia     Mixed obsessional thoughts and acts     Coronary artery disease involving native coronary artery of native heart without angina pectoris 02/15/2019    Anxiety     Recurrent major depressive disorder, in remission ([facility]     Chest pain 2018    Hypertension 2018     Past Medical History:   Diagnosis Date    Anxiety     Chest pain 3/29/2018    Coronary artery disease involving native coronary artery of native heart without angina pectoris 2/15/2019    Diabetes mellitus ([facility] [address] [facility]     no meds    History of colon polyps     Hyperlipidemia     Hypertension     Type 2 diabetes mellitus without complication (HCC)     Vertigo      Past Surgical History:   Procedure Laterality Date    CARDIAC SURGERY      PTCA     COLONOSCOPY      COLONOSCOPY N/A 10/17/2019    COLORECTAL CANCER SCREENING, HIGH RISK performed by Ramin Vazquez MD at [address] [place] [address]      OTHER SURGICAL HISTORY      patent foramen ovale    TONSILLECTOMY      UPPER GASTROINTESTINAL ENDOSCOPY N/A 2019    EGD ESOPHAGOGASTRODUODENOSCOPY performed by Ramin Vazquez MD at [address] [place] [address] Place: 21    Date of Evaluation: 2021   Evaluating Therapist: JESSICA Lara      Assessment:  Cognitive Diagnosis: Pt presents with intact cognitive skills this date.  Pt able to complete working memory, short term memory, abstract reasoning, problem solving, and safety awareness tasks. Diagnosis: Pt presents with receptive/expressive language skills deemed functional this date. Pt was able to complete naming tasks, answer questions appropriately, completed abstract reasoning tasks, follow multi step directions, and express wants/needs. Recommendations:  Requires SLP Intervention: No  Duration/Frequency of Treatment: n/a    Subjective:   Previous level of function and limitations: WFL per chart review   General  Chart Reviewed: Yes  Patient assessed for rehabilitation services?: Yes  Family / Caregiver Present: No  Subjective  Subjective: Pt was alert and cooperative for evaluation     Vision  Vision: Impaired  Vision Exceptions: Wears glasses for reading  Hearing  Hearing: Within functional limits      Objective:     Oral/Motor  Oral Motor: Within functional limits    Auditory Comprehension  Comprehension: Within Functional Limits    Expression  Primary Mode of Expression: Verbal    Verbal Expression  Verbal Expression: Within functional limits    Motor Speech  Motor Speech: Within Functional Limits    Cognition:      Orientation  Overall Orientation Status: Within Functional Limits  Attention  Attention: Within Functional Limits  Memory  Memory: Within Funtional Limits  Problem Solving  Problem Solving: Within Functional Limits  Abstract Reasoning  Abstract Reasoning: Within Functional Limits  Safety/Judgement  Safety/Judgement: Within Functional Limits      Prognosis:  Individuals consulted  Consulted and agree with results and recommendations: Patient;RN (ISABEL Gutierrez)    Education:  Patient Education: results and recommendations  Patient Education Response: Verbalizes understanding  Safety Devices in place: Yes  Type of devices:  All fall risk precautions in place    Pain Assessment:  Pre-Treatment  Pain assessment: 0-10  Pain level: 0  Intervention:  Patient denies pain.    Post-Treatment  Pain assessment: 0-10  Pain level: 0  Intervention:  Patient denies pain.       Therapy Time  SLP Individual Minutes  Time In: 2451  Time Out: [address]  Minutes: 14                 Signature: Electronically signed by Leonardo Riedel, SLP on 9/8/2021 at 4:24 PM

## 2021-09-09 VITALS
SYSTOLIC BLOOD PRESSURE: 127 MMHG | BODY MASS INDEX: 22.5 KG/M2 | OXYGEN SATURATION: 100 % | RESPIRATION RATE: 20 BRPM | TEMPERATURE: 97.7 F | WEIGHT: 140 LBS | HEART RATE: 59 BPM | HEIGHT: 66 IN | DIASTOLIC BLOOD PRESSURE: 92 MMHG

## 2021-09-09 LAB
ANION GAP SERPL CALCULATED.3IONS-SCNC: 11 MEQ/L (ref 9–15)
BASOPHILS ABSOLUTE: 0 K/UL (ref 0–0.2)
BASOPHILS RELATIVE PERCENT: 0 %
BUN BLDV-MCNC: 24 MG/DL (ref 8–23)
C-REACTIVE PROTEIN: 43 MG/L (ref 0–5)
CALCIUM SERPL-MCNC: 8.9 MG/DL (ref 8.5–9.9)
CHLORIDE BLD-SCNC: 106 MEQ/L (ref 95–107)
CO2: 23 MEQ/L (ref 20–31)
CREAT SERPL-MCNC: 0.83 MG/DL (ref 0.7–1.2)
D DIMER: 0.3 MG/L FEU (ref 0–0.5)
EOSINOPHILS ABSOLUTE: 0 K/UL (ref 0–0.7)
EOSINOPHILS RELATIVE PERCENT: 0 %
GFR AFRICAN AMERICAN: >60
GFR AFRICAN AMERICAN: >60
GFR NON-AFRICAN AMERICAN: >60
GFR NON-AFRICAN AMERICAN: >60
GLUCOSE BLD-MCNC: 125 MG/DL (ref 60–115)
GLUCOSE BLD-MCNC: 128 MG/DL (ref 60–115)
GLUCOSE BLD-MCNC: 167 MG/DL (ref 70–99)
HCT VFR BLD CALC: 40.9 % (ref 42–52)
HEMOGLOBIN: 14.1 G/DL (ref 14–18)
LYMPHOCYTES ABSOLUTE: 0.5 K/UL (ref 1–4.8)
LYMPHOCYTES RELATIVE PERCENT: 6.7 %
MCH RBC QN AUTO: 31.1 PG (ref 27–31.3)
MCHC RBC AUTO-ENTMCNC: 34.5 % (ref 33–37)
MCV RBC AUTO: 90.2 FL (ref 80–100)
MONOCYTES ABSOLUTE: 0.4 K/UL (ref 0.2–0.8)
MONOCYTES RELATIVE PERCENT: 4.6 %
NEUTROPHILS ABSOLUTE: 7.2 K/UL (ref 1.4–6.5)
NEUTROPHILS RELATIVE PERCENT: 88.7 %
PDW BLD-RTO: 14.1 % (ref 11.5–14.5)
PERFORMED ON: ABNORMAL
PERFORMED ON: ABNORMAL
PERFORMED ON: NORMAL
PLATELET # BLD: 169 K/UL (ref 130–400)
POC CREATININE: 1.1 MG/DL (ref 0.8–1.3)
POC SAMPLE TYPE: NORMAL
POTASSIUM REFLEX MAGNESIUM: 4.2 MEQ/L (ref 3.4–4.9)
RBC # BLD: 4.54 M/UL (ref 4.7–6.1)
SODIUM BLD-SCNC: 140 MEQ/L (ref 135–144)
WBC # BLD: 8.1 K/UL (ref 4.8–10.8)

## 2021-09-09 PROCEDURE — 2700000000 HC OXYGEN THERAPY PER DAY

## 2021-09-09 PROCEDURE — 6370000000 HC RX 637 (ALT 250 FOR IP): Performed by: INTERNAL MEDICINE

## 2021-09-09 PROCEDURE — 85025 COMPLETE CBC W/AUTO DIFF WBC: CPT

## 2021-09-09 PROCEDURE — 80048 BASIC METABOLIC PNL TOTAL CA: CPT

## 2021-09-09 PROCEDURE — 86140 C-REACTIVE PROTEIN: CPT

## 2021-09-09 PROCEDURE — 36415 COLL VENOUS BLD VENIPUNCTURE: CPT

## 2021-09-09 PROCEDURE — 85379 FIBRIN DEGRADATION QUANT: CPT

## 2021-09-09 PROCEDURE — 99233 SBSQ HOSP IP/OBS HIGH 50: CPT | Performed by: PSYCHIATRY & NEUROLOGY

## 2021-09-09 PROCEDURE — 6360000002 HC RX W HCPCS: Performed by: INTERNAL MEDICINE

## 2021-09-09 RX ORDER — CLONAZEPAM 0.5 MG/1
0.5 TABLET ORAL 3 TIMES DAILY PRN
Status: ON HOLD | COMMUNITY
End: 2021-09-09 | Stop reason: HOSPADM

## 2021-09-09 RX ADMIN — KETOTIFEN FUMARATE 1 DROP: 0.35 SOLUTION/ DROPS OPHTHALMIC at 08:55

## 2021-09-09 RX ADMIN — RANOLAZINE 1000 MG: 500 TABLET, FILM COATED, EXTENDED RELEASE ORAL at 08:54

## 2021-09-09 RX ADMIN — INSULIN GLARGINE 5 UNITS: 100 INJECTION, SOLUTION SUBCUTANEOUS at 09:20

## 2021-09-09 RX ADMIN — DEXAMETHASONE 6 MG: 6 TABLET ORAL at 08:55

## 2021-09-09 RX ADMIN — ASPIRIN 81 MG: 81 TABLET, COATED ORAL at 08:54

## 2021-09-09 RX ADMIN — OXYCODONE HYDROCHLORIDE AND ACETAMINOPHEN 500 MG: 500 TABLET ORAL at 08:54

## 2021-09-09 RX ADMIN — LATANOPROST 1 DROP: 50 SOLUTION OPHTHALMIC at 08:55

## 2021-09-09 RX ADMIN — VORTIOXETINE 5 MG: 10 TABLET, FILM COATED ORAL at 08:54

## 2021-09-09 RX ADMIN — ENOXAPARIN SODIUM 30 MG: 30 INJECTION SUBCUTANEOUS at 08:55

## 2021-09-09 RX ADMIN — Medication 1000 UNITS: at 11:52

## 2021-09-09 RX ADMIN — Medication 5000 UNITS: at 08:54

## 2021-09-09 RX ADMIN — TAMSULOSIN HYDROCHLORIDE 0.4 MG: 0.4 CAPSULE ORAL at 08:54

## 2021-09-09 NOTE — PROGRESS NOTES
pill splitter   Does not apply Once Destinee Blue D Sedar, DO        dexamethasone (DECADRON) tablet 6 mg  6 mg Oral Daily Destinee Blue D Sedar, DO   6 mg at 09/09/21 0855    glucose (GLUTOSE) 40 % oral gel 15 g  15 g Oral PRN Elba Grater, DO        dextrose 50 % IV solution  12.5 g IntraVENous PRN Elba Grater, DO        glucagon (rDNA) injection 1 mg  1 mg IntraMUSCular PRN Elba Grater, DO        dextrose 5 % solution  100 mL/hr IntraVENous PRN Elba Grater, DO        insulin glargine (LANTUS) injection vial 5 Units  5 Units SubCUTAneous Daily Madison Ye, DO   5 Units at 09/09/21 0920    insulin lispro (HUMALOG) injection vial 0-12 Units  0-12 Units SubCUTAneous TID WC Elba Grater, DO   4 Units at 09/08/21 1742    insulin lispro (HUMALOG) injection vial 0-6 Units  0-6 Units SubCUTAneous Nightly Elba Grater, DO   2 Units at 09/08/21 2150    enoxaparin (LOVENOX) injection 30 mg  30 mg SubCUTAneous BID [address] Apple Sedar, DO   30 mg at 09/09/21 0855    acetaminophen (TYLENOL) tablet 650 mg  650 mg Oral Q6H PRN Will Apple Sedar, DO        Or    acetaminophen (TYLENOL) suppository 650 mg  650 mg Rectal Q6H PRN Will Apple Sedar, DO        ondansetron (ZOFRAN-ODT) disintegrating tablet 4 mg  4 mg Oral Q8H PRN [address] Apple Sedar, DO        Or    [facility]dansetron [facility] injection 4 mg  4 mg IntraVENous Q6H PRN [address] Apple Sedar, DO        aspirin EC tablet 81 mg  81 mg Oral Daily Will Apple Sedar, DO   81 mg at 09/09/21 7187    Or    aspirin suppository 300 mg  300 mg Rectal Daily Will Apple Sedar, DO        labetalol (NORMODYNE;TRANDATE) injection 10 mg  10 mg IntraVENous Q10 Min PRN Will Apple Sedar, DO        rosuvastatin (CRESTOR) tablet 40 mg  40 mg Oral Nightly Naseem D Sedar, DO   40 mg at 09/08/21 6873       PHYSICAL EXAM:    BP (!) 127/92   Pulse 59   Temp 97.7 °F (36.5 °C) (Oral)   Resp 20   Ht 5' 6\" (1.676 m)   Wt 140 lb (63.5 kg)   SpO2 100%   BMI 22.60 kg/m²    General Appearance:      Skin:  normal  CVS - Normal sounds, No murmurs , No carotid Bruits  RS -CTA  Abdomen Soft, BS present  Review of Systems   Mental Status Exam:             Level of Alertness:   awake            Orientation:   person, place, time                      Attention/Concentration:  normal            Language:  normal      Funduscopic Exam:     Cranial Nerves            Cranial nerve III           Pupils:  equal, round, reactive to light      Cranial nerves III, IV, VI           Extraocular Movements: intact        Motor:    Drift:  absent  Motor exam is symmetrical 5 out of 5 all extremities bilaterally  Tone:  normal  Abnormal Movements:  absent            Sensory:        Pinprick             Right Upper Extremity:  normal             Left Upper Extremity:  normal             Right Lower Extremity:  normal             Left Lower Extremity:  normal           Vibration                         Touch            Proprioception                 Coordination:           Finger/Nose   Right:  normal              Left:  normal          Heel-Knee-Shin                Right:  normal              Left:  normal          Rapid Alternating Movements              Right:  normal              Left:  normal          Gait:                       Casual:  normal                         Romberg:  normal            Reflexes:             Deep Tendon Reflexes:             Reflexes are 2 +             Plantar response:                Right:  downgoing               Left:  downgoing    Vascular:  Cardiac Exam:  normal         CT Head WO Contrast    Result Date: 9/7/2021  CT HEAD WO CONTRAST CLINICAL HISTORY:  covid +, confusion, r/o CVA COMPARISON: CT HEAD WO CONTRAST CLINICAL HISTORY:  covid +, confusion, COMPARISON: August 15, 2021 O 0738 hours TECHNIQUE: Multiple unenhanced serial axial images of the brain from the vertex of the skull to the base of the skull were performed. FINDINGS: The ventricles are dilated. This is compensatory to the surrounding moderate generalized parenchymal volume loss. No mass. No midline shift. The cisterns are patent. There are white matter and periventricular changes most likely consistent with chronic small vessel disease. No acute intra-axial or extra-axial findings. The visualized osseous structures are unremarkable. There is mucoperiosteal thickening of the frontal sinuses, patchy opacification of the ethmoids, mucoperiosteal thickening in the sphenoid and maxillary sinuses. There are probable bilateral enterostomies again noted. NO ACUTE INTRA-AXIAL OR EXTRA-AXIAL FINDINGS. All CT scans at this facility use dose modulation, iterative reconstruction, and/or weight based dosing when appropriate to reduce radiation dose to as low as reasonably achievable. TECHNIQUE: Multiple unenhanced serial axial images of the brain from the vertex of the skull to the base of the skull were performed. FINDINGS: The ventricles are dilated. This is compensatory to the surrounding moderate generalized parenchymal volume loss. No mass. No midline shift. The cisterns are patent. There are white matter and periventricular changes most likely consistent with chronic small vessel disease. No acute intra-axial or extra-axial findings. The visualized osseous structures are unremarkable. The visualized portion of the paranasal sinuses, and mastoids are unremarkable. IMPRESSION: CHANGES IN PARANASAL SINUSES DESCRIBED ABOVE WHICH HAVE INCREASED SINCE THE PRIOR EXAMINATION. CORRELATE CLINICALLY NO ACUTE INTRA-AXIAL OR EXTRA-AXIAL FINDINGS. IF SIGNS OR SYMPTOMS PERSIST THEN CONSIDER MRI TO FURTHER EVALUATE IF THERE ARE NO CONTRAINDICATIONS All CT scans at this facility use dose modulation, iterative reconstruction, and/or weight based dosing when appropriate to reduce radiation dose to as low as reasonably achievable. CTA Chest W WO  (PE study)    Result Date: 9/7/2021  The EXAMINATION: CT scan of the chest with contrast (pulmonary embolism protocol) INDICATION: Chest pain and shortness of breath. COMPARISON: September 5, 2019 1154 hours TECHNIQUE: Helical CT was performed through the chest utilizing 100 cc of Isovue-300 intravenous contrast.  Images were obtained with bolus tracking in order to opacify the pulmonary arteries. Both MIP and 3D volume rendered reconstructions were performed. FINDINGS: There are no findings a central, proximal or proximal-segmental pulmonary emboli. There is areas of small patchy groundglass infiltrate at the base posterior aspect of the right upper lobe and in both bases. No pleural effusions. No pneumothoraces. No significant periaortic, pretracheal, parahilar or subcarinal adenopathy. Within field-of-view of the abdomen partially partially visualized partially exophytic cystic area at the superior pole the right kidney. It measures 4.2 cm. Osseous structures are intact. NO FINDINGS A CENTRAL, PROXIMAL PROXIMAL-SEGMENTAL PORT EMBOLI 2. SMALL PATCHY AREAS OF GROUNDGLASS INFILTRATES WITHIN THE BASE POSTERIOR ASPECT RIGHT UPPER LOBE AND BOTH BASES. IMAGING FEATURES CAN BE SEEN WITH (COVID-19) PNEUMONIA, THOUGH ARE NONSPECIFIC AND CAN OCCUR WITH A VARIETY OF INFECTIOUS AND NONINFECTIOUS  PROCESSES. 3. PARTIALLY VISUALIZED PARTIALLY EXOPHYTIC CYSTIC AREA SUPERIOR POLE RIGHT KIDNEY. NOT FULLY CHARACTERIZED. FOLLOW- All CT scans at this facility use dose modulation, iterative reconstruction, and/or weight based dosing when appropriate to reduce radiation dose to as low as reasonably achievable. MRI brain without contrast    Result Date: 9/8/2021  MRI BRAIN WO CONTRAST : 9/8/2021 CLINICAL HISTORY:  encephalopathy . COMPARISON: Head CT 9/7/2021 and head MRI 2/1/2008. TECHNIQUE: Multiplanar MR imaging of the head was performed without contrast. FINDINGS: There is no infarct, intracranial hemorrhage, mass effect, midline shift, extra-axial collection, or hydrocephalus.   Mild generalized cerebral volume loss is present, with mild patchy supratentorial white matter changes most consistent with chronic small vessel ischemic disease. Mucosal thickening predominantly within the maxillary sinuses is again noted. NO ACUTE INTRACRANIAL PROCESS IDENTIFIED. ATROPHIC AND INVOLUTIONAL CHANGES. US CAROTID ARTERY BILATERAL    Result Date: 9/8/2021  EXAMINATION: CAROTID ULTRASOUND CLINICAL HISTORY: 70-year-old with hypertension. He presents with transient confusion FINDINGS: Duplex and color Doppler ultrasound were performed of the bilateral extracranial carotid systems. Velocity criteria and internal carotid artery stenoses are extrapolated from data as defined by the Society of Radiologist in [address] [place] [address] Radiology 2003; [id number]. Comparison made with a prior carotid ultrasound from 6/9/2008 RIGHT CAROTID SYSTEM: There is a small amount of heterogeneous calcified plaque in the bulb extending into the proximal ICA and ECA. . There are no elevations of peak systolic velocities or ICA/CCA velocity ratios. Velocities in the ICA range from 92 cm/s  proximal aspect, 88 cm/s mid aspect to 90 cm/s distal aspect. ICA/CCA velocity ratio is 0.8. By ultrasound criteria there is less than 50 percent diameter reduction of the right ICA. Peak systolic velocities in the proximal ECA and mid CCA are 117 and 109cm/s. There is antegrade flow in the right vertebral artery. LEFT CAROTID SYSTEM: There is a small amount of heterogeneous calcified plaque in the bulb. There are now mild elevations of peak systolic velocities in the proximal ICA Velocities in the ICA range from 155 cm/s proximal aspect, 94 cm/s mid aspect to 76 cm/s distal aspect. ICA/CCA velocity ratio is 1.1. By ultrasound criteria there is 50-69% diameter reduction of the left ICA. Peak systolic velocities in the proximal ECA and mid CCA are 153 and 143cm/s. There is antegrade flow in the left vertebral artery.  Incidental note is made of a 1.2 cm round slightly hypoechoic solid right thyroid nodule     ATHEROSCLEROTIC

## 2021-09-09 NOTE — PROGRESS NOTES
Assessment complete. Patient alert and oriented x4. Vital signs stable. Lung sounds clear. SpO2 97% on room air. Medicated per MAR. Denies any other issues at this time. Will continue to monitor.  Electronically signed by Diedre Goldmann, RN on 9/9/2021 at 2:26 AM

## 2021-09-09 NOTE — ACP (ADVANCE CARE PLANNING)
Advance Care Planning     Advance Care Planning Inpatient Note  [facility] Department    Today's Date: 9/9/2021  Unit: [facility]  4W MED SURG UNIT    Received request from IDT Member. Upon review of chart and communication with care team, patient's decision making abilities are not in question. . Patient was/were present in the room during visit. Goals of ACP Conversation:  Facilitate a discussion related to patient's goals of care as they align with the patient's values and beliefs. Health Care Decision Makers:       Primary Decision Maker: Nicolas Weiner [facility] [phone]    Summary:  Verified Documents    Advance Care Planning Documents (Patient Wishes):  Healthcare Power of /Advance Directive Appointment of Health Care Agent     Assessment:    Interventions:  Verefied already submitted documents    Care Preferences Communicated:   Ventilation:   If the patient, in their present state of health, suddenly became very ill and unable to breathe on their own,     the patient would desire the use of a ventilator (breathing machine). If their health worsens and it becomes clear that the change of recovery is unlikely,     the patient would desire the use of a ventilator (breathing machine). Outcomes/Plan:  New advance directive completed.     Electronically signed by Samantha Deleon, [address] [place] [address] on 9/9/2021 at 12:30 PM

## 2021-09-09 NOTE — PROGRESS NOTES
Patient was assessed and charted on by this RN. A&Ox4. VSS. Lung sounds clear. SpO2 100% upon assessment on room air. Continued droplet plus precautions in place. Anticipated discharge today with pulse oximeter sent home with him.

## 2021-09-10 ENCOUNTER — HOSPITAL ENCOUNTER (OUTPATIENT)
Age: 71
Setting detail: OBSERVATION
Discharge: HOME | End: 2021-09-12
Attending: INTERNAL MEDICINE | Admitting: INTERNAL MEDICINE
Payer: COMMERCIAL

## 2021-09-10 ENCOUNTER — CARE COORDINATION (OUTPATIENT)
Dept: CASE MANAGEMENT | Age: 71
End: 2021-09-10

## 2021-09-10 DIAGNOSIS — R53.1 GENERAL WEAKNESS: Primary | ICD-10-CM

## 2021-09-10 DIAGNOSIS — Z86.16 HISTORY OF COVID-19: ICD-10-CM

## 2021-09-10 DIAGNOSIS — R53.1 WEAKNESS: ICD-10-CM

## 2021-09-10 LAB
ALBUMIN SERPL-MCNC: 3.4 G/DL (ref 3.5–4.6)
ALP BLD-CCNC: 60 U/L (ref 35–104)
ALT SERPL-CCNC: 31 U/L (ref 0–41)
AMPHETAMINE SCREEN, URINE: ABNORMAL
ANION GAP SERPL CALCULATED.3IONS-SCNC: 14 MEQ/L (ref 9–15)
AST SERPL-CCNC: 42 U/L (ref 0–40)
BARBITURATE SCREEN URINE: ABNORMAL
BASOPHILS ABSOLUTE: 0 K/UL (ref 0–0.2)
BASOPHILS RELATIVE PERCENT: 0.1 %
BENZODIAZEPINE SCREEN, URINE: POSITIVE
BILIRUB SERPL-MCNC: 0.7 MG/DL (ref 0.2–0.7)
BILIRUBIN URINE: NEGATIVE
BLOOD, URINE: ABNORMAL
BUN BLDV-MCNC: 25 MG/DL (ref 8–23)
CALCIUM SERPL-MCNC: 9.2 MG/DL (ref 8.5–9.9)
CANNABINOID SCREEN URINE: ABNORMAL
CHLORIDE BLD-SCNC: 100 MEQ/L (ref 95–107)
CLARITY: CLEAR
CO2: 21 MEQ/L (ref 20–31)
COCAINE METABOLITE SCREEN URINE: ABNORMAL
COLOR: YELLOW
CREAT SERPL-MCNC: 0.84 MG/DL (ref 0.7–1.2)
EOSINOPHILS ABSOLUTE: 0 K/UL (ref 0–0.7)
EOSINOPHILS RELATIVE PERCENT: 0 %
EPITHELIAL CELLS, UA: ABNORMAL /HPF
ETHANOL PERCENT: NORMAL G/DL
ETHANOL: <10 MG/DL (ref 0–0.08)
GFR AFRICAN AMERICAN: >60
GFR NON-AFRICAN AMERICAN: >60
GLOBULIN: 3.5 G/DL (ref 2.3–3.5)
GLUCOSE BLD-MCNC: 92 MG/DL (ref 70–99)
GLUCOSE URINE: NEGATIVE MG/DL
HCT VFR BLD CALC: 45.4 % (ref 42–52)
HEMOGLOBIN: 15.8 G/DL (ref 14–18)
KETONES, URINE: NEGATIVE MG/DL
LEUKOCYTE ESTERASE, URINE: ABNORMAL
LYMPHOCYTES ABSOLUTE: 0.4 K/UL (ref 1–4.8)
LYMPHOCYTES RELATIVE PERCENT: 4.7 %
Lab: ABNORMAL
MAGNESIUM: 1.7 MG/DL (ref 1.7–2.4)
MCH RBC QN AUTO: 31.1 PG (ref 27–31.3)
MCHC RBC AUTO-ENTMCNC: 34.8 % (ref 33–37)
MCV RBC AUTO: 89.3 FL (ref 80–100)
METHADONE SCREEN, URINE: ABNORMAL
MONOCYTES ABSOLUTE: 0.4 K/UL (ref 0.2–0.8)
MONOCYTES RELATIVE PERCENT: 4.9 %
NEUTROPHILS ABSOLUTE: 7.5 K/UL (ref 1.4–6.5)
NEUTROPHILS RELATIVE PERCENT: 90.3 %
NITRITE, URINE: NEGATIVE
OPIATE SCREEN URINE: ABNORMAL
OXYCODONE URINE: ABNORMAL
PDW BLD-RTO: 14.1 % (ref 11.5–14.5)
PH UA: 5.5 (ref 5–9)
PHENCYCLIDINE SCREEN URINE: ABNORMAL
PLATELET # BLD: 153 K/UL (ref 130–400)
POTASSIUM SERPL-SCNC: 4.6 MEQ/L (ref 3.4–4.9)
PROPOXYPHENE SCREEN: ABNORMAL
PROTEIN UA: 30 MG/DL
RBC # BLD: 5.08 M/UL (ref 4.7–6.1)
RBC UA: ABNORMAL /HPF (ref 0–2)
SODIUM BLD-SCNC: 135 MEQ/L (ref 135–144)
SPECIFIC GRAVITY UA: 1.02 (ref 1–1.03)
TOTAL PROTEIN: 6.9 G/DL (ref 6.3–8)
URINE REFLEX TO CULTURE: ABNORMAL
UROBILINOGEN, URINE: 1 E.U./DL
WBC # BLD: 8.3 K/UL (ref 4.8–10.8)
WBC UA: ABNORMAL /HPF (ref 0–5)

## 2021-09-10 PROCEDURE — 99284 EMERGENCY DEPT VISIT MOD MDM: CPT

## 2021-09-10 PROCEDURE — G0378 HOSPITAL OBSERVATION PER HR: HCPCS

## 2021-09-10 PROCEDURE — 6370000000 HC RX 637 (ALT 250 FOR IP): Performed by: INTERNAL MEDICINE

## 2021-09-10 PROCEDURE — 80307 DRUG TEST PRSMV CHEM ANLYZR: CPT

## 2021-09-10 PROCEDURE — 82077 ASSAY SPEC XCP UR&BREATH IA: CPT

## 2021-09-10 PROCEDURE — 85025 COMPLETE CBC W/AUTO DIFF WBC: CPT

## 2021-09-10 PROCEDURE — 36415 COLL VENOUS BLD VENIPUNCTURE: CPT

## 2021-09-10 PROCEDURE — 2580000003 HC RX 258: Performed by: PHYSICIAN ASSISTANT

## 2021-09-10 PROCEDURE — 83735 ASSAY OF MAGNESIUM: CPT

## 2021-09-10 PROCEDURE — 80053 COMPREHEN METABOLIC PANEL: CPT

## 2021-09-10 PROCEDURE — 81001 URINALYSIS AUTO W/SCOPE: CPT

## 2021-09-10 RX ORDER — TAMSULOSIN HYDROCHLORIDE 0.4 MG/1
0.4 CAPSULE ORAL DAILY
Status: DISCONTINUED | OUTPATIENT
Start: 2021-09-11 | End: 2021-09-12 | Stop reason: HOSPADM

## 2021-09-10 RX ORDER — ASCORBIC ACID 500 MG
500 TABLET ORAL DAILY
Status: DISCONTINUED | OUTPATIENT
Start: 2021-09-11 | End: 2021-09-12 | Stop reason: HOSPADM

## 2021-09-10 RX ORDER — BENZONATATE 100 MG/1
100 CAPSULE ORAL 3 TIMES DAILY PRN
Status: DISCONTINUED | OUTPATIENT
Start: 2021-09-10 | End: 2021-09-12 | Stop reason: HOSPADM

## 2021-09-10 RX ORDER — LATANOPROST 50 UG/ML
1 SOLUTION/ DROPS OPHTHALMIC NIGHTLY
Status: DISCONTINUED | OUTPATIENT
Start: 2021-09-10 | End: 2021-09-12 | Stop reason: HOSPADM

## 2021-09-10 RX ORDER — 0.9 % SODIUM CHLORIDE 0.9 %
1000 INTRAVENOUS SOLUTION INTRAVENOUS ONCE
Status: COMPLETED | OUTPATIENT
Start: 2021-09-10 | End: 2021-09-10

## 2021-09-10 RX ORDER — ACETAMINOPHEN 325 MG/1
650 TABLET ORAL EVERY 6 HOURS PRN
Status: DISCONTINUED | OUTPATIENT
Start: 2021-09-10 | End: 2021-09-12 | Stop reason: HOSPADM

## 2021-09-10 RX ORDER — MULTIVIT WITH MINERALS/LUTEIN
1000 TABLET ORAL DAILY
Status: DISCONTINUED | OUTPATIENT
Start: 2021-09-11 | End: 2021-09-12 | Stop reason: HOSPADM

## 2021-09-10 RX ORDER — RANOLAZINE 500 MG/1
1000 TABLET, EXTENDED RELEASE ORAL 2 TIMES DAILY
Status: DISCONTINUED | OUTPATIENT
Start: 2021-09-10 | End: 2021-09-12 | Stop reason: HOSPADM

## 2021-09-10 RX ADMIN — ACETAMINOPHEN 650 MG: 325 TABLET ORAL at 23:02

## 2021-09-10 RX ADMIN — RANOLAZINE 1000 MG: 500 TABLET, FILM COATED, EXTENDED RELEASE ORAL at 22:34

## 2021-09-10 RX ADMIN — LATANOPROST 1 DROP: 50 SOLUTION OPHTHALMIC at 23:02

## 2021-09-10 RX ADMIN — BENZONATATE 100 MG: 100 CAPSULE ORAL at 22:34

## 2021-09-10 RX ADMIN — SODIUM CHLORIDE 1000 ML: 9 INJECTION, SOLUTION INTRAVENOUS at 16:03

## 2021-09-10 ASSESSMENT — ENCOUNTER SYMPTOMS
ABDOMINAL DISTENTION: 0
SHORTNESS OF BREATH: 0
NAUSEA: 0
VOICE CHANGE: 0
VOMITING: 0
EYE DISCHARGE: 0
ANAL BLEEDING: 0
PHOTOPHOBIA: 0
APNEA: 0

## 2021-09-10 ASSESSMENT — PAIN SCALES - GENERAL
PAINLEVEL_OUTOF10: 0
PAINLEVEL_OUTOF10: 0

## 2021-09-10 NOTE — CARE COORDINATION
[facility] Transitions Initial Follow Up Call    Call within 2 business days of discharge: Yes    Patient: Shankar Jaimes Patient : 1950   MRN: [id number]  Reason for Admission: 2021 - 2021 CV-19, Encephalopathy. Discharge Date: 21 RARS: Readmission Risk Score: 19  NR  CT    Last Discharge [facility]       Complaint Diagnosis Description Type Department Provider    21 Altered Mental Status; Positive For Covid-19 Encephalopathy due to COVID-19 virus . .. ED to Hosp-Admission (Discharged) (ADMITTED) [facility] DELBERT Hurt DO        Initil CV-19 outreach attempt. Primary verified as a wrong number and removed. Secondary number rings immediately busy x2.      PCP  11:00    Care Transitions 24 Hour Call    Care Transitions Interventions         Follow Up  Future Appointments   Date Time Provider Kitty Carrizales   3/25/2022 11:00 AM Rian Weller DO [address]

## 2021-09-10 NOTE — ED NOTES
Full linen change, warm blanket applied, pt requires assistance for toileting, talking on the phone in room now, appears to be comfortable.      Lauro Simpson RN  09/10/21 6083

## 2021-09-10 NOTE — ED PROVIDER NOTES
[address] [facility] ED  eMERGENCY dEPARTMENT eNCOUnter      Pt Name: Marly Yoo  MRN: [id number]  [place] 1950  Date of evaluation: 9/10/2021  Provider: Michele Rendon, [facility]       Chief Complaint   Patient presents with    Fatigue         HISTORY OF PRESENT ILLNESS   (Location/Symptom, Timing/Onset,Context/Setting, Quality, Duration, Modifying Factors, Severity)  Note limiting factors. Marly Yoo is a 65 y.o. male who presents to the emergency department patient brought to the emergency room for fatigue family states he has been lethargic and unable to ambulate per EMS report. Patient's family is not here patient initially alert and oriented x4 for nursing states he does ambulate but has been having trouble he refuses to use a walker at home. Patient denies fever chills nausea vomiting although he does have recent history of Covid. HPI    NursingNotes were reviewed. REVIEW OF SYSTEMS    (2-9 systems for level 4, 10 or more for level 5)     Review of Systems   Constitutional: Negative for activity change, appetite change and unexpected weight change. HENT: Negative for ear discharge, nosebleeds and voice change. Eyes: Negative for photophobia and discharge. Respiratory: Negative for apnea and shortness of breath. Cardiovascular: Negative for chest pain. Gastrointestinal: Negative for abdominal distention, anal bleeding, nausea and vomiting. Endocrine: Negative for cold intolerance, heat intolerance and polyphagia. Genitourinary: Negative for genital sores. Musculoskeletal: Negative for joint swelling. Skin: Negative for pallor. Allergic/Immunologic: Negative for immunocompromised state. Neurological: Positive for weakness. Negative for seizures and facial asymmetry. Hematological: Does not bruise/bleed easily. Psychiatric/Behavioral: Negative for behavioral problems, self-injury and sleep disturbance.    All other systems reviewed and are negative. Except as noted above the remainder of the review of systems was reviewed and negative. PAST MEDICAL HISTORY     Past Medical History:   Diagnosis Date    Anxiety     Chest pain 3/29/2018    Coronary artery disease involving native coronary artery of native heart without angina pectoris 2/15/2019    Diabetes mellitus ([facility]     no meds    History of colon polyps     Hyperlipidemia     Hypertension     Type 2 diabetes mellitus without complication ([facility]     Vertigo          SURGICALHISTORY       Past Surgical History:   Procedure Laterality Date    CARDIAC SURGERY  1973    PTCA     COLONOSCOPY      COLONOSCOPY N/A 10/17/2019    COLORECTAL CANCER SCREENING, HIGH RISK performed by Lexis Sykes MD at [address]      OTHER SURGICAL HISTORY      patent foramen ovale    TONSILLECTOMY      UPPER GASTROINTESTINAL ENDOSCOPY N/A 8/1/2019    EGD ESOPHAGOGASTRODUODENOSCOPY performed by Lexis Sykes MD at [address]       Previous Medications    BLOOD GLUCOSE MONITORING SUPPL (ONETOUCH VERIO FLEX SYSTEM) W/DEVICE KIT        CHOLECALCIFEROL (VITAMIN D) 2000 UNITS CAPS CAPSULE    Take 5,000 Units by mouth daily     CLONAZEPAM (KLONOPIN) 0.5 MG TABLET    Take 1 tablet by mouth nightly as needed for Anxiety for up to 14 days. DEXAMETHASONE (DECADRON) 6 MG TABLET    Take 1 tablet by mouth daily (with breakfast) for 10 days    DOXEPIN HCL (SILENOR) 6 MG TABS    Take by mouth 3 times daily as needed     LATANOPROST (XALATAN) 0.005 % OPHTHALMIC SOLUTION    Use 1 Drop in both eyes daily at bedtime. MECLIZINE (ANTIVERT) 25 MG TABLET    Take 1 tablet by mouth 2 times daily as needed for Dizziness    MELATONIN 3 MG TABS TABLET    Take 3 mg by mouth nightly as needed    NITROGLYCERIN (NITROSTAT) 0.4 MG SL TABLET    up to max of 3 total doses. If no relief after 1 dose, call 911. OLOPATADINE (PATADAY) 0.2 % SOLN OPHTHALMIC SOLUTION    Apply 1 drop to eye daily For allergies    OMEGA-3 FATTY ACIDS (FISH OIL) 1000 MG CAPS    Take 3,000 mg by mouth every other day    ONDANSETRON (ZOFRAN-ODT) 4 MG DISINTEGRATING TABLET    Take 1 tablet by mouth 3 times daily as needed for Nausea or Vomiting    ONETOUCH DELICA LANCETS 56K MISC        ONETOUCH VERIO STRIP        PHYTONADIONE (VITAMIN K) 5 MG TABLET    Take 5 mg by mouth once    RANOLAZINE (RANEXA) 1000 MG EXTENDED RELEASE TABLET    Take 1 tablet by mouth 2 times daily    TAMSULOSIN (FLOMAX) 0.4 MG CAPSULE    Take 1 capsule by mouth daily    VITAMIN C (ASCORBIC ACID) 500 MG TABLET    Take 500 mg by mouth daily    VITAMIN E 1000 UNITS CAPSULE    Take 1,000 Units by mouth daily    VORTIOXETINE (TRINTELLIX) 5 MG TABLET    Take 5 mg by mouth daily            Seroquel [quetiapine]    FAMILY HISTORY       Family History   Problem Relation Age of Onset    Heart Disease Maternal Aunt     Cancer Maternal Aunt     Colon Cancer Maternal Aunt           SOCIAL HISTORY       Social History     Socioeconomic History    Marital status:      Spouse name: None    Number of children: None    Years of education: None    Highest education level: None   Occupational History    None   Tobacco Use    Smoking status: Never Smoker    Smokeless tobacco: Never Used   Vaping Use    Vaping Use: Never used   Substance and Sexual Activity    Alcohol use: No    Drug use: Never    Sexual activity: None   Other Topics Concern    None   Social History Narrative    None     Social Determinants of Health     Financial Resource Strain:     Difficulty of Paying Living Expenses:    Food Insecurity:     Worried About Running Out of Food in the Last Year:     Ran Out of Food in the Last Year:    Transportation Needs:     Lack of Transportation (Medical):      Lack of Transportation (Non-Medical):    Physical Activity:     Days of Exercise per Week:     Minutes of Exercise per Session:    Stress:     Feeling of Stress :    Social Connections:     Frequency of Communication with Friends and Family:     Frequency of Social Gatherings with Friends and Family:     Attends Nondenominational Services:     Active Member of Clubs or Organizations:     Attends Club or Organization Meetings:     Marital Status:    Intimate Partner Violence:     Fear of Current or Ex-Partner:     Emotionally Abused:     Physically Abused:     Sexually Abused:        SCREENINGS   Ebola Virus Disease (EVD) Screening   Temp: 99.4 °F (37.4 °C)  CIWA Assessment  BP: 136/63  Pulse: 89    PHYSICAL EXAM    (up to 7 for level 4, 8 or more for level 5)     ED Triage Vitals [09/10/21 1519]   BP Temp Temp Source Pulse Resp SpO2 Height Weight   130/72 99.4 °F (37.4 °C) Oral 86 24 94 % 5' 6\" (1.676 m) 140 lb (63.5 kg)       Physical Exam  Vitals and nursing note reviewed. Constitutional:       General: He is not in acute distress. Appearance: He is well-developed. HENT:      Head: Normocephalic and atraumatic. Right Ear: External ear normal.      Left Ear: External ear normal.      Nose: Nose normal.      Mouth/Throat:      Mouth: Mucous membranes are moist.   Eyes:      General:         Right eye: No discharge. Left eye: No discharge. Pupils: Pupils are equal, round, and reactive to light. Cardiovascular:      Rate and Rhythm: Normal rate and regular rhythm. Heart sounds: Normal heart sounds. Pulmonary:      Effort: Pulmonary effort is normal. No respiratory distress. Breath sounds: Normal breath sounds. No stridor. Abdominal:      General: Bowel sounds are normal. There is no distension. Palpations: Abdomen is soft. Musculoskeletal:         General: Normal range of motion. Cervical back: Normal range of motion and neck supple. Skin:     General: Skin is warm. Findings: No erythema. Neurological:      Mental Status: He is alert.       Motor: Weakness present. Gait: Gait abnormal.      Comments: Patient alert and oriented to name and age upon exam.  On subsequent exam patient oriented x4.    4/5 strength bilaterally   Psychiatric:         Mood and Affect: Mood normal.         RESULTS     EKG: All EKG's are interpreted by the Emergency Department Physician who either signs or Co-signsthis chart in the absence of a cardiologist.         RADIOLOGY:   Stephanie Filipe such as CT, Ultrasound and MRI are read by the radiologist. Plain radiographic images are visualized and preliminarily interpreted by the emergency physician with the below findings:         Interpretation per the Radiologist below, if available at the time ofthis note:    No orders to display         ED BEDSIDE ULTRASOUND:   Performed by ED Physician - none    LABS:  Labs Reviewed   COMPREHENSIVE METABOLIC PANEL - Abnormal; Notable for the following components:       Result Value    BUN 25 (*)     Albumin 3.4 (*)     AST 42 (*)     All other components within normal limits   CBC WITH AUTO DIFFERENTIAL - Abnormal; Notable for the following components:    Neutrophils Absolute 7.5 (*)     Lymphocytes Absolute 0.4 (*)     All other components within normal limits   ETHANOL   MAGNESIUM   URINE DRUG SCREEN   URINE RT REFLEX TO CULTURE       All other labs were within normal range or not returned as of this dictation. EMERGENCY DEPARTMENT COURSE and DIFFERENTIAL DIAGNOSIS/MDM:   Vitals:    Vitals:    09/10/21 1519 09/10/21 1600 09/10/21 1704 09/10/21 1730   BP: 130/72 126/69 134/65 136/63   Pulse: 86 84 74 89   Resp: 24 29 18 24   Temp: 99.4 °F (37.4 °C)      TempSrc: Oral      SpO2: 94% 95% 95% 95%   Weight: 140 lb (63.5 kg)      Height: 5' 6\" (1.676 m)               MDM    CRITICAL CARE TIME     CONSULTS:  None    PROCEDURES:  Unless otherwise noted below, none     Procedures    FINAL IMPRESSION      1. General weakness    2.  History of COVID-19          DISPOSITION/PLAN   DISPOSITION Admitted 09/10/2021 06:17:30 PM      PATIENT REFERRED TO:  No follow-up provider specified.     DISCHARGE MEDICATIONS:  New Prescriptions    No medications on file          (Please note that portions of this note were completed with a voice recognition program.  Efforts were made to edit the dictations but occasionally words are mis-transcribed.)    Jules Dennis PA-C (electronically signed)  Attending Emergency Physician       Jules Dennis PA-C  09/10/21 Brea Scott

## 2021-09-10 NOTE — ED NOTES
4W nurse still in report, awaiting call back.  Attempting IV access in the ER now     Silvia Diaz RN  09/10/21 1934

## 2021-09-10 NOTE — ED NOTES
Attempted to ambulate patient, failed attempt, gait unsteady, [address] [facility] [address] notified, pt returned safely to bed and placed on monitor, call light placed within reach.      Chaya Cohen RN  09/10/21 9032

## 2021-09-10 NOTE — H&P
Patient is a 64-year-old male with past medical history of diabetes not on medication, vertigo on meclizine, anxiety coronary disease was recently discharged from the hospital following diagnosis of COVID-19. Patient is unable to ambulate family brought him back to the hospital.  Patient is alert oriented x3. Upon evaluation patient unable to get up from seated position. Has ataxia and very short stepping gait. It took me in a ER physician to evaluate him safely while walking. We had to hold on his body to prevent him falling. Patient denies chest pain, cough, shortness of breath.     Past Medical History:   Diagnosis Date    Anxiety     Chest pain 3/29/2018    Coronary artery disease involving native coronary artery of native heart without angina pectoris 2/15/2019    Diabetes mellitus ([address]     no meds    History of colon polyps     Hyperlipidemia     Hypertension     Type 2 diabetes mellitus without complication (HCC)     Vertigo      Past Surgical History:   Procedure Laterality Date    CARDIAC SURGERY  1973    PTCA     COLONOSCOPY      COLONOSCOPY N/A 10/17/2019    COLORECTAL CANCER SCREENING, HIGH RISK performed [facility] Lucretia [facility]imentel [facility] OTHER SURGICAL HISTORY      patent foramen ovale    TONSILLECTOMY      UPPER GASTROINTESTINAL ENDOSCOPY N/A 8/1/2019    EGD ESOPHAGOGASTRODUODENOSCOPY performed by Lucretia Pimentel MD at [address] [place] [address] History     Smoking Status  Never Smoker    Smokeless Tobacco Use  Never Used          Alcohol History     Alcohol Use Status  No          Drug Use     Drug Use Status  Never          Sexual Activity     Sexually Active  Not Asked              Family History   Problem Relation Age of Onset    Heart Disease Maternal Aunt     Cancer Maternal Aunt     Colon Cancer Maternal Aunt      No current facility-administered medications on file prior to encounter. Current Outpatient Medications on File Prior to Encounter   Medication Sig Dispense Refill    clonazePAM (KLONOPIN) 0.5 MG tablet Take 1 tablet by mouth nightly as needed for Anxiety for up to 14 days. 60 tablet 0    melatonin 3 MG TABS tablet Take 3 mg by mouth nightly as needed      dexamethasone (DECADRON) 6 MG tablet Take 1 tablet by mouth daily (with breakfast) for 10 days 10 tablet 0    ondansetron (ZOFRAN-ODT) 4 MG disintegrating tablet Take 1 tablet by mouth 3 times daily as needed for Nausea or Vomiting 21 tablet 0    meclizine (ANTIVERT) 25 MG tablet Take 1 tablet by mouth 2 times daily as needed for Dizziness 60 tablet 0    ranolazine (RANEXA) 1000 MG extended release tablet Take 1 tablet by mouth 2 times daily 60 tablet 6    vitamin C (ASCORBIC ACID) 500 MG tablet Take 500 mg by mouth daily      phytonadione (VITAMIN K) 5 MG tablet Take 5 mg by mouth once      vitamin E 1000 units capsule Take 1,000 Units by mouth daily      tamsulosin (FLOMAX) 0.4 MG capsule Take 1 capsule by mouth daily 90 capsule 3    Doxepin HCl (SILENOR) 6 MG TABS Take by mouth 3 times daily as needed       Omega-3 Fatty Acids (FISH OIL) 1000 MG CAPS Take 3,000 mg by mouth every other day      VORTIoxetine (TRINTELLIX) 5 MG tablet Take 5 mg by mouth daily      latanoprost (XALATAN) 0.005 % ophthalmic solution Use 1 Drop in both eyes daily at bedtime.  nitroGLYCERIN (NITROSTAT) 0.4 MG SL tablet up to max of 3 total doses.  If no relief after 1 dose, call 911. 25 tablet 0    olopatadine (PATADAY) 0.2 % SOLN ophthalmic solution Apply 1 drop to eye daily For allergies 1 Bottle 1    Blood Glucose Monitoring Suppl (ONETOUCH VERIO FLEX SYSTEM) w/Device KIT       ONETOUCH VERIO strip       ONETOUCH DELICA LANCETS 18N MISC       Cholecalciferol (VITAMIN D) 2000 units CAPS capsule Take 5,000 Units by mouth daily        Lab Results   Component Value Date    WBC 8.3 09/10/2021    HGB 15.8 09/10/2021    HCT 45.4 09/10/2021    MCV 89.3 09/10/2021     09/10/2021     Lab Results   Component Value Date     09/10/2021    K 4.6 09/10/2021    K 4.2 09/09/2021     09/10/2021    CO2 21 09/10/2021    BUN 25 09/10/2021    CREATININE 0.84 09/10/2021    GLUCOSE 92 09/10/2021    CALCIUM 9.2 09/10/2021      HEENT: AT/NC, PERRLA, no JVD  HEART: s1/s2 wnl w/o s3  LUNG: clear  ABD: soft, NT  EXT: no edema  SKin : no rash  Neuro:no focal deficits  ROS: 12 point review systems negative except was stated in HPI  1) weakness secondary to covid  2) CAD no cp or SOB  3) pevious encephalopathy resolved  Admit to obs  Hold decadron since pt is not on oxygen  Pt/ot   for placement

## 2021-09-11 VITALS
SYSTOLIC BLOOD PRESSURE: 116 MMHG | BODY MASS INDEX: 17.6 KG/M2 | DIASTOLIC BLOOD PRESSURE: 66 MMHG | TEMPERATURE: 99 F | HEART RATE: 86 BPM | WEIGHT: 109.5 LBS | HEIGHT: 66 IN | OXYGEN SATURATION: 95 % | RESPIRATION RATE: 20 BRPM

## 2021-09-11 LAB
ALBUMIN SERPL-MCNC: 2.6 G/DL (ref 3.5–4.6)
ALP BLD-CCNC: 49 U/L (ref 35–104)
ALT SERPL-CCNC: 29 U/L (ref 0–41)
ANION GAP SERPL CALCULATED.3IONS-SCNC: 18 MEQ/L (ref 9–15)
AST SERPL-CCNC: 49 U/L (ref 0–40)
BASOPHILS ABSOLUTE: 0 K/UL (ref 0–0.2)
BASOPHILS RELATIVE PERCENT: 0.1 %
BILIRUB SERPL-MCNC: 0.8 MG/DL (ref 0.2–0.7)
BUN BLDV-MCNC: 18 MG/DL (ref 8–23)
CALCIUM SERPL-MCNC: 8.2 MG/DL (ref 8.5–9.9)
CHLORIDE BLD-SCNC: 98 MEQ/L (ref 95–107)
CO2: 15 MEQ/L (ref 20–31)
CREAT SERPL-MCNC: 0.78 MG/DL (ref 0.7–1.2)
EOSINOPHILS ABSOLUTE: 0 K/UL (ref 0–0.7)
EOSINOPHILS RELATIVE PERCENT: 0.2 %
GFR AFRICAN AMERICAN: >60
GFR NON-AFRICAN AMERICAN: >60
GLOBULIN: 3.3 G/DL (ref 2.3–3.5)
GLUCOSE BLD-MCNC: 86 MG/DL (ref 70–99)
HCT VFR BLD CALC: 41.3 % (ref 42–52)
HEMOGLOBIN: 14.6 G/DL (ref 14–18)
LYMPHOCYTES ABSOLUTE: 0.4 K/UL (ref 1–4.8)
LYMPHOCYTES RELATIVE PERCENT: 4.2 %
MCH RBC QN AUTO: 31.1 PG (ref 27–31.3)
MCHC RBC AUTO-ENTMCNC: 35.3 % (ref 33–37)
MCV RBC AUTO: 88.1 FL (ref 80–100)
MONOCYTES ABSOLUTE: 0.3 K/UL (ref 0.2–0.8)
MONOCYTES RELATIVE PERCENT: 3.2 %
NEUTROPHILS ABSOLUTE: 7.9 K/UL (ref 1.4–6.5)
NEUTROPHILS RELATIVE PERCENT: 92.3 %
PDW BLD-RTO: 13.9 % (ref 11.5–14.5)
PLATELET # BLD: 133 K/UL (ref 130–400)
POTASSIUM SERPL-SCNC: 4.8 MEQ/L (ref 3.4–4.9)
RBC # BLD: 4.69 M/UL (ref 4.7–6.1)
SODIUM BLD-SCNC: 131 MEQ/L (ref 135–144)
TOTAL PROTEIN: 5.9 G/DL (ref 6.3–8)
WBC # BLD: 8.6 K/UL (ref 4.8–10.8)

## 2021-09-11 PROCEDURE — 36415 COLL VENOUS BLD VENIPUNCTURE: CPT

## 2021-09-11 PROCEDURE — 80053 COMPREHEN METABOLIC PANEL: CPT

## 2021-09-11 PROCEDURE — 85025 COMPLETE CBC W/AUTO DIFF WBC: CPT

## 2021-09-11 PROCEDURE — 6370000000 HC RX 637 (ALT 250 FOR IP): Performed by: INTERNAL MEDICINE

## 2021-09-11 PROCEDURE — 96372 THER/PROPH/DIAG INJ SC/IM: CPT

## 2021-09-11 PROCEDURE — G0378 HOSPITAL OBSERVATION PER HR: HCPCS

## 2021-09-11 PROCEDURE — 6360000002 HC RX W HCPCS: Performed by: INTERNAL MEDICINE

## 2021-09-11 PROCEDURE — 97167 OT EVAL HIGH COMPLEX 60 MIN: CPT

## 2021-09-11 PROCEDURE — 2580000003 HC RX 258

## 2021-09-11 RX ORDER — SODIUM CHLORIDE, SODIUM LACTATE, POTASSIUM CHLORIDE, CALCIUM CHLORIDE 600; 310; 30; 20 MG/100ML; MG/100ML; MG/100ML; MG/100ML
INJECTION, SOLUTION INTRAVENOUS
Status: COMPLETED
Start: 2021-09-11 | End: 2021-09-11

## 2021-09-11 RX ORDER — SODIUM CHLORIDE, SODIUM LACTATE, POTASSIUM CHLORIDE, CALCIUM CHLORIDE 600; 310; 30; 20 MG/100ML; MG/100ML; MG/100ML; MG/100ML
INJECTION, SOLUTION INTRAVENOUS ONCE
Status: DISCONTINUED | OUTPATIENT
Start: 2021-09-11 | End: 2021-09-12 | Stop reason: HOSPADM

## 2021-09-11 RX ADMIN — OXYCODONE HYDROCHLORIDE AND ACETAMINOPHEN 500 MG: 500 TABLET ORAL at 09:01

## 2021-09-11 RX ADMIN — BENZONATATE 100 MG: 100 CAPSULE ORAL at 22:45

## 2021-09-11 RX ADMIN — TAMSULOSIN HYDROCHLORIDE 0.4 MG: 0.4 CAPSULE ORAL at 09:01

## 2021-09-11 RX ADMIN — RANOLAZINE 1000 MG: 500 TABLET, FILM COATED, EXTENDED RELEASE ORAL at 22:40

## 2021-09-11 RX ADMIN — RANOLAZINE 1000 MG: 500 TABLET, FILM COATED, EXTENDED RELEASE ORAL at 09:01

## 2021-09-11 RX ADMIN — ACETAMINOPHEN 650 MG: 325 TABLET ORAL at 05:40

## 2021-09-11 RX ADMIN — VORTIOXETINE 5 MG: 10 TABLET, FILM COATED ORAL at 09:02

## 2021-09-11 RX ADMIN — SODIUM CHLORIDE, POTASSIUM CHLORIDE, SODIUM LACTATE AND CALCIUM CHLORIDE 1000 ML: 600; 310; 30; 20 INJECTION, SOLUTION INTRAVENOUS at 06:01

## 2021-09-11 RX ADMIN — LATANOPROST 1 DROP: 50 SOLUTION OPHTHALMIC at 22:40

## 2021-09-11 RX ADMIN — ENOXAPARIN SODIUM 40 MG: 100 INJECTION SUBCUTANEOUS at 09:01

## 2021-09-11 ASSESSMENT — ENCOUNTER SYMPTOMS
COUGH: 0
NAUSEA: 0
DIARRHEA: 0
VOMITING: 0
SHORTNESS OF BREATH: 0

## 2021-09-11 ASSESSMENT — PAIN SCALES - GENERAL
PAINLEVEL_OUTOF10: 0

## 2021-09-11 NOTE — PROGRESS NOTES
[facility] OCCUPATIONAL THERAPY EVALUATION - ACUTE     NAME: Fabio Goodman  : [birth year]8 (65 y.o.)  MRN: [id number]  CODE STATUS: Prior  Room: C614/B644-98    Date of Service: 2021    Patient Diagnosis(es): Weakness [R53.1]  General weakness [R53.1]  History of COVID-19 [Z86.16]   Chief Complaint   Patient presents with    Fatigue     Patient Active Problem List    Diagnosis Date Noted    Weakness 09/10/2021    Adenomatous polyp of sigmoid colon     Chronic gastritis without bleeding     Dyspepsia     Mixed obsessional thoughts and acts     Coronary artery disease involving native coronary artery of native heart without angina pectoris 02/15/2019    Anxiety     Recurrent major depressive disorder, in remission ([facility] [address]     Chest pain 2018    Hypertension 2018        Past Medical History:   Diagnosis Date    Anxiety     Chest pain 3/29/2018    Coronary artery disease involving native coronary artery of native heart without angina pectoris 2/15/2019    Diabetes mellitus ([facility] [address] [facility]     no meds    History of colon polyps     Hyperlipidemia     Hypertension     Type 2 diabetes mellitus without complication ([facility]     Vertigo      Past Surgical History:   Procedure Laterality Date    CARDIAC SURGERY  1973    PTCA     COLONOSCOPY      COLONOSCOPY N/A 10/17/2019    COLORECTAL CANCER SCREENING, HIGH RISK performed by Simon Badillo MD at [facility] OTHER SURGICAL HISTORY      patent foramen ovale    TONSILLECTOMY      UPPER GASTROINTESTINAL ENDOSCOPY N/A 2019    EGD ESOPHAGOGASTRODUODENOSCOPY performed by [facility] MD at [facility]        Restrictions  Restrictions/Precautions: Isolation, Fall Risk (+covid-19)     Safety Devices: Safety Devices  Safety Devices in place: Yes  Type of devices:  All fall risk precautions in place        Subjective       Pain Reassessment:   Pain Assessment  Patient Currently in Pain: Denies       Prior Level of Function:  Social/Functional History  Lives With: Spouse  Type of Home: Apartment (5th floor)  Home Layout: One level (laundry on main floor)  Home Access: Elevator  Bathroom Shower/Tub: Tub/Shower unit  ADL Assistance: Independent (no AD)  Homemaking Assistance: Independent  Ambulation Assistance: Independent  Transfer Assistance: Independent  Active : Yes    OBJECTIVE:     Orientation Status:  Orientation  Overall Orientation Status: Within Functional Limits    Observation:  Observation/Palpation  Posture: Fair  Observation: pt lying sideways in bed, no acute signs of distress    Cognition Status:  Cognition  Cognition Comment: pt with increased time to follow simple commands    Perception Status:  Perception  Overall Perceptual Status: WFL    Sensation Status:  Sensation  Overall Sensation Status: WFL (pt denies numbness and tingling)    Vision and Hearing Status:  Vision  Vision: Impaired  Vision Exceptions: Wears glasses for reading  Hearing  Hearing: Within functional limits     ROM:   LUE AROM (degrees)  LUE AROM : WFL  Left Hand AROM (degrees)  Left Hand AROM: WFL  RUE AROM (degrees)  RUE AROM : WFL  Right Hand AROM (degrees)  Right Hand AROM: WFL    Strength:  LUE Strength  L Hand General: 3+/5  LUE Strength Comment: 3+/5  RUE Strength  R Hand General: 3+/5  RUE Strength Comment: 3+/5    Coordination, Tone, Quality of Movement: Tone RUE  RUE Tone: Normotonic  Tone LUE  LUE Tone: Normotonic  Coordination  Movements Are Fluid And Coordinated: No  Coordination and Movement description: Decreased speed, Right UE, Left UE    Hand Dominance:  Hand Dominance  Hand Dominance: Right    ADL Status:  ADL  Feeding: Setup  Grooming: Minimal assistance  UE Bathing: Contact guard assistance  LE Bathing: Minimal assistance  UE Dressing: Contact guard assistance  LE Dressing:  Moderate assistance  Toileting: Maximum assistance  Additional Comments: pt unable to doff/don socks, other ADL's simulated  Toilet Transfers  Toilet Transfer: Unable to assess  Toilet Transfers Comments: anticipated Min to Mod A       Therapy key for assistance levels -   Independent = Pt. is able to perform task with no assistance but may require a device   Stand by assistance = Pt. does not perform task at an independent level but does not need physical assistance, requires verbal cues  Minimal, Moderate, Maximal Assistance = Pt. requires physical assistance (25%, 50%, 75% assist from helper) for task but is able to actively participate in task   Dependent = Pt. requires total assistance with task and is not able to actively participate with task completion     Functional Mobility:  Functional Mobility  Functional - Mobility Device: Other (hand held)  Activity: Other (side step to [facility])  Assist Level: Minimal assistance (VC's with tactile cues to side step to [facility])  Transfers  Sit to stand: Moderate assistance  Stand to sit: Minimal assistance    Bed Mobility  Bed mobility  Rolling to Left: Maximum assistance  Rolling to Right: Maximum assistance  Supine to Sit: Maximum assistance  Sit to Supine: Moderate assistance    Seated and Standing Balance:  Balance  Sitting Balance: Minimal assistance (pt with posterior LOB sitting EOB)  Standing Balance: Minimal assistance    Functional Endurance:  Activity Tolerance  Activity Tolerance: Patient limited by fatigue    D/C Recommendations:  OT D/C RECOMMENDATIONS  REQUIRES OT FOLLOW UP: Yes    Equipment Recommendations:  OT Equipment Recommendations  Other: Continue to assess    OT Education:   OT Education  OT Education: OT Role, Plan of Care  Barriers to Learning: none    OT Follow Up:  OT D/C RECOMMENDATIONS  REQUIRES OT FOLLOW UP: Yes       Assessment/Discharge Disposition:  Assessment: Pt is a 65 y.o. male recently d/c and re-admitted d/t weakness from Covid-19.  Pt may benefit from OT services to address above mentioned deficits and maximize safety and function during ADL's. Performance deficits / Impairments: Decreased functional mobility , Decreased ADL status, Decreased strength, Decreased endurance, Decreased balance, Decreased high-level IADLs, Decreased fine motor control, Decreased coordination, Decreased posture  Prognosis: Good  Discharge Recommendations: Continue to assess pending progress  Decision Making: High Complexity  History: Mutli comorb  Exam: 9 perf imp  Assistance / Modification: Max A    Six Click Score   How much help for putting on and taking off regular lower body clothing?: A Lot  How much help for Bathing?: A Lot  How much help for Toileting?: A Lot  How much help for putting on and taking off regular upper body clothing?: A Little  How much help for taking care of personal grooming?: A Little  How much help for eating meals?: A Little  AM-[facility] Inpatient Daily Activity Raw Score: 15  AM-PAC Inpatient ADL T-Scale Score : 34.69  ADL Inpatient CMS 0-100% Score: 56.46    Plan:  Plan  Times per week: 1-4  Plan weeks: LOS  Current Treatment Recommendations: Strengthening, Balance Training, Functional Mobility Training, Endurance Training, Patient/Caregiver Education & Training, Equipment Evaluation, Education, & procurement, Self-Care / ADL, Home Management Training    Goals:   Patient will:    - Improve functional endurance to tolerate/complete 20-30 mins of ADL's  - Be Set up in UB ADLs   - Be Min A in LB ADLs  - Be SBA in ADL transfers without LOB  - Be SBA in toileting tasks  - Improve B UE strength and endurance to increase by 1/2 MMT grade in order to participate in self-care activities as projected. Patient Goal:    Feel better    Discussed and agreed upon: Yes Comments:     Therapy Time:   OT Individual Minutes  Time In: 3468  Time Out: 1440  Minutes: 25    Eval: 25 minutes     Electronically signed by:     Reji Mccall OTR/L, OTR/L  9/11/2021, 4:01 PM

## 2021-09-11 NOTE — ED NOTES
Report called to Sushila Bingham on 4W, Pt updated and stable for transport     Kayla Salvador RN  09/10/21 2003

## 2021-09-11 NOTE — FLOWSHEET NOTE
Pt from home with wife, tested positive for Covid on 9/6, wife tested positive today. Pt's son and POA stated his father was more weak at home and he was concerned. Admission completed with son, unable to rec med's as he does not have his dad's med list and stated he will call in the morning with it. Pt cleaned after urinary incontinence. Passed bedside swallow, will provide snack. Oral temp 103.1, ice packs to b/l axillae, Perfect Serve to Dr. Pa Messer, order for Tylenol, administered. Oral temp recheck 99.1. Fall protocol in place, pt oriented to room and call light, call light within reach. 0545- Oral temp 102.7, 650 mg PO Tylenol administered. Spoke with Dr. Pa Messer, IV fluids ordered.

## 2021-09-11 NOTE — CARE COORDINATION
PATIENT IS A READMIT. WAS AT HOME W/ WIFE. PATIENT IS BACK NEEDS PLACEMENT FOR INABILITY TO AMBULATE. PATIENT WILL NEED A SNF. NEED TO START PRECERT ON Monday. PATIENT WENT HOME AND RETURNED D/T WEAKNESS. CMI FROM ADMIT 9/8/21. Jason Dooley Case Management Initial Discharge Assessment     Met with Patient to discuss discharge plan.          PCP: Sugey Dawson MD                                Date of Last Visit: June 2021     If no PCP, list provided? N/A     Discharge Planning     Living Arrangements: independently at home     Who do you live with? Wife      Who helps you with your care:  self     If lives at home:                Do you have any barriers navigating in your home? no     Patient can perform ADL? Yes     Current Services (outpatient and in home) :  None     Dialysis: No     Is transportation available to get to your appointments? Yes     DME Equipment:  no     Respiratory equipment: None     Respiratory provider:  no     Pharmacy:  yes - [facility] in [address] [place] [address] with Medication Assistance Program?  No       Patient agreeable to [facility]? No     Patient agreeable to SNF/Rehab? No     Other discharge needs identified? N/A     Does Patient Have a High-Risk for Readmission Diagnosis (CHF, PN, MI, COPD)? No        Initial Discharge Plan? (Note: please see concurrent daily documentation for any updates after initial note). Patient plans on returning home with wife.  Wife is able to assist with care if needed.     Readmission Risk              Risk of Unplanned Readmission:  19

## 2021-09-11 NOTE — PROGRESS NOTES
Progress Note  Date:2021       Room:[place] [address]-  Patient Name:Jb Obrien     YOB: 1950     Age:70 y.o. Subjective    Subjective:  Symptoms:  He reports malaise and weakness. No shortness of breath, cough, chest pain, headache, chest pressure, anorexia, diarrhea or anxiety. Diet:  No nausea or vomiting. Review of Systems   Respiratory: Negative for cough and shortness of breath. Cardiovascular: Negative for chest pain. Gastrointestinal: Negative for anorexia, diarrhea, nausea and vomiting. Neurological: Positive for weakness. Objective         Vitals Last 24 Hours:  TEMPERATURE:  Temp  Av.7 °F (38.2 °C)  Min: 98.8 °F (37.1 °C)  Max: 103.1 °F (39.5 °C)  RESPIRATIONS RANGE: Resp  Av.9  Min: 18  Max: 29  PULSE OXIMETRY RANGE: SpO2  Av %  Min: 93 %  Max: 97 %  PULSE RANGE: Pulse  Av.5  Min: 74  Max: 95  BLOOD PRESSURE RANGE: Systolic (65MNN), YTA:300 , Min:120 , BOM:374   ; Diastolic (27HLX), CZT:82, Min:57, Max:82    I/O (24Hr): Intake/Output Summary (Last 24 hours) at 2021 1052  Last data filed at 2021 0546  Gross per 24 hour   Intake 1240 ml   Output 200 ml   Net 1040 ml     Objective:  General Appearance:  Comfortable, well-appearing and in no acute distress. Vital signs: (most recent): Blood pressure (!) 120/57, pulse 89, temperature 98.8 °F (37.1 °C), temperature source Oral, resp. rate 20, height 5' 6\" (1.676 m), weight 97 lb (44 kg), SpO2 93 %. HEENT: Normal HEENT exam.    Lungs:  Normal effort. Heart: Normal rate. Regular rhythm. S1 normal.    Abdomen: Abdomen is soft. There is no epigastric area tenderness. Pulses: Distal pulses are intact. Neurological: Patient is alert. Pupils:  Pupils are equal, round, and reactive to light. Skin:  Warm and dry.       Labs/Imaging/Diagnostics    Labs:  CBC:  Recent Labs     21  0703 09/10/21  1545 21  0718   WBC 8.1 8.3 8.6   RBC 4.54* 5.08 4.69*   HGB 14.1 15.8 14.6   HCT 40.9* 45.4 41.3*   MCV 90.2 89.3 88.1   RDW 14.1 14.1 13.9    153 133     CHEMISTRIES:  Recent Labs     09/09/21  0703 09/10/21  1545 09/11/21  0718    135 131*   K 4.2 4.6 4.8    100 98   CO2 23 21 15*   BUN 24* 25* 18   CREATININE 0.83 0.84 0.78   GLUCOSE 167* 92 86   MG  --  1.7  --      PT/INR:No results for input(s): PROTIME, INR in the last 72 hours. APTT:No results for input(s): APTT in the last 72 hours. LIVER PROFILE:  Recent Labs     09/10/21  1545 09/11/21 0718   AST 42* 49*   ALT 31 29   BILITOT 0.7 0.8*   ALKPHOS 60 49       Imaging Last 24 Hours:  No results found. Assessment//Plan           Hospital Problems         Last Modified POA    Weakness 9/10/2021 Yes        Assessment & Plan  9/11: Patient needs placement for weakness unable to ambulate. Awaiting PT OT notes. Fevers due to COVID-19. Patient not on oxygen. Alert and oriented. No overnight events.   Electronically signed by Tita Eisenberg MD on 9/11/21 at 10:52 AM EDT

## 2021-09-11 NOTE — PROGRESS NOTES
See OT evaluation for all goals and OT POC.  Electronically signed by KAYLEE Sultana/L on 9/11/2021 at 4:03 PM

## 2021-09-11 NOTE — PROGRESS NOTES
Comprehensive Nutrition Assessment    Type and Reason for Visit:  Initial, Positive Nutrition Screen    Nutrition Recommendations/Plan: Modify Current Diet, Start Oral Nutrition Supplement    Nutrition Assessment:   Pt presents with predicted sub-optimal intake d/t current condition (COVID). To provide Carb Control diet and provide diabetic supplement tid/monitor po intake of meals. Malnutrition Assessment:  Malnutrition Status:  Insufficient data    Context:  Acute Illness       Estimated Daily Nutrient Needs:  Energy (kcal):  4240-9937 (kg x 28-30); Weight Used for Energy Requirements:  Admission     Protein (g):  75-82 (kg x 1.2-1.3); Weight Used for Protein Requirements:  Admission        Fluid (ml/day):  ~1900; Method Used for Fluid Requirements:  1 ml/kcal      Nutrition Related Findings:   PMH-DB, CAD, htn, hld; recent +COVID; adm with weakness. [address] Unable to reach pt via phone/intake noted 50-75% at meals. Wounds:  None       Current Nutrition Therapies:    ADULT DIET; Regular; Safety Tray (Disposables)    Anthropometric Measures:  · Height: 5' 6\" (167.6 cm)  · Current Body Weight: (question accuracy of 9/10 weight). Please obtain updated weight after bed is zeroed.    · Admission Body Weight: 140 lb (63.5 kg)  (stated)  · Usual Body Weight: 140 lb (63.5 kg) (stated 8/15)     · Ideal Body Weight: 142 lbs  · BMI:  24.2 using stated adm weight  · BMI Categories: Normal Weight (BMI 22.0 to 24.9) age over 72       Nutrition Diagnosis:   · Predicted inadequate energy intake related to  (current condition) as evidenced by intake 51-75%  · Altered nutrition-related lab values related to endocrine dysfuntion as evidenced by lab values    Nutrition Interventions:   Food and/or Nutrient Delivery:  Modify Current Diet, Start Oral Nutrition Supplement  Nutrition Education/Counseling:  No recommendation at this time   Coordination of Nutrition Care:  Continue to monitor while inpatient    Goals:  Intake >75% of meals/supplement. Gluc <160. Weight gain.        Nutrition Monitoring and Evaluation:     Food/Nutrient Intake Outcomes:  Food and Nutrient Intake, Supplement Intake  Physical Signs/Symptoms Outcomes:  Weight, Biochemical Data     Electronically signed by Enrique Manrique RD, NATHAN on 9/11/21 at 3:42 PM EDT

## 2021-09-12 LAB
ALBUMIN SERPL-MCNC: 2.7 G/DL (ref 3.5–4.6)
ALP BLD-CCNC: 54 U/L (ref 35–104)
ALT SERPL-CCNC: 28 U/L (ref 0–41)
ANION GAP SERPL CALCULATED.3IONS-SCNC: 13 MEQ/L (ref 9–15)
AST SERPL-CCNC: 27 U/L (ref 0–40)
BASOPHILS ABSOLUTE: 0 K/UL (ref 0–0.2)
BASOPHILS RELATIVE PERCENT: 0.4 %
BILIRUB SERPL-MCNC: 1.1 MG/DL (ref 0.2–0.7)
BUN BLDV-MCNC: 17 MG/DL (ref 8–23)
CALCIUM SERPL-MCNC: 8.3 MG/DL (ref 8.5–9.9)
CHLORIDE BLD-SCNC: 99 MEQ/L (ref 95–107)
CO2: 23 MEQ/L (ref 20–31)
CREAT SERPL-MCNC: 0.94 MG/DL (ref 0.7–1.2)
EOSINOPHILS ABSOLUTE: 0.1 K/UL (ref 0–0.7)
EOSINOPHILS RELATIVE PERCENT: 1.9 %
GFR AFRICAN AMERICAN: >60
GFR NON-AFRICAN AMERICAN: >60
GLOBULIN: 2.9 G/DL (ref 2.3–3.5)
GLUCOSE BLD-MCNC: 113 MG/DL (ref 70–99)
HCT VFR BLD CALC: 40.7 % (ref 42–52)
HEMOGLOBIN: 14 G/DL (ref 14–18)
LYMPHOCYTES ABSOLUTE: 0.4 K/UL (ref 1–4.8)
LYMPHOCYTES RELATIVE PERCENT: 6.2 %
MCH RBC QN AUTO: 30.6 PG (ref 27–31.3)
MCHC RBC AUTO-ENTMCNC: 34.5 % (ref 33–37)
MCV RBC AUTO: 88.8 FL (ref 80–100)
MONOCYTES ABSOLUTE: 0.3 K/UL (ref 0.2–0.8)
MONOCYTES RELATIVE PERCENT: 4.8 %
NEUTROPHILS ABSOLUTE: 5.9 K/UL (ref 1.4–6.5)
NEUTROPHILS RELATIVE PERCENT: 86.7 %
PDW BLD-RTO: 14.2 % (ref 11.5–14.5)
PLATELET # BLD: 165 K/UL (ref 130–400)
POTASSIUM SERPL-SCNC: 3.5 MEQ/L (ref 3.4–4.9)
RBC # BLD: 4.59 M/UL (ref 4.7–6.1)
SODIUM BLD-SCNC: 135 MEQ/L (ref 135–144)
TOTAL PROTEIN: 5.6 G/DL (ref 6.3–8)
VITAMIN D 25-HYDROXY: 47.6 NG/ML (ref 30–100)
WBC # BLD: 6.8 K/UL (ref 4.8–10.8)

## 2021-09-12 PROCEDURE — G0378 HOSPITAL OBSERVATION PER HR: HCPCS

## 2021-09-12 PROCEDURE — 36415 COLL VENOUS BLD VENIPUNCTURE: CPT

## 2021-09-12 PROCEDURE — 80053 COMPREHEN METABOLIC PANEL: CPT

## 2021-09-12 PROCEDURE — 6370000000 HC RX 637 (ALT 250 FOR IP): Performed by: INTERNAL MEDICINE

## 2021-09-12 PROCEDURE — 96372 THER/PROPH/DIAG INJ SC/IM: CPT

## 2021-09-12 PROCEDURE — 85025 COMPLETE CBC W/AUTO DIFF WBC: CPT

## 2021-09-12 PROCEDURE — 97162 PT EVAL MOD COMPLEX 30 MIN: CPT

## 2021-09-12 PROCEDURE — 6360000002 HC RX W HCPCS: Performed by: INTERNAL MEDICINE

## 2021-09-12 RX ORDER — BENZONATATE 100 MG/1
100 CAPSULE ORAL 2 TIMES DAILY PRN
Qty: 20 CAPSULE | Refills: 0 | Status: SHIPPED | OUTPATIENT
Start: 2021-09-12 | End: 2021-09-19

## 2021-09-12 RX ORDER — LANOLIN ALCOHOL/MO/W.PET/CERES
3 CREAM (GRAM) TOPICAL NIGHTLY PRN
Qty: 30 TABLET | Refills: 0 | Status: SHIPPED | OUTPATIENT
Start: 2021-09-12 | End: 2022-05-06 | Stop reason: ALTCHOICE

## 2021-09-12 RX ADMIN — TAMSULOSIN HYDROCHLORIDE 0.4 MG: 0.4 CAPSULE ORAL at 09:37

## 2021-09-12 RX ADMIN — BENZONATATE 100 MG: 100 CAPSULE ORAL at 09:37

## 2021-09-12 RX ADMIN — BENZONATATE 100 MG: 100 CAPSULE ORAL at 04:05

## 2021-09-12 RX ADMIN — VORTIOXETINE 5 MG: 10 TABLET, FILM COATED ORAL at 09:37

## 2021-09-12 RX ADMIN — OXYCODONE HYDROCHLORIDE AND ACETAMINOPHEN 500 MG: 500 TABLET ORAL at 09:37

## 2021-09-12 RX ADMIN — ENOXAPARIN SODIUM 40 MG: 100 INJECTION SUBCUTANEOUS at 09:37

## 2021-09-12 RX ADMIN — RANOLAZINE 1000 MG: 500 TABLET, FILM COATED, EXTENDED RELEASE ORAL at 09:37

## 2021-09-12 ASSESSMENT — PAIN SCALES - GENERAL: PAINLEVEL_OUTOF10: 0

## 2021-09-12 NOTE — DISCHARGE SUMMARY
Discharge Summary    Date: 9/12/2021  Patient Name: Tara Vallejo YOB: 1950 Age: 65 y.o. Admit Date: 9/10/2021  Discharge Date: 9/12/2021  Discharge Condition: Fair    Admission Diagnosis  Weakness (R53.1); General weakness (R53.1); History of COVID-19 (Z86.16)     Discharge Diagnosis  Active Problems: WeaknessResolved Problems: * No resolved hospital problems. [facility] Stay  Narrative of Hospital Course:  Patient was brought in by family members after being discharged for unable to ambulate. Patient was discharged Decadron for covid and Klonopin for anxiety. Patient is on room air. Will DC Decadron and Klonopin. Patient not anxious. Patient walking improved. Patient can follow-up with PCP outpatient. Consultants:  None    Surgeries/procedures Performed:       Treatments:            Discharge Plan/Disposition:  Home    Hospital/Incidental Findings Requiring Follow Up:    Patient Instructions:    Diet: Regular Diet    Activity:  For number of days (if applicable): Other Instructions:    Provider Follow-Up:   No follow-ups on file. Significant Diagnostic Studies:    Recent Labs:  Admission on 09/10/2021Sodium                                        Date: 09/10/2021Value: 135         Ref range: 135 - 144 mEq/L    Status: FinalPotassium                                     Date: 09/10/2021Value: 4.6         Ref range: 3.4 - 4.9 mEq/L    Status: Final              Comment: Specimen hemolysis has exceeded the interference as definedby Roche. Value may be falsely increased. Suggestrecollection if clinically indicated. Chloride                                      Date: 09/10/2021Value: 100         Ref range: 95 - 107 mEq/L     Status: FinalCO2                                           Date: 09/10/2021Value: 21          Ref range: 20 - 31 mEq/L      Status: FinalAnion Gap                                     Date: 09/10/2021Value: 14          Ref range: 9 - 15 mEq/L       Status: FinalGlucose Date: 09/10/2021Value: 92          Ref range: 70 - 99 mg/dL      Status: FinalBUN                                           Date: 09/10/2021Value: 25*         Ref range: 8 - 23 mg/dL       Status: FinalCREATININE                                    Date: 09/10/2021Value: 0.84        Ref range: 0.70 - 1.20 mg/dL  Status: FinalGFR Non-                      Date: 09/10/2021Value: >60.0       Ref range: >60                Status: Final              Comment: >60 mL/min/1.73m2 EGFR, calc. for ages 25 and older using theMDRD formula (not corrected for weight), is valid for stablerenal function. GFR                           Date: 09/10/2021Value: >60.0       Ref range: >60                Status: Final              Comment: >60 mL/min/1.73m2 EGFR, calc. for ages 25 and older using theMDRD formula (not corrected for weight), is valid for stablerenal function. Calcium                                       Date: 09/10/2021Value: 9.2         Ref range: 8.5 - 9.9 mg/dL    Status: FinalTotal Protein                                 Date: 09/10/2021Value: 6.9         Ref range: 6.3 - 8.0 g/dL     Status: FinalAlbumin                                       Date: 09/10/2021Value: 3.4*        Ref range: 3.5 - 4.6 g/dL     Status: FinalTotal Bilirubin                               Date: 09/10/2021Value: 0.7         Ref range: 0.2 - 0.7 mg/dL    Status: FinalAlkaline Phosphatase                          Date: 09/10/2021Value: 60          Ref range: 35 - 104 U/L       Status: FinalALT                                           Date: 09/10/2021Value: 31          Ref range: 0 - 41 U/L         Status: Final              Comment: Specimen hemolysis has exceeded the interference as definedby Roche. Result may be affected. Suggest recollection ifclinically indicated. AST                                           Date: 09/10/2021Value: 42*         Ref range: 0 - 40 U/L Status: Final              Comment: Specimen hemolysis has exceeded the interference as definedby Roche. Value may be falsely increased. Suggestrecollection if clinically indicated. Globulin                                      Date: 09/10/2021Value: 3.5         Ref range: 2.3 - 3.5 g/dL     Status: FinalAmphetamine Screen, Urine                     Date: 09/10/2021Value: Neg         Ref range: Negative <1000 n*  Status: FinalBarbiturate Screen, Ur                        Date: 09/10/2021Value: Neg         Ref range: Negative < 200 n*  Status: FinalBenzodiazepine Screen, Urine                  Date: 09/10/2021Value: POSITIVE*   Ref range: Negative < 200 n*  Status: FinalCannabinoid Scrn, Ur                          Date: 09/10/2021Value: Neg         Ref range: Negative < 50 ng*  Status: FinalCocaine Metabolite Screen, Urine              Date: 09/10/2021Value: Neg         Ref range: Negative < 300 n*  Status: FinalOpiate Scrn, Ur                               Date: 09/10/2021Value: Neg         Ref range: Negative < 300 n*  Status: FinalPCP Screen, Urine                             Date: 09/10/2021Value: Neg         Ref range: Negative < 25 ng*  Status: FinalMethadone Screen, Urine                       Date: 09/10/2021Value: Neg         Ref range: Negative <300 ng*  Status: FinalPropoxyphene Scrn, Ur                         Date: 09/10/2021Value: Neg         Ref range: Negative <300 ng*  Status: FinalOxycodone Urine                               Date: 09/10/2021Value: Neg         Ref range: Negative <100 ng*  Status: FinalDrug Screen Comment:                          Date: 09/10/2021Value: see below     Status: Final              Comment: This method is a screening test to detect only these drugclasses as part of a medical workup. Confirmatory testingby another method should be ordered if clinically indicated. Ethanol Lvl                                   Date: 09/10/2021Value: <10         Ref range: mg/dL Status: Final              Comment: Specimen hemolysis has exceeded the interference as definedby Roche. Value may be falsely decreased. Suggestrecollection if clinically indicated. Ethanol percent                               Date: 09/10/2021Value: Not indicated                   Ref range: G/dL               Status: [address] [facility]                                           Date: 09/10/2021Value: 8.3         Ref range: 4.8 - 10.8 K/uL    Status: FinalRBC                                           Date: 09/10/2021Value: 5.08        Ref range: 4.70 - 6.10 M/uL   Status: FinalHemoglobin                                    Date: 09/10/2021Value: 15.8        Ref range: 14.0 - 18.0 g/dL   Status: FinalHematocrit                                    Date: 09/10/2021Value: 45.4        Ref range: 42.0 - 52.0 %      Status: FinalMCV                                           Date: 09/10/2021Value: 89.3        Ref range: 80.0 - 100.0 fL    Status: [facility] VANES[facility]                                           Date: 09/10/2021Value: 31.1        Ref range: 27.0 - 31.3 pg     Status: [address]                                          Date: 09/10/2021Value: 34.8        Ref range: 33.0 - 37.0 %      Status: FinalRDW                                           Date: 09/10/2021Value: 14.1        Ref range: 11.5 - 14.5 %      Status: FinalPlatelets                                     Date: 09/10/2021Value: 153         Ref range: 130 - 400 K/uL     Status: FinalNeutrophils %                                 Date: 09/10/2021Value: 90.3        Ref range: %                  Status: FinalLymphocytes %                                 Date: 09/10/2021Value: 4.7         Ref range: %                  Status: FinalMonocytes %                                   Date: 09/10/2021Value: 4.9         Ref range: %                  Status: FinalEosinophils %                                 Date: 09/10/2021Value: 0.0         Ref range: %                  Status: FinalBasophils Negative mg/dL     Status: FinalUrobilinogen, Urine                           Date: 09/10/2021Value: 1.0         Ref range: <2.0 E.U./dL       Status: FinalNitrite, Urine                                Date: 09/10/2021Value: Negative    Ref range: Negative           Status: FinalLeukocyte Esterase, Urine                     Date: 09/10/2021Value: TRACE*      Ref range: Negative           Status: FinalUrine Reflex to Culture                       Date: 09/10/2021Value: Not Indicated                     Status: [address] [facility]                                       Date: 09/10/2021Value: 3-5         Ref range: 0 - 5 /HPF         Status: FinalRBC, UA                                       Date: 09/10/2021Value: 10-20*      Ref range: 0 - 2 /HPF         Status: FinalEpithelial Cells, UA                          Date: 09/10/2021Value: 3-5         Ref range: /HPF               Status: [address] [facility]                                           Date: 09/11/2021Value: 8.6         Ref range: 4.8 - 10.8 K/uL    Status: FinalRBC                                           Date: 09/11/2021Value: 4.69*       Ref range: 4.70 - 6.10 M/uL   Status: FinalHemoglobin                                    Date: 09/11/2021Value: 14.6        Ref range: 14.0 - 18.0 g/dL   Status: FinalHematocrit                                    Date: 09/11/2021Value: 41.3*       Ref range: 42.0 - 52.0 %      Status: FinalMCV                                           Date: 09/11/2021Value: 88.1        Ref range: 80.0 - 100.0 fL    Status: [address]                                           Date: 09/11/2021Value: 31.1        Ref range: 27.0 - 31.3 pg     Status: [address]                                          Date: 09/11/2021Value: 35.3        Ref range: 33.0 - 37.0 %      Status: FinalRDW                                           Date: 09/11/2021Value: 13.9        Ref range: 11.5 - 14.5 %      Status: FinalPlatelets                                     Date: 09/11/2021Value: 133         Ref range: 130 - 400 K/uL     Status: FinalNeutrophils %                                 Date: 09/11/2021Value: 92.3        Ref range: %                  Status: FinalLymphocytes %                                 Date: 09/11/2021Value: 4.2         Ref range: %                  Status: FinalMonocytes %                                   Date: 09/11/2021Value: 3.2         Ref range: %                  Status: FinalEosinophils %                                 Date: 09/11/2021Value: 0.2         Ref range: %                  Status: FinalBasophils %                                   Date: 09/11/2021Value: 0.1         Ref range: %                  Status: FinalNeutrophils Absolute                          Date: 09/11/2021Value: 7.9*        Ref range: 1.4 - 6.5 K/uL     Status: FinalLymphocytes Absolute                          Date: 09/11/2021Value: 0.4*        Ref range: 1.0 - 4.8 K/uL     Status: FinalMonocytes Absolute                            Date: 09/11/2021Value: 0.3         Ref range: 0.2 - 0.8 K/uL     Status: FinalEosinophils Absolute                          Date: 09/11/2021Value: 0.0         Ref range: 0.0 - 0.7 K/uL     Status: FinalBasophils Absolute                            Date: 09/11/2021Value: 0.0         Ref range: 0.0 - 0.2 K/uL     Status: FinalSodium                                        Date: 09/11/2021Value: 131*        Ref range: 135 - 144 mEq/L    Status: FinalPotassium                                     Date: 09/11/2021Value: 4.8         Ref range: 3.4 - 4.9 mEq/L    Status: Final              Comment: Specimen hemolysis has exceeded the interference as definedby Roche. Value may be falsely increased. Suggestrecollection if clinically indicated. Chloride                                      Date: 09/11/2021Value: 98          Ref range: 95 - 107 mEq/L     Status: FinalCO2                                           Date: 09/11/2021Value: 15*         Ref range: 20 - 31 mEq/L      Status: FinalAnion Gap                                     Date: 09/11/2021Value: 18*         Ref range: 9 - 15 mEq/L       Status: FinalGlucose                                       Date: 09/11/2021Value: 86          Ref range: 70 - 99 mg/dL      Status: FinalBUN                                           Date: 09/11/2021Value: 18          Ref range: 8 - 23 mg/dL       Status: FinalCREATININE                                    Date: 09/11/2021Value: 0.78        Ref range: 0.70 - 1.20 mg/dL  Status: FinalGFR Non-                      Date: 09/11/2021Value: >60.0       Ref range: >60                Status: Final              Comment: >60 mL/min/1.73m2 EGFR, calc. for ages 25 and older using theMDRD formula (not corrected for weight), is valid for stablerenal function. GFR                           Date: 09/11/2021Value: >60.0       Ref range: >60                Status: Final              Comment: >60 mL/min/1.73m2 EGFR, calc. for ages 25 and older using theMDRD formula (not corrected for weight), is valid for stablerenal function. Calcium                                       Date: 09/11/2021Value: 8.2*        Ref range: 8.5 - 9.9 mg/dL    Status: FinalTotal Protein                                 Date: 09/11/2021Value: 5.9*        Ref range: 6.3 - 8.0 g/dL     Status: FinalAlbumin                                       Date: 09/11/2021Value: 2.6*        Ref range: 3.5 - 4.6 g/dL     Status: FinalTotal Bilirubin                               Date: 09/11/2021Value: 0.8*        Ref range: 0.2 - 0.7 mg/dL    Status: FinalAlkaline Phosphatase                          Date: 09/11/2021Value: 49          Ref range: 35 - 104 U/L       Status: Final              Comment: Specimen hemolysis has exceeded the interference as definedby Roche. Value may be falsely decreased. Suggestrecollection if clinically indicated. ALT                                           Date: 09/11/2021Value: 29 Ref range: 0 - 41 U/L         Status: Final              Comment: Specimen hemolysis has exceeded the interference as definedby Roche. Result may be affected. Suggest recollection ifclinically indicated. AST                                           Date: 09/11/2021Value: 49*         Ref range: 0 - 40 U/L         Status: Final              Comment: Specimen hemolysis has exceeded the interference as definedby Roche. Value may be falsely increased. Suggestrecollection if clinically indicated. Globulin                                      Date: 09/11/2021Value: 3.3         Ref range: 2.3 - 3.5 g/dL     Status: [address] [facility]                                           Date: 09/12/2021Value: 6.8         Ref range: 4.8 - 10.8 K/uL    Status: FinalRBC                                           Date: 09/12/2021Value: 4.59*       Ref range: 4.70 - 6.10 M/uL   Status: FinalHemoglobin                                    Date: 09/12/2021Value: 14.0        Ref range: 14.0 - 18.0 g/dL   Status: FinalHematocrit                                    Date: 09/12/2021Value: 40.7*       Ref range: 42.0 - 52.0 %      Status: FinalMCV                                           Date: 09/12/2021Value: 88.8        Ref range: 80.0 - 100.0 fL    Status: [address] [place]                                           Date: 09/12/2021Value: 30.6        Ref range: 27.0 - 31.3 pg     Status: [address]                                          Date: 09/12/2021Value: 34.5        Ref range: 33.0 - 37.0 %      Status: FinalRDW                                           Date: 09/12/2021Value: 14.2        Ref range: 11.5 - 14.5 %      Status: FinalPlatelets                                     Date: 09/12/2021Value: 165         Ref range: 130 - 400 K/uL     Status: FinalNeutrophils %                                 Date: 09/12/2021Value: 86.7        Ref range: %                  Status: FinalLymphocytes %                                 Date: 09/12/2021Value: 6.2 Ref range: %                  Status: FinalMonocytes %                                   Date: 09/12/2021Value: 4.8         Ref range: %                  Status: FinalEosinophils %                                 Date: 09/12/2021Value: 1.9         Ref range: %                  Status: FinalBasophils %                                   Date: 09/12/2021Value: 0.4         Ref range: %                  Status: FinalNeutrophils Absolute                          Date: 09/12/2021Value: 5.9         Ref range: 1.4 - 6.5 K/uL     Status: FinalLymphocytes Absolute                          Date: 09/12/2021Value: 0.4*        Ref range: 1.0 - 4.8 K/uL     Status: FinalMonocytes Absolute                            Date: 09/12/2021Value: 0.3         Ref range: 0.2 - 0.8 K/uL     Status: FinalEosinophils Absolute                          Date: 09/12/2021Value: 0.1         Ref range: 0.0 - 0.7 K/uL     Status: FinalBasophils Absolute                            Date: 09/12/2021Value: 0.0         Ref range: 0.0 - 0.2 K/uL     Status: FinalSodium                                        Date: 09/12/2021Value: 135         Ref range: 135 - 144 mEq/L    Status: FinalPotassium                                     Date: 09/12/2021Value: 3.5         Ref range: 3.4 - 4.9 mEq/L    Status: FinalChloride                                      Date: 09/12/2021Value: 99          Ref range: 95 - 107 mEq/L     Status: FinalCO2                                           Date: 09/12/2021Value: 23          Ref range: 20 - 31 mEq/L      Status: FinalAnion Gap                                     Date: 09/12/2021Value: 13          Ref range: 9 - 15 mEq/L       Status: FinalGlucose                                       Date: 09/12/2021Value: 113*        Ref range: 70 - 99 mg/dL      Status: FinalBUN                                           Date: 09/12/2021Value: 17          Ref range: 8 - 23 mg/dL       Status: Colleen Colvin Date: 09/12/2021Value: 0.94        Ref range: 0.70 - 1.20 mg/dL  Status: FinalGFR Non-                      Date: 09/12/2021Value: >60.0       Ref range: >60                Status: Final              Comment: >60 mL/min/1.73m2 EGFR, calc. for ages 25 and older using theMDRD formula (not corrected for weight), is valid for stablerenal function. GFR                           Date: 09/12/2021Value: >60.0       Ref range: >60                Status: Final              Comment: >60 mL/min/1.73m2 EGFR, calc. for ages 25 and older using theMDRD formula (not corrected for weight), is valid for stablerenal function. Calcium                                       Date: 09/12/2021Value: 8.3*        Ref range: 8.5 - 9.9 mg/dL    Status: FinalTotal Protein                                 Date: 09/12/2021Value: 5.6*        Ref range: 6.3 - 8.0 g/dL     Status: FinalAlbumin                                       Date: 09/12/2021Value: 2.7*        Ref range: 3.5 - 4.6 g/dL     Status: FinalTotal Bilirubin                               Date: 09/12/2021Value: 1.1*        Ref range: 0.2 - 0.7 mg/dL    Status: FinalAlkaline Phosphatase                          Date: 09/12/2021Value: 54          Ref range: 35 - 104 U/L       Status: FinalALT                                           Date: 09/12/2021Value: 28          Ref range: 0 - 41 U/L         Status: FinalAST                                           Date: 09/12/2021Value: 27          Ref range: 0 - 40 U/L         Status: FinalGlobulin                                      Date: 09/12/2021Value: 2.9         Ref range: 2.3 - 3.5 g/dL     Status: Final------------    Radiology last 7 days:  CT Head WO ContrastResult Date: 9/7/2021NO ACUTE INTRA-AXIAL OR EXTRA-AXIAL FINDINGS. All CT scans at this facility use dose modulation, iterative reconstruction, and/or weight based dosing when appropriate to reduce radiation dose to as low as reasonably achievable. TECHNIQUE: Multiple unenhanced serial axial images of the brain from the vertex of the skull to the base of the skull were performed. FINDINGS: The ventricles are dilated. This is compensatory to the surrounding moderate generalized parenchymal volume loss. No mass. No midline shift. The cisterns are patent. There are white matter and periventricular changes most likely consistent with chronic small vessel disease. No acute intra-axial or extra-axial findings. The visualized osseous structures are unremarkable. The visualized portion of the paranasal sinuses, and mastoids are unremarkable. IMPRESSION: CHANGES IN PARANASAL SINUSES DESCRIBED ABOVE WHICH HAVE INCREASED SINCE THE PRIOR EXAMINATION. CORRELATE CLINICALLY NO ACUTE INTRA-AXIAL OR EXTRA-AXIAL FINDINGS. IF SIGNS OR SYMPTOMS PERSIST THEN CONSIDER MRI TO FURTHER EVALUATE IF THERE ARE NO CONTRAINDICATIONS All CT scans at this facility use dose modulation, iterative reconstruction, and/or weight based dosing when appropriate to reduce radiation dose to as low as reasonably achievable. CTA Chest W WO  (PE study)Result Date: 9/7/2021NO FINDINGS A CENTRAL, PROXIMAL PROXIMAL-SEGMENTAL PORT EMBOLI 2. SMALL PATCHY AREAS OF GROUNDGLASS INFILTRATES WITHIN THE BASE POSTERIOR ASPECT RIGHT UPPER LOBE AND BOTH BASES. IMAGING FEATURES CAN BE SEEN WITH (COVID-19) PNEUMONIA, THOUGH ARE NONSPECIFIC AND CAN OCCUR WITH A VARIETY OF INFECTIOUS AND NONINFECTIOUS  PROCESSES. 3. PARTIALLY VISUALIZED PARTIALLY EXOPHYTIC CYSTIC AREA SUPERIOR POLE RIGHT KIDNEY. NOT FULLY CHARACTERIZED. FOLLOW- All CT scans at this facility use dose modulation, iterative reconstruction, and/or weight based dosing when appropriate to reduce radiation dose to as low as reasonably achievable. XR CHEST PORTABLEResult Date: 9/6/2021No acute radiographic abnormality. MRI brain without contrastResult Date: 9/8/2021NO ACUTE INTRACRANIAL PROCESS IDENTIFIED. ATROPHIC AND INVOLUTIONAL CHANGES.  US CAROTID ARTERY BILATERALResult Date: 9/8/2021ATHEROSCLEROTIC CALCIFIED PLAQUE IN BOTH CAROTID SYSTEMS. BY VELOCITY CRITERIA THERE IS LESS THAN 50% DIAMETER REDUCTION OF THE RIGHT ICA AND 50-69% DIAMETER REDUCTION OF THE LEFT ICA WHICH HAS BEEN AN INTERVAL CHANGE. ANTEGRADE VERTEBRAL FLOW      [unfilled]    Discharge Medications    Current Discharge Medication List    Current Discharge Medication List    Current Discharge Medication ListCONTINUE these medications which have NOT CHANGEDmelatonin 3 MG TABS tabletTake 3 mg by mouth nightly as neededondansetron (ZOFRAN-ODT) 4 MG disintegrating tabletTake 1 tablet by mouth 3 times daily as needed for Nausea or VomitingQty: 21 tablet Refills: 0meclizine (ANTIVERT) 25 MG tabletTake 1 tablet by mouth 2 times daily as needed for DizzinessQty: 60 tablet Refills: 0ranolazine (RANEXA) 1000 MG extended release tabletTake 1 tablet by mouth 2 times dailyQty: 60 tablet Refills: 6Associated Diagnoses:Angina, class IV ([facility])vitamin C (ASCORBIC ACID) 500 MG tabletTake 500 mg by mouth dailyphytonadione (VITAMIN K) 5 MG tabletTake 5 mg by mouth oncevitamin E 1000 units capsuleTake 1,000 Units by mouth dailytamsulosin (FLOMAX) 0.4 MG capsuleTake 1 capsule by mouth dailyQty: 90 capsule Refills: 3Doxepin HCl (SILENOR) 6 MG TABSTake by mouth 3 times daily as needed Omega-3 Fatty Acids (FISH OIL) 1000 MG CAPSTake 3,000 mg by mouth every other dayVORTIoxetine (TRINTELLIX) 5 MG tabletTake 5 mg by mouth dailylatanoprost (XALATAN) 0.005 % ophthalmic solutionUse 1 Drop in both eyes daily at bedtime. nitroGLYCERIN (NITROSTAT) 0.4 MG SL tabletup to max of 3 total doses. If no relief after 1 dose, call 911. Qty: 25 tablet Refills: 0olopatadine (PATADAY) 0.2 % SOLN ophthalmic solutionApply 1 drop to eye daily For allergiesQty: 1 Bottle Refills: 1Associated Diagnoses: Allergic conjunctivitis and rhinitis, bilateralBlood Glucose Monitoring Suppl ([employer] FLEX SYSTEM) w/Device [address] [facility] LANCETS 33G MISCCholecalciferol (VITAMIN D) 2000 units CAPS capsuleTake 5,000 Units by mouth daily     Current Discharge Medication ListSTOP taking these medicationsclonazePAM (KLONOPIN) 0.5 MG tabletComments:Reason for Stopping:dexamethasone (DECADRON) 6 MG tabletComments:Reason for Stopping:    Time Spent on Discharge:E] minutes were spent in patient examination, evaluation, counseling as well as medication reconciliation, prescriptions for required medications, discharge plan, and follow up.     Electronically signed by Katie Owen MD on 9/12/21 at 12:05 PM EDT   overtime on dc summary was 45 min

## 2021-09-12 NOTE — PROGRESS NOTES
Physical Therapy Med Surg Initial Assessment  Facility/Department: [facility]  [facility]       NAME: Abdulaziz Gonzales  :  (65 y.o.)  MRN: [id number]  CODE STATUS: Prior    Date of Service: 2021    Patient Diagnosis(es): Weakness [R53.1]  General weakness [R53.1]  History of COVID-19 [Z86.16]   Chief Complaint   Patient presents with    Fatigue     Patient Active Problem List    Diagnosis Date Noted    Weakness 09/10/2021    Adenomatous polyp of sigmoid colon     Chronic gastritis without bleeding     Dyspepsia     Mixed obsessional thoughts and acts     Coronary artery disease involving native coronary artery of native heart without angina pectoris 02/15/2019    Anxiety     Recurrent major depressive disorder, in remission ([facility]     Chest pain 2018    Hypertension 2018        Past Medical History:   Diagnosis Date    Anxiety     Chest pain 3/29/2018    Coronary artery disease involving native coronary artery of native heart without angina pectoris 2/15/2019    Diabetes mellitus ([facility] [address]     no meds    History of colon polyps     Hyperlipidemia     Hypertension     Type 2 diabetes mellitus without complication ([facility] [address] [facility]     Vertigo      Past Surgical History:   Procedure Laterality Date    CARDIAC SURGERY  1973    PTCA     COLONOSCOPY      COLONOSCOPY N/A 10/17/2019    COLORECTAL CANCER SCREENING, HIGH RISK performed by Shashi Henley MD at [address] [place] [address]      OTHER SURGICAL HISTORY      patent foramen ovale    TONSILLECTOMY      UPPER GASTROINTESTINAL ENDOSCOPY N/A 2019    EGD ESOPHAGOGASTRODUODENOSCOPY performed by Shashi Henley MD at [facility]       Chart Reviewed: Yes  Patient assessed for rehabilitation services?: Yes  Family / Caregiver Present: No    Restrictions:  Restrictions/Precautions: Isolation, Fall Risk (+covid-19, high dominguez score)     SUBJECTIVE: Pain  Pre Treatment Pain Screening  Pain at present: 0  Intervention List: Patient able to continue with treatment    Post Treatment Pain Screening:   Pain Screening  Patient Currently in Pain: Denies  Pain Assessment  Pain Level: 0    Prior Level of Function:  Social/Functional History  Lives With: Spouse  Type of Home: Apartment (5th floor)  Home Layout: One level (laundry on main floor)  Home Access: Elevator  Bathroom Shower/Tub: Tub/Shower unit  ADL Assistance: Independent (no AD)  Homemaking Assistance: Independent  Ambulation Assistance: Independent (no AD)  Transfer Assistance: Independent  Active : Yes    OBJECTIVE:   Vision: Impaired  Vision Exceptions: Wears glasses for reading  Hearing: Within functional limits    Cognition:  Overall Orientation Status: Within Functional Limits  Follows Commands: Within Functional Limits    Observation/Palpation  Observation: no acute signs of distress, no SOB with gait    ROM:  RLE AROM: WFL  LLE AROM : WFL    Strength:  Strength RLE  Comment: grossly 4/5  Strength LLE  Comment: grossly 4/5    Neuro:  Balance  Posture: Fair  Sitting - Static: Good  Sitting - Dynamic: Good  Standing - Static: Fair  Standing - Dynamic: Fair (SBA to CGA with gait; wide HEBER; reaching for furniture)     Tone RLE  RLE Tone: Normotonic  Tone LLE  LLE Tone: Normotonic  Motor Control  Gross Motor?: WFL  Sensation  Overall Sensation Status: WFL (pt denies numbness and tingling)    Bed mobility  Supine to Sit: Supervision  Sit to Supine: Supervision  Comment: HOB minimally elevated; no difficulty exhibited    Transfers  Sit to Stand: Stand by assistance  Stand to sit: Stand by assistance  Comment: no AD; mild to mod increased sway in standing without UE support    Ambulation  Ambulation?: Yes  More Ambulation?: No  Ambulation 1  Surface: level tile  Device: No Device  Assistance: Stand by assistance;Contact guard assistance  Quality of Gait: SBA with straight path amb; CGA with turns R/L 90/180 deg; pt reaching for furniture to steadiness self during gait  Gait Deviations: Slow Lizzy; Increased HEBER; Decreased step length;Decreased step height  Distance: 30 ft  Comments: no SOB; quick fatigue exhibited    Stairs/Curb  Stairs?: No         Activity Tolerance  Activity Tolerance: Patient limited by fatigue  Activity Tolerance: Pt limited by unsteadiness          PT Education  PT Education: Goals;Plan of Care;PT Role;Home Exercise Program    ASSESSMENT:   Body structures, Functions, Activity limitations: Decreased functional mobility ; Decreased balance;Decreased endurance;Decreased strength;Decreased posture  Decision Making: Medium Complexity  History: med  Exam: med  Clinical Presentation: med    Prognosis: Good    DISCHARGE RECOMMENDATIONS:  Discharge Recommendations: Continue to assess pending progress    Assessment: Pt exhibits decreased dynamic balance, decreased activity tolerance for dynamic activity, decreased B LE strength requiring increased assistance for functional mobility compred to baseline of indep. Continued PT is indicated for safe return home at indep level with wife. REQUIRES PT FOLLOW UP: Yes      PLAN OF CARE:  Plan  Times per week: 3-6  Current Treatment Recommendations: Strengthening, Transfer Training, Endurance Training, Neuromuscular Re-education, Patient/Caregiver Education & Training, ROM, Manual Therapy - Soft Tissue Mobilization, Pain Management, Equipment Evaluation, Education, & procurement, Balance Training, Gait Training, Home Exercise Program, Functional Mobility Training, Stair training, Safety Education & Training, Positioning  Safety Devices  Type of devices: Call light within reach, Bed alarm in place, Left in bed    Goals:  Patient goals : \"return home\"  Short term goals  Short term goal 1: Pt will demonstrate HEP indep. Long term goals  Long term goal 1: Pt will demonstrate bed mobility indep for return to PLOF.   Long term goal 2: Pt will demonstrate transfers indep for safe return home with wife. Long term goal 3: Pt will demonstrate amb 150ft without AD indep for return home with ability to amb around apartment building. [facility] (6 CLICK) BASIC MOBILITY  AM-PAC Inpatient Mobility Raw Score : 18     Therapy Time:   Individual   Time In 0946   Time Out 1001   Minutes [address] [place] [address] , [facility], 09/12/21 at 10:13 AM         Definitions for assistance levels  Independent = pt does not require any physical supervision or assistance from another person for activity completion. Device may be needed.   Stand by assistance = pt requires verbal cues or instructions from another person, close to but not touching, to perform the activity  Minimal assistance= pt performs 75% or more of the activity; assistance is required to complete the activity  Moderate assistance= pt performs 50% of the activity; assistance is required to complete the activity  Maximal assistance = pt performs 25% of the activity; assistance is required to complete the activity  Dependent = pt requires total physical assistance to accomplish the task

## 2021-09-12 NOTE — PROGRESS NOTES
Shift assessment complete. Patient A&Ox4. VSS. Denies pain. Complains of coughing tessalon given. Denies needs at this time. Fall precautions in place. Call light in reach. Will continue to monitor.

## 2021-09-13 ENCOUNTER — CARE COORDINATION (OUTPATIENT)
Dept: CASE MANAGEMENT | Age: 71
End: 2021-09-13

## 2021-09-13 LAB
BLOOD CULTURE, ROUTINE: NORMAL
CULTURE, BLOOD 2: NORMAL
REASON FOR REJECTION: NORMAL
REJECTED TEST: NORMAL

## 2021-09-14 ENCOUNTER — CARE COORDINATION (OUTPATIENT)
Dept: CASE MANAGEMENT | Age: 71
End: 2021-09-14

## 2021-09-16 ENCOUNTER — HOSPITAL ENCOUNTER (EMERGENCY)
Age: 71
Discharge: HOME | End: 2021-09-16
Attending: EMERGENCY MEDICINE
Payer: COMMERCIAL

## 2021-09-16 ENCOUNTER — APPOINTMENT (OUTPATIENT)
Dept: CT IMAGING | Age: 71
End: 2021-09-16
Payer: COMMERCIAL

## 2021-09-16 VITALS
HEIGHT: 65 IN | TEMPERATURE: 98.2 F | DIASTOLIC BLOOD PRESSURE: 88 MMHG | SYSTOLIC BLOOD PRESSURE: 150 MMHG | OXYGEN SATURATION: 98 % | WEIGHT: 125 LBS | RESPIRATION RATE: 20 BRPM | BODY MASS INDEX: 20.83 KG/M2 | HEART RATE: 85 BPM

## 2021-09-16 DIAGNOSIS — R06.02 SHORTNESS OF BREATH: Primary | ICD-10-CM

## 2021-09-16 DIAGNOSIS — U07.1 COVID-19: ICD-10-CM

## 2021-09-16 LAB
ALBUMIN SERPL-MCNC: 3.4 G/DL (ref 3.5–4.6)
ALP BLD-CCNC: 71 U/L (ref 35–104)
ALT SERPL-CCNC: 63 U/L (ref 0–41)
ANION GAP SERPL CALCULATED.3IONS-SCNC: 14 MEQ/L (ref 9–15)
AST SERPL-CCNC: 32 U/L (ref 0–40)
BASOPHILS ABSOLUTE: 0 K/UL (ref 0–0.2)
BASOPHILS RELATIVE PERCENT: 0.4 %
BILIRUB SERPL-MCNC: 0.9 MG/DL (ref 0.2–0.7)
BUN BLDV-MCNC: 13 MG/DL (ref 8–23)
CALCIUM SERPL-MCNC: 9.1 MG/DL (ref 8.5–9.9)
CHLORIDE BLD-SCNC: 100 MEQ/L (ref 95–107)
CO2: 22 MEQ/L (ref 20–31)
CREAT SERPL-MCNC: 0.8 MG/DL (ref 0.7–1.2)
EOSINOPHILS ABSOLUTE: 0.1 K/UL (ref 0–0.7)
EOSINOPHILS RELATIVE PERCENT: 0.7 %
GFR AFRICAN AMERICAN: >60
GFR NON-AFRICAN AMERICAN: >60
GLOBULIN: 3.8 G/DL (ref 2.3–3.5)
GLUCOSE BLD-MCNC: 125 MG/DL (ref 70–99)
HCT VFR BLD CALC: 39.4 % (ref 42–52)
HEMOGLOBIN: 13.9 G/DL (ref 14–18)
LYMPHOCYTES ABSOLUTE: 0.7 K/UL (ref 1–4.8)
LYMPHOCYTES RELATIVE PERCENT: 6.4 %
MCH RBC QN AUTO: 31.7 PG (ref 27–31.3)
MCHC RBC AUTO-ENTMCNC: 35.1 % (ref 33–37)
MCV RBC AUTO: 90.1 FL (ref 80–100)
MONOCYTES ABSOLUTE: 0.5 K/UL (ref 0.2–0.8)
MONOCYTES RELATIVE PERCENT: 5.2 %
NEUTROPHILS ABSOLUTE: 9 K/UL (ref 1.4–6.5)
NEUTROPHILS RELATIVE PERCENT: 87.3 %
PDW BLD-RTO: 13.9 % (ref 11.5–14.5)
PLATELET # BLD: 280 K/UL (ref 130–400)
POC CREATININE WHOLE BLOOD: 0.8
POTASSIUM SERPL-SCNC: 4.1 MEQ/L (ref 3.4–4.9)
RBC # BLD: 4.38 M/UL (ref 4.7–6.1)
SODIUM BLD-SCNC: 136 MEQ/L (ref 135–144)
TOTAL PROTEIN: 7.2 G/DL (ref 6.3–8)
WBC # BLD: 10.3 K/UL (ref 4.8–10.8)

## 2021-09-16 PROCEDURE — 2500000003 HC RX 250 WO HCPCS: Performed by: EMERGENCY MEDICINE

## 2021-09-16 PROCEDURE — 85025 COMPLETE CBC W/AUTO DIFF WBC: CPT

## 2021-09-16 PROCEDURE — 6360000004 HC RX CONTRAST MEDICATION: Performed by: EMERGENCY MEDICINE

## 2021-09-16 PROCEDURE — 6360000002 HC RX W HCPCS: Performed by: EMERGENCY MEDICINE

## 2021-09-16 PROCEDURE — 36415 COLL VENOUS BLD VENIPUNCTURE: CPT

## 2021-09-16 PROCEDURE — 2580000003 HC RX 258: Performed by: EMERGENCY MEDICINE

## 2021-09-16 PROCEDURE — 99285 EMERGENCY DEPT VISIT HI MDM: CPT

## 2021-09-16 PROCEDURE — 80053 COMPREHEN METABOLIC PANEL: CPT

## 2021-09-16 PROCEDURE — 93005 ELECTROCARDIOGRAM TRACING: CPT | Performed by: EMERGENCY MEDICINE

## 2021-09-16 PROCEDURE — 96374 THER/PROPH/DIAG INJ IV PUSH: CPT

## 2021-09-16 PROCEDURE — 71275 CT ANGIOGRAPHY CHEST: CPT

## 2021-09-16 PROCEDURE — 96375 TX/PRO/DX INJ NEW DRUG ADDON: CPT

## 2021-09-16 RX ORDER — DEXAMETHASONE 6 MG/1
6 TABLET ORAL
Qty: 10 TABLET | Refills: 0 | Status: SHIPPED | OUTPATIENT
Start: 2021-09-16 | End: 2021-09-26

## 2021-09-16 RX ORDER — 0.9 % SODIUM CHLORIDE 0.9 %
1000 INTRAVENOUS SOLUTION INTRAVENOUS ONCE
Status: COMPLETED | OUTPATIENT
Start: 2021-09-16 | End: 2021-09-16

## 2021-09-16 RX ORDER — DEXAMETHASONE SODIUM PHOSPHATE 10 MG/ML
6 INJECTION INTRAMUSCULAR; INTRAVENOUS ONCE
Status: COMPLETED | OUTPATIENT
Start: 2021-09-16 | End: 2021-09-16

## 2021-09-16 RX ADMIN — DEXAMETHASONE SODIUM PHOSPHATE 6 MG: 10 INJECTION INTRAMUSCULAR; INTRAVENOUS at 15:36

## 2021-09-16 RX ADMIN — SODIUM CHLORIDE 1000 ML: 9 INJECTION, SOLUTION INTRAVENOUS at 15:36

## 2021-09-16 RX ADMIN — FAMOTIDINE 20 MG: 10 INJECTION, SOLUTION INTRAVENOUS at 17:14

## 2021-09-16 RX ADMIN — IOPAMIDOL 100 ML: 755 INJECTION, SOLUTION INTRAVENOUS at 16:33

## 2021-09-16 ASSESSMENT — ENCOUNTER SYMPTOMS
BACK PAIN: 0
SHORTNESS OF BREATH: 1
SORE THROAT: 0
NAUSEA: 0
COUGH: 1
VOMITING: 0
ABDOMINAL PAIN: 0
DIARRHEA: 0

## 2021-09-16 NOTE — ED NOTES
Discharge instructions reviewed with patient. Verbalized understanding. Attempting to call son to pick him up. Andrew Rivera RN  09/16/21 [id number]

## 2021-09-16 NOTE — ED NOTES
Patient reports his son and wife are not answering. He will call [facility] for taxi. Agustin Pro RN  09/16/21 5533

## 2021-09-16 NOTE — ED PROVIDER NOTES
[facility] [facility] ED  eMERGENCYdEPARTMENT eNCOUnter      Pt Name: Janiya Last  MRN: [id number]  [place] 1950  Date of evaluation: 9/16/2021  Deisy Tripp MD    CHIEF COMPLAINT           HPI  Janiya aLst is a 66 y.o. male per chart review has a h/o anxiety, depression, dyspepsia presents with shortness of breath. Patient states he was diagnosed with COVID two weeks ago, and for the past week has been experiencing worsening shortness of breath, SOB is constant, severe, patient feels winded with ADL's, and has associated lack of appetite. He denies previous COPD, smoking or asthma, he denies chest pain, hemoptysis, nausea and vomiting. ROS  Review of Systems   Constitutional: Negative for activity change, chills and fever. HENT: Negative for ear pain and sore throat. Eyes: Negative for visual disturbance. Respiratory: Positive for cough and shortness of breath. Cardiovascular: Negative for chest pain, palpitations and leg swelling. Gastrointestinal: Negative for abdominal pain, diarrhea, nausea and vomiting. Genitourinary: Negative for dysuria. Musculoskeletal: Negative for back pain. Skin: Negative for rash. Neurological: Negative for dizziness and weakness. Except as noted above the remainder of the review of systems was reviewed and negative.        PAST MEDICAL HISTORY     Past Medical History:   Diagnosis Date    Anxiety     Chest pain 3/29/2018    Coronary artery disease involving native coronary artery of native heart without angina pectoris 2/15/2019    Diabetes mellitus ([address]     no meds    History of colon polyps     Hyperlipidemia     Hypertension     Type 2 diabetes mellitus without complication ([address]     Vertigo          SURGICAL HISTORY       Past Surgical History:   Procedure Laterality Date    CARDIAC SURGERY  1973    PTCA     COLONOSCOPY      COLONOSCOPY N/A 10/17/2019    COLORECTAL CANCER SCREENING, HIGH RISK performed by Phyllis Moraes MD at [facility]      OTHER SURGICAL HISTORY      patent foramen ovale    TONSILLECTOMY      UPPER GASTROINTESTINAL ENDOSCOPY N/A 8/1/2019    EGD ESOPHAGOGASTRODUODENOSCOPY performed by Nelly Wang MD at [facility]       Previous Medications    BENZONATATE (TESSALON) 100 MG CAPSULE    Take 1 capsule by mouth 2 times daily as needed for Cough    BLOOD GLUCOSE MONITORING SUPPL (ONETOUCH VERIO FLEX SYSTEM) W/DEVICE KIT        CHOLECALCIFEROL (VITAMIN D) 2000 UNITS CAPS CAPSULE    Take 5,000 Units by mouth daily     LATANOPROST (XALATAN) 0.005 % OPHTHALMIC SOLUTION    Use 1 Drop in both eyes daily at bedtime. MELATONIN 3 MG TABS TABLET    Take 1 tablet by mouth nightly as needed (sleep)    NITROGLYCERIN (NITROSTAT) 0.4 MG SL TABLET    up to max of 3 total doses. If no relief after 1 dose, call 911.     OLOPATADINE (PATADAY) 0.2 % SOLN OPHTHALMIC SOLUTION    Apply 1 drop to eye daily For allergies    OMEGA-3 FATTY ACIDS (FISH OIL) 1000 MG CAPS    Take 3,000 mg by mouth every other day    ONDANSETRON (ZOFRAN-ODT) 4 MG DISINTEGRATING TABLET    Take 1 tablet by mouth 3 times daily as needed for Nausea or Vomiting    ONETOUCH DELICA LANCETS 66P MISC        ONETOUCH VERIO STRIP        PHYTONADIONE (VITAMIN K) 5 MG TABLET    Take 5 mg by mouth once    RANOLAZINE (RANEXA) 1000 MG EXTENDED RELEASE TABLET    Take 1 tablet by mouth 2 times daily    TAMSULOSIN (FLOMAX) 0.4 MG CAPSULE    Take 1 capsule by mouth daily    VITAMIN C (ASCORBIC ACID) 500 MG TABLET    Take 500 mg by mouth daily    VITAMIN E 1000 UNITS CAPSULE    Take 1,000 Units by mouth daily    VORTIOXETINE (TRINTELLIX) 5 MG TABLET    Take 5 mg by mouth daily       ALLERGIES     Seroquel [quetiapine]    FAMILY HISTORY       Family History   Problem Relation Age of Onset    Heart Disease Maternal Aunt     Cancer Maternal Aunt     Colon Cancer Maternal Aunt           SOCIAL HISTORY       Social History     Socioeconomic History    Marital status:      Spouse name: None    Number of children: None    Years of education: None    Highest education level: None   Occupational History    None   Tobacco Use    Smoking status: Never Smoker    Smokeless tobacco: Never Used   Vaping Use    Vaping Use: Never used   Substance and Sexual Activity    Alcohol use: No    Drug use: Never    Sexual activity: None   Other Topics Concern    None   Social History Narrative    None     Social Determinants of Health     Financial Resource Strain:     Difficulty of Paying Living Expenses:    Food Insecurity:     Worried About Running Out of Food in the Last Year:     Ran Out of Food in the Last Year:    Transportation Needs:     Lack of Transportation (Medical):  Lack of Transportation (Non-Medical):    Physical Activity:     Days of Exercise per Week:     Minutes of Exercise per Session:    Stress:     Feeling of Stress :    Social Connections:     Frequency of Communication with Friends and Family:     Frequency of Social Gatherings with Friends and Family:     Attends Mandaeism Services:     Active Member of Clubs or Organizations:     Attends Club or Organization Meetings:     Marital Status:    Intimate Partner Violence:     Fear of Current or Ex-Partner:     Emotionally Abused:     Physically Abused:     Sexually Abused:          PHYSICAL EXAM       ED Triage Vitals   BP Temp Temp src Pulse Resp SpO2 Height Weight   09/16/21 1325 09/16/21 1328 -- 09/16/21 1325 09/16/21 1325 09/16/21 1325 09/16/21 1325 09/16/21 1325   118/69 98.2 °F (36.8 °C)  92 18 94 % 5' 5\" (1.651 m) 125 lb (56.7 kg)       Physical Exam  Vitals and nursing note reviewed. Constitutional:       Appearance: He is well-developed. HENT:      Head: Normocephalic.       Right Ear: External ear normal.      Left Ear: External ear normal.   Eyes:      Conjunctiva/sclera: Conjunctivae normal.      Pupils: Pupils are equal, round, and reactive to light. Cardiovascular:      Rate and Rhythm: Normal rate and regular rhythm. Heart sounds: Normal heart sounds. Pulmonary:      Effort: Pulmonary effort is normal.      Breath sounds: Examination of the right-middle field reveals wheezing. Wheezing present. Abdominal:      General: Bowel sounds are normal. There is no distension. Palpations: Abdomen is soft. Tenderness: There is no abdominal tenderness. Musculoskeletal:         General: Normal range of motion. Cervical back: Normal range of motion and neck supple. Skin:     General: Skin is warm and dry. Neurological:      Mental Status: He is alert and oriented to person, place, and time. MDM  This is a 66year old male presenting with SOB after being diagnosed with COVID two weeks ago. Patient is afebrile and hemodynamically stable, O2 is above 97% on RA. Patient given 1L NS, IV Decadron, IV Pepcid. Patient reevaluated after medication and states he is feeling better. EKG shows normal rhythm, rate and axis, QTc of 492, no ST elevations. Labs remarkable for neuts 9.0, Lymphs 0.7, glucose 125. CTA of chest shows patchy infiltrates consistent with moderate COVID-19 pneumonia. Patient requested Abdulaziz Fan. Patient educated on the efficacy and procedure regarding monoclonal antibody treatment, and scheduled for outpatient infusion tomorrow. Plan for Decadron 6 mg PO once/day for 10 days. Patient is agreeable with this plan. FINAL IMPRESSION      1.  Shortness of breath          DISPOSITION/PLAN   DISPOSITION Decision To Discharge 09/16/2021 05:35:11 PM        DISCHARGE MEDICATIONS:  [unfilled]         Deepak Jones MD(electronically signed)  Attending Emergency Physician           Deepak Jones MD  09/16/21 [id number]

## 2021-09-16 NOTE — ED NOTES
Provider at bedside     Naveed Beard.  Mcdonough Lornaal, [facility]  09/16/21 0144

## 2021-09-17 ENCOUNTER — CARE COORDINATION (OUTPATIENT)
Dept: CARE COORDINATION | Age: 71
End: 2021-09-17

## 2021-09-17 LAB
EKG ATRIAL RATE: 81 BPM
EKG P AXIS: 66 DEGREES
EKG P-R INTERVAL: 172 MS
EKG Q-T INTERVAL: 424 MS
EKG QRS DURATION: 78 MS
EKG QTC CALCULATION (BAZETT): 492 MS
EKG R AXIS: 10 DEGREES
EKG T AXIS: 61 DEGREES
EKG VENTRICULAR RATE: 81 BPM

## 2021-09-17 PROCEDURE — 93010 ELECTROCARDIOGRAM REPORT: CPT | Performed by: INTERNAL MEDICINE

## 2021-09-17 NOTE — FLOWSHEET NOTE
Attempted to get a hold of this patient to change his appointment to tomorrow. Unsuccessful.  HUB aware

## 2021-09-17 NOTE — CARE COORDINATION
I attempted to complete an ER follow up call for [address] answers the phone and we begin the conversation but he states that he is having trouble with the phone that he is using. He asks that I call his cell which I do but is not in service. I attempt to call his home phone but there is no answer.

## 2021-09-19 LAB
GFR AFRICAN AMERICAN: >60
GFR NON-AFRICAN AMERICAN: >60
PERFORMED ON: NORMAL
POC CREATININE: 0.8 MG/DL (ref 0.8–1.3)
POC SAMPLE TYPE: NORMAL

## 2021-09-20 ENCOUNTER — CARE COORDINATION (OUTPATIENT)
Dept: CARE COORDINATION | Age: 71
End: 2021-09-20

## 2021-09-20 ENCOUNTER — HOSPITAL ENCOUNTER (OUTPATIENT)
Dept: INFUSION THERAPY | Age: 71
Setting detail: INFUSION SERIES
Discharge: HOME | End: 2021-09-20
Payer: COMMERCIAL

## 2021-09-20 VITALS
TEMPERATURE: 99.3 F | RESPIRATION RATE: 22 BRPM | HEART RATE: 88 BPM | SYSTOLIC BLOOD PRESSURE: 136 MMHG | OXYGEN SATURATION: 98 % | DIASTOLIC BLOOD PRESSURE: 79 MMHG

## 2021-09-20 DIAGNOSIS — U07.1 COVID-19: Primary | ICD-10-CM

## 2021-09-20 PROCEDURE — 2580000003 HC RX 258: Performed by: EMERGENCY MEDICINE

## 2021-09-20 PROCEDURE — 6360000002 HC RX W HCPCS: Performed by: EMERGENCY MEDICINE

## 2021-09-20 PROCEDURE — 95816 EEG AWAKE AND DROWSY: CPT | Performed by: PSYCHIATRY & NEUROLOGY

## 2021-09-20 PROCEDURE — M0243 CASIRIVI AND IMDEVI INFUSION: HCPCS

## 2021-09-20 PROCEDURE — 2500000003 HC RX 250 WO HCPCS: Performed by: EMERGENCY MEDICINE

## 2021-09-20 PROCEDURE — 2580000003 HC RX 258

## 2021-09-20 RX ORDER — SODIUM CHLORIDE 9 MG/ML
INJECTION, SOLUTION INTRAVENOUS
Status: COMPLETED
Start: 2021-09-20 | End: 2021-09-20

## 2021-09-20 RX ADMIN — SODIUM CHLORIDE 250 ML: 9 INJECTION, SOLUTION INTRAVENOUS at 14:47

## 2021-09-20 RX ADMIN — IMDEVIMAB: 1332 INJECTION, SOLUTION, CONCENTRATE INTRAVENOUS at 15:13

## 2021-09-20 NOTE — PROGRESS NOTES
Pt to [facility] via w/c for miguel. Was here early and waited at entrance of ER to get in [facility]. Escorted to area with nurse via w/c. States is weak. Pt states hasn't slept for 2 weeks, wants medicine to sleep. Instructed pt to call physician about medicine. Pt states has had cough for 1 1/2 weeks, dry, nonproductive. No fever, has had diarrhea, and some abdominal discomfort.

## 2021-09-20 NOTE — PROCEDURES
[address]                      [facility], [address] [facility]                          ELECTROENCEPHALOGRAM REPORT    PATIENT NAME: Dolores Ramachandran                      :        1950  MED REC NO:   [id number]                            ROOM:       [id number]  ACCOUNT NO:   [de-identified]                           ADMIT DATE: 2021  PROVIDER:     Clem Andrade MD    DATE OF EE2021    MEDICATIONS:  Ranexa, Trintellix, Decadron, Humulin insulin, aspirin. EEG FINDINGS:  This is a spontaneous 21-channel EEG recording for this  patient who is COVID positive with a questionable seizure. The  background rhythm of this EEG shows an 8 Hz posterior dominant rhythm of  alpha. This is symmetrical and attenuates with eye opening. EKG is  monitored, this is regular. Photic stimulation is performed without any  photic responses. There is no photo response to seizures. Hyperventilation not performed. The record remains in awake phase with  very well-regulated alpha rhythm seen throughout the EEG recording. There are no asymmetries or epileptiform discharge. Very minimal drowsy  patterns are seen. IMPRESSION:  This is a normal well-regulated awake EEG recording. Clinical correlation is recommended.         Char Castañeda MD    D: 2021 10:45:30       T: 2021 10:47:56     [email or web address]  Job#: [id number]     Doc#: [id number]    CC:

## 2021-09-20 NOTE — PROGRESS NOTES
Flush completed. Pt has showed no sxs reactions. Pt has lexicomp info on meds, and declined AVS.  States has too many papers. States doesn't feel any different. Still has cough. Stated stomach a little upset after eating snacks earlier. Pt left unit. No c/o or requests. All equipment used in the care for this patient has been cleaned.

## 2021-09-20 NOTE — PROGRESS NOTES
Infusion complete. Pt tolerated well. Flush has begun. Pt states no c/o. Has eaten some snacks, choked on cracker crumbs but is alright.   States that's the way it is for him

## 2021-10-03 ENCOUNTER — APPOINTMENT (OUTPATIENT)
Dept: GENERAL RADIOLOGY | Age: 71
End: 2021-10-03
Payer: COMMERCIAL

## 2021-10-03 ENCOUNTER — HOSPITAL ENCOUNTER (EMERGENCY)
Age: 71
Discharge: HOME | End: 2021-10-03
Payer: COMMERCIAL

## 2021-10-03 VITALS
TEMPERATURE: 98 F | HEART RATE: 80 BPM | DIASTOLIC BLOOD PRESSURE: 88 MMHG | WEIGHT: 120 LBS | BODY MASS INDEX: 19.99 KG/M2 | HEIGHT: 65 IN | OXYGEN SATURATION: 100 % | SYSTOLIC BLOOD PRESSURE: 141 MMHG | RESPIRATION RATE: 25 BRPM

## 2021-10-03 DIAGNOSIS — K59.00 CONSTIPATION, UNSPECIFIED CONSTIPATION TYPE: ICD-10-CM

## 2021-10-03 DIAGNOSIS — G47.00 INSOMNIA, UNSPECIFIED TYPE: ICD-10-CM

## 2021-10-03 DIAGNOSIS — F41.9 ANXIETY: Primary | ICD-10-CM

## 2021-10-03 LAB
ALBUMIN SERPL-MCNC: 4.2 G/DL (ref 3.5–4.6)
ALP BLD-CCNC: 71 U/L (ref 35–104)
ALT SERPL-CCNC: 20 U/L (ref 0–41)
AMYLASE: 81 U/L (ref 22–93)
ANION GAP SERPL CALCULATED.3IONS-SCNC: 17 MEQ/L (ref 9–15)
APTT: 28.4 SEC (ref 24.4–36.8)
AST SERPL-CCNC: 13 U/L (ref 0–40)
BASOPHILS ABSOLUTE: 0 K/UL (ref 0–0.2)
BASOPHILS RELATIVE PERCENT: 0.8 %
BILIRUB SERPL-MCNC: 0.7 MG/DL (ref 0.2–0.7)
BUN BLDV-MCNC: 17 MG/DL (ref 8–23)
CALCIUM SERPL-MCNC: 9.8 MG/DL (ref 8.5–9.9)
CHLORIDE BLD-SCNC: 103 MEQ/L (ref 95–107)
CO2: 18 MEQ/L (ref 20–31)
CREAT SERPL-MCNC: 1 MG/DL (ref 0.7–1.2)
EOSINOPHILS ABSOLUTE: 0.2 K/UL (ref 0–0.7)
EOSINOPHILS RELATIVE PERCENT: 2.8 %
GFR AFRICAN AMERICAN: >60
GFR NON-AFRICAN AMERICAN: >60
GLOBULIN: 3 G/DL (ref 2.3–3.5)
GLUCOSE BLD-MCNC: 151 MG/DL (ref 70–99)
HCT VFR BLD CALC: 43.1 % (ref 42–52)
HEMOGLOBIN: 15 G/DL (ref 14–18)
INR BLD: 1.1
LACTIC ACID: 2.2 MMOL/L (ref 0.5–2.2)
LACTIC ACID: 2.5 MMOL/L (ref 0.5–2.2)
LIPASE: 33 U/L (ref 12–95)
LYMPHOCYTES ABSOLUTE: 1.4 K/UL (ref 1–4.8)
LYMPHOCYTES RELATIVE PERCENT: 24.2 %
MCH RBC QN AUTO: 30.9 PG (ref 27–31.3)
MCHC RBC AUTO-ENTMCNC: 34.7 % (ref 33–37)
MCV RBC AUTO: 89.2 FL (ref 80–100)
MONOCYTES ABSOLUTE: 0.6 K/UL (ref 0.2–0.8)
MONOCYTES RELATIVE PERCENT: 10.8 %
NEUTROPHILS ABSOLUTE: 3.5 K/UL (ref 1.4–6.5)
NEUTROPHILS RELATIVE PERCENT: 61.4 %
PDW BLD-RTO: 14.8 % (ref 11.5–14.5)
PLATELET # BLD: 227 K/UL (ref 130–400)
POTASSIUM SERPL-SCNC: 3.4 MEQ/L (ref 3.4–4.9)
PRO-BNP: 143 PG/ML
PROTHROMBIN TIME: 14 SEC (ref 12.3–14.9)
RBC # BLD: 4.83 M/UL (ref 4.7–6.1)
SODIUM BLD-SCNC: 138 MEQ/L (ref 135–144)
TOTAL CK: 58 U/L (ref 0–190)
TOTAL PROTEIN: 7.2 G/DL (ref 6.3–8)
TROPONIN: <0.01 NG/ML (ref 0–0.01)
WBC # BLD: 5.8 K/UL (ref 4.8–10.8)

## 2021-10-03 PROCEDURE — 80053 COMPREHEN METABOLIC PANEL: CPT

## 2021-10-03 PROCEDURE — 83880 ASSAY OF NATRIURETIC PEPTIDE: CPT

## 2021-10-03 PROCEDURE — 85610 PROTHROMBIN TIME: CPT

## 2021-10-03 PROCEDURE — 82550 ASSAY OF CK (CPK): CPT

## 2021-10-03 PROCEDURE — 85025 COMPLETE CBC W/AUTO DIFF WBC: CPT

## 2021-10-03 PROCEDURE — 84484 ASSAY OF TROPONIN QUANT: CPT

## 2021-10-03 PROCEDURE — 6370000000 HC RX 637 (ALT 250 FOR IP): Performed by: PERSONAL EMERGENCY RESPONSE ATTENDANT

## 2021-10-03 PROCEDURE — 36415 COLL VENOUS BLD VENIPUNCTURE: CPT

## 2021-10-03 PROCEDURE — 6360000002 HC RX W HCPCS: Performed by: PERSONAL EMERGENCY RESPONSE ATTENDANT

## 2021-10-03 PROCEDURE — 71046 X-RAY EXAM CHEST 2 VIEWS: CPT

## 2021-10-03 PROCEDURE — 74019 RADEX ABDOMEN 2 VIEWS: CPT

## 2021-10-03 PROCEDURE — 93005 ELECTROCARDIOGRAM TRACING: CPT | Performed by: EMERGENCY MEDICINE

## 2021-10-03 PROCEDURE — 83690 ASSAY OF LIPASE: CPT

## 2021-10-03 PROCEDURE — 99283 EMERGENCY DEPT VISIT LOW MDM: CPT

## 2021-10-03 PROCEDURE — 83605 ASSAY OF LACTIC ACID: CPT

## 2021-10-03 PROCEDURE — 82150 ASSAY OF AMYLASE: CPT

## 2021-10-03 PROCEDURE — 96374 THER/PROPH/DIAG INJ IV PUSH: CPT

## 2021-10-03 PROCEDURE — 85730 THROMBOPLASTIN TIME PARTIAL: CPT

## 2021-10-03 PROCEDURE — 2580000003 HC RX 258: Performed by: PERSONAL EMERGENCY RESPONSE ATTENDANT

## 2021-10-03 RX ORDER — HYDROXYZINE PAMOATE 25 MG/1
25 CAPSULE ORAL ONCE
Status: COMPLETED | OUTPATIENT
Start: 2021-10-03 | End: 2021-10-03

## 2021-10-03 RX ORDER — CLONAZEPAM 0.5 MG/1
0.5 TABLET ORAL ONCE
Status: COMPLETED | OUTPATIENT
Start: 2021-10-03 | End: 2021-10-03

## 2021-10-03 RX ORDER — POLYETHYLENE GLYCOL 3350 17 G/17G
17 POWDER, FOR SOLUTION ORAL DAILY
Qty: 238 G | Refills: 0 | Status: SHIPPED | OUTPATIENT
Start: 2021-10-03 | End: 2021-10-10

## 2021-10-03 RX ORDER — DOCUSATE SODIUM 100 MG/1
100 CAPSULE, LIQUID FILLED ORAL DAILY
Qty: 7 CAPSULE | Refills: 0 | Status: SHIPPED | OUTPATIENT
Start: 2021-10-03 | End: 2022-05-06 | Stop reason: ALTCHOICE

## 2021-10-03 RX ORDER — DIPHENHYDRAMINE HYDROCHLORIDE 50 MG/ML
50 INJECTION INTRAMUSCULAR; INTRAVENOUS ONCE
Status: COMPLETED | OUTPATIENT
Start: 2021-10-03 | End: 2021-10-03

## 2021-10-03 RX ORDER — 0.9 % SODIUM CHLORIDE 0.9 %
1000 INTRAVENOUS SOLUTION INTRAVENOUS ONCE
Status: COMPLETED | OUTPATIENT
Start: 2021-10-03 | End: 2021-10-03

## 2021-10-03 RX ADMIN — DIPHENHYDRAMINE HYDROCHLORIDE 50 MG: 50 INJECTION, SOLUTION INTRAMUSCULAR; INTRAVENOUS at 03:53

## 2021-10-03 RX ADMIN — HYDROXYZINE PAMOATE 25 MG: 25 CAPSULE ORAL at 02:45

## 2021-10-03 RX ADMIN — SODIUM CHLORIDE 1000 ML: 9 INJECTION, SOLUTION INTRAVENOUS at 03:54

## 2021-10-03 RX ADMIN — CLONAZEPAM 0.5 MG: 0.5 TABLET ORAL at 05:26

## 2021-10-03 ASSESSMENT — PAIN DESCRIPTION - DESCRIPTORS: DESCRIPTORS: DISCOMFORT

## 2021-10-03 ASSESSMENT — ENCOUNTER SYMPTOMS
COUGH: 1
COLOR CHANGE: 0
NAUSEA: 0
SORE THROAT: 0
DIARRHEA: 0
VOMITING: 0
RHINORRHEA: 0
CONSTIPATION: 1
SHORTNESS OF BREATH: 1
ABDOMINAL PAIN: 1
BLOOD IN STOOL: 0

## 2021-10-03 ASSESSMENT — PAIN DESCRIPTION - PAIN TYPE: TYPE: ACUTE PAIN

## 2021-10-03 ASSESSMENT — PAIN DESCRIPTION - ORIENTATION: ORIENTATION: MID

## 2021-10-03 ASSESSMENT — PAIN SCALES - GENERAL: PAINLEVEL_OUTOF10: 9

## 2021-10-03 ASSESSMENT — PAIN DESCRIPTION - FREQUENCY: FREQUENCY: CONTINUOUS

## 2021-10-03 ASSESSMENT — PAIN DESCRIPTION - LOCATION: LOCATION: ABDOMEN;CHEST

## 2021-10-03 NOTE — DISCHARGE INSTR - COC
Continuity of Care Form    Patient Name: Nakita Lobo   :    MRN:  [id number]    Admit date:  10/3/2021  Discharge date:  ***    Code Status Order: Prior   Advance Directives:     Admitting Physician:  No admitting provider for patient encounter.   PCP: Susana Romero MD    Discharging Nurse: [facility] Unit/Room#:   Discharging Unit Phone Number: ***    Emergency Contact:   Extended Emergency Contact Information  Primary Emergency Contact: Eva Rios  Address: [address] [place] [address], [address] [place] [address] [address] [facility] Phone: [phone]  Work Phone: [phone]  Mobile Phone: [phone]  Relation: Spouse  Secondary Emergency Contact: Nelson Banks [facility] [address]  Phone: [phone]  Work Phone: [phone]  Mobile Phone: [phone]  Relation: Child    Past Surgical History:  Past Surgical History:   Procedure Laterality Date    CARDIAC SURGERY  1973    PTCA     COLONOSCOPY      COLONOSCOPY N/A 10/17/2019    COLORECTAL CANCER SCREENING, HIGH RISK performed by Asa Ormond, MD at [facility] OTHER SURGICAL HISTORY      patent foramen ovale    TONSILLECTOMY      UPPER GASTROINTESTINAL ENDOSCOPY N/A 2019    EGD ESOPHAGOGASTRODUODENOSCOPY [facility]a [facility]rmond, [facility]       Immunization History:   Immunization History   Administered Date(s) Administered    Influenza, High Dose (Fluzone 65 yrs and older) 11/10/2016, 2017    Influenza, MDCK Chalo Staple, with preserv IM (Flucelvax 2 yrs and older) 2019    Pneumococcal Conjugate 13-valent [facility]) 2018       Active Problems:  Patient Active Problem List   Diagnosis Code    Chest pain R07.9    Hypertension I10    Anxiety F41.9    Recurrent major depressive disorder, in remission ([facility] [address] F33.40    Coronary artery disease involving native coronary artery of native heart without angina pectoris I25.10    Mixed obsessional thoughts and acts F42.2    Chronic gastritis without bleeding K29.50    Dyspepsia R10.13    Adenomatous polyp of sigmoid colon D12.5    Weakness R53.1    COVID-19 U07.1       Isolation/Infection:   Isolation          No Isolation        Patient Infection Status     Infection Onset Added Last Indicated Last Indicated By Review Planned Expiration Resolved Resolved By    None active    Resolved    COVID-19 21 COVID-19, Rapid   21     COVID-19 Rule Out 21 COVID-19, Rapid (Ordered)   21 Rule-Out Test Resulted          Nurse Assessment:  Last Vital Signs: /89   Pulse 86   Temp 98 °F (36.7 °C) (Oral)   Resp 20   Ht 5' 5\" (1.651 m)   Wt 120 lb (54.4 kg)   SpO2 98%   BMI 19.97 kg/m²     Last documented pain score (0-10 scale): Pain Level: 9  Last Weight:   Wt Readings from Last 1 Encounters:   10/03/21 120 lb (54.4 kg)     Mental Status:  {IP PT MENTAL STATUS:}    IV Access:  { HAILY IV ACCESS:911506817}    Nursing Mobility/ADLs:  Walking   {[facility] DME GTHP:859118814}  Transfer  {[facility] DME TESE:467972205}  Bathing  {[facility] DME BGUJ:083436133}  Dressing  {[facility] DME XIGC:077027811}  Toileting  {[facility] DME RUNH:724024330}  Feeding  {[facility] DME REPB:618181324}  Med Admin  {[facility] DME NENT:955026034}  Med Delivery   { HAILY MED Delivery:370028344}    Wound Care Documentation and Therapy:        Elimination:  Continence:   · Bowel: {YES / BB:99920}  · Bladder: {YES / WW:57369}  Urinary Catheter: {Urinary Catheter:806577439}   Colostomy/Ileostomy/Ileal Conduit: {YES / LV:64436}       Date of Last BM: ***  No intake or output data in the 24 hours ending 10/03/21 0346  No intake/output data recorded.     Safety Concerns:     [facility] Safety Concerns:148552256}    Impairments/Disabilities:      [address] [facility]EN Impairments/Disabilities:019208807}    Nutrition Therapy:  Current Nutrition Therapy:   [address] [facility] Diet ZEFN:861814670}    Routes of Feeding: {CHP DME Other Feedings:947584399}  Liquids: {Slp liquid thickness:67658}  Daily Fluid Restriction: {CHP DME Yes amt example:424604283}  Last Modified Barium Swallow with Video (Video Swallowing Test): {Done Not Done CTYP:843131085}    Treatments at the Time of Hospital Discharge:   Respiratory Treatments: ***  Oxygen Therapy:  {Therapy; copd oxygen:28074}  Ventilator:    {[facility] Vent RUM}    Rehab Therapies: {THERAPEUTIC INTERVENTION:7006294665}  Weight Bearing Status/Restrictions: {[facility] Weight Bearin}  Other Medical Equipment (for information only, NOT a DME order):  {EQUIPMENT:509120429}  Other Treatments: ***    Patient's personal belongings (please select all that are sent with patient):  {[facility] DME Belongings:711918585}    RN SIGNATURE:  {Esignature:618605685}    CASE MANAGEMENT/SOCIAL WORK SECTION    Inpatient Status Date: ***    Readmission Risk Assessment Score:  Readmission Risk              Risk of Unplanned Readmission:  0           Discharging to Facility/ Agency   · Name:   · Address:  · Phone:  · Fax:    Dialysis Facility (if applicable)   · Name:  · Address:  · Dialysis Schedule:  · Phone:  · Fax:    / signature: {Esignature:435628932}    PHYSICIAN SECTION    Prognosis: {Prognosis:5353641542}    Condition at Discharge: [address] [place] [address] Patient Condition:384803212}    Rehab Potential (if transferring to Rehab): {Prognosis:1754408700}    Recommended Labs or Other Treatments After Discharge: ***    Physician Certification: I certify the above information and transfer of Tang Funk  is necessary for the continuing treatment of the diagnosis listed and that he requires {Admit to Appropriate Level of Care:94446} for {GREATER/LESS:411410290} 30 days.      Update Admission H&P: {CHP DME Changes in TWEWV:926691870}    PHYSICIAN SIGNATURE:  {Esignature:171656283}
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
## 2021-10-03 NOTE — ED TRIAGE NOTES
Pt states that he has been having ABD and chest pain x2-3 weeks. Pt was recent dx of covid 3 weeks ago.  Pt states that he also has a cough and SOB

## 2021-10-03 NOTE — ED NOTES
Pt brought to bed 2  Patient complaining of anxiety attack   Denies CP currently   Denies SOB     Kandi WILLIS at [address] [facility] Norma, RN  10/03/21 0589

## 2021-10-03 NOTE — ED NOTES
Pt understands discharge instructions.   Pt instructed to follow up with PCP   Prescriptions explained   Pt told to come back for new or worsening symptoms  No further questions         Ebony Beltran RN  10/03/21 3639

## 2021-10-04 LAB
EKG ATRIAL RATE: 72 BPM
EKG P AXIS: 53 DEGREES
EKG P-R INTERVAL: 152 MS
EKG Q-T INTERVAL: 412 MS
EKG QRS DURATION: 104 MS
EKG QTC CALCULATION (BAZETT): 469 MS
EKG R AXIS: 55 DEGREES
EKG T AXIS: 76 DEGREES
EKG VENTRICULAR RATE: 78 BPM

## 2021-10-04 PROCEDURE — 93010 ELECTROCARDIOGRAM REPORT: CPT | Performed by: INTERNAL MEDICINE

## 2021-10-25 ENCOUNTER — TELEPHONE (OUTPATIENT)
Dept: CARDIOLOGY CLINIC | Age: 71
End: 2021-10-25

## 2021-10-26 ENCOUNTER — APPOINTMENT (OUTPATIENT)
Dept: GENERAL RADIOLOGY | Age: 71
End: 2021-10-26
Payer: COMMERCIAL

## 2021-10-26 ENCOUNTER — HOSPITAL ENCOUNTER (OUTPATIENT)
Age: 71
Setting detail: OBSERVATION
Discharge: HOME | End: 2021-10-28
Attending: INTERNAL MEDICINE | Admitting: INTERNAL MEDICINE
Payer: COMMERCIAL

## 2021-10-26 DIAGNOSIS — R25.1 TREMOR: ICD-10-CM

## 2021-10-26 DIAGNOSIS — R77.8 ELEVATED TROPONIN: ICD-10-CM

## 2021-10-26 DIAGNOSIS — F33.40 RECURRENT MAJOR DEPRESSIVE DISORDER, IN REMISSION (HCC): Primary | ICD-10-CM

## 2021-10-26 DIAGNOSIS — R07.9 CHEST PAIN, UNSPECIFIED TYPE: ICD-10-CM

## 2021-10-26 DIAGNOSIS — F42.2 MIXED OBSESSIONAL THOUGHTS AND ACTS: ICD-10-CM

## 2021-10-26 LAB
ALBUMIN SERPL-MCNC: 4.6 G/DL (ref 3.5–4.6)
ALP BLD-CCNC: 74 U/L (ref 35–104)
ALT SERPL-CCNC: 8 U/L (ref 0–41)
ANION GAP SERPL CALCULATED.3IONS-SCNC: 13 MEQ/L (ref 9–15)
AST SERPL-CCNC: 19 U/L (ref 0–40)
BASOPHILS ABSOLUTE: 0 K/UL (ref 0–0.2)
BASOPHILS RELATIVE PERCENT: 0.6 %
BILIRUB SERPL-MCNC: 1.3 MG/DL (ref 0.2–0.7)
BUN BLDV-MCNC: 21 MG/DL (ref 8–23)
CALCIUM SERPL-MCNC: 10.2 MG/DL (ref 8.5–9.9)
CHLORIDE BLD-SCNC: 105 MEQ/L (ref 95–107)
CHOLESTEROL, TOTAL: 223 MG/DL (ref 0–199)
CO2: 23 MEQ/L (ref 20–31)
CREAT SERPL-MCNC: 1.01 MG/DL (ref 0.7–1.2)
EOSINOPHILS ABSOLUTE: 0 K/UL (ref 0–0.7)
EOSINOPHILS RELATIVE PERCENT: 0.3 %
GFR AFRICAN AMERICAN: >60
GFR NON-AFRICAN AMERICAN: >60
GLOBULIN: 2.9 G/DL (ref 2.3–3.5)
GLUCOSE BLD-MCNC: 176 MG/DL (ref 60–115)
GLUCOSE BLD-MCNC: 177 MG/DL (ref 70–99)
HCT VFR BLD CALC: 43.1 % (ref 42–52)
HCT VFR BLD CALC: 46.5 % (ref 42–52)
HDLC SERPL-MCNC: 40 MG/DL (ref 40–59)
HEMOGLOBIN: 14.8 G/DL (ref 14–18)
HEMOGLOBIN: 16.1 G/DL (ref 14–18)
LDL CHOLESTEROL CALCULATED: 153 MG/DL (ref 0–129)
LYMPHOCYTES ABSOLUTE: 1.2 K/UL (ref 1–4.8)
LYMPHOCYTES RELATIVE PERCENT: 16.2 %
MAGNESIUM: 2.1 MG/DL (ref 1.7–2.4)
MAGNESIUM: 2.2 MG/DL (ref 1.7–2.4)
MCH RBC QN AUTO: 30.9 PG (ref 27–31.3)
MCH RBC QN AUTO: 31.5 PG (ref 27–31.3)
MCHC RBC AUTO-ENTMCNC: 34.3 % (ref 33–37)
MCHC RBC AUTO-ENTMCNC: 34.7 % (ref 33–37)
MCV RBC AUTO: 90.1 FL (ref 80–100)
MCV RBC AUTO: 90.9 FL (ref 80–100)
MONOCYTES ABSOLUTE: 0.6 K/UL (ref 0.2–0.8)
MONOCYTES RELATIVE PERCENT: 8.8 %
NEUTROPHILS ABSOLUTE: 5.3 K/UL (ref 1.4–6.5)
NEUTROPHILS RELATIVE PERCENT: 74.1 %
PDW BLD-RTO: 15.5 % (ref 11.5–14.5)
PDW BLD-RTO: 16 % (ref 11.5–14.5)
PERFORMED ON: ABNORMAL
PLATELET # BLD: 251 K/UL (ref 130–400)
PLATELET # BLD: 254 K/UL (ref 130–400)
POTASSIUM SERPL-SCNC: 4.1 MEQ/L (ref 3.4–4.9)
RBC # BLD: 4.79 M/UL (ref 4.7–6.1)
RBC # BLD: 5.11 M/UL (ref 4.7–6.1)
SODIUM BLD-SCNC: 141 MEQ/L (ref 135–144)
TOTAL CK: 132 U/L (ref 0–190)
TOTAL PROTEIN: 7.5 G/DL (ref 6.3–8)
TRIGL SERPL-MCNC: 151 MG/DL (ref 0–150)
TROPONIN: 0.11 NG/ML (ref 0–0.01)
TSH SERPL DL<=0.05 MIU/L-ACNC: 3.25 UIU/ML (ref 0.44–3.86)
WBC # BLD: 7.1 K/UL (ref 4.8–10.8)
WBC # BLD: 8.6 K/UL (ref 4.8–10.8)

## 2021-10-26 PROCEDURE — G0378 HOSPITAL OBSERVATION PER HR: HCPCS

## 2021-10-26 PROCEDURE — 6370000000 HC RX 637 (ALT 250 FOR IP): Performed by: PHYSICIAN ASSISTANT

## 2021-10-26 PROCEDURE — 84484 ASSAY OF TROPONIN QUANT: CPT

## 2021-10-26 PROCEDURE — 6360000002 HC RX W HCPCS: Performed by: PHYSICIAN ASSISTANT

## 2021-10-26 PROCEDURE — 83735 ASSAY OF MAGNESIUM: CPT

## 2021-10-26 PROCEDURE — 85025 COMPLETE CBC W/AUTO DIFF WBC: CPT

## 2021-10-26 PROCEDURE — 84443 ASSAY THYROID STIM HORMONE: CPT

## 2021-10-26 PROCEDURE — 71045 X-RAY EXAM CHEST 1 VIEW: CPT

## 2021-10-26 PROCEDURE — 80061 LIPID PANEL: CPT

## 2021-10-26 PROCEDURE — 85027 COMPLETE CBC AUTOMATED: CPT

## 2021-10-26 PROCEDURE — 36415 COLL VENOUS BLD VENIPUNCTURE: CPT

## 2021-10-26 PROCEDURE — 2580000003 HC RX 258: Performed by: PHYSICIAN ASSISTANT

## 2021-10-26 PROCEDURE — 82550 ASSAY OF CK (CPK): CPT

## 2021-10-26 PROCEDURE — 96372 THER/PROPH/DIAG INJ SC/IM: CPT

## 2021-10-26 PROCEDURE — 99283 EMERGENCY DEPT VISIT LOW MDM: CPT

## 2021-10-26 PROCEDURE — 2060000000 HC ICU INTERMEDIATE R&B

## 2021-10-26 PROCEDURE — 93005 ELECTROCARDIOGRAM TRACING: CPT | Performed by: INTERNAL MEDICINE

## 2021-10-26 PROCEDURE — 80053 COMPREHEN METABOLIC PANEL: CPT

## 2021-10-26 RX ORDER — CLONAZEPAM 0.5 MG/1
1 TABLET ORAL ONCE
Status: COMPLETED | OUTPATIENT
Start: 2021-10-26 | End: 2021-10-26

## 2021-10-26 RX ORDER — ACETAMINOPHEN 650 MG/1
650 SUPPOSITORY RECTAL EVERY 6 HOURS PRN
Status: DISCONTINUED | OUTPATIENT
Start: 2021-10-26 | End: 2021-10-28 | Stop reason: HOSPADM

## 2021-10-26 RX ORDER — ACETAMINOPHEN 325 MG/1
650 TABLET ORAL EVERY 6 HOURS PRN
Status: DISCONTINUED | OUTPATIENT
Start: 2021-10-26 | End: 2021-10-28 | Stop reason: HOSPADM

## 2021-10-26 RX ORDER — SODIUM CHLORIDE 0.9 % (FLUSH) 0.9 %
5-40 SYRINGE (ML) INJECTION EVERY 12 HOURS SCHEDULED
Status: DISCONTINUED | OUTPATIENT
Start: 2021-10-26 | End: 2021-10-28 | Stop reason: HOSPADM

## 2021-10-26 RX ORDER — POLYETHYLENE GLYCOL 3350 17 G/17G
17 POWDER, FOR SOLUTION ORAL DAILY PRN
Status: DISCONTINUED | OUTPATIENT
Start: 2021-10-26 | End: 2021-10-28 | Stop reason: HOSPADM

## 2021-10-26 RX ORDER — NITROGLYCERIN 0.4 MG/1
0.4 TABLET SUBLINGUAL EVERY 5 MIN PRN
Status: DISCONTINUED | OUTPATIENT
Start: 2021-10-26 | End: 2021-10-27 | Stop reason: SDUPTHER

## 2021-10-26 RX ORDER — ONDANSETRON 4 MG/1
4 TABLET, ORALLY DISINTEGRATING ORAL EVERY 8 HOURS PRN
Status: DISCONTINUED | OUTPATIENT
Start: 2021-10-26 | End: 2021-10-28 | Stop reason: HOSPADM

## 2021-10-26 RX ORDER — METOPROLOL TARTRATE 50 MG/1
25 TABLET, FILM COATED ORAL ONCE
Status: COMPLETED | OUTPATIENT
Start: 2021-10-26 | End: 2021-10-26

## 2021-10-26 RX ORDER — ASPIRIN 81 MG/1
324 TABLET, CHEWABLE ORAL ONCE
Status: COMPLETED | OUTPATIENT
Start: 2021-10-26 | End: 2021-10-26

## 2021-10-26 RX ORDER — ASPIRIN 81 MG/1
81 TABLET, CHEWABLE ORAL DAILY
Status: DISCONTINUED | OUTPATIENT
Start: 2021-10-27 | End: 2021-10-28 | Stop reason: HOSPADM

## 2021-10-26 RX ORDER — ONDANSETRON 2 MG/ML
4 INJECTION INTRAMUSCULAR; INTRAVENOUS EVERY 6 HOURS PRN
Status: DISCONTINUED | OUTPATIENT
Start: 2021-10-26 | End: 2021-10-28 | Stop reason: HOSPADM

## 2021-10-26 RX ORDER — SODIUM CHLORIDE 0.9 % (FLUSH) 0.9 %
5-40 SYRINGE (ML) INJECTION PRN
Status: DISCONTINUED | OUTPATIENT
Start: 2021-10-26 | End: 2021-10-28 | Stop reason: HOSPADM

## 2021-10-26 RX ORDER — SODIUM CHLORIDE 9 MG/ML
25 INJECTION, SOLUTION INTRAVENOUS PRN
Status: DISCONTINUED | OUTPATIENT
Start: 2021-10-26 | End: 2021-10-28 | Stop reason: HOSPADM

## 2021-10-26 RX ORDER — ATORVASTATIN CALCIUM 40 MG/1
80 TABLET, FILM COATED ORAL NIGHTLY
Status: DISCONTINUED | OUTPATIENT
Start: 2021-10-26 | End: 2021-10-28 | Stop reason: HOSPADM

## 2021-10-26 RX ADMIN — ATORVASTATIN CALCIUM 80 MG: 40 TABLET, FILM COATED ORAL at 22:42

## 2021-10-26 RX ADMIN — METOPROLOL TARTRATE 25 MG: 50 TABLET, FILM COATED ORAL at 16:37

## 2021-10-26 RX ADMIN — CLONAZEPAM 1 MG: 0.5 TABLET ORAL at 22:45

## 2021-10-26 RX ADMIN — Medication 10 ML: at 22:51

## 2021-10-26 RX ADMIN — ASPIRIN 324 MG: 81 TABLET, CHEWABLE ORAL at 16:36

## 2021-10-26 RX ADMIN — ENOXAPARIN SODIUM 40 MG: 40 INJECTION SUBCUTANEOUS at 18:00

## 2021-10-26 ASSESSMENT — PAIN SCALES - GENERAL
PAINLEVEL_OUTOF10: 1
PAINLEVEL_OUTOF10: 5
PAINLEVEL_OUTOF10: 0

## 2021-10-26 ASSESSMENT — ENCOUNTER SYMPTOMS
VOICE CHANGE: 0
COUGH: 0
EYE DISCHARGE: 0
ANAL BLEEDING: 0
PHOTOPHOBIA: 0
VOMITING: 0
ABDOMINAL DISTENTION: 0
NAUSEA: 0
APNEA: 0
SHORTNESS OF BREATH: 0

## 2021-10-26 ASSESSMENT — PAIN DESCRIPTION - LOCATION: LOCATION: CHEST

## 2021-10-26 ASSESSMENT — PAIN DESCRIPTION - DESCRIPTORS: DESCRIPTORS: DISCOMFORT

## 2021-10-26 ASSESSMENT — PAIN DESCRIPTION - ORIENTATION: ORIENTATION: LEFT

## 2021-10-26 ASSESSMENT — PAIN DESCRIPTION - PAIN TYPE: TYPE: ACUTE PAIN

## 2021-10-26 ASSESSMENT — PAIN DESCRIPTION - FREQUENCY: FREQUENCY: CONTINUOUS

## 2021-10-26 NOTE — ACP (ADVANCE CARE PLANNING)
Advance Care Planning     Advance Care Planning Activator (Inpatient)  Conversation Note      Date of ACP Conversation: 10/26/2021     Conversation Conducted with: Patient with Decision Making Capacity    ACP Activator: Ananda Quintanilla    Pt states that he does have a living will and that it is current with his wishes as noted below. Health Care Decision Maker:     Current Designated Health Care Decision Maker:     Primary Decision Maker: Felicitas Aleman - Spouse - [phone]    Primary Decision Maker: Kurt Libradop Child - [phone]    Care Preferences    Ventilation: \"If you were in your present state of health and suddenly became very ill and were unable to breathe on your own, what would your preference be about the use of a ventilator (breathing machine) if it were available to you? \"      Would the patient desire the use of ventilator (breathing machine)?: yes    \"If your health worsens and it becomes clear that your chance of recovery is unlikely, what would your preference be about the use of a ventilator (breathing machine) if it were available to you? \"     Would the patient desire the use of ventilator (breathing machine)?: No      Resuscitation  \"CPR works best to restart the heart when there is a sudden event, like a heart attack, in someone who is otherwise healthy. Unfortunately, CPR does not typically restart the heart for people who have serious health conditions or who are very sick. \"    \"In the event your heart stopped as a result of an underlying serious health condition, would you want attempts to be made to restart your heart (answer \"yes\" for attempt to resuscitate) or would you prefer a natural death (answer \"no\" for do not attempt to resuscitate)? \" yes       [] Yes   [] No   Educated Patient / Javier Buck regarding differences between Advance Directives and portable DNR orders.     Length of ACP Conversation in minutes:      Conversation Outcomes:  [] ACP discussion completed  [] Existing advance directive reviewed with patient; no changes to patient's previously recorded wishes  [] New Advance Directive completed  [] Portable Do Not Rescitate prepared for Provider review and signature  [] POLST/POST/MOLST/MOST prepared for Provider review and signature      Follow-up plan:    [] Schedule follow-up conversation to continue planning  [] Referred individual to Provider for additional questions/concerns   [] Advised patient/agent/surrogate to review completed ACP document and update if needed with changes in condition, patient preferences or care setting    [] This note routed to one or more involved healthcare providers

## 2021-10-26 NOTE — CARE COORDINATION
[facility] [place] Case Management Initial Discharge Assessment    Met with Patient to discuss discharge plan. PCP: Rajan Landaverde MD                                Date of Last Visit: 1 month    If no PCP, list provided? N/A    Discharge Planning    Living Arrangements: independently at home    Who do you live with? wife    Who helps you with your care:  self    If lives at home:     Do you have any barriers navigating in your home? no    Patient can perform ADL? Yes    Current Services (outpatient and in home) :  None    Dialysis: No    Is transportation available to get to your appointments? Yes    DME Equipment:  no    Respiratory equipment: None    Respiratory provider:  no     Pharmacy:  yes - [facility]    Consult with Medication Assistance Program?  No        Does Patient Have a High-Risk for Readmission Diagnosis (CHF, PN, MI, COPD)? No    Initial Discharge Plan? (Note: please see concurrent daily documentation for any updates after initial note). Pt would like [facility] but states that his wife's insurance company needs to approve it. Cm to assess for d/c needs and referrals.     Readmission Risk              Risk of Unplanned Readmission:  0         Electronically signed by Danielle Cade on 10/26/2021 at 5:37 PM

## 2021-10-26 NOTE — ED TRIAGE NOTES
CP since last night. On left side. Hx of one stent. Has been shaking in extremities for a week. Hx of anxiety. Recently started melatonin/renexa last week.

## 2021-10-26 NOTE — ED PROVIDER NOTES
[address] [facility] ED  eMERGENCY dEPARTMENT eNCOUnter      Pt Name: Corwin Krabbe  MRN: [id number]  [place] 1950  Date of evaluation: 10/26/2021  Provider: Johnathan Ferguson PA-C    CHIEF COMPLAINT       Chief Complaint   Patient presents with    Chest Pain         HISTORY OF PRESENT ILLNESS   (Location/Symptom, Timing/Onset,Context/Setting, Quality, Duration, Modifying Factors, Severity)  Note limiting factors. Corwin Krabbe is a 66 y.o. male who presents to the emergency department has history of tremors notes that started a week ago after beginning Ranexa he had chest pain started last night he also has history of anxiety but does not want to take Klonopin any longer. He also takes melatonin. Patient denies fever chills nausea vomiting leg swelling. Symptoms mild to moderate severity nothing proves worsen symptoms. HPI    NursingNotes were reviewed. REVIEW OF SYSTEMS    (2-9 systems for level 4, 10 or more for level 5)     Review of Systems   Constitutional: Negative for activity change, appetite change, fever and unexpected weight change. HENT: Negative for ear discharge, nosebleeds and voice change. Eyes: Negative for photophobia and discharge. Respiratory: Negative for apnea, cough and shortness of breath. Cardiovascular: Positive for chest pain. Negative for palpitations and leg swelling. Gastrointestinal: Negative for abdominal distention, anal bleeding, nausea and vomiting. Endocrine: Negative for cold intolerance, heat intolerance and polyphagia. Genitourinary: Negative for genital sores. Musculoskeletal: Negative for joint swelling. Skin: Negative for pallor. Allergic/Immunologic: Negative for immunocompromised state. Neurological: Positive for tremors. Negative for seizures and facial asymmetry. Hematological: Does not bruise/bleed easily. Psychiatric/Behavioral: Positive for sleep disturbance. Negative for behavioral problems and self-injury.    All other systems reviewed and are negative. Except as noted above the remainder of the review of systems was reviewed and negative. PAST MEDICAL HISTORY     Past Medical History:   Diagnosis Date    Anxiety     Chest pain 3/29/2018    Coronary artery disease involving native coronary artery of native heart without angina pectoris 2/15/2019    Diabetes mellitus ([facility]     no meds    History of colon polyps     Hyperlipidemia     Hypertension     Type 2 diabetes mellitus without complication ([facility]     Vertigo          SURGICALHISTORY       Past Surgical History:   Procedure Laterality Date    CARDIAC SURGERY  1973    PTCA     COLONOSCOPY      COLONOSCOPY N/A 10/17/2019    COLORECTAL CANCER SCREENING, HIGH RISK performed by Tasha Orozco MD at [address]      OTHER SURGICAL HISTORY      patent foramen ovale    TONSILLECTOMY      UPPER GASTROINTESTINAL ENDOSCOPY N/A 8/1/2019    EGD ESOPHAGOGASTRODUODENOSCOPY performed by Tasha Orozco MD at [address]       Previous Medications    BLOOD GLUCOSE MONITORING SUPPL ([address]) W/DEVICE KIT        CHOLECALCIFEROL (VITAMIN D) 2000 UNITS CAPS CAPSULE    Take 5,000 Units by mouth daily     DOCUSATE SODIUM (COLACE) 100 MG CAPSULE    Take 1 capsule by mouth daily    LATANOPROST (XALATAN) 0.005 % OPHTHALMIC SOLUTION    Use 1 Drop in both eyes daily at bedtime. MELATONIN 3 MG TABS TABLET    Take 1 tablet by mouth nightly as needed (sleep)    NITROGLYCERIN (NITROSTAT) 0.4 MG SL TABLET    up to max of 3 total doses. If no relief after 1 dose, call 911.     OLOPATADINE (PATADAY) 0.2 % SOLN OPHTHALMIC SOLUTION    Apply 1 drop to eye daily For allergies    OMEGA-3 FATTY ACIDS (FISH OIL) 1000 MG CAPS    Take 3,000 mg by mouth every other day    ONDANSETRON (ZOFRAN-ODT) 4 MG DISINTEGRATING TABLET    Take 1 tablet by mouth 3 times daily as needed for Nausea or Vomiting    ONETOUCH DELICA LANCETS 52P MISC        ONETOUCH VERIO STRIP        PHYTONADIONE (VITAMIN K) 5 MG TABLET    Take 5 mg by mouth once    RANOLAZINE (RANEXA) 1000 MG EXTENDED RELEASE TABLET    Take 1 tablet by mouth 2 times daily    TAMSULOSIN (FLOMAX) 0.4 MG CAPSULE    Take 1 capsule by mouth daily    VITAMIN C (ASCORBIC ACID) 500 MG TABLET    Take 500 mg by mouth daily    VITAMIN E 1000 UNITS CAPSULE    Take 1,000 Units by mouth daily    VORTIOXETINE (TRINTELLIX) 5 MG TABLET    Take 5 mg by mouth daily            Seroquel [quetiapine]    FAMILY HISTORY       Family History   Problem Relation Age of Onset    Heart Disease Maternal Aunt     Cancer Maternal Aunt     Colon Cancer Maternal Aunt           SOCIAL HISTORY       Social History     Socioeconomic History    Marital status:      Spouse name: Not on file    Number of children: Not on file    Years of education: Not on file    Highest education level: Not on file   Occupational History    Not on file   Tobacco Use    Smoking status: Never Smoker    Smokeless tobacco: Never Used   Vaping Use    Vaping Use: Never used   Substance and Sexual Activity    Alcohol use: No    Drug use: Never    Sexual activity: Not on file   Other Topics Concern    Not on file   Social History Narrative    Not on file     Social Determinants of Health     Financial Resource Strain:     Difficulty of Paying Living Expenses:    Food Insecurity:     Worried About Running Out of Food in the Last Year:     Ran Out of Food in the Last Year:    Transportation Needs:     Lack of Transportation (Medical):      Lack of Transportation (Non-Medical):    Physical Activity:     Days of Exercise per Week:     Minutes of Exercise per Session:    Stress:     Feeling of Stress :    Social Connections:     Frequency of Communication with Friends and Family:     Frequency of Social Gatherings with Friends and Family:     Attends Jain Services:     Active Member of Clubs or Organizations:     Attends Club or Organization Meetings:     Marital Status:    Intimate Partner Violence:     Fear of Current or Ex-Partner:     Emotionally Abused:     Physically Abused:     Sexually Abused:        SCREENINGS   Ebola Virus Disease (EVD) Screening   Temp: 97.5 °F (36.4 °C)  CIWA Assessment  BP: 124/78  Pulse: 89    PHYSICAL EXAM    (up to 7 for level 4, 8 or more for level 5)     ED Triage Vitals   BP Temp Temp src Pulse Resp SpO2 Height Weight   10/26/21 1411 10/26/21 1409 -- 10/26/21 1409 10/26/21 1409 10/26/21 1409 10/26/21 1411 10/26/21 1411   124/78 97.5 °F (36.4 °C)  89 16 100 % 5' 0.5\" (1.537 m) 134 lb (60.8 kg)       Physical Exam  Vitals and nursing note reviewed. Constitutional:       General: He is not in acute distress. Appearance: He is well-developed. HENT:      Head: Normocephalic and atraumatic. Right Ear: External ear normal.      Left Ear: External ear normal.      Nose: Nose normal. No congestion. Mouth/Throat:      Mouth: Mucous membranes are moist.   Eyes:      General:         Right eye: No discharge. Left eye: No discharge. Pupils: Pupils are equal, round, and reactive to light. Cardiovascular:      Rate and Rhythm: Normal rate and regular rhythm. Pulses: Normal pulses. Heart sounds: Murmur heard. Pulmonary:      Effort: Pulmonary effort is normal. No respiratory distress. Breath sounds: Normal breath sounds. No stridor. No wheezing, rhonchi or rales. Chest:      Chest wall: No tenderness. Abdominal:      General: Bowel sounds are normal. There is no distension. Palpations: Abdomen is soft. Musculoskeletal:         General: Normal range of motion. Cervical back: Normal range of motion and neck supple. Right lower leg: No edema. Left lower leg: No edema. Skin:     General: Skin is warm. Findings: No erythema.    Neurological:      Mental Status: He is alert and oriented to person, place, and time. Psychiatric:         Mood and Affect: Mood normal.         RESULTS     EKG: All EKG's are interpreted by the Emergency Department Physician who either signs or Co-signsthis chart in the absence of a cardiologist.    EKG normal sinus rhythm ventricular rate 71 NM interval 170 ms QRS 90 ms  ms PRT axes positive similar in appearance to prior EKGs    RADIOLOGY:   Non-plain filmimages such as CT, Ultrasound and MRI are read by the radiologist. Plain radiographic images are visualized and preliminarily interpreted by the emergency physician with the below findings:         Interpretation per the Radiologist below, if available at the time ofthis note:    XR CHEST PORTABLE   Final Result   RESOLVING RIGHT LUNG ATELECTASIS/PNEUMONIA            ED BEDSIDE ULTRASOUND:   Performed by ED Physician - none    LABS:  Labs Reviewed   COMPREHENSIVE METABOLIC PANEL - Abnormal; Notable for the following components:       Result Value    Glucose 177 (*)     Calcium 10.2 (*)     Total Bilirubin 1.3 (*)     All other components within normal limits   CBC WITH AUTO DIFFERENTIAL - Abnormal; Notable for the following components:    MCH 31.5 (*)     RDW 15.5 (*)     All other components within normal limits   TROPONIN - Abnormal; Notable for the following components:    Troponin 0.108 (*)     All other components within normal limits    Narrative:     CALL  Lopez  [facility] tel. [phone],  Trop results called to and read back by Eugenie Edwards, 10/26/2021 15:53, by  [facility]   MAGNESIUM   CK   TSH WITHOUT REFLEX    Narrative:     Ale Hess tel. [phone],  Trop results called to and read back by Eugenie Edwards, 10/26/2021 15:53, by  [facility]   TROPONIN   TROPONIN   BRAIN NATRIURETIC PEPTIDE       All other labs were within normal range or not returned as of this dictation.     EMERGENCY DEPARTMENT COURSE and DIFFERENTIAL DIAGNOSIS/MDM:   Vitals:    Vitals:    10/26/21 1409 10/26/21 1411   BP:  124/78   Pulse: 89    Resp: 16    Temp: 97.5 °F (36.4 °C)    SpO2: 100%    Weight:  134 lb (60.8 kg)   Height:  5' 0.5\" (1.537 m)            MDM  Number of Diagnoses or Management Options     Amount and/or Complexity of Data Reviewed  Clinical lab tests: reviewed and ordered  Tests in the medicine section of CPT®: reviewed and ordered        CRITICAL CARE TIME       CONSULTS:  None    PROCEDURES:  Unless otherwise noted below, none     Procedures    FINAL IMPRESSION      1. Chest pain, unspecified type    2. Elevated troponin    3. Tremor          DISPOSITION/PLAN   DISPOSITION        PATIENT REFERRED TO:  No follow-up provider specified.     DISCHARGE MEDICATIONS:  New Prescriptions    No medications on file          (Please note that portions of this note were completed with a voice recognition program.  Efforts were made to edit the dictations but occasionally words are mis-transcribed.)    Della George PA-C (electronically signed)  Attending Emergency Physician       Della George PA-C  10/29/21 4140

## 2021-10-27 LAB
EKG ATRIAL RATE: 71 BPM
EKG ATRIAL RATE: 92 BPM
EKG P AXIS: 42 DEGREES
EKG P AXIS: 55 DEGREES
EKG P-R INTERVAL: 170 MS
EKG P-R INTERVAL: 174 MS
EKG Q-T INTERVAL: 408 MS
EKG Q-T INTERVAL: 442 MS
EKG QRS DURATION: 90 MS
EKG QRS DURATION: 90 MS
EKG QTC CALCULATION (BAZETT): 480 MS
EKG QTC CALCULATION (BAZETT): 504 MS
EKG R AXIS: 14 DEGREES
EKG R AXIS: 55 DEGREES
EKG T AXIS: 77 DEGREES
EKG T AXIS: 82 DEGREES
EKG VENTRICULAR RATE: 71 BPM
EKG VENTRICULAR RATE: 92 BPM
GLUCOSE BLD-MCNC: 105 MG/DL (ref 60–115)
GLUCOSE BLD-MCNC: 105 MG/DL (ref 60–115)
GLUCOSE BLD-MCNC: 147 MG/DL (ref 60–115)
GLUCOSE BLD-MCNC: 162 MG/DL (ref 60–115)
PERFORMED ON: ABNORMAL
PERFORMED ON: ABNORMAL
PERFORMED ON: NORMAL
PERFORMED ON: NORMAL
TROPONIN: 0.04 NG/ML (ref 0–0.01)

## 2021-10-27 PROCEDURE — 93010 ELECTROCARDIOGRAM REPORT: CPT | Performed by: INTERNAL MEDICINE

## 2021-10-27 PROCEDURE — 36415 COLL VENOUS BLD VENIPUNCTURE: CPT

## 2021-10-27 PROCEDURE — 93005 ELECTROCARDIOGRAM TRACING: CPT | Performed by: PHYSICIAN ASSISTANT

## 2021-10-27 PROCEDURE — 6370000000 HC RX 637 (ALT 250 FOR IP): Performed by: INTERNAL MEDICINE

## 2021-10-27 PROCEDURE — 6360000002 HC RX W HCPCS: Performed by: PHYSICIAN ASSISTANT

## 2021-10-27 PROCEDURE — G0378 HOSPITAL OBSERVATION PER HR: HCPCS

## 2021-10-27 PROCEDURE — 96372 THER/PROPH/DIAG INJ SC/IM: CPT

## 2021-10-27 PROCEDURE — 2580000003 HC RX 258: Performed by: PHYSICIAN ASSISTANT

## 2021-10-27 PROCEDURE — 6370000000 HC RX 637 (ALT 250 FOR IP): Performed by: PHYSICIAN ASSISTANT

## 2021-10-27 PROCEDURE — 99223 1ST HOSP IP/OBS HIGH 75: CPT | Performed by: INTERNAL MEDICINE

## 2021-10-27 PROCEDURE — 84484 ASSAY OF TROPONIN QUANT: CPT

## 2021-10-27 RX ORDER — LATANOPROST 50 UG/ML
1 SOLUTION/ DROPS OPHTHALMIC DAILY
Status: DISCONTINUED | OUTPATIENT
Start: 2021-10-27 | End: 2021-10-28 | Stop reason: HOSPADM

## 2021-10-27 RX ORDER — ASCORBIC ACID 500 MG
500 TABLET ORAL DAILY
Status: DISCONTINUED | OUTPATIENT
Start: 2021-10-27 | End: 2021-10-28 | Stop reason: HOSPADM

## 2021-10-27 RX ORDER — CHLORAL HYDRATE 500 MG
3000 CAPSULE ORAL EVERY OTHER DAY
Status: DISCONTINUED | OUTPATIENT
Start: 2021-10-27 | End: 2021-10-27 | Stop reason: CLARIF

## 2021-10-27 RX ORDER — NITROGLYCERIN 0.4 MG/1
0.4 TABLET SUBLINGUAL EVERY 5 MIN PRN
Status: DISCONTINUED | OUTPATIENT
Start: 2021-10-27 | End: 2021-10-28 | Stop reason: HOSPADM

## 2021-10-27 RX ORDER — ONDANSETRON 4 MG/1
4 TABLET, ORALLY DISINTEGRATING ORAL 3 TIMES DAILY PRN
Status: DISCONTINUED | OUTPATIENT
Start: 2021-10-27 | End: 2021-10-28 | Stop reason: HOSPADM

## 2021-10-27 RX ORDER — CLONAZEPAM 0.5 MG/1
0.5 TABLET ORAL NIGHTLY
Status: COMPLETED | OUTPATIENT
Start: 2021-10-27 | End: 2021-10-27

## 2021-10-27 RX ORDER — TAMSULOSIN HYDROCHLORIDE 0.4 MG/1
0.4 CAPSULE ORAL DAILY
Status: DISCONTINUED | OUTPATIENT
Start: 2021-10-27 | End: 2021-10-28 | Stop reason: HOSPADM

## 2021-10-27 RX ADMIN — Medication 5000 UNITS: at 17:55

## 2021-10-27 RX ADMIN — ATORVASTATIN CALCIUM 80 MG: 40 TABLET, FILM COATED ORAL at 20:57

## 2021-10-27 RX ADMIN — CLONAZEPAM 0.5 MG: 0.5 TABLET ORAL at 20:58

## 2021-10-27 RX ADMIN — VORTIOXETINE 5 MG: 10 TABLET, FILM COATED ORAL at 17:55

## 2021-10-27 RX ADMIN — ENOXAPARIN SODIUM 40 MG: 40 INJECTION SUBCUTANEOUS at 17:55

## 2021-10-27 RX ADMIN — Medication 8 ML: at 09:32

## 2021-10-27 RX ADMIN — Medication 1000 UNITS: at 17:54

## 2021-10-27 RX ADMIN — ASPIRIN 81 MG: 81 TABLET, CHEWABLE ORAL at 09:26

## 2021-10-27 RX ADMIN — TAMSULOSIN HYDROCHLORIDE 0.4 MG: 0.4 CAPSULE ORAL at 17:55

## 2021-10-27 RX ADMIN — OXYCODONE HYDROCHLORIDE AND ACETAMINOPHEN 500 MG: 500 TABLET ORAL at 17:55

## 2021-10-27 RX ADMIN — ACETAMINOPHEN 650 MG: 325 TABLET ORAL at 15:17

## 2021-10-27 RX ADMIN — Medication 10 ML: at 20:57

## 2021-10-27 ASSESSMENT — ENCOUNTER SYMPTOMS
TROUBLE SWALLOWING: 0
COLOR CHANGE: 0
CHEST TIGHTNESS: 1
WHEEZING: 0
VOICE CHANGE: 0
SHORTNESS OF BREATH: 0
FACIAL SWELLING: 0
NAUSEA: 0
ANAL BLEEDING: 0
ABDOMINAL DISTENTION: 0
BLOOD IN STOOL: 0
APNEA: 0
VOMITING: 0

## 2021-10-27 ASSESSMENT — PAIN SCALES - GENERAL
PAINLEVEL_OUTOF10: 0
PAINLEVEL_OUTOF10: 8

## 2021-10-27 NOTE — FLOWSHEET NOTE
2327 - pt to the floor via cart. Walked to the bed. No distress noted and denied chest pain at this point.

## 2021-10-27 NOTE — H&P
History and Physical  Patient: Matthieu Monet  Unit/Bed:W194/W194-01  YOB: 1950  MRN: [id number]  Acct: [de-identified]   Admitting Diagnosis: Tremor [R25.1]  Chest pain [R07.9]  Elevated troponin [R77.8]  Chest pain, unspecified type [R07.9]  Admit Date:  10/26/2021  Hospital Day: 1      Chief Complaint:  Chest discomfort and shaking and elevated troponins    History of Present Illness:  77-year-old gentleman with known to coronary artery disease status post angioplasty stent to the circumflex minimal disease in the RCA and LAD normal LV ejection fraction negative MARGARET. Non-smoker. Was in state of health until yesterday when start shaking had some vague chest discomfort. He got concerned and came to the ER where labs were done which showed mildly elevated troponin he was admitted for observation because of his past history of coronary artery disease angioplasty stent. He does not smoke. Very pleasant gentleman. He lives with his wife. No seizures.   No fevers or chills no headache    Allergies   Allergen Reactions    Seroquel [Quetiapine] Other (See Comments)     lethargy  weak       Current Facility-Administered Medications   Medication Dose Route Frequency Provider Last Rate Last Admin    vitamin D capsule CAPS 5,000 Units  5,000 Units Oral Daily Bharati Cox MD        latanoprost (XALATAN) 0.005 % ophthalmic solution 1 drop  1 drop Both Eyes Daily Bharati Cxo MD        nitroGLYCERIN (NITROSTAT) SL tablet 0.4 mg  0.4 mg SubLINGual Q5 Min PRN Bharati Cox MD        fish oil capsule 3,000 mg  3,000 mg Oral Every Other Day Bhraati Cox MD        ondansetron (ZOFRAN-ODT) disintegrating tablet 4 mg  4 mg Oral TID PRN Bharati Cox MD        [facility] capsule 0.4 mg  0.4 mg Oral Daily Bharati Cox MD        ascorbic acid (VITAMIN C) tablet 500 mg  500 mg Oral Daily Bharati Cox MD        vitamin E capsule 1,000 Units  1,000 Units Oral Daily Bharati Cox MD  VORTIoxetine (TRINTELLIX) tablet 5 mg  5 mg Oral Daily Rick Mackey MD        nitroGLYCERIN (NITROSTAT) SL tablet 0.4 mg  0.4 mg SubLINGual Q5 Min PRN Karole Apo, PA-C        sodium chloride flush 0.9 % injection 5-40 mL  5-40 mL IntraVENous 2 times per day Karole Apo, PA-C   8 mL at 10/27/21 0932    sodium chloride flush 0.9 % injection 5-40 mL  5-40 mL IntraVENous PRN Karole Apo, PA-C        0.9 % sodium chloride infusion  25 mL IntraVENous PRN Karole Apo, PA-C        ondansetron (ZOFRAN-ODT) disintegrating tablet 4 mg  4 mg Oral Q8H PRN Karole Apo, PA-C        Or    ondansetron [facility]) injection 4 mg  4 mg IntraVENous Q6H PRN Karole Apo, PA-C        acetaminophen (TYLENOL) tablet 650 mg  650 mg Oral Q6H PRN Karole Apo, PA-C   650 mg at 10/27/21 1517    Or    acetaminophen (TYLENOL) suppository 650 mg  650 mg Rectal Q6H PRN Karole Apo, PA-C        polyethylene glycol (GLYCOLAX) packet 17 g  17 g Oral Daily PRN Karole Apo, PA-C        aspirin chewable tablet 81 mg  81 mg Oral Daily Karole Apo, PA-C   81 mg at 10/27/21 6910    enoxaparin (LOVENOX) injection 40 mg  40 mg SubCUTAneous Daily Karole Apo, PA-C   40 mg at 10/26/21 1800    atorvastatin (LIPITOR) tablet 80 mg  80 mg Oral Nightly Karole Apo, PA-C   80 mg at 10/26/21 2242    nitroGLYCERIN (NITROSTAT) SL tablet 0.4 mg  0.4 mg SubLINGual Q5 Min PRN Karole Apo, PA-C           PMHx:  Past Medical History:   Diagnosis Date    Anxiety     Chest pain 3/29/2018    Coronary artery disease involving native coronary artery of native heart without angina pectoris 2/15/2019    Diabetes [facility]     no meds    History of colon polyps     Hyperlipidemia     Hypertension     Type 2 diabetes mellitus without complication ([facility]     Vertigo        PSHx:  Past Surgical History:   Procedure Laterality Date   [address]    PTCA     COLONOSCOPY      COLONOSCOPY N/A 10/17/2019    COLORECTAL CANCER SCREENING, HIGH RISK performed by Guerita Blank MD at [facility] OTHER SURGICAL HISTORY      patent foramen ovale    TONSILLECTOMY      UPPER GASTROINTESTINAL ENDOSCOPY N/A 8/1/2019    EGD ESOPHAGOGASTRODUODENOSCOPY performed by Guerita Blank MD at [address] [place] [address] Hx:  Social History     Socioeconomic History    Marital status:      Spouse name: Not on file    Number of children: Not on file    Years of education: Not on file    Highest education level: Not on file   Occupational History    Not on file   Tobacco Use    Smoking status: Never Smoker    Smokeless tobacco: Never Used   Vaping Use    Vaping Use: Never used   Substance and Sexual Activity    Alcohol use: No    Drug use: Never    Sexual activity: Not on file   Other Topics Concern    Not on file   Social History Narrative    Not on file     Social Determinants of Health     Financial Resource Strain:     Difficulty of Paying Living Expenses:    Food Insecurity:     Worried About [address] in the Last Year:     [address] in the Last Year:    Transportation Needs:     Lack of Transportation (Medical):      Lack of Transportation (Non-Medical):    Physical Activity:     Days of Exercise per Week:     Minutes of Exercise per Session:    Stress:     Feeling of Stress :    Social Connections:     Frequency of Communication with Friends and Family:     Frequency of Social Gatherings with Friends and Family:     Attends Episcopal Services:     Active Member of Clubs or Organizations:     Attends Club or Organization Meetings:     Marital Status:    Intimate Partner Violence:     Fear of Current or Ex-Partner:     Emotionally Abused:     Physically Abused:     Sexually Abused:        Family Hx:  Family History   Problem Relation Age of Onset    Heart Disease Maternal Aunt     Cancer Maternal Aunt     Colon Cancer Maternal Aunt        Review ofSystems:   Review of Systems   Constitutional: Negative for activity change, appetite change, diaphoresis, fatigue and unexpected weight change. HENT: Negative for facial swelling, nosebleeds, trouble swallowing and voice change. Respiratory: Positive for chest tightness. Negative for apnea, shortness of breath and wheezing. Cardiovascular: Negative for chest pain, palpitations and leg swelling. Gastrointestinal: Negative for abdominal distention, anal bleeding, blood in stool, nausea and vomiting. Genitourinary: Negative for decreased urine volume and dysuria. Musculoskeletal: Negative for gait problem and myalgias. Skin: Negative for color change, pallor, rash and wound. Neurological: Positive for tremors. Negative for dizziness, syncope, facial asymmetry, weakness, light-headedness, numbness and headaches. Hematological: Does not bruise/bleed easily. Psychiatric/Behavioral: Negative for agitation, behavioral problems, confusion, hallucinations and suicidal ideas. The patient is not nervous/anxious. All other systems reviewed and are negative. Physical Examination:    /86   Pulse 75   Temp 97.9 °F (36.6 °C) (Oral)   Resp 18   Ht 5' 5\" (1.651 m)   Wt 129 lb 12.8 oz (58.9 kg)   SpO2 99%   BMI 21.60 kg/m²    Physical Exam  Constitutional:       Appearance: He is well-developed. HENT:      Head: Atraumatic. Eyes:      Conjunctiva/sclera: Conjunctivae normal.      Pupils: Pupils are equal, round, and reactive to light. Neck:      Thyroid: No thyromegaly. Vascular: No JVD. Trachea: No tracheal deviation. Cardiovascular:      Rate and Rhythm: Normal rate and regular rhythm. Heart sounds: No murmur heard. No friction rub. No gallop. Pulmonary:      Effort: No respiratory distress. Breath sounds: No wheezing or rales.    Chest:      Chest wall: No tenderness. Abdominal:      General: There is no distension. Palpations: Abdomen is soft. There is no mass. Tenderness: There is no abdominal tenderness. There is no guarding or rebound. Musculoskeletal:         General: Normal range of motion. Lymphadenopathy:      Cervical: No cervical adenopathy. Skin:     General: Skin is warm. Neurological:      Mental Status: He is alert and oriented to person, place, and time.           LABS:  CBC:   Lab Results   Component Value Date    WBC 8.6 10/26/2021    RBC 4.79 10/26/2021    HGB 14.8 10/26/2021    HCT 43.1 10/26/2021    MCV 90.1 10/26/2021    MCH 30.9 10/26/2021    MCHC 34.3 10/26/2021    RDW 16.0 10/26/2021     10/26/2021     CBC with Differential:   Lab Results   Component Value Date    WBC 8.6 10/26/2021    RBC 4.79 10/26/2021    HGB 14.8 10/26/2021    HCT 43.1 10/26/2021     10/26/2021    MCV 90.1 10/26/2021    MCH 30.9 10/26/2021    MCHC 34.3 10/26/2021    RDW 16.0 10/26/2021    LYMPHOPCT 16.2 10/26/2021    MONOPCT 8.8 10/26/2021    BASOPCT 0.6 10/26/2021    MONOSABS 0.6 10/26/2021    LYMPHSABS 1.2 10/26/2021    EOSABS 0.0 10/26/2021    BASOSABS 0.0 10/26/2021     CMP:    Lab Results   Component Value Date     10/26/2021    K 4.1 10/26/2021    K 4.2 09/09/2021     10/26/2021    CO2 23 10/26/2021    BUN 21 10/26/2021    CREATININE 1.01 10/26/2021    GFRAA >60.0 10/26/2021    LABGLOM >60.0 10/26/2021    GLUCOSE 177 10/26/2021    PROT 7.5 10/26/2021    LABALBU 4.6 10/26/2021    CALCIUM 10.2 10/26/2021    BILITOT 1.3 10/26/2021    ALKPHOS 74 10/26/2021    AST 19 10/26/2021    ALT 8 10/26/2021     BMP:    Lab Results   Component Value Date     10/26/2021    K 4.1 10/26/2021    K 4.2 09/09/2021     10/26/2021    CO2 23 10/26/2021    BUN 21 10/26/2021    LABALBU 4.6 10/26/2021    CREATININE 1.01 10/26/2021    CALCIUM 10.2 10/26/2021    GFRAA >60.0 10/26/2021    LABGLOM >60.0 10/26/2021    GLUCOSE 177 10/26/2021 Magnesium:    Lab Results   Component Value Date    MG 2.2 10/26/2021     Troponin:    Lab Results   Component Value Date    TROPONINI 0.041 10/27/2021     No results for input(s): PROBNP in the last 72 hours. No results for input(s): INR in the last 72 hours. RADIOLOGY:  XR CHEST PORTABLE    Result Date: 10/26/2021  EXAMINATION: XR CHEST PORTABLE CLINICAL HISTORY: CHEST PAIN COMPARISONS: OCTOBER 3, 2021 FINDINGS: Osseous structures intact. Cardiopericardial silhouette normal. The ill-defined area of increased opacity in the right lung is nearly resolved with a small focus remaining laterally in the right midlung. Left lung is clear. RESOLVING RIGHT LUNG ATELECTASIS/PNEUMONIA         EKG:   Assessment:    Active Hospital Problems    Diagnosis Date Noted    Chest pain [R07.9] 03/29/2018         [facility] PTCA RCA        _ABI     Plan:  1. Repeat troponin  2. Check Echo  3. If both OK,DC home  4.  Repeat CXR        Electronically signed by Krys Castillo MD on 10/27/2021 at 5:03 PM

## 2021-10-27 NOTE — FLOWSHEET NOTE
Dr Markie Hernandez by for rounds. No discharge today. Home meds reviewed. Diet order changed. No complaints. Electronically signed by Claudio Green RN on 10/27/2021 at 6:40 PM    1830 Dr Elvin Jama contacted per pt request. States he will be unable to sleep without klonopin. One dose ordered as per home med from previous discharge.  Electronically signed by Claudio Green RN on 10/27/2021 at 6:41 PM

## 2021-10-27 NOTE — ED NOTES
Report called to ISABEL Saucedo. Waiting for room to be cleaned, telemonitor on pt.      Jayde Ricardo RN  10/26/21 4175

## 2021-10-28 VITALS
WEIGHT: 129.8 LBS | HEART RATE: 84 BPM | BODY MASS INDEX: 21.63 KG/M2 | OXYGEN SATURATION: 99 % | DIASTOLIC BLOOD PRESSURE: 74 MMHG | RESPIRATION RATE: 18 BRPM | HEIGHT: 65 IN | TEMPERATURE: 98.1 F | SYSTOLIC BLOOD PRESSURE: 115 MMHG

## 2021-10-28 LAB
GLUCOSE BLD-MCNC: 120 MG/DL (ref 60–115)
GLUCOSE BLD-MCNC: 191 MG/DL (ref 60–115)
LV EF: 55 %
LVEF MODALITY: NORMAL
PERFORMED ON: ABNORMAL
PERFORMED ON: ABNORMAL
TROPONIN: 0.02 NG/ML (ref 0–0.01)

## 2021-10-28 PROCEDURE — G0378 HOSPITAL OBSERVATION PER HR: HCPCS

## 2021-10-28 PROCEDURE — 6370000000 HC RX 637 (ALT 250 FOR IP): Performed by: PHYSICIAN ASSISTANT

## 2021-10-28 PROCEDURE — 6360000002 HC RX W HCPCS: Performed by: PHYSICIAN ASSISTANT

## 2021-10-28 PROCEDURE — 6370000000 HC RX 637 (ALT 250 FOR IP): Performed by: INTERNAL MEDICINE

## 2021-10-28 PROCEDURE — 99239 HOSP IP/OBS DSCHRG MGMT >30: CPT | Performed by: INTERNAL MEDICINE

## 2021-10-28 PROCEDURE — 93306 TTE W/DOPPLER COMPLETE: CPT

## 2021-10-28 PROCEDURE — 84484 ASSAY OF TROPONIN QUANT: CPT

## 2021-10-28 PROCEDURE — 96372 THER/PROPH/DIAG INJ SC/IM: CPT

## 2021-10-28 PROCEDURE — 36415 COLL VENOUS BLD VENIPUNCTURE: CPT

## 2021-10-28 RX ORDER — ATORVASTATIN CALCIUM 20 MG/1
20 TABLET, FILM COATED ORAL DAILY
Qty: 30 TABLET | Refills: 3 | Status: SHIPPED | OUTPATIENT
Start: 2021-10-28 | End: 2022-04-04 | Stop reason: SDUPTHER

## 2021-10-28 RX ORDER — ASPIRIN 81 MG/1
81 TABLET, CHEWABLE ORAL DAILY
Qty: 30 TABLET | Refills: 3 | Status: ON HOLD | OUTPATIENT
Start: 2021-10-29

## 2021-10-28 RX ORDER — CLONAZEPAM 1 MG/1
1 TABLET ORAL DAILY
Status: DISCONTINUED | OUTPATIENT
Start: 2021-10-28 | End: 2021-10-28 | Stop reason: HOSPADM

## 2021-10-28 RX ORDER — CLONAZEPAM 1 MG/1
1 TABLET ORAL DAILY
Qty: 2 TABLET | Refills: 0 | Status: SHIPPED | OUTPATIENT
Start: 2021-10-28 | End: 2021-12-29

## 2021-10-28 RX ADMIN — ENOXAPARIN SODIUM 40 MG: 40 INJECTION SUBCUTANEOUS at 09:38

## 2021-10-28 RX ADMIN — ASPIRIN 81 MG: 81 TABLET, CHEWABLE ORAL at 09:37

## 2021-10-28 RX ADMIN — Medication 1000 UNITS: at 09:37

## 2021-10-28 RX ADMIN — TAMSULOSIN HYDROCHLORIDE 0.4 MG: 0.4 CAPSULE ORAL at 09:38

## 2021-10-28 RX ADMIN — Medication 5000 UNITS: at 09:38

## 2021-10-28 RX ADMIN — OXYCODONE HYDROCHLORIDE AND ACETAMINOPHEN 500 MG: 500 TABLET ORAL at 09:37

## 2021-10-28 RX ADMIN — ACETAMINOPHEN 650 MG: 325 TABLET ORAL at 10:17

## 2021-10-28 RX ADMIN — VORTIOXETINE 5 MG: 10 TABLET, FILM COATED ORAL at 09:38

## 2021-10-28 ASSESSMENT — PAIN SCALES - GENERAL
PAINLEVEL_OUTOF10: 0
PAINLEVEL_OUTOF10: 0
PAINLEVEL_OUTOF10: 5

## 2021-10-28 ASSESSMENT — PAIN DESCRIPTION - FREQUENCY: FREQUENCY: CONTINUOUS

## 2021-10-28 ASSESSMENT — PAIN DESCRIPTION - LOCATION: LOCATION: HEAD

## 2021-10-28 ASSESSMENT — PAIN DESCRIPTION - PAIN TYPE: TYPE: ACUTE PAIN

## 2021-10-28 NOTE — FLOWSHEET NOTE
Spiritual Care Services     Summary of Visit:  Pt requested to see the , pt has been struggling with his param, patient was talkative and receptive during the conversation, encouraged the pt in the param through the reading of scripture and prayer, let the pt know that we are here for him and if he needs to speak with a  to let his nurse know. Pt appreciated the visit,     Spiritual Assessment/Intervention/Outcomes:    Encounter Summary  Services provided to[de-identified] (P) Patient  Referral/Consult From[de-identified] (P) Nurse, Patient  Support System: (P) Spouse, Children, Family members  Continue Visiting: (P) No  Complexity of Encounter: (P) Moderate  Length of Encounter: (P) 30 minutes  Spiritual Assessment Completed: (P) Yes  Advance Care Planning: Yes  Routine  Type: (P) Initial     Spiritual/Anabaptism  Type: (P) Spiritual struggle  Assessment: (P) Approachable, Calm, Questioning param, Questioning meaning/purpose, Anxious, Tearful  Intervention: (P) Nurtured hope, Active listening, Explored feelings, thoughts, concerns, Discussed illness/injury and it's impact, Discussed relationship with God, Discussed meaning/purpose, Prayer, Scripture  Outcome: (P) Expressed gratitude, Comfort, Engaged in conversation, Shared reminiscences, Receptive        Primary Decision Maker (Healthcare Proxy)  [facility] is[facility]de-identified[facility] / Beliefs  Do you have any ethnic, cultural, sacramental, or spiritual [place]ism needs you would like us to be aware of while you are in the hospital?: No    Care Plan:        [id number] [address]   Electronically signed by Davie Long on 10/28/21 at 9:03 AM EDT     To reach a  for emotional and spiritual support, place an [facility] consult request.   If a  is needed immediately, dial 0 and ask to page the on-call .

## 2021-10-28 NOTE — DISCHARGE SUMMARY
Cardiology Discharge Summary      Patient Identification:  Teodora Grajeda  :   MRN: [id number]   Account: [de-identified]     Admit date: 10/26/2021  Discharge date: 10/28/21  Attending provider: Delmy Harris MD        Primary care provider: Elisabet Guzman MD     Admission Diagnoses:  Tristen Soares        Discharge Diagnoses: Active Hospital Problems    Diagnosis Date Noted    Chest pain [R07.9] 2018         Susannasavannah Medrano        Prior PTCA        [facility] Course: Teodora Grajeda is a73 y.o. male admitted to [facility] on 10/26/2021 for shaking and tremors at home when he combine melatonin and Ranexa. Came to the ER was evaluated. Troponin mildly elevated at admit for prevention because of past history of coronary artery it was trending down the second time. Patient had no chest pains. Hemodynamically was stable. He was seen by spiritual services for struggling with his param and other depressive ideation. He will be discharged home. He has a visit with a psychiatrist tomorrow. On his request I gave him 2 tablets of Klonopin until he sees a psychiatrist.  Cleola Mutter was discontinued. Procedures:   NA     Consults:   NA    Examination:  /74   Pulse 84   Temp 98.1 °F (36.7 °C)   Resp 18   Ht 5' 5\" (1.651 m)   Wt 129 lb 12.8 oz (58.9 kg)   SpO2 99%   BMI 21.60 kg/m²    Physical Exam  Constitutional:       Appearance: He is well-developed. Cardiovascular:      Rate and Rhythm: Normal rate and regular rhythm. Heart sounds: Normal heart sounds. Pulmonary:      Effort: Pulmonary effort is normal.      Breath sounds: Normal breath sounds. Musculoskeletal:         General: Normal range of motion. Skin:     General: Skin is warm and dry. Neurological:      Mental Status: He is alert and oriented to person, place, and time. Deep Tendon Reflexes: Reflexes are normal and symmetric.    Psychiatric: Behavior: Behavior normal.         Thought Content: Thought content normal.         Judgment: Judgment normal.         Medications:  Current Discharge Medication List      START taking these medications    Details   aspirin 81 MG chewable tablet Take 1 tablet by mouth daily  Qty: 30 tablet, Refills: 3      atorvastatin (LIPITOR) 20 MG tablet Take 1 tablet by mouth daily  Qty: 30 tablet, Refills: 3      clonazePAM (KLONOPIN) 1 MG tablet Take 1 tablet by mouth daily for 2 days. Qty: 2 tablet, Refills: 0    Associated Diagnoses: Recurrent major depressive disorder, in remission ([facility] [address]         CONTINUE these medications which have NOT CHANGED    Details   melatonin 3 MG TABS tablet Take 1 tablet by mouth nightly as needed (sleep)  Qty: 30 tablet, Refills: 0      ondansetron (ZOFRAN-ODT) 4 MG disintegrating tablet Take 1 tablet by mouth 3 times daily as needed for Nausea or Vomiting  Qty: 21 tablet, Refills: 0      vitamin C (ASCORBIC ACID) 500 MG tablet Take 500 mg by mouth daily      phytonadione (VITAMIN K) 5 MG tablet Take 5 mg by mouth once      vitamin E 1000 units capsule Take 1,000 Units by mouth daily      VORTIoxetine (TRINTELLIX) 5 MG tablet Take 5 mg by mouth daily      latanoprost (XALATAN) 0.005 % ophthalmic solution Use 1 Drop in both eyes daily at bedtime. olopatadine (PATADAY) 0.2 % SOLN ophthalmic solution Apply 1 drop to eye daily For allergies  Qty: 1 Bottle, Refills: 1    Associated Diagnoses:  Allergic conjunctivitis and rhinitis, bilateral      Cholecalciferol (VITAMIN D) 2000 units CAPS capsule Take 5,000 Units by mouth daily       docusate sodium (COLACE) 100 MG capsule Take 1 capsule by mouth daily  Qty: 7 capsule, Refills: 0      tamsulosin (FLOMAX) 0.4 MG capsule Take 1 capsule by mouth daily  Qty: 90 capsule, Refills: 3      Omega-3 Fatty Acids (FISH OIL) 1000 MG CAPS Take 3,000 mg by mouth every other day      Blood Glucose Monitoring Suppl (ONETOUCH VERIO FLEX SYSTEM) w/Device KIT ONETOUCH VERIO strip       ONETOUCH DELICA LANCETS 16O MISC          STOP taking these medications       ranolazine (RANEXA) 1000 MG extended release tablet Comments:   Reason for Stopping:         nitroGLYCERIN (NITROSTAT) 0.4 MG SL tablet Comments:   Reason for Stopping:               Significant Diagnostics:   Radiology: XR CHEST PORTABLE    Result Date: 10/26/2021  EXAMINATION: XR CHEST PORTABLE CLINICAL HISTORY: CHEST PAIN COMPARISONS: OCTOBER 3, 2021 FINDINGS: Osseous structures intact. Cardiopericardial silhouette normal. The ill-defined area of increased opacity in the right lung is nearly resolved with a small focus remaining laterally in the right midlung. Left lung is clear.      RESOLVING RIGHT LUNG ATELECTASIS/PNEUMONIA      Labs:   Recent Results (from the past 72 hour(s))   EKG 12 Lead    Collection Time: 10/26/21  2:21 PM   Result Value Ref Range    Ventricular Rate 71 BPM    Atrial Rate 71 BPM    P-R Interval 170 ms    QRS Duration 90 ms    Q-T Interval 442 ms    QTc Calculation (Bazett) 480 ms    P Axis 42 degrees    R Axis 55 degrees    T Axis 82 degrees   Comprehensive Metabolic Panel    Collection Time: 10/26/21  2:30 PM   Result Value Ref Range    Sodium 141 135 - 144 mEq/L    Potassium 4.1 3.4 - 4.9 mEq/L    Chloride 105 95 - 107 mEq/L    CO2 23 20 - 31 mEq/L    Anion Gap 13 9 - 15 mEq/L    Glucose 177 (H) 70 - 99 mg/dL    BUN 21 8 - 23 mg/dL    CREATININE 1.01 0.70 - 1.20 mg/dL    GFR Non-African American >60.0 >60    GFR  >60.0 >60    Calcium 10.2 (H) 8.5 - 9.9 mg/dL    Total Protein 7.5 6.3 - 8.0 g/dL    Albumin 4.6 3.5 - 4.6 g/dL    Total Bilirubin 1.3 (H) 0.2 - 0.7 mg/dL    Alkaline Phosphatase 74 35 - 104 U/L    ALT 8 0 - 41 U/L    AST 19 0 - 40 U/L    Globulin 2.9 2.3 - 3.5 g/dL   CBC Auto Differential    Collection Time: 10/26/21  2:30 PM   Result Value Ref Range    WBC 7.1 4.8 - 10.8 K/uL    RBC 5.11 4.70 - 6.10 M/uL    Hemoglobin 16.1 14.0 - 18.0 g/dL Hematocrit 46.5 42.0 - 52.0 %    MCV 90.9 80.0 - 100.0 fL    MCH 31.5 (H) 27.0 - 31.3 pg    MCHC 34.7 33.0 - 37.0 %    RDW 15.5 (H) 11.5 - 14.5 %    Platelets 631 136 - 894 K/uL    Neutrophils % 74.1 %    Lymphocytes % 16.2 %    Monocytes % 8.8 %    Eosinophils % 0.3 %    Basophils % 0.6 %    Neutrophils Absolute 5.3 1.4 - 6.5 K/uL    Lymphocytes Absolute 1.2 1.0 - 4.8 K/uL    Monocytes Absolute 0.6 0.2 - 0.8 K/uL    Eosinophils Absolute 0.0 0.0 - 0.7 K/uL    Basophils Absolute 0.0 0.0 - 0.2 K/uL   Magnesium    Collection Time: 10/26/21  2:30 PM   Result Value Ref Range    Magnesium 2.1 1.7 - 2.4 mg/dL   Troponin    Collection Time: 10/26/21  2:30 PM   Result Value Ref Range    Troponin 0.108 (HH) 0.000 - 0.010 ng/mL   CK    Collection Time: 10/26/21  2:30 PM   Result Value Ref Range    Total  0 - 190 U/L   TSH without Reflex    Collection Time: 10/26/21  2:30 PM   Result Value Ref Range    TSH 3.250 0.440 - 3.860 uIU/mL   POCT Glucose    Collection Time: 10/26/21  6:02 PM   Result Value Ref Range    POC Glucose 176 (H) 60 - 115 mg/dl    Performed on ACCU-CHEK    CBC    Collection Time: 10/26/21  9:02 PM   Result Value Ref Range    WBC 8.6 4.8 - 10.8 K/uL    RBC 4.79 4.70 - 6.10 M/uL    Hemoglobin 14.8 14.0 - 18.0 g/dL    Hematocrit 43.1 42.0 - 52.0 %    MCV 90.1 80.0 - 100.0 fL    MCH 30.9 27.0 - 31.3 pg    MCHC 34.3 33.0 - 37.0 %    RDW 16.0 (H) 11.5 - 14.5 %    Platelets 461 728 - 063 K/uL   Magnesium    Collection Time: 10/26/21  9:09 PM   Result Value Ref Range    Magnesium 2.2 1.7 - 2.4 mg/dL   Lipid panel - fasting    Collection Time: 10/26/21  9:09 PM   Result Value Ref Range    Cholesterol, Total 223 (H) 0 - 199 mg/dL    Triglycerides 151 (H) 0 - 150 mg/dL    HDL 40 40 - 59 mg/dL    LDL Calculated 153 (H) 0 - 129 mg/dL   EKG 12 lead    Collection Time: 10/27/21  6:20 AM   Result Value Ref Range    Ventricular Rate 92 BPM    Atrial Rate 92 BPM    P-R Interval 174 ms    QRS Duration 90 ms    Q-T Interval 408 ms    QTc Calculation (Bazett) 504 ms    P Axis 55 degrees    R Axis 14 degrees    T Axis 77 degrees   POCT Glucose    Collection Time: 10/27/21  6:26 AM   Result Value Ref Range    POC Glucose 105 60 - 115 mg/dl    Performed on ACCU-CHEK    Troponin    Collection Time: 10/27/21  9:36 AM   Result Value Ref Range    Troponin 0.041 (HH) 0.000 - 0.010 ng/mL   POCT Glucose    Collection Time: 10/27/21 10:55 AM   Result Value Ref Range    POC Glucose 147 (H) 60 - 115 mg/dl    Performed on ACCU-CHEK    POCT Glucose    Collection Time: 10/27/21  4:14 PM   Result Value Ref Range    POC Glucose 105 60 - 115 mg/dl    Performed on ACCU-CHEK    POCT Glucose    Collection Time: 10/27/21  8:08 PM   Result Value Ref Range    POC Glucose 162 (H) 60 - 115 mg/dl    Performed on ACCU-CHEK    POCT Glucose    Collection Time: 10/28/21  6:26 AM   Result Value Ref Range    POC Glucose 191 (H) 60 - 115 mg/dl    Performed on ACCU-CHEK    Troponin    Collection Time: 10/28/21  7:10 AM   Result Value Ref Range    Troponin 0.023 (HH) 0.000 - 0.010 ng/mL   POCT Glucose    Collection Time: 10/28/21 11:34 AM   Result Value Ref Range    POC Glucose 120 (H) 60 - 115 mg/dl    Performed on ACCU-CHEK              Follow-up visits: Jennifer Cifuentes MD  [phone]    On 11/1/2021  at [place] [phone]    In 3 weeks         Assessment:  Active Hospital Problems    Diagnosis Date Noted    Chest pain [R07.9] 03/29/2018         Plan:   1.   Okay to discharge home  2.   see psychiatrist as outpatient  3    See  holiday as outpatient      Discharge time including talking to the patient arrangement to follow-up with physicians and to medical reconciliation  40 minutes  Electronically signed by Ofe Bai 10/28/2021 at 3:23 PM

## 2021-10-29 NOTE — TELEPHONE ENCOUNTER
SPOKE WITH PATIENT Ronak Burns AND WAS D/C YESTERDAY AND WAS TOLD TO STOP  [address]  HOSP F/U APPOINTMENT MADE

## 2021-10-31 NOTE — PROGRESS NOTES
-Has known 50% LAD occlusion  -No anginal symptoms reported  -Continue with home statin and ASA  -F/up with Dr. Ellis on discharge   Pt ate supper tray.  ca

## 2021-11-05 ENCOUNTER — OFFICE VISIT (OUTPATIENT)
Dept: CARDIOLOGY CLINIC | Age: 71
End: 2021-11-05
Payer: COMMERCIAL

## 2021-11-05 VITALS
OXYGEN SATURATION: 98 % | SYSTOLIC BLOOD PRESSURE: 110 MMHG | HEART RATE: 65 BPM | DIASTOLIC BLOOD PRESSURE: 78 MMHG | WEIGHT: 132 LBS | HEIGHT: 65 IN | BODY MASS INDEX: 21.99 KG/M2

## 2021-11-05 DIAGNOSIS — I25.119 CHEST PAIN DUE TO CAD (HCC): Primary | ICD-10-CM

## 2021-11-05 PROBLEM — R06.09 DYSPNEA ON EXERTION: Status: ACTIVE | Noted: 2021-11-05

## 2021-11-05 PROBLEM — E78.5 HYPERLIPIDEMIA: Status: ACTIVE | Noted: 2021-11-05

## 2021-11-05 PROBLEM — E11.9 TYPE 2 DIABETES MELLITUS WITHOUT COMPLICATION, WITHOUT LONG-TERM CURRENT USE OF INSULIN (HCC): Status: ACTIVE | Noted: 2017-02-13

## 2021-11-05 PROBLEM — R01.1 HEART MURMUR: Status: ACTIVE | Noted: 2021-11-05

## 2021-11-05 PROBLEM — F41.1 GENERALIZED ANXIETY DISORDER: Status: ACTIVE | Noted: 2018-04-15

## 2021-11-05 PROBLEM — F13.20 BENZODIAZEPINE DEPENDENCE (HCC): Status: ACTIVE | Noted: 2017-12-06

## 2021-11-05 PROCEDURE — 99213 OFFICE O/P EST LOW 20 MIN: CPT | Performed by: NURSE PRACTITIONER

## 2021-11-05 RX ORDER — RANOLAZINE 500 MG/1
500 TABLET, EXTENDED RELEASE ORAL DAILY
Qty: 30 TABLET | Refills: 3 | Status: SHIPPED | OUTPATIENT
Start: 2021-11-05 | End: 2022-05-06

## 2021-11-05 RX ORDER — LORAZEPAM 2 MG/1
TABLET ORAL
COMMUNITY
Start: 2021-10-29 | End: 2022-10-14

## 2021-11-05 RX ORDER — ZOLPIDEM TARTRATE 10 MG/1
TABLET ORAL
COMMUNITY
Start: 2021-09-29 | End: 2022-06-01 | Stop reason: ALTCHOICE

## 2021-11-05 RX ORDER — METHOCARBAMOL 750 MG/1
TABLET ORAL
COMMUNITY
Start: 2021-09-21 | End: 2022-10-14

## 2021-11-05 RX ORDER — ESCITALOPRAM OXALATE 10 MG/1
20 TABLET ORAL DAILY
Status: ON HOLD | COMMUNITY
Start: 2021-11-03

## 2021-11-05 RX ORDER — PRAVASTATIN SODIUM 40 MG
TABLET ORAL
COMMUNITY
Start: 2021-10-07 | End: 2021-11-05

## 2021-11-05 ASSESSMENT — ENCOUNTER SYMPTOMS
RHINORRHEA: 0
NAUSEA: 0
VOMITING: 0
CONSTIPATION: 0
BACK PAIN: 0
ABDOMINAL DISTENTION: 0
TROUBLE SWALLOWING: 0
SHORTNESS OF BREATH: 0
ABDOMINAL PAIN: 0
COUGH: 0
WHEEZING: 0
DIARRHEA: 0

## 2021-11-05 NOTE — PROGRESS NOTES
Patient: Jeremy Fernando  YOB: 1950  MRN: [id number]    Chief Complaint:  Chief Complaint   Patient presents with    Follow-Up from [facility]    Chest Pain     ativan is not helping          Subjective/HPI    11/5/2021: pt presents for follow-up after hospital discharge. Patient was admitted for observation after experiencing chest pain. Patient had mildly elevated troponins on admission but they trended downwards and he was discharged home in stable condition. He had a negative chest x-ray at that time. Echo on 10/28/2021 showed ejection fraction of 55%. Today patient states he continues to have a \"strange feeling\" in the left side of his chest, states he cannot describe it but it is not painful. Patient states this feeling is there all day every day x 2 to 3 months. States nothing makes it better or worse. Pt denies dyspnea, dyspnea on exertion, change in exercise capacity, fatigue,  nausea, vomiting, diarrhea, constipation, motor weakness, insomnia, weight loss, syncope, dizziness, lightheadedness, PND, orthopnea, or claudication. No bleeding issues. Pt is compliant with all Rx medications. Blood pressure and HR are under control. Patient states he took Ranexa for years and never had this pain/feeling. States in June Dr. Maryann Flores increase Ranexa to 1000 mg twice daily which he thinks is too much. Patient states on last hospital admission he took his Ranexa with his melatonin which caused him to be shaky. Patient interested in trying Ranexa again at a lower dosage. States he takes Ativan 2 mg at night and 1 mg in the morning. After lengthy discussion with patient we have decided he will try Ranexa 500 mg once a day in the morning. Patient aware that he can take it a second time in the evening if needed. Patient was ordered a stress test in June which he never had done. He states he will get it done in the next few weeks.   Patient will follow up with me in 3 months to assess chest pain, Ranexa, and stress test results. Allergies   Allergen Reactions    Seroquel [Quetiapine] Other (See Comments)     lethargy  weak       Current Outpatient Medications   Medication Sig Dispense Refill    LORazepam (ATIVAN) 2 MG tablet       D3-50 1.25 MG (83445 UT) CAPS       metFORMIN (GLUCOPHAGE) 500 MG tablet       ranolazine (RANEXA) 500 MG extended release tablet Take 1 tablet by mouth daily 30 tablet 3    vitamin C (ASCORBIC ACID) 500 MG tablet Take 500 mg by mouth daily      VORTIoxetine (TRINTELLIX) 5 MG tablet Take 5 mg by mouth daily      latanoprost (XALATAN) 0.005 % ophthalmic solution Use 1 Drop in both eyes daily at bedtime.  olopatadine (PATADAY) 0.2 % SOLN ophthalmic solution Apply 1 drop to eye daily For allergies 1 Bottle 1    ONETOUCH VERIO strip       ONETOUCH DELICA LANCETS 06C MISC       escitalopram (LEXAPRO) 10 MG tablet       zolpidem (AMBIEN) 10 MG tablet  (Patient not taking: Reported on 11/5/2021)      aspirin 81 MG chewable tablet Take 1 tablet by mouth daily 30 tablet 3    atorvastatin (LIPITOR) 20 MG tablet Take 1 tablet by mouth daily (Patient not taking: Reported on 11/5/2021) 30 tablet 3    clonazePAM (KLONOPIN) 1 MG tablet Take 1 tablet by mouth daily for 2 days.  2 tablet 0    docusate sodium (COLACE) 100 MG capsule Take 1 capsule by mouth daily 7 capsule 0    melatonin 3 MG TABS tablet Take 1 tablet by mouth nightly as needed (sleep) (Patient not taking: Reported on 11/5/2021) 30 tablet 0    ondansetron (ZOFRAN-ODT) 4 MG disintegrating tablet Take 1 tablet by mouth 3 times daily as needed for Nausea or Vomiting (Patient not taking: Reported on 11/5/2021) 21 tablet 0    phytonadione (VITAMIN K) 5 MG tablet Take 5 mg by mouth once      vitamin E 1000 units capsule Take 1,000 Units by mouth daily      tamsulosin (FLOMAX) 0.4 MG capsule Take 1 capsule by mouth daily (Patient not taking: Reported on 11/5/2021) 90 capsule 3    Omega-3 Fatty Acids (FISH OIL) 1000 MG CAPS Take 3,000 mg by mouth every other day (Patient not taking: Reported on 11/5/2021)      Blood Glucose Monitoring Suppl ([address]) w/Device KIT        No current facility-administered medications for this visit. Past Medical History:   Diagnosis Date    Anxiety     Chest pain 3/29/2018    Coronary artery disease involving native coronary artery of native heart without angina pectoris 2/15/2019    Diabetes mellitus ([facility] [address]     no meds    History of colon polyps     Hyperlipidemia     Hypertension     Type 2 diabetes mellitus without complication (HCC)     Vertigo        Past Surgical History:   Procedure Laterality Date    CARDIAC SURGERY  1973    PTCA     COLONOSCOPY      COLONOSCOPY N/A 10/17/2019    COLORECTAL CANCER SCREENING, HIGH RISK performed by Marlyn Luong MD at [facility] OTHER SURGICAL HISTORY      patent foramen ovale    TONSILLECTOMY      UPPER GASTROINTESTINAL ENDOSCOPY N/A 8/1/2019    EGD ESOPHAGOGASTRODUODENOSCOPY performed by Marlyn Luong MD at [address] [place] [address] Marital status:      Spouse name: None    Number of children: None    Years of education: None    Highest education level: None   Occupational History    None   Tobacco Use    Smoking status: Never Smoker    Smokeless tobacco: Never Used   Vaping Use    Vaping Use: Never used   Substance and Sexual Activity    Alcohol use: No    Drug use: Never    Sexual activity: None   Other Topics Concern    None   Social History Narrative    None     Social Determinants of Health     Financial Resource Strain:     Difficulty of Paying Living Expenses:    Food Insecurity:     Worried About Running Out of Food in the Last Year:     Ran Out of Food in the Last Year:    Transportation Needs:     Lack of Transportation (Medical):      Lack of Transportation (Non-Medical):    Physical Activity:     Days of Exercise per Week:     Minutes of Exercise per Session:    Stress:     Feeling of Stress :    Social Connections:     Frequency of Communication with Friends and Family:     Frequency of Social Gatherings with Friends and Family:     Attends Islam Services:     Active Member of Clubs or Organizations:     Attends Club or Organization Meetings:     Marital Status:    Intimate Partner Violence:     Fear of Current or Ex-Partner:     Emotionally Abused:     Physically Abused:     Sexually Abused:        Family History   Problem Relation Age of Onset    Heart Disease Maternal Aunt     Cancer Maternal Aunt     Colon Cancer Maternal Aunt          Review of Systems:   Review of Systems   Constitutional: Negative for activity change, chills, diaphoresis and fever. HENT: Negative for congestion, rhinorrhea and trouble swallowing. Eyes: Negative for visual disturbance. Respiratory: Negative for cough, shortness of breath and wheezing. Cardiovascular: Negative for chest pain, palpitations and leg swelling. Gastrointestinal: Negative for abdominal distention, abdominal pain, constipation, diarrhea, nausea and vomiting. Endocrine: Negative. Genitourinary: Negative for difficulty urinating, dysuria, frequency and urgency. Musculoskeletal: Negative for back pain and gait problem. Skin: Negative for wound. Neurological: Negative for dizziness, seizures, syncope, speech difficulty, weakness, numbness and headaches. Hematological: Does not bruise/bleed easily. Psychiatric/Behavioral: Negative. Physical Examination:    /78 (Site: Left Upper Arm, Position: Sitting, Cuff Size: Large Adult)   Pulse 65   Ht 5' 5\" (1.651 m)   Wt 132 lb (59.9 kg)   SpO2 98%   BMI 21.97 kg/m²    Physical Exam  Vitals reviewed. Constitutional:       General: He is not in acute distress. Appearance: Normal appearance.    HENT: Head: Normocephalic. Nose: No congestion. Mouth/Throat:      Mouth: Mucous membranes are moist.   Cardiovascular:      Rate and Rhythm: Normal rate and regular rhythm. Pulses: Normal pulses. Heart sounds: Normal heart sounds. Pulmonary:      Effort: Pulmonary effort is normal. No respiratory distress. Breath sounds: Normal breath sounds. No stridor. No wheezing, rhonchi or rales. Chest:      Chest wall: No tenderness. Abdominal:      Palpations: Abdomen is soft. Musculoskeletal:      Cervical back: Normal range of motion. Right lower leg: No edema. Left lower leg: No edema. Skin:     General: Skin is warm and dry. Neurological:      General: No focal deficit present. Mental Status: He is alert and oriented to person, place, and time.          LABS:  CBC:   Lab Results   Component Value Date    WBC 8.6 10/26/2021    RBC 4.79 10/26/2021    HGB 14.8 10/26/2021    HCT 43.1 10/26/2021    MCV 90.1 10/26/2021    MCH 30.9 10/26/2021    MCHC 34.3 10/26/2021    RDW 16.0 10/26/2021     10/26/2021     Lipids:  Lab Results   Component Value Date    CHOL 223 (H) 10/26/2021    CHOL 162 09/08/2021    CHOL 232 (H) 05/21/2021     Lab Results   Component Value Date    TRIG 151 (H) 10/26/2021    TRIG 108 09/08/2021    TRIG 174 (H) 05/21/2021     Lab Results   Component Value Date    HDL 40 10/26/2021    HDL 41 09/08/2021    HDL 36 (L) 05/21/2021     Lab Results   Component Value Date    LDLCALC 153 (H) 10/26/2021    LDLCALC 99 09/08/2021    LDLCALC 161 (H) 05/21/2021     No results found for: LABVLDL, VLDL  No results found for: CHOLHDLRATIO  CMP:    Lab Results   Component Value Date     10/26/2021    K 4.1 10/26/2021    K 4.2 09/09/2021     10/26/2021    CO2 23 10/26/2021    BUN 21 10/26/2021    CREATININE 1.01 10/26/2021    GFRAA >60.0 10/26/2021    LABGLOM >60.0 10/26/2021    GLUCOSE 177 10/26/2021    PROT 7.5 10/26/2021    LABALBU 4.6 10/26/2021    CALCIUM 10.2 10/26/2021    BILITOT 1.3 10/26/2021    ALKPHOS 74 10/26/2021    AST 19 10/26/2021    ALT 8 10/26/2021     BMP:    Lab Results   Component Value Date     10/26/2021    K 4.1 10/26/2021    K 4.2 09/09/2021     10/26/2021    CO2 23 10/26/2021    BUN 21 10/26/2021    LABALBU 4.6 10/26/2021    CREATININE 1.01 10/26/2021    CALCIUM 10.2 10/26/2021    GFRAA >60.0 10/26/2021    LABGLOM >60.0 10/26/2021    GLUCOSE 177 10/26/2021     Magnesium:    Lab Results   Component Value Date    MG 2.2 10/26/2021     TSH:  Lab Results   Component Value Date    TSH 3.250 10/26/2021     . result  No results for input(s): PROBNP in the last 72 hours. No results for input(s): INR in the last 72 hours. Patient Active Problem List   Diagnosis    Chest pain    Hypertension    Anxiety    Recurrent major depressive disorder, in remission ([facility] [address]    Coronary artery disease involving native coronary artery of native heart without angina pectoris    Mixed obsessional thoughts and acts    Chronic gastritis without bleeding    Dyspepsia    Adenomatous polyp of sigmoid colon    Weakness    COVID-19    Hyperlipidemia    Heart murmur    Generalized anxiety disorder    Dyspnea on exertion    ED (erectile dysfunction)    Benzodiazepine dependence (HCC)    Insomnia secondary to depression with anxiety    Panic attacks    Type 2 diabetes mellitus without complication, without long-term current use of insulin (HCC)       Assessment   Diagnosis Orders   1. Chest pain due to CAD ([facility] [address]  NM MYOCARDIAL SPECT REST EXERCISE OR RX     Plan:  1. Start Ranexa 500mg PO daily  2.  Obtain nuclear stress test      Orders Placed This Encounter   Procedures    NM MYOCARDIAL SPECT REST EXERCISE OR RX     Standing Status:   Future     Standing Expiration Date:   11/5/2022     Order Specific Question:   Reason for Exam?     Answer:   Chest pain     Order Specific Question:   Procedure Type     Answer:   Exercise     Order Specific Question:   Reason for exam:     Answer:   chronic chest pain, last stress test 2019      Orders Placed This Encounter   Medications    ranolazine (RANEXA) 500 MG extended release tablet     Sig: Take 1 tablet by mouth daily     Dispense:  30 tablet     Refill:  3        Return in about 3 months (around 2/5/2022) for follow up with Thomas Shell CNP. Counseling: The patient has been advised to contact us if theyexperience chest pain, palpitations, progressive SOB, orthopnea, PND or progressive edema.

## 2021-11-05 NOTE — PATIENT INSTRUCTIONS
Patient Education        ranolazine  Pronunciation:  dawit messer  Brand:  Ranexa  What is the most important information I should know about ranolazine? You should not take ranolazine if you have cirrhosis of the liver. Tell your doctor about all your current medicines and any you start or stop using. Many drugs can interact, and some drugs should not be used together. What is ranolazine? Ranolazine is used to treat chronic angina (chest pain). Ranolazine is not for use during an acute (emergency) attack of angina. Ranolazine may also be used for purposes not listed in this medication guide. What should I discuss with my healthcare provider before taking ranolazine? You should not take ranolazine if you are allergic to it, or if you have:  · cirrhosis of the liver. Many drugs can interact and cause dangerous effects. Some drugs should not be used together with ranolazine. Your doctor may change your treatment plan if you also use:  · clarithromycin;  · nefazodone;  · Harry's wort;  · antifungal medicine --itraconazole, ketoconazole;  · HIV or AIDS medicine --indinavir, lopinavir/ritonavir, nelfinavir, ritonavir, saquinavir;  · seizure medicine --carbamazepine, phenobarbital, phenytoin; or  · tuberculosis medicine --rifabutin, rifampin, rifapentine. Tell your doctor if you have ever had:  · long QT syndrome (in you or a family member);  · liver disease; or  · kidney disease. Tell your doctor if you are pregnant or breastfeeding. How should I take ranolazine? Follow all directions on your prescription label and read all medication guides or instruction sheets. Your doctor may occasionally change your dose. Use the medicine exactly as directed. You may take ranolazine with or without food. Swallow the tablet whole and do not crush, chew, or break it. Chronic angina is often treated with a combination of drugs. Use all medications as directed and read all medication guides you receive.  Do not change your dose or dosing schedule without your doctor's advice. Call your doctor if your symptoms do not improve, or if they get worse. You will need frequent medical tests to check your heart and kidney function. Store at room temperature away from moisture and heat. What happens if I miss a dose? Skip the missed dose and use your next dose at the regular time. Do not use two doses at one time. What happens if I overdose? Seek emergency medical attention or call the Poison Help line at 1-440.171.5523. Overdose can cause nausea, vomiting, numbness or tingling, dizziness, double vision, confusion, or fainting. What should I avoid while taking ranolazine? Avoid driving or hazardous activity until you know how this medicine will affect you. Your reactions could be impaired. Grapefruit may interact with ranolazine and lead to unwanted side effects. Avoid the use of grapefruit products. What are the possible side effects of ranolazine? Get emergency medical help if you have signs of an allergic reaction: hives; difficult breathing; swelling of your face, lips, tongue, or throat. Call your doctor at once if you have:  · a light-headed feeling, like you might pass out;  · fast or pounding heartbeats, fluttering in your chest; or  · kidney problems --little or no urination, painful or difficult urination, swelling in your feet or ankles, feeling tired or short of breath. Common side effects may include:  · nausea, constipation;  · headache; or  · dizziness. This is not a complete list of side effects and others may occur. Call your doctor for medical advice about side effects. You may report side effects to FDA at 5-806-BJX-8343. What other drugs will affect ranolazine? Tell your doctor about all your current medicines.  Many drugs can affect ranolazine, especially:  · any other medicine to treat heart disease;  · an antibiotic or antifungal medicine;  · cholesterol-lowering medicine;  · oral diabetes medicine;  · medicine to prevent organ transplant rejection;  · medicine to treat a mental illness; or  · medicine to treat or prevent nausea and vomiting caused by chemotherapy or radiation. This list is not complete and many other drugs may affect ranolazine. This includes prescription and over-the-counter medicines, vitamins, and herbal products. Not all possible drug interactions are listed here. Where can I get more information? Your pharmacist can provide more information about ranolazine. Remember, keep this and all other medicines out of the reach of children, never share your medicines with others, and use this medication only for the indication prescribed. Every effort has been made to ensure that the information provided by [facility] is accurate, up-to-date, and complete, but no guarantee is made to that effect. Drug information contained herein may be time sensitive. [facility] information has been compiled for use by healthcare practitioners and consumers in the [place] and therefore [facility] does not warrant that uses outside of the [place] are appropriate, unless specifically indicated otherwise. [facility]'s drug information does not endorse drugs, diagnose patients or recommend therapy. [facility] drug information is an informational resource designed to assist licensed healthcare practitioners in caring for their patients and/or to serve consumers viewing this service as a supplement to, and not a substitute for, the expertise, skill, knowledge and judgment of healthcare practitioners. The absence of a warning for a given drug or drug combination in no way should be construed to indicate that the drug or drug combination is safe, effective or appropriate for any given patient. [facility] does not assume any responsibility for any aspect of healthcare administered with the aid of information [facility] provides.  The information contained herein is not intended to cover all possible uses, directions, precautions, warnings, drug interactions, allergic reactions, or adverse effects. If you have questions about the drugs you are taking, check with your doctor, nurse or pharmacist.  Copyright 2159-5960 [address]: 12.01. Revision date: 9/11/2019. Care instructions adapted under license by [facility] ([facility]). If you have questions about a medical condition or this instruction, always ask your healthcare professional. [place] [address] any warranty or liability for your use of this information.

## 2021-11-16 ENCOUNTER — HOSPITAL ENCOUNTER (OUTPATIENT)
Dept: NON INVASIVE DIAGNOSTICS | Age: 71
Discharge: HOME | End: 2021-11-16
Payer: COMMERCIAL

## 2021-11-16 ENCOUNTER — HOSPITAL ENCOUNTER (OUTPATIENT)
Dept: NUCLEAR MEDICINE | Age: 71
Discharge: HOME | End: 2021-11-18
Payer: COMMERCIAL

## 2021-11-16 DIAGNOSIS — I25.119 CHEST PAIN DUE TO CAD (HCC): ICD-10-CM

## 2021-11-16 PROCEDURE — 3430000000 HC RX DIAGNOSTIC RADIOPHARMACEUTICAL: Performed by: NURSE PRACTITIONER

## 2021-11-16 PROCEDURE — 2580000003 HC RX 258: Performed by: NURSE PRACTITIONER

## 2021-11-16 PROCEDURE — A9502 TC99M TETROFOSMIN: HCPCS | Performed by: NURSE PRACTITIONER

## 2021-11-16 PROCEDURE — 93017 CV STRESS TEST TRACING ONLY: CPT

## 2021-11-16 PROCEDURE — 78452 HT MUSCLE IMAGE SPECT MULT: CPT

## 2021-11-16 PROCEDURE — 6360000002 HC RX W HCPCS: Performed by: NURSE PRACTITIONER

## 2021-11-16 RX ORDER — SODIUM CHLORIDE 0.9 % (FLUSH) 0.9 %
10 SYRINGE (ML) INJECTION PRN
Status: COMPLETED | OUTPATIENT
Start: 2021-11-16 | End: 2021-11-16

## 2021-11-16 RX ADMIN — TETROFOSMIN 11.9 MILLICURIE: 1.38 INJECTION, POWDER, LYOPHILIZED, FOR SOLUTION INTRAVENOUS at 08:04

## 2021-11-16 RX ADMIN — Medication 10 ML: at 08:04

## 2021-11-16 RX ADMIN — Medication 10 ML: at 09:34

## 2021-11-16 RX ADMIN — Medication 10 ML: at 09:35

## 2021-11-16 RX ADMIN — REGADENOSON 0.4 MG: 0.08 INJECTION, SOLUTION INTRAVENOUS at 09:34

## 2021-11-16 RX ADMIN — TETROFOSMIN 32.8 MILLICURIE: 1.38 INJECTION, POWDER, LYOPHILIZED, FOR SOLUTION INTRAVENOUS at 09:34

## 2021-11-16 NOTE — PROGRESS NOTES
Reviewed history, allergies, and medications. Patient held his home medications prior to testing. Consent confirmed. Lexiscan exam explained. Placed patient on monitor. [facility] here to inject Isai Mayorga. SOB noted during recovery phase. Denied chest pain. No ectopy noted. Patient off monitor and instructed to eat, will have last part of exam in 1 hour.

## 2021-11-18 NOTE — PROCEDURES
[address]                      [address], [address] [facility]                              CARDIAC STRESS TEST    PATIENT NAME: Ben Magaña                      :        1950  MED REC NO:   [id number]                            ROOM:  ACCOUNT NO:   [de-identified]                           ADMIT DATE: 2021  PROVIDER:     Patricia Elmore MD    CARDIOVASCULAR DIAGNOSTIC DEPARTMENT    DATE OF STUDY:  2021    ORDERING PROVIDER:  _____    INDICATION:  Chest pain. TECHNIQUE:  At rest, the patient was injected with 11.9 mCi of Myoview. Resting images were obtained. The patient was then given 0.4 mg of  Lexiscan followed by administration 32.8 mCi of Myoview. Stress  tomographic images were then obtained. Left ventricular ejection  fraction and gated wall motion were acquired. RESULTS:  Resting EKG revealed sinus rhythm. Maximum heart rate  achieved was 95 beats per minute. Blood pressure response was normal.   No diagnostic ST-segment changes were noted. IMAGING RESULTS:  Review of rest and stress tomographic images revealed  homogenous myocardial perfusion with no evidence of prior myocardial  infarction or ischemia. Left ventricular ejection fraction normal at  66%. TID ratio 1.1, which is within normal limits. IMPRESSION:  1. Homogenous myocardial perfusion with no evidence of prior myocardial  infarction or ischemia. 2.  Normal left ventricular ejection fraction. 3.  Normal TID ratio.         Timo Henao MD    D: 2021 #16:06:07       T: 2021 16:09:33     [email or web address]  Job#: [id number]     Doc#: [id number]    CC:

## 2021-11-19 PROCEDURE — 93018 CV STRESS TEST I&R ONLY: CPT | Performed by: INTERNAL MEDICINE

## 2021-12-29 ENCOUNTER — OFFICE VISIT (OUTPATIENT)
Dept: UROLOGY | Age: 71
End: 2021-12-29
Payer: COMMERCIAL

## 2021-12-29 VITALS
WEIGHT: 130.4 LBS | HEIGHT: 65 IN | HEART RATE: 76 BPM | BODY MASS INDEX: 21.73 KG/M2 | DIASTOLIC BLOOD PRESSURE: 70 MMHG | OXYGEN SATURATION: 98 % | SYSTOLIC BLOOD PRESSURE: 110 MMHG

## 2021-12-29 DIAGNOSIS — N13.8 HYPERTROPHY OF PROSTATE WITH URINARY OBSTRUCTION: ICD-10-CM

## 2021-12-29 DIAGNOSIS — N40.1 HYPERTROPHY OF PROSTATE WITH URINARY OBSTRUCTION: ICD-10-CM

## 2021-12-29 DIAGNOSIS — N52.9 ERECTILE DYSFUNCTION, UNSPECIFIED ERECTILE DYSFUNCTION TYPE: Primary | ICD-10-CM

## 2021-12-29 PROCEDURE — 4040F PNEUMOC VAC/ADMIN/RCVD: CPT | Performed by: UROLOGY

## 2021-12-29 PROCEDURE — G8484 FLU IMMUNIZE NO ADMIN: HCPCS | Performed by: UROLOGY

## 2021-12-29 PROCEDURE — 3017F COLORECTAL CA SCREEN DOC REV: CPT | Performed by: UROLOGY

## 2021-12-29 PROCEDURE — G8420 CALC BMI NORM PARAMETERS: HCPCS | Performed by: UROLOGY

## 2021-12-29 PROCEDURE — G8427 DOCREV CUR MEDS BY ELIG CLIN: HCPCS | Performed by: UROLOGY

## 2021-12-29 PROCEDURE — 1123F ACP DISCUSS/DSCN MKR DOCD: CPT | Performed by: UROLOGY

## 2021-12-29 PROCEDURE — 99214 OFFICE O/P EST MOD 30 MIN: CPT | Performed by: UROLOGY

## 2021-12-29 PROCEDURE — 1036F TOBACCO NON-USER: CPT | Performed by: UROLOGY

## 2021-12-29 RX ORDER — VITAMIN B COMPLEX
1 CAPSULE ORAL DAILY
COMMUNITY
End: 2022-10-14

## 2021-12-29 RX ORDER — RANOLAZINE 1000 MG/1
TABLET, EXTENDED RELEASE ORAL
COMMUNITY
End: 2021-12-29

## 2021-12-29 RX ORDER — TAMSULOSIN HYDROCHLORIDE 0.4 MG/1
0.4 CAPSULE ORAL DAILY
Qty: 90 CAPSULE | Refills: 3 | Status: SHIPPED | OUTPATIENT
Start: 2021-12-29 | End: 2022-10-14

## 2021-12-29 ASSESSMENT — ENCOUNTER SYMPTOMS: ABDOMINAL PAIN: 0

## 2021-12-29 NOTE — PROGRESS NOTES
Subjective:      Patient ID: Jeremy Fernando is a 66 y.o. male    HPI  This is a 68 yo male with h/o DM, CAD with stent (Dr Herman Just[employer], Anxiety/depression, Covid in 9/21, and back with persistent BPH and LUTs. When last seen in 2020, he was to start Flomax but never filled the script for unclear reasons. He still has an intermittent slower flow and PVD. He gets some urgency as well. He also still complains of  refractory ED (BRADLEY failed/cost and full dose Viagra). I reviewed the interval PSA.  He has no other new complaints or surgical problems.     Past Medical History:   Diagnosis Date    Anxiety     Chest pain 3/29/2018    Coronary artery disease involving native coronary artery of native heart without angina pectoris 2/15/2019    Diabetes mellitus ([facility] [address]     no meds    History of colon polyps     Hyperlipidemia     Hypertension     Type 2 diabetes mellitus without complication (HCC)     Vertigo      Past Surgical History:   Procedure Laterality Date    CARDIAC SURGERY  1973    PTCA     COLONOSCOPY      COLONOSCOPY N/A 10/17/2019    COLORECTAL CANCER SCREENING, HIGH RISK performed by Brayden Watters MD at [facility] OTHER SURGICAL HISTORY      patent foramen ovale    TONSILLECTOMY      UPPER GASTROINTESTINAL ENDOSCOPY N/A 8/1/2019    EGD ESOPHAGOGASTRODUODENOSCOPY performed by Brayden Watters MD at [address] [place] [address] Marital status:      Spouse name: Not on file    Number of children: Not on file    Years of education: Not on file    Highest education level: Not on file   Occupational History    Not on file   Tobacco Use    Smoking status: Never Smoker    Smokeless tobacco: Never Used   Vaping Use    Vaping Use: Never used   Substance and Sexual Activity    Alcohol use: No    Drug use: Never    Sexual activity: Not on file   Other Topics Concern    Not on file   Social History Narrative    Not on file     Social Determinants of Health     Financial Resource Strain:     Difficulty of Paying Living Expenses: Not on file   Food Insecurity:     Worried About Running Out of Food in the Last Year: Not on file    Darcy of Food in the Last Year: Not on file   Transportation Needs:     Lack of Transportation (Medical): Not on file    Lack of Transportation (Non-Medical):  Not on file   Physical Activity:     Days of Exercise per Week: Not on file    Minutes of Exercise per Session: Not on file   Stress:     Feeling of Stress : Not on file   Social Connections:     Frequency of Communication with Friends and Family: Not on file    Frequency of Social Gatherings with Friends and Family: Not on file    Attends [employer] Services: Not on file    Active Member of [address] [place] [address] [employer] or Organizations: Not on file    Attends Club or Organization Meetings: Not on file    Marital Status: Not on file   Intimate Partner Violence:     Fear of Current or Ex-Partner: Not on file    Emotionally Abused: Not on file    Physically Abused: Not on file    Sexually Abused: Not on file   Housing Stability:     Unable to Pay for Housing in the Last Year: Not on file    Number of [place] in the Last Year: Not on file    Unstable Housing in the Last Year: Not on file     Family History   Problem Relation Age of Onset    Heart Disease Maternal Aunt     Cancer Maternal Aunt     Colon Cancer Maternal Aunt      Current Outpatient Medications   Medication Sig Dispense Refill    LORazepam (ATIVAN) 2 MG tablet       escitalopram (LEXAPRO) 10 MG tablet       D3-50 1.25 MG (32048 UT) CAPS       metFORMIN (GLUCOPHAGE) 500 MG tablet       zolpidem (AMBIEN) 10 MG tablet  (Patient not taking: Reported on 11/5/2021)      ranolazine (RANEXA) 500 MG extended release tablet Take 1 tablet by mouth daily 30 tablet 3    aspirin 81 MG chewable tablet Take 1 tablet by mouth daily 30 tablet 3    atorvastatin (LIPITOR) 20 MG tablet Take 1 tablet by mouth daily (Patient not taking: Reported on 11/5/2021) 30 tablet 3    clonazePAM (KLONOPIN) 1 MG tablet Take 1 tablet by mouth daily for 2 days. 2 tablet 0    docusate sodium (COLACE) 100 MG capsule Take 1 capsule by mouth daily 7 capsule 0    melatonin 3 MG TABS tablet Take 1 tablet by mouth nightly as needed (sleep) (Patient not taking: Reported on 11/5/2021) 30 tablet 0    ondansetron (ZOFRAN-ODT) 4 MG disintegrating tablet Take 1 tablet by mouth 3 times daily as needed for Nausea or Vomiting (Patient not taking: Reported on 11/5/2021) 21 tablet 0    vitamin C (ASCORBIC ACID) 500 MG tablet Take 500 mg by mouth daily      phytonadione (VITAMIN K) 5 MG tablet Take 5 mg by mouth once      vitamin E 1000 units capsule Take 1,000 Units by mouth daily      tamsulosin (FLOMAX) 0.4 MG capsule Take 1 capsule by mouth daily (Patient not taking: Reported on 11/5/2021) 90 capsule 3    Omega-3 Fatty Acids (FISH OIL) 1000 MG CAPS Take 3,000 mg by mouth every other day (Patient not taking: Reported on 11/5/2021)      VORTIoxetine (TRINTELLIX) 5 MG tablet Take 5 mg by mouth daily      latanoprost (XALATAN) 0.005 % ophthalmic solution Use 1 Drop in both eyes daily at bedtime.  olopatadine (PATADAY) 0.2 % SOLN ophthalmic solution Apply 1 drop to eye daily For allergies 1 Bottle 1    Blood Glucose Monitoring Suppl (ONETOUCH VERIO FLEX SYSTEM) w/Device KIT       ONETOUCH VERIO strip       ONETOUCH DELICA LANCETS 62X MISC        No current facility-administered medications for this visit. Seroquel [quetiapine]  reviewed      Review of Systems   Constitutional: Negative for unexpected weight change. Gastrointestinal: Negative for abdominal pain. Genitourinary: Negative for dysuria, flank pain and hematuria. Objective:   Physical Exam  Constitutional:       Appearance: Normal appearance. Abdominal:      General: There is no distension. Palpations: Abdomen is soft. Neurological:      Mental Status: He is alert. Psychiatric:         Mood and Affect: Mood normal.          PSA   Date Value Ref Range Status   05/21/2021 1.58 0.00 - 6.22 ng/mL Final     Comment:     When the Total PSA is between 3.00 and 10.00 ng/mL, consider  requesting a Free PSA to aid in diagnosis. Assessment: This is a 68 yo male with h/o DM, CAD with stent (Dr Lashon Russell, Anxiety/depression and with refractory ED and I again discussed possible referral for penile injection or IPP. Will also provide info on BRADLEY which he felt was too costly in past. He still has bothersome LUTs and I again discussed a trial of a BPH medication the option of Alpha-blocker vs 5ARI and he wants to try Flomax. The proper dosing and SE were reviewed. Plan:      1. F/U 1 yr for Flow/PVR  2. No orders of the defined types were placed in this encounter.     Orders Placed This Encounter   Medications    tamsulosin (FLOMAX) 0.4 MG capsule     Sig: Take 1 capsule by mouth daily     Dispense:  90 capsule     Refill:  3             Trish Oconnor MD

## 2022-02-04 ENCOUNTER — VIRTUAL VISIT (OUTPATIENT)
Dept: CARDIOLOGY CLINIC | Age: 72
End: 2022-02-04
Payer: MEDICARE

## 2022-02-04 DIAGNOSIS — R07.9 CHEST PAIN, UNSPECIFIED TYPE: ICD-10-CM

## 2022-02-04 DIAGNOSIS — Z71.2 ENCOUNTER TO DISCUSS TEST RESULTS: Primary | ICD-10-CM

## 2022-02-04 DIAGNOSIS — I10 PRIMARY HYPERTENSION: ICD-10-CM

## 2022-02-04 PROCEDURE — G8420 CALC BMI NORM PARAMETERS: HCPCS | Performed by: NURSE PRACTITIONER

## 2022-02-04 PROCEDURE — G8484 FLU IMMUNIZE NO ADMIN: HCPCS | Performed by: NURSE PRACTITIONER

## 2022-02-04 PROCEDURE — 1123F ACP DISCUSS/DSCN MKR DOCD: CPT | Performed by: NURSE PRACTITIONER

## 2022-02-04 PROCEDURE — 4040F PNEUMOC VAC/ADMIN/RCVD: CPT | Performed by: NURSE PRACTITIONER

## 2022-02-04 PROCEDURE — G8427 DOCREV CUR MEDS BY ELIG CLIN: HCPCS | Performed by: NURSE PRACTITIONER

## 2022-02-04 PROCEDURE — 3017F COLORECTAL CA SCREEN DOC REV: CPT | Performed by: NURSE PRACTITIONER

## 2022-02-04 PROCEDURE — 1036F TOBACCO NON-USER: CPT | Performed by: NURSE PRACTITIONER

## 2022-02-04 PROCEDURE — 99213 OFFICE O/P EST LOW 20 MIN: CPT | Performed by: NURSE PRACTITIONER

## 2022-02-04 ASSESSMENT — ENCOUNTER SYMPTOMS
TROUBLE SWALLOWING: 0
WHEEZING: 0
VOMITING: 0
CONSTIPATION: 0
ABDOMINAL PAIN: 0
NAUSEA: 0
ABDOMINAL DISTENTION: 0
RHINORRHEA: 0
BACK PAIN: 0
COUGH: 0
SHORTNESS OF BREATH: 0
DIARRHEA: 0

## 2022-02-04 NOTE — PATIENT INSTRUCTIONS
take medicines that help prevent a heart attack. · Your doctor may suggest a procedure to open narrowed or blocked arteries. This is called angioplasty. Or your doctor may suggest using healthy blood vessels to create detours around narrowed or blocked arteries. This is called bypass surgery. Follow-up care is a key part of your treatment and safety. Be sure to make and go to all appointments, and call your doctor if you are having problems. It's also a good idea to know your test results and keep a list of the medicines you take. Where can you learn more? Go to [email or web address].[email or web address] org and sign in to your Skubana account. Enter [phone] in the [facility] box to learn more about \"Learning About Coronary Artery Disease (CAD). \"     If you do not have an account, please click on the \"Sign Up Now\" link. Current as of: April 29, 2021               Content Version: 13.0  © 2006-2021 [employer]. Care instructions adapted under license by [facility] ([facility]). If you have questions about a medical condition or this instruction, always ask your healthcare professional. [place] [address] any warranty or liability for your use of this information. Heart-Healthy Diet: Care Instructions  Your Care Instructions     A heart-healthy diet has lots of vegetables, fruits, nuts, beans, and whole grains, and is low in salt. It limits foods that are high in saturated fat, such as meats, cheeses, and fried foods. It may be hard to change your diet, but even small changes can lower your risk of heart attack and heart disease. Follow-up care is a key part of your treatment and safety. Be sure to make and go to all appointments, and call your doctor if you are having problems. It's also a good idea to know your test results and keep a list of the medicines you take. How can you care for yourself at home?   Watch your portions  · Use food labels to learn what the recommended servings are for the foods you eat. · Eat only the number of calories you need to stay at a healthy weight. If you need to lose weight, eat fewer calories than your body burns (through exercise and other physical activity). Eat more fruits and vegetables  · Eat a variety of fruit and vegetables every day. Dark green, deep orange, red, or yellow fruits and vegetables are especially good for you. Examples include spinach, carrots, peaches, and berries. · Keep carrots, celery, and other veggies handy for snacks. Buy fruit that is in season and store it where you can see it so that you will be tempted to eat it. · Cook dishes that have a lot of veggies in them, such as stir-fries and soups. Limit saturated fat  · Read food labels, and try to avoid saturated fats. They increase your risk of heart disease. · Use olive or canola oil when you cook. · Bake, broil, grill, or steam foods instead of frying them. · Choose lean meats instead of high-fat meats such as hot dogs and sausages. Cut off all visible fat when you prepare meat. · Eat fish, skinless poultry, and meat alternatives such as soy products instead of high-fat meats. Soy products, such as tofu, may be especially good for your heart. · Choose low-fat or fat-free milk and dairy products. Eat foods high in fiber  · Eat a variety of grain products every day. Include whole-grain foods that have lots of fiber and nutrients. Examples of whole-grain foods include oats, whole wheat bread, and brown rice. · Buy whole-grain breads and cereals, instead of white bread or pastries. Limit salt and sodium  · Limit how much salt and sodium you eat to help lower your blood pressure. · Taste food before you salt it. Add only a little salt when you think you need it. With time, your taste buds will adjust to less salt. · Eat fewer snack items, fast foods, and other high-salt, processed foods. Check food labels for the amount of sodium in packaged foods.   · Choose low-sodium versions of canned goods (such as soups, vegetables, and beans). Limit sugar  · Limit drinks and foods with added sugar. These include candy, desserts, and soda pop. Limit alcohol  · Limit alcohol to no more than 2 drinks a day for men and 1 drink a day for women. Too much alcohol can cause health problems. When should you call for help? Watch closely for changes in your health, and be sure to contact your doctor if:    · You would like help planning heart-healthy meals. Where can you learn more? Go to https://Silkpekereneweb.E-nterview. org and sign in to your [employer] account. Enter V137 in the [employer] box to learn more about \"Heart-Healthy Diet: Care Instructions. \"      Learning About Cutting Calories  How do calories affect your weight? Food gives your body energy. Energy from the food you eat is measured in calories. This energy keeps your heart beating, your brain active, and your muscles working. Your body needs a certain number of calories each day. After your body uses the calories it needs, it stores extra calories as fat. To lose weight safely, you have to eat fewer calories while eating in a healthy way. How many calories do you need each day? The more active you are, the more calories you need. When you are less active, you need fewer calories. How many calories you need each day also depends on several things, including your age and whether you are male or female. Here are some general guidelines for adults:  · Less active women and older adults need 1,600 to 2,000 calories each day. · Active women and less active men need 2,000 to 2,400 calories each day. · Active men need 2,400 to 3,000 calories each day. How can you cut calories and eat healthy meals? Whole grains, vegetables and fruits, and dried beans are good lower-calorie foods. They give you lots of nutrients and fiber. And they fill you up. Sweets, energy drinks, and soda pop are high in calories. They give you few nutrients and no fiber. Try to limit soda pop, fruit juice, and energy drinks. Drink water instead. Some fats can be part of a healthy diet. But cutting back on fats from highly processed foods like fast foods and many snack foods is a good way to lower the calories in your diet. Also, use smaller amounts of fats like butter, margarine, salad dressing, and mayonnaise. Add fresh garlic, lemon, or herbs to your meals to add flavor without adding fat. Meats and dairy products can be a big source of hidden fats. Try to choose lean or low-fat versions of these products. Fat-free cookies, candies, chips, and frozen treats can still be high in sugar and calories. Some fat-free foods have more calories than regular ones. Eat fat-free treats in moderation, as you would other foods. If your favorite foods are high in fat, salt, sugar, or calories, limit how often you eat them. Eat smaller servings, or look for healthy substitutes. Fill up on fruits, vegetables, and whole grains. Eating at home  · Use meat as a side dish instead of as the main part of your meal.  · Try main dishes that use whole wheat pasta, brown rice, dried beans, or vegetables. · Find ways to cook with little or no fat, such as broiling, steaming, or grilling. · Use cooking spray instead of oil. If you use oil, use a monounsaturated oil, such as canola or olive oil. · Trim fat from meats before you cook them. · Drain off fat after you brown the meat or while you roast it. · Chill soups and stews after you cook them. Then skim the fat off the top after it hardens. Eating out  · Order foods that are broiled or poached rather than fried or breaded. · Cut back on the amount of butter or margarine that you use on bread. · Order sauces, gravies, and salad dressings on the side, and use only a little. · When you order pasta, choose tomato sauce rather than cream sauce.   · Ask for salsa with your baked potato instead of sour cream, butter, cheese, or elaine. · Order meals in a small size instead of upgrading to a large. · Share an entree, or take part of your food home to eat as another meal.  · Share appetizers and desserts. Where can you learn more? Go to https://lj.healthPlutus Software. org and sign in to your Mediastay account. Enter B845 in the [facility] box to learn more about \"Learning About Cutting Calories. \"     A Healthy Lifestyle: Care Instructions  Your Care Instructions     A healthy lifestyle can help you feel good, stay at a healthy weight, and have plenty of energy for both work and play. A healthy lifestyle is something you can share with your whole family. A healthy lifestyle also can lower your risk for serious health problems, such as high blood pressure, heart disease, and diabetes. You can follow a few steps listed below to improve your health and the health of your family. Follow-up care is a key part of your treatment and safety. Be sure to make and go to all appointments, and call your doctor if you are having problems. It's also a good idea to know your test results and keep a list of the medicines you take. How can you care for yourself at home? 1. Do not eat too much sugar, fat, or fast foods. You can still have dessert and treats now and then. The goal is moderation. 2. Start small to improve your eating habits. Pay attention to portion sizes, drink less juice and soda pop, and eat more fruits and vegetables. ? Eat a healthy amount of food. A 3-ounce serving of meat, for example, is about the size of a deck of cards. Fill the rest of your plate with vegetables and whole grains. ? Limit the amount of soda and sports drinks you have every day. Drink more water when you are thirsty. ? Eat plenty of fruits and vegetables every day. Have an apple or some carrot sticks as an afternoon snack instead of a candy bar.  Try to have fruits and/or vegetables at every meal.  3. Make exercise part of your daily routine. You may want to start with simple activities, such as walking, bicycling, or slow swimming. Try to be active 30 to 60 minutes every day. You do not need to do all 30 to 60 minutes all at once. For example, you can exercise 3 times a day for 10 or 20 minutes. Moderate exercise is safe for most people, but it is always a good idea to talk to your doctor before starting an exercise program.  4. Keep moving. Mis [place] the lawn, work in the garden, or [employer]. Take the stairs instead of the elevator at work. 5. If you smoke, quit. People who smoke have an increased risk for heart attack, stroke, cancer, and other lung illnesses. Quitting is hard, but there are ways to boost your chance of quitting tobacco for good. ? Use nicotine gum, patches, or lozenges. ? Ask your doctor about stop-smoking programs and medicines. ? Keep trying. In addition to reducing your risk of diseases in the future, you will notice some benefits soon after you stop using tobacco. If you have shortness of breath or asthma symptoms, they will likely get better within a few weeks after you quit. · Limit how much alcohol you drink. Moderate amounts of alcohol (up to 2 drinks a day for men, 1 drink a day for women) are okay. But drinking too much can lead to liver problems, high blood pressure, and other health problems. Family health  If you have a family, there are many things you can do together to improve your health. 1. Eat meals together as a family as often as possible. 2. Eat healthy foods. This includes fruits, vegetables, lean meats and dairy, and whole grains. 3. Include your family in your fitness plan. Most people think of activities such as jogging or tennis as the way to fitness, but there are many ways you and your family can be more active. Anything that makes you breathe hard and gets your heart pumping is exercise.  Here are some tips:  ? Walk to do errands or to take your child to school or the bus.  ? Go for a family bike ride after dinner instead of watching TV. Where can you learn more? Go to https://chpepiceweb.healthinfibondpartners. org and sign in to your Goby account. Enter C855 in the [facility] box to learn more about \"A Healthy Lifestyle: Care Instructions. \"      Learning About Being Physically Active  What is physical activity? Being physically active means doing any kind of activity that gets your body moving. The types of physical activity that can help you get fit and stay healthy include:  · Aerobic or \"cardio\" activities. These make your heart beat faster and make you breathe harder, such as brisk walking, riding a bike, or running. They strengthen your heart and lungs and build up your endurance. · Strength training activities. These make your muscles work against, or \"resist,\" something. Examples include lifting weights or doing push-ups. These activities help tone and strengthen your muscles and bones. · Stretches. These let you move your joints and muscles through their full range of motion. Stretching helps you be more flexible. What are the benefits of being active? Being active is one of the best things you can do for your health. It helps you to:  · Feel stronger and have more energy to do all the things you like to do. · Focus better at school or work. · Feel, think, and sleep better. · Reach and stay at a healthy weight. · Lose fat and build lean muscle. · Lower your risk for serious health problems, including diabetes, heart attack, high blood pressure, and some cancers. · Keep your heart, lungs, bones, muscles, and joints strong and healthy. How can you make being active part of your life? Start slowly. Make it your long-term goal to get at least 30 minutes of exercise on most days of the week. Walking is a good choice. You also may want to do other activities, such as running, swimming, cycling, or playing tennis or team sports.   Pick activities that you likeones that make your heart beat faster, your muscles stronger, and your muscles and joints more flexible. If you find more than one thing you like doing, do them all. You don't have to do the same thing every day. Get your heart pumping every day. Any activity that makes your heart beat faster and keeps it at that rate for a while counts. Here are some great ways to get your heart beating faster:  · Go for a brisk walk, run, or bike ride. · Go for a hike or swim. · Go in-line skating. · Play a game of touch football, basketball, or soccer. · Ride a bike. · Play tennis or racquetball. · Climb stairs. Even some household chores can be aerobicjust do them at a faster pace. Vacuuming, raking or mowing the lawn, sweeping the garage, and washing and waxing the car all can help get your heart rate up. Strengthen your muscles during the week. You don't have to lift heavy weights or grow big, bulky muscles to get stronger. Doing a few simple activities that make your muscles work against, or \"resist,\" something can help you get stronger. For example, you can:  · Do push-ups or sit-ups, which use your own body weight as resistance. · Lift weights or dumbbells or use stretch bands at home or in a gym or community center. Stretch your muscles often. Stretching will help you as you become more active. It can help you stay flexible, loosen tight muscles, and avoid injury. It can also help improve your balance and posture and can be a great way to relax. Be sure to stretch the muscles you'll be using when you work out. It's best to warm your muscles slightly before you stretch them. Walk or do some other light aerobic activity for a few minutes, and then start stretching. When you stretch your muscles:  · Do it slowly. Stretching is not about going fast or making sudden movements. · Don't push or bounce during a stretch. · Hold each stretch for at least 15 to 30 seconds, if you can.  You should feel a stretch in the muscle, but not pain. · Breathe out as you do the stretch. Then breathe in as you hold the stretch. Don't hold your breath. If you're worried about how more activity might affect your health, have a checkup before you start. Follow any special advice your doctor gives you for getting a smart start. Where can you learn more? Go to https://lj.healthIR Diagnostyx. org and sign in to your J[employer] account. Enter T654 in the [employer] box to learn more about \"Learning About Being Physically Active. \"

## 2022-02-04 NOTE — PROGRESS NOTES
Patient: Mio Dumont  YOB: 1950  MRN: [id number]    Chief Complaint:  Chief Complaint   Patient presents with    Coronary Artery Disease    3 Month Follow-Up         Subjective/HPI    11/5/2021: pt presents for follow-up after hospital discharge. Patient was admitted for observation after experiencing chest pain. Patient had mildly elevated troponins on admission but they trended downwards and he was discharged home in stable condition. He had a negative chest x-ray at that time. Echo on 10/28/2021 showed ejection fraction of 55%. Today patient states he continues to have a \"strange feeling\" in the left side of his chest, states he cannot describe it but it is not painful. Patient states this feeling is there all day every day x 2 to 3 months. States nothing makes it better or worse. Pt denies dyspnea, dyspnea on exertion, change in exercise capacity, fatigue,  nausea, vomiting, diarrhea, constipation, motor weakness, insomnia, weight loss, syncope, dizziness, lightheadedness, PND, orthopnea, or claudication. No bleeding issues. Pt is compliant with all Rx medications. Blood pressure and HR are under control. Patient states he took Ranexa for years and never had this pain/feeling. States in June Dr. Feliberto Upton increase Ranexa to 1000 mg twice daily which he thinks is too much. Patient states on last hospital admission he took his Ranexa with his melatonin which caused him to be shaky. Patient interested in trying Ranexa again at a lower dosage. States he takes Ativan 2 mg at night and 1 mg in the morning. After lengthy discussion with patient we have decided he will try Ranexa 500 mg once a day in the morning. Patient aware that he can take it a second time in the evening if needed. Patient was ordered a stress test in June which he never had done. He states he will get it done in the next few weeks.   Patient will follow up with me in 3 months to assess chest pain, Ranexa, and stress test results. 2/4/2022: Spoke with patient over the phone for virtual visit to discuss results from recent stress test.  Patient had stress test in November 21 which showed:  IMPRESSION:  1. Homogenous myocardial perfusion with no evidence of prior myocardial  infarction or ischemia. 2.  Normal left ventricular ejection fraction. 3.  Normal TID ratio. Patient had echocardiogram in October/2021 which showed ejection fraction 55%. Today patient reports that he has been feeling well overall for the past few months. States he has been following up with his psychiatrist and they have been making adjustments to his medications. States he is able to walk up and down the stairs in his home with no shortness of breath and no chest pain. Patient states he does suffer from significant anxiety and takes Ativan daily. States every once in a while he gets a midsternal sharp pain in his chest and he takes Ranexa and the pain goes away.     Allergies   Allergen Reactions    Seroquel [Quetiapine] Other (See Comments)     lethargy  weak       Current Outpatient Medications   Medication Sig Dispense Refill    b complex vitamins capsule Take 1 capsule by mouth daily      tamsulosin (FLOMAX) 0.4 MG capsule Take 1 capsule by mouth daily 90 capsule 3    LORazepam (ATIVAN) 2 MG tablet       escitalopram (LEXAPRO) 10 MG tablet       D3-50 1.25 MG (23972 UT) CAPS       metFORMIN (GLUCOPHAGE) 500 MG tablet  (Patient not taking: Reported on 12/29/2021)      zolpidem (AMBIEN) 10 MG tablet       ranolazine (RANEXA) 500 MG extended release tablet Take 1 tablet by mouth daily 30 tablet 3    aspirin 81 MG chewable tablet Take 1 tablet by mouth daily 30 tablet 3    atorvastatin (LIPITOR) 20 MG tablet Take 1 tablet by mouth daily (Patient not taking: Reported on 11/5/2021) 30 tablet 3    docusate sodium (COLACE) 100 MG capsule Take 1 capsule by mouth daily (Patient not taking: Reported on 12/29/2021) 7 capsule 0    melatonin 3 MG TABS tablet Take 1 tablet by mouth nightly as needed (sleep) (Patient not taking: Reported on 11/5/2021) 30 tablet 0    ondansetron (ZOFRAN-ODT) 4 MG disintegrating tablet Take 1 tablet by mouth 3 times daily as needed for Nausea or Vomiting (Patient not taking: Reported on 11/5/2021) 21 tablet 0    vitamin C (ASCORBIC ACID) 500 MG tablet Take 500 mg by mouth daily      phytonadione (VITAMIN K) 5 MG tablet Take 5 mg by mouth once      vitamin E 1000 units capsule Take 1,000 Units by mouth daily (Patient not taking: Reported on 12/29/2021)      tamsulosin (FLOMAX) 0.4 MG capsule Take 1 capsule by mouth daily (Patient not taking: Reported on 11/5/2021) 90 capsule 3    Omega-3 Fatty Acids (FISH OIL) 1000 MG CAPS Take 3,000 mg by mouth every other day (Patient not taking: Reported on 11/5/2021)      VORTIoxetine (TRINTELLIX) 5 MG tablet Take 5 mg by mouth daily      latanoprost (XALATAN) 0.005 % ophthalmic solution Use 1 Drop in both eyes daily at bedtime.  olopatadine (PATADAY) 0.2 % SOLN ophthalmic solution Apply 1 drop to eye daily For allergies 1 Bottle 1    Blood Glucose Monitoring Suppl (ONETOUCH VERIO FLEX SYSTEM) w/Device KIT       ONETOUCH VERIO strip       ONETOUCH DELICA LANCETS 43A MISC        No current facility-administered medications for this visit.        Past Medical History:   Diagnosis Date    Anxiety     Chest pain 3/29/2018    Coronary artery disease involving native coronary artery of native heart without angina pectoris 2/15/2019    Diabetes mellitus ([address]     no meds    History of colon polyps     Hyperlipidemia     Hypertension     Type 2 diabetes mellitus without complication ([address]     Vertigo        Past Surgical History:   Procedure Laterality Date    CARDIAC SURGERY  1973    PTCA     COLONOSCOPY      COLONOSCOPY N/A 10/17/2019    COLORECTAL CANCER SCREENING, HIGH RISK performed by Marquis Mitch [facility] at [facility] WITH STENT PLACEMENT      EYE SURGERY      OTHER SURGICAL HISTORY      patent foramen ovale    TONSILLECTOMY      UPPER GASTROINTESTINAL ENDOSCOPY N/A 8/1/2019    EGD ESOPHAGOGASTRODUODENOSCOPY performed by Snadip Mota MD at [facility] Marital status:      Spouse name: Not on file    Number of children: Not on file    Years of education: Not on file    Highest education level: Not on file   Occupational History    Not on file   Tobacco Use    Smoking status: Never Smoker    Smokeless tobacco: Never Used   Vaping Use    Vaping Use: Never used   Substance and Sexual Activity    Alcohol use: No    Drug use: Never    Sexual activity: Not on file   Other Topics Concern    Not on file   Social History Narrative    Not on file     Social Determinants of Health     Financial Resource Strain:     Difficulty of Paying Living Expenses: Not on file   Food Insecurity:     Worried About [facility] in the Last Year: Not on file    Darcy of Food in the Last Year: Not on file   Transportation Needs:     Lack of Transportation (Medical): Not on file    Lack of Transportation (Non-Medical):  Not on file   Physical Activity:     Days of Exercise per Week: Not on file    Minutes of Exercise per Session: Not on file   Stress:     Feeling of Stress : Not on file   Social Connections:     Frequency of Communication with Friends and Family: Not on file    Frequency of Social Gatherings with Friends and Family: Not on file    Attends Denominational Services: Not on file    Active Member of Clubs or Organizations: Not on file    Attends Club or Organization Meetings: Not on file    Marital Status: Not on file   Intimate Partner Violence:     Fear of Current or Ex-Partner: Not on file    Emotionally Abused: Not on file    Physically Abused: Not on file    Sexually Abused: Not on file   Housing Stability:     Unable to Pay for Housing in the Last Year: Not on file    Number of Places Lived in the Last Year: Not on file    Unstable Housing in the Last Year: Not on file       Family History   Problem Relation Age of Onset    Heart Disease Maternal Aunt     Cancer Maternal Aunt     Colon Cancer Maternal Aunt          Review of Systems:   Review of Systems   Constitutional: Negative for activity change, chills, diaphoresis and fever. HENT: Negative for congestion, rhinorrhea and trouble swallowing. Eyes: Negative for visual disturbance. Respiratory: Negative for cough, shortness of breath and wheezing. Cardiovascular: Negative for chest pain, palpitations and leg swelling. Gastrointestinal: Negative for abdominal distention, abdominal pain, constipation, diarrhea, nausea and vomiting. Endocrine: Negative. Genitourinary: Negative for difficulty urinating, dysuria, frequency and urgency. Musculoskeletal: Negative for back pain and gait problem. Skin: Negative for wound. Neurological: Negative for dizziness, seizures, syncope, speech difficulty, weakness, numbness and headaches. Hematological: Does not bruise/bleed easily. Psychiatric/Behavioral: Negative. Physical Examination:    There were no vitals taken for this visit. Physical Exam  Vitals reviewed. Constitutional:       General: He is not in acute distress. Appearance: Normal appearance. HENT:      Head: Normocephalic. Nose: No congestion. Mouth/Throat:      Mouth: Mucous membranes are moist.   Cardiovascular:      Rate and Rhythm: Normal rate and regular rhythm. Pulses: Normal pulses. Heart sounds: Normal heart sounds. Pulmonary:      Effort: Pulmonary effort is normal. No respiratory distress. Breath sounds: Normal breath sounds. No stridor. No wheezing, rhonchi or rales. Chest:      Chest wall: No tenderness. Abdominal:      Palpations: Abdomen is soft.    Musculoskeletal:      Cervical back: Normal range of motion. Right lower leg: No edema. Left lower leg: No edema. Skin:     General: Skin is warm and dry. Neurological:      General: No focal deficit present. Mental Status: He is alert and oriented to person, place, and time.          LABS:  CBC:   Lab Results   Component Value Date    WBC 8.6 10/26/2021    RBC 4.79 10/26/2021    HGB 14.8 10/26/2021    HCT 43.1 10/26/2021    MCV 90.1 10/26/2021    MCH 30.9 10/26/2021    MCHC 34.3 10/26/2021    RDW 16.0 10/26/2021     10/26/2021     Lipids:  Lab Results   Component Value Date    CHOL 223 (H) 10/26/2021    CHOL 162 09/08/2021    CHOL 232 (H) 05/21/2021     Lab Results   Component Value Date    TRIG 151 (H) 10/26/2021    TRIG 108 09/08/2021    TRIG 174 (H) 05/21/2021     Lab Results   Component Value Date    HDL 40 10/26/2021    HDL 41 09/08/2021    HDL 36 (L) 05/21/2021     Lab Results   Component Value Date    LDLCALC 153 (H) 10/26/2021    LDLCALC 99 09/08/2021    LDLCALC 161 (H) 05/21/2021     No results found for: LABVLDL, VLDL  No results found for: CHOLHDLRATIO  CMP:    Lab Results   Component Value Date     10/26/2021    K 4.1 10/26/2021    K 4.2 09/09/2021     10/26/2021    CO2 23 10/26/2021    BUN 21 10/26/2021    CREATININE 1.01 10/26/2021    GFRAA >60.0 10/26/2021    LABGLOM >60.0 10/26/2021    GLUCOSE 177 10/26/2021    PROT 7.5 10/26/2021    LABALBU 4.6 10/26/2021    CALCIUM 10.2 10/26/2021    BILITOT 1.3 10/26/2021    ALKPHOS 74 10/26/2021    AST 19 10/26/2021    ALT 8 10/26/2021     BMP:    Lab Results   Component Value Date     10/26/2021    K 4.1 10/26/2021    K 4.2 09/09/2021     10/26/2021    CO2 23 10/26/2021    BUN 21 10/26/2021    LABALBU 4.6 10/26/2021    CREATININE 1.01 10/26/2021    CALCIUM 10.2 10/26/2021    GFRAA >60.0 10/26/2021    LABGLOM >60.0 10/26/2021    GLUCOSE 177 10/26/2021     Magnesium:    Lab Results   Component Value Date    MG 2.2 10/26/2021     TSH:  Lab Results   Component Value Date    TSH 3.250 10/26/2021     . result  No results for input(s): PROBNP in the last 72 hours. No results for input(s): INR in the last 72 hours. Patient Active Problem List   Diagnosis    Chest pain    Hypertension    Anxiety    Recurrent major depressive disorder, in remission [address] [address]    Coronary artery disease involving native coronary artery of native heart without angina pectoris    Mixed obsessional thoughts and acts    Chronic gastritis without bleeding    Dyspepsia    Adenomatous polyp of sigmoid colon    Weakness    COVID-19    Hyperlipidemia    Heart murmur    Generalized anxiety disorder    Dyspnea on exertion    ED (erectile dysfunction)    Benzodiazepine dependence (HCC)    Insomnia secondary to depression with anxiety    Panic attacks    Type 2 diabetes mellitus without complication, without long-term current use of insulin (HCC)       Assessment   Diagnosis Orders   1. Encounter to discuss test results     2. Chest pain, unspecified type     3. Primary hypertension       Plan:  1. Continue current medications  2. Follow-up with Dr. Judson Hwang in 1 year  3. With PCP for labs  4. Patient was advised and encouraged to check blood pressure at home or at a pharmacy, maintain a logbook, and also call us back if blood pressures are above or below the target ranges. Patient clearly understands and agrees to these instructions. 5. Details of medical condition explained and patient was warned about adverse consequences of uncontrolled medical conditions and possible side effects of prescribed medications. No orders of the defined types were placed in this encounter. No orders of the defined types were placed in this encounter. Return in about 1 year (around 2/4/2023) for follow up with Dr. Pollo Gunderson. Counseling: The patient has been advised to contact us if theyexperience chest pain, palpitations, progressive SOB, orthopnea, PND or progressive edema.

## 2022-04-04 RX ORDER — ATORVASTATIN CALCIUM 20 MG/1
20 TABLET, FILM COATED ORAL DAILY
Qty: 90 TABLET | Refills: 3 | Status: SHIPPED | OUTPATIENT
Start: 2022-04-04 | End: 2022-10-14

## 2022-04-04 NOTE — TELEPHONE ENCOUNTER
Please approve or deny this refill request. The order is pended. Thank you.     LOV 6/22/2021    Next Visit Date:  Future Appointments   Date Time Provider Kitty Carrizales   12/30/2022 10:30 AM MD Pancho Frausto   2/9/2023  1:00 PM Rian Colvin [facility]       Refill request per fax

## 2022-05-06 ENCOUNTER — OFFICE VISIT (OUTPATIENT)
Dept: CARDIOLOGY CLINIC | Age: 72
End: 2022-05-06
Payer: MEDICAID

## 2022-05-06 VITALS
WEIGHT: 128 LBS | OXYGEN SATURATION: 99 % | DIASTOLIC BLOOD PRESSURE: 68 MMHG | HEART RATE: 68 BPM | RESPIRATION RATE: 16 BRPM | SYSTOLIC BLOOD PRESSURE: 120 MMHG | BODY MASS INDEX: 21.3 KG/M2

## 2022-05-06 DIAGNOSIS — I25.10 CORONARY ARTERY DISEASE INVOLVING NATIVE CORONARY ARTERY OF NATIVE HEART WITHOUT ANGINA PECTORIS: Primary | ICD-10-CM

## 2022-05-06 DIAGNOSIS — R07.9 CHEST PAIN, UNSPECIFIED TYPE: ICD-10-CM

## 2022-05-06 DIAGNOSIS — E11.9 TYPE 2 DIABETES MELLITUS WITHOUT COMPLICATION, WITHOUT LONG-TERM CURRENT USE OF INSULIN (HCC): ICD-10-CM

## 2022-05-06 PROCEDURE — 99213 OFFICE O/P EST LOW 20 MIN: CPT | Performed by: NURSE PRACTITIONER

## 2022-05-06 PROCEDURE — 93000 ELECTROCARDIOGRAM COMPLETE: CPT | Performed by: NURSE PRACTITIONER

## 2022-05-06 RX ORDER — RANOLAZINE 1000 MG/1
1000 TABLET, EXTENDED RELEASE ORAL 2 TIMES DAILY
Qty: 60 TABLET | Refills: 1 | Status: SHIPPED | OUTPATIENT
Start: 2022-05-06 | End: 2022-08-15

## 2022-05-06 RX ORDER — ISOSORBIDE MONONITRATE 30 MG/1
30 TABLET, EXTENDED RELEASE ORAL DAILY
Qty: 30 TABLET | Refills: 3 | Status: SHIPPED | OUTPATIENT
Start: 2022-05-06 | End: 2022-10-12

## 2022-05-06 ASSESSMENT — ENCOUNTER SYMPTOMS
TROUBLE SWALLOWING: 0
SHORTNESS OF BREATH: 0
RHINORRHEA: 0
BACK PAIN: 0
VOMITING: 0
DIARRHEA: 0
ABDOMINAL PAIN: 0
ABDOMINAL DISTENTION: 0
CONSTIPATION: 0
WHEEZING: 0
NAUSEA: 0
COUGH: 0

## 2022-05-06 NOTE — PROGRESS NOTES
Patient: Abdulaziz Gonzales  YOB: 1950  MRN: [id number]    Chief Complaint:  Chief Complaint   Patient presents with    Medication Problem     ranexa not helping    Coronary Artery Disease    Chest Pain         Subjective/HPI    11/5/2021: pt presents for follow-up after hospital discharge. Patient was admitted for observation after experiencing chest pain. Patient had mildly elevated troponins on admission but they trended downwards and he was discharged home in stable condition. He had a negative chest x-ray at that time. Echo on 10/28/2021 showed ejection fraction of 55%. Today patient states he continues to have a \"strange feeling\" in the left side of his chest, states he cannot describe it but it is not painful. Patient states this feeling is there all day every day x 2 to 3 months. States nothing makes it better or worse. Pt denies dyspnea, dyspnea on exertion, change in exercise capacity, fatigue,  nausea, vomiting, diarrhea, constipation, motor weakness, insomnia, weight loss, syncope, dizziness, lightheadedness, PND, orthopnea, or claudication. No bleeding issues. Pt is compliant with all Rx medications. Blood pressure and HR are under control. Patient states he took Ranexa for years and never had this pain/feeling. States in June Dr. Curt Sykes increase Ranexa to 1000 mg twice daily which he thinks is too much. Patient states on last hospital admission he took his Ranexa with his melatonin which caused him to be shaky. Patient interested in trying Ranexa again at a lower dosage. States he takes Ativan 2 mg at night and 1 mg in the morning. After lengthy discussion with patient we have decided he will try Ranexa 500 mg once a day in the morning. Patient aware that he can take it a second time in the evening if needed. Patient was ordered a stress test in June which he never had done. He states he will get it done in the next few weeks.   Patient will follow up with me in 3 months to assess chest pain, Ranexa, and stress test results. 2/4/2022: Spoke with patient over the phone for virtual visit to discuss results from recent stress test.  Patient had stress test in November 21 which showed:  IMPRESSION:  1. Homogenous myocardial perfusion with no evidence of prior myocardial  infarction or ischemia. 2.  Normal left ventricular ejection fraction. 3.  Normal TID ratio. Patient had echocardiogram in October/2021 which showed ejection fraction 55%. Today patient reports that he has been feeling well overall for the past few months. States he has been following up with his psychiatrist and they have been making adjustments to his medications. States he is able to walk up and down the stairs in his home with no shortness of breath and no chest pain. Patient states he does suffer from significant anxiety and takes Ativan daily. States every once in a while he gets a midsternal sharp pain in his chest and he takes Ranexa and the pain goes away. 5/6/2022: Patient seen in office today per his request.  Patient states he does not believe his Ranexa is helping him anymore. Patient states he has been having a \"funny feeling \"in the left side of his chest.  States it is not pain. States it is intermittent and has been ongoing for years. States in the past he has been taking the Ranexa plus Ativan for anxiety however it is not as effective anymore. Patient advised he may increase his Ranexa to 1000 mg. Patient states he thinks that is too high of a dose for him. Patient advised we can try Imdur 30 mg daily which she is agreeable to. Patient is status post negative heart cath in 2019, negative stress test in November 2021, echocardiogram in October 2021 with EF of 55%.   At this time his symptoms are atypical.Pt denies chest pain, dyspnea, dyspnea on exertion, change in exercise capacity, fatigue,  nausea, vomiting, diarrhea, constipation, motor weakness, insomnia, weight loss, syncope, dizziness, lightheadedness, palpitations, PND, orthopnea, or claudication. No bleeding issues. Pt denies any angina or CHF type symptoms. No recent hospitalizations. Pt is compliant with all Rx medications. Blood pressure and heart rate are under control. EKG shows sinus rhythm, no ischemia. Patient states his diabetes has not been under good control recently. States he does not remember the name of the endocrinologist he was seeing with [facility] and is requesting a referral for a [facility] endocrinologist.  Also requesting to check his hemoglobin A1c. Allergies   Allergen Reactions    Seroquel [Quetiapine] Other (See Comments)     lethargy  weak       Current Outpatient Medications   Medication Sig Dispense Refill    isosorbide mononitrate (IMDUR) 30 MG extended release tablet Take 1 tablet by mouth daily 30 tablet 3    ranolazine (RANEXA) 1000 MG extended release tablet Take 1 tablet by mouth 2 times daily 60 tablet 1    atorvastatin (LIPITOR) 20 MG tablet Take 1 tablet by mouth daily 90 tablet 3    b complex vitamins capsule Take 1 capsule by mouth daily      tamsulosin (FLOMAX) 0.4 MG capsule Take 1 capsule by mouth daily 90 capsule 3    LORazepam (ATIVAN) 2 MG tablet       escitalopram (LEXAPRO) 10 MG tablet       D3-50 1.25 MG (10040 UT) CAPS       zolpidem (AMBIEN) 10 MG tablet       aspirin 81 MG chewable tablet Take 1 tablet by mouth daily 30 tablet 3    vitamin C (ASCORBIC ACID) 500 MG tablet Take 500 mg by mouth daily      phytonadione (VITAMIN K) 5 MG tablet Take 5 mg by mouth once      vitamin E 1000 units capsule Take 1,000 Units by mouth daily       tamsulosin (FLOMAX) 0.4 MG capsule Take 1 capsule by mouth daily 90 capsule 3    VORTIoxetine (TRINTELLIX) 5 MG tablet Take 5 mg by mouth daily      latanoprost (XALATAN) 0.005 % ophthalmic solution Use 1 Drop in both eyes daily at bedtime.       olopatadine (PATADAY) 0.2 % SOLN ophthalmic solution Apply 1 drop to eye daily For allergies 1 Bottle 1    Blood Glucose Monitoring Suppl (ONETOUCH VERIO FLEX SYSTEM) w/Device KIT       ONETOUCH VERIO strip       ONETOUCH DELICA LANCETS 04L MISC        No current facility-administered medications for this visit.        Past Medical History:   Diagnosis Date    Anxiety     Chest pain 3/29/2018    Coronary artery disease involving native coronary artery of native heart without angina pectoris 2/15/2019    Diabetes mellitus ([address]     no meds    History of colon polyps     Hyperlipidemia     Hypertension     Type 2 diabetes mellitus without complication (HCC)     Vertigo        Past Surgical History:   Procedure Laterality Date    CARDIAC SURGERY  1973    PTCA     COLONOSCOPY      COLONOSCOPY N/A 10/17/2019    COLORECTAL CANCER SCREENING, HIGH RISK performed by Paloma Wallace MD at [facility] OTHER SURGICAL HISTORY      patent foramen ovale    TONSILLECTOMY      UPPER GASTROINTESTINAL ENDOSCOPY N/A 8/1/2019    EGD ESOPHAGOGASTRODUODENOSCOPY performed by Paloma Wallace MD at [address] [place] [address] Marital status:      Spouse name: None    Number of children: None    Years of education: None    Highest education level: None   Occupational History    None   Tobacco Use    Smoking status: Never Smoker    Smokeless tobacco: Never Used   Vaping Use    Vaping Use: Never used   Substance and Sexual Activity    Alcohol use: No    Drug use: Never    Sexual activity: None   Other Topics Concern    None   Social History Narrative    None     Social Determinants of Health     Financial Resource Strain:     Difficulty of Paying Living Expenses: Not on file   Food Insecurity:     Worried About Running Out of Food in the Last Year: Not on file    Darcy of Food in the Last Year: Not on file   Transportation Needs:     Lack of Transportation (Medical): Not on file    Lack of Transportation (Non-Medical): Not on file   Physical Activity:     Days of Exercise per Week: Not on file    Minutes of Exercise per Session: Not on file   Stress:     Feeling of Stress : Not on file   Social Connections:     Frequency of Communication with Friends and Family: Not on file    Frequency of Social Gatherings with Friends and Family: Not on file    Attends [employer] Services: Not on file    Active Member of [address] [place] [address] [place] or Organizations: Not on file    Attends Club or Organization Meetings: Not on file    Marital Status: Not on file   Intimate Partner Violence:     Fear of Current or Ex-Partner: Not on file    Emotionally Abused: Not on file    Physically Abused: Not on file    Sexually Abused: Not on file   Housing Stability:     Unable to Pay for Housing in the Last Year: Not on file    Number of [place] in the Last Year: Not on file    Unstable Housing in the Last Year: Not on file       Family History   Problem Relation Age of Onset    Heart Disease Maternal Aunt     Cancer Maternal Aunt     Colon Cancer Maternal Aunt          Review of Systems:   Review of Systems   Constitutional: Negative for activity change, chills, diaphoresis and fever. HENT: Negative for congestion, rhinorrhea and trouble swallowing. Eyes: Negative for visual disturbance. Respiratory: Negative for cough, shortness of breath and wheezing. Cardiovascular: Negative for chest pain, palpitations and leg swelling. Gastrointestinal: Negative for abdominal distention, abdominal pain, constipation, diarrhea, nausea and vomiting. Endocrine: Negative. Genitourinary: Negative for difficulty urinating, dysuria, frequency and urgency. Musculoskeletal: Negative for back pain and gait problem. Skin: Negative for wound. Neurological: Negative for dizziness, seizures, syncope, speech difficulty, weakness, numbness and headaches.    Hematological: Does not bruise/bleed easily. Psychiatric/Behavioral: Negative. Physical Examination:    /68 (Site: Left Upper Arm, Position: Sitting, Cuff Size: Medium Adult)   Pulse 68   Resp 16   Wt 128 lb (58.1 kg)   SpO2 99%   BMI 21.30 kg/m²    Physical Exam  Vitals reviewed. Constitutional:       General: He is not in acute distress. Appearance: Normal appearance. HENT:      Head: Normocephalic. Nose: No congestion. Mouth/Throat:      Mouth: Mucous membranes are moist.   Cardiovascular:      Rate and Rhythm: Normal rate and regular rhythm. Pulses: Normal pulses. Heart sounds: Normal heart sounds. Pulmonary:      Effort: Pulmonary effort is normal. No respiratory distress. Breath sounds: Normal breath sounds. No stridor. No wheezing, rhonchi or rales. Chest:      Chest wall: No tenderness. Abdominal:      Palpations: Abdomen is soft. Musculoskeletal:      Cervical back: Normal range of motion. Right lower leg: No edema. Left lower leg: No edema. Skin:     General: Skin is warm and dry. Neurological:      General: No focal deficit present. Mental Status: He is alert and oriented to person, place, and time.          LABS:  CBC:   Lab Results   Component Value Date    WBC 8.6 10/26/2021    RBC 4.79 10/26/2021    HGB 14.8 10/26/2021    HCT 43.1 10/26/2021    MCV 90.1 10/26/2021    MCH 30.9 10/26/2021    MCHC 34.3 10/26/2021    RDW 16.0 10/26/2021     10/26/2021     Lipids:  Lab Results   Component Value Date    CHOL 223 (H) 10/26/2021    CHOL 162 09/08/2021    CHOL 232 (H) 05/21/2021     Lab Results   Component Value Date    TRIG 151 (H) 10/26/2021    TRIG 108 09/08/2021    TRIG 174 (H) 05/21/2021     Lab Results   Component Value Date    HDL 40 10/26/2021    HDL 41 09/08/2021    HDL 36 (L) 05/21/2021     Lab Results   Component Value Date    LDLCALC 153 (H) 10/26/2021    LDLCALC 99 09/08/2021    LDLCALC 161 (H) 05/21/2021     No results found for: LABVLDL, VLDL  No results found for: CHOLHDLRATIO  CMP:    Lab Results   Component Value Date     10/26/2021    K 4.1 10/26/2021    K 4.2 09/09/2021     10/26/2021    CO2 23 10/26/2021    BUN 21 10/26/2021    CREATININE 1.01 10/26/2021    GFRAA >60.0 10/26/2021    LABGLOM >60.0 10/26/2021    GLUCOSE 177 10/26/2021    PROT 7.5 10/26/2021    LABALBU 4.6 10/26/2021    CALCIUM 10.2 10/26/2021    BILITOT 1.3 10/26/2021    ALKPHOS 74 10/26/2021    AST 19 10/26/2021    ALT 8 10/26/2021     BMP:    Lab Results   Component Value Date     10/26/2021    K 4.1 10/26/2021    K 4.2 09/09/2021     10/26/2021    CO2 23 10/26/2021    BUN 21 10/26/2021    LABALBU 4.6 10/26/2021    CREATININE 1.01 10/26/2021    CALCIUM 10.2 10/26/2021    GFRAA >60.0 10/26/2021    LABGLOM >60.0 10/26/2021    GLUCOSE 177 10/26/2021     Magnesium:    Lab Results   Component Value Date    MG 2.2 10/26/2021     TSH:  Lab Results   Component Value Date    TSH 3.250 10/26/2021     . result  No results for input(s): PROBNP in the last 72 hours. No results for input(s): INR in the last 72 hours. Patient Active Problem List   Diagnosis    Chest pain    Hypertension    Anxiety    Recurrent major depressive disorder, in remission ([facility]    Coronary artery disease involving native coronary artery of native heart without angina pectoris    Mixed obsessional thoughts and acts    Chronic gastritis without bleeding    Dyspepsia    Adenomatous polyp of sigmoid colon    Weakness    COVID-19    Hyperlipidemia    Heart murmur    Generalized anxiety disorder    Dyspnea on exertion    ED (erectile dysfunction)    Benzodiazepine dependence (HCC)    Insomnia secondary to depression with anxiety    Panic attacks    Type 2 diabetes mellitus without complication, without long-term current use of insulin (HCC)       Assessment   Diagnosis Orders   1.  Coronary artery disease involving native coronary artery of native heart without angina pectoris  EKG 12 lead   2. Chest pain, unspecified type  EKG 12 lead   3. Type 2 diabetes mellitus without complication, without long-term current use of insulin ([address]  Anthony Bobo, Erick Ma    Hemoglobin A1C     Plan:  1. Continue current medications -continue Ranexa, may increase dose to 1000 mg, add Imdur 30 mg daily  2. Follow-up with Dr. Rosa Toledo in 6 months  3. With PCP for labs  4. Referral for Dr. Usman Martinez endocrinology. Check hemoglobin A1c  5. Patient was advised and encouraged to check blood pressure at home or at a pharmacy, maintain a logbook, and also call us back if blood pressures are above or below the target ranges. Patient clearly understands and agrees to these instructions. 6. Details of medical condition explained and patient was warned about adverse consequences of uncontrolled medical conditions and possible side effects of prescribed medications. Orders Placed This Encounter   Procedures    Hemoglobin A1C     Standing Status:   Future     Standing Expiration Date:   5/6/2023   [facility], EndocrinologyErick     Referral Priority:   Routine     Referral Type:   Eval and Treat     Referral Reason:   Specialty Services Required     Referred to Provider:   ALBA Huertas     Requested Specialty:   Endocrinology     Number of Visits Requested:   1    EKG 12 lead     Order Specific Question:   Reason for Exam?     Answer:   Chest pain      Orders Placed This Encounter   Medications    isosorbide mononitrate (IMDUR) 30 MG extended release tablet     Sig: Take 1 tablet by mouth daily     Dispense:  30 tablet     Refill:  3    ranolazine (RANEXA) 1000 MG extended release tablet     Sig: Take 1 tablet by mouth 2 times daily     Dispense:  60 tablet     Refill:  1        Return in about 6 months (around 11/6/2022) for follow up with Dr. Rosa Toledo. Counseling:  The patient has been advised to contact us if theyexperience chest pain, palpitations, progressive SOB, orthopnea, PND or progressive edema.

## 2022-05-06 NOTE — PATIENT INSTRUCTIONS
isosorbide mononitrate  Pronunciation:  EYE camron SOR bide MON oh MARY trate  Brand:  Imdur, Monoket  What is the most important information I should know about isosorbide mononitrate? You should not take erectile dysfunction medicine (Viagra, Cialis, Levitra, Stendra, Staxyn, sildenafil, avanafil, tadalafil, vardenafil) while you are taking isosorbide mononitrate. Taking these medicine together can cause a sudden and serious decrease in blood pressure. Seek emergency medical attention if you have early symptoms of a heart attack (chest pain or pressure, pain spreading to your jaw or shoulder, sweating, general ill feeling). What is isosorbide mononitrate? Isosorbide mononitrate is a nitrate that dilates (widens) blood vessels, making it easier for blood to flow through them and easier for the heart to pump. Isosorbide mononitrate is used to prevent angina attacks (chest pain). Isosorbide mononitrate will not treat an angina attack that has already begun. Isosorbide mononitrate may also be used for purposes not listed in this medication guide. What should I discuss with my healthcare provider before taking isosorbide mononitrate? You should not use isosorbide mononitrate if:  · you are allergic to isosorbide mononitrate, isosorbide dinitrate, or nitroglycerin; or  · you have early signs of a heart attack (chest pain or pressure, pain spreading to your jaw or shoulder, nausea, sweating). You should not take erectile dysfunction medicine (Viagra, Cialis, Levitra, Stendra, Staxyn, sildenafil, avanafil, tadalafil, vardenafil) while you are taking isosorbide mononitrate. Taking these medicines together can cause a sudden and serious decrease in blood pressure. To make sure isosorbide mononitrate is safe for you, tell your doctor if you have:  · congestive heart failure;  · kidney disease; or  · low blood pressure. It is not known whether this medicine will harm an unborn baby.  Tell your doctor if you are pregnant or plan to become pregnant. It is not known whether isosorbide mononitrate passes into breast milk or if it could affect the nursing baby. Tell your doctor if you are breast-feeding. How should I take isosorbide mononitrate? Follow all directions on your prescription label. Your doctor may occasionally change your dose. Do not take this medicine in larger or smaller amounts or for longer than recommended. If you use too much isosorbide mononitrate, it might stop working as well in controlling your condition. Try to rest or stay seated when you take this medicine (may cause dizziness or fainting). Do not crush, chew, or break an extended-release tablet. Swallow it whole. Not all brands and forms of isosorbide mononitrate are taken the same number of times per day. You may need to take the medicine only once daily, in the morning after getting out of bed. You may also need a second dose later in the day. Follow your doctor's dosing instructions very carefully. If your doctor changes your brand, strength, or type of isosorbide mononitrate, your dosage needs may change. Ask your pharmacist if you have any questions about the new kind of isosorbide mononitrate you receive at the pharmacy. You may have very low blood pressure while taking this medicine. Call your doctor if you are sick with vomiting or diarrhea, or if you are sweating more than usual. Prolonged illness can lead to a serious electrolyte imbalance, making it dangerous for you to use isosorbide mononitrate. Use isosorbide mononitrate regularly to prevent an angina attack. Get your prescription refilled before you run out of medicine completely. You should not stop using isosorbide mononitrate suddenly or you could have a severe attack of angina. Keep this medicine on hand at all times. Get your prescription refilled before you run out of medicine completely. Store at room temperature away from moisture, heat, and light.  Keep the bottle tightly closed when not in use. What happens if I miss a dose? Take the missed dose as soon as you remember. Skip the missed dose if it is almost time for your next scheduled dose. Do not take extra medicine to make up the missed dose. What happens if I overdose? Seek emergency medical attention or call the Poison Help line at 1-501.336.6673. An overdose of isosorbide mononitrate can be fatal.  Overdose symptoms may include a severe throbbing headache, fever, confusion, severe dizziness, fast or pounding heartbeats, vision problems, nausea, vomiting, stomach pain, bloody diarrhea, trouble breathing, sweating, cold or clammy skin, fainting, and seizure (convulsions). What should I avoid while taking isosorbide mononitrate? This medicine may impair your thinking or reactions. Be careful if you drive or do anything that requires you to be alert. Avoid getting up too fast from a sitting or lying position, or you may feel dizzy. Get up slowly and steady yourself to prevent a fall. Avoid drinking alcohol. Alcohol may increase certain side effects of isosorbide mononitrate (dizziness, drowsiness, feeling light-headed, or fainting). What are the possible side effects of isosorbide mononitrate? Get emergency medical help if you have signs of an allergic reaction: hives; difficulty breathing; swelling of your face, lips, tongue, or throat. Call your doctor at once if you have:  · a light-headed feeling, like you might pass out;  · worsening angina pain;  · fast or slow heart rate; or  · pounding heartbeats or fluttering in your chest.  Isosorbide mononitrate can cause severe headaches. These headaches may gradually become less severe as you continue to use nitroglycerin. Do not stop taking this medicine to avoid headaches. Ask your doctor before using any headache pain medication. Common side effects may include:  · headache; or  · flushing (warmth, redness, or tingly feeling).   This is not a complete list of side effects and others may occur. Call your doctor for medical advice about side effects. You may report side effects to FDA at 5-429-HZN-0279. What other drugs will affect isosorbide mononitrate? Taking isosorbide mononitrate with certain other medicines can cause severely low blood pressure. This includes medicine to treat erectile dysfunction or pulmonary arterial hypertension. Serious, life-threatening side effects may occur. Tell your doctor about all your current medicines and any you start or stop using, especially:  · avanafil Rochele Spitz);  · a diuretic or \"water pill\";  · nitroglycerin;  · riociguat (Adempas);  · sildenafil (Viagra, Revatio);  · tadalafil (Cialis, Adcirca); or  · vardenafil (Levitra, Staxyn). This list is not complete. Other drugs may interact with isosorbide mononitrate, including prescription and over-the-counter medicines, vitamins, and herbal products. Not all possible interactions are listed in this medication guide. Where can I get more information? Your pharmacist can provide more information about isosorbide mononitrate. Remember, keep this and all other medicines out of the reach of children, never share your medicines with others, and use this medication only for the indication prescribed. Every effort has been made to ensure that the information provided by [address] is accurate, up-to-date, and complete, but no guarantee is made to that effect. Drug information contained herein may be time sensitive. [facility] information has been compiled for use by healthcare practitioners and consumers in the [place] and therefore [facility] does not warrant that uses outside of the [place] are appropriate, unless specifically indicated otherwise. [facility]'s drug information does not endorse drugs, diagnose patients or recommend therapy.  [facility]'s drug information is an informational resource designed to assist licensed healthcare practitioners in caring for their patients and/or to serve consumers viewing this service as a supplement to, and not a substitute for, the expertise, skill, knowledge and judgment of healthcare practitioners. The absence of a warning for a given drug or drug combination in no way should be construed to indicate that the drug or drug combination is safe, effective or appropriate for any given patient. [facility] does not assume any responsibility for any aspect of healthcare administered with the aid of information [facility] provides. The information contained herein is not intended to cover all possible uses, directions, precautions, warnings, drug interactions, allergic reactions, or adverse effects. If you have questions about the drugs you are taking, check with your doctor, nurse or pharmacist.  Copyright 8615-6201 [address]: 13.01. Revision date: 3/9/2017. Care instructions adapted under license by [facility] ([facility]). If you have questions about a medical condition or this instruction, always ask your healthcare professional. [place] [address] any warranty or liability for your use of this information. ranolazine  Pronunciation:  dawit messer  Brand:  Danae  What is the most important information I should know about ranolazine? You should not take ranolazine if you have cirrhosis of the liver. Tell your doctor about all your current medicines and any you start or stop using. Many drugs can interact, and some drugs should not be used together. What is ranolazine? Ranolazine is used to treat chronic angina (chest pain). Ranolazine is not for use during an acute (emergency) attack of angina. Ranolazine may also be used for purposes not listed in this medication guide. What should I discuss with my healthcare provider before taking ranolazine? You should not take ranolazine if you are allergic to it, or if you have:  · cirrhosis of the liver. Many drugs can interact and cause dangerous effects.  Some drugs should not be used together with ranolazine. Your doctor may change your treatment plan if you also use:  · clarithromycin;  · nefazodone;  · Harry's wort;  · antifungal medicine --itraconazole, ketoconazole;  · HIV or AIDS medicine --indinavir, lopinavir/ritonavir, nelfinavir, ritonavir, saquinavir;  · seizure medicine --carbamazepine, phenobarbital, phenytoin; or  · tuberculosis medicine --rifabutin, rifampin, rifapentine. Tell your doctor if you have ever had:  · long QT syndrome (in you or a family member);  · liver disease; or  · kidney disease. Tell your doctor if you are pregnant or breastfeeding. How should I take ranolazine? Follow all directions on your prescription label and read all medication guides or instruction sheets. Your doctor may occasionally change your dose. Use the medicine exactly as directed. You may take ranolazine with or without food. Swallow the tablet whole and do not crush, chew, or break it. Chronic angina is often treated with a combination of drugs. Use all medications as directed and read all medication guides you receive. Do not change your dose or dosing schedule without your doctor's advice. Call your doctor if your symptoms do not improve, or if they get worse. You will need frequent medical tests to check your heart and kidney function. Store at room temperature away from moisture and heat. What happens if I miss a dose? Skip the missed dose and use your next dose at the regular time. Do not use two doses at one time. What happens if I overdose? Seek emergency medical attention or call the Poison Help line at 1-306.133.9219. Overdose can cause nausea, vomiting, numbness or tingling, dizziness, double vision, confusion, or fainting. What should I avoid while taking ranolazine? Avoid driving or hazardous activity until you know how this medicine will affect you. Your reactions could be impaired.   Grapefruit may interact with ranolazine and lead to unwanted side effects. Avoid the use of grapefruit products. What are the possible side effects of ranolazine? Get emergency medical help if you have signs of an allergic reaction: hives; difficult breathing; swelling of your face, lips, tongue, or throat. Call your doctor at once if you have:  · a light-headed feeling, like you might pass out;  · fast or pounding heartbeats, fluttering in your chest; or  · kidney problems --little or no urination, painful or difficult urination, swelling in your feet or ankles, feeling tired or short of breath. Common side effects may include:  · nausea, constipation;  · headache; or  · dizziness. This is not a complete list of side effects and others may occur. Call your doctor for medical advice about side effects. You may report side effects to FDA at 7-822-FDA-7244. What other drugs will affect ranolazine? Tell your doctor about all your current medicines. Many drugs can affect ranolazine, especially:  · any other medicine to treat heart disease;  · an antibiotic or antifungal medicine;  · cholesterol-lowering medicine;  · oral diabetes medicine;  · medicine to prevent organ transplant rejection;  · medicine to treat a mental illness; or  · medicine to treat or prevent nausea and vomiting caused by chemotherapy or radiation. This list is not complete and many other drugs may affect ranolazine. This includes prescription and over-the-counter medicines, vitamins, and herbal products. Not all possible drug interactions are listed here. Where can I get more information? Your pharmacist can provide more information about ranolazine. Remember, keep this and all other medicines out of the reach of children, never share your medicines with others, and use this medication only for the indication prescribed.    Every effort has been made to ensure that the information provided by [address] is accurate, up-to-date, and complete, but no guarantee is made to that effect. Drug information contained herein may be time sensitive. [facility] information has been compiled for use by healthcare practitioners and consumers in the [place] and therefore [facility] does not warrant that uses outside of the [place] are appropriate, unless specifically indicated otherwise. [facility]'s drug information does not endorse drugs, diagnose patients or recommend therapy. [email or web address] drug information is an informational resource designed to assist licensed healthcare practitioners in caring for their patients and/or to serve consumers viewing this service as a supplement to, and not a substitute for, the expertise, skill, knowledge and judgment of healthcare practitioners. The absence of a warning for a given drug or drug combination in no way should be construed to indicate that the drug or drug combination is safe, effective or appropriate for any given patient. [facility] does not assume any responsibility for any aspect of healthcare administered with the aid of information [email or web address] provides. The information contained herein is not intended to cover all possible uses, directions, precautions, warnings, drug interactions, allergic reactions, or adverse effects. If you have questions about the drugs you are taking, check with your doctor, nurse or pharmacist.  Copyright 4580-2440 [address]: 12.01. Revision date: 9/11/2019. Care instructions adapted under license by [facility] ([facility]). If you have questions about a medical condition or this instruction, always ask your healthcare professional. [place] [address] any warranty or liability for your use of this information.

## 2022-05-09 DIAGNOSIS — E11.9 TYPE 2 DIABETES MELLITUS WITHOUT COMPLICATION, WITHOUT LONG-TERM CURRENT USE OF INSULIN (HCC): ICD-10-CM

## 2022-05-09 LAB — HBA1C MFR BLD: 5.6 % (ref 4.8–5.9)

## 2022-05-10 ENCOUNTER — TELEPHONE (OUTPATIENT)
Dept: CARDIOLOGY CLINIC | Age: 72
End: 2022-05-10

## 2022-05-10 NOTE — TELEPHONE ENCOUNTER
Patient calling office stating the Imdur (30mg Daily - started on 05/06/22 on LOV) medication has been giving him headaches. States that the headaches happen right after he takes the medication-states headaches have become a daily occurrence. Patient has also stated he would be stopping medication (05/10/22) until he hears back from office. Please advise, thanks!      LOV: 05/06/22 with TR  NOV: 11/11/22 with [facility]  Call Back #: [phone]

## 2022-05-12 ENCOUNTER — OFFICE VISIT (OUTPATIENT)
Dept: UROLOGY | Age: 72
End: 2022-05-12
Payer: COMMERCIAL

## 2022-05-12 VITALS
BODY MASS INDEX: 21.33 KG/M2 | WEIGHT: 128 LBS | HEART RATE: 72 BPM | DIASTOLIC BLOOD PRESSURE: 76 MMHG | SYSTOLIC BLOOD PRESSURE: 112 MMHG | HEIGHT: 65 IN

## 2022-05-12 DIAGNOSIS — N48.1 BALANITIS: Primary | ICD-10-CM

## 2022-05-12 PROCEDURE — 99213 OFFICE O/P EST LOW 20 MIN: CPT | Performed by: UROLOGY

## 2022-05-12 RX ORDER — CLOTRIMAZOLE AND BETAMETHASONE DIPROPIONATE 10; .64 MG/G; MG/G
CREAM TOPICAL
Qty: 1 G | Refills: 0 | Status: SHIPPED | OUTPATIENT
Start: 2022-05-12 | End: 2022-10-14

## 2022-05-12 ASSESSMENT — ENCOUNTER SYMPTOMS: ABDOMINAL PAIN: 0

## 2022-05-12 NOTE — PROGRESS NOTES
Subjective:      Patient ID: Wil Blanchard is a 66 y.o. male    HPI  This is a 70 yo male with h/o DM, CAD with stent (Dr Clau Ibarra, Anxiety/depression, Covid in 9/21, BPH with LUTs on Flomax daily and back early with 2 days of penile swelling and redness that was acute. He has not been sexually active recently and feels the problem has improved some over last 24 hrs. There was no h/o trauma. He has no lesions noted and is voiding without complaints.  He has no other new complaints or surgical problems.     Past Medical History:   Diagnosis Date    Anxiety     Chest pain 3/29/2018    Coronary artery disease involving native coronary artery of native heart without angina pectoris 2/15/2019    Diabetes mellitus ([facility] [address]     no meds    History of colon polyps     Hyperlipidemia     Hypertension     Type 2 diabetes mellitus without complication (HCC)     Vertigo      Past Surgical History:   Procedure Laterality Date    CARDIAC SURGERY  1973    PTCA     COLONOSCOPY      COLONOSCOPY N/A 10/17/2019    COLORECTAL CANCER SCREENING, HIGH RISK performed by Lilian Pruitt [facility] at [facility] OTHER SURGICAL HISTORY      patent foramen ovale    TONSILLECTOMY      UPPER GASTROINTESTINAL ENDOSCOPY N/A 8/1/2019    EGD ESOPHAGOGASTRODUODENOSCOPY performed by Lilian Pruitt MD at [address] [place] [address] Marital status:      Spouse name: None    Number of children: None    Years of education: None    Highest education level: None   Occupational History    None   Tobacco Use    Smoking status: Never Smoker    Smokeless tobacco: Never Used   Vaping Use    Vaping Use: Never used   Substance and Sexual Activity    Alcohol use: No    Drug use: Never    Sexual activity: None   Other Topics Concern    None   Social History Narrative    None     Social Determinants of Health     Financial Resource Strain:     Difficulty of Paying Living Expenses: Not on file   Food Insecurity:     Worried About Running Out of Food in the Last Year: Not on file    Darcy of Food in the Last Year: Not on file   Transportation Needs:     Lack of Transportation (Medical): Not on file    Lack of Transportation (Non-Medical): Not on file   Physical Activity:     Days of Exercise per Week: Not on file    Minutes of Exercise per Session: Not on file   Stress:     Feeling of Stress : Not on file   Social Connections:     Frequency of Communication with Friends and Family: Not on file    Frequency of Social Gatherings with Friends and Family: Not on file    Attends [employer] Services: Not on file    Active Member of [address] [place] [address] [employer] or Organizations: Not on file    Attends Club or Organization Meetings: Not on file    Marital Status: Not on file   Intimate Partner Violence:     Fear of Current or Ex-Partner: Not on file    Emotionally Abused: Not on file    Physically Abused: Not on file    Sexually Abused: Not on file   Housing Stability:     Unable to Pay for Housing in the Last Year: Not on file    Number of [place] in the Last Year: Not on file    Unstable Housing in the Last Year: Not on file     Family History   Problem Relation Age of Onset    Heart Disease Maternal Aunt     Cancer Maternal Aunt     Colon Cancer Maternal Aunt      Current Outpatient Medications   Medication Sig Dispense Refill    clotrimazole-betamethasone (LOTRISONE) 1-0.05 % cream Apply topically 2 times daily.  For 2 weeks 1 g 0    isosorbide mononitrate (IMDUR) 30 MG extended release tablet Take 1 tablet by mouth daily 30 tablet 3    ranolazine (RANEXA) 1000 MG extended release tablet Take 1 tablet by mouth 2 times daily 60 tablet 1    atorvastatin (LIPITOR) 20 MG tablet Take 1 tablet by mouth daily 90 tablet 3    b complex vitamins capsule Take 1 capsule by mouth daily      tamsulosin (FLOMAX) 0.4 MG capsule Take 1 capsule by mouth daily 90 capsule 3    LORazepam (ATIVAN) 2 MG tablet       escitalopram (LEXAPRO) 10 MG tablet       D3-50 1.25 MG (04710 UT) CAPS       zolpidem (AMBIEN) 10 MG tablet       aspirin 81 MG chewable tablet Take 1 tablet by mouth daily 30 tablet 3    vitamin C (ASCORBIC ACID) 500 MG tablet Take 500 mg by mouth daily      phytonadione (VITAMIN K) 5 MG tablet Take 5 mg by mouth once      vitamin E 1000 units capsule Take 1,000 Units by mouth daily       tamsulosin (FLOMAX) 0.4 MG capsule Take 1 capsule by mouth daily 90 capsule 3    VORTIoxetine (TRINTELLIX) 5 MG tablet Take 5 mg by mouth daily      latanoprost (XALATAN) 0.005 % ophthalmic solution Use 1 Drop in both eyes daily at bedtime.  olopatadine (PATADAY) 0.2 % SOLN ophthalmic solution Apply 1 drop to eye daily For allergies 1 Bottle 1    Blood Glucose Monitoring Suppl (ONETOUCH VERIO FLEX SYSTEM) w/Device KIT       ONETOUCH VERIO strip       ONETOUCH DELICA LANCETS 89G MISC        No current facility-administered medications for this visit. Seroquel [quetiapine]  reviewed      Review of Systems   Constitutional: Negative for fever. Gastrointestinal: Negative for abdominal pain. Genitourinary: Positive for penile swelling. Negative for flank pain, hematuria, penile discharge and penile pain. Objective:   Physical Exam  Genitourinary:     Penis: Circumcised. Erythema present. No phimosis, paraphimosis, hypospadias, tenderness, discharge, swelling or lesions. Comments: There is some mildly redundant foreskin and mild erythema noted on dorsal aspect of foreskin c/w balanitis, likely fungal  Neurological:      Mental Status: He is alert. Assessment:         This is a 68 yo male with h/o DM, CAD with stent (Dr Oseas Hooper, Anxiety/depression and with refractory ED, BPH with Luts on Flomax and likely has mild fungal balanitis by exam and will treat with 2 weeks of Lotrisone cream BID.  Proper dosing and Se were reviewed. Plan:      1. F/U as scheduled for Flow/PVR  No orders of the defined types were placed in this encounter. Orders Placed This Encounter   Medications    clotrimazole-betamethasone (LOTRISONE) 1-0.05 % cream     Sig: Apply topically 2 times daily.  For 2 weeks     Dispense:  1 g     Refill:  0             Santa Altamirano MD

## 2022-06-01 ENCOUNTER — OFFICE VISIT (OUTPATIENT)
Dept: ENDOCRINOLOGY | Age: 72
End: 2022-06-01
Payer: MEDICAID

## 2022-06-01 VITALS
DIASTOLIC BLOOD PRESSURE: 72 MMHG | OXYGEN SATURATION: 96 % | HEART RATE: 76 BPM | SYSTOLIC BLOOD PRESSURE: 119 MMHG | BODY MASS INDEX: 21.49 KG/M2 | WEIGHT: 129 LBS | HEIGHT: 65 IN

## 2022-06-01 DIAGNOSIS — E11.9 TYPE 2 DIABETES MELLITUS WITHOUT COMPLICATION, WITHOUT LONG-TERM CURRENT USE OF INSULIN (HCC): Primary | ICD-10-CM

## 2022-06-01 LAB
CHP ED QC CHECK: ABNORMAL
GLUCOSE BLD-MCNC: 224 MG/DL

## 2022-06-01 PROCEDURE — 99213 OFFICE O/P EST LOW 20 MIN: CPT | Performed by: PHYSICIAN ASSISTANT

## 2022-06-01 PROCEDURE — 82962 GLUCOSE BLOOD TEST: CPT | Performed by: PHYSICIAN ASSISTANT

## 2022-06-01 PROCEDURE — 3044F HG A1C LEVEL LT 7.0%: CPT | Performed by: PHYSICIAN ASSISTANT

## 2022-06-01 PROCEDURE — 1123F ACP DISCUSS/DSCN MKR DOCD: CPT | Performed by: PHYSICIAN ASSISTANT

## 2022-06-01 RX ORDER — BUSPIRONE HYDROCHLORIDE 7.5 MG/1
TABLET ORAL
COMMUNITY
Start: 2022-05-31 | End: 2022-10-14

## 2022-06-01 RX ORDER — ZOLPIDEM TARTRATE 5 MG/1
TABLET ORAL
COMMUNITY
Start: 2022-05-19 | End: 2022-10-14

## 2022-06-01 ASSESSMENT — ENCOUNTER SYMPTOMS
NAUSEA: 0
EYE REDNESS: 0
RHINORRHEA: 0
SINUS PRESSURE: 0
EYE PAIN: 0
WHEEZING: 0
SHORTNESS OF BREATH: 0
ABDOMINAL PAIN: 0
SORE THROAT: 0
DIARRHEA: 0
COUGH: 0
VOMITING: 0

## 2022-06-01 NOTE — PATIENT INSTRUCTIONS
Endocrinology-diabetes    1. Check your blood sugars 4 times a day, before meals and at night  2. Document these numbers and a blood glucose log and bring them with you to your follow-up appointment. 3. If you are prescribed insulin, Do not take your mealtime insulin if your blood sugars less than 120   4. Call our office if you have blood sugars less than 80 or greater then 300 on two or more occasions  5. Call our office if you have any questions regarding your blood sugars or insulin dosing regiment  6. Signs of low blood sugar include sweating , heart racing, dizziness and weakness. Check your blood sugar if you have any of these symptoms. 7. Monitor blood sugar 3 times daily document   8.  Follow up with PCP as scheduled

## 2022-06-01 NOTE — PROGRESS NOTES
6/1/2022    Assessment:       Diagnosis Orders   1. Type 2 diabetes mellitus without complication, without long-term current use of insulin ([facility])  POCT Glucose       PLAN:     1. Monitor blood sugar 3 times daily document   2. Follow up with PCP as scheduled       Orders Placed This Encounter   Procedures    POCT Glucose     No orders of the defined types were placed in this encounter. Return in about 6 weeks (around 7/13/2022) for Diabetes. Subjective:     Chief Complaint   Patient presents with    New Patient    Diabetes     Vitals:    06/01/22 1427   BP: 119/72   Pulse: 76   SpO2: 96%   Weight: 129 lb (58.5 kg)   Height: 5' 5\" (1.651 m)     Wt Readings from Last 3 Encounters:   06/01/22 129 lb (58.5 kg)   05/12/22 128 lb (58.1 kg)   05/06/22 128 lb (58.1 kg)     BP Readings from Last 3 Encounters:   06/01/22 119/72   05/12/22 112/76   05/06/22 120/68     Patient is a 64-year-old gentleman presents today for evaluation of diabetes. Thorough review of previous hemoglobin A1c show 1 elevation to 6.6% since 2011, all previous numbers have been 5.5-6%. His glucose today is 224 however he states he did eat a high carb lunch before coming in. He states that when he checks his blood sugars in the morning it is . He is not on any diabetic medications, he limits his refined sugar intake and tries to monitor his carbs as well. I explained to him the definition of diabetes, prediabetes, and showed him previous A1c's to reassure him that he is no longer diabetic, does not require any medications. Instructed him to follow-up with his PCP as scheduled. Diabetes  He presents for his follow-up diabetic visit. He has type 2 diabetes mellitus. Pertinent negatives for hypoglycemia include no dizziness or headaches. Pertinent negatives for diabetes include no chest pain, no fatigue, no polydipsia and no polyphagia.      Past Medical History:   Diagnosis Date    Anxiety     Chest pain 3/29/2018    Coronary artery disease involving native coronary artery of native heart without angina pectoris 2/15/2019    Diabetes mellitus ([facility] [address]     no meds    History of colon polyps     Hyperlipidemia     Hypertension     Type 2 diabetes mellitus without complication (HCC)     Vertigo      Past Surgical History:   Procedure Laterality Date    CARDIAC SURGERY  1973    PTCA     COLONOSCOPY      COLONOSCOPY N/A 10/17/2019    COLORECTAL CANCER SCREENING, HIGH RISK performed by Dago Goff MD at [facility] OTHER SURGICAL HISTORY      patent foramen ovale    TONSILLECTOMY      UPPER GASTROINTESTINAL ENDOSCOPY N/A 8/1/2019    EGD ESOPHAGOGASTRODUODENOSCOPY performed by Dago Goff MD at [address] [place] [address] Marital status:      Spouse name: Not on file    Number of children: Not on file    Years of education: Not on file    Highest education level: Not on file   Occupational History    Not on file   Tobacco Use    Smoking status: Never Smoker    Smokeless tobacco: Never Used   Vaping Use    Vaping Use: Never used   Substance and Sexual Activity    Alcohol use: No    Drug use: Never    Sexual activity: Not on file   Other Topics Concern    Not on file   Social History Narrative    Not on file     Social Determinants of Health     Financial Resource Strain:     Difficulty of Paying Living Expenses: Not on file   Food Insecurity:     Worried About [address] [employer] in the Last Year: Not on file    [address] in the Last Year: Not on file   Transportation Needs:     Lack of Transportation (Medical): Not on file    Lack of Transportation (Non-Medical):  Not on file   Physical Activity:     Days of Exercise per Week: Not on file    Minutes of Exercise per Session: Not on file   Stress:     Feeling of Stress : Not on file   Social Connections:     Frequency of Communication with Friends and Family: Not on file    Frequency of Social Gatherings with Friends and Family: Not on file    Attends Druze Services: Not on file    Active Member of Clubs or Organizations: Not on file    Attends Club or Organization Meetings: Not on file    Marital Status: Not on file   Intimate Partner Violence:     Fear of Current or Ex-Partner: Not on file    Emotionally Abused: Not on file    Physically Abused: Not on file    Sexually Abused: Not on file   Housing Stability:     Unable to Pay for Housing in the Last Year: Not on file    Number of [place] in the Last Year: Not on file    Unstable Housing in the Last Year: Not on file     Family History   Problem Relation Age of Onset    Heart Disease Maternal Aunt     Cancer Maternal Aunt     Colon Cancer Maternal Aunt      Allergies   Allergen Reactions    Seroquel [Quetiapine] Other (See Comments)     lethargy  weak       Current Outpatient Medications:     busPIRone (BUSPAR) 7.5 MG tablet, , Disp: , Rfl:     zolpidem (AMBIEN) 5 MG tablet, , Disp: , Rfl:     clotrimazole-betamethasone (LOTRISONE) 1-0.05 % cream, Apply topically 2 times daily.  For 2 weeks, Disp: 1 g, Rfl: 0    isosorbide mononitrate (IMDUR) 30 MG extended release tablet, Take 1 tablet by mouth daily, Disp: 30 tablet, Rfl: 3    ranolazine (RANEXA) 1000 MG extended release tablet, Take 1 tablet by mouth 2 times daily, Disp: 60 tablet, Rfl: 1    atorvastatin (LIPITOR) 20 MG tablet, Take 1 tablet by mouth daily, Disp: 90 tablet, Rfl: 3    b complex vitamins capsule, Take 1 capsule by mouth daily, Disp: , Rfl:     tamsulosin (FLOMAX) 0.4 MG capsule, Take 1 capsule by mouth daily, Disp: 90 capsule, Rfl: 3    LORazepam (ATIVAN) 2 MG tablet, , Disp: , Rfl:     escitalopram (LEXAPRO) 10 MG tablet, , Disp: , Rfl:     D3-50 1.25 MG (29993 UT) CAPS, , Disp: , Rfl:     aspirin 81 MG chewable tablet, Take 1 tablet by mouth daily, Disp: 30 tablet, Rfl: 3   vitamin C (ASCORBIC ACID) 500 MG tablet, Take 500 mg by mouth daily, Disp: , Rfl:     phytonadione (VITAMIN K) 5 MG tablet, Take 5 mg by mouth once, Disp: , Rfl:     vitamin E 1000 units capsule, Take 1,000 Units by mouth daily , Disp: , Rfl:     VORTIoxetine (TRINTELLIX) 5 MG tablet, Take 5 mg by mouth daily, Disp: , Rfl:     latanoprost (XALATAN) 0.005 % ophthalmic solution, Use 1 Drop in both eyes daily at bedtime. , Disp: , Rfl:     olopatadine (PATADAY) 0.2 % SOLN ophthalmic solution, Apply 1 drop to eye daily For allergies, Disp: 1 Bottle, Rfl: 1    Blood Glucose Monitoring Suppl (ONETOUCH VERIO FLEX SYSTEM) w/Device KIT, , Disp: , Rfl:     ONETOUCH VERIO strip, , Disp: , Rfl:     ONETOUCH DELICA LANCETS 60G MISC, , Disp: , Rfl:   Lab Results   Component Value Date     10/26/2021    K 4.1 10/26/2021     10/26/2021    CO2 23 10/26/2021    BUN 21 10/26/2021    CREATININE 1.01 10/26/2021    GLUCOSE 224 (H) 06/01/2022    CALCIUM 10.2 (H) 10/26/2021    PROT 7.5 10/26/2021    LABALBU 4.6 10/26/2021    BILITOT 1.3 (H) 10/26/2021    ALKPHOS 74 10/26/2021    AST 19 10/26/2021    ALT 8 10/26/2021    LABGLOM >60.0 10/26/2021    GFRAA >60.0 10/26/2021    GLOB 2.9 10/26/2021     Lab Results   Component Value Date    WBC 8.6 10/26/2021    HGB 14.8 10/26/2021    HCT 43.1 10/26/2021    MCV 90.1 10/26/2021     10/26/2021     Lab Results   Component Value Date    LABA1C 5.6 05/09/2022    LABA1C 5.8 09/08/2021    LABA1C 6.0 (H) 05/21/2021     Lab Results   Component Value Date    HDL 40 10/26/2021    HDL 41 09/08/2021    HDL 36 (L) 05/21/2021    LDLCALC 153 (H) 10/26/2021    LDLCALC 99 09/08/2021    LDLCALC 161 (H) 05/21/2021    CHOL 223 (H) 10/26/2021    CHOL 162 09/08/2021    CHOL 232 (H) 05/21/2021    TRIG 151 (H) 10/26/2021    TRIG 108 09/08/2021    TRIG 174 (H) 05/21/2021     Lab Results   Component Value Date    TESTM 424 08/05/2019    TESTFC 72 08/05/2019    TESTOSTFR 1.7 08/05/2019    SHBG 39 08/05/2019     Lab Results   Component Value Date    TSH 3.250 10/26/2021    TSH 3.840 05/21/2021    TSH 2.730 08/30/2020     No results found for: TPOABS    Review of Systems   Constitutional: Negative for chills, fatigue and fever. HENT: Negative for congestion, ear pain, postnasal drip, rhinorrhea, sinus pressure and sore throat. Eyes: Negative for pain and redness. Respiratory: Negative for cough, shortness of breath and wheezing. Cardiovascular: Negative for chest pain, palpitations and leg swelling. Gastrointestinal: Negative for abdominal pain, diarrhea, nausea and vomiting. Endocrine: Negative for polydipsia and polyphagia. Genitourinary: Negative for difficulty urinating. Musculoskeletal: Negative for arthralgias. Skin: Negative for rash. Neurological: Negative for dizziness and headaches. Objective:   Physical Exam  Constitutional:       Appearance: He is well-developed. HENT:      Head: Normocephalic and atraumatic. Eyes:      Conjunctiva/sclera: Conjunctivae normal.   Cardiovascular:      Rate and Rhythm: Normal rate and regular rhythm. Heart sounds: Normal heart sounds. Pulmonary:      Effort: Pulmonary effort is normal.      Breath sounds: Normal breath sounds. Abdominal:      General: Bowel sounds are normal.      Palpations: Abdomen is soft. Musculoskeletal:         General: Normal range of motion. Cervical back: Normal range of motion and neck supple. Skin:     General: Skin is warm and dry. Neurological:      Mental Status: He is alert and oriented to person, place, and time.

## 2022-07-22 ENCOUNTER — HOSPITAL ENCOUNTER (EMERGENCY)
Age: 72
Discharge: HOME | End: 2022-07-22
Payer: MEDICARE

## 2022-07-22 ENCOUNTER — APPOINTMENT (OUTPATIENT)
Dept: GENERAL RADIOLOGY | Age: 72
End: 2022-07-22
Payer: MEDICARE

## 2022-07-22 VITALS
OXYGEN SATURATION: 98 % | RESPIRATION RATE: 18 BRPM | DIASTOLIC BLOOD PRESSURE: 77 MMHG | HEIGHT: 61 IN | TEMPERATURE: 97.7 F | SYSTOLIC BLOOD PRESSURE: 116 MMHG | WEIGHT: 124 LBS | BODY MASS INDEX: 23.41 KG/M2 | HEART RATE: 67 BPM

## 2022-07-22 DIAGNOSIS — F41.9 ANXIETY: Primary | ICD-10-CM

## 2022-07-22 LAB
ACETAMINOPHEN LEVEL: <5 UG/ML (ref 10–30)
ALBUMIN SERPL-MCNC: 4.2 G/DL (ref 3.5–4.6)
ALP BLD-CCNC: 62 U/L (ref 35–104)
ALT SERPL-CCNC: 8 U/L (ref 0–41)
ANION GAP SERPL CALCULATED.3IONS-SCNC: 9 MEQ/L (ref 9–15)
AST SERPL-CCNC: 9 U/L (ref 0–40)
BASOPHILS ABSOLUTE: 0 K/UL (ref 0–0.2)
BASOPHILS RELATIVE PERCENT: 0.5 %
BILIRUB SERPL-MCNC: 1 MG/DL (ref 0.2–0.7)
BUN BLDV-MCNC: 9 MG/DL (ref 8–23)
CALCIUM SERPL-MCNC: 9.5 MG/DL (ref 8.5–9.9)
CHLORIDE BLD-SCNC: 105 MEQ/L (ref 95–107)
CHOLESTEROL, TOTAL: 178 MG/DL (ref 0–199)
CO2: 25 MEQ/L (ref 20–31)
CREAT SERPL-MCNC: 0.88 MG/DL (ref 0.7–1.2)
EOSINOPHILS ABSOLUTE: 0.1 K/UL (ref 0–0.7)
EOSINOPHILS RELATIVE PERCENT: 1.2 %
ETHANOL PERCENT: NORMAL G/DL
ETHANOL: <10 MG/DL (ref 0–0.08)
GFR AFRICAN AMERICAN: >60
GFR NON-AFRICAN AMERICAN: >60
GLOBULIN: 2.6 G/DL (ref 2.3–3.5)
GLUCOSE BLD-MCNC: 102 MG/DL (ref 70–99)
HCT VFR BLD CALC: 41.7 % (ref 42–52)
HDLC SERPL-MCNC: 43 MG/DL (ref 40–59)
HEMOGLOBIN: 14.6 G/DL (ref 14–18)
LDL CHOLESTEROL CALCULATED: 115 MG/DL (ref 0–129)
LYMPHOCYTES ABSOLUTE: 0.9 K/UL (ref 1–4.8)
LYMPHOCYTES RELATIVE PERCENT: 18.8 %
MCH RBC QN AUTO: 32.2 PG (ref 27–31.3)
MCHC RBC AUTO-ENTMCNC: 35 % (ref 33–37)
MCV RBC AUTO: 92 FL (ref 80–100)
MONOCYTES ABSOLUTE: 0.4 K/UL (ref 0.2–0.8)
MONOCYTES RELATIVE PERCENT: 7.8 %
NEUTROPHILS ABSOLUTE: 3.4 K/UL (ref 1.4–6.5)
NEUTROPHILS RELATIVE PERCENT: 71.7 %
PDW BLD-RTO: 14.4 % (ref 11.5–14.5)
PLATELET # BLD: 177 K/UL (ref 130–400)
POTASSIUM SERPL-SCNC: 4 MEQ/L (ref 3.4–4.9)
RBC # BLD: 4.54 M/UL (ref 4.7–6.1)
SALICYLATE, SERUM: <0.3 MG/DL (ref 15–30)
SARS-COV-2, NAAT: NOT DETECTED
SODIUM BLD-SCNC: 139 MEQ/L (ref 135–144)
TOTAL CK: 31 U/L (ref 0–190)
TOTAL PROTEIN: 6.8 G/DL (ref 6.3–8)
TRIGL SERPL-MCNC: 102 MG/DL (ref 0–150)
TROPONIN: <0.01 NG/ML (ref 0–0.01)
TSH SERPL DL<=0.05 MIU/L-ACNC: 3.53 UIU/ML (ref 0.44–3.86)
WBC # BLD: 4.8 K/UL (ref 4.8–10.8)

## 2022-07-22 PROCEDURE — 36415 COLL VENOUS BLD VENIPUNCTURE: CPT

## 2022-07-22 PROCEDURE — 93005 ELECTROCARDIOGRAM TRACING: CPT | Performed by: EMERGENCY MEDICINE

## 2022-07-22 PROCEDURE — 99285 EMERGENCY DEPT VISIT HI MDM: CPT

## 2022-07-22 PROCEDURE — 84484 ASSAY OF TROPONIN QUANT: CPT

## 2022-07-22 PROCEDURE — 80053 COMPREHEN METABOLIC PANEL: CPT

## 2022-07-22 PROCEDURE — 82550 ASSAY OF CK (CPK): CPT

## 2022-07-22 PROCEDURE — 71045 X-RAY EXAM CHEST 1 VIEW: CPT

## 2022-07-22 PROCEDURE — 84443 ASSAY THYROID STIM HORMONE: CPT

## 2022-07-22 PROCEDURE — 80179 DRUG ASSAY SALICYLATE: CPT

## 2022-07-22 PROCEDURE — 80061 LIPID PANEL: CPT

## 2022-07-22 PROCEDURE — 85025 COMPLETE CBC W/AUTO DIFF WBC: CPT

## 2022-07-22 PROCEDURE — 82077 ASSAY SPEC XCP UR&BREATH IA: CPT

## 2022-07-22 PROCEDURE — 80143 DRUG ASSAY ACETAMINOPHEN: CPT

## 2022-07-22 PROCEDURE — 87635 SARS-COV-2 COVID-19 AMP PRB: CPT

## 2022-07-22 ASSESSMENT — PAIN SCALES - GENERAL: PAINLEVEL_OUTOF10: 4

## 2022-07-22 ASSESSMENT — ENCOUNTER SYMPTOMS
VOMITING: 0
PHOTOPHOBIA: 0
ABDOMINAL PAIN: 0
NAUSEA: 0
SHORTNESS OF BREATH: 0
WHEEZING: 0
COUGH: 0

## 2022-07-22 ASSESSMENT — PAIN DESCRIPTION - ORIENTATION: ORIENTATION: MID

## 2022-07-22 ASSESSMENT — PAIN - FUNCTIONAL ASSESSMENT: PAIN_FUNCTIONAL_ASSESSMENT: 0-10

## 2022-07-22 ASSESSMENT — PAIN DESCRIPTION - LOCATION: LOCATION: ABDOMEN

## 2022-07-22 ASSESSMENT — PAIN DESCRIPTION - DESCRIPTORS: DESCRIPTORS: DULL

## 2022-07-22 ASSESSMENT — PAIN DESCRIPTION - PAIN TYPE: TYPE: ACUTE PAIN

## 2022-07-22 NOTE — ED NOTES
Spoke to patient at bedside at request of PA overseeing case. Patient states he came to ED for chest pain and stomach, and panic attack. States he has had ongoing anxiety for 4 years. States he is seen at [facility], and next appt is in 2 weeks. States they do prescribe him medicaitons, and he does take them as directed. States today he was at the store, and had a panic attack. States he hasn't felt that level of panic attack before. He does state while having a panic attack, \"I said something stupid, I didn't mean to, but I don't want to mention it, its embarrassing. \". Per prior note, he had asked for sex with a women. Patient denies ever doing something like this before, state it was due to the panic attack, and would never normally do something like that. Denies any IS or depression. He is mostly anxious about his house being broken in to, as its just him and his wife at home. States he doesn't live in a good area.      Leonie Vergara RN  07/22/22 4022

## 2022-07-22 NOTE — ED PROVIDER NOTES
[facility] ED  EMERGENCY DEPARTMENT ENCOUNTER      Pt Name: Yanick Rodriguez  MRN: [id number]  [place] 1950  Date of evaluation: 7/22/2022  Provider: [facility] [place], [address]       Chief Complaint   Patient presents with    Anxiety    Psychiatric Evaluation         HISTORY OF PRESENT ILLNESS   (Location/Symptom, Timing/Onset, Context/Setting, Quality, Duration, Modifying Factors, Severity)  Note limiting factors. Yanick Rodriguez is a 66 y.o. male who presents to the emergency department for psychiatric evaluation. Pt states he is having extreme anxiety. Takes ativan at home with some relief, last dose this morning around 4am. He tells ER triage nurse that anxiety began this morning when he \"asked the wrong person to have sex\" at [facility] and they told him he was going to call the  on him. He states he blurted this out of anxiety and realizes \"it was stupid. \" He states he feels embarrassed. He reports passive suicidal thoughts in the past, no SI currently and no plan. He is anxious at home, states is always afraid someone will break into his home and hurt him or his wife. Pt reports associated chest tightness, states this is typical for him when he is anxious. Denies associated nv dizziness diaphoresis syncope. Pt states he feels nauseous when taking his medications in the morning, denies abd pain at this time. Denies HI, AVH, drugs, ETOH. Follows with psychiatry, has an appt next week. HPI    Nursing Notes were reviewed. REVIEW OF SYSTEMS    (2-9 systems for level 4, 10 or more for level 5)     Review of Systems   Constitutional:  Negative for chills and fever. HENT:  Negative for congestion. Eyes:  Negative for photophobia. Respiratory:  Negative for cough, shortness of breath and wheezing. Cardiovascular:  Negative for chest pain and palpitations. Gastrointestinal:  Negative for abdominal pain, nausea and vomiting.    Genitourinary:  Negative for dysuria, tablet Take 1 tablet by mouth 2 times daily, Disp-60 tablet, R-1Normal      atorvastatin (LIPITOR) 20 MG tablet Take 1 tablet by mouth daily, Disp-90 tablet, R-3Normal      b complex vitamins capsule Take 1 capsule by mouth dailyHistorical Med      tamsulosin (FLOMAX) 0.4 MG capsule Take 1 capsule by mouth daily, Disp-90 capsule, R-3Normal      LORazepam (ATIVAN) 2 MG tablet Historical Med      escitalopram (LEXAPRO) 10 MG tablet Historical Med      D3-50 1.25 MG (67163 UT) CAPS DAWHistorical Med      aspirin 81 MG chewable tablet Take 1 tablet by mouth daily, Disp-30 tablet, R-3Normal      vitamin C (ASCORBIC ACID) 500 MG tablet Take 500 mg by mouth dailyHistorical Med      phytonadione (VITAMIN K) 5 MG tablet Take 5 mg by mouth onceHistorical Med      vitamin E 1000 units capsule Take 1,000 Units by mouth daily Historical Med      VORTIoxetine (TRINTELLIX) 5 MG tablet Take 5 mg by mouth dailyHistorical Med      latanoprost (XALATAN) 0.005 % ophthalmic solution Use 1 Drop in both eyes daily at bedtime. Historical Med      olopatadine (PATADAY) 0.2 % SOLN ophthalmic solution Apply 1 drop to eye daily For allergies, Disp-1 Bottle, R-1Normal      Blood Glucose Monitoring Suppl ([address]) w/Device KIT Historical Med      ONETOUCH VERIO strip DAWHistorical Med      ONETOUCH DELICA LANCETS 46C MISC Historical Med             ALLERGIES     Seroquel [quetiapine]    FAMILY HISTORY       Family History   Problem Relation Age of Onset    Heart Disease Maternal Aunt     Cancer Maternal Aunt     Colon Cancer Maternal Aunt           SOCIAL HISTORY       Social History     Socioeconomic History    Marital status:    Tobacco Use    Smoking status: Never    Smokeless tobacco: Never   Vaping Use    Vaping Use: Never used   Substance and Sexual Activity    Alcohol use: No    Drug use: Never       SCREENINGS         [place] Coma Scale  Eye Opening: Spontaneous  Best Verbal Response: Confused  Best Motor Response: Obeys commands  Tommy Coma Scale Score: 14                     [facility] Assessment  BP: 116/77  Heart Rate: 67                 PHYSICAL EXAM    (up to 7 for level 4, 8 or more for level 5)     ED Triage Vitals [07/22/22 0832]   BP Temp Temp Source Heart Rate Resp SpO2 Height Weight   116/77 97.7 °F (36.5 °C) Oral 67 18 98 % 5' 0.5\" (1.537 m) 124 lb (56.2 kg)       Physical Exam  Constitutional:       General: He is not in acute distress. Appearance: He is well-developed. He is not ill-appearing, toxic-appearing or diaphoretic. HENT:      Head: Normocephalic and atraumatic. Nose: Nose normal.      Mouth/Throat:      Mouth: Mucous membranes are moist.   Eyes:      Pupils: Pupils are equal, round, and reactive to light. Cardiovascular:      Rate and Rhythm: Normal rate and regular rhythm. Heart sounds: No murmur heard. No friction rub. No gallop. Pulmonary:      Effort: Pulmonary effort is normal.      Breath sounds: Normal breath sounds. No wheezing, rhonchi or rales. Abdominal:      General: Bowel sounds are normal. There is no distension. Palpations: Abdomen is soft. Tenderness: There is no abdominal tenderness. There is no guarding or rebound. Musculoskeletal:         General: No swelling. Cervical back: Normal range of motion. Skin:     General: Skin is warm and dry. Capillary Refill: Capillary refill takes less than 2 seconds. Neurological:      General: No focal deficit present. Mental Status: He is alert and oriented to person, place, and time. Psychiatric:         Attention and Perception: Attention normal.         Mood and Affect: Mood is anxious. Speech: Speech normal.         Behavior: Behavior is cooperative. Thought Content:  Thought content normal.       DIAGNOSTIC RESULTS     EKG: All EKG's are interpreted by the Emergency Department Physician who either signs or Co-signs this chart in the absence of a cardiologist.    EKG shows NSR with HR 61, normal axis, normal intervals, no ST changes. PVCs. RADIOLOGY:   Non-plain film images such as CT, Ultrasound and MRI are read by the radiologist. Plain radiographic images are visualized and preliminarily interpreted by the emergency physician with the below findings:        Interpretation per the Radiologist below, if available at the time of this note:    XR CHEST PORTABLE   Final Result   NO SIGNIFICANT ACUTE INTRATHORACIC ABNORMALITY. .               ED BEDSIDE ULTRASOUND:   Performed by ED Physician - none    LABS:  Labs Reviewed   COMPREHENSIVE METABOLIC PANEL - Abnormal; Notable for the following components:       Result Value    Glucose 102 (*)     Total Bilirubin 1.0 (*)     All other components within normal limits   CBC WITH AUTO DIFFERENTIAL - Abnormal; Notable for the following components:    RBC 4.54 (*)     Hematocrit 41.7 (*)     MCH 32.2 (*)     Lymphocytes Absolute 0.9 (*)     All other components within normal limits   ACETAMINOPHEN LEVEL - Abnormal; Notable for the following components:    Acetaminophen Level <5 (*)     All other components within normal limits   SALICYLATE LEVEL - Abnormal; Notable for the following components:    Salicylate, Serum <3.9 (*)     All other components within normal limits   COVID-19, RAPID   ETHANOL   TROPONIN   TSH   CK   LIPID PANEL   URINE DRUG SCREEN   URINALYSIS WITH REFLEX TO CULTURE       All other labs were within normal range or not returned as of this dictation. EMERGENCY DEPARTMENT COURSE and DIFFERENTIAL DIAGNOSIS/MDM:   Vitals:    Vitals:    07/22/22 0832   BP: 116/77   Pulse: 67   Resp: 18   Temp: 97.7 °F (36.5 °C)   TempSrc: Oral   SpO2: 98%   Weight: 124 lb (56.2 kg)   Height: 5' 0.5\" (1.537 m)         MDM    Patient presents the ED for evaluation of anxiety. He denies SI or HI. Afebrile and hemodynamically stable. Labs largely unremarkable. EKG shows NSR with HR 61, normal axis, normal intervals, no ST changes. PVCs.  Chest x-ray shows no acute intrathoracic process. [address] [place] [address] team spoke with patient in the ED. He is not currently meeting inpatient criteria at this time. He is stable for discharge. I do not feel he is harm to self or others at this time. Suspect chest tightness secondary to anxiety as opposed to ACS, no EKG changes and normal troponin. He does have a follow-up appointment with psychiatry next week. Return to the ED in the meantime for worsening symptoms given warning signs for which she should return. Patient understands agrees with plan. REASSESSMENT          CRITICAL CARE TIME   Total Critical Care time was 0 minutes, excluding separately reportable procedures. There was a high probability of clinically significant/life threatening deterioration in the patient's condition which required my urgent intervention. CONSULTS:  None    PROCEDURES:  Unless otherwise noted below, none     Procedures        FINAL IMPRESSION      1. Anxiety          DISPOSITION/PLAN   DISPOSITION Decision To Discharge 07/22/2022 11:03:57 AM      PATIENT REFERRED TO:  Clem Steel MD  [phone]    Schedule an appointment as soon as possible for a visit in 1 day      [facility] ED  [address] [place] [address]  [phone]  Go to   As needed, If symptoms worsen    DISCHARGE MEDICATIONS:  Discharge Medication List as of 7/22/2022 10:40 AM        Controlled Substances Monitoring:     No flowsheet data found.     (Please note that portions of this note were completed with a voice recognition program.  Efforts were made to edit the dictations but occasionally words are mis-transcribed.)    Anjana De La Torre PA-C (electronically signed)             Anjana De La Torre PA-C  07/22/22 5302

## 2022-07-22 NOTE — ED TRIAGE NOTES
Pt states \"my anxiety is acting up and now I have stomach pains and chest pains. \" Pt states has had suicidal thoughts in the past \"but not right now. I'm too scared to do it. Sometimes I don't feel safe at home I'm scared someone might break in and hurt me or my wife. \" Patient said anxiety increased while at the store by [place] this AM \"because I needed to have sex and I asked the wrong person and they said they were going to call the police. \" Patient unsure who he asked. Patient forgetful and tearful in triage.

## 2022-07-24 LAB
EKG ATRIAL RATE: 61 BPM
EKG P AXIS: 59 DEGREES
EKG P-R INTERVAL: 174 MS
EKG Q-T INTERVAL: 434 MS
EKG QRS DURATION: 82 MS
EKG QTC CALCULATION (BAZETT): 436 MS
EKG R AXIS: 27 DEGREES
EKG T AXIS: 56 DEGREES
EKG VENTRICULAR RATE: 61 BPM

## 2022-07-24 PROCEDURE — 93010 ELECTROCARDIOGRAM REPORT: CPT | Performed by: INTERNAL MEDICINE

## 2022-08-15 RX ORDER — RANOLAZINE 1000 MG/1
TABLET, EXTENDED RELEASE ORAL
Qty: 60 TABLET | Refills: 5 | Status: ON HOLD | OUTPATIENT
Start: 2022-08-15

## 2022-08-15 NOTE — TELEPHONE ENCOUNTER
Requesting medication refill. Please approve or deny this request.    Rx requested:  Requested Prescriptions     Pending Prescriptions Disp Refills    ranolazine (RANEXA) 1000 MG extended release tablet [Pharmacy Med Name: RANOLAZINE 1000MG ER TABLETS] 60 tablet 5     Sig: TAKE 1 TABLET BY MOUTH TWICE DAILY         Last Office Visit:   5/6/2022      Next Visit Date:  Future Appointments   Date Time Provider Kitty Carrizales   11/11/2022 10:45 AM Rian Hoyos DO [address] [place] [address]   12/30/2022 10:30 AM MD Pancho Iverson               Last refill 5/6/21. Please approve or deny.

## 2022-08-29 ENCOUNTER — HOSPITAL ENCOUNTER (EMERGENCY)
Age: 72
Discharge: HOME | End: 2022-08-29
Attending: EMERGENCY MEDICINE
Payer: MEDICARE

## 2022-08-29 ENCOUNTER — APPOINTMENT (OUTPATIENT)
Dept: GENERAL RADIOLOGY | Age: 72
End: 2022-08-29
Payer: MEDICARE

## 2022-08-29 VITALS
RESPIRATION RATE: 18 BRPM | OXYGEN SATURATION: 98 % | TEMPERATURE: 98.6 F | HEART RATE: 63 BPM | DIASTOLIC BLOOD PRESSURE: 71 MMHG | BODY MASS INDEX: 22.66 KG/M2 | WEIGHT: 120 LBS | HEIGHT: 61 IN | SYSTOLIC BLOOD PRESSURE: 110 MMHG

## 2022-08-29 DIAGNOSIS — R07.81 RIB PAIN: Primary | ICD-10-CM

## 2022-08-29 PROCEDURE — 71100 X-RAY EXAM RIBS UNI 2 VIEWS: CPT

## 2022-08-29 PROCEDURE — 96372 THER/PROPH/DIAG INJ SC/IM: CPT

## 2022-08-29 PROCEDURE — 99284 EMERGENCY DEPT VISIT MOD MDM: CPT

## 2022-08-29 PROCEDURE — 6360000002 HC RX W HCPCS: Performed by: EMERGENCY MEDICINE

## 2022-08-29 PROCEDURE — 71046 X-RAY EXAM CHEST 2 VIEWS: CPT

## 2022-08-29 RX ORDER — KETOROLAC TROMETHAMINE 30 MG/ML
30 INJECTION, SOLUTION INTRAMUSCULAR; INTRAVENOUS ONCE
Status: COMPLETED | OUTPATIENT
Start: 2022-08-29 | End: 2022-08-29

## 2022-08-29 RX ORDER — LIDOCAINE 50 MG/G
1 PATCH TOPICAL DAILY
Qty: 7 PATCH | Refills: 0 | Status: SHIPPED | OUTPATIENT
Start: 2022-08-29 | End: 2022-09-05

## 2022-08-29 RX ADMIN — KETOROLAC TROMETHAMINE 30 MG: 30 INJECTION, SOLUTION INTRAMUSCULAR; INTRAVENOUS at 10:56

## 2022-08-29 ASSESSMENT — PAIN SCALES - GENERAL: PAINLEVEL_OUTOF10: 5

## 2022-08-29 ASSESSMENT — PAIN - FUNCTIONAL ASSESSMENT: PAIN_FUNCTIONAL_ASSESSMENT: 0-10

## 2022-08-29 ASSESSMENT — PAIN DESCRIPTION - FREQUENCY: FREQUENCY: INTERMITTENT

## 2022-08-29 ASSESSMENT — PAIN DESCRIPTION - LOCATION: LOCATION: RIB CAGE

## 2022-08-29 ASSESSMENT — PAIN DESCRIPTION - PAIN TYPE: TYPE: ACUTE PAIN

## 2022-08-29 ASSESSMENT — PAIN DESCRIPTION - ORIENTATION: ORIENTATION: LEFT

## 2022-08-29 ASSESSMENT — PAIN DESCRIPTION - DESCRIPTORS: DESCRIPTORS: DULL

## 2022-08-29 NOTE — ED TRIAGE NOTES
Pt states that 2 weeks ago he was up late at night and became tired so he laid on the kitchen floor. Pt states that he was laying there for 2 hours and has been having left sided rib pain since then. Pt states that it increases with he is laying down.

## 2022-08-29 NOTE — ED PROVIDER NOTES
[facility] ED  EMERGENCY DEPARTMENT ENCOUNTER      Pt Name: Nakita Lobo  MRN: [id number]  [place] 1950  Date of evaluation: 8/29/2022  Provider: Cynthia Pierre MD    [address] [place] [address]       Chief Complaint   Patient presents with    Rib Injury     Left side pain from laying on the kitchen floor 2 weeks ago for 2 hours. Pt denies fall, states that it was late at night and he got tired and laid down, pain is on left side mid/lower axillary line. HISTORY OF PRESENT ILLNESS   (Location/Symptom, Timing/Onset, Context/Setting, Quality, Duration, Modifying Factors, Severity)  Note limiting factors. 78-year-old male presenting with left-sided rib pain. Symptoms started about 2 weeks ago after laying on the floor for a couple of hours. Notes continued pain since that time. While lying still he has no symptoms. While moving in certain positions pain increases. Pain is localized to a very specific spot of the left rib cage. Denies any other associated symptoms. Has not taken anything for relief during this time. Nursing Notes were reviewed. REVIEW OF SYSTEMS    (2-9 systems for level 4, 10 or more for level 5)     Review of Systems   All other systems reviewed and are negative. Except as noted above the remainder of the review of systems was reviewed and negative.        PAST MEDICAL HISTORY     Past Medical History:   Diagnosis Date    Anxiety     Chest pain 3/29/2018    Coronary artery disease involving native coronary artery of native heart without angina pectoris 2/15/2019    Diabetes mellitus ([facility] [address]     no meds    History of colon polyps     Hyperlipidemia     Hypertension     Type 2 diabetes mellitus without complication ([address]     Vertigo          SURGICAL HISTORY       Past Surgical History:   Procedure Laterality Date    CARDIAC SURGERY  1973    PTCA     COLONOSCOPY      COLONOSCOPY N/A 10/17/2019    COLORECTAL CANCER SCREENING, HIGH RISK performed by Asa Ormond, MD at [address]    CORONARY ANGIOPLASTY WITH STENT PLACEMENT      EYE SURGERY      OTHER SURGICAL HISTORY      patent foramen ovale    TONSILLECTOMY      UPPER GASTROINTESTINAL ENDOSCOPY N/A 8/1/2019    EGD ESOPHAGOGASTRODUODENOSCOPY performed by Ronald Carrasco MD at [address]       Previous Medications    ASPIRIN 81 MG CHEWABLE TABLET    Take 1 tablet by mouth daily    ATORVASTATIN (LIPITOR) 20 MG TABLET    Take 1 tablet by mouth daily    B COMPLEX VITAMINS CAPSULE    Take 1 capsule by mouth daily    BLOOD GLUCOSE MONITORING SUPPL (ONETOUCH VERIO FLEX SYSTEM) W/DEVICE KIT        BUSPIRONE (BUSPAR) 7.5 MG TABLET        CLOTRIMAZOLE-BETAMETHASONE (LOTRISONE) 1-0.05 % CREAM    Apply topically 2 times daily. For 2 weeks    D3-50 1.25 MG (90520 UT) CAPS        ESCITALOPRAM (LEXAPRO) 10 MG TABLET        ISOSORBIDE MONONITRATE (IMDUR) 30 MG EXTENDED RELEASE TABLET    Take 1 tablet by mouth daily    LATANOPROST (XALATAN) 0.005 % OPHTHALMIC SOLUTION    Use 1 Drop in both eyes daily at bedtime.     LORAZEPAM (ATIVAN) 2 MG TABLET        OLOPATADINE (PATADAY) 0.2 % SOLN OPHTHALMIC SOLUTION    Apply 1 drop to eye daily For allergies    ONETOUCH DELICA LANCETS 40M MISC        ONETOUCH VERIO STRIP        PHYTONADIONE (VITAMIN K) 5 MG TABLET    Take 5 mg by mouth once    RANOLAZINE (RANEXA) 1000 MG EXTENDED RELEASE TABLET    TAKE 1 TABLET BY MOUTH TWICE DAILY    TAMSULOSIN (FLOMAX) 0.4 MG CAPSULE    Take 1 capsule by mouth daily    VITAMIN C (ASCORBIC ACID) 500 MG TABLET    Take 500 mg by mouth daily    VITAMIN E 1000 UNITS CAPSULE    Take 1,000 Units by mouth daily     VORTIOXETINE (TRINTELLIX) 5 MG TABLET    Take 5 mg by mouth daily    ZOLPIDEM (AMBIEN) 5 MG TABLET           ALLERGIES     Seroquel [quetiapine]    FAMILY HISTORY       Family History   Problem Relation Age of Onset    Heart Disease Maternal Aunt     Cancer Maternal Aunt     Colon Cancer Maternal Aunt           SOCIAL HISTORY Social History     Socioeconomic History    Marital status:      Spouse name: None    Number of children: None    Years of education: None    Highest education level: None   Tobacco Use    Smoking status: Never    Smokeless tobacco: Never   Vaping Use    Vaping Use: Never used   Substance and Sexual Activity    Alcohol use: No    Drug use: Never       SCREENINGS    [place] Coma Scale  Eye Opening: Spontaneous  Best Verbal Response: Oriented  Best Motor Response: Obeys commands  [place] Coma Scale Score: 15          PHYSICAL EXAM    (up to 7 for level 4, 8 or more for level 5)     ED Triage Vitals [08/29/22 1025]   BP Temp Temp Source Heart Rate Resp SpO2 Height Weight   110/71 98.6 °F (37 °C) Oral 63 18 98 % 5' 0.5\" (1.537 m) 120 lb (54.4 kg)       Physical Exam  Vitals and nursing note reviewed. Constitutional:       General: He is not in acute distress. Appearance: Normal appearance. He is well-developed. He is not ill-appearing. HENT:      Head: Normocephalic and atraumatic. Mouth/Throat:      Mouth: Mucous membranes are moist.      Pharynx: Oropharynx is clear. Eyes:      Extraocular Movements: Extraocular movements intact. Conjunctiva/sclera: Conjunctivae normal.   Cardiovascular:      Rate and Rhythm: Normal rate and regular rhythm. Comments: L sided rib tenderness at midaxillary line, ribs 9-10  Pulmonary:      Effort: Pulmonary effort is normal.      Breath sounds: Normal breath sounds. Abdominal:      General: Bowel sounds are normal.      Palpations: Abdomen is soft. Tenderness: There is no abdominal tenderness. Musculoskeletal:         General: No deformity. Normal range of motion. Cervical back: Normal range of motion and neck supple. Skin:     General: Skin is warm and dry. Capillary Refill: Capillary refill takes less than 2 seconds. Neurological:      General: No focal deficit present.       Mental Status: He is alert and oriented to person, place, and time. Mental status is at baseline. Cranial Nerves: No cranial nerve deficit. Psychiatric:         Thought Content: Thought content normal.       DIAGNOSTIC RESULTS     EKG: All EKG's are interpreted by the Emergency Department Physician who either signs or Co-signs this chart in the absence of a cardiologist.    RADIOLOGY:   Non-plain film images such as CT, Ultrasound and MRI are read by the radiologist. Plain radiographic images are visualized and preliminarily interpreted by the emergency physician with the below findings:    L ribs- neg acute  CXR- neg acute    Interpretation per the Radiologist below, if available at the time of this note:    XR RIBS LEFT (2 VIEWS)    (Results Pending)   XR CHEST (2 VW)    (Results Pending)       LABS:  Labs Reviewed - No data to display    All other labs were within normal range or not returned as of this dictation. EMERGENCY DEPARTMENT COURSE and DIFFERENTIAL DIAGNOSIS/MDM:   Vitals:    Vitals:    08/29/22 1025   BP: 110/71   Pulse: 63   Resp: 18   Temp: 98.6 °F (37 °C)   TempSrc: Oral   SpO2: 98%   Weight: 120 lb (54.4 kg)   Height: 5' 0.5\" (1.537 m)       MDM  Number of Diagnoses or Management Options  Rib pain  Diagnosis management comments: Lidocaine patch for symptom relief. Patient will be discharged home in good condition. Patient has been hemodynamically stable throughout ED course and is appropriate for outpatient follow up. Patient should follow up with PCP in 2-3 days or return to ED immediately for any new or worsening symptoms. Patient is well appearing on discharge and agreeable with plan of care. Procedures    CRITICAL CARE TIME   Total Critical Care time was 0 minutes, excluding separately reportable procedures. There was a high probability of clinically significant/life threatening deterioration in the patient's condition which required my urgent intervention. FINAL IMPRESSION      1.  Rib pain DISPOSITION/PLAN   DISPOSITION Decision To Discharge 08/29/2022 10:55:53 AM      (Please note that portions of this note were completed with a voice recognition program.  Efforts were made to edit the dictations but occasionally words are mis-transcribed.)    Suyapa Garcia MD (electronically signed)  Attending Emergency Physician       Suyapa Garcia MD  08/29/22 7915

## 2022-09-13 ENCOUNTER — HOSPITAL ENCOUNTER (EMERGENCY)
Age: 72
Discharge: HOME | End: 2022-09-13
Payer: MEDICARE

## 2022-09-13 ENCOUNTER — APPOINTMENT (OUTPATIENT)
Dept: GENERAL RADIOLOGY | Age: 72
End: 2022-09-13
Payer: MEDICARE

## 2022-09-13 VITALS
HEIGHT: 65 IN | BODY MASS INDEX: 20.83 KG/M2 | SYSTOLIC BLOOD PRESSURE: 157 MMHG | RESPIRATION RATE: 17 BRPM | OXYGEN SATURATION: 100 % | HEART RATE: 65 BPM | DIASTOLIC BLOOD PRESSURE: 87 MMHG | WEIGHT: 125 LBS | TEMPERATURE: 97.6 F

## 2022-09-13 DIAGNOSIS — R53.83 FATIGUE, UNSPECIFIED TYPE: Primary | ICD-10-CM

## 2022-09-13 LAB
ALBUMIN SERPL-MCNC: 4.3 G/DL (ref 3.5–4.6)
ALP BLD-CCNC: 76 U/L (ref 35–104)
ALT SERPL-CCNC: 10 U/L (ref 0–41)
ANION GAP SERPL CALCULATED.3IONS-SCNC: 8 MEQ/L (ref 9–15)
AST SERPL-CCNC: 12 U/L (ref 0–40)
BASOPHILS ABSOLUTE: 0 K/UL (ref 0–0.2)
BASOPHILS RELATIVE PERCENT: 0.3 %
BILIRUB SERPL-MCNC: 1 MG/DL (ref 0.2–0.7)
BILIRUBIN URINE: NEGATIVE
BLOOD, URINE: NEGATIVE
BUN BLDV-MCNC: 13 MG/DL (ref 8–23)
CALCIUM SERPL-MCNC: 9.2 MG/DL (ref 8.5–9.9)
CHLORIDE BLD-SCNC: 96 MEQ/L (ref 95–107)
CLARITY: CLEAR
CO2: 25 MEQ/L (ref 20–31)
COLOR: YELLOW
CREAT SERPL-MCNC: 0.75 MG/DL (ref 0.7–1.2)
EOSINOPHILS ABSOLUTE: 0 K/UL (ref 0–0.7)
EOSINOPHILS RELATIVE PERCENT: 0.1 %
GFR AFRICAN AMERICAN: >60
GFR NON-AFRICAN AMERICAN: >60
GLOBULIN: 2.5 G/DL (ref 2.3–3.5)
GLUCOSE BLD-MCNC: 136 MG/DL (ref 70–99)
GLUCOSE URINE: NEGATIVE MG/DL
HCT VFR BLD CALC: 43.7 % (ref 42–52)
HEMOGLOBIN: 15.3 G/DL (ref 14–18)
KETONES, URINE: ABNORMAL MG/DL
LACTIC ACID: 1.2 MMOL/L (ref 0.5–2.2)
LEUKOCYTE ESTERASE, URINE: NEGATIVE
LYMPHOCYTES ABSOLUTE: 0.5 K/UL (ref 1–4.8)
LYMPHOCYTES RELATIVE PERCENT: 5.8 %
MCH RBC QN AUTO: 32.7 PG (ref 27–31.3)
MCHC RBC AUTO-ENTMCNC: 35 % (ref 33–37)
MCV RBC AUTO: 93.4 FL (ref 80–100)
MONOCYTES ABSOLUTE: 0.2 K/UL (ref 0.2–0.8)
MONOCYTES RELATIVE PERCENT: 2.6 %
NEUTROPHILS ABSOLUTE: 7.5 K/UL (ref 1.4–6.5)
NEUTROPHILS RELATIVE PERCENT: 91.2 %
NITRITE, URINE: NEGATIVE
PDW BLD-RTO: 13.5 % (ref 11.5–14.5)
PH UA: 7.5 (ref 5–9)
PLATELET # BLD: 172 K/UL (ref 130–400)
POTASSIUM SERPL-SCNC: 4.9 MEQ/L (ref 3.4–4.9)
PROCALCITONIN: 0.04 NG/ML (ref 0–0.15)
PROTEIN UA: NEGATIVE MG/DL
RBC # BLD: 4.67 M/UL (ref 4.7–6.1)
SARS-COV-2, NAAT: NOT DETECTED
SODIUM BLD-SCNC: 129 MEQ/L (ref 135–144)
SPECIFIC GRAVITY UA: 1.01 (ref 1–1.03)
TOTAL CK: 37 U/L (ref 0–190)
TOTAL PROTEIN: 6.8 G/DL (ref 6.3–8)
TROPONIN: <0.01 NG/ML (ref 0–0.01)
URINE REFLEX TO CULTURE: ABNORMAL
UROBILINOGEN, URINE: 0.2 E.U./DL
WBC # BLD: 8.2 K/UL (ref 4.8–10.8)

## 2022-09-13 PROCEDURE — 99285 EMERGENCY DEPT VISIT HI MDM: CPT

## 2022-09-13 PROCEDURE — 85025 COMPLETE CBC W/AUTO DIFF WBC: CPT

## 2022-09-13 PROCEDURE — 87635 SARS-COV-2 COVID-19 AMP PRB: CPT

## 2022-09-13 PROCEDURE — 83605 ASSAY OF LACTIC ACID: CPT

## 2022-09-13 PROCEDURE — 82550 ASSAY OF CK (CPK): CPT

## 2022-09-13 PROCEDURE — 71045 X-RAY EXAM CHEST 1 VIEW: CPT

## 2022-09-13 PROCEDURE — 36415 COLL VENOUS BLD VENIPUNCTURE: CPT

## 2022-09-13 PROCEDURE — 80053 COMPREHEN METABOLIC PANEL: CPT

## 2022-09-13 PROCEDURE — 81003 URINALYSIS AUTO W/O SCOPE: CPT

## 2022-09-13 PROCEDURE — 84484 ASSAY OF TROPONIN QUANT: CPT

## 2022-09-13 PROCEDURE — 93005 ELECTROCARDIOGRAM TRACING: CPT | Performed by: PHYSICIAN ASSISTANT

## 2022-09-13 PROCEDURE — 84145 PROCALCITONIN (PCT): CPT

## 2022-09-13 ASSESSMENT — ENCOUNTER SYMPTOMS
SORE THROAT: 0
ABDOMINAL PAIN: 0
COLOR CHANGE: 0
EYE DISCHARGE: 0
CONSTIPATION: 0
RHINORRHEA: 0
ABDOMINAL DISTENTION: 0
SHORTNESS OF BREATH: 0

## 2022-09-13 ASSESSMENT — PAIN - FUNCTIONAL ASSESSMENT
PAIN_FUNCTIONAL_ASSESSMENT: NONE - DENIES PAIN
PAIN_FUNCTIONAL_ASSESSMENT: NONE - DENIES PAIN

## 2022-09-13 NOTE — ED TRIAGE NOTES
Pt to ED via EMS due to getting weak while walking to the bathroom and having to lay on the floor. Pt states that he has been feeling weak and fatigued starting this morning. Pt is alert and oriented x4. Resp are regular and equal. Skin is warm, dry, intact.

## 2022-09-13 NOTE — ED PROVIDER NOTES
[address] [facility] ED  eMERGENCY dEPARTMENT eNCOUnter      Pt Name: Jose Francisco Dill  MRN: [id number]  [place] 1950  Date of evaluation: 9/13/2022  Provider: Darren Chavis PA-C    CHIEF COMPLAINT       Chief Complaint   Patient presents with    Fatigue     Pt to ED via EMS to do fatigue and weakness. Pt states he was walking to the bathroom and felt weak to the point that he had to lay himself on the ground and needed help getting up. HISTORY OF PRESENT ILLNESS   (Location/Symptom, Timing/Onset,Context/Setting, Quality, Duration, Modifying Factors, Severity)  Note limiting factors. Jose Francisco Dill is a 66 y.o. male who presents to the emergency department generalized weakness. Patient states when he got up at 8 AM this morning he felt fine, he states he went into the living room, he states he felt tired so he was going back to bed, as he went back into the bedroom, he states he generally felt weak, and sat down on the floor. He denies any falls or injuries. No chest pain no shortness of breath: No dizziness no blurred vision no numbness or tingling. Patient denies any pain, 0-10, and states generally feels weak and fatigued. Past medical history significant for hyperlipidemia, hypertension, diabetes, vertigo, anxiety, chest pain. HPI    NursingNotes were reviewed. REVIEW OF SYSTEMS    (2-9 systems for level 4, 10 or more for level 5)     Review of Systems   Constitutional:  Positive for fatigue. Negative for activity change and appetite change. HENT:  Negative for congestion, ear discharge, ear pain, nosebleeds, rhinorrhea and sore throat. Eyes:  Negative for discharge. Respiratory:  Negative for shortness of breath. Cardiovascular:  Negative for chest pain, palpitations and leg swelling. Gastrointestinal:  Negative for abdominal distention, abdominal pain and constipation. Genitourinary:  Negative for difficulty urinating and dysuria.    Musculoskeletal:  Negative for arthralgias. Skin:  Negative for color change. Neurological:  Positive for weakness. Negative for dizziness, syncope, numbness and headaches. Psychiatric/Behavioral:  Negative for agitation and confusion. Except as noted above the remainder of the review of systems was reviewed and negative. PAST MEDICAL HISTORY     Past Medical History:   Diagnosis Date    Anxiety     Chest pain 3/29/2018    Coronary artery disease involving native coronary artery of native heart without angina pectoris 2/15/2019    Diabetes mellitus ([facility] [address]     no meds    History of colon polyps     Hyperlipidemia     Hypertension     Type 2 diabetes mellitus without complication ([facility] [address]     Vertigo          SURGICALHISTORY       Past Surgical History:   Procedure Laterality Date    [address]    PTCA     COLONOSCOPY      COLONOSCOPY N/A 10/17/2019    COLORECTAL CANCER SCREENING, HIGH RISK performed by Dyana Concepcion MD at [facility]      OTHER SURGICAL HISTORY      patent foramen ovale    TONSILLECTOMY      UPPER GASTROINTESTINAL ENDOSCOPY N/A 8/1/2019    EGD ESOPHAGOGASTRODUODENOSCOPY performed by Dyana Concepcion MD at [address]       Previous Medications    ASPIRIN 81 MG CHEWABLE TABLET    Take 1 tablet by mouth daily    ATORVASTATIN (LIPITOR) 20 MG TABLET    Take 1 tablet by mouth daily    B COMPLEX VITAMINS CAPSULE    Take 1 capsule by mouth daily    BLOOD GLUCOSE MONITORING SUPPL (ONETOUCH VERIO FLEX SYSTEM) W/DEVICE KIT        BUSPIRONE (BUSPAR) 7.5 MG TABLET        CLOTRIMAZOLE-BETAMETHASONE (LOTRISONE) 1-0.05 % CREAM    Apply topically 2 times daily.  For 2 weeks    D3-50 1.25 MG (32514 UT) CAPS        ESCITALOPRAM (LEXAPRO) 10 MG TABLET        ISOSORBIDE MONONITRATE (IMDUR) 30 MG EXTENDED RELEASE TABLET    Take 1 tablet by mouth daily    LATANOPROST (XALATAN) 0.005 % OPHTHALMIC SOLUTION    Use 1 Drop in both eyes daily at bedtime. LORAZEPAM (ATIVAN) 2 MG TABLET        OLOPATADINE (PATADAY) 0.2 % SOLN OPHTHALMIC SOLUTION    Apply 1 drop to eye daily For allergies    ONETOUCH DELICA LANCETS 98J MISC        ONETOUCH VERIO STRIP        PHYTONADIONE (VITAMIN K) 5 MG TABLET    Take 5 mg by mouth once    RANOLAZINE (RANEXA) 1000 MG EXTENDED RELEASE TABLET    TAKE 1 TABLET BY MOUTH TWICE DAILY    TAMSULOSIN (FLOMAX) 0.4 MG CAPSULE    Take 1 capsule by mouth daily    VITAMIN C (ASCORBIC ACID) 500 MG TABLET    Take 500 mg by mouth daily    VITAMIN E 1000 UNITS CAPSULE    Take 1,000 Units by mouth daily     VORTIOXETINE (TRINTELLIX) 5 MG TABLET    Take 5 mg by mouth daily    ZOLPIDEM (AMBIEN) 5 MG TABLET           ALLERGIES     Seroquel [quetiapine]    FAMILY HISTORY       Family History   Problem Relation Age of Onset    Heart Disease Maternal Aunt     Cancer Maternal Aunt     Colon Cancer Maternal Aunt           SOCIAL HISTORY       Social History     Socioeconomic History    Marital status:      Spouse name: None    Number of children: None    Years of education: None    Highest education level: None   Tobacco Use    Smoking status: Never    Smokeless tobacco: Never   Vaping Use    Vaping Use: Never used   Substance and Sexual Activity    Alcohol use: No    Drug use: Never       SCREENINGS   NIH Stroke Scale  Interval: Baseline  Level of Consciousness (1a): Alert  LOC Questions (1b): Answers both correctly  LOC Commands (1c): Performs both tasks correctly  Best Gaze (2): Normal  Visual (3): No visual loss  Facial Palsy (4): Normal symmetrical movement  Motor Arm, Left (5a): No drift  Motor Arm, Right (5b): No drift  Motor Leg, Left (6a): No drift  Motor Leg, Right (6b):  No drift  Limb Ataxia (7): Absent  Sensory (8): Normal  Best Language (9): No aphasia  Dysarthria (10): Normal  Extinction and Inattention (11): No abnormality  Total: 0[place] Coma Scale  Eye Opening: Spontaneous  Best Verbal Response: Oriented  Best No deformity. Cervical back: Normal range of motion and neck supple. No rigidity. Skin:     General: Skin is warm and dry. Capillary Refill: Capillary refill takes less than 2 seconds. Coloration: Skin is not jaundiced. Neurological:      General: No focal deficit present. Mental Status: He is alert and oriented to person, place, and time. Mental status is at baseline. Cranial Nerves: No cranial nerve deficit. Sensory: No sensory deficit. Motor: No weakness. Coordination: Coordination normal.      Comments: She is alert and orient, he is neurologically intact, no facial droop, no slurring speech. No drift upper or lower extremities   Psychiatric:         Mood and Affect: Mood normal.       DIAGNOSTIC RESULTS     EKG: All EKG's are interpreted by the Emergency Department Physician who either signs or Co-signsthis chart in the absence of a cardiologist.    EKG shows normal sinus rhythm at 61 bpm no acute ST segment abnormality no ventricular ectopy  ms    Repeat EKG shows sinus rhythm with a first-degree AV block at 64 bpm there is no acute ST segment abnormality no ventricular ectopy  ms    RADIOLOGY:   Non-plain filmimages such as CT, Ultrasound and MRI are read by the radiologist. Plain radiographic images are visualized and preliminarily interpreted by the emergency physician with the below findings:      Interpretation per the Radiologist below, if available at the time ofthis note:    XR CHEST PORTABLE   Final Result   No acute process.                ED BEDSIDE ULTRASOUND:   Performed by ED Physician - none    LABS:  Labs Reviewed   COMPREHENSIVE METABOLIC PANEL - Abnormal; Notable for the following components:       Result Value    Sodium 129 (*)     Anion Gap 8 (*)     Glucose 136 (*)     Total Bilirubin 1.0 (*)     All other components within normal limits   CBC WITH AUTO DIFFERENTIAL - Abnormal; Notable for the following components:    RBC 4.67 (*) MCH 32.7 (*)     Neutrophils Absolute 7.5 (*)     Lymphocytes Absolute 0.5 (*)     All other components within normal limits   URINALYSIS WITH REFLEX TO CULTURE - Abnormal; Notable for the following components:    Ketones, Urine TRACE (*)     All other components within normal limits   COVID-19, RAPID   LACTIC ACID   TROPONIN   CK   PROCALCITONIN       All other labs were within normal range or not returned as of this dictation. EMERGENCY DEPARTMENT COURSE and DIFFERENTIAL DIAGNOSIS/MDM:   Vitals:    Vitals:    09/13/22 0956 09/13/22 1000 09/13/22 1030 09/13/22 1130   BP: (!) 161/87 (!) 152/89 (!) 145/76 (!) 157/87   Pulse: 61 60 62 65   Resp: 16 17 22 17   Temp: 97.6 °F (36.4 °C)      TempSrc: Oral      SpO2: 97% 97% 99% 100%   Weight: 125 lb (56.7 kg)      Height: 5' 5\" (1.651 m)               MDM  Number of Diagnoses or Management Options  Fatigue, unspecified type  Diagnosis management comments: Had presented to the ED with complaint of generalized weakness, he states that when he got up this morning he felt fine, and shortly thereafter he felt weak and fatigued, he states he sat down on the ground, did not feel like he had enough strength to get up. On arrival to the ED he appears in no acute distress, he is moving all extremities well, he has no neurological deficits. His work-up in the ED is essentially overall without acute findings. He is able to stand and ambulate in the ED without any significant distress or difficulty he does this unassisted. Patient will be discharged home with a diagnosis of nonspecific fatigue. He is advised to contact his regular family provider for follow-up. Should he have any worsening or change symptoms, was advised to return to the ED. CRITICAL CARE TIME   Total Critical Care time was  minutes, excluding separately reportableprocedures.   There was a high probability of clinicallysignificant/life threatening deterioration in the patient's condition which required my urgent intervention. CONSULTS:  None    PROCEDURES:  Unless otherwise noted below, none     Procedures    FINAL IMPRESSION      1.  Fatigue, unspecified type          DISPOSITION/PLAN   DISPOSITION Decision To Discharge 09/13/2022 02:02:05 PM      PATIENT REFERRED TO:  Amanda Cadena MD  [phone]    In 3 days      DISCHARGE MEDICATIONS:  New Prescriptions    No medications on file          (Please note that portions of this note were completed with a voice recognition program.  Efforts were made to edit the dictations but occasionally words are mis-transcribed.)    Marya Ann PA-C (electronically signed)  Attending Emergency Physician         Marya Ann PA-C  09/13/22 1571

## 2022-09-13 NOTE — ED NOTES
Pt up out of bed and ambulated in [place] with this rn. Gait steady and pt shuffles feet. Pt instructed to walk slower and  his feet when walking. Swapnil bravo aware. Johnny Castellano  09/13/22 7312

## 2022-09-17 LAB
EKG ATRIAL RATE: 61 BPM
EKG ATRIAL RATE: 64 BPM
EKG P AXIS: 51 DEGREES
EKG P AXIS: 60 DEGREES
EKG P-R INTERVAL: 184 MS
EKG P-R INTERVAL: 210 MS
EKG Q-T INTERVAL: 476 MS
EKG Q-T INTERVAL: 476 MS
EKG QRS DURATION: 110 MS
EKG QRS DURATION: 96 MS
EKG QTC CALCULATION (BAZETT): 479 MS
EKG QTC CALCULATION (BAZETT): 491 MS
EKG R AXIS: 57 DEGREES
EKG R AXIS: 58 DEGREES
EKG T AXIS: 66 DEGREES
EKG T AXIS: 70 DEGREES
EKG VENTRICULAR RATE: 61 BPM
EKG VENTRICULAR RATE: 64 BPM

## 2022-10-12 ENCOUNTER — APPOINTMENT (OUTPATIENT)
Dept: CT IMAGING | Age: 72
End: 2022-10-12
Payer: MEDICARE

## 2022-10-12 ENCOUNTER — HOSPITAL ENCOUNTER (EMERGENCY)
Age: 72
Discharge: HOME | End: 2022-10-12
Payer: MEDICARE

## 2022-10-12 VITALS
TEMPERATURE: 98.4 F | HEART RATE: 62 BPM | BODY MASS INDEX: 20.8 KG/M2 | RESPIRATION RATE: 16 BRPM | OXYGEN SATURATION: 100 % | SYSTOLIC BLOOD PRESSURE: 127 MMHG | HEIGHT: 65 IN | DIASTOLIC BLOOD PRESSURE: 67 MMHG

## 2022-10-12 DIAGNOSIS — F41.9 ANXIETY: ICD-10-CM

## 2022-10-12 DIAGNOSIS — K59.00 CONSTIPATION, UNSPECIFIED CONSTIPATION TYPE: ICD-10-CM

## 2022-10-12 DIAGNOSIS — J40 BRONCHITIS: Primary | ICD-10-CM

## 2022-10-12 LAB
ALBUMIN SERPL-MCNC: 4.5 G/DL (ref 3.5–4.6)
ALP BLD-CCNC: 77 U/L (ref 35–104)
ALT SERPL-CCNC: 13 U/L (ref 0–41)
ANION GAP SERPL CALCULATED.3IONS-SCNC: 12 MEQ/L (ref 9–15)
AST SERPL-CCNC: 11 U/L (ref 0–40)
BASOPHILS ABSOLUTE: 0 K/UL (ref 0–0.2)
BASOPHILS RELATIVE PERCENT: 0.4 %
BILIRUB SERPL-MCNC: 1 MG/DL (ref 0.2–0.7)
BUN BLDV-MCNC: 16 MG/DL (ref 8–23)
CALCIUM SERPL-MCNC: 9.9 MG/DL (ref 8.5–9.9)
CHLORIDE BLD-SCNC: 107 MEQ/L (ref 95–107)
CO2: 20 MEQ/L (ref 20–31)
CREAT SERPL-MCNC: 0.95 MG/DL (ref 0.7–1.2)
EOSINOPHILS ABSOLUTE: 0.1 K/UL (ref 0–0.7)
EOSINOPHILS RELATIVE PERCENT: 1.1 %
GFR AFRICAN AMERICAN: >60
GFR AFRICAN AMERICAN: >60
GFR NON-AFRICAN AMERICAN: >60
GFR NON-AFRICAN AMERICAN: >60
GLOBULIN: 2.8 G/DL (ref 2.3–3.5)
GLUCOSE BLD-MCNC: 106 MG/DL (ref 70–99)
HCT VFR BLD CALC: 41.2 % (ref 42–52)
HEMOGLOBIN: 15 G/DL (ref 14–18)
LYMPHOCYTES ABSOLUTE: 1.5 K/UL (ref 1–4.8)
LYMPHOCYTES RELATIVE PERCENT: 22.9 %
MAGNESIUM: 2 MG/DL (ref 1.7–2.4)
MCH RBC QN AUTO: 33.2 PG (ref 27–31.3)
MCHC RBC AUTO-ENTMCNC: 36.5 % (ref 33–37)
MCV RBC AUTO: 91.1 FL (ref 80–100)
MONOCYTES ABSOLUTE: 0.5 K/UL (ref 0.2–0.8)
MONOCYTES RELATIVE PERCENT: 7.8 %
NEUTROPHILS ABSOLUTE: 4.6 K/UL (ref 1.4–6.5)
NEUTROPHILS RELATIVE PERCENT: 67.8 %
PDW BLD-RTO: 14.1 % (ref 11.5–14.5)
PERFORMED ON: NORMAL
PLATELET # BLD: 199 K/UL (ref 130–400)
POC CREATININE: 1 MG/DL (ref 0.8–1.3)
POC SAMPLE TYPE: NORMAL
POTASSIUM SERPL-SCNC: 4 MEQ/L (ref 3.4–4.9)
PRO-BNP: 139 PG/ML
RBC # BLD: 4.53 M/UL (ref 4.7–6.1)
SODIUM BLD-SCNC: 139 MEQ/L (ref 135–144)
TOTAL PROTEIN: 7.3 G/DL (ref 6.3–8)
TROPONIN: <0.01 NG/ML (ref 0–0.01)
TSH REFLEX: 4.19 UIU/ML (ref 0.44–3.86)
WBC # BLD: 6.7 K/UL (ref 4.8–10.8)

## 2022-10-12 PROCEDURE — 71275 CT ANGIOGRAPHY CHEST: CPT

## 2022-10-12 PROCEDURE — 85025 COMPLETE CBC W/AUTO DIFF WBC: CPT

## 2022-10-12 PROCEDURE — 36415 COLL VENOUS BLD VENIPUNCTURE: CPT

## 2022-10-12 PROCEDURE — 80053 COMPREHEN METABOLIC PANEL: CPT

## 2022-10-12 PROCEDURE — 83735 ASSAY OF MAGNESIUM: CPT

## 2022-10-12 PROCEDURE — 6370000000 HC RX 637 (ALT 250 FOR IP): Performed by: PERSONAL EMERGENCY RESPONSE ATTENDANT

## 2022-10-12 PROCEDURE — 84443 ASSAY THYROID STIM HORMONE: CPT

## 2022-10-12 PROCEDURE — 74177 CT ABD & PELVIS W/CONTRAST: CPT

## 2022-10-12 PROCEDURE — 6360000004 HC RX CONTRAST MEDICATION: Performed by: PERSONAL EMERGENCY RESPONSE ATTENDANT

## 2022-10-12 PROCEDURE — 6360000002 HC RX W HCPCS: Performed by: PERSONAL EMERGENCY RESPONSE ATTENDANT

## 2022-10-12 PROCEDURE — 94640 AIRWAY INHALATION TREATMENT: CPT

## 2022-10-12 PROCEDURE — 94761 N-INVAS EAR/PLS OXIMETRY MLT: CPT

## 2022-10-12 PROCEDURE — 84484 ASSAY OF TROPONIN QUANT: CPT

## 2022-10-12 PROCEDURE — 83880 ASSAY OF NATRIURETIC PEPTIDE: CPT

## 2022-10-12 PROCEDURE — 99285 EMERGENCY DEPT VISIT HI MDM: CPT

## 2022-10-12 PROCEDURE — 93005 ELECTROCARDIOGRAM TRACING: CPT | Performed by: PERSONAL EMERGENCY RESPONSE ATTENDANT

## 2022-10-12 PROCEDURE — 96374 THER/PROPH/DIAG INJ IV PUSH: CPT

## 2022-10-12 RX ORDER — ALBUTEROL SULFATE 90 UG/1
2 AEROSOL, METERED RESPIRATORY (INHALATION) 4 TIMES DAILY PRN
Qty: 54 G | Refills: 1 | Status: ON HOLD | OUTPATIENT
Start: 2022-10-12

## 2022-10-12 RX ORDER — ALPRAZOLAM 0.5 MG/1
0.5 TABLET ORAL ONCE
Status: COMPLETED | OUTPATIENT
Start: 2022-10-12 | End: 2022-10-12

## 2022-10-12 RX ORDER — ISOSORBIDE MONONITRATE 30 MG/1
30 TABLET, EXTENDED RELEASE ORAL DAILY
Qty: 30 TABLET | Refills: 3 | Status: ON HOLD | OUTPATIENT
Start: 2022-10-12

## 2022-10-12 RX ORDER — LORAZEPAM 2 MG/ML
0.5 INJECTION INTRAMUSCULAR ONCE
Status: COMPLETED | OUTPATIENT
Start: 2022-10-12 | End: 2022-10-12

## 2022-10-12 RX ORDER — NITROGLYCERIN 0.4 MG/1
0.4 TABLET SUBLINGUAL EVERY 5 MIN PRN
Status: DISCONTINUED | OUTPATIENT
Start: 2022-10-12 | End: 2022-10-12 | Stop reason: HOSPADM

## 2022-10-12 RX ORDER — DOCUSATE SODIUM 100 MG/1
100 CAPSULE, LIQUID FILLED ORAL 2 TIMES DAILY
Qty: 14 CAPSULE | Refills: 0 | Status: ON HOLD | OUTPATIENT
Start: 2022-10-12 | End: 2022-10-19

## 2022-10-12 RX ORDER — IPRATROPIUM BROMIDE AND ALBUTEROL SULFATE 2.5; .5 MG/3ML; MG/3ML
1 SOLUTION RESPIRATORY (INHALATION) CONTINUOUS PRN
Status: DISCONTINUED | OUTPATIENT
Start: 2022-10-12 | End: 2022-10-12 | Stop reason: HOSPADM

## 2022-10-12 RX ADMIN — IOPAMIDOL 50 ML: 612 INJECTION, SOLUTION INTRAVENOUS at 04:35

## 2022-10-12 RX ADMIN — LORAZEPAM 0.5 MG: 2 INJECTION INTRAMUSCULAR; INTRAVENOUS at 06:06

## 2022-10-12 RX ADMIN — IPRATROPIUM BROMIDE AND ALBUTEROL SULFATE 1 AMPULE: .5; 2.5 SOLUTION RESPIRATORY (INHALATION) at 06:10

## 2022-10-12 RX ADMIN — NITROGLYCERIN 0.4 MG: 0.4 TABLET SUBLINGUAL at 05:47

## 2022-10-12 RX ADMIN — ALPRAZOLAM 0.5 MG: 0.5 TABLET ORAL at 03:23

## 2022-10-12 ASSESSMENT — PAIN SCALES - GENERAL
PAINLEVEL_OUTOF10: 10
PAINLEVEL_OUTOF10: 10
PAINLEVEL_OUTOF10: 6

## 2022-10-12 ASSESSMENT — ENCOUNTER SYMPTOMS
SORE THROAT: 0
VOMITING: 0
ABDOMINAL PAIN: 0
COLOR CHANGE: 0
BLOOD IN STOOL: 0
COUGH: 1
RHINORRHEA: 0
DIARRHEA: 0
SHORTNESS OF BREATH: 1
NAUSEA: 0

## 2022-10-12 ASSESSMENT — PAIN DESCRIPTION - LOCATION
LOCATION: CHEST

## 2022-10-12 ASSESSMENT — PAIN - FUNCTIONAL ASSESSMENT: PAIN_FUNCTIONAL_ASSESSMENT: NONE - DENIES PAIN

## 2022-10-12 ASSESSMENT — PAIN DESCRIPTION - DESCRIPTORS: DESCRIPTORS: ACHING

## 2022-10-12 NOTE — ED NOTES
Pt states no relief in chest pain, pt remains anxious, reassurance given, continue to monitor     [facility] RN  10/12/22 7368

## 2022-10-12 NOTE — ED NOTES
Pt in CT at this time     [facility], [address] [place] [address]  10/12/22 8479

## 2022-10-12 NOTE — ED PROVIDER NOTES
[address] [facility] ED  eMERGENCY dEPARTMENT eNCOUnter      Pt Name: Arley Sandifer  MRN: [id number]  [place] 1950  Date of evaluation: 10/12/2022  Provider: Leann Harrington, [address]  Chivo Joseph is a 74 y.o. male with PMHx of CAD, anxiety, DM2, hyperlipidemia, hypertension, vertigo, gastritis, depression presents to the emergency department with anxiety and hemoptysis. Patient states for 3 weeks he has been having bright red blood in his mucus. He states he has had this in the past with unknown etiology. A few minutes ago he did start with left lateral chest pain, described as dull, coming and going, nothing current. He has been shortness of breath without wheezing. He does admit to anxiety but did not take Xanax at home. Nausea without emesis. He denies fevers, blood thinner use, tobacco abuse, abdominal pain, diarrhea. No other bleeding or bruising sites. He denies hemoptysis or blood in his stools. HPI    Nursing Notes were reviewed. REVIEW OF SYSTEMS       Review of Systems   Constitutional:  Negative for appetite change, chills and fever. HENT:  Negative for congestion, rhinorrhea and sore throat. Respiratory:  Positive for cough and shortness of breath. Cardiovascular:  Positive for chest pain. Gastrointestinal:  Negative for abdominal pain, blood in stool, diarrhea, nausea and vomiting. Genitourinary:  Negative for difficulty urinating. Musculoskeletal:  Negative for neck stiffness. Skin:  Negative for color change and rash. Neurological:  Negative for dizziness, syncope, weakness, light-headedness, numbness and headaches. Psychiatric/Behavioral:  The patient is nervous/anxious. All other systems reviewed and are negative.           PAST MEDICAL HISTORY     Past Medical History:   Diagnosis Date    Anxiety     Chest pain 3/29/2018    Coronary artery disease involving native coronary artery of native heart without angina pectoris 2/15/2019    Diabetes mellitus ([facility]     no meds    History of colon polyps     Hyperlipidemia     Hypertension     [facility]     Vertigo          SURGICAL HISTORY       Past Surgical History:   Procedure Laterality Date    CARDIAC SURGERY  1973    PTCA     COLONOSCOPY      COLONOSCOPY N/A 10/17/2019    COLORECTAL CANCER SCREENING, HIGH RISK performed by Venu Orlando MD at [facility]      OTHER SURGICAL HISTORY      patent foramen ovale    TONSILLECTOMY      UPPER GASTROINTESTINAL ENDOSCOPY N/A 8/1/2019    EGD ESOPHAGOGASTRODUODENOSCOPY performed by Venu Orlando MD at [address]       Previous Medications    ASPIRIN 81 MG CHEWABLE TABLET    Take 1 tablet by mouth daily    ATORVASTATIN (LIPITOR) 20 MG TABLET    Take 1 tablet by mouth daily    B COMPLEX VITAMINS CAPSULE    Take 1 capsule by mouth daily    BLOOD GLUCOSE MONITORING SUPPL (ONETOUCH VERIO FLEX SYSTEM) W/DEVICE KIT        BUSPIRONE (BUSPAR) 7.5 MG TABLET        CLOTRIMAZOLE-BETAMETHASONE (LOTRISONE) 1-0.05 % CREAM    Apply topically 2 times daily. For 2 weeks    D3-50 1.25 MG (44476 UT) CAPS        ESCITALOPRAM (LEXAPRO) 10 MG TABLET        ISOSORBIDE MONONITRATE (IMDUR) 30 MG EXTENDED RELEASE TABLET    Take 1 tablet by mouth daily    LATANOPROST (XALATAN) 0.005 % OPHTHALMIC SOLUTION    Use 1 Drop in both eyes daily at bedtime.     LORAZEPAM (ATIVAN) 2 MG TABLET        OLOPATADINE (PATADAY) 0.2 % SOLN OPHTHALMIC SOLUTION    Apply 1 drop to eye daily For allergies    ONETOUCH DELICA LANCETS 99U MISC        ONETOUCH VERIO STRIP        PHYTONADIONE (VITAMIN K) 5 MG TABLET    Take 5 mg by mouth once    RANOLAZINE (RANEXA) 1000 MG EXTENDED RELEASE TABLET    TAKE 1 TABLET BY MOUTH TWICE DAILY    TAMSULOSIN (FLOMAX) 0.4 MG CAPSULE    Take 1 capsule by mouth daily    VITAMIN C (ASCORBIC ACID) 500 MG TABLET    Take 500 mg by mouth daily    VITAMIN E 1000 UNITS CAPSULE    Take 1,000 Units by mouth daily     VORTIOXETINE (TRINTELLIX) 5 MG TABLET    Take 5 mg by mouth daily    ZOLPIDEM (AMBIEN) 5 MG TABLET           ALLERGIES     Seroquel [quetiapine]    FAMILY HISTORY       Family History   Problem Relation Age of Onset    Heart Disease Maternal Aunt     Cancer Maternal Aunt     Colon Cancer Maternal Aunt           SOCIAL HISTORY       Social History     Socioeconomic History    Marital status:      Spouse name: None    Number of children: None    Years of education: None    Highest education level: None   Tobacco Use    Smoking status: Never    Smokeless tobacco: Never   Vaping Use    Vaping Use: Never used   Substance and Sexual Activity    Alcohol use: No    Drug use: Never         PHYSICAL EXAM         ED Triage Vitals [10/12/22 0247]   BP Temp Temp Source Heart Rate Resp SpO2 Height Weight   (!) 166/83 98.4 °F (36.9 °C) Oral 60 18 100 % 5' 5\" (1.651 m) --       Physical Exam  Constitutional:       Appearance: He is well-developed. HENT:      Head: Normocephalic and atraumatic. Mouth/Throat:      Pharynx: No oropharyngeal exudate or posterior oropharyngeal erythema. Eyes:      Conjunctiva/sclera: Conjunctivae normal.      Pupils: Pupils are equal, round, and reactive to light. Neck:      Trachea: No tracheal deviation. Cardiovascular:      Heart sounds: Normal heart sounds. Pulmonary:      Effort: Pulmonary effort is normal. No respiratory distress. Breath sounds: Normal breath sounds. No stridor. Abdominal:      General: Bowel sounds are normal. There is no distension. Palpations: Abdomen is soft. There is no mass. Tenderness: There is abdominal tenderness. There is no guarding or rebound.       Comments: Moderately tender to palpate suprapubically, abdomen soft and nondistended, no rebound or rigidity, no pulsatile mass or bruit, no ecchymosis   Musculoskeletal:         General: Normal range of motion. Cervical back: Normal range of motion and neck supple. Skin:     General: Skin is warm and dry. Capillary Refill: Capillary refill takes less than 2 seconds. Findings: No rash. Neurological:      Mental Status: He is alert and oriented to person, place, and time. Deep Tendon Reflexes: Reflexes are normal and symmetric. Psychiatric:         Behavior: Behavior normal.         Thought Content: Thought content normal.         Judgment: Judgment normal.       DIAGNOSTIC RESULTS     EKG:All EKG's are interpreted by the Emergency Department Physician who either signs or Co-signs this chart in the absence of a cardiologist.    Normal sinus rhythm, rate 65, normal intervals, normal axis, no ST segment changes. QTc 482    RADIOLOGY:   Non-plain film images such as CT, Ultrasound and MRI are read by theradiologist. Plain radiographic images are visualized and preliminarily interpreted by the emergency physician with the below findings:    Interpretation per theRadiologist below, if available at the time of this note:    CT ABDOMEN PELVIS W IV CONTRAST Additional Contrast? None    (Results Pending)   CTA Chest W WO  (PE study)    (Results Pending)           LABS:  Labs Reviewed   COMPREHENSIVE METABOLIC PANEL - Abnormal; Notable for the following components:       Result Value    Glucose 106 (*)     Total Bilirubin 1.0 (*)     All other components within normal limits   CBC WITH AUTO DIFFERENTIAL - Abnormal; Notable for the following components:    RBC 4.53 (*)     Hematocrit 41.2 (*)     MCH 33.2 (*)     All other components within normal limits   TSH WITH REFLEX - Abnormal; Notable for the following components:    TSH 4.190 (*)     All other components within normal limits   TROPONIN   BRAIN NATRIURETIC PEPTIDE   MAGNESIUM   URINALYSIS WITH REFLEX TO CULTURE   POCT CREATININE       All other labs were within normal range or not returned as of this dictation.     EMERGENCY DEPARTMENT COURSE and DIFFERENTIAL DIAGNOSIS/MDM:   Vitals:    Vitals:    10/12/22 0330 10/12/22 0400 10/12/22 0500 10/12/22 0530   BP: (!) 165/82 (!) 142/71 (!) 143/79 (!) 141/72   Pulse: 63 62 65 61   Resp: 18 16 18 16   Temp:       TempSrc:       SpO2: 100% 98% 99% 100%   Height:             MDM    TSH 4.19, T4 free pending. Pt given Xanax for his chest pain without change. States this is his typical chest pain with his anxiety. Given nitro SL without any changes. CT chest negative for PE. Does show peribronchial cuffing which can be compatible with bronchial inflammation. CT abdomen pelvis shows constipation. Given treatments and Ativan IV and on reassessment symptoms have improved. Anxiety most likely contributing to patient's symptoms today. Patient will be placed on Colace. Standard anticipatory guidance given to patient upon discharge. Have given them a specific time frame in which to follow-up and who to follow-up with. I have also advised them that they should return to the emergency department if they get worse, or not getting better or develop any new or concerning symptoms. Patient demonstrates understanding. CRITICAL CARE TIME   Total Critical Caretime was 0 minutes, excluding separately reportable procedures. There was a high probability of clinically significant/life threatening deterioration in the patient's condition which required my urgent intervention. Procedures    FINAL IMPRESSION      1. Bronchitis    2. Constipation, unspecified constipation type    3.  Anxiety          DISPOSITION/PLAN   DISPOSITION Discharge - Pending Orders Complete 10/12/2022 06:06:06 AM      PATIENT REFERRED TO:  Barak Gregory MD  [id number]  [phone]          DISCHARGE MEDICATIONS:  New Prescriptions    DOCUSATE SODIUM (COLACE) 100 MG CAPSULE    Take 1 capsule by mouth 2 times daily for 7 days          (Please notethat portions of this note were completed with a voice recognition program.  Efforts were made to edit the dictations but occasionally words are mis-transcribed. )    ALBA Kowalski (electronically signed)  Emergency Physician Assistant         Jamila Mosleyma  10/14/22 2033

## 2022-10-12 NOTE — ED TRIAGE NOTES
Pt to room 9 per ems, report received, pt alert & oriented x 4, states that he has been unable to sleep due to anxiety and that he had one episode of bloody sputum, pt calm at this time, denies SI/HI, states that he did not take prescribed xanax for anxiety because it does not work, denies chest pain or shortness of breath, no coughing noted at this time, triage completed

## 2022-10-12 NOTE — ED NOTES
Pt states decrease in chest pain & anxiety, updated with discharge plans, pt calling son for ride at this time     [facility], [address] [place] [address]  10/12/22 1335

## 2022-10-12 NOTE — ED NOTES
Discharge instructions given, pt verbalized understanding     [facility]  10/12/22 9402

## 2022-10-12 NOTE — TELEPHONE ENCOUNTER
Please approve or deny this refill request. The order is pended. Thank you.     LOV 5/6/2022    Next Visit Date:  Future Appointments   Date Time Provider Kitty Carrizales   11/11/2022 10:45 AM DO Erick [facility]   12/30/2022 10:30 AM Mima Wright MD [facility]

## 2022-10-12 NOTE — ED NOTES
Pt back from CT & taken to bathroom, no distress noted     [facility]  10/12/22 1963

## 2022-10-14 ENCOUNTER — HOSPITAL ENCOUNTER (INPATIENT)
Age: 72
LOS: 24 days | Discharge: HOME | DRG: 885 | End: 2022-11-07
Attending: PSYCHIATRY & NEUROLOGY | Admitting: PSYCHIATRY & NEUROLOGY
Payer: MEDICARE

## 2022-10-14 DIAGNOSIS — F33.3 SEVERE EPISODE OF RECURRENT MAJOR DEPRESSIVE DISORDER, WITH PSYCHOTIC FEATURES (HCC): Primary | ICD-10-CM

## 2022-10-14 PROBLEM — F32.3 MAJOR DEPRESSION WITH PSYCHOTIC FEATURES (HCC): Status: ACTIVE | Noted: 2022-10-14

## 2022-10-14 LAB
ACETAMINOPHEN LEVEL: <5 UG/ML (ref 10–30)
ALBUMIN SERPL-MCNC: 4.6 G/DL (ref 3.5–4.6)
ALP BLD-CCNC: 85 U/L (ref 35–104)
ALT SERPL-CCNC: 16 U/L (ref 0–41)
AMPHETAMINE SCREEN, URINE: NORMAL
ANION GAP SERPL CALCULATED.3IONS-SCNC: 15 MEQ/L (ref 9–15)
AST SERPL-CCNC: 19 U/L (ref 0–40)
BARBITURATE SCREEN URINE: NORMAL
BASOPHILS ABSOLUTE: 0 K/UL (ref 0–0.2)
BASOPHILS RELATIVE PERCENT: 0.3 %
BENZODIAZEPINE SCREEN, URINE: NORMAL
BILIRUB SERPL-MCNC: 1.7 MG/DL (ref 0.2–0.7)
BILIRUBIN URINE: NEGATIVE
BLOOD, URINE: NEGATIVE
BUN BLDV-MCNC: 16 MG/DL (ref 8–23)
CALCIUM SERPL-MCNC: 9.7 MG/DL (ref 8.5–9.9)
CANNABINOID SCREEN URINE: NORMAL
CHLORIDE BLD-SCNC: 106 MEQ/L (ref 95–107)
CHOLESTEROL, TOTAL: 238 MG/DL (ref 0–199)
CLARITY: CLEAR
CO2: 18 MEQ/L (ref 20–31)
COCAINE METABOLITE SCREEN URINE: NORMAL
COLOR: YELLOW
CREAT SERPL-MCNC: 0.85 MG/DL (ref 0.7–1.2)
EOSINOPHILS ABSOLUTE: 0 K/UL (ref 0–0.7)
EOSINOPHILS RELATIVE PERCENT: 0.1 %
ETHANOL PERCENT: NORMAL G/DL
ETHANOL: <10 MG/DL (ref 0–0.08)
FENTANYL SCREEN, URINE: NORMAL
GFR AFRICAN AMERICAN: >60
GFR NON-AFRICAN AMERICAN: >60
GLOBULIN: 2.8 G/DL (ref 2.3–3.5)
GLUCOSE BLD-MCNC: 138 MG/DL (ref 70–99)
GLUCOSE URINE: NEGATIVE MG/DL
HCT VFR BLD CALC: 44.1 % (ref 42–52)
HDLC SERPL-MCNC: 48 MG/DL (ref 40–59)
HEMOGLOBIN: 15.6 G/DL (ref 14–18)
KETONES, URINE: 15 MG/DL
LDL CHOLESTEROL CALCULATED: 171 MG/DL (ref 0–129)
LEUKOCYTE ESTERASE, URINE: NEGATIVE
LYMPHOCYTES ABSOLUTE: 0.8 K/UL (ref 1–4.8)
LYMPHOCYTES RELATIVE PERCENT: 11.4 %
Lab: NORMAL
MAGNESIUM: 1.9 MG/DL (ref 1.7–2.4)
MCH RBC QN AUTO: 32.3 PG (ref 27–31.3)
MCHC RBC AUTO-ENTMCNC: 35.3 % (ref 33–37)
MCV RBC AUTO: 91.6 FL (ref 80–100)
METHADONE SCREEN, URINE: NORMAL
MONOCYTES ABSOLUTE: 0.4 K/UL (ref 0.2–0.8)
MONOCYTES RELATIVE PERCENT: 4.9 %
NEUTROPHILS ABSOLUTE: 6.1 K/UL (ref 1.4–6.5)
NEUTROPHILS RELATIVE PERCENT: 83.3 %
NITRITE, URINE: NEGATIVE
OPIATE SCREEN URINE: NORMAL
OXYCODONE URINE: NORMAL
PDW BLD-RTO: 13.9 % (ref 11.5–14.5)
PH UA: 8 (ref 5–9)
PHENCYCLIDINE SCREEN URINE: NORMAL
PLATELET # BLD: 255 K/UL (ref 130–400)
POTASSIUM SERPL-SCNC: 3.4 MEQ/L (ref 3.4–4.9)
PROPOXYPHENE SCREEN: NORMAL
PROTEIN UA: NEGATIVE MG/DL
RBC # BLD: 4.81 M/UL (ref 4.7–6.1)
SALICYLATE, SERUM: <0.3 MG/DL (ref 15–30)
SARS-COV-2, NAAT: NOT DETECTED
SODIUM BLD-SCNC: 139 MEQ/L (ref 135–144)
SPECIFIC GRAVITY UA: 1.01 (ref 1–1.03)
TOTAL CK: 254 U/L (ref 0–190)
TOTAL PROTEIN: 7.4 G/DL (ref 6.3–8)
TRIGL SERPL-MCNC: 94 MG/DL (ref 0–150)
TROPONIN: <0.01 NG/ML (ref 0–0.01)
TSH SERPL DL<=0.05 MIU/L-ACNC: 3.23 UIU/ML (ref 0.44–3.86)
URINE REFLEX TO CULTURE: ABNORMAL
UROBILINOGEN, URINE: 1 E.U./DL
WBC # BLD: 7.4 K/UL (ref 4.8–10.8)

## 2022-10-14 PROCEDURE — 80143 DRUG ASSAY ACETAMINOPHEN: CPT

## 2022-10-14 PROCEDURE — 1240000000 HC EMOTIONAL WELLNESS R&B

## 2022-10-14 PROCEDURE — 85025 COMPLETE CBC W/AUTO DIFF WBC: CPT

## 2022-10-14 PROCEDURE — 6370000000 HC RX 637 (ALT 250 FOR IP): Performed by: STUDENT IN AN ORGANIZED HEALTH CARE EDUCATION/TRAINING PROGRAM

## 2022-10-14 PROCEDURE — 83735 ASSAY OF MAGNESIUM: CPT

## 2022-10-14 PROCEDURE — 87635 SARS-COV-2 COVID-19 AMP PRB: CPT

## 2022-10-14 PROCEDURE — 82077 ASSAY SPEC XCP UR&BREATH IA: CPT

## 2022-10-14 PROCEDURE — 94664 DEMO&/EVAL PT USE INHALER: CPT

## 2022-10-14 PROCEDURE — 80061 LIPID PANEL: CPT

## 2022-10-14 PROCEDURE — 36415 COLL VENOUS BLD VENIPUNCTURE: CPT

## 2022-10-14 PROCEDURE — 6370000000 HC RX 637 (ALT 250 FOR IP): Performed by: PSYCHIATRY & NEUROLOGY

## 2022-10-14 PROCEDURE — 99285 EMERGENCY DEPT VISIT HI MDM: CPT

## 2022-10-14 PROCEDURE — 93005 ELECTROCARDIOGRAM TRACING: CPT | Performed by: STUDENT IN AN ORGANIZED HEALTH CARE EDUCATION/TRAINING PROGRAM

## 2022-10-14 PROCEDURE — 82550 ASSAY OF CK (CPK): CPT

## 2022-10-14 PROCEDURE — 80307 DRUG TEST PRSMV CHEM ANLYZR: CPT

## 2022-10-14 PROCEDURE — 84484 ASSAY OF TROPONIN QUANT: CPT

## 2022-10-14 PROCEDURE — 84443 ASSAY THYROID STIM HORMONE: CPT

## 2022-10-14 PROCEDURE — 80179 DRUG ASSAY SALICYLATE: CPT

## 2022-10-14 PROCEDURE — 80053 COMPREHEN METABOLIC PANEL: CPT

## 2022-10-14 PROCEDURE — 81003 URINALYSIS AUTO W/O SCOPE: CPT

## 2022-10-14 RX ORDER — HYDROXYZINE PAMOATE 50 MG/1
50 CAPSULE ORAL EVERY 6 HOURS PRN
Status: DISCONTINUED | OUTPATIENT
Start: 2022-10-14 | End: 2022-10-15

## 2022-10-14 RX ORDER — ASPIRIN 81 MG/1
81 TABLET, CHEWABLE ORAL DAILY
Status: DISCONTINUED | OUTPATIENT
Start: 2022-10-15 | End: 2022-11-07 | Stop reason: HOSPADM

## 2022-10-14 RX ORDER — TRAZODONE HYDROCHLORIDE 50 MG/1
50 TABLET ORAL NIGHTLY PRN
Status: DISCONTINUED | OUTPATIENT
Start: 2022-10-14 | End: 2022-10-15

## 2022-10-14 RX ORDER — RANOLAZINE 500 MG/1
1000 TABLET, EXTENDED RELEASE ORAL 2 TIMES DAILY
Status: DISCONTINUED | OUTPATIENT
Start: 2022-10-14 | End: 2022-10-15

## 2022-10-14 RX ORDER — POLYETHYLENE GLYCOL 3350 17 G
2 POWDER IN PACKET (EA) ORAL
Status: DISCONTINUED | OUTPATIENT
Start: 2022-10-14 | End: 2022-11-07 | Stop reason: HOSPADM

## 2022-10-14 RX ORDER — ALBUTEROL SULFATE 90 UG/1
2 AEROSOL, METERED RESPIRATORY (INHALATION) 4 TIMES DAILY PRN
Status: DISCONTINUED | OUTPATIENT
Start: 2022-10-14 | End: 2022-11-07 | Stop reason: HOSPADM

## 2022-10-14 RX ORDER — MIRTAZAPINE 15 MG/1
15 TABLET, FILM COATED ORAL NIGHTLY
Status: DISCONTINUED | OUTPATIENT
Start: 2022-10-14 | End: 2022-10-15

## 2022-10-14 RX ORDER — HYDROXYZINE HYDROCHLORIDE 50 MG/ML
50 INJECTION, SOLUTION INTRAMUSCULAR EVERY 6 HOURS PRN
Status: DISCONTINUED | OUTPATIENT
Start: 2022-10-14 | End: 2022-10-15

## 2022-10-14 RX ORDER — FAMOTIDINE 20 MG/1
20 TABLET, FILM COATED ORAL ONCE
Status: COMPLETED | OUTPATIENT
Start: 2022-10-14 | End: 2022-10-14

## 2022-10-14 RX ORDER — ESCITALOPRAM OXALATE 10 MG/1
20 TABLET ORAL DAILY
Status: DISCONTINUED | OUTPATIENT
Start: 2022-10-15 | End: 2022-10-15

## 2022-10-14 RX ORDER — BENZTROPINE MESYLATE 1 MG/ML
2 INJECTION INTRAMUSCULAR; INTRAVENOUS 2 TIMES DAILY PRN
Status: DISCONTINUED | OUTPATIENT
Start: 2022-10-14 | End: 2022-10-16

## 2022-10-14 RX ORDER — ALPRAZOLAM 0.5 MG/1
0.25 TABLET ORAL 3 TIMES DAILY
Status: DISCONTINUED | OUTPATIENT
Start: 2022-10-14 | End: 2022-10-25

## 2022-10-14 RX ORDER — ISOSORBIDE MONONITRATE 30 MG/1
30 TABLET, EXTENDED RELEASE ORAL DAILY
Status: DISCONTINUED | OUTPATIENT
Start: 2022-10-15 | End: 2022-10-15

## 2022-10-14 RX ORDER — ACETAMINOPHEN 325 MG/1
650 TABLET ORAL EVERY 4 HOURS PRN
Status: DISCONTINUED | OUTPATIENT
Start: 2022-10-14 | End: 2022-11-07 | Stop reason: HOSPADM

## 2022-10-14 RX ORDER — MIRTAZAPINE 15 MG/1
15 TABLET, FILM COATED ORAL NIGHTLY
Status: ON HOLD | COMMUNITY
End: 2022-11-07 | Stop reason: HOSPADM

## 2022-10-14 RX ORDER — HALOPERIDOL 5 MG/1
5 TABLET ORAL ONCE
Status: COMPLETED | OUTPATIENT
Start: 2022-10-14 | End: 2022-10-14

## 2022-10-14 RX ORDER — ALPRAZOLAM 0.25 MG/1
0.25 TABLET ORAL 3 TIMES DAILY
COMMUNITY

## 2022-10-14 RX ADMIN — HALOPERIDOL 5 MG: 5 TABLET ORAL at 09:36

## 2022-10-14 RX ADMIN — RANOLAZINE 1000 MG: 500 TABLET, FILM COATED, EXTENDED RELEASE ORAL at 21:12

## 2022-10-14 RX ADMIN — ALPRAZOLAM 0.25 MG: 0.5 TABLET ORAL at 16:03

## 2022-10-14 RX ADMIN — ALPRAZOLAM 0.25 MG: 0.5 TABLET ORAL at 21:12

## 2022-10-14 RX ADMIN — MIRTAZAPINE 15 MG: 15 TABLET, FILM COATED ORAL at 21:12

## 2022-10-14 RX ADMIN — FAMOTIDINE 20 MG: 20 TABLET, FILM COATED ORAL at 11:22

## 2022-10-14 ASSESSMENT — ENCOUNTER SYMPTOMS
WHEEZING: 0
PHOTOPHOBIA: 0
ABDOMINAL PAIN: 0
COUGH: 0
SHORTNESS OF BREATH: 0
NAUSEA: 0
VOMITING: 0

## 2022-10-14 ASSESSMENT — SLEEP AND FATIGUE QUESTIONNAIRES
SLEEP PATTERN: INSOMNIA
DO YOU HAVE DIFFICULTY SLEEPING: YES
AVERAGE NUMBER OF SLEEP HOURS: 0
DO YOU USE A SLEEP AID: YES

## 2022-10-14 ASSESSMENT — PATIENT HEALTH QUESTIONNAIRE - PHQ9
SUM OF ALL RESPONSES TO PHQ QUESTIONS 1-9: 15
SUM OF ALL RESPONSES TO PHQ QUESTIONS 1-9: 16

## 2022-10-14 ASSESSMENT — LIFESTYLE VARIABLES
HOW OFTEN DO YOU HAVE A DRINK CONTAINING ALCOHOL: NEVER
HOW MANY STANDARD DRINKS CONTAINING ALCOHOL DO YOU HAVE ON A TYPICAL DAY: PATIENT DOES NOT DRINK

## 2022-10-14 NOTE — ED NOTES
Provisional Diagnosis:   Depression UNSP type, Psychosis UNSP    Psychosocial and Contextual Factors:   None    C-SSRS Summary:  SI with plan to stab self with knives at home. Patient: see above  Family: none contacted  Agency: [facility]IRSTIN MOREJ[facility]    Substance Abuse Denies    Present Suicidal Behavior:      Verbal: Reports SI with plan to stab or cut self with knives at home, unsure of intent    Attempt: None    Past Suicidal Behavior:     Verbal: has reported SI related to panic attacks in the past    Attempt: none      Self-Injurious/Self-Mutilation: None      Violence Current or Past None      Trauma Identified:  None    Protective Factors:    , active in treatment. Risk Factors:    None      Clinical Summary:    74year old male presents to ED reporting suicidal and homicidal ideations. He reports going suicidal thoughts for over one month, and recent thoughts to harm his wife. He reports thoughts are to stab himself and/or his wife. He reports he has bene hearing voices for over 4 years. He reports he hasn't told anyone. States he hears voices telling him to kill self. He reports he gets \"flashes\" of images in his head to stab his wife. States he sees the [facility], but hasn't told his doctor about this, for unknown reason. Reports he is on zanax, took a large amount of xanax last night both because he couldn't sleep, and in an attempt to harm himself. He reports he hasn't been eating or sleeping at home for 4 days. Ate 50% at bedside this morning.         Level of Care Disposition:    pending         Cricket Huerta RN  10/14/22 2936 ISABEL Rich  10/14/22 6111

## 2022-10-14 NOTE — ED NOTES
Covid obtained & sent to lab. Tolerated procedure well. Changed into [facility] clothing. Skin check done with no skin breakdown noted. Contraband check done with no contraband found @ this time. Oriented to [place]. Verbalizes understanding.      Viji Roach RN  10/14/22 4055

## 2022-10-14 NOTE — ED NOTES
Lunch tray given. Sitting on side of bed & began eating with no complaints voiced.      Epi Harrington RN  10/14/22 1967

## 2022-10-14 NOTE — PROGRESS NOTES
One to one maintained by this nurse at this time, pt is resting in bed, continues to grunt and have body tremors, pt walked to bathroom with hands on assist x 1, pt has unsteady gait, pt able to urinate without difficulties. Pt aware he is in the hospital, unsure if it is [place] or [place].

## 2022-10-14 NOTE — ED NOTES
C/O hallucinations & feeling afraid. PA notified & medication order obtained.      Rory Huggins RN  10/14/22 0853

## 2022-10-14 NOTE — PROGRESS NOTES
Patient arrived to unit via wheelchair with Chaka Giraldo. Skin and contraband check completed with this RN and Samantha Hernandez RN. Skin intact. No contraband noted. Patient oriented to room.  Patient tremulous, calm and cooperative Yes

## 2022-10-14 NOTE — PROGRESS NOTES
Patient remains on 1:1 for patient safety. Patient has been resting in be since completing admission assessment.

## 2022-10-14 NOTE — ED TRIAGE NOTES
Patient arrives via EMS from home with his wife for complaints of SI and HI. Patient reports he has not been sleeping despite using his xanax to help with his anxiety. Patient states he's been having \"bad thoughts\" of harming himself and his wife. Patient admits to taking a handful of his xanax last night to help him sleep, but he reports he has not slept. Patient denies pain or other complaints. Patient restless in triage. Patient alert and oriented, answers questions appropriately.

## 2022-10-14 NOTE — PROGRESS NOTES
Pt arrived to 373, skin check done by 2 Rns, pt was oriented to room, pt reports he hasn't slept in days , reports he has thoughts to hurt his wife and her care giver with a knife, pt is noted to have constant twitching of his eyes and different parts of his body. Pt reports voices but they are peoples voices unable to say what they say. Denies the urge urinate. Denied pain.

## 2022-10-14 NOTE — PROGRESS NOTES
Pitcher of water given, urinal at bed side if urgency cant wait to use the bath room, reinforced to pt someone will always be with him, pt denied being hungry , refused offer for snack, explained fridge in the dinning room is there if he wants ice cream milk or juice, pt reports wife wont visit , that he has her number in his phone to call her . Pt continues to tremble, grunt, constantly moves is lips, facial and arm and hand .

## 2022-10-14 NOTE — ED NOTES
informed Dr Marisol Escobar about elevated QTC, stated addon mag and trop. If ok admit, if not defer to ED provider. Antolin WILLIS updated, labs added, lab called, they have green tops still, will run.      Salvatore Santos RN  10/14/22 2199

## 2022-10-14 NOTE — ED PROVIDER NOTES
[facility] ED  EMERGENCY DEPARTMENT ENCOUNTER      Pt Name: Marybeth Manuel  MRN: [id number]  [place] 1950  Date of evaluation: 10/14/2022  Provider: Ирина Garcia PA-C    CHIEF COMPLAINT       Chief Complaint   Patient presents with    Suicidal    Homicidal     For a month or more         HISTORY OF PRESENT ILLNESS   (Location/Symptom, Timing/Onset, Context/Setting, Quality, Duration, Modifying Factors, Severity)  Note limiting factors. Marybeth Manuel is a 74 y.o. male who per chart review has a past medical history of hypertension anxiety depression CAD gastritis obsessional thoughts COVID hyperlipidemia benzodiazepine dependence insomnia type 2 diabetes presents to the emergency department for psychiatric evaluation. Patient states he has had both suicidal and homicidal ideations against his wife for months. He reports plan to use a knife. He states he is scared being at home with these thoughts. He states he is having visual hallucinations of pictures. He reports auditory hallucinations but is not able to elaborate on these. He states he took a handful of Xanax which was prescribed to him by his psychiatrist yesterday in order to sleep. He also reports that this was not an attempt to harm himself. He denies medical complaints at this time. Denies other drug and alcohol use    HPI    Nursing Notes were reviewed. REVIEW OF SYSTEMS    (2-9 systems for level 4, 10 or more for level 5)     Review of Systems   Constitutional:  Negative for chills and fever. HENT:  Negative for congestion. Eyes:  Negative for photophobia. Respiratory:  Negative for cough, shortness of breath and wheezing. Cardiovascular:  Negative for chest pain and palpitations. Gastrointestinal:  Negative for abdominal pain, nausea and vomiting. Genitourinary:  Negative for dysuria, frequency and hematuria. Musculoskeletal:  Negative for myalgias.    Allergic/Immunologic: Negative for immunocompromised state.   Neurological:  Negative for dizziness, weakness and headaches. Psychiatric/Behavioral:  Positive for hallucinations, sleep disturbance and suicidal ideas. All other systems reviewed and are negative. Except as noted above the remainder of the review of systems was reviewed and negative. PAST MEDICAL HISTORY     Past Medical History:   Diagnosis Date    Anxiety     Chest pain 3/29/2018    Coronary artery disease involving native coronary artery of native heart without angina pectoris 2/15/2019    Diabetes mellitus ([place])     no meds    History of colon polyps     Hyperlipidemia     Hypertension     Type 2 diabetes mellitus without complication ([place])     Vertigo          SURGICAL HISTORY       Past Surgical History:   Procedure Laterality Date    [address]    PTCA     COLONOSCOPY      COLONOSCOPY N/A 10/17/2019    COLORECTAL CANCER SCREENING, HIGH RISK performed by Rayo Murphy MD at [facility] 23      OTHER SURGICAL HISTORY      patent foramen ovale    TONSILLECTOMY      UPPER GASTROINTESTINAL ENDOSCOPY N/A 8/1/2019    EGD ESOPHAGOGASTRODUODENOSCOPY performed by Rayo Murphy MD at [address]       Previous Medications    ALBUTEROL SULFATE HFA (VENTOLIN HFA) 108 (90 BASE) MCG/ACT INHALER    Inhale 2 puffs into the lungs 4 times daily as needed for Wheezing    ALPRAZOLAM (XANAX) 0.25 MG TABLET    Take 0.25 mg by mouth in the morning, at noon, and at bedtime.     ASPIRIN 81 MG CHEWABLE TABLET    Take 1 tablet by mouth daily    BLOOD GLUCOSE MONITORING SUPPL (ONETOUCH VERIO FLEX SYSTEM) W/DEVICE KIT        DOCUSATE SODIUM (COLACE) 100 MG CAPSULE    Take 1 capsule by mouth 2 times daily for 7 days    ESCITALOPRAM (LEXAPRO) 10 MG TABLET    Take 20 mg by mouth daily    ISOSORBIDE MONONITRATE (IMDUR) 30 MG EXTENDED RELEASE TABLET    TAKE 1 TABLET BY MOUTH DAILY    MIRTAZAPINE (REMERON) 15 MG TABLET    Take 15 mg by mouth nightly    ONETOUCH DELICA LANCETS 83F MISC        ONETOUCH VERIO STRIP        RANOLAZINE (RANEXA) 1000 MG EXTENDED RELEASE TABLET    TAKE 1 TABLET BY MOUTH TWICE DAILY       ALLERGIES     Seroquel [quetiapine]    FAMILY HISTORY       Family History   Problem Relation Age of Onset    Heart Disease Maternal Aunt     Cancer Maternal Aunt     Colon Cancer Maternal Aunt           SOCIAL HISTORY       Social History     Socioeconomic History    Marital status:      Spouse name: None    Number of children: None    Years of education: None    Highest education level: None   Tobacco Use    Smoking status: Never    Smokeless tobacco: Never   Vaping Use    Vaping Use: Never used   Substance and Sexual Activity    Alcohol use: No    Drug use: Never       SCREENINGS         [place] Coma Scale  Eye Opening: Spontaneous  Best Verbal Response: Oriented  Best Motor Response: Obeys commands  [place]incoln Coma Scale Score: 15                     CIWA Assessment  BP: (!) 145/78  Heart Rate: 74                 PHYSICAL EXAM    (up to 7 for level 4, 8 or more for level 5)     ED Triage Vitals [10/14/22 0806]   BP Temp Temp Source Heart Rate Resp SpO2 Height Weight   (!) 156/95 98.1 °F (36.7 °C) Oral 73 16 98 % 5' 5\" (1.651 m) 120 lb (54.4 kg)       Physical Exam  Constitutional:       General: He is not in acute distress. Appearance: He is well-developed. He is not ill-appearing, toxic-appearing or diaphoretic. HENT:      Head: Normocephalic and atraumatic. Nose: Nose normal.      Mouth/Throat:      Mouth: Mucous membranes are moist.   Eyes:      Pupils: Pupils are equal, round, and reactive to light. Cardiovascular:      Rate and Rhythm: Normal rate and regular rhythm. Heart sounds: No murmur heard. No friction rub. No gallop. Pulmonary:      Effort: Pulmonary effort is normal.      Breath sounds: Normal breath sounds. No wheezing, rhonchi or rales.    Abdominal:      General: Bowel sounds are normal. There is no distension. Palpations: Abdomen is soft. Tenderness: There is no abdominal tenderness. There is no guarding or rebound. Musculoskeletal:         General: No swelling. Cervical back: Normal range of motion. Skin:     General: Skin is warm and dry. Capillary Refill: Capillary refill takes less than 2 seconds. Neurological:      General: No focal deficit present. Mental Status: He is alert and oriented to person, place, and time. Psychiatric:         Attention and Perception: Attention normal.         Mood and Affect: Mood is anxious. Affect is flat. Speech: Speech normal.         Behavior: Behavior is cooperative. Thought Content: Thought content includes homicidal and suicidal ideation. Thought content includes homicidal and suicidal plan. DIAGNOSTIC RESULTS     EKG: All EKG's are interpreted by the Emergency Department Physician who either signs or Co-signs this chart in the absence of a cardiologist.    EKG shows NSR with HR 76, normal axis, Qtc 526, no ST changes. RADIOLOGY:   Non-plain film images such as CT, Ultrasound and MRI are read by the radiologist. Plain radiographic images are visualized and preliminarily interpreted by the emergency physician with the below findings:        Interpretation per the Radiologist below, if available at the time of this note:    No orders to display         ED BEDSIDE ULTRASOUND:   Performed by ED Physician - none    LABS:  Labs Reviewed   ACETAMINOPHEN LEVEL - Abnormal; Notable for the following components:       Result Value    Acetaminophen Level <5 (*)     All other components within normal limits   CBC WITH AUTO DIFFERENTIAL - Abnormal; Notable for the following components:    MCH 32.3 (*)     Lymphocytes Absolute 0.8 (*)     All other components within normal limits   CK - Abnormal; Notable for the following components:     Total  (*)     All other components within normal limits   COMPREHENSIVE METABOLIC PANEL - Abnormal; Notable for the following components:    CO2 18 (*)     Glucose 138 (*)     Total Bilirubin 1.7 (*)     All other components within normal limits   LIPID PANEL - Abnormal; Notable for the following components:    Cholesterol, Total 238 (*)     LDL Calculated 171 (*)     All other components within normal limits   SALICYLATE LEVEL - Abnormal; Notable for the following components:    Salicylate, Serum <0.0 (*)     All other components within normal limits   URINALYSIS WITH REFLEX TO CULTURE - Abnormal; Notable for the following components:    Ketones, Urine 15 (*)     All other components within normal limits   COVID-19, RAPID   ETHANOL   TSH   URINE DRUG SCREEN   MAGNESIUM    Narrative:     add on per nurse sylvester   TROPONIN    Narrative:     add on per nurse sylvester       All other labs were within normal range or not returned as of this dictation. EMERGENCY DEPARTMENT COURSE and DIFFERENTIAL DIAGNOSIS/MDM:   Vitals:    Vitals:    10/14/22 0806 10/14/22 1029   BP: (!) 156/95 (!) 145/78   Pulse: 73 74   Resp: 16 18   Temp: 98.1 °F (36.7 °C) 99.1 °F (37.3 °C)   TempSrc: Oral    SpO2: 98% 100%   Weight: 120 lb (54.4 kg)    Height: 5' 5\" (1.651 m)        MDM    Patient presents with suicidal homicidal ideations as well as visual hallucinations. Afebrile hemodynamically stable. EKG shows NSR with HR 76, normal axis, Qtc 526, no ST changes. He is on Lexapro, possible cause of prolonged QTC. Troponin and mag normal.  No chest pain. Patient medically cleared for psychiatric evaluation. Patient to be admitted to the psychiatry unit with a diagnosis of major depression with psychotic features. Stable for admission. REASSESSMENT          CRITICAL CARE TIME   Total Critical Care time was 0 minutes, excluding separately reportable procedures.   There was a high probability of clinically significant/life threatening deterioration in the patient's condition which

## 2022-10-14 NOTE — ED NOTES
Spoke to Dr Janette Alberts, states admit to 3w with 1:1, PT/OT consult to clear for up ad pamela or need for assistance, and continue home medicaitons. Psychosis UNSP.      Martha Walter RN  10/14/22 9424

## 2022-10-14 NOTE — ED NOTES
Lab called about results that haven't come back. State they will look into it and call back.      Isacc Sr RN  10/14/22 0549

## 2022-10-14 NOTE — ED NOTES
Appetite fair for lunch. Voices no complaints. Continues to await lab results.      Eliezer Akbar RN  10/14/22 9006

## 2022-10-14 NOTE — PROGRESS NOTES
[facility] [place] EMERGENCY [facility] AT [place] Respiratory Therapy Evaluation   Current Order:  albuterol PRN      Home Regimen: PRN      Ordering Physician: Maranda Glasgow  Re-evaluation Date:  eval done     Diagnosis: depression      Patient Status: Stable / Unstable + Physician notified    The following MDI Criteria must be met in order to convert aerosol to MDI with spacer.  If unable to meet, MDI will be converted to aerosol:  []  Patient able to demonstrate the ability to use MDI effectively  []  Patient alert and cooperative  []  Patient able to take deep breath with 5-10 second hold  []  Medication(s) available in this delivery method   []  Peak flow greater than or equal to 200 ml/min            Current Order Substituted To  (same drug, same frequency)   Aerosol to MDI [] Albuterol Sulfate 0.083% unit dose by aerosol Albuterol Sulfate MDI 2 puffs by inhalation with spacer    [] Levalbuterol 1.25 mg unit dose by aerosol Levalbuterol MDI 2 puffs by inhalation with spacer    [] Levalbuterol 0.63 mg unit dose by aerosol Levalbuterol MDI 2 puffs by inhalation with spacer    [] Ipratropium Bromide 0.02% unit dose by aerosol Ipratropium Bromide MDI 2 puffs by inhalation with spacer    [] Duoneb (Ipratropium + Albuterol) unit dose by aerosol Ipratropium MDI + Albuterol MDI 2 puffs by inhalation w/spacer   MDI to Aerosol [] Albuterol Sulfate MDI Albuterol Sulfate 0.083% unit dose by aerosol    [] Levalbuterol MDI 2 puffs by inhalation Levalbuterol 1.25 mg unit dose by aerosol    [] Ipratropium Bromide MDI by inhalation Ipratropium Bromide 0.02% unit dose by aerosol    [] Combivent (Ipratropium + Albuterol) MDI by inhalation Duoneb (Ipratropium + Albuterol) unit dose by aerosol       Treatment Assessment [Frequency/Schedule]:  Change frequency to: __________no changes per protocol________________________________________per Protocol, P&T, MEC      Points 0 1 2 3 4   Pulmonary Status  Non-Smoker  [x]   Smoking history   < 20 pack years  []   Smoking history  ?  20 pack years  []   Pulmonary Disorder  (acute or chronic)  []   Severe or Chronic w/ Exacerbation  []     Surgical Status No [x]   Surgeries     General []   Surgery Lower []   Abdominal Thoracic or []   Upper Abdominal Thoracic with  PulmonaryDisorder  []     Chest X-ray Clear/Not  Ordered     [x]  Chronic Changes  Results Pending  []  Infiltrates, atelectasis, pleural effusion, or edema  []  Infiltrates in more than one lobe []  Infiltrate + Atelectasis, &/or pleural effusion  []    Respiratory Pattern Regular,  RR = 12-20 [x]  Increased,  RR = 21-25 []  HENRIQUEZ, irregular,  or RR = 26-30 []  Decreased FEV1  or RR = 31-35 []  Severe SOB, use  of accessory muscles, or RR ? 35  []    Mental Status Alert, oriented,  Cooperative [x]  Confused but Follows commands []  Lethargic or unable to follow commands []  Obtunded  []  Comatose  []    Breath Sounds Clear to  auscultation  [x]  Decreased unilaterally or  in bases only []  Decreased  bilaterally  []  Crackles or intermittent wheezes []  Wheezes []    Cough Strong, Spontan., & nonproductive [x]  Strong,  spontaneous, &  productive []  Weak,  Nonproductive []  Weak, productive or  with wheezes []  No spontaneous  cough or may require suctioning []    Level of Activity Ambulatory [x]  Ambulatory w/ Assist  []  Non-ambulatory []  Paraplegic []  Quadriplegic []    Total    Score:__0_____     Triage Score:_____5___      Tri       Triage:     1. (>20) Freq: Q3    2. (16-20) Freq: Q4   3. (11-15) Freq: QID & Albuterol Q2 PRN    4. (6-10) Freq: TID & Albuterol Q2 PRN    5. (0-5) Freq Q4prn

## 2022-10-14 NOTE — FLOWSHEET NOTE
Pt resting in his bed this writer introduced herself and pt denies needs at this time. Pt did point to his head and states \" These thought's I keep getting to hurt my wife and my family so I thought I better come here\". Reassured pt he is safe while here and that someone will be sitting with him , pt is able to make his need's know. Pt is restless in bed with abrupt movements.

## 2022-10-15 LAB
EKG ATRIAL RATE: 65 BPM
EKG ATRIAL RATE: 75 BPM
EKG P AXIS: 47 DEGREES
EKG P AXIS: 59 DEGREES
EKG P-R INTERVAL: 164 MS
EKG P-R INTERVAL: 172 MS
EKG Q-T INTERVAL: 464 MS
EKG Q-T INTERVAL: 480 MS
EKG QRS DURATION: 90 MS
EKG QRS DURATION: 94 MS
EKG QTC CALCULATION (BAZETT): 482 MS
EKG QTC CALCULATION (BAZETT): 536 MS
EKG R AXIS: 67 DEGREES
EKG R AXIS: 67 DEGREES
EKG T AXIS: 62 DEGREES
EKG T AXIS: 68 DEGREES
EKG VENTRICULAR RATE: 65 BPM
EKG VENTRICULAR RATE: 75 BPM
GLUCOSE BLD-MCNC: 105 MG/DL (ref 70–99)
GLUCOSE BLD-MCNC: 114 MG/DL (ref 70–99)
GLUCOSE BLD-MCNC: 145 MG/DL (ref 70–99)
PERFORMED ON: ABNORMAL

## 2022-10-15 PROCEDURE — 6370000000 HC RX 637 (ALT 250 FOR IP): Performed by: INTERNAL MEDICINE

## 2022-10-15 PROCEDURE — 93010 ELECTROCARDIOGRAM REPORT: CPT | Performed by: INTERNAL MEDICINE

## 2022-10-15 PROCEDURE — 1240000000 HC EMOTIONAL WELLNESS R&B

## 2022-10-15 PROCEDURE — 6370000000 HC RX 637 (ALT 250 FOR IP): Performed by: PSYCHIATRY & NEUROLOGY

## 2022-10-15 PROCEDURE — 99222 1ST HOSP IP/OBS MODERATE 55: CPT | Performed by: INTERNAL MEDICINE

## 2022-10-15 PROCEDURE — 97162 PT EVAL MOD COMPLEX 30 MIN: CPT

## 2022-10-15 PROCEDURE — 93005 ELECTROCARDIOGRAM TRACING: CPT | Performed by: PSYCHIATRY & NEUROLOGY

## 2022-10-15 RX ORDER — MECOBALAMIN 5000 MCG
5 TABLET,DISINTEGRATING ORAL NIGHTLY
Status: DISCONTINUED | OUTPATIENT
Start: 2022-10-15 | End: 2022-10-20

## 2022-10-15 RX ORDER — ATORVASTATIN CALCIUM 20 MG/1
20 TABLET, FILM COATED ORAL NIGHTLY
Status: DISCONTINUED | OUTPATIENT
Start: 2022-10-15 | End: 2022-11-07 | Stop reason: HOSPADM

## 2022-10-15 RX ORDER — INSULIN LISPRO 100 [IU]/ML
0-8 INJECTION, SOLUTION INTRAVENOUS; SUBCUTANEOUS
Status: DISCONTINUED | OUTPATIENT
Start: 2022-10-15 | End: 2022-11-07

## 2022-10-15 RX ORDER — PAROXETINE HYDROCHLORIDE 20 MG/1
20 TABLET, FILM COATED ORAL DAILY
Status: DISCONTINUED | OUTPATIENT
Start: 2022-10-15 | End: 2022-10-16

## 2022-10-15 RX ORDER — METOPROLOL SUCCINATE 25 MG/1
25 TABLET, EXTENDED RELEASE ORAL NIGHTLY
Status: DISCONTINUED | OUTPATIENT
Start: 2022-10-15 | End: 2022-11-07 | Stop reason: HOSPADM

## 2022-10-15 RX ORDER — ISOSORBIDE MONONITRATE 30 MG/1
30 TABLET, EXTENDED RELEASE ORAL DAILY
Status: DISCONTINUED | OUTPATIENT
Start: 2022-10-15 | End: 2022-11-07 | Stop reason: HOSPADM

## 2022-10-15 RX ORDER — DEXTROSE MONOHYDRATE 100 MG/ML
INJECTION, SOLUTION INTRAVENOUS CONTINUOUS PRN
Status: DISCONTINUED | OUTPATIENT
Start: 2022-10-15 | End: 2022-11-07 | Stop reason: HOSPADM

## 2022-10-15 RX ORDER — INSULIN LISPRO 100 [IU]/ML
0-4 INJECTION, SOLUTION INTRAVENOUS; SUBCUTANEOUS NIGHTLY
Status: DISCONTINUED | OUTPATIENT
Start: 2022-10-15 | End: 2022-11-07

## 2022-10-15 RX ADMIN — ATORVASTATIN CALCIUM 20 MG: 20 TABLET, FILM COATED ORAL at 21:50

## 2022-10-15 RX ADMIN — RANOLAZINE 1000 MG: 500 TABLET, FILM COATED, EXTENDED RELEASE ORAL at 08:58

## 2022-10-15 RX ADMIN — METOPROLOL SUCCINATE 25 MG: 25 TABLET, FILM COATED, EXTENDED RELEASE ORAL at 21:49

## 2022-10-15 RX ADMIN — PAROXETINE HYDROCHLORIDE 20 MG: 20 TABLET, FILM COATED ORAL at 11:25

## 2022-10-15 RX ADMIN — Medication 5 MG: at 21:50

## 2022-10-15 RX ADMIN — ISOSORBIDE MONONITRATE 30 MG: 60 TABLET, EXTENDED RELEASE ORAL at 16:38

## 2022-10-15 RX ADMIN — ALPRAZOLAM 0.25 MG: 0.5 TABLET ORAL at 08:58

## 2022-10-15 RX ADMIN — ESCITALOPRAM OXALATE 20 MG: 10 TABLET ORAL at 08:58

## 2022-10-15 RX ADMIN — ISOSORBIDE MONONITRATE 30 MG: 30 TABLET, EXTENDED RELEASE ORAL at 08:58

## 2022-10-15 RX ADMIN — ALPRAZOLAM 0.25 MG: 0.5 TABLET ORAL at 21:48

## 2022-10-15 RX ADMIN — ALPRAZOLAM 0.25 MG: 0.5 TABLET ORAL at 15:05

## 2022-10-15 RX ADMIN — ASPIRIN 81 MG 81 MG: 81 TABLET ORAL at 08:58

## 2022-10-15 ASSESSMENT — ENCOUNTER SYMPTOMS
STRIDOR: 0
NAUSEA: 0
GASTROINTESTINAL NEGATIVE: 1
SHORTNESS OF BREATH: 0
RESPIRATORY NEGATIVE: 1
EYES NEGATIVE: 1
COUGH: 0
WHEEZING: 0
BLOOD IN STOOL: 0
CHEST TIGHTNESS: 0

## 2022-10-15 NOTE — PROGRESS NOTES
Pt. declined to attend the 0900 community meeting, despite staff encouragement.  Patient did not state a goal. Electronically signed by Nain Mendoza [address] on 10/15/2022 at 9:57 AM

## 2022-10-15 NOTE — PROGRESS NOTES
Physical Therapy Med Surg Initial Assessment  Facility/Department: [address]  [facility]       NAME: Arbutus Essex  : [birth year]3 (74 y.o.)  MRN: [id number]  CODE STATUS: Full Code    Date of Service: 10/15/2022    Patient Diagnosis(es): Severe episode of recurrent major depressive disorder, with psychotic features ([facility] [address] [address] [F33.3]  Major depression with psychotic features [facility]) [F32.3]   Chief Complaint   Patient presents with    Suicidal    Homicidal     For a month or more     Patient Active Problem List    Diagnosis Date Noted    Major depression with psychotic features ([facility] [address] 10/14/2022    Hyperlipidemia 2021    Heart murmur 2021    Dyspnea on exertion 2021    COVID-19 2021    Weakness 09/10/2021    Adenomatous polyp of sigmoid colon     Chronic gastritis without bleeding     Dyspepsia     Mixed obsessional thoughts and acts     Coronary artery disease involving native coronary artery of native heart without angina pectoris 02/15/2019    Generalized anxiety disorder 04/15/2018    Anxiety     Recurrent major depressive disorder, in remission ([address]     Chest pain 2018    Hypertension 2018    Benzodiazepine dependence ([address] 2017    Type 2 diabetes mellitus without complication, without long-term current use of insulin ([address] 2017    Insomnia secondary to depression with anxiety 2016    Panic attacks 2016    ED (erectile dysfunction) 2011        Past Medical History:   Diagnosis Date    Anxiety     Chest pain 3/29/2018    Coronary artery disease involving native coronary artery of native heart without angina pectoris 2/15/2019    Diabetes mellitus ([address]     no meds    History of colon polyps     Hyperlipidemia     Hypertension     Type 2 diabetes mellitus without complication ([address]     Vertigo      Past Surgical History:   Procedure Laterality Date    [address]    PTCA     COLONOSCOPY      COLONOSCOPY N/A 10/17/2019    COLORECTAL CANCER SCREENING, HIGH RISK performed by Miguel Rowe MD at [facility] 23      OTHER SURGICAL HISTORY      patent foramen ovale    TONSILLECTOMY      UPPER GASTROINTESTINAL ENDOSCOPY N/A 8/1/2019    EGD ESOPHAGOGASTRODUODENOSCOPY performed by Miguel Rowe MD at [facility]       Chart Reviewed: Yes  Family / Caregiver Present: Yes (sitter)    Restrictions:  Restrictions/Precautions: Fall Risk (1:1)     SUBJECTIVE:   Subjective: Pt reports no pain at beginning or end of session      Prior Level of Function:  Social/Functional History  Lives With: Spouse  Type of Home: House  Home Layout: Two level, Performs ADL's on one level, Able to Live on Main level with bedroom/bathroom  Home Access: Level entry  ADL Assistance: Independent (reports wife occasional assists)  Ambulation Assistance: Independent (no devices)  Transfer Assistance: Independent    OBJECTIVE:   Vision  Vision: Within Functional Limits  Hearing: Within functional limits    Cognition:  Overall Orientation Status: Within Functional Limits  Follows Commands: Within Functional Limits  Overall Cognitive Status: WFL         ROM:  RLE AROM: WFL  LLE AROM : WFL    Strength:  Strength RLE  Comment: 4-/5  Strength LLE  Comment: 4-/5  Strength Other  Other: poor coordination of all motor tasks with ataxia and tremors present throughout. Pt reports tremors have been present for \"a long time\"    Neuro:  Balance  Sitting - Static: Good  Sitting - Dynamic: Good  Standing - Static: Fair (pt able to maintain standing without UE support 1 min however required 1 UE for standing longer.  Increased sway with delayed reactions noted)  Standing - Dynamic: Poor                                Bed mobility  Rolling to Left: Independent  Rolling to Right: Independent  Supine to Sit: Independent  Sit to Supine: Independent  Scooting: Independent    Transfers  Sit to Stand: Stand by assistance  Stand to Sit: Stand by assistance  Comment: increased ant/post sway in standing with delayed balance reactions - pt utilizes UE on furniture for support after 1 min    Ambulation  Surface: Level tile  Device: No Device  Assistance: Minimal assistance; Moderate assistance  Quality of Gait: narrow HEBER, constant LOB with wall or therapist assistance required for correction, decreased awareness of environmental obstacle distance to reach and to maneuver around, decreased awareness of need for safety and for deficits  Gait Deviations: Staggers;Decreased step height;Decreased arm swing;Decreased head and trunk rotation;Decreased step length;Deviated path  Distance: 30 feet  Comments: Pt additionally ambulated with ww requiring min assistance for balance maintenance and safe maneuvering of ww                   Activity Tolerance  Activity Tolerance: Patient tolerated evaluation without incident    Patient Education  Education Given To: Patient  Education Provided: Role of Therapy;Plan of Care; Fall Prevention Strategies  Education Method: Demonstration;Verbal  Education Outcome: Continued education needed       ASSESSMENT:   Body Structures, Functions, Activity Limitations Requiring Skilled Therapeutic Intervention: Decreased functional mobility ; Decreased safe awareness;Decreased balance;Decreased coordination  Decision Making: Medium Complexity  History: med  Exam: med  Clinical Presentation: med    Barriers to Learning: awareness of limitations/safety         DISCHARGE RECOMMENDATIONS:  Discharge Recommendations: Continue to assess pending progress    Assessment: Pt demonstrates deficits as listed. He presents with need for assistance with gait at this time.  Pt would benefit from continued PT  Requires PT Follow-Up: Yes      PLAN OF CARE:  Physcial Therapy Plan  General Plan: 1 time a day 3-6 times a week  Current Treatment Recommendations: Balance training, Functional mobility training, Gait training, Neuromuscular re-education, Equipment evaluation, education, & procurement, Patient/Caregiver education & training, Safety education & training    Safety Devices  Type of Devices: Sitter present, Left in bed    Goals:  Short Term Goals  Short Term Goal 1: SBA gait with appropriate device 50 feet  Short Term Goal 2: mejia tested at 40/56 for evidence of decreased falls risk  Short Term Goal 3: safest mobility device determined  Short Term Goal 4: indep with HEP    AMPAC (6 CLICK) BASIC MOBILITY  AM-PAC Inpatient Mobility Raw Score : 20     Therapy Time:   Individual   Time In 1602   Time Out 1622   Minutes [id number] [place] [address], 10/15/22 at 4:35 PM         Definitions for assistance levels  Independent = pt does not require any physical supervision or assistance from another person for activity completion. Device may be needed.   Stand by assistance = pt requires verbal cues or instructions from another person, close to but not touching, to perform the activity  Minimal assistance= pt performs 75% or more of the activity; assistance is required to complete the activity  Moderate assistance= pt performs 50% of the activity; assistance is required to complete the activity  Maximal assistance = pt performs 25% of the activity; assistance is required to complete the activity  Dependent = pt requires total physical assistance to accomplish the task

## 2022-10-15 NOTE — H&P
Department of Psychiatry  History and Physical - Adult         Behavioral Services  Medicare Certification Upon Admission    I certify that this patient's inpatient psychiatric hospital admission is medically necessary for:    [x] (1) Treatment which could reasonably be expected to improve this patient's condition,       [x] (2) Or for diagnostic study;     AND     [x](2) The inpatient psychiatric services are provided while the individual is under the care of a physician and are included in the individualized plan of care. Estimated length of stay/service 5-7 days, depending on stability    Plan for post-hospital care follow up with outpatient provider    Electronically signed by Balbir Gutierrez MD on 10/15/2022 at 6:55 PM        CHIEF COMPLAINT:  depressed mood, suicidal and homicidal ideation    History obtained from:  patient, electronic medical record    Patient was seen after discussing with the treatment team and reviewing the chart\    CIRCUMSTANCES OF ADMISSION:   Mr. Joselito Neri is a 74 y.o. male with a history of depression, who presented to the ER with c/o depressed mood and suicidal and homicidal ideation (with thoughts to stab his wife to death). In the ER, he also reported having had auditory hallucinations as voices telling him to kill himself. Per ER notes, \"65 year old male presents to ED reporting suicidal and homicidal ideations. He reports going suicidal thoughts for over one month, and recent thoughts to harm his wife. He reports thoughts are to stab himself and/or his wife. He reports he has bene hearing voices for over 4 years. He reports he hasn't told anyone. States he hears voices telling him to kill self. He reports he gets \"flashes\" of images in his head to stab his wife. States he sees the [facility], but hasn't told his doctor about this, for unknown reason.  Reports he is on zanax, took a large amount of xanax last night both because he couldn't sleep, and in an attempt to harm himself. He reports he hasn't been eating or sleeping at home for 4 days. Ate 50% at bedside this morning. \"    HISTORY OF PRESENT ILLNESS:    Patient was interviewed in his room; he was laying in bed. The patient is a 74 y.o. male with significant past history of depression who presented to the ER with c/o suicidal and homicidal ideation, as above. When interviewed today, the patient said he had been \"trembling\" and \"can't sleep\". He c/o having ongoing muscle jerking/spasming (which he actually displayed throughout the interview). He said he came to the hospital because he was \"very anxious\", and had \"bad thoughts\", of hurting his wife and himself. He said he had been afraid he might do something to his wife, but he actually does not want to hurt her; he also said he is bothered by his thoughts of harming his wife. He said he does have thoughts of suicide, \"sometimes\". He said he cannot fall asleep, \"every time I get depressed and anxious\". He said his appetite was 'not too good'. He admitted to having been feeling \"very depressed\" lately. He admitted to hearing voices, Cassidy Belts I was going to Hell\", but said he had last heard them \"5 years ago\". He also admitted to having had visual hallucinations, but only once, \"some time ago\", \"a little soldier coming into the room\", but he said this had not been recent. He said he had only been hearing voices when he felt depressed. He denied paranoia. He denied having had any other delusions. He denied having any thought broadcasting or insertion. Stressors: unclear    The patient is currently receiving care for the above psychiatric illness. Medications Prior to Admission:   Medications Prior to Admission: mirtazapine (REMERON) 15 MG tablet, Take 15 mg by mouth nightly  ALPRAZolam (XANAX) 0.25 MG tablet, Take 0.25 mg by mouth in the morning, at noon, and at bedtime.   docusate sodium (COLACE) 100 MG capsule, Take 1 capsule by mouth 2 times daily for 7 days  albuterol sulfate HFA (VENTOLIN HFA) 108 (90 Base) MCG/ACT inhaler, Inhale 2 puffs into the lungs 4 times daily as needed for Wheezing  isosorbide mononitrate (IMDUR) 30 MG extended release tablet, TAKE 1 TABLET BY MOUTH DAILY  ranolazine (RANEXA) 1000 MG extended release tablet, TAKE 1 TABLET BY MOUTH TWICE DAILY  escitalopram (LEXAPRO) 10 MG tablet, Take 20 mg by mouth daily  aspirin 81 MG chewable tablet, Take 1 tablet by mouth daily  Blood Glucose Monitoring Suppl ([address]) w/Device KIT,   ONETOUCH VERIO strip,   ONETOUCH DELICA LANCETS 73I MISC,     Compliance: reported to be good    Psychiatric Review of Systems       Depression: yes     Bernadette or Hypomania:  no     Panic Attacks:  no     Phobias:  no     Obsessions and Compulsions:  yes - the thoughts of killing his wife seem to be obsessive, since they are ego-dystonic     PTSD : denies     Hallucinations:  yes - he reported to me they had been years ago, but reported in the ER that they were more recent     Delusions:  no    Substance Abuse History:  ETOH: denies   Marijuana: denies  Opiates: denies  Other Drugs: denies      Past Psychiatric History:  Prior Diagnosis:  Major depressive disorder, recurrent  Psychiatrist: he said he is seeing 'Salud' at the [facility]  Therapist:no  Hospitalization: yes; he was hospitalized in various psychiatric wards, in [facility] and [place]  Hx of Suicidal Attempts: no  Hx of violence:  no  ECT: no  Previous discontinued Psychiatric Med Trials: n/a    Past Medical History:        Diagnosis Date    Anxiety     Chest pain 3/29/2018    Coronary artery disease involving native coronary artery of native heart without angina pectoris 2/15/2019    Diabetes mellitus ([facility] [address]     no meds    History of colon polyps     Hyperlipidemia     Hypertension     Type 2 diabetes mellitus without complication ([facility] [address]     Vertigo        Past Surgical History:        Procedure Laterality Date    [address]    PTCA COLONOSCOPY      COLONOSCOPY N/A 10/17/2019    COLORECTAL CANCER SCREENING, HIGH RISK performed by Paul Culver MD at [facility]      OTHER SURGICAL HISTORY      patent foramen ovale    TONSILLECTOMY      UPPER GASTROINTESTINAL ENDOSCOPY N/A 8/1/2019    EGD ESOPHAGOGASTRODUODENOSCOPY performed by Paul Culver MD at [facility]       Allergies:   Seroquel [quetiapine]    Family History  Family History   Problem Relation Age of Onset    Heart Disease Maternal Aunt     Cancer Maternal Aunt     Colon Cancer Maternal Aunt          Social History:  Born and Raised: [place]  Describes Childhood:   supportive  Education: high school and some college  Employment: Retired  Relationships:   Children:  4 children  Current Support:  spouse     Legal Hx: none  Access to weapons?:  No      EXAMINATION:    REVIEW OF SYSTEMS:    ROS:  [x] All negative/unchanged except if checked.  Explain positive(checked items) below:  [] Constitutional  [] Eyes  [] Ear/Nose/Mouth/Throat  [] Respiratory  [] CV  [] GI  []   [] Musculoskeletal  [] Skin/Breast  [] Neurological  [] Endocrine  [] Heme/Lymph  [] Allergic/Immunologic    Explanation:     Vitals:  /72   Pulse 77   Temp 98.1 °F (36.7 °C) (Oral)   Resp 18   Ht 5' 5\" (1.651 m)   Wt 113 lb 12.8 oz (51.6 kg) Comment: bed scale  SpO2 97%   BMI 18.94 kg/m²      Neurologic Exam:   Muscle Strength & Tone: full ROM  Gait:  patient was laying in bed when interviewed    Involuntary Movements: Yes; muscle spasms    Mental Status Examination:    Level of consciousness:  within normal limits   Appearance:  ill-appearing, lying in bed, poor grooming, and poor hygiene  Behavior/Motor:  psychomotor retardation  Attitude toward examiner:  cooperative and fair eye contact  Speech:  slow, increased latency, and monotone   Mood: anxious, constricted, decreased range, and depressed  Affect:  mood congruent and flat  Thought processes:  slow   Thought content:  Obsessions/instrusive thoughts:  of killing his wife  Homocidal ideation ? of killing his wife  Suicidal Ideation:  with plan to stab himself  Perceptual Disturbance:  auditory and visual; unclear about when he last had them though  Cognition:  oriented to person, place, and time   Concentration distractible  Memory limited  Insight limited   Judgement limited   Fund of Knowledge limited        DIAGNOSIS:  Major Depressive Disorder, recurrent, severe with psychotic symptoms  OCD          RISK ASSESSMENT:    SUICIDE RISK ASSESSMENT: high  HOMICIDE: high  AGITATION/VIOLENCE: low  ELOPEMENT: low    LABS: REVIEWED TODAY:  Recent Labs     10/14/22  0843   WBC 7.4   HGB 15.6        Recent Labs     10/14/22  0843      K 3.4      CO2 18*   BUN 16   CREATININE 0.85   GLUCOSE 138*     Recent Labs     10/14/22  0843   BILITOT 1.7*   ALKPHOS 85   AST 19   ALT 16     Lab Results   Component Value Date/Time    LABAMPH Neg 10/14/2022 08:15 AM    BARBSCNU Neg 10/14/2022 08:15 AM    LABBENZ Neg 10/14/2022 08:15 AM    LABMETH Neg 10/14/2022 08:15 AM    OPIATESCREENURINE Neg 10/14/2022 08:15 AM    PHENCYCLIDINESCREENURINE Neg 10/14/2022 08:15 AM    ETOH <10 10/14/2022 08:43 AM     Lab Results   Component Value Date/Time    TSH 3.230 10/14/2022 08:43 AM     No results found for: LITHIUM  No results found for: VALPROATE, CBMZ  No results found for: LITHIUM, VALPROATE    FURTHER LABS ORDERED :      Radiology   CTA Chest W WO  (PE study)    Result Date: 10/12/2022  EXAMINATION: CTA OF THE CHEST WITH AND WITHOUT CONTRAST 10/12/2022 4:32 am TECHNIQUE: CTA of the chest was performed before and after the administration of intravenous contrast.  Multiplanar reformatted images are provided for review. MIP images are provided for review.  Automated exposure control, iterative reconstruction, and/or weight based adjustment of the mA/kV was utilized to reduce the radiation dose to as low as reasonably achievable. COMPARISON: None. HISTORY: ORDERING SYSTEM PROVIDED HISTORY: Hemoptysis TECHNOLOGIST PROVIDED HISTORY: Reason for exam:->Hemoptysis Decision Support Exception - unselect if not a suspected or confirmed emergency medical condition->Emergency Medical Condition (MA) What reading provider will be dictating this exam?->CRC FINDINGS: Aorta: No evidence of thoracic aortic aneurysm or dissection. No acute abnormality of the aorta. Pulmonary arteries: The pulmonary arteries are well pacified with IV contrast.  There is no pulmonary embolus. Mediastinum: No evidence of mediastinal lymphadenopathy. The heart and pericardium demonstrate no acute abnormality. Lungs/Pleura: The lungs are without acute process. No focal consolidation or pulmonary edema. No evidence of pleural effusion or pneumothorax. There is perihilar peribronchial cuffing more prominent within the right and left lower lobes suggestive of bronchitis. There is minimal dependent atelectasis. There is no pleural thickening. Upper Abdomen: Limited images of the upper abdomen are unremarkable. Soft Tissues/Bones: No acute bone or soft tissue abnormality. 1. There is no pulmonary embolus 2. There is no thoracic aortic aneurysm or dissection 3. Bilateral perihilar peribronchial cuffing more prominent within the lower lobes suggestive of bronchitis. 4. There are no findings of pneumonia. CT ABDOMEN PELVIS W IV CONTRAST Additional Contrast? None    Result Date: 10/12/2022  EXAMINATION: CT OF THE ABDOMEN AND PELVIS WITH CONTRAST 10/12/2022 4:32 am TECHNIQUE: CT of the abdomen and pelvis was performed with the administration of intravenous contrast. Multiplanar reformatted images are provided for review. Automated exposure control, iterative reconstruction, and/or weight based adjustment of the mA/kV was utilized to reduce the radiation dose to as low as reasonably achievable. COMPARISON: None.  HISTORY: ORDERING SYSTEM PROVIDED HISTORY: gen abd pain TECHNOLOGIST PROVIDED HISTORY: Reason for exam:->gen abd pain Additional Contrast?->None Decision Support Exception - unselect if not a suspected or confirmed emergency medical condition->Emergency Medical Condition (MA) What reading provider will be dictating this exam?->CRC FINDINGS: Lower Chest: The lung bases are clear Organs: The liver, spleen, pancreas and adrenal glands are unremarkable. There is no right or left obstructive uropathy. There are bilateral simple renal cyst.  The largest is cysts are seen extending from the right kidney. The 2 largest right renal cyst measure 4.2 cm and 5.2 cm. There is no obstructive uropathy. GI/Bowel: The gallbladder and stomach are normally distended. There is no large or small bowel obstruction. There is retained stool seen throughout the course of the colon suggestive of mild constipation. Pelvis: The bladder is distended. The prostate gland measures 5.1 x 4.0 cm. The appendix is visualized and is unremarkable. No evidence of acute appendicitis. Peritoneum/Retroperitoneum: The abdominal aorta is normal in caliber. There is no aneurysm or dissection. No evidence of retroperitoneal lymphadenopathy. Bones/Soft Tissues:  Age related degenerative changes of the visualized osseous structures without focal destructive lesion. 1. There is no acute intra-or intrapelvic pathology. Specifically, there is no evidence of appendicitis, free air or inflammatory process 2. Moderate amount of retained stool seen within the colon suggestive constipation 3. Bilateral simple renal cysts, right greater than left. EKG: TRACING REVIEWED    TREATMENT PLAN:    Risk Management:  close watch, suicide risk, and homocidal risk    Collateral Information:  Will obtain collateral information from the family or friends.   Will obtain medical records as appropriate from out patient providers  Will consult the hospitalist for a physical exam to rule out any co-morbid physical condition. Home medication Reconciled       New Medications started during this admission :    Due to his prolonged QTc, will d/c Remeron, Vistaril and Trazodone, and start Paxil (which has very low risk of QTc prolongation). Will not start an antipsychotic at this time. Will start melatonin for sleep. Will get Cardiology and Neurology consults. Discussed with the patient risk, benefit, alternative and common side effects for the  proposed medication treatment. Patient is consenting to the treatment.     Psychotherapy:   Encourage participation in milieu and group therapy  Individual therapy as needed      Electronically signed by Jimy Escalante MD on 10/15/2022 at 3:42 PM

## 2022-10-15 NOTE — CARE COORDINATION
Psychosocial Assessment    Current Level of Psychosocial Functioning     Independent   Dependent  X  Minimal Assist     Comments:  Major Depression With Psychotic Features     Psychosocial High Risk Factors (check all that apply)    Unable to obtain meds   Chronic illness/pain    Substance abuse   Lack of Family Support   Financial stress   Isolation   Inadequate Community Resources  Suicide attempt(s) X SI   Not taking medications X  Victim of crime   Developmental Delay  Unable to manage personal needs    Age 70 or older X  Homeless  No transportation X does not drive  Readmission within 30 days  Unemployment  Traumatic Event    Family/Supports identified:   Wife Eva is supportive  Sexual Orientation:    Patient is in a heterosexual marriage for 48 years   Patient Strengths:  Education, Yazidi, family support, connected to [facility]  Patient Barriers:   Not compliant with medications (forgets), SI  Safety plan:  Completed   CMHC/MH history:  Pt reports previous psych admissions but does not elaborate. Plan of Care:  medication management, group/individual therapies, family meetings, psycho -education, treatment team meetings to assist with stabilization    Initial Discharge Plan:    Return to his home. Clinical Summary:    Per notes, patient presented to ED reporting suicidal and homicidal ideations. He reported having  suicidal thoughts for over one month, and recent thoughts to harm his wife. He reported thoughts are to stab himself and/or his wife. He reported he has been hearing voices for over 4 years. Reported he is on zanax, took a large amount of xanax (last night) both because he couldn't sleep, and in an attempt to harm himself. He reported he hasn't been eating or sleeping at home for 4 days. Patient had flat affect, was depressed but cooperative for the [facility] assessment. He maintained good eye contact and was very shaky.    He is on 1:1 for safety precautions. He currently endorses SI and HI towards his wife. He reported hearing \"people in my mind, but not in front of me. \"   He has support from his wife and children. He is connected to the [facility] but does not know when his last appointment was. He plans to discharge back home.  will assist per doctor's orders.

## 2022-10-15 NOTE — CARE COORDINATION
Brief Intervention and Referral to Treatment Summary    Patient was provided PHQ-9, AUDIT-C and DAST Screening:      PHQ-9 Score: 15  AUDIT-C Score:  -1  DAST Score:  0  USD positive for Benzodiazepine     Patients substance use is considered     Low Risk/Healthy X  Moderate Risk  Harmful  Dependent    Patients depression is considered:     Minimal  Mild   Moderate X  Moderately Severe  Severe    Brief Education Was Provided    Patient was receptive X  Patient was not receptive      Brief Intervention Is Provided (Only for AUDIT-C or DAST) N/A    Patient reports readiness to decrease and/or stop use and a plan was discussed   Patient denies readiness to decrease and/or stop use and a plan was not discussed      Recommendations/Referrals for Brief and/or Specialized Treatment Provided to Patient    Patient denied drug and alcohol use. As a result, no recommendations or referrals were provided to the patient at this time. Per chart, patient is linked with the [facility]. He lives with his wife.

## 2022-10-15 NOTE — PROGRESS NOTES
1:1  maintained. Patient lying in bed, positioned, right side, eyes closed. Respirations, even and unlabored.

## 2022-10-15 NOTE — CONSULTS
Inpatient consult to Cardiology  Consult performed by: Kimber Trotter MD  Consult ordered by: Carol Ann Randolph MD        Patient Name: Conrad Arias Date: 10/14/2022  8:11 AM  MR #: [id number]  : 1950    Attending Physician: Crissy Yip MD  Reason for consult: QTc    History of Presenting Illness:      Magnolia Bernheim is a 74 y.o. male on hospital day 1 with a history of . History Obtained From:  patient, electronic medical record    Admitted with SI and HI to 3W. We are asked to evaluate due to prolonged QTc. He has no CP SOB     Initial QTc 482. 2nd 536 then last 499 ms.    History:      EKG: SR 76  Past Medical History:   Diagnosis Date    Anxiety     Chest pain 3/29/2018    Coronary artery disease involving native coronary artery of native heart without angina pectoris 2/15/2019    Diabetes mellitus ([facility] [address]     no meds    History of colon polyps     Hyperlipidemia     Hypertension     Type 2 diabetes mellitus without complication ([facility] [address]     Vertigo      Past Surgical History:   Procedure Laterality Date    CARDIAC SURGERY      PTCA     COLONOSCOPY      COLONOSCOPY N/A 10/17/2019    COLORECTAL CANCER SCREENING, HIGH RISK performed by Chrissy Ramirez MD at [place] [address]      OTHER SURGICAL HISTORY      patent foramen ovale    TONSILLECTOMY      UPPER GASTROINTESTINAL ENDOSCOPY N/A 2019    EGD ESOPHAGOGASTRODUODENOSCOPY performed by Chrissy Ramirez MD at [facility]       Family History  Family History   Problem Relation Age of Onset    Heart Disease Maternal Aunt     Cancer Maternal Aunt     Colon Cancer Maternal Aunt      [] Unable to obtain due to ventilated and/ or neurologic status    Social History     Socioeconomic History    Marital status:      Spouse name: Not on file    Number of children: Not on file    Years of education: Not on file    Highest education level: Not on file   Occupational History Not on file   Tobacco Use    Smoking status: Never    Smokeless tobacco: Never   Vaping Use    Vaping Use: Never used   Substance and Sexual Activity    Alcohol use: No    Drug use: Never    Sexual activity: Not on file   Other Topics Concern    Not on file   Social History Narrative    Not on file     Social Determinants of Health     Financial Resource Strain: Not on file   Food Insecurity: Not on file   Transportation Needs: Not on file   Physical Activity: Not on file   Stress: Not on file   Social Connections: Not on file   Intimate Partner Violence: Not on file   Housing Stability: Not on file      [] Unable to obtain due to ventilated and/ or neurologic status      Home Medications:      Medications Prior to Admission: mirtazapine (REMERON) 15 MG tablet, Take 15 mg by mouth nightly  ALPRAZolam (XANAX) 0.25 MG tablet, Take 0.25 mg by mouth in the morning, at noon, and at bedtime.   docusate sodium (COLACE) 100 MG capsule, Take 1 capsule by mouth 2 times daily for 7 days  albuterol sulfate HFA (VENTOLIN HFA) 108 (90 Base) MCG/ACT inhaler, Inhale 2 puffs into the lungs 4 times daily as needed for Wheezing  isosorbide mononitrate (IMDUR) 30 MG extended release tablet, TAKE 1 TABLET BY MOUTH DAILY  ranolazine (RANEXA) 1000 MG extended release tablet, TAKE 1 TABLET BY MOUTH TWICE DAILY  escitalopram (LEXAPRO) 10 MG tablet, Take 20 mg by mouth daily  aspirin 81 MG chewable tablet, Take 1 tablet by mouth daily  Blood Glucose Monitoring Suppl (ONETOUCH VERIO FLEX SYSTEM) w/Device KIT,   ONETOUCH VERIO strip,   ONETOUCH DELICA LANCETS B [facility],     Current Hospital Medications:     Scheduled Meds:   PARoxetine  20 mg Oral Daily    melatonin  5 mg Oral Nightly    insulin lispro  0-8 Units SubCUTAneous TID WC    insulin lispro  0-4 Units SubCUTAneous Nightly    ALPRAZolam  0.25 mg Oral TID    aspirin  81 mg Oral Daily    isosorbide mononitrate  30 mg Oral Daily    ranolazine  1,000 mg Oral BID     Continuous Infusions: dextrose       PRN Meds:.glucose, dextrose bolus **OR** dextrose bolus, glucagon (rDNA), dextrose, albuterol sulfate HFA, hydrOXYzine pamoate **OR** hydrOXYzine, acetaminophen, traZODone, benztropine mesylate, magnesium hydroxide, nicotine polacrilex  . dextrose          Allergies: Allergies   Allergen Reactions    Seroquel [Quetiapine] Other (See Comments)     lethargy  weak        Review of Systems:       Review of Systems   Constitutional: Negative. Negative for diaphoresis and fatigue. HENT: Negative. Eyes: Negative. Respiratory: Negative. Negative for cough, chest tightness, shortness of breath, wheezing and stridor. Cardiovascular: Negative. Negative for chest pain, palpitations and leg swelling. Gastrointestinal: Negative. Negative for blood in stool and nausea. Genitourinary: Negative. Musculoskeletal: Negative. Skin: Negative. Neurological: Negative. Negative for dizziness, syncope, weakness and light-headedness. Hematological: Negative. Psychiatric/Behavioral:  Positive for agitation, behavioral problems, self-injury and suicidal ideas. Objective Findings:     Vitals:/72   Pulse 77   Temp 98.1 °F (36.7 °C) (Oral)   Resp 18   Ht 5' 5\" (1.651 m)   Wt 113 lb 12.8 oz (51.6 kg) Comment: bed scale  SpO2 97%   BMI 18.94 kg/m²      Physical Examination:    Physical Exam   Constitutional: He appears healthy. No distress. HENT:   Normal cephalic and Atraumatic   Eyes: Pupils are equal, round, and reactive to light. Neck: Thyroid normal. No JVD present. No neck adenopathy. No thyromegaly present. Cardiovascular: Normal rate, regular rhythm, normal heart sounds, intact distal pulses and normal pulses. Pulmonary/Chest: Effort normal and breath sounds normal. He has no wheezes. He has no rales. He exhibits no tenderness. Abdominal: Soft. Bowel sounds are normal. There is no abdominal tenderness.    Musculoskeletal:         General: No tenderness or edema. Normal range of motion. Cervical back: Normal range of motion and neck supple. Neurological: He is alert and oriented to person, place, and time. Skin: Skin is warm. No cyanosis. Nails show no clubbing. Results/ Medications reviewed 10/15/2022, 2:07 PM     Laboratory, Microbiology, Pathology, Radiology, Cardiology, Medications and Transcriptions reviewed  Scheduled Meds:   PARoxetine  20 mg Oral Daily    melatonin  5 mg Oral Nightly    insulin lispro  0-8 Units SubCUTAneous TID WC    insulin lispro  0-4 Units SubCUTAneous Nightly    ALPRAZolam  0.25 mg Oral TID    aspirin  81 mg Oral Daily    isosorbide mononitrate  30 mg Oral Daily    ranolazine  1,000 mg Oral BID     Continuous Infusions:   dextrose         Recent Labs     10/14/22  0843   WBC 7.4   HGB 15.6   HCT 44.1   MCV 91.6        Recent Labs     10/14/22  0843      K 3.4      CO2 18*   BUN 16   CREATININE 0.85     Recent Labs     10/14/22  0843   AST 19   ALT 16   BILITOT 1.7*   ALKPHOS 85     No results for input(s): LIPASE, AMYLASE in the last 72 hours. Recent Labs     10/14/22  0843   PROT 7.4     CTA Chest W WO  (PE study)    Result Date: 10/12/2022  EXAMINATION: CTA OF THE CHEST WITH AND WITHOUT CONTRAST 10/12/2022 4:32 am TECHNIQUE: CTA of the chest was performed before and after the administration of intravenous contrast.  Multiplanar reformatted images are provided for review. MIP images are provided for review. Automated exposure control, iterative reconstruction, and/or weight based adjustment of the mA/kV was utilized to reduce the radiation dose to as low as reasonably achievable. COMPARISON: None.  HISTORY: ORDERING SYSTEM PROVIDED HISTORY: Hemoptysis TECHNOLOGIST PROVIDED HISTORY: Reason for exam:->Hemoptysis Decision Support Exception - unselect if not a suspected or confirmed emergency medical condition->Emergency Medical Condition (MA) What reading provider will be dictating this exam?->CRC FINDINGS: Aorta: No evidence of thoracic aortic aneurysm or dissection. No acute abnormality of the aorta. Pulmonary arteries: The pulmonary arteries are well pacified with IV contrast.  There is no pulmonary embolus. Mediastinum: No evidence of mediastinal lymphadenopathy. The heart and pericardium demonstrate no acute abnormality. Lungs/Pleura: The lungs are without acute process. No focal consolidation or pulmonary edema. No evidence of pleural effusion or pneumothorax. There is perihilar peribronchial cuffing more prominent within the right and left lower lobes suggestive of bronchitis. There is minimal dependent atelectasis. There is no pleural thickening. Upper Abdomen: Limited images of the upper abdomen are unremarkable. Soft Tissues/Bones: No acute bone or soft tissue abnormality. 1. There is no pulmonary embolus 2. There is no thoracic aortic aneurysm or dissection 3. Bilateral perihilar peribronchial cuffing more prominent within the lower lobes suggestive of bronchitis. 4. There are no findings of pneumonia. CT ABDOMEN PELVIS W IV CONTRAST Additional Contrast? None    Result Date: 10/12/2022  EXAMINATION: CT OF THE ABDOMEN AND PELVIS WITH CONTRAST 10/12/2022 4:32 am TECHNIQUE: CT of the abdomen and pelvis was performed with the administration of intravenous contrast. Multiplanar reformatted images are provided for review. Automated exposure control, iterative reconstruction, and/or weight based adjustment of the mA/kV was utilized to reduce the radiation dose to as low as reasonably achievable. COMPARISON: None.  HISTORY: ORDERING SYSTEM PROVIDED HISTORY: gen abd pain TECHNOLOGIST PROVIDED HISTORY: Reason for exam:->gen abd pain Additional Contrast?->None Decision Support Exception - unselect if not a suspected or confirmed emergency medical condition->Emergency Medical Condition (MA) What reading provider will be dictating this exam?->CRC FINDINGS: Lower Chest: The lung bases are clear Organs: The liver, spleen, pancreas and adrenal glands are unremarkable. There is no right or left obstructive uropathy. There are bilateral simple renal cyst.  The largest is cysts are seen extending from the right kidney. The 2 largest right renal cyst measure 4.2 cm and 5.2 cm. There is no obstructive uropathy. GI/Bowel: The gallbladder and stomach are normally distended. There is no large or small bowel obstruction. There is retained stool seen throughout the course of the colon suggestive of mild constipation. Pelvis: The bladder is distended. The prostate gland measures 5.1 x 4.0 cm. The appendix is visualized and is unremarkable. No evidence of acute appendicitis. Peritoneum/Retroperitoneum: The abdominal aorta is normal in caliber. There is no aneurysm or dissection. No evidence of retroperitoneal lymphadenopathy. Bones/Soft Tissues:  Age related degenerative changes of the visualized osseous structures without focal destructive lesion. 1. There is no acute intra-or intrapelvic pathology. Specifically, there is no evidence of appendicitis, free air or inflammatory process 2. Moderate amount of retained stool seen within the colon suggestive constipation 3. Bilateral simple renal cysts, right greater than left. Active Hospital Problems    Diagnosis Date Noted    Major depression with psychotic features ([facility] [address] [F32.3] 10/14/2022     Priority: Medium         Impression/Plan:   SI/HI - under Rx  Prolonged QTc - dc Ranexa and Trazadone  CAD- we orly add Imdur since dc Ranexa. CX FERNANDO remotely- stable no angina. Contineu ASA. Add low dose BB. HTN Stable continue meds. HPL - add statin. Thank you for allowing us to participate in the care of this patient. Will continue to follow. Please call if questions or concerns arise.     Electronically signed by Kvng Diaz MD on 10/15/2022 at 2:07 PM

## 2022-10-15 NOTE — FLOWSHEET NOTE
Pt resting in bed and was offered a snack and pt declined. Pt noted to be restless at times with abrupt movements . Pt states he has been having \"bad thought's \" of harming himself and his wife. Pt denies pain and answers questions appropriately. 1:1  maintained.

## 2022-10-15 NOTE — FLOWSHEET NOTE
Pt was laying in his bed with a flat and worried affect. Pt endorses having \"bad thoughts\" of hurting himself and his wife. Pt has no plan and contracts for safety while on the unit. Pt rates his anxiety a 7/10 and depression a 10/10 with ten being the highest. Pt does report he has a support system with his family. Pt reports he had poor sleep and a poor appetite at home.

## 2022-10-15 NOTE — CONSULTS
[address] MEDICINE    HISTORY AND PHYSICAL EXAM    PATIENT NAME:  Pavel Joseph    MRN:  [id number]  SERVICE DATE:  10/15/2022   SERVICE TIME:  12:01 PM    Primary Care Physician: Bry Nash MD         SUBJECTIVE  CHIEF COMPLAINT:  Medically appropriate for inpatient psychiatry admission. Consult for medical H/P encounter. HPI:  This is a 74 y.o. male who presents with  PMHx anxiety, DM2, CAD, Covid 23,  HTN, HPL and vertigo who presented to emergency room with SI and HI. Reports HI against wife and plans for SI to use a knife. Reports having AVH as well. Per notes, he did take a \"handful\" of xanax in order to sleep. Patient cleared from emergency room for psychiatric care. Patient Seen, Chart, Labs, Radiologystudies, and Consults reviewed. Patient denies headache, chest pain, shortness of breath, N/V/D/C, fever/chills.       PAST MEDICAL HISTORY:    Past Medical History:   Diagnosis Date    Anxiety     Chest pain 3/29/2018    Coronary artery disease involving native coronary artery of native heart without angina pectoris 2/15/2019    Diabetes mellitus ([facility] [address]     no meds    History of colon polyps     Hyperlipidemia     Hypertension     Type 2 diabetes mellitus without complication ([facility] [address]     Vertigo      PAST SURGICAL HISTORY:    Past Surgical History:   Procedure Laterality Date    CARDIAC SURGERY  1973    PTCA     COLONOSCOPY      COLONOSCOPY N/A 10/17/2019    COLORECTAL CANCER SCREENING, HIGH RISK performed by Beckey Gaucher, MD at [place] [address]      OTHER SURGICAL HISTORY      patent foramen ovale    TONSILLECTOMY      UPPER GASTROINTESTINAL ENDOSCOPY N/A 8/1/2019    EGD ESOPHAGOGASTRODUODENOSCOPY performed by Beckey Gaucher, MD at [address] [place] [address]:    Family History   Problem Relation Age of Onset    Heart Disease Maternal Aunt     Cancer Maternal Aunt     Colon Cancer Maternal Aunt      SOCIAL HISTORY: Social History     Socioeconomic History    Marital status:      Spouse name: Not on file    Number of children: Not on file    Years of education: Not on file    Highest education level: Not on file   Occupational History    Not on file   Tobacco Use    Smoking status: Never    Smokeless tobacco: Never   Vaping Use    Vaping Use: Never used   Substance and Sexual Activity    Alcohol use: No    Drug use: Never    Sexual activity: Not on file   Other Topics Concern    Not on file   Social History Narrative    Not on file     Social Determinants of Health     Financial Resource Strain: Not on file   Food Insecurity: Not on file   Transportation Needs: Not on file   Physical Activity: Not on file   Stress: Not on file   Social Connections: Not on file   Intimate Partner Violence: Not on file   Housing Stability: Not on file     MEDICATIONS:    Current Facility-Administered Medications   Medication Dose Route Frequency Provider Last Rate Last Admin    PARoxetine (PAXIL) tablet 20 mg  20 mg Oral Daily Fidencio Santana MD   20 mg at 10/15/22 1125    melatonin disintegrating tablet 5 mg  5 mg Oral Nightly Fidencio Santana MD        ALPRAZolam Shala Lee) tablet 0.25 mg  0.25 mg Oral TID Silvana Ocasio MD   0.25 mg at 10/15/22 0858    albuterol sulfate HFA (PROVENTIL;VENTOLIN;PROAIR) 108 (90 Base) MCG/ACT inhaler 2 puff  2 puff Inhalation 4x Daily PRN Silvana Ocasio MD        aspirin chewable tablet 81 mg  81 mg Oral Daily Silvana Ocasio MD   81 mg at 10/15/22 0858    isosorbide mononitrate (IMDUR) extended release tablet 30 mg  30 mg Oral Daily Silvana Ocasio MD   30 mg at 10/15/22 0858    ranolazine (RANEXA) extended release tablet 1,000 mg  1,000 mg Oral BID Silvana Ocasio MD   1,000 mg at 10/15/22 0858    hydrOXYzine pamoate (VISTARIL) capsule 50 mg  50 mg Oral Q6H PRN Silvana Ocasio MD        Or    hydrOXYzine (VISTARIL) injection 50 mg  50 mg IntraMUSCular Q6H PRN Silvana Ocasio MD acetaminophen (TYLENOL) tablet 650 mg  650 mg Oral Q4H PRN Sammi Nolasco MD        traZODone (DESYREL) tablet 50 mg  50 mg Oral Nightly PRN Sammi Nolasco MD        benztropine mesylate (COGENTIN) injection 2 mg  2 mg IntraMUSCular BID PRN Sammi Nolasco MD        magnesium hydroxide (MILK OF MAGNESIA) 400 MG/5ML suspension 30 mL  30 mL Oral Daily PRN Sammi Nolasco MD        nicotine polacrilex (COMMIT) lozenge 2 mg  2 mg Oral Q1H PRN Sammi Nolasco MD           ALLERGIES: Seroquel [quetiapine]    REVIEW OF SYSTEM:   ROS as noted in HPI, 12 point ROS reviewed and otherwise negative. OBJECTIVE  PHYSICAL EXAM: /72   Pulse 77   Temp 98.1 °F (36.7 °C) (Oral)   Resp 18   Ht 5' 5\" (1.651 m)   Wt 113 lb 12.8 oz (51.6 kg) Comment: bed scale  SpO2 97%   BMI 18.94 kg/m²   CONSTITUTIONAL:  awake, alert, cooperative, no apparent distress, and appears stated age  EYES:  Lids and lashes normal,  conjunctiva normal  ENT:  Normocephalic, without obvious abnormality, atraumatic, sinuses nontender on palpation, external ears without lesions, oral pharynx with moist mucus membranes, tonsils without erythema or exudates, gums normal and good dentition. NECK:  Supple, symmetrical, trachea midline  LUNGS:  Clear to auscultation bilaterally, no crackles or wheezing  CARDIOVASCULAR: Regular rate and rhythm, normal S1 and S2, murmur  ABDOMEN: Normal bowel sounds, soft, non-distended, non-tender, no masses palpated, no hepatosplenomegally  MUSCULOSKELETAL:  There is no redness, warmth, or swelling of the joints. NEUROLOGIC:  Awake, alert, oriented to name, place and time. SKIN:  Warm and dry    DATA:     Diagnostic tests reviewed for today's visit:    Most recent labs and imaging results reviewed.        ASSESSMENT AND PLAN    Major depression with psychotic features ([address]  SI/HI  AVH  Patient admitted to behavorial health for evaluation and treatment     Prolonged Qtc  - cardiology consulted  - avoid QT prolonging agents    Tremors  - neuro consulted  - seizure precautions    DM2  - SSI    HTN/CAD  - continue ASA, imdur and ranexa    This is only a history and physical examination and not medical management. The patient is to contact and follow up with their primary care physician and go over any abnormal labs, imaging, findings, medical concerns, or conditions that we have and have not addressed during this encounter.     Plan of care discussed with: patient    SIGNATURE: RAVEN Solorzano NP  DATE: October 15, 2022  TIME: 12:01 PM

## 2022-10-15 NOTE — PLAN OF CARE
Patient is alert and oriented x 4, he has stayed in bed for the majority of this shift. Patient reports decreased appetite and poor sleep. Patient states he has intrusive thoughts of suicide and of hurting others, he states he has no plan to harm himself or others while here. He rates his depression and anxiety 10/10 on a scale 1-10 where 10 is the worst.  Patient is cooperative. He continues to have tremors though his hands, chest, arms, and involuntary movement in his face. Patient states this has been going on for 3-4 weeks. He states it started and continued to get worse. Neuro was consulted per psychiatrist. Patient's heart is difficult to auscultate related to his tremors through his chest, Hospitalist was made aware. Cariology also consulted per psychiatrist related to his high QTC. Patient appears sad and worried, he states his wife is helpful and supportive.       Problem: Self Harm/Suicidality  Goal: Will have no self-injury during hospital stay  Description: INTERVENTIONS:  1. Q 30 MINUTES: Routine safety checks  2. Q SHIFT & PRN: Assess risk to determine if routine checks are adequate to maintain patient safety  10/15/2022 1135 by Pawel Harp RN  Outcome: Progressing  10/15/2022 0500 by Akosua Olivares RN  Outcome: Progressing     Problem: Chronic Conditions and Co-morbidities  Goal: Patient's chronic conditions and co-morbidity symptoms are monitored and maintained or improved  10/15/2022 1135 by Pawel Harp RN  Outcome: Progressing  10/15/2022 0500 by Akosua Olivares RN  Outcome: Progressing     Problem: Safety - Adult  Goal: Free from fall injury  10/15/2022 1135 by Pawel Harp RN  Outcome: Progressing  10/15/2022 0500 by Akosua Olivares RN  Outcome: Progressing     Problem: ABCDS Injury Assessment  Goal: Absence of physical injury  10/15/2022 1135 by Pawel Harp RN  Outcome: Progressing  10/15/2022 0500 by Akosua Olivares RN  Outcome: Progressing     Problem: Skin/Tissue Integrity  Goal: Absence of new skin breakdown  Description: 1. Monitor for areas of redness and/or skin breakdown  2. Assess vascular access sites hourly  3. Every 4-6 hours minimum:  Change oxygen saturation probe site  4. Every 4-6 hours:  If on nasal continuous positive airway pressure, respiratory therapy assess nares and determine need for appliance change or resting period.   10/15/2022 1135 by Jimmie King RN  Outcome: Progressing  10/15/2022 0500 by Myrna Mariano RN  Outcome: Progressing

## 2022-10-15 NOTE — PROGRESS NOTES
Pt. refused to attend the 1000 skills group, despite staff encouragement.  Electronically signed by Christelle Brooke, [address] on 10/15/2022 at 11:07 AM

## 2022-10-15 NOTE — PROGRESS NOTES
Patient was laying in bed in his room. He was awake and he was agreeable to do his leisure assessment. He had a flat affect, was anxious and was worrisome. Patient stated he thinks he is depressed. He stated he has medication but he forgets sometimes to take it and he feels it does not always work. He had thoughts of hurting himself and his wife. He had auditory hallucinations saying bad things to him but did not elaborate what the voices were saying. He denies any visual hallucinations. He had poor sleep and a poor appetite at home. Patient has some medical issues but did not elaborate except that he was very shaky. He lives with his wife and she is supportive. He has no leisure interests and stated \"I just sit on the couch\".   Electronically signed by Janina Mackenzie, [address] on 10/15/2022 at 6:26 AM

## 2022-10-16 PROBLEM — F33.3 SEVERE EPISODE OF RECURRENT MAJOR DEPRESSIVE DISORDER, WITH PSYCHOTIC FEATURES (HCC): Status: ACTIVE | Noted: 2022-10-16

## 2022-10-16 LAB
GLUCOSE BLD-MCNC: 107 MG/DL (ref 70–99)
GLUCOSE BLD-MCNC: 119 MG/DL (ref 70–99)
GLUCOSE BLD-MCNC: 121 MG/DL (ref 70–99)
GLUCOSE BLD-MCNC: 155 MG/DL (ref 70–99)
PERFORMED ON: ABNORMAL

## 2022-10-16 PROCEDURE — 6370000000 HC RX 637 (ALT 250 FOR IP): Performed by: INTERNAL MEDICINE

## 2022-10-16 PROCEDURE — 1240000000 HC EMOTIONAL WELLNESS R&B

## 2022-10-16 PROCEDURE — 99232 SBSQ HOSP IP/OBS MODERATE 35: CPT | Performed by: INTERNAL MEDICINE

## 2022-10-16 PROCEDURE — 93005 ELECTROCARDIOGRAM TRACING: CPT | Performed by: INTERNAL MEDICINE

## 2022-10-16 PROCEDURE — 99221 1ST HOSP IP/OBS SF/LOW 40: CPT | Performed by: PSYCHIATRY & NEUROLOGY

## 2022-10-16 PROCEDURE — 6370000000 HC RX 637 (ALT 250 FOR IP): Performed by: PSYCHIATRY & NEUROLOGY

## 2022-10-16 RX ADMIN — ISOSORBIDE MONONITRATE 30 MG: 60 TABLET, EXTENDED RELEASE ORAL at 09:25

## 2022-10-16 RX ADMIN — ATORVASTATIN CALCIUM 20 MG: 20 TABLET, FILM COATED ORAL at 20:47

## 2022-10-16 RX ADMIN — Medication 5 MG: at 20:47

## 2022-10-16 RX ADMIN — PAROXETINE HYDROCHLORIDE 20 MG: 20 TABLET, FILM COATED ORAL at 09:24

## 2022-10-16 RX ADMIN — ALPRAZOLAM 0.25 MG: 0.5 TABLET ORAL at 14:35

## 2022-10-16 RX ADMIN — ASPIRIN 81 MG 81 MG: 81 TABLET ORAL at 09:24

## 2022-10-16 RX ADMIN — METOPROLOL SUCCINATE 25 MG: 25 TABLET, FILM COATED, EXTENDED RELEASE ORAL at 20:47

## 2022-10-16 RX ADMIN — ALPRAZOLAM 0.25 MG: 0.5 TABLET ORAL at 09:24

## 2022-10-16 RX ADMIN — MAGNESIUM HYDROXIDE 30 ML: 400 SUSPENSION ORAL at 12:17

## 2022-10-16 RX ADMIN — ALPRAZOLAM 0.25 MG: 0.5 TABLET ORAL at 20:47

## 2022-10-16 ASSESSMENT — ENCOUNTER SYMPTOMS
VOMITING: 0
BACK PAIN: 0
TROUBLE SWALLOWING: 0
CHOKING: 0
SHORTNESS OF BREATH: 0
COLOR CHANGE: 0
EYES NEGATIVE: 1
STRIDOR: 0
COUGH: 0
CHEST TIGHTNESS: 0
GASTROINTESTINAL NEGATIVE: 1
NAUSEA: 0
PHOTOPHOBIA: 0
WHEEZING: 0
BLOOD IN STOOL: 0
RESPIRATORY NEGATIVE: 1

## 2022-10-16 ASSESSMENT — PAIN SCALES - GENERAL: PAINLEVEL_OUTOF10: 0

## 2022-10-16 NOTE — PLAN OF CARE
Problem: Self Harm/Suicidality  Goal: Will have no self-injury during hospital stay  Description: INTERVENTIONS:  1. Q 30 MINUTES: Routine safety checks  2. Q SHIFT & PRN: Assess risk to determine if routine checks are adequate to maintain patient safety  10/15/2022 2023 by Jaleel Galvan RN  Outcome: Progressing  10/15/2022 1135 by Krystyna Mccollum RN  Outcome: Progressing     Problem: Chronic Conditions and Co-morbidities  Goal: Patient's chronic conditions and co-morbidity symptoms are monitored and maintained or improved  10/15/2022 2023 by Jaleel Galvan RN  Outcome: Progressing  10/15/2022 1135 by Krystyna Mccollum RN  Outcome: Progressing     Problem: Safety - Adult  Goal: Free from fall injury  10/15/2022 2023 by Jaleel Galvan RN  Outcome: Progressing  10/15/2022 1135 by Krystyna Mccollum RN  Outcome: Progressing     Problem: ABCDS Injury Assessment  Goal: Absence of physical injury  10/15/2022 2023 by Jaleel Galvan RN  Outcome: Progressing  10/15/2022 1135 by Krystyna Mccollum RN  Outcome: Progressing     Problem: Skin/Tissue Integrity  Goal: Absence of new skin breakdown  Description: 1. Monitor for areas of redness and/or skin breakdown  2. Assess vascular access sites hourly  3. Every 4-6 hours minimum:  Change oxygen saturation probe site  4. Every 4-6 hours:  If on nasal continuous positive airway pressure, respiratory therapy assess nares and determine need for appliance change or resting period.   10/15/2022 2023 by Jaleel Galvan RN  Outcome: Progressing  10/15/2022 1135 by Krystyna Mccollum RN  Outcome: Progressing

## 2022-10-16 NOTE — PROGRESS NOTES
1:1 safety sitter maintained, pt awake @ this time. Bilat. arms tremulous, pt sat up on side of bed, holding his head. Pt stated,\" I have such bad thoughts\"  \"I am scared. \" \" I do not want to go home. \"Pt does not elaborate on what his thoughts are. Encouraged pt to tell Dr in am.This writer reported pts' behavior to [place]. Classical sleep music played on computer in attempt to promote relaxation & enhance sleep. Pt now resting quietly though appears to remain tremulous.

## 2022-10-16 NOTE — PLAN OF CARE
Patient is alert and oriented x 4, he walks independently with standby assist and a walker. Patient is irritable when encouraged to come to day room for meals. He is reluctantly cooperative. He self isolates to his room and refuses to come to groups, states he is afraid. Patient focused on his thoughts of HI and SI, he states that they are constant and distracting. He denies AVH. Patient rates his depression and anxiety both at 10/10. Patient states he does not want to go back home. He did complain of some constipation. Milk of magnesia administered. Problem: Self Harm/Suicidality  Goal: Will have no self-injury during hospital stay  Description: INTERVENTIONS:  1. Q 30 MINUTES: Routine safety checks  2. Q SHIFT & PRN: Assess risk to determine if routine checks are adequate to maintain patient safety  10/16/2022 1304 by Stacey Hercules RN  Outcome: Progressing  10/16/2022 1304 by Stacey Hercules RN  Outcome: Progressing     Problem: Chronic Conditions and Co-morbidities  Goal: Patient's chronic conditions and co-morbidity symptoms are monitored and maintained or improved  10/16/2022 1304 by Stacey Hercules RN  Outcome: Progressing  10/16/2022 1304 by Stacey Hercules RN  Outcome: Progressing     Problem: Safety - Adult  Goal: Free from fall injury  10/16/2022 1304 by Stacey Hercules RN  Outcome: Progressing  10/16/2022 1304 by Stacey Hercules RN  Outcome: Progressing     Problem: ABCDS Injury Assessment  Goal: Absence of physical injury  10/16/2022 1304 by Stacey Hercules RN  Outcome: Progressing  10/16/2022 1304 by Stacey Hercules RN  Outcome: Progressing     Problem: Skin/Tissue Integrity  Goal: Absence of new skin breakdown  Description: 1. Monitor for areas of redness and/or skin breakdown  2. Assess vascular access sites hourly  3. Every 4-6 hours minimum:  Change oxygen saturation probe site  4.   Every 4-6 hours:  If on nasal continuous positive airway pressure, respiratory therapy assess nares and determine need for appliance change or resting period.   10/16/2022 1304 by Sakina Hess, RN  Outcome: Progressing  10/16/2022 1304 by Sakina Hess, RN  Outcome: Progressing

## 2022-10-16 NOTE — PROGRESS NOTES
Patient remains on 1:1 with staff present at bedside to maintain patient safety. Patient has been resting in bed with no distress noted. This RN encouraged patient to eat lunch in dining room. Patient was agreeable to this, ambulated independently with use of walker for support to dining room. Patient reports \"It has been some days, I have not went to the bathroom\" patient requesting milk of magnesia to help him have a bowel movement.

## 2022-10-16 NOTE — PROGRESS NOTES
Pt. declined to attend the 0900 community meeting, despite staff encouragement.  Goal - \"Stop my bad thoughts\" Electronically signed by LONNY Rodriges on 10/16/2022 at 9:59 AM

## 2022-10-16 NOTE — PROGRESS NOTES
Patient remains on 1:1 for safety and fall risk. Patient ambulated to dining room with walker and assistance and encouragement from RN. Patient is eating breakfast in dining room with no distress noted.

## 2022-10-16 NOTE — PROGRESS NOTES
1:1 remains with patient for safety. Patient is observed sitting on the side of his bed. Reports that he \"ate almost all of it\" when asked how lunch was. Patient reports he has not had a bowel movement yet, this RN educated patient the Milk of magnesia can be given once per day. Patient is accepting of this. Patient is observed to be freqently burping- report that he \"have gas but nothing wants to come out\" Patient reports no needs at this time. Will continue to monitor and provide support as needed.

## 2022-10-16 NOTE — PROGRESS NOTES
BEHAVIORAL HEALTH FOLLOW-UP NOTE     10/16/2022     Patient was seen and examined in person, Chart reviewed   Patient's case discussed with staff/team    Chief Complaint: depressed mood, suicidal and homicidal ideation    Interim History:     Patient said he is feeling \"so-so\". He c/o being constipated. He said his appetite was low. He said he could not sleep well. He is still depressed, and has been having \"bad thoughts\" of hurting himself and his wife, which bother him. He denied having auditory or visual hallucinations. He denied paranoia or other delusions.      Appetite:   [] Normal/Unchanged  [] Increased  [x] Decreased      Sleep:       [] Normal/Unchanged  [] Fair       [x] Poor              Energy:    [] Normal/Unchanged  [] Increased  [x] Decreased        SI [x] Present  [] Absent    HI  [x]Present  [] Absent     Aggression:  [] yes  [x] no    Patient is [] able  [x] unable to CONTRACT FOR SAFETY     PAST MEDICAL/PSYCHIATRIC HISTORY:   Past Medical History:   Diagnosis Date    Anxiety     Chest pain 3/29/2018    Coronary artery disease involving native coronary artery of native heart without angina pectoris 2/15/2019    Diabetes mellitus ([facility] [address]     no meds    History of colon polyps     Hyperlipidemia     Hypertension     Type 2 diabetes mellitus without complication ([facility] [address]     Vertigo        FAMILY/SOCIAL HISTORY:  Family History   Problem Relation Age of Onset    Heart Disease Maternal Aunt     Cancer Maternal Aunt     Colon Cancer Maternal Aunt      Social History     Socioeconomic History    Marital status:      Spouse name: Not on file    Number of children: Not on file    Years of education: Not on file    Highest education level: Not on file   Occupational History    Not on file   Tobacco Use    Smoking status: Never    Smokeless tobacco: Never   Vaping Use    Vaping Use: Never used   Substance and Sexual Activity    Alcohol use: No    Drug use: Never    Sexual activity: Not on file   Other Topics Concern    Not on file   Social History Narrative    Not on file     Social Determinants of Health     Financial Resource Strain: Not on file   Food Insecurity: Not on file   Transportation Needs: Not on file   Physical Activity: Not on file   Stress: Not on file   Social Connections: Not on file   Intimate Partner Violence: Not on file   Housing Stability: Not on file           ROS:  [x] All negative/unchanged except if checked.  Explain positive(checked items) below:  [] Constitutional  [] Eyes  [] Ear/Nose/Mouth/Throat  [] Respiratory  [] CV  [] GI  []   [] Musculoskeletal  [] Skin/Breast  [] Neurological  [] Endocrine  [] Heme/Lymph  [] Allergic/Immunologic    Explanation:     MEDICATIONS:    Current Facility-Administered Medications:     PARoxetine (PAXIL) tablet 20 mg, 20 mg, Oral, Daily, Clinton Sharma MD, 20 mg at 10/16/22 6050    melatonin disintegrating tablet 5 mg, 5 mg, Oral, Nightly, Clinton Sharma MD, 5 mg at 10/15/22 2150    insulin lispro (HUMALOG) injection vial 0-8 Units, 0-8 Units, SubCUTAneous, TID WC, Mary Moulds, APRN - NP    insulin lispro (HUMALOG) injection vial 0-4 Units, 0-4 Units, SubCUTAneous, Nightly, Mary Moulds, APRN - NP    glucose chewable tablet 16 g, 4 tablet, Oral, PRN, Mary Moulds, APRN - NP    dextrose bolus 10% 125 mL, 125 mL, IntraVENous, PRN **OR** dextrose bolus 10% 250 mL, 250 mL, IntraVENous, PRN, Mary Moulds, APRN - NP    glucagon (rDNA) injection 1 mg, 1 mg, SubCUTAneous, PRN, Mary Moulds, APRN - NP    dextrose 10 % infusion, , IntraVENous, Continuous PRN, Mary Moulds, APRN - NP    isosorbide mononitrate (IMDUR) extended release tablet 30 mg, 30 mg, Oral, Daily, Heber Villanueva MD, 30 mg at 10/16/22 8491    metoprolol succinate (TOPROL XL) extended release tablet 25 mg, 25 mg, Oral, Nightly, Heber Villanueva MD, 25 mg at 10/15/22 0885    atorvastatin (LIPITOR) tablet 20 mg, 20 mg, Oral, Nightly, Heber Villanueva MD, 20 mg at 10/15/22 8309 ALPRAZolam (XANAX) tablet 0.25 mg, 0.25 mg, Oral, TID, Colin Infante MD, 0.25 mg at 10/16/22 1435    albuterol sulfate HFA (PROVENTIL;VENTOLIN;PROAIR) 108 (90 Base) MCG/ACT inhaler 2 puff, 2 puff, Inhalation, 4x Daily PRN, Colin Infante MD    aspirin chewable tablet 81 mg, 81 mg, Oral, Daily, Colin Infante MD, 81 mg at 10/16/22 4866    acetaminophen (TYLENOL) tablet 650 mg, 650 mg, Oral, Q4H PRN, Colin Infante MD    magnesium hydroxide (MILK OF MAGNESIA) 400 MG/5ML suspension 30 mL, 30 mL, Oral, Daily PRN, Colin Infante MD, 30 mL at 10/16/22 1217    nicotine polacrilex (COMMIT) lozenge 2 mg, 2 mg, Oral, Q1H PRN, Colin Infante MD      Examination:  /67   Pulse 62   Temp 98.4 °F (36.9 °C) (Oral)   Resp 18   Ht 5' 5\" (1.651 m)   Wt 113 lb 12.8 oz (51.6 kg) Comment: bed scale  SpO2 96%   BMI 18.94 kg/m²   Gait - steady  Medication side effects(SE): none reported    Mental Status Examination:    Level of consciousness:  within normal limits   Appearance:  poor grooming and poor hygiene  Behavior/Motor:  psychomotor retardation  Attitude toward examiner:  cooperative and poor eye contact  Speech:  slow, increased latency, and monotone   Mood: anxious, constricted, decreased range, and depressed  Affect:  mood congruent and anxious  Thought processes:  linear, goal directed, coherent, and slow   Thought content:  Obsessions/instrusive thoughts:  of harming his wife and himself  Homocidal ideation of harming his wife- probably obsessional  Suicidal Ideation:  with plan to stab himself  Delusions:  no evidence of delusions  Perceptual Disturbance:  denies any perceptual disturbance  Cognition:  oriented to person, place, and time   Concentration poor  Insight limited   Judgement limited     ASSESSMENT:   Patient symptoms are:  [] Well controlled  [] Improving  [] Worsening  [x] No change      Diagnosis:   Major Depressive Disorder, recurrent, severe with psychotic symptoms  OCD    LABS:    Recent Labs     10/14/22  0843   WBC 7.4   HGB 15.6        Recent Labs     10/14/22  0843      K 3.4      CO2 18*   BUN 16   CREATININE 0.85   GLUCOSE 138*     Recent Labs     10/14/22  0843   BILITOT 1.7*   ALKPHOS 85   AST 19   ALT 16     Lab Results   Component Value Date/Time    LABAMPH Neg 10/14/2022 08:15 AM    BARBSCNU Neg 10/14/2022 08:15 AM    LABBENZ Neg 10/14/2022 08:15 AM    LABMETH Neg 10/14/2022 08:15 AM    OPIATESCREENURINE Neg 10/14/2022 08:15 AM    PHENCYCLIDINESCREENURINE Neg 10/14/2022 08:15 AM    ETOH <10 10/14/2022 08:43 AM     Lab Results   Component Value Date/Time    TSH 3.230 10/14/2022 08:43 AM     No results found for: LITHIUM  No results found for: VALPROATE, CBMZ    RISK ASSESSMENT: high risk of suicide and ?homicide    Treatment Plan:  Reviewed current Medications with the patient. Will increase the Paxil. Cardiology consult reviewed; appreciate Dr. Adan Jasmine help and med management. Neuro consult reviewed; appreciate Dr. Lynsey Perez help. Risks, benefits, side effects, drug-to-drug interactions and alternatives to treatment were discussed. Collateral information: pending   CD evaluation   Encourage patient to attend group and other milieu activities.   Discharge planning discussed with the patient and treatment team.    PSYCHOTHERAPY/COUNSELING:  [x] Therapeutic interview  [x] Supportive  [] CBT  [] Ongoing  [] Other    [x] Patient continues to need, on a daily basis, active treatment furnished directly by or requiring the supervision of inpatient psychiatric personnel      Anticipated Length of stay:             Electronically signed by Louis Au MD on 10/16/2022 at 4:05 PM

## 2022-10-16 NOTE — CONSULTS
Subjective:      Patient ID: Pavel Joseph is a 74 y.o. male who presents today for tremors. Robert Nieto HPI 74 right-handed gentleman who I was asked to see for upper extremity jerking tremors. He is not quite sure how long this has been is a very poor historian. He is not previous history of seizures. He does have some orofacial dyskinesias which may be related to underlying use of antipsychotics in the past with the twitching appears to be intermittent in his upper extremities though holding his hand I feel the twitching which is a jerking movement suggest mild myoclonus. No reports of dementia though. He was able to eat very well he denies any difficulty walking. He is depressed    Review of Systems   Constitutional:  Negative for fever. HENT:  Negative for ear pain, tinnitus and trouble swallowing. Eyes:  Negative for photophobia and visual disturbance. Respiratory:  Negative for choking and shortness of breath. Cardiovascular:  Negative for chest pain and palpitations. Gastrointestinal:  Negative for nausea and vomiting. Musculoskeletal:  Negative for back pain, gait problem, joint swelling, myalgias, neck pain and neck stiffness. Skin:  Negative for color change. Allergic/Immunologic: Negative for food allergies. Neurological:  Positive for tremors. Negative for dizziness, seizures, syncope, facial asymmetry, speech difficulty, weakness, light-headedness, numbness and headaches. Psychiatric/Behavioral:  Negative for behavioral problems, confusion, hallucinations and sleep disturbance.       Past Medical History:   Diagnosis Date    Anxiety     Chest pain 3/29/2018    Coronary artery disease involving native coronary artery of native heart without angina pectoris 2/15/2019    Diabetes mellitus ([address]     no meds    History of colon polyps     Hyperlipidemia     Hypertension     Type 2 diabetes mellitus without complication ([address]     Vertigo      Past Surgical History:   Procedure Laterality Date    CARDIAC SURGERY  1973    PTCA     COLONOSCOPY      COLONOSCOPY N/A 10/17/2019    COLORECTAL CANCER SCREENING, HIGH RISK performed by Marisa Niño MD at [address]      OTHER SURGICAL HISTORY      patent foramen ovale    TONSILLECTOMY      UPPER GASTROINTESTINAL ENDOSCOPY N/A 8/1/2019    EGD ESOPHAGOGASTRODUODENOSCOPY performed by Marisa Niño MD at [address] History    Marital status:      Spouse name: Not on file    Number of children: Not on file    Years of education: Not on file    Highest education level: Not on file   Occupational History    Not on file   Tobacco Use    Smoking status: Never    Smokeless tobacco: Never   Vaping Use    Vaping Use: Never used   Substance and Sexual Activity    Alcohol use: No    Drug use: Never    Sexual activity: Not on file   Other Topics Concern    Not on file   Social History Narrative    Not on file     Social Determinants of Health     Financial Resource Strain: Not on file   Food Insecurity: Not on file   Transportation Needs: Not on file   Physical Activity: Not on file   Stress: Not on file   Social Connections: Not on file   Intimate Partner Violence: Not on file   Housing Stability: Not on file     Family History   Problem Relation Age of Onset    Heart Disease Maternal Aunt     Cancer Maternal Aunt     Colon Cancer Maternal Aunt      Allergies   Allergen Reactions    Seroquel [Quetiapine] Other (See Comments)     lethargy  weak     Current Facility-Administered Medications   Medication Dose Route Frequency Provider Last Rate Last Admin    PARoxetine (PAXIL) tablet 20 mg  20 mg Oral Daily Enrique Rogers MD   20 mg at 10/16/22 2317    melatonin disintegrating tablet 5 mg  5 mg Oral Nightly Enrique Rogers MD   5 mg at 10/15/22 2150    insulin lispro (HUMALOG) injection vial 0-8 Units  0-8 Units SubCUTAneous TID  Annette Ragland and reactive to light. Cardiovascular:      Rate and Rhythm: Normal rate and regular rhythm. Heart sounds: No murmur heard. Pulmonary:      Effort: Pulmonary effort is normal.      Breath sounds: Normal breath sounds. Abdominal:      General: Bowel sounds are normal.   Musculoskeletal:         General: Normal range of motion. Cervical back: Normal range of motion. Skin:     General: Skin is warm. Neurological:      Mental Status: He is alert and oriented to person, place, and time. Cranial Nerves: No cranial nerve deficit. Sensory: No sensory deficit. Motor: No abnormal muscle tone. Coordination: Coordination normal.      Deep Tendon Reflexes: Reflexes are normal and symmetric. Babinski sign absent on the right side. Babinski sign absent on the left side. Psychiatric:         Mood and Affect: Mood normal.   Patient's upper extremity reveals fine twitch which is mostly mild myoclonic jerks. No results found.     Lab Results   Component Value Date/Time    WBC 7.4 10/14/2022 08:43 AM    RBC 4.81 10/14/2022 08:43 AM    HGB 15.6 10/14/2022 08:43 AM    HCT 44.1 10/14/2022 08:43 AM    MCV 91.6 10/14/2022 08:43 AM    MCH 32.3 10/14/2022 08:43 AM    MCHC 35.3 10/14/2022 08:43 AM    RDW 13.9 10/14/2022 08:43 AM     10/14/2022 08:43 AM     Lab Results   Component Value Date/Time     10/14/2022 08:43 AM    K 3.4 10/14/2022 08:43 AM    K 4.2 09/09/2021 07:03 AM     10/14/2022 08:43 AM    CO2 18 10/14/2022 08:43 AM    BUN 16 10/14/2022 08:43 AM    CREATININE 0.85 10/14/2022 08:43 AM    GFRAA >60.0 10/14/2022 08:43 AM    LABGLOM >60.0 10/14/2022 08:43 AM    GLUCOSE 138 10/14/2022 08:43 AM    PROT 7.4 10/14/2022 08:43 AM    LABALBU 4.6 10/14/2022 08:43 AM    CALCIUM 9.7 10/14/2022 08:43 AM    BILITOT 1.7 10/14/2022 08:43 AM    ALKPHOS 85 10/14/2022 08:43 AM    AST 19 10/14/2022 08:43 AM    ALT 16 10/14/2022 08:43 AM     Lab Results   Component Value Date/Time    PROTIME 14.0 10/03/2021 01:36 AM    INR 1.1 10/03/2021 01:36 AM     Lab Results   Component Value Date/Time    TSH 3.230 10/14/2022 08:43 AM    FERRITIN 448.4 09/08/2021 05:23 AM     Lab Results   Component Value Date/Time    TRIG 94 10/14/2022 08:43 AM    HDL 48 10/14/2022 08:43 AM    LDLCALC 171 10/14/2022 08:43 AM     Lab Results   Component Value Date/Time    LABAMPH Neg 10/14/2022 08:15 AM    BARBSCNU Neg 10/14/2022 08:15 AM    LABBENZ Neg 10/14/2022 08:15 AM    LABMETH Neg 10/14/2022 08:15 AM    OPIATESCREENURINE Neg 10/14/2022 08:15 AM    PHENCYCLIDINESCREENURINE Neg 10/14/2022 08:15 AM    ETOH <10 10/14/2022 08:43 AM     No results found for: LITHIUM, DILFRTOT, VALPROATE    Assessment:   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 The cervical spine twitches notable distally in his hands. No other tremor types are noted. He also has a orofacial dyskinesia. In the first instance we will recommend an EEG before we consider any treatment options and the treatment options will be Keppra which we will consider keeping in mind that he has underlying psychiatric illness as well. Patient already is on benzo atropine which I recommend we hold for now. Pending on the results of the same will further advise      Kian Wilson MD, Reynold Hawthorne, American Board of Psychiatry & Neurology  Board Certified in Vascular Neurology  Board Certified in Neuromuscular Medicine  Certified in [facility]:

## 2022-10-16 NOTE — PLAN OF CARE
Problem: Self Harm/Suicidality  Goal: Will have no self-injury during hospital stay  Description: INTERVENTIONS:  1. Q 30 MINUTES: Routine safety checks  2. Q SHIFT & PRN: Assess risk to determine if routine checks are adequate to maintain patient safety  10/16/2022 1952 by Sierra Olivares RN  Outcome: Progressing  10/16/2022 1304 by Mathew Naqvi RN  Outcome: Progressing  10/16/2022 1304 by Mathew Naqvi RN  Outcome: Progressing     Problem: Chronic Conditions and Co-morbidities  Goal: Patient's chronic conditions and co-morbidity symptoms are monitored and maintained or improved  10/16/2022 1952 by Sierra Olivares RN  Outcome: Progressing  10/16/2022 1304 by Mathew Naqvi RN  Outcome: Progressing  10/16/2022 1304 by Mathew Naqvi RN  Outcome: Progressing     Problem: Safety - Adult  Goal: Free from fall injury  10/16/2022 1952 by Sierra Olivares RN  Outcome: Progressing  10/16/2022 1304 by Mathew Naqvi RN  Outcome: Progressing  10/16/2022 1304 by Mathew Naqvi RN  Outcome: Progressing     Problem: ABCDS Injury Assessment  Goal: Absence of physical injury  10/16/2022 1952 by Sierra Olivares RN  Outcome: Progressing  10/16/2022 1304 by Mathew Naqvi RN  Outcome: Progressing  10/16/2022 1304 by Mathew Naqvi RN  Outcome: Progressing     Problem: Skin/Tissue Integrity  Goal: Absence of new skin breakdown  Description: 1. Monitor for areas of redness and/or skin breakdown  2. Assess vascular access sites hourly  3. Every 4-6 hours minimum:  Change oxygen saturation probe site  4. Every 4-6 hours:  If on nasal continuous positive airway pressure, respiratory therapy assess nares and determine need for appliance change or resting period.   10/16/2022 1952 by Sierra Olivares RN  Outcome: Progressing  10/16/2022 1304 by Mathew Naqvi RN  Outcome: Progressing  10/16/2022 1304 by Mathew Naqvi RN  Outcome: Progressing

## 2022-10-16 NOTE — PROGRESS NOTES
Patient is awake in bed. Pt reports he is having bad thoughts. \" I can't go home. I\"ll do something bad. Did I do something bad? Notified pt he will not be discharged tonight and staff is present with him at all times to help keep him safe. No PRNs available due to elevated QTC. PT asked to be distracted from his thoughts. Classical music is playing. PT is laying down in bed.

## 2022-10-16 NOTE — PROGRESS NOTES
Pt. refused to attend the 1000 skills group, despite staff encouragement.  Electronically signed by Natty Mann[address] on 10/16/2022 at 2:50 PM

## 2022-10-16 NOTE — CARE COORDINATION
Group Therapy Note    Date: 10/17/2022  Start Time: 1100  End Time:  1130    Number of Participants: 10    Type of Group: Psychotherapy    Patient's Goal:  To participate in a goal oriented group. Notes: Patient declined to attend psychoeducation group at 1100 despite encouragement by staff.      Discipline Responsible: /Counselor    FER Dumont

## 2022-10-16 NOTE — PROGRESS NOTES
Ct remains on 1:1 observation with staff present. Resting in bed, out for dinner and ate well, cooperative. Continues to voice suicidal and homicidal thoughts, won't elaborate what the thoughts are but states,\"Keep bothering me all day\", \"Not sure if my wife understands\". Also reports poor sleep. Will offer sleep med if available.

## 2022-10-16 NOTE — PROGRESS NOTES
Progress Note  Patient: Jayashree García  Unit/Bed: J038/G885-52  YOB: 1950  MRN: [id number]  Acct: [de-identified]   Admitting Diagnosis: Severe episode of recurrent major depressive disorder, with psychotic features ([address] [F33.3]  Major depression with psychotic features ([facility] [address] [F32.3]  Admit Date:  10/14/2022  Hospital Day: 2    Chief Complaint: QT    Histories:  Past Medical History:   Diagnosis Date    Anxiety     Chest pain 3/29/2018    Coronary artery disease involving native coronary artery of native heart without angina pectoris 2/15/2019    Diabetes mellitus ([facility] [address]     no meds    History of colon polyps     Hyperlipidemia     Hypertension     Type 2 diabetes mellitus without complication ([facility] [address]     Vertigo      Past Surgical History:   Procedure Laterality Date    [address]    PTCA     COLONOSCOPY      COLONOSCOPY N/A 10/17/2019    COLORECTAL CANCER SCREENING, HIGH RISK performed by Melchor Carlton MD at [place] [address]      OTHER SURGICAL HISTORY      patent foramen ovale    TONSILLECTOMY      UPPER GASTROINTESTINAL ENDOSCOPY N/A 8/1/2019    EGD ESOPHAGOGASTRODUODENOSCOPY performed by Melchor Carlton MD at [facility]     Family History   Problem Relation Age of Onset    Heart Disease Maternal Aunt     Cancer Maternal Aunt     Colon Cancer Maternal Aunt      Social History     Socioeconomic History    Marital status:      Spouse name: None    Number of children: None    Years of education: None    Highest education level: None   Tobacco Use    Smoking status: Never    Smokeless tobacco: Never   Vaping Use    Vaping Use: Never used   Substance and Sexual Activity    Alcohol use: No    Drug use: Never       Subjective/HPI pt is not participating in group sessions. No cp no sob not eating much 1:1 in room for safety    EKG:        Review of Systems:   Review of Systems   Constitutional: Negative.   Negative for diaphoresis and fatigue. HENT: Negative. Eyes: Negative. Respiratory: Negative. Negative for cough, chest tightness, shortness of breath, wheezing and stridor. Cardiovascular: Negative. Negative for chest pain, palpitations and leg swelling. Gastrointestinal: Negative. Negative for blood in stool and nausea. Genitourinary: Negative. Musculoskeletal: Negative. Skin: Negative. Neurological: Negative. Negative for dizziness, syncope, weakness and light-headedness. Hematological: Negative. Psychiatric/Behavioral:  Positive for agitation. Physical Examination:    /67   Pulse 62   Temp 98.4 °F (36.9 °C) (Oral)   Resp 18   Ht 5' 5\" (1.651 m)   Wt 113 lb 12.8 oz (51.6 kg) Comment: bed scale  SpO2 96%   BMI 18.94 kg/m²    Physical Exam   Constitutional: He appears healthy. No distress. HENT:   Normal cephalic and Atraumatic   Eyes: Pupils are equal, round, and reactive to light. Neck: Thyroid normal. No JVD present. No neck adenopathy. No thyromegaly present. Cardiovascular: Normal rate, regular rhythm, normal heart sounds, intact distal pulses and normal pulses. Pulmonary/Chest: Effort normal and breath sounds normal. He has no wheezes. He has no rales. He exhibits no tenderness. Abdominal: Soft. Bowel sounds are normal. There is no abdominal tenderness. Musculoskeletal:         General: No tenderness or edema. Normal range of motion. Cervical back: Normal range of motion and neck supple. Neurological: He is alert and oriented to person, place, and time. Skin: Skin is warm. No cyanosis. Nails show no clubbing.      LABS:  CBC:   Lab Results   Component Value Date/Time    WBC 7.4 10/14/2022 08:43 AM    RBC 4.81 10/14/2022 08:43 AM    HGB 15.6 10/14/2022 08:43 AM    HCT 44.1 10/14/2022 08:43 AM    MCV 91.6 10/14/2022 08:43 AM    MCH 32.3 10/14/2022 08:43 AM    MCHC 35.3 10/14/2022 08:43 AM    RDW 13.9 10/14/2022 08:43 AM     10/14/2022 08:43 AM CBC with Differential:    Lab Results   Component Value Date/Time    WBC 7.4 10/14/2022 08:43 AM    RBC 4.81 10/14/2022 08:43 AM    HGB 15.6 10/14/2022 08:43 AM    HCT 44.1 10/14/2022 08:43 AM     10/14/2022 08:43 AM    MCV 91.6 10/14/2022 08:43 AM    MCH 32.3 10/14/2022 08:43 AM    MCHC 35.3 10/14/2022 08:43 AM    RDW 13.9 10/14/2022 08:43 AM    LYMPHOPCT 11.4 10/14/2022 08:43 AM    MONOPCT 4.9 10/14/2022 08:43 AM    BASOPCT 0.3 10/14/2022 08:43 AM    MONOSABS 0.4 10/14/2022 08:43 AM    LYMPHSABS 0.8 10/14/2022 08:43 AM    EOSABS 0.0 10/14/2022 08:43 AM    BASOSABS 0.0 10/14/2022 08:43 AM     CMP:    Lab Results   Component Value Date/Time     10/14/2022 08:43 AM    K 3.4 10/14/2022 08:43 AM    K 4.2 09/09/2021 07:03 AM     10/14/2022 08:43 AM    CO2 18 10/14/2022 08:43 AM    BUN 16 10/14/2022 08:43 AM    CREATININE 0.85 10/14/2022 08:43 AM    GFRAA >60.0 10/14/2022 08:43 AM    LABGLOM >60.0 10/14/2022 08:43 AM    GLUCOSE 138 10/14/2022 08:43 AM    PROT 7.4 10/14/2022 08:43 AM    LABALBU 4.6 10/14/2022 08:43 AM    CALCIUM 9.7 10/14/2022 08:43 AM    BILITOT 1.7 10/14/2022 08:43 AM    ALKPHOS 85 10/14/2022 08:43 AM    AST 19 10/14/2022 08:43 AM    ALT 16 10/14/2022 08:43 AM     BMP:    Lab Results   Component Value Date/Time     10/14/2022 08:43 AM    K 3.4 10/14/2022 08:43 AM    K 4.2 09/09/2021 07:03 AM     10/14/2022 08:43 AM    CO2 18 10/14/2022 08:43 AM    BUN 16 10/14/2022 08:43 AM    LABALBU 4.6 10/14/2022 08:43 AM    CREATININE 0.85 10/14/2022 08:43 AM    CALCIUM 9.7 10/14/2022 08:43 AM    GFRAA >60.0 10/14/2022 08:43 AM    LABGLOM >60.0 10/14/2022 08:43 AM    GLUCOSE 138 10/14/2022 08:43 AM     Magnesium:    Lab Results   Component Value Date/Time    MG 1.9 10/14/2022 08:43 AM     Troponin:    Lab Results   Component Value Date/Time    TROPONINI <0.010 10/14/2022 08:43 AM        Active Hospital Problems    Diagnosis Date Noted    Major depression with psychotic features [facility]) [F32.3] 10/14/2022     Priority: Medium        Assessment/Plan:  SI/HI - under Rx  Prolonged QTc - stopped Ranexa and Trazadone - f/u QTc 460 from 499 ms. CAD- added Imdur since dc Ranexa. Stable no CP. CX FERNANDO remotely- stable no angina. Contineu ASA. Add low dose BB. HTN Stable continue meds. HPL - add statin.           Electronically signed by Bossman King MD on 10/16/2022 at 11:25 AM

## 2022-10-17 PROBLEM — R25.1 TREMOR: Status: ACTIVE | Noted: 2022-10-17

## 2022-10-17 LAB
EKG ATRIAL RATE: 64 BPM
EKG ATRIAL RATE: 74 BPM
EKG P AXIS: 60 DEGREES
EKG P AXIS: 62 DEGREES
EKG P-R INTERVAL: 170 MS
EKG P-R INTERVAL: 178 MS
EKG Q-T INTERVAL: 446 MS
EKG Q-T INTERVAL: 450 MS
EKG QRS DURATION: 94 MS
EKG QRS DURATION: 94 MS
EKG QTC CALCULATION (BAZETT): 460 MS
EKG QTC CALCULATION (BAZETT): 499 MS
EKG R AXIS: 66 DEGREES
EKG R AXIS: 71 DEGREES
EKG T AXIS: 48 DEGREES
EKG T AXIS: 84 DEGREES
EKG VENTRICULAR RATE: 64 BPM
EKG VENTRICULAR RATE: 74 BPM
GLUCOSE BLD-MCNC: 112 MG/DL (ref 70–99)
GLUCOSE BLD-MCNC: 119 MG/DL (ref 70–99)
GLUCOSE BLD-MCNC: 128 MG/DL (ref 70–99)
GLUCOSE BLD-MCNC: 130 MG/DL (ref 70–99)
PERFORMED ON: ABNORMAL

## 2022-10-17 PROCEDURE — 6370000000 HC RX 637 (ALT 250 FOR IP): Performed by: INTERNAL MEDICINE

## 2022-10-17 PROCEDURE — 97166 OT EVAL MOD COMPLEX 45 MIN: CPT

## 2022-10-17 PROCEDURE — 95816 EEG AWAKE AND DROWSY: CPT

## 2022-10-17 PROCEDURE — 99233 SBSQ HOSP IP/OBS HIGH 50: CPT | Performed by: PSYCHIATRY & NEUROLOGY

## 2022-10-17 PROCEDURE — 6370000000 HC RX 637 (ALT 250 FOR IP): Performed by: PSYCHIATRY & NEUROLOGY

## 2022-10-17 PROCEDURE — 93010 ELECTROCARDIOGRAM REPORT: CPT | Performed by: INTERNAL MEDICINE

## 2022-10-17 PROCEDURE — 99232 SBSQ HOSP IP/OBS MODERATE 35: CPT | Performed by: INTERNAL MEDICINE

## 2022-10-17 PROCEDURE — APPSS30 APP SPLIT SHARED TIME 16-30 MINUTES: Performed by: NURSE PRACTITIONER

## 2022-10-17 PROCEDURE — 1240000000 HC EMOTIONAL WELLNESS R&B

## 2022-10-17 RX ADMIN — ALPRAZOLAM 0.25 MG: 0.5 TABLET ORAL at 11:30

## 2022-10-17 RX ADMIN — Medication 5 MG: at 21:32

## 2022-10-17 RX ADMIN — ALPRAZOLAM 0.25 MG: 0.5 TABLET ORAL at 16:18

## 2022-10-17 RX ADMIN — PAROXETINE 30 MG: 10 TABLET, FILM COATED ORAL at 09:39

## 2022-10-17 RX ADMIN — ISOSORBIDE MONONITRATE 30 MG: 60 TABLET, EXTENDED RELEASE ORAL at 09:39

## 2022-10-17 RX ADMIN — ALPRAZOLAM 0.25 MG: 0.5 TABLET ORAL at 21:32

## 2022-10-17 RX ADMIN — ATORVASTATIN CALCIUM 20 MG: 20 TABLET, FILM COATED ORAL at 21:32

## 2022-10-17 RX ADMIN — ASPIRIN 81 MG 81 MG: 81 TABLET ORAL at 09:39

## 2022-10-17 ASSESSMENT — ENCOUNTER SYMPTOMS
VOMITING: 0
SHORTNESS OF BREATH: 0
COLOR CHANGE: 0
BLOOD IN STOOL: 0
TROUBLE SWALLOWING: 0
GASTROINTESTINAL NEGATIVE: 1
STRIDOR: 0
EYES NEGATIVE: 1
RESPIRATORY NEGATIVE: 1
CHEST TIGHTNESS: 0
COUGH: 0
WHEEZING: 0
NAUSEA: 0

## 2022-10-17 NOTE — PLAN OF CARE
See OT evaluation for all goals and OT POC.  Electronically signed by KAYLEE Villa/L on 10/17/2022 at 12:59 PM

## 2022-10-17 NOTE — GROUP NOTE
Group Therapy Note    Date: 10/17/2022    Group Start Time: 1000  Group End Time: 1100  Group Topic: Art Therapy     [facility]W I    Diony Harris        Group Therapy Note    Attendees: 12/20       Patient's Goal: stated he did not have a goal    Notes:  Patient minimally participated.     Status After Intervention:  Unchanged    Participation Level: Minimal    Participation Quality: Appropriate      Speech:  normal      Thought Process/Content: Logical      Affective Functioning: Congruent      Mood: depressed      Level of consciousness:  Alert      Response to Learning: Resistant      Endings: None Reported    Modes of Intervention: Activity      Discipline Responsible: [address]      Signature:  Diony Harris

## 2022-10-17 NOTE — PROGRESS NOTES
Gave 0.25 xanax as pt requested for anxiety #10. Pt just out from seeing the doctor, stated this am depression #10, reports he still has thoughts to harm his wife, reports bad dreams, pt stated he still does not want to live.

## 2022-10-17 NOTE — PROGRESS NOTES
[facility]CCUPATIONAL [facility]ERAPY EVALUATION - ACUTE     NAME: Maddy Villanueva  : [birth year] (60 y.o.)  MRN: [id number]  CODE STATUS: Full Code  Room: [facility]/E081-26    Date of Service: 10/17/2022    Patient Diagnosis(es): Severe episode of recurrent major depressive disorder, with psychotic features ([address] [F33.3]  Major depression with psychotic features [facility]) [F32.3]   Patient Active Problem List    Diagnosis Date Noted    Severe episode of recurrent major depressive disorder, with psychotic features ([address] 10/16/2022    Major depression with psychotic features ([address] 10/14/2022    Hyperlipidemia 2021    Heart murmur 2021    Dyspnea on exertion 2021    COVID-19 2021    Weakness 09/10/2021    Adenomatous polyp of sigmoid colon     Chronic gastritis without bleeding     Dyspepsia     Mixed obsessional thoughts and acts     Coronary artery disease involving native coronary artery of native heart without angina pectoris 02/15/2019    Generalized anxiety disorder 04/15/2018    Anxiety     Recurrent major depressive disorder, in remission ([address]     Chest pain 2018    Hypertension 2018    Benzodiazepine dependence ([address] 2017    Type 2 diabetes mellitus without complication, without long-term current use of insulin ([address] 2017    Insomnia secondary to depression with anxiety 2016    Panic attacks 2016    ED (erectile dysfunction) 2011        Past Medical History:   Diagnosis Date    Anxiety     Chest pain 3/29/2018    Coronary artery disease involving native coronary artery of native heart without angina pectoris 2/15/2019    Diabetes mellitus ([address]     no meds    History of colon polyps     Hyperlipidemia     Hypertension     Type 2 diabetes mellitus without complication ([address]     Vertigo      Past Surgical History:   Procedure Laterality Date    CARDIAC SURGERY      PTCA     COLONOSCOPY      COLONOSCOPY N/A 10/17/2019    COLORECTAL CANCER SCREENING, HIGH RISK performed by Venu Orlando MD at [facility]      OTHER SURGICAL HISTORY      patent foramen ovale    TONSILLECTOMY      UPPER GASTROINTESTINAL ENDOSCOPY N/A 8/1/2019    EGD ESOPHAGOGASTRODUODENOSCOPY performed by Venu Orlando MD at [facility]        Restrictions  Restrictions/Precautions: Fall Risk (1:1)       Safety Devices: Safety Devices  Type of Devices: Sitter present; Left in bed     Patient's date of birth confirmed: Yes    General:  Chart Reviewed: Yes  Patient assessed for rehabilitation services?: Yes    Subjective  Subjective: \"I want to go to a nursing home. I'm afraid I will hurt my wife. \"       Pain at start of treatment: No    Pain at end of treatment: No    Location:   Nursing notified: Not Applicable  RN:   Intervention: None    Prior Level of Function:  Social/Functional History  Lives With: Spouse  Type of Home: House  Home Layout: Two level, Performs ADL's on one level, Able to Live on Main level with bedroom/bathroom  Home Access: Level entry  Bathroom Shower/Tub: Tub/Shower unit  ADL Assistance: Independent (reports wife occasional assists)  Ambulation Assistance: Independent (no devices)  Transfer Assistance: Independent  Active : No  Patient's  Info:  Sons  Additional Comments: Pt somewhat inconsistent historian    OBJECTIVE:     Orientation Status:  Orientation  Overall Orientation Status: Within Functional Limits    Observation:  Observation/Palpation  Posture: Good  Observation: Pt alert and attentive, agreeable to OT eval. Sitting with 1:1    Cognition Status:  Cognition  Overall Cognitive Status: WFL    Perception Status:  Perception  Overall Perceptual Status: WFL    Vision and Hearing Status:  Hearing  Hearing: Within functional limits   Vision - Basic Assessment  Prior Vision: No visual deficits  Visual History: No significant visual history  Patient Visual Report: No visual complaint reported. Visual Field Cut: No  Oculo Motor Control: WNL    GROSS ASSESSMENT AROM/PROM:  AROM: Generally decreased, functional       ROM:   LUE AROM (degrees)  LUE AROM : WFL  Left Hand AROM (degrees)  Left Hand AROM: WFL  RUE AROM (degrees)  RUE AROM : WFL  Right Hand AROM (degrees)  Right Hand AROM: WFL    UE STRENGTH:  Strength: Generally decreased, functional (3-/5 all planes)    UE COORDINATION:  Coordination: Generally decreased, functional (Tremoring noted in BUEs)    UE TONE:  Tone: Normal    UE SENSATION:  Sensation: Intact    Hand Dominance:  Hand Dominance  Hand Dominance: Right    ADL Status:  ADL  Feeding: Independent  Grooming: Supervision  UE Bathing: Stand by assistance  LE Bathing: Contact guard assistance  UE Dressing: Stand by assistance  LE Dressing: Contact guard assistance  Toileting: Contact guard assistance  Additional Comments: Simulated ADLs as above. CGA for LE dressing d/t pt's fatigue, pt reports this level of assist varies. Toilet Transfers  Toilet Transfer: Unable to assess  Toilet Transfers Comments: Declined need, anticipate Mod I with grab bar    Functional Mobility:    Transfers  Sit to stand: Modified independent  Stand to sit: Modified independent    Patient ambulated to/from bathroom with [facility] at Supervision level. Increased time and effort for transitions and turns.     Bed Mobility  Bed mobility  Supine to Sit: Unable to assess  Sit to Supine: Unable to assess  Bed Mobility Comments: Pt declines, reports independent    Seated and Standing Balance:  Balance  Sitting: Intact  Standing: Impaired  Standing - Static: Good  Standing - Dynamic: Fair    Functional Endurance:  Activity Tolerance  Activity Tolerance: Patient Tolerated treatment well    D/C Recommendations:  OT D/C RECOMMENDATIONS  REQUIRES OT FOLLOW-UP: Yes    Equipment Recommendations:  OT Equipment Recommendations  Other: Continue to assess    OT Education:   Patient Education  Education Given To: Patient  Education Provided: Role of Therapy;Plan of Care  Education Method: Verbal  Education Outcome: Verbalized understanding    OT Follow Up:   OT D/C RECOMMENDATIONS  REQUIRES OT FOLLOW-UP: Yes       Assessment/Discharge Disposition:  Assessment: Pt is a 74year old man from home with spouse who presents to [address] [facility] with the above deficits which impact his ability to perform ADLs and IADLs. Pt. limited d/t fatigue. Pt would benefit from continued OT to maximize independence and safety with ADL tasks.   Performance deficits / Impairments: Decreased functional mobility , Decreased ADL status, Decreased strength, Decreased endurance, Decreased balance, Decreased high-level IADLs  Prognosis: Good  Discharge Recommendations: Continue to assess pending progress  Decision Making: Medium Complexity  History: Pt's medical history is moderately complex  Exam: Pt has 6 performance deficits  Assistance / Modification: Pt. requires min A    AMPAC (Six Click) Self care Score   How much help for putting on and taking off regular lower body clothing?: A Little  How much help for Bathing?: A Little  How much help for Toileting?: A Little  How much help for putting on and taking off regular upper body clothing?: A Little  How much help for taking care of personal grooming?: A Little  How much help for eating meals?: None  AM-[facility] Inpatient Daily Activity Raw Score: 19  AM-PAC Inpatient ADL T-Scale Score : 40.22  ADL Inpatient CMS 0-100% Score: 42.8    Therapy key for assistance levels -   Independent/Mod I = Pt. is able to perform task with no assistance but may require a device   Stand by assistance = Pt. does not perform task at an independent level but does not need physical assistance, requires verbal cues  Minimal, Moderate, Maximal Assistance = Pt. requires physical assistance (25%, 50%, 75% assist from helper) for task but is able to actively participate in task   Dependent = Pt. requires total assistance with task and is not able to actively participate with task completion     Plan:  Occupational Therapy Plan  Times Per Week: 1-4x  Therapy Duration:  (Length of acute stay)  Current Treatment Recommendations: Strengthening, Functional mobility training, Endurance training, Balance training, Patient/Caregiver education & training, Safety education & training, Home management training, Equipment evaluation, education, & procurement, Self-Care / ADL    Goals:   Patient will:    - Improve functional endurance to tolerate/complete 30 mins of ADL's  - Be Mod I in UB ADLs   - Be Mod I in LB ADLs  - Be Mod I in ADL transfers without LOB  - Be Mod I in toileting tasks  - Improve B hand fine motor coordination to [facility] in order to manage clothing fasteners/self-care containers in a timely manner  - Improve B UE strength and endurance to 4-/5 in order to participate in self-care activities as projected. - Access appropriate D/C site with as few architectural barriers as possible. - Sequence self-care tasks with no verbal cues for safety    Patient Goal: Patient goals : \"I just want to go to a nursing home. \"     Discussed and agreed upon: Yes Comments:       Therapy Time:   Individual   Time In 1055   Time Out 1105   Minutes 10     Eval: 10 minutes     Electronically signed by:     MAIA Dunn,   10/17/2022, 12:57 PM

## 2022-10-17 NOTE — PROGRESS NOTES
Progress Note  Patient: Aftab Isbell  Unit/Bed: I989/I080-65  YOB: 1950  MRN: [id number]  Acct: [de-identified]   Admitting Diagnosis: Severe episode of recurrent major depressive disorder, with psychotic features ([address] [F33.3]  Major depression with psychotic features ([address] [F32.3]  Admit Date:  10/14/2022  Hospital Day: 3    Chief Complaint: QT    Histories:  Past Medical History:   Diagnosis Date    Anxiety     Chest pain 3/29/2018    Coronary artery disease involving native coronary artery of native heart without angina pectoris 2/15/2019    Diabetes mellitus ([address]     no meds    History of colon polyps     Hyperlipidemia     Hypertension     Type 2 diabetes mellitus without complication ([address]     Vertigo      Past Surgical History:   Procedure Laterality Date    [address]    PTCA     COLONOSCOPY      COLONOSCOPY N/A 10/17/2019    COLORECTAL CANCER SCREENING, HIGH RISK performed by Phyllis Worley MD at [place] [address]      OTHER SURGICAL HISTORY      patent foramen ovale    TONSILLECTOMY      UPPER GASTROINTESTINAL ENDOSCOPY N/A 8/1/2019    EGD ESOPHAGOGASTRODUODENOSCOPY performed by Phyllis Worley MD at [facility]     Family History   Problem Relation Age of Onset    Heart Disease Maternal Aunt     Cancer Maternal Aunt     Colon Cancer Maternal Aunt      Social History     Socioeconomic History    Marital status:      Spouse name: None    Number of children: None    Years of education: None    Highest education level: None   Tobacco Use    Smoking status: Never    Smokeless tobacco: Never   Vaping Use    Vaping Use: Never used   Substance and Sexual Activity    Alcohol use: No    Drug use: Never       Subjective/HPI  pt is in the common area sitting. Feels well. No cp no sob. Feels little queasy. Thania had reported SOB but sats 98% at the time.     EKG:        Review of Systems:   Review of Systems Constitutional: Negative. Negative for diaphoresis and fatigue. HENT: Negative. Eyes: Negative. Respiratory: Negative. Negative for cough, chest tightness, shortness of breath, wheezing and stridor. Cardiovascular: Negative. Negative for chest pain, palpitations and leg swelling. Gastrointestinal: Negative. Negative for blood in stool and nausea. Genitourinary: Negative. Musculoskeletal: Negative. Skin: Negative. Neurological: Negative. Negative for dizziness, syncope, weakness and light-headedness. Hematological: Negative. Psychiatric/Behavioral:  Positive for agitation. Physical Examination:    /67   Pulse 66   Temp 98.4 °F (36.9 °C) (Oral)   Resp 18   Ht 5' 5\" (1.651 m)   Wt 113 lb 12.8 oz (51.6 kg) Comment: bed scale  SpO2 98%   BMI 18.94 kg/m²    Physical Exam   Constitutional: He appears healthy. No distress. HENT:   Normal cephalic and Atraumatic   Eyes: Pupils are equal, round, and reactive to light. Neck: Thyroid normal. No JVD present. No neck adenopathy. No thyromegaly present. Cardiovascular: Normal rate, regular rhythm, normal heart sounds, intact distal pulses and normal pulses. Pulmonary/Chest: Effort normal and breath sounds normal. He has no wheezes. He has no rales. He exhibits no tenderness. Abdominal: Soft. Bowel sounds are normal. There is no abdominal tenderness. Musculoskeletal:         General: No tenderness or edema. Normal range of motion. Cervical back: Normal range of motion and neck supple. Neurological: He is alert and oriented to person, place, and time. Skin: Skin is warm. No cyanosis. Nails show no clubbing.      LABS:  CBC:   Lab Results   Component Value Date/Time    WBC 7.4 10/14/2022 08:43 AM    RBC 4.81 10/14/2022 08:43 AM    HGB 15.6 10/14/2022 08:43 AM    HCT 44.1 10/14/2022 08:43 AM    MCV 91.6 10/14/2022 08:43 AM    MCH 32.3 10/14/2022 08:43 AM    MCHC 35.3 10/14/2022 08:43 AM    RDW 13.9 10/14/2022 08:43 AM     10/14/2022 08:43 AM     CBC with Differential:    Lab Results   Component Value Date/Time    WBC 7.4 10/14/2022 08:43 AM    RBC 4.81 10/14/2022 08:43 AM    HGB 15.6 10/14/2022 08:43 AM    HCT 44.1 10/14/2022 08:43 AM     10/14/2022 08:43 AM    MCV 91.6 10/14/2022 08:43 AM    MCH 32.3 10/14/2022 08:43 AM    MCHC 35.3 10/14/2022 08:43 AM    RDW 13.9 10/14/2022 08:43 AM    LYMPHOPCT 11.4 10/14/2022 08:43 AM    MONOPCT 4.9 10/14/2022 08:43 AM    BASOPCT 0.3 10/14/2022 08:43 AM    MONOSABS 0.4 10/14/2022 08:43 AM    LYMPHSABS 0.8 10/14/2022 08:43 AM    EOSABS 0.0 10/14/2022 08:43 AM    BASOSABS 0.0 10/14/2022 08:43 AM     CMP:    Lab Results   Component Value Date/Time     10/14/2022 08:43 AM    K 3.4 10/14/2022 08:43 AM    K 4.2 09/09/2021 07:03 AM     10/14/2022 08:43 AM    CO2 18 10/14/2022 08:43 AM    BUN 16 10/14/2022 08:43 AM    CREATININE 0.85 10/14/2022 08:43 AM    GFRAA >60.0 10/14/2022 08:43 AM    LABGLOM >60.0 10/14/2022 08:43 AM    GLUCOSE 138 10/14/2022 08:43 AM    PROT 7.4 10/14/2022 08:43 AM    LABALBU 4.6 10/14/2022 08:43 AM    CALCIUM 9.7 10/14/2022 08:43 AM    BILITOT 1.7 10/14/2022 08:43 AM    ALKPHOS 85 10/14/2022 08:43 AM    AST 19 10/14/2022 08:43 AM    ALT 16 10/14/2022 08:43 AM     BMP:    Lab Results   Component Value Date/Time     10/14/2022 08:43 AM    K 3.4 10/14/2022 08:43 AM    K 4.2 09/09/2021 07:03 AM     10/14/2022 08:43 AM    CO2 18 10/14/2022 08:43 AM    BUN 16 10/14/2022 08:43 AM    LABALBU 4.6 10/14/2022 08:43 AM    CREATININE 0.85 10/14/2022 08:43 AM    CALCIUM 9.7 10/14/2022 08:43 AM    GFRAA >60.0 10/14/2022 08:43 AM    LABGLOM >60.0 10/14/2022 08:43 AM    GLUCOSE 138 10/14/2022 08:43 AM     Magnesium:    Lab Results   Component Value Date/Time    MG 1.9 10/14/2022 08:43 AM     Troponin:    Lab Results   Component Value Date/Time    TROPONINI <0.010 10/14/2022 08:43 AM        Active Hospital Problems    Diagnosis Date Noted Severe episode of recurrent major depressive disorder, with psychotic features ([facility] [F33.3] 10/16/2022     Priority: Medium    Major depression with psychotic features ([facility] [F32.3] 10/14/2022     Priority: Medium        Assessment/Plan:  SI/HI - under Rx  Prolonged QTc - stopped Ranexa and Trazadone - f/u QTc 460 from 499 ms. CAD- added Imdur since dc Ranexa. Stable no CP. CX FERNANDO remotely- stable no angina. Contineu ASA. Add low dose BB. HTN Stable continue meds. HPL - add statin. No new recs- Cardiology will sign off. Please call with issues.           Electronically signed by Irene Godinez MD on 10/17/2022 at 9:20 AM

## 2022-10-17 NOTE — GROUP NOTE
Group Therapy Note    Date: 10/17/2022    Group Start Time: 0920  Group End Time: 0091  Group Topic: [id number] [facility]        Group Therapy Note    Attendees: 14/20       Morning 62 Adore Robles attended the morning community meeting on 10/17/22. Topics discussed today     [x] Introduction  Day of the week and date  Mask distribution  Current mask requirements  [x]Teams  Explanation of  Green and Blue team criteria  Nurses assigned to each team for today  Explanation about green and blue paper  Date  Patient's Name  Patient's Nurse  Goals  [x] Visitation  Announce the visiting hours for the day  Announce which team is allowed to have visitors for the day  Review any updated Covid 19 requirements for visitors during visitation  Vaccine Card or negative Covid test within 48 hours of visit  State Identification  Patients are reminded to alert the  at least 1 hour before visitation   [x] Unit Orientation  Coffee use  Phone location and etiquette  Shower locations  Spring and dryer location and process  Common area expectations  Staff rounds expectation  [x] Meals   Educate patient to the menu  The patient is encouraged to fill out the menu to get preferences at mealtime  The patient is educated that if they do not fill out the menu, they will get the standard tray  The coffee pot is decaf, patient encouraged to order regular coffee from menu.   Educate patient to the meal process  Patient encouraged to eat snacks provided twice daily  Snacks may stay in patient room     [x] Discharge Process  Discharge expectations  Fill out the survey after discharge   [x] Hygiene  Daily showers encouraged  Showers availability discussed   Daily dressing encouraged  Discussed wearing street clothing  Education provided on where to place linens and clothing  Linens in the hamper  personal clothing does not go into the linen hamper  [x] Group   Patient encouraged to attend group provided  Time of Group Meetings discussed  Gentle reminder that attendance is a Physician order  [x] Movement  Chair exercises completed  Stretching completed  Notes:

## 2022-10-17 NOTE — PROGRESS NOTES
BEHAVIORAL HEALTH FOLLOW-UP NOTE     10/17/2022     Patient was seen and examined in person, Chart reviewed   Patient's case discussed with staff/team    Chief Complaint: depressed mood, suicidal and homicidal ideation    Interim History:     No change in presentation from yesterday. He said his appetite was low. He said he could not sleep well. He is still depressed, and has been having \"bad thoughts\" of hurting himself and his wife, which bother him. He denied having auditory or visual hallucinations.    Intrusive thoughts are obsessive in nature    Appetite:   [] Normal/Unchanged  [] Increased  [x] Decreased      Sleep:       [] Normal/Unchanged  [] Fair       [x] Poor              Energy:    [] Normal/Unchanged  [] Increased  [x] Decreased        SI [x] Present  [] Absent    HI  [x]Present  [] Absent     Aggression:  [] yes  [x] no    Patient is [] able  [x] unable to CONTRACT FOR SAFETY     PAST MEDICAL/PSYCHIATRIC HISTORY:   Past Medical History:   Diagnosis Date    Anxiety     Chest pain 3/29/2018    Coronary artery disease involving native coronary artery of native heart without angina pectoris 2/15/2019    Diabetes mellitus ([facility] [address]     no meds    History of colon polyps     Hyperlipidemia     Hypertension     Type 2 diabetes mellitus without complication ([facility] [address]     Vertigo        FAMILY/SOCIAL HISTORY:  Family History   Problem Relation Age of Onset    Heart Disease Maternal Aunt     Cancer Maternal Aunt     Colon Cancer Maternal Aunt      Social History     Socioeconomic History    Marital status:      Spouse name: Not on file    Number of children: Not on file    Years of education: Not on file    Highest education level: Not on file   Occupational History    Not on file   Tobacco Use    Smoking status: Never    Smokeless tobacco: Never   Vaping Use    Vaping Use: Never used   Substance and Sexual Activity    Alcohol use: No    Drug use: Never    Sexual activity: Not on file   Other Topics Concern Not on file   Social History Narrative    Not on file     Social Determinants of Health     Financial Resource Strain: Not on file   Food Insecurity: Not on file   Transportation Needs: Not on file   Physical Activity: Not on file   Stress: Not on file   Social Connections: Not on file   Intimate Partner Violence: Not on file   Housing Stability: Not on file           ROS:  [x] All negative/unchanged except if checked.  Explain positive(checked items) below:  [] Constitutional  [] Eyes  [] Ear/Nose/Mouth/Throat  [] Respiratory  [] CV  [] GI  []   [] Musculoskeletal  [] Skin/Breast  [] Neurological  [] Endocrine  [] Heme/Lymph  [] Allergic/Immunologic    Explanation:     MEDICATIONS:    Current Facility-Administered Medications:     PARoxetine (PAXIL) tablet 30 mg, 30 mg, Oral, Daily, Enrique Rogers MD, 30 mg at 10/17/22 2315    melatonin disintegrating tablet 5 mg, 5 mg, Oral, Nightly, Enrique Rogers MD, 5 mg at 10/16/22 2047    insulin lispro (HUMALOG) injection vial 0-8 Units, 0-8 Units, SubCUTAneous, TID WC, RAVEN Chase NP    insulin lispro (HUMALOG) injection vial 0-4 Units, 0-4 Units, SubCUTAneous, Nightly, RAVEN Chase - NP    glucose chewable tablet 16 g, 4 tablet, Oral, PRN, RAVEN Chase - NP    dextrose bolus 10% 125 mL, 125 mL, IntraVENous, PRN **OR** dextrose bolus 10% 250 mL, 250 mL, IntraVENous, PRNAnnette APRN - NP    glucagon (rDNA) injection 1 mg, 1 mg, SubCUTAneous, PRNAnnette APRN - NP    dextrose 10 % infusion, , IntraVENous, Continuous PRNAnnette APRN - NP    isosorbide mononitrate (IMDUR) extended release tablet 30 mg, 30 mg, Oral, Daily, Sakina Chew MD, 30 mg at 10/17/22 2622    metoprolol succinate (TOPROL XL) extended release tablet 25 mg, 25 mg, Oral, Nightly, Sakina Chew MD, 25 mg at 10/16/22 2047    atorvastatin (LIPITOR) tablet 20 mg, 20 mg, Oral, Nightly, Sakina Chew MD, 20 mg at 10/16/22 2047    ALPRAZolam Markel Abreu) tablet 0.25 mg, 0.25 mg, Oral, TID, Manny Interiano MD, 0.25 mg at 10/17/22 1130    albuterol sulfate HFA (PROVENTIL;VENTOLIN;PROAIR) 108 (90 Base) MCG/ACT inhaler 2 puff, 2 puff, Inhalation, 4x Daily PRN, Manny Interiano MD    aspirin chewable tablet 81 mg, 81 mg, Oral, Daily, Manny Interiano MD, 81 mg at 10/17/22 5327    acetaminophen (TYLENOL) tablet 650 mg, 650 mg, Oral, Q4H PRN, Manny Interiano MD    magnesium hydroxide (MILK OF MAGNESIA) 400 MG/5ML suspension 30 mL, 30 mL, Oral, Daily PRN, Manny Interiano MD, 30 mL at 10/16/22 1217    nicotine polacrilex (COMMIT) lozenge 2 mg, 2 mg, Oral, Q1H PRN, Manny Interiano MD      Examination:  /67   Pulse 66   Temp 98.4 °F (36.9 °C) (Oral)   Resp 18   Ht 5' 5\" (1.651 m)   Wt 113 lb 12.8 oz (51.6 kg) Comment: bed scale  SpO2 98%   BMI 18.94 kg/m²   Gait - steady  Medication side effects(SE): none reported    Mental Status Examination:    Level of consciousness:  within normal limits   Appearance:  poor grooming and poor hygiene  Behavior/Motor:  psychomotor retardation  Attitude toward examiner:  cooperative and poor eye contact  Speech:  slow, increased latency, and monotone   Mood: anxious, constricted, decreased range, and depressed  Affect:  mood congruent and anxious  Thought processes:  linear, goal directed, coherent, and slow   Thought content:  Obsessions/instrusive thoughts:  of harming his wife and himself  Homocidal ideation of harming his wife- probably obsessional  Suicidal Ideation:  with plan to stab himself  Delusions:  no evidence of delusions  Perceptual Disturbance:  denies any perceptual disturbance  Cognition:  oriented to person, place, and time   Concentration poor  Insight limited   Judgement limited     ASSESSMENT:   Patient symptoms are:  [] Well controlled  [] Improving  [] Worsening  [x] No change      Diagnosis:   Major Depressive Disorder, recurrent, severe with psychotic symptoms  OCD    LABS:    No results for input(s): WBC, HGB, PLT in the last 72 hours. No results for input(s): NA, K, CL, CO2, BUN, CREATININE, GLUCOSE in the last 72 hours. No results for input(s): BILITOT, ALKPHOS, AST, ALT in the last 72 hours. Lab Results   Component Value Date/Time    LABAMPH Neg 10/14/2022 08:15 AM    BARBSCNU Neg 10/14/2022 08:15 AM    LABBENZ Neg 10/14/2022 08:15 AM    LABMETH Neg 10/14/2022 08:15 AM    OPIATESCREENURINE Neg 10/14/2022 08:15 AM    PHENCYCLIDINESCREENURINE Neg 10/14/2022 08:15 AM    ETOH <10 10/14/2022 08:43 AM     Lab Results   Component Value Date/Time    TSH 3.230 10/14/2022 08:43 AM     No results found for: LITHIUM  No results found for: VALPROATE, CBMZ    RISK ASSESSMENT: high risk of suicide and ?homicide    Treatment Plan:  Reviewed current Medications with the patient. Will increase the Paxil. Cardiology consult reviewed; appreciate Dr. Demario Lema help and med management. Neuro consult reviewed; appreciate Dr. Trevin Bruce help. Will consider ECT once medical work up is completed    Risks, benefits, side effects, drug-to-drug interactions and alternatives to treatment were discussed. Collateral information: pending   CD evaluation   Encourage patient to attend group and other milieu activities.   Discharge planning discussed with the patient and treatment team.    PSYCHOTHERAPY/COUNSELING:  [x] Therapeutic interview  [x] Supportive  [] CBT  [] Ongoing  [] Other    [x] Patient continues to need, on a daily basis, active treatment furnished directly by or requiring the supervision of inpatient psychiatric personnel      Anticipated Length of stay:             Electronically signed by Altagracia Jones MD on 10/17/2022 at 3:04 PM

## 2022-10-17 NOTE — PROGRESS NOTES
Comprehensive Nutrition Assessment    Type and Reason for Visit:  Initial, Positive Nutrition Screen (poor po;wt loss)    Nutrition Recommendations/Plan:   Continue General diet    May need to add Carb Control 4 to diet order if glucose > 160    Start diabetic ONS BID     Malnutrition Assessment:  Malnutrition Status: At risk for malnutrition (Comment) (10/17/22 1631)    Context:  Social/Environmental Circumstances     Findings of the 6 clinical characteristics of malnutrition:  Energy Intake:  Mild decrease in energy intake (Comment)  Weight Loss:  Greater than 10% over 6 months     Body Fat Loss:  Unable to assess     Muscle Mass Loss:  Unable to assess    Fluid Accumulation:  No significant fluid accumulation     Strength:  Not Performed    Nutrition Assessment:         Nutrition Related Findings:    PMH: hypertension anxiety depression CAD gastritis obsessional thoughts COVID hyperlipidemia benzodiazepine dependence insomnia type 2 diabetes presents to the emergency department for psychiatric evaluation. Reported poor appetite/po intake pta Wound Type: None       Current Nutrition Intake & Therapies:    Average Meal Intake: 51-75%  Average Supplements Intake: None Ordered  ADULT DIET; Regular    Anthropometric Measures:  Height: 5' 5\" (165.1 cm)  Ideal Body Weight (IBW): 136 lbs (62 kg)    Admission Body Weight: 120 lb (54.4 kg) (stated)  Current Body Weight: 113 lb (51.3 kg) (10/14),   IBW.  Weight Source: Bed Scale  Current BMI (kg/m2): 18.8  Usual Body Weight: 128 lb (58.1 kg) (5/6/22-office visit)  % Weight Change (Calculated): -11.7                    BMI Categories: Underweight (BMI less than 22) age over 72    Estimated Daily Nutrient Needs:  Energy Requirements Based On: Kcal/kg  Weight Used for Energy Requirements: Current  Energy (kcal/day): ~ 1550 @ 30 kcal/kg  Weight Used for Protein Requirements: Current  Protein (g/day): 67-77 gm (kg x 1.3-1.5)  Method Used for Fluid Requirements: 1 ml/kcal  Fluid (ml/day): ~1500 ml    Nutrition Diagnosis:   Inadequate oral intake related to cognitive or neurological impairment as evidenced by poor intake prior to admission  Unintended weight loss related to inadequate protein-energy intake as evidenced by weight loss, poor intake prior to admission    Nutrition Interventions:   Food and/or Nutrient Delivery: Continue Current Diet (Continue General diet  May need to add Carb Control 4 to diet order if glucose > 160  Start diabetic ONS BID)  Nutrition Education/Counseling: No recommendation at this time  Coordination of Nutrition Care: Continue to monitor while inpatient       Goals:     Goals: PO intake 75% or greater, other (specify)  Specify Other Goals: Glucose < 160, wt gain    Nutrition Monitoring and Evaluation:      Food/Nutrient Intake Outcomes: Food and Nutrient Intake, Supplement Intake  Physical Signs/Symptoms Outcomes: Biochemical Data, Weight, Meal Time Behavior    Discharge Planning:     Too soon to determine     Sharron Pereira RD, LD

## 2022-10-17 NOTE — PROGRESS NOTES
[facility] Neurology Daily Progress Note  Name: Chacorta Bernabe  Age: 74 y.o. Gender: male  CodeStatus: Full Code  Allergies: Seroquel [Quetiapine]    Chief Complaint:Suicidal and Homicidal (For a month or more)    Primary Care Provider: Jerilyn Kendall MD  InpatientTreatment Team: Treatment Team: Attending Provider: Jovita Castrejon MD; Consulting Physician: RAVEN Randle - NP; Registered Nurse: Mary Wilkins RN; Consulting Physician: Gilberto Mandujano MD; LPN: Gunjan Adams LPN; Registered Nurse: Fabrice Naranjo RN; LPN: Jones Duane, LPN  Admission Date: 10/14/2022      HPI   Pt seen and examined on behavioral health unit for myoclonus and dyskinesias. Currently alert and oriented x3, no acute distress, cooperative. No myoclonus noted currently. Patient is requiring one-on-one supervision and supervising staff reports patient does have myoclonic jerking with anxiety or stress. Resting upper extremity tremors noted. Patient noted to have rigidity with distraction maneuvers. Bradykinesia with decrement on finger tap testing. Decreased blink. Tremors better , not as much     Vitals:    10/17/22 0500   BP: 124/67   Pulse: 66   Resp:    Temp:    SpO2: 98%        Review of Systems   Constitutional:  Negative for fever. HENT:  Negative for hearing loss and trouble swallowing. Eyes:  Negative for visual disturbance. Respiratory:  Negative for cough, chest tightness, shortness of breath and wheezing. Cardiovascular:  Negative for chest pain, palpitations and leg swelling. Gastrointestinal:  Negative for nausea and vomiting. Musculoskeletal:  Negative for gait problem. Skin:  Negative for color change and rash. Neurological:  Positive for tremors. Negative for dizziness, seizures, syncope, facial asymmetry, speech difficulty, weakness, light-headedness, numbness and headaches. Psychiatric/Behavioral:  Negative for agitation, confusion and hallucinations. The patient is nervous/anxious. Physical Exam  Vitals and nursing note reviewed. Constitutional:       General: He is not in acute distress. Appearance: He is not diaphoretic. HENT:      Head: Normocephalic and atraumatic. Eyes:      Pupils: Pupils are equal, round, and reactive to light. Cardiovascular:      Rate and Rhythm: Normal rate and regular rhythm. Pulmonary:      Effort: Pulmonary effort is normal. No respiratory distress. Breath sounds: Normal breath sounds. Abdominal:      General: Bowel sounds are normal. There is no distension. Palpations: Abdomen is soft. Tenderness: There is no abdominal tenderness. Skin:     General: Skin is warm and dry. Neurological:      Mental Status: He is alert and oriented to person, place, and time. Cranial Nerves: No cranial nerve deficit. Sensory: No sensory deficit. Motor: Tremor and abnormal muscle tone present. No weakness, atrophy, seizure activity or pronator drift. Coordination: Coordination normal.             Medications:  Reviewed    Infusion Medications:    dextrose       Scheduled Medications:    PARoxetine  30 mg Oral Daily    melatonin  5 mg Oral Nightly    insulin lispro  0-8 Units SubCUTAneous TID WC    insulin lispro  0-4 Units SubCUTAneous Nightly    isosorbide mononitrate  30 mg Oral Daily    metoprolol succinate  25 mg Oral Nightly    atorvastatin  20 mg Oral Nightly    ALPRAZolam  0.25 mg Oral TID    aspirin  81 mg Oral Daily     PRN Meds: glucose, dextrose bolus **OR** dextrose bolus, glucagon (rDNA), dextrose, albuterol sulfate HFA, acetaminophen, magnesium hydroxide, nicotine polacrilex    Labs:   No results for input(s): WBC, HGB, HCT, PLT in the last 72 hours. No results for input(s): NA, K, CL, CO2, BUN, CREATININE, CALCIUM, PHOS in the last 72 hours. Invalid input(s): MAGNES  No results for input(s): AST, ALT, BILIDIR, BILITOT, ALKPHOS in the last 72 hours. No results for input(s): INR in the last 72 hours.   No results for input(s): Amna Flores in the last 72 hours. Urinalysis:   Lab Results   Component Value Date/Time    NITRU Negative 10/14/2022 08:15 AM    WBCUA 3-5 09/10/2021 03:45 PM    BACTERIA Negative 09/07/2021 06:30 PM    RBCUA 10-20 09/10/2021 03:45 PM    BLOODU Negative 10/14/2022 08:15 AM    SPECGRAV 1.010 10/14/2022 08:15 AM    GLUCOSEU Negative 10/14/2022 08:15 AM       Radiology:   Most recent    EEG No valid procedures specified. MRI of Brain No results found for this or any previous visit. Results for orders placed during the hospital encounter of 09/07/21    MRI brain without contrast    Narrative  MRI BRAIN WO CONTRAST : 9/8/2021    CLINICAL HISTORY:  encephalopathy . COMPARISON: Head CT 9/7/2021 and head MRI 2/1/2008. TECHNIQUE: Multiplanar MR imaging of the head was performed without contrast.      FINDINGS:    There is no infarct, intracranial hemorrhage, mass effect, midline shift, extra-axial collection, or hydrocephalus. Mild generalized cerebral volume loss is present, with mild patchy supratentorial white matter changes most consistent with chronic small vessel ischemic disease. Mucosal thickening predominantly within the maxillary sinuses is again noted. Impression  NO ACUTE INTRACRANIAL PROCESS IDENTIFIED. ATROPHIC AND INVOLUTIONAL CHANGES. MRA of the Head and Neck: No results found for this or any previous visit. No results found for this or any previous visit. No results found for this or any previous visit.                             CT of the Head: Results for orders placed during the hospital encounter of 09/07/21    CT Head WO Contrast    Narrative  CT HEAD WO CONTRAST    CLINICAL HISTORY:  covid +, confusion, r/o CVA    COMPARISON: CT HEAD WO CONTRAST    CLINICAL HISTORY:  covid +, confusion,    COMPARISON: August 15, 2021 O 0738 hours    TECHNIQUE: Multiple unenhanced serial axial images of the brain from the vertex of the skull to the base of the skull were performed. FINDINGS: The ventricles are dilated. This is compensatory to the surrounding moderate generalized parenchymal volume loss. No mass. No midline shift. The cisterns are patent. There are white matter and periventricular changes most likely consistent  with chronic small vessel disease. No acute intra-axial or extra-axial findings. The visualized osseous structures are unremarkable. There is mucoperiosteal thickening of the frontal sinuses, patchy opacification of the ethmoids, mucoperiosteal thickening in the sphenoid and maxillary sinuses. There are probable bilateral enterostomies again noted. Impression  NO ACUTE INTRA-AXIAL OR EXTRA-AXIAL FINDINGS. All CT scans at this facility use dose modulation, iterative reconstruction, and/or weight based dosing when appropriate to reduce radiation dose to as low as reasonably achievable. TECHNIQUE: Multiple unenhanced serial axial images of the brain from the vertex of the skull to the base of the skull were performed. FINDINGS: The ventricles are dilated. This is compensatory to the surrounding moderate generalized parenchymal volume loss. No mass. No midline shift. The cisterns are patent. There are white matter and periventricular changes most likely consistent  with chronic small vessel disease. No acute intra-axial or extra-axial findings. The visualized osseous structures are unremarkable. The visualized portion of the paranasal sinuses, and mastoids are unremarkable. IMPRESSION:  CHANGES IN PARANASAL SINUSES DESCRIBED ABOVE WHICH HAVE INCREASED SINCE THE PRIOR EXAMINATION. CORRELATE CLINICALLY    NO ACUTE INTRA-AXIAL OR EXTRA-AXIAL FINDINGS.     IF SIGNS OR SYMPTOMS PERSIST THEN CONSIDER MRI TO FURTHER EVALUATE IF THERE ARE NO CONTRAINDICATIONS    All CT scans at this facility use dose modulation, iterative reconstruction, and/or weight based dosing when appropriate to reduce radiation dose to as low as reasonably achievable. No results found for this or any previous visit. No results found for this or any previous visit. Carotid duplex: No results found for this or any previous visit. No results found for this or any previous visit. Results for orders placed during the hospital encounter of 09/07/21    US CAROTID ARTERY BILATERAL    Narrative  EXAMINATION: CAROTID ULTRASOUND    CLINICAL HISTORY: 58-year-old with hypertension. He presents with transient confusion    FINDINGS: Duplex and color Doppler ultrasound were performed of the bilateral extracranial carotid systems. Velocity criteria and internal carotid artery stenoses are extrapolated from data as defined by the Society of Radiologist in [facility] 13 Radiology 2003; 210;510-336. Comparison made with a prior carotid ultrasound from 6/9/2008    RIGHT CAROTID SYSTEM: There is a small amount of heterogeneous calcified plaque in the bulb extending into the proximal ICA and ECA. . There are no elevations of peak systolic velocities or ICA/CCA velocity ratios. Velocities in the ICA range from 92 cm/s  proximal aspect, 88 cm/s mid aspect to 90 cm/s distal aspect. ICA/CCA velocity ratio is 0.8. By ultrasound criteria there is less than 50 percent diameter reduction of the right ICA. Peak systolic velocities in the proximal ECA and mid CCA are 117 and  109cm/s. There is antegrade flow in the right vertebral artery. LEFT CAROTID SYSTEM: There is a small amount of heterogeneous calcified plaque in the bulb. There are now mild elevations of peak systolic velocities in the proximal ICA Velocities in the ICA range from 155 cm/s proximal aspect, 94 cm/s mid aspect to 76  cm/s distal aspect. ICA/CCA velocity ratio is 1.1. By ultrasound criteria there is 50-69% diameter reduction of the left ICA. Peak systolic velocities in the proximal ECA and mid CCA are 153 and 143cm/s.  There is antegrade flow in the left vertebral  artery. Incidental note is made of a 1.2 cm round slightly hypoechoic solid right thyroid nodule    Impression  ATHEROSCLEROTIC CALCIFIED PLAQUE IN BOTH CAROTID SYSTEMS. BY VELOCITY CRITERIA THERE IS LESS THAN 50% DIAMETER REDUCTION OF THE RIGHT ICA AND 50-69% DIAMETER REDUCTION OF THE LEFT ICA WHICH HAS BEEN AN INTERVAL CHANGE. ANTEGRADE VERTEBRAL  FLOW      Echo No results found for this or any previous visit. Assessment/Plan:    10/16/22:  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 The cervical spine twitches notable distally in his hands. No other tremor types are noted. He also has a orofacial dyskinesia. In the first instance we will recommend an EEG before we consider any treatment options and the treatment options will be Keppra which we will consider keeping in mind that he has underlying psychiatric illness as well. Patient already is on benzo atropine which I recommend we hold for now. Pending on the results of the same will further advise    10/17/2022:  No myoclonic jerking noted on today's exam.  Nursing reports it recurs or worsens with anxiety. Patient continues on Xanax. EEG pending for today. Patient with some resting tremors of the bilateral upper extremities. Rigidity and bradykinesia noted. Orofacial dyskinesias persist.  Thyroid studies normal.  CK mildly elevated at 254. Further recommendations pending EEG results. I have personally performed a face to face diagnostic evaluation on this patient, reviewed all data and investigations, and am the sole provider of all clinical decisions on the neurological status of this patient. tremors, more myoclonic, d/c cogentin may have helped,  Will await eeg and consider keppra if myoclonic       Kian Carmona MD, Shakir Graham American Board of Psychiatry & Neurology  Board Certified in Vascular Neurology  Board Certified in Neuromuscular Medicine  Certified in Neurorehabilitation         Collaborating physicians: Dr Bria Dwyer    Electronically signed by RAVEN Marshall CNP on 10/17/2022 at 3:04 PM

## 2022-10-17 NOTE — PROGRESS NOTES
Patient awake in bed stating he is short of breath. Vitals taken. SpO2-98%, BP-124/67, HR-66. No distress noted. Ensured pt his vitals are within normal limits. Pt reports he continues to have bad thoughts. 1:1 continued for safety. Will continue to monitor.

## 2022-10-17 NOTE — PLAN OF CARE
Nutrition Problem #1: Inadequate oral intake  Intervention: Food and/or Nutrient Delivery: Continue Current Diet (Continue General diet  May need to add Carb Control 4 to diet order if glucose > 160  Start diabetic ONS BID)

## 2022-10-18 LAB
GLUCOSE BLD-MCNC: 113 MG/DL (ref 70–99)
GLUCOSE BLD-MCNC: 118 MG/DL (ref 70–99)
GLUCOSE BLD-MCNC: 129 MG/DL (ref 70–99)
GLUCOSE BLD-MCNC: 161 MG/DL (ref 70–99)
PERFORMED ON: ABNORMAL

## 2022-10-18 PROCEDURE — 95816 EEG AWAKE AND DROWSY: CPT | Performed by: PSYCHIATRY & NEUROLOGY

## 2022-10-18 PROCEDURE — 99233 SBSQ HOSP IP/OBS HIGH 50: CPT | Performed by: PSYCHIATRY & NEUROLOGY

## 2022-10-18 PROCEDURE — 6370000000 HC RX 637 (ALT 250 FOR IP): Performed by: INTERNAL MEDICINE

## 2022-10-18 PROCEDURE — 6370000000 HC RX 637 (ALT 250 FOR IP): Performed by: PSYCHIATRY & NEUROLOGY

## 2022-10-18 PROCEDURE — 97116 GAIT TRAINING THERAPY: CPT

## 2022-10-18 PROCEDURE — 1240000000 HC EMOTIONAL WELLNESS R&B

## 2022-10-18 PROCEDURE — 99222 1ST HOSP IP/OBS MODERATE 55: CPT | Performed by: INTERNAL MEDICINE

## 2022-10-18 RX ADMIN — ALPRAZOLAM 0.25 MG: 0.5 TABLET ORAL at 14:49

## 2022-10-18 RX ADMIN — ATORVASTATIN CALCIUM 20 MG: 20 TABLET, FILM COATED ORAL at 20:44

## 2022-10-18 RX ADMIN — Medication 5 MG: at 20:44

## 2022-10-18 RX ADMIN — ALPRAZOLAM 0.25 MG: 0.5 TABLET ORAL at 09:09

## 2022-10-18 RX ADMIN — ASPIRIN 81 MG 81 MG: 81 TABLET ORAL at 09:09

## 2022-10-18 RX ADMIN — ALPRAZOLAM 0.25 MG: 0.5 TABLET ORAL at 20:43

## 2022-10-18 RX ADMIN — ISOSORBIDE MONONITRATE 30 MG: 60 TABLET, EXTENDED RELEASE ORAL at 09:16

## 2022-10-18 RX ADMIN — PAROXETINE 30 MG: 10 TABLET, FILM COATED ORAL at 09:09

## 2022-10-18 ASSESSMENT — ENCOUNTER SYMPTOMS
SHORTNESS OF BREATH: 0
NAUSEA: 0
WHEEZING: 0
VOMITING: 0
EYES NEGATIVE: 1
ALLERGIC/IMMUNOLOGIC NEGATIVE: 1
GASTROINTESTINAL NEGATIVE: 1
ABDOMINAL PAIN: 0

## 2022-10-18 NOTE — PROGRESS NOTES
Pt. refused to attend the 1000 skills group, despite staff encouragement.  Electronically signed by Natty [address] on 10/18/2022 at 11:31 AM

## 2022-10-18 NOTE — PROGRESS NOTES
Patient refreshened up in front of sink,changed clothes and brushed teeth.  Goal to eat lunch in dining room

## 2022-10-18 NOTE — FLOWSHEET NOTE
Spoke with patient about ECT Patient declined to watch video. Verbalized understanding of procedure and its use. Was hesitant at first but then said he wanted to try it out. Pt agreeable to having this nurse also call son to discuss treatment. Pt signed consent. Given educational paper but then asked this nurse to take it back.

## 2022-10-18 NOTE — PROGRESS NOTES
Pt. declined to attend the 0900 community meeting, despite staff encouragement. Goal - \"To feel better\".  Electronically signed by Dougie Beach, [address] on 10/18/2022 at 9:46 AM

## 2022-10-18 NOTE — GROUP NOTE
Group Therapy Note    Date: 10/18/2022    Group Start Time: 0200  Group End Time: 0230  Group Topic: Cognitive Skills    ML 3W I    ESMER Howell Do        Group Therapy Note    Attendees: 6/14        Patient's Goal:  to learn about coping skills and self care techniques     Notes:  n/a     Status After Intervention:  Unchanged    Participation Level: Minimal    Participation Quality: Lethargic      Speech:  mute      Thought Process/Content: Delusional      Affective Functioning: Flat      Mood: depressed      Level of consciousness:  Inattentive      Response to Learning: Resistant      Endings: None Reported    Modes of Intervention: Education      Discipline Responsible: /Counselor      Signature:  ESMER Howell Do

## 2022-10-18 NOTE — PROGRESS NOTES
Pt is resting in bed, reports anxiety is a #9-10, explained and gave am meds, xanax 0.25 was shown to pt and given, pt was informed he doesn't have to ask for it staff will give it when due, pt appeared distraught saying he is worried about his wife that he might kill her, pt reports not wanting to live either, pt was encouraged to talk with doctor about it,1:1 continues.

## 2022-10-18 NOTE — PROGRESS NOTES
Pt is noted sitting in the dinning room for lengthy time, P.T. here to work with pt, 1:1 continues, gave xanax 0.25 for a # 9 anxiety

## 2022-10-18 NOTE — PROGRESS NOTES
Physical Therapy Med Surg Daily Treatment Note  Facility/Department: [address]  [facility]       NAME: Krystina Jose  :  (74 y.o.)  MRN: [id number]  CODE STATUS: Full Code    Date of Service: 10/18/2022    Patient Diagnosis(es): Severe episode of recurrent major depressive disorder, with psychotic features ([facility] [address] [F33.3]  Major depression with psychotic features [facility]) [F32.3]   Chief Complaint   Patient presents with    Suicidal    Homicidal     For a month or more     Patient Active Problem List    Diagnosis Date Noted    Tremor 10/17/2022    Severe episode of recurrent major depressive disorder, with psychotic features ([facility] [address] 10/16/2022    Major depression with psychotic features ([facility] [address] 10/14/2022    Hyperlipidemia 2021    Heart murmur 2021    Dyspnea on exertion 2021    COVID-19 2021    Weakness 09/10/2021    Adenomatous polyp of sigmoid colon     Chronic gastritis without bleeding     Dyspepsia     Mixed obsessional thoughts and acts     Coronary artery disease involving native coronary artery of native heart without angina pectoris 02/15/2019    Generalized anxiety disorder 04/15/2018    Anxiety     Recurrent major depressive disorder, in remission ([address]     Chest pain 2018    Hypertension 2018    Benzodiazepine dependence ([address] 2017    Type 2 diabetes mellitus without complication, without long-term current use of insulin ([address] 2017    Insomnia secondary to depression with anxiety 2016    Panic attacks 2016    ED (erectile dysfunction) 2011        Past Medical History:   Diagnosis Date    Anxiety     Chest pain 3/29/2018    Coronary artery disease involving native coronary artery of native heart without angina pectoris 2/15/2019    Diabetes mellitus ([address]     no meds    History of colon polyps     Hyperlipidemia     Hypertension     Type 2 diabetes mellitus without complication ([address]     Vertigo      Past Surgical History: Procedure Laterality Date    CARDIAC SURGERY  1973    PTCA     COLONOSCOPY      COLONOSCOPY N/A 10/17/2019    COLORECTAL CANCER SCREENING, HIGH RISK performed by Rayo Murphy MD at [facility]      OTHER SURGICAL HISTORY      patent foramen ovale    TONSILLECTOMY      UPPER GASTROINTESTINAL ENDOSCOPY N/A 8/1/2019    EGD ESOPHAGOGASTRODUODENOSCOPY performed by [facility]o Murphy MD at [facility]       Chart Reviewed: Yes  Family / Caregiver Present: No  General Comment  Comments: pt sitting in common area agreeable to tx. Restrictions:  Restrictions/Precautions: Fall Risk (1:1)    SUBJECTIVE:   Subjective: \"I am weaker than usual.\"    Pain  Pain: denies pain. OBJECTIVE:        Bed mobility  Bed Mobility Comments: Pt declines, reports independent    Transfers  Sit to Stand: Supervision  Stand to Sit: Supervision  Comment: STS x5 for improved ability and BLE strengthening, pt demos improved stability. Ambulation  Surface: Level tile  Device: Rolling Walker  Assistance: Supervision  Quality of Gait: step through pattern, inconsistent pacing. steady. Gait Deviations: Decreased step length;Decreased step height  Distance: 100'  Comments: pt requests to use WW this session, reports having one at home. Activity Tolerance  Activity Tolerance: Patient tolerated treatment well          ASSESSMENT   Assessment: pt safe with WW, able to increase gait training distance, pt focused on his mental health, does not engage too often with questions centered around physical therapy.      Discharge Recommendations:  Continue to assess pending progress         Goals  Short Term Goals  Short Term Goal 1: SBA gait with appropriate device 50 feet  Short Term Goal 2: mejia tested at 40/56 for evidence of decreased falls risk  Short Term Goal 3: safest mobility device determined  Short Term Goal 4: indep with HEP    PLAN General Plan: 1 time a day 3-6 times a week  Safety Devices  Type of Devices: Sitter present, Left in chair (left in common room with 1:1)     [facility] (6 CLICK) Slim Gonsalez 28 Inpatient Mobility Raw Score : 22      Therapy Time   Individual   Time In 1436   Time Out 1447   Minutes 11      Gait: 8  TrsF: [address] PTA, 10/18/22 at 3:43 PM         Definitions for assistance levels  Independent = pt does not require any physical supervision or assistance from another person for activity completion. Device may be needed.   Stand by assistance = pt requires verbal cues or instructions from another person, close to but not touching, to perform the activity  Minimal assistance= pt performs 75% or more of the activity; assistance is required to complete the activity  Moderate assistance= pt performs 50% of the activity; assistance is required to complete the activity  Maximal assistance = pt performs 25% of the activity; assistance is required to complete the activity  Dependent = pt requires total physical assistance to accomplish the task

## 2022-10-18 NOTE — CONSULTS
Chief Complaint   Patient presents with    Suicidal    Homicidal     For a month or more        Patient is a 74 y.o. male who presents with a chief complaint of psych issues. Patient is followed on a regular basis by Dr. Shaina Mcneal MD.   Patient with past medical history of coronary status post remote PCI, diabetes, hypertension, hyperlipidemia. Patient requiring to undergo ECT treatment. He denies any angina or CHF type symptoms. Pt denies chest pain, dyspnea, dyspnea on exertion, change in exercise capacity, fatigue,  nausea, vomiting, diarrhea, constipation, motor weakness, insomnia, weight loss, syncope, dizziness, lightheadedness, palpitations, PND, orthopnea, or claudication.   Patient is hemodynamically stable        Past Medical History:   Diagnosis Date    Anxiety     Chest pain 3/29/2018    Coronary artery disease involving native coronary artery of native heart without angina pectoris 2/15/2019    Diabetes mellitus ([address]     no meds    History of colon polyps     Hyperlipidemia     Hypertension     Type 2 diabetes mellitus without complication ([address]     Vertigo       Patient Active Problem List   Diagnosis    Chest pain    Hypertension    Anxiety    Recurrent major depressive disorder, in remission ([address]    Coronary artery disease involving native coronary artery of native heart without angina pectoris    Mixed obsessional thoughts and acts    Chronic gastritis without bleeding    Dyspepsia    Adenomatous polyp of sigmoid colon    Weakness    COVID-19    Hyperlipidemia    Heart murmur    Generalized anxiety disorder    Dyspnea on exertion    ED (erectile dysfunction)    Benzodiazepine dependence ([address]    Insomnia secondary to depression with anxiety    Panic attacks    Type 2 diabetes mellitus without complication, without long-term current use of insulin (HCC)    Major depression with psychotic features ([address]    Severe episode of recurrent major depressive disorder, with psychotic features ([address] Tremor       Past Surgical History:   Procedure Laterality Date    [address]    PTCA     COLONOSCOPY      COLONOSCOPY N/A 10/17/2019    COLORECTAL CANCER SCREENING, HIGH RISK performed by Cayden Hansen MD at [facility]      OTHER SURGICAL HISTORY      patent foramen ovale    TONSILLECTOMY      UPPER GASTROINTESTINAL ENDOSCOPY N/A 8/1/2019    EGD ESOPHAGOGASTRODUODENOSCOPY performed by Cayden Hansen MD at [address] [facility] History    Marital status:      Spouse name: None    Number of children: None    Years of education: None    Highest education level: None   Tobacco Use    Smoking status: Never    Smokeless tobacco: Never   Vaping Use    Vaping Use: Never used   Substance and Sexual Activity    Alcohol use: No    Drug use: Never       Family History   Problem Relation Age of Onset    Heart Disease Maternal Aunt     Cancer Maternal Aunt     Colon Cancer Maternal Aunt        Current Facility-Administered Medications   Medication Dose Route Frequency Provider Last Rate Last Admin    PARoxetine (PAXIL) tablet 30 mg  30 mg Oral Daily Jimy Escalante MD   30 mg at 10/18/22 7215    melatonin disintegrating tablet 5 mg  5 mg Oral Nightly Jimy Escalante MD   5 mg at 10/17/22 2132    insulin lispro (HUMALOG) injection vial 0-8 Units  0-8 Units SubCUTAneous TID WC Hayden Ip, APRN - NP        insulin lispro (HUMALOG) injection vial 0-4 Units  0-4 Units SubCUTAneous Nightly Hayden Ip, APRN - NP        glucose chewable tablet 16 g  4 tablet Oral PRN Hayden Ip, APRN - NP        dextrose bolus 10% 125 mL  125 mL IntraVENous PRN Hayden Ip, APRN - NP        Or    dextrose bolus 10% 250 mL  250 mL IntraVENous PRN Hayden Ip, APRN - NP        glucagon (rDNA) injection 1 mg  1 mg SubCUTAneous PRN Hayden Ip, APRN - NP        dextrose 10 % infusion   IntraVENous Continuous PRN RAVEN Cueto - NOELLE        isosorbide mononitrate (IMDUR) extended release tablet 30 mg  30 mg Oral Daily Jet Mcclain MD   30 mg at 10/18/22 0916    metoprolol succinate (TOPROL XL) extended release tablet 25 mg  25 mg Oral Nightly Jet Mcclain MD   25 mg at 10/16/22 2047    atorvastatin (LIPITOR) tablet 20 mg  20 mg Oral Nightly Jet Mcclain MD   20 mg at 10/17/22 2132    ALPRAZolam Eric Clayman) tablet 0.25 mg  0.25 mg Oral TID Home Mason MD   0.25 mg at 10/18/22 1449    albuterol sulfate HFA (PROVENTIL;VENTOLIN;PROAIR) 108 (90 Base) MCG/ACT inhaler 2 puff  2 puff Inhalation 4x Daily PRN Home Mason MD        aspirin chewable tablet 81 mg  81 mg Oral Daily Home Mason MD   81 mg at 10/18/22 3682    acetaminophen (TYLENOL) tablet 650 mg  650 mg Oral Q4H PRN Home Mason MD        magnesium hydroxide (MILK OF MAGNESIA) 400 MG/5ML suspension 30 mL  30 mL Oral Daily PRN Home Mason MD   30 mL at 10/16/22 1217    nicotine polacrilex (COMMIT) lozenge 2 mg  2 mg Oral Q1H PRN Home Mason MD           ALLERGIES: Seroquel [quetiapine]    Review of Systems   Constitutional: Negative. Negative for chills and fever. HENT: Negative. Eyes: Negative. Respiratory:  Negative for shortness of breath and wheezing. Cardiovascular:  Negative for chest pain, palpitations and leg swelling. Gastrointestinal: Negative. Negative for abdominal pain, nausea and vomiting. Endocrine: Negative. Genitourinary: Negative. Musculoskeletal: Negative. Skin: Negative. Negative for rash. Allergic/Immunologic: Negative. Neurological: Negative. Negative for dizziness, weakness and headaches. Hematological: Negative. Psychiatric/Behavioral:  Positive for behavioral problems. VITALS:  Blood pressure 123/67, pulse 51, temperature 98.2 °F (36.8 °C), resp. rate 16, height 5' 5\" (1.651 m), weight 113 lb 12.8 oz (51.6 kg), SpO2 97 %.   Body mass index is 18.94 kg/m². Physical Exam  Vitals and nursing note reviewed. Constitutional:       Appearance: He is well-developed. He is not diaphoretic. HENT:      Head: Normocephalic and atraumatic. Eyes:      Pupils: Pupils are equal, round, and reactive to light. Neck:      Thyroid: No thyromegaly. Vascular: No JVD. Trachea: No tracheal deviation. Cardiovascular:      Rate and Rhythm: Normal rate and regular rhythm. Chest Wall: PMI is not displaced. Pulses: Intact distal pulses. Heart sounds: Normal heart sounds. Heart sounds not distant. No murmur heard. No friction rub. No gallop. No S3 sounds. Pulmonary:      Effort: No respiratory distress. Breath sounds: No wheezing or rales. Chest:      Chest wall: No tenderness. Abdominal:      General: Bowel sounds are normal. There is no distension. Palpations: Abdomen is soft. There is no mass. Tenderness: There is no abdominal tenderness. There is no guarding or rebound. Musculoskeletal:         General: Normal range of motion. Cervical back: Normal range of motion and neck supple. Skin:     General: Skin is warm and dry. Coloration: Skin is not pale. Findings: No erythema or rash. Neurological:      Mental Status: He is alert and oriented to person, place, and time. Cranial Nerves: No cranial nerve deficit. Psychiatric:         Behavior: Behavior normal.         Thought Content:  Thought content normal.         Judgment: Judgment normal.       LABS:  Recent Results (from the past 24 hour(s))   POCT Glucose    Collection Time: 10/17/22  4:48 PM   Result Value Ref Range    POC Glucose 112 (H) 70 - 99 mg/dl    Performed on ACCU-CHEK    POCT Glucose    Collection Time: 10/17/22  8:14 PM   Result Value Ref Range    POC Glucose 128 (H) 70 - 99 mg/dl    Performed on ACCU-CHEK    POCT Glucose    Collection Time: 10/18/22  6:54 AM   Result Value Ref Range    POC Glucose 129 (H) 70 - 99 mg/dl    Performed on ACCU-CHEK    POCT Glucose    Collection Time: 10/18/22 12:05 PM   Result Value Ref Range    POC Glucose 161 (H) 70 - 99 mg/dl    Performed on ACCU-CHEK      Troponin:   Lab Results   Component Value Date/Time    TROPONINI <0.010 10/14/2022 08:43 AM       EKG:     ASSESSMENT:    Active Hospital Problems    Diagnosis Date Noted    Tremor [R25.1] 10/17/2022     Priority: Medium    Severe episode of recurrent major depressive disorder, with psychotic features ([address] [F33.3] 10/16/2022     Priority: Medium    Major depression with psychotic features ([address] [F32.3] 10/14/2022     Priority: Medium     History of CAD status post remote PCI  Diabetes mellitus  Hypertension  Hyperlipidemia  Anxiety    PLAN:   As always, aggressive risk factor modification is strongly recommended. We should adhere to the JNC VIII guidelines for HTN management and the NCEPATP III guidelines for LDL-C management. Stable from cardiology standpoint to undergo ECT treatment.   Check EKG  Maximize cardiac medications  Will follow along    Electronically signed by Jaqui Blanc DO on 10/18/2022 at 4:29 PM

## 2022-10-18 NOTE — PROGRESS NOTES
[facility] FOLLOW-UP NOTE     10/18/2022     Patient was seen and examined in person, Chart reviewed   Patient's case discussed with staff/team    Chief Complaint: depressed mood, suicidal and homicidal ideation    Interim History:     Pt report feeling the same  Pt still feel like hurting his wife  Intrusive thoughts are obsessive in nature  Depressed with hopeless feeling  Poor sleep last night  I want to tie my hand so that the pressure in my head does not make me to act     Appetite:   [] Normal/Unchanged  [] Increased  [x] Decreased      Sleep:       [] Normal/Unchanged  [] Fair       [x] Poor              Energy:    [] Normal/Unchanged  [] Increased  [x] Decreased        SI [x] Present  [] Absent    HI  [x]Present  [] Absent     Aggression:  [] yes  [x] no    Patient is [] able  [x] unable to CONTRACT FOR SAFETY     PAST MEDICAL/PSYCHIATRIC HISTORY:   Past Medical History:   Diagnosis Date    Anxiety     Chest pain 3/29/2018    Coronary artery disease involving native coronary artery of native heart without angina pectoris 2/15/2019    Di[address] mellitus [address] [facility]     no meds    History of colon polyps     Hyperlipidemia     Hypertension     Type 2 diabetes mellitus without complication (HCC)     Vertigo        FAMILY/SOCIAL HISTORY:  Family History   Problem Relation Age of Onset    Heart Disease Maternal Aunt     Cancer Maternal Aunt     Colon Cancer Maternal Aunt      Social History     Socioeconomic History    Marital status:      Spouse name: Not on file    Number of children: Not on file    Years of education: Not on file    Highest education level: Not on file   Occupational History    Not on file   Tobacco Use    Smoking status: Never    Smokeless tobacco: Never   Vaping Use    Vaping Use: Never used   Substance and Sexual Activity    Alcohol use: No    Drug use: Never    Sexual activity: Not on file   Other Topics Concern    Not on file   Social History Narrative    Not on file     Social Determinants of Health     Financial Resource Strain: Not on file   Food Insecurity: Not on file   Transportation Needs: Not on file   Physical Activity: Not on file   Stress: Not on file   Social Connections: Not on file   Intimate Partner Violence: Not on file   Housing Stability: Not on file           ROS:  [x] All negative/unchanged except if checked.  Explain positive(checked items) below:  [] Constitutional  [] Eyes  [] Ear/Nose/Mouth/Throat  [] Respiratory  [] CV  [] GI  []   [] Musculoskeletal  [] Skin/Breast  [] Neurological  [] Endocrine  [] Heme/Lymph  [] Allergic/Immunologic    Explanation:     MEDICATIONS:    Current Facility-Administered Medications:     PARoxetine (PAXIL) tablet 30 mg, 30 mg, Oral, Daily, Louis Au MD, 30 mg at 10/18/22 3721    melatonin disintegrating tablet 5 mg, 5 mg, Oral, Nightly, Louis Au MD, 5 mg at 10/17/22 2132    insulin lispro (HUMALOG) injection vial 0-8 Units, 0-8 Units, SubCUTAneous, TID WC, RAVEN Juarez NP    insulin lispro (HUMALOG) injection vial 0-4 Units, 0-4 Units, SubCUTAneous, Nightly, RAVEN Juarez - NP    glucose chewable tablet 16 g, 4 tablet, Oral, PRN, RAVEN Juarez - NP    dextrose bolus 10% 125 mL, 125 mL, IntraVENous, PRN **OR** dextrose bolus 10% 250 mL, 250 mL, IntraVENous, PRN, RAVEN Juarez - NP    glucagon (rDNA) injection 1 mg, 1 mg, SubCUTAneous, PRN, RAVEN Juarez - NP    dextrose 10 % infusion, , IntraVENous, Continuous PRN, RAVEN Juarez - NP    isosorbide mononitrate (IMDUR) extended release tablet 30 mg, 30 mg, Oral, Daily, Pretty Ward MD, 30 mg at 10/18/22 0916    metoprolol succinate (TOPROL XL) extended release tablet 25 mg, 25 mg, Oral, Nightly, Pretty Ward MD, 25 mg at 10/16/22 2047    atorvastatin (LIPITOR) tablet 20 mg, 20 mg, Oral, Nightly, Pretty Ward MD, 20 mg at 10/17/22 2132    ALPRAZolam (XANAX) tablet 0.25 mg, 0.25 mg, Oral, TID, Adelita Espinoza MD, 0.25 mg at 10/18/22 0909    albuterol sulfate HFA (PROVENTIL;VENTOLIN;PROAIR) 108 (90 Base) MCG/ACT inhaler 2 puff, 2 puff, Inhalation, 4x Daily PRN, Adalberto Acuna MD    aspirin chewable tablet 81 mg, 81 mg, Oral, Daily, Adalberto Acuna MD, 81 mg at 10/18/22 6645    acetaminophen (TYLENOL) tablet 650 mg, 650 mg, Oral, Q4H PRN, Adalberto Acuna MD    magnesium hydroxide (MILK OF MAGNESIA) 400 MG/5ML suspension 30 mL, 30 mL, Oral, Daily PRN, Adalberto Acuna MD, 30 mL at 10/16/22 1217    nicotine polacrilex (COMMIT) lozenge 2 mg, 2 mg, Oral, Q1H PRN, Adalberto Acuna MD      Examination:  /67   Pulse 51   Temp 98.2 °F (36.8 °C)   Resp 16   Ht 5' 5\" (1.651 m)   Wt 113 lb 12.8 oz (51.6 kg) Comment: bed scale  SpO2 97%   BMI 18.94 kg/m²   Gait - steady  Medication side effects(SE): none reported    Mental Status Examination:    Level of consciousness:  within normal limits   Appearance:  poor grooming and poor hygiene  Behavior/Motor:  psychomotor retardation  Attitude toward examiner:  cooperative and poor eye contact  Speech:  slow, increased latency, and monotone   Mood: anxious, constricted, decreased range, and depressed  Affect:  mood congruent and anxious  Thought processes:  linear, goal directed, coherent, and slow   Thought content:  Obsessions/instrusive thoughts:  of harming his wife and himself  Homocidal ideation of harming his wife- probably obsessional  Suicidal Ideation:  with plan to stab himself  Delusions:  no evidence of delusions  Perceptual Disturbance:  denies any perceptual disturbance  Cognition:  oriented to person, place, and time   Concentration poor  Insight limited   Judgement limited     ASSESSMENT:   Patient symptoms are:  [] Well controlled  [] Improving  [] Worsening  [x] No change      Diagnosis:   Major Depressive Disorder, recurrent, severe with psychotic symptoms  OCD    LABS:    No results for input(s): WBC, HGB, PLT in the last 72 hours.     No results for input(s): NA, K, CL, CO2, BUN, CREATININE, GLUCOSE in the last 72 hours. No results for input(s): BILITOT, ALKPHOS, AST, ALT in the last 72 hours. Lab Results   Component Value Date/Time    LABAMPH Neg 10/14/2022 08:15 AM    BARBSCNU Neg 10/14/2022 08:15 AM    LABBENZ Neg 10/14/2022 08:15 AM    LABMETH Neg 10/14/2022 08:15 AM    OPIATESCREENURINE Neg 10/14/2022 08:15 AM    PHENCYCLIDINESCREENURINE Neg 10/14/2022 08:15 AM    ETOH <10 10/14/2022 08:43 AM     Lab Results   Component Value Date/Time    TSH 3.230 10/14/2022 08:43 AM     No results found for: LITHIUM  No results found for: VALPROATE, CBMZ    RISK ASSESSMENT: high risk of suicide and ?homicide    Treatment Plan:  Reviewed current Medications with the patient. Will increase the Paxil. Cardiology consult for ECT clearance ordered  ECT tomorrow  Risks, benefits, side effects, drug-to-drug interactions and alternatives to treatment were discussed. Collateral information: pending   CD evaluation   Encourage patient to attend group and other milieu activities.   Discharge planning discussed with the patient and treatment team.    PSYCHOTHERAPY/COUNSELING:  [x] Therapeutic interview  [x] Supportive  [] CBT  [] Ongoing  [] Other    [x] Patient continues to need, on a daily basis, active treatment furnished directly by or requiring the supervision of inpatient psychiatric personnel      Anticipated Length of stay:             Electronically signed by Gisella Marin MD on 10/18/2022 at 11:20 AM

## 2022-10-18 NOTE — PROGRESS NOTES
Patient isolating to room today, offered to take him to the shower, patient refused, will try again later,  Patient worried about his thoughts to harm his wife, he said he had thought about ripping out her defibrillator, and does not want to feel that way , he is afraid to go home feeling this way. SI comes and goes , no plan. Anxiety and depression #10  Patient said the voices sometimes are bad, sometimes they just are a continued saying over and over.   Patient complains of poor and appetite and sleep,  Patient says these feelings and problems have been going on for a couple of months

## 2022-10-18 NOTE — PROGRESS NOTES
Pt awake in room, laying in bed. Minimal eye contact. Sad affect. Pt requesting to be tied to the bed so that he does not hurt his wife. Pt informed that he will not be tied to the bed and assured pt that he is not able to hurt his wife d/t she is not here in the hospital. Pt states he does not like feeling this way and is very anxious and depressed. Reminded pt of treatment in AM to attempt to help with current presentation. Pt states he does not want to feel this way. Offered pt to go for a walk, watch TV, go pick out a book or magazine from [employer], or to listen to music. Pt declines all offers for distraction. Pt states he went for a walk earlier and does not like to read anymore. Pt educated that laying in bed in complete silence will likely not be help with his current thoughts. Offered pt choice of music however pt would not pick from a list of music choices. Pt did not object to this writer playing him music from the computer. Pt reports intrusive thoughts to harm wife, denies hallucinations. Denies thoughts to harm self but does not wish to be alive feeling this way. Attempts to engage pt in conversation with open ended questions- pt gives a few word, mostly negative, responses.

## 2022-10-18 NOTE — PROCEDURES
[address]                      [facility], [address] [facility]                          ELECTROENCEPHALOGRAM REPORT    PATIENT NAME: Libia Lundberg                      :        1950  MED REC NO:   [id number]                            [facility]       [facility]  ACCOUNT NO:   [de-identified]                           ADMIT DATE: 10/14/2022  PROVIDER:     Licha Gaspar MD    DATE OF EEG:  10/18/2022    MEDICATIONS:  Paxil, Humulin insulin, Imdur, Toprol, Xanax, albuterol,  aspirin. EEG FINDINGS:  The background rhythm of this 21-channel EEG recording  shows well regulated 9 Hz posterior dominant rhythm of alpha. This is  symmetrical and attenuates with eye opening. EKG is monitored. There  are no essential tremors or myoclonic jerks. Hyperventilation does not  reveal any changes and photic stimulation was performed without any  photic responses. IMPRESSION:  This is a normal, well-regulated EEG recording. The  patient's limb twitching appears to be peripheral and not cortical.   Clinical correlation is recommended.         Nga Perez MD    D: 10/18/2022 11:40:44       T: 10/18/2022 12:08:23     [email or web address]  Job#: [id number]     Doc#: [id number]    CC:

## 2022-10-18 NOTE — CARE COORDINATION
the past 5-6 months, his mental health has \"started to scare me it is almost like he is losing his mind. \" Reports pt is still driving and son is concerned if this a good idea or not. Reports pt can be forgetful but Kiki Seth will never forget those [facility] appointments for his medications. \"     Reports pt does appear \"out of it at times\" but believes this is due to the medications. Reports the twitching/shaking is not his normal.     Son recently got POA, pt signed a release for [employer], working on him having more control over pt's money. Family Mental Health/Substance Use History:   \"None of his brothers or sisters struggle with substance use or alcoholism. Pt's dad did have dementia and lost his mind around his 76s. \"         Support Network's Goal for Hospitalization:  \"to get a better idea on what is going on and how we can help. \"    Discharge Plan: discharge home when pt is stable. Potentially switch from [facility] to other outpatient provider       Support Network Supportive of Discharge Plan: agreeable       Support can confirm Safety of Location and Security of Weapons: no firearms. Live at [facility] and [facility]- assisted living type of area. Support agreeable to Safeguard and Monitor Medications (including Prescription and OTC): agreeable     Identified Barriers to Compliance with Discharge Plan: n/a     Recommendations for Support Network:  be available for follow up calls.          ESMER Hills

## 2022-10-18 NOTE — GROUP NOTE
Group Therapy Note    Date: 10/18/2022    Group Start Time: 1115  Group End Time: 1200  Group Topic: Psychotherapy    MLPEGG[facility] [address] HARISH [place] , [facility]        Group Therapy Note    Attendees: 7       Patient's Goal:  coping skills    Notes:  patient was an active listener    Status After Intervention:  Improved    Participation Level: Interactive    Participation Quality: Attentive      Speech:  normal      Thought Process/Content: Logical      Affective Functioning: Congruent      Mood: anxious      Level of consciousness:  Alert      Response to Learning: Progressing to goal      Endings: None Reported    Modes of Intervention: Support      Discipline Responsible: /Counselor      Signature:  Preet Mendoza, [facility]

## 2022-10-18 NOTE — PROGRESS NOTES
Patient doesn't appear to relax at all. Eyes have been open the whole day, he looks past you,and stares. his mouth  in constant motion, like he is chewing something, /tongue movement?   Patient also says he is seeing faces, but they are just regular familiar faces of people he knows, not mean or anything  Continues to talk to himself in a whisper, or no noise, just mouth movements

## 2022-10-18 NOTE — PROGRESS NOTES
Patient out for groups and lunch,poor appetite,  Gait steady with walker. Bathroom and back to bed.   Continues to talk to self in bed, talk to the voices at this time telling him \"you liar, you devil \"

## 2022-10-19 ENCOUNTER — ANESTHESIA (OUTPATIENT)
Dept: POSTOP/PACU | Age: 72
End: 2022-10-19

## 2022-10-19 ENCOUNTER — ANESTHESIA EVENT (OUTPATIENT)
Dept: POSTOP/PACU | Age: 72
End: 2022-10-19

## 2022-10-19 ENCOUNTER — APPOINTMENT (OUTPATIENT)
Dept: POSTOP/PACU | Age: 72
DRG: 885 | End: 2022-10-19
Payer: MEDICARE

## 2022-10-19 LAB
GLUCOSE BLD-MCNC: 125 MG/DL (ref 70–99)
GLUCOSE BLD-MCNC: 125 MG/DL (ref 70–99)
GLUCOSE BLD-MCNC: 126 MG/DL (ref 70–99)
GLUCOSE BLD-MCNC: 147 MG/DL (ref 70–99)
GLUCOSE BLD-MCNC: 171 MG/DL (ref 70–99)
PERFORMED ON: ABNORMAL

## 2022-10-19 PROCEDURE — 90870 ELECTROCONVULSIVE THERAPY: CPT | Performed by: PSYCHIATRY & NEUROLOGY

## 2022-10-19 PROCEDURE — 2580000003 HC RX 258: Performed by: STUDENT IN AN ORGANIZED HEALTH CARE EDUCATION/TRAINING PROGRAM

## 2022-10-19 PROCEDURE — 6370000000 HC RX 637 (ALT 250 FOR IP): Performed by: PSYCHIATRY & NEUROLOGY

## 2022-10-19 PROCEDURE — 7100000000 HC PACU RECOVERY - FIRST 15 MIN

## 2022-10-19 PROCEDURE — 2580000003 HC RX 258

## 2022-10-19 PROCEDURE — 1240000000 HC EMOTIONAL WELLNESS R&B

## 2022-10-19 PROCEDURE — APPSS15 APP SPLIT SHARED TIME 0-15 MINUTES: Performed by: NURSE PRACTITIONER

## 2022-10-19 PROCEDURE — 6360000002 HC RX W HCPCS: Performed by: STUDENT IN AN ORGANIZED HEALTH CARE EDUCATION/TRAINING PROGRAM

## 2022-10-19 PROCEDURE — GZB4ZZZ OTHER ELECTROCONVULSIVE THERAPY: ICD-10-PCS | Performed by: PSYCHIATRY & NEUROLOGY

## 2022-10-19 PROCEDURE — 6360000002 HC RX W HCPCS: Performed by: PSYCHIATRY & NEUROLOGY

## 2022-10-19 PROCEDURE — 2500000003 HC RX 250 WO HCPCS: Performed by: STUDENT IN AN ORGANIZED HEALTH CARE EDUCATION/TRAINING PROGRAM

## 2022-10-19 PROCEDURE — 90870 ELECTROCONVULSIVE THERAPY: CPT

## 2022-10-19 PROCEDURE — 6370000000 HC RX 637 (ALT 250 FOR IP): Performed by: INTERNAL MEDICINE

## 2022-10-19 PROCEDURE — 3700000000 HC ANESTHESIA ATTENDED CARE

## 2022-10-19 PROCEDURE — 99233 SBSQ HOSP IP/OBS HIGH 50: CPT | Performed by: PSYCHIATRY & NEUROLOGY

## 2022-10-19 PROCEDURE — 7100000001 HC PACU RECOVERY - ADDTL 15 MIN

## 2022-10-19 RX ORDER — PROPOFOL 10 MG/ML
INJECTION, EMULSION INTRAVENOUS PRN
Status: DISCONTINUED | OUTPATIENT
Start: 2022-10-19 | End: 2022-10-19 | Stop reason: SDUPTHER

## 2022-10-19 RX ORDER — GLYCOPYRROLATE 1 MG/5 ML
SYRINGE (ML) INTRAVENOUS PRN
Status: DISCONTINUED | OUTPATIENT
Start: 2022-10-19 | End: 2022-10-19 | Stop reason: SDUPTHER

## 2022-10-19 RX ORDER — ONDANSETRON 2 MG/ML
INJECTION INTRAMUSCULAR; INTRAVENOUS PRN
Status: DISCONTINUED | OUTPATIENT
Start: 2022-10-19 | End: 2022-10-19 | Stop reason: SDUPTHER

## 2022-10-19 RX ORDER — HALOPERIDOL 5 MG/ML
2 INJECTION INTRAMUSCULAR EVERY 6 HOURS PRN
Status: DISCONTINUED | OUTPATIENT
Start: 2022-10-19 | End: 2022-10-24

## 2022-10-19 RX ORDER — SUCCINYLCHOLINE/SOD CL,ISO/PF 100 MG/5ML
SYRINGE (ML) INTRAVENOUS PRN
Status: DISCONTINUED | OUTPATIENT
Start: 2022-10-19 | End: 2022-10-19 | Stop reason: SDUPTHER

## 2022-10-19 RX ORDER — SODIUM CHLORIDE, SODIUM LACTATE, POTASSIUM CHLORIDE, CALCIUM CHLORIDE 600; 310; 30; 20 MG/100ML; MG/100ML; MG/100ML; MG/100ML
INJECTION, SOLUTION INTRAVENOUS CONTINUOUS PRN
Status: DISCONTINUED | OUTPATIENT
Start: 2022-10-19 | End: 2022-10-19 | Stop reason: SDUPTHER

## 2022-10-19 RX ORDER — SODIUM CHLORIDE 9 MG/ML
INJECTION, SOLUTION INTRAVENOUS
Status: COMPLETED
Start: 2022-10-19 | End: 2022-10-19

## 2022-10-19 RX ADMIN — ONDANSETRON 4 MG: 2 INJECTION INTRAMUSCULAR; INTRAVENOUS at 11:14

## 2022-10-19 RX ADMIN — ISOSORBIDE MONONITRATE 30 MG: 60 TABLET, EXTENDED RELEASE ORAL at 12:37

## 2022-10-19 RX ADMIN — SODIUM CHLORIDE, POTASSIUM CHLORIDE, SODIUM LACTATE AND CALCIUM CHLORIDE: 600; 310; 30; 20 INJECTION, SOLUTION INTRAVENOUS at 11:01

## 2022-10-19 RX ADMIN — ATORVASTATIN CALCIUM 20 MG: 20 TABLET, FILM COATED ORAL at 21:32

## 2022-10-19 RX ADMIN — SODIUM CHLORIDE 1000 ML: 9 INJECTION, SOLUTION INTRAVENOUS at 10:41

## 2022-10-19 RX ADMIN — ALPRAZOLAM 0.25 MG: 0.5 TABLET ORAL at 21:39

## 2022-10-19 RX ADMIN — ASPIRIN 81 MG 81 MG: 81 TABLET ORAL at 12:38

## 2022-10-19 RX ADMIN — Medication 5 MG: at 21:32

## 2022-10-19 RX ADMIN — ALPRAZOLAM 0.25 MG: 0.5 TABLET ORAL at 12:52

## 2022-10-19 RX ADMIN — METOPROLOL SUCCINATE 25 MG: 25 TABLET, FILM COATED, EXTENDED RELEASE ORAL at 21:31

## 2022-10-19 RX ADMIN — HALOPERIDOL LACTATE 2 MG: 5 INJECTION, SOLUTION INTRAMUSCULAR at 09:01

## 2022-10-19 RX ADMIN — ACETAMINOPHEN 650 MG: 325 TABLET ORAL at 12:42

## 2022-10-19 RX ADMIN — Medication 0.2 MG: at 11:14

## 2022-10-19 RX ADMIN — Medication 30 MG: at 11:15

## 2022-10-19 RX ADMIN — PROPOFOL 70 MG: 10 INJECTION, EMULSION INTRAVENOUS at 11:15

## 2022-10-19 RX ADMIN — PAROXETINE 30 MG: 10 TABLET, FILM COATED ORAL at 12:38

## 2022-10-19 ASSESSMENT — PAIN DESCRIPTION - LOCATION: LOCATION: HEAD;JAW

## 2022-10-19 ASSESSMENT — ENCOUNTER SYMPTOMS
SHORTNESS OF BREATH: 0
WHEEZING: 0
CHEST TIGHTNESS: 0
NAUSEA: 0
SHORTNESS OF BREATH: 1
COLOR CHANGE: 0
TROUBLE SWALLOWING: 0
VOMITING: 0
COUGH: 0

## 2022-10-19 ASSESSMENT — PAIN DESCRIPTION - ORIENTATION: ORIENTATION: RIGHT;MID

## 2022-10-19 ASSESSMENT — PAIN - FUNCTIONAL ASSESSMENT
PAIN_FUNCTIONAL_ASSESSMENT: 0-10
PAIN_FUNCTIONAL_ASSESSMENT: ACTIVITIES ARE NOT PREVENTED

## 2022-10-19 ASSESSMENT — PAIN SCALES - WONG BAKER: WONGBAKER_NUMERICALRESPONSE: 0

## 2022-10-19 ASSESSMENT — PAIN SCALES - GENERAL: PAINLEVEL_OUTOF10: 8

## 2022-10-19 ASSESSMENT — PATIENT HEALTH QUESTIONNAIRE - PHQ9: SUM OF ALL RESPONSES TO PHQ QUESTIONS 1-9: 21

## 2022-10-19 ASSESSMENT — PAIN DESCRIPTION - DESCRIPTORS: DESCRIPTORS: ACHING;DISCOMFORT

## 2022-10-19 NOTE — PROGRESS NOTES
Patient did not attend the 1000 skills group due to being off the unit for ECT.  Electronically signed by Natty [address] on 10/19/2022 at 11:07 AM

## 2022-10-19 NOTE — PROGRESS NOTES
Pt states I dont have to urinate, pt was encouraged to try , pt walked to bathroom and urinated large amount of urin with a good stream. Pt returned back to bed with walker ,steady gait.

## 2022-10-19 NOTE — ANESTHESIA POSTPROCEDURE EVALUATION
Department of Anesthesiology  Postprocedure Note    Patient: Andrés Truong  MRN: [id number]  YOB: 1950  Date of evaluation: 10/19/2022      Procedure Summary     Date: 10/19/22 Room / Location: [facility] PACU    Anesthesia Start: 1111 Anesthesia Stop:     Procedure: ECT W/ ANESTHESIA Diagnosis:     Scheduled Providers:  Responsible Provider: Leroy Juarez DO    Anesthesia Type: general ASA Status: 3          Anesthesia Type: Gen    Juan Phase I:      Juan Phase II:        Anesthesia Post Evaluation    Patient location during evaluation: bedside  Patient participation: complete - patient participated  Level of consciousness: awake and awake and alert  Pain score: 0  Airway patency: patent  Nausea & Vomiting: no nausea and no vomiting  Complications: no  Cardiovascular status: blood pressure returned to baseline and hemodynamically stable  Respiratory status: acceptable  Hydration status: euvolemic

## 2022-10-19 NOTE — PROGRESS NOTES
Pt reports voices are not gone, states they are better, appears to be more relaxed , pt is laying in bed now, eyes open. Pt is not talking to himself now.   pt received 2mg of im haldol left delt 0.4 ml  1:1 at side

## 2022-10-19 NOTE — PROGRESS NOTES
Pt is noted to be anxious, talking to him self, responding to internal stimuli, stated he hears voices that are not normal, noted to be restless, hitting himself in the head with both palms of his hand, able to redirect to stop, encouraged going down to the dinning room, and pt refused stated they are eating and I can not eat. Informed rn. Pt stated he has not slept last night. Pt is alert to place , month and year, thought today was Friday.

## 2022-10-19 NOTE — PROGRESS NOTES
Pt return from ECT via wheel chair transferred to bed with a steady gait,removed hep lock before departure, applied dsd, canula intact, pt reports head , jaw and throat pain. Pt denies the urge to urinate.

## 2022-10-19 NOTE — PROGRESS NOTES
Patient continues on a 1:1 for safety. Patient AM medications held due to patient being NPO going to ECT. HAYDEN Durant Balling tell staff patient was very anxious this morning. Haldol IM 2 mg is given to the patient in the left deltoid per Freddie Amaro.  Patient will be monitored  Electronically signed by Jeramy Escobar LPN on 32/35/1128 at 10:06 AM

## 2022-10-19 NOTE — PROGRESS NOTES
Pt is dangled at bed ,appears distressed, stated I just want to die, stated he trying to have a heart attack ,

## 2022-10-19 NOTE — PROGRESS NOTES
Pt is quietly talking to himself, appears to be looking up at someone, responding to internal stimuli.

## 2022-10-19 NOTE — OP NOTE
Department of Psychiatry  Electroconvulsive Therapy Treatment Note        10/19/2022    PURPOSE FOR ECT:  Therapeutic NUMBER: 1    DIAGNOSIS:   Principal Problem:    Major depression with psychotic features ([facility] [address]  Active Problems:    Severe episode of recurrent major depressive disorder, with psychotic features ([facility] [address]    Tremor  Resolved Problems:    * No resolved hospital problems.  *      MEDICATIONS:    Current Facility-Administered Medications:     haloperidol lactate (HALDOL) injection 2 mg, 2 mg, IntraMUSCular, Q6H PRN, Karon Weldon MD, 2 mg at 10/19/22 0901    PARoxetine (PAXIL) tablet 30 mg, 30 mg, Oral, Daily, Woody Cuba MD, 30 mg at 10/18/22 6456    melatonin disintegrating tablet 5 mg, 5 mg, Oral, Nightly, Woody Cuba MD, 5 mg at 10/18/22 2044    insulin lispro (HUMALOG) injection vial 0-8 Units, 0-8 Units, SubCUTAneous, TID WC, Vladimir Laureano, APRN - NP    insulin lispro (HUMALOG) injection vial 0-4 Units, 0-4 Units, SubCUTAneous, Nightly, Marqueson Georgi, APRN - NP    glucose chewable tablet 16 g, 4 tablet, Oral, PRN, Karleeamon Georgi, APRN - NP    dextrose bolus 10% 125 mL, 125 mL, IntraVENous, PRN **OR** dextrose bolus 10% 250 mL, 250 mL, IntraVENous, PRN, Vladimir Laureano, APRN - NP    glucagon (rDNA) injection 1 mg, 1 mg, SubCUTAneous, PRN, Marqueson Georgi, APRN - NP    dextrose 10 % infusion, , IntraVENous, Continuous PRN, Vladimir Laureano APRN - NP    isosorbide mononitrate (IMDUR) extended release tablet 30 mg, 30 mg, Oral, Daily, Jessica Lucas MD, 30 mg at 10/18/22 0916    metoprolol succinate (TOPROL XL) extended release tablet 25 mg, 25 mg, Oral, Nightly, Jessica Lucas MD, 25 mg at 10/16/22 2047    atorvastatin (LIPITOR) tablet 20 mg, 20 mg, Oral, Nightly, Jessica Lucas MD, 20 mg at 10/18/22 2044    ALPRAZolam Mariia Barks) tablet 0.25 mg, 0.25 mg, Oral, TID, Karon Weldon MD, 0.25 mg at 10/18/22 2043    albuterol sulfate HFA (PROVENTIL;VENTOLIN;PROAIR) 108 (90 Base) MCG/ACT inhaler 2 puff, 2 puff, Inhalation, 4x Daily PRN, Sakina Pearson MD    aspirin chewable tablet 81 mg, 81 mg, Oral, Daily, Sakina Pearson MD, 81 mg at 10/18/22 0909    acetaminophen (TYLENOL) tablet 650 mg, 650 mg, Oral, Q4H PRN, Sakina Pearson MD    magnesium hydroxide (MILK OF MAGNESIA) 400 MG/5ML suspension 30 mL, 30 mL, Oral, Daily PRN, Sakina Pearson MD, 30 mL at 10/16/22 1217    nicotine polacrilex (COMMIT) lozenge 2 mg, 2 mg, Oral, Q1H PRN, Sakina Pearson MD    CHANGE IN PSYCHIATRY STATUS SINCE LAST ECT:   N/a    INFORMED CONSENT OBTAINED : YES    PRE ECT MEDICATION ADMINISTERED:   YES    NPO STATUS: Confirmed    ELECTRODE PLACEMENT : RUL    TIME OUT :  TEAM CONFIRMS THE CORRECT PATIENT, CORRECT PROCEDURE AND CORRECT SITE.     DEVICE PARAMETERS:   Charge- 40  PW - 0.3  Freq- 20  Duration- 4.2  EEG- 64  Motor-  60    VITALS:   Vitals:    10/19/22 1125   BP: (!) 169/88   Pulse: 93   Resp: 18   Temp:    SpO2: 136%         COMPLICATIONS: no      RECOMMENDATIONS FOR NEXT TREATMENT:  fri        PSYCHIATRIST:  Dada Hill MD

## 2022-10-19 NOTE — PROGRESS NOTES
Pt reports his thoughts are bothering him as he is pointing to his head, pt has not slept all day , remains preoccupied responding to internal stimuli. 1;1 remains, rails up x2, pt is laying in bed with eyes open.

## 2022-10-19 NOTE — PROGRESS NOTES
Pt is dangled at bedside, with head in his hands, appears to be agitated with the voices  , pt is mildly hitting himself in the chest.

## 2022-10-19 NOTE — PROGRESS NOTES
1:1 rudolphtey sitter maintained, pt continues to sit on side of bed.,pt reports he has bad thoughts to kill himself. Encouraged pt to go out to dayroom for dinner. Pt states he is not hungry.

## 2022-10-19 NOTE — ANESTHESIA PRE PROCEDURE
Department of Anesthesiology  Preprocedure Note       Name:  Umm Nunez   Age:  74 y.o.  :  1950                                          MRN:  [id number]         Date:  10/19/2022      Surgeon: * No surgeons listed *    Procedure: * No procedures listed *    Medications prior to admission:   Prior to Admission medications    Medication Sig Start Date End Date Taking? Authorizing Provider   mirtazapine (REMERON) 15 MG tablet Take 15 mg by mouth nightly   Yes Historical Provider, MD   ALPRAZolam (XANAX) 0.25 MG tablet Take 0.25 mg by mouth in the morning, at noon, and at bedtime.    Yes Historical Provider, MD   docusate sodium (COLACE) 100 MG capsule Take 1 capsule by mouth 2 times daily for 7 days 10/12/22 10/19/22  ALBA Lutz   albuterol sulfate HFA (VENTOLIN HFA) 108 (90 Base) MCG/ACT inhaler Inhale 2 puffs into the lungs 4 times daily as needed for Wheezing 10/12/22   Luisa Tian MD   isosorbide mononitrate (IMDUR) 30 MG extended release tablet TAKE 1 TABLET BY MOUTH DAILY 10/12/22   Rian Vega DO   ranolazine (RANEXA) 1000 MG extended release tablet TAKE 1 TABLET BY MOUTH TWICE DAILY 8/15/22   Tiff Perry, APRN - CNP   escitalopram (LEXAPRO) 10 MG tablet Take 20 mg by mouth daily 11/3/21   Historical Provider, MD   aspirin 81 MG chewable tablet Take 1 tablet by mouth daily 10/29/21   Hayes Steele MD   Blood Glucose Monitoring Suppl ([address]) w/Device KIT  18   Historical Provider, MD Shantal Portillo strip  18   Historical Provider, MD Georges Needle [address] [facility]  18   Historical Provider, MD       Current medications:    Current Facility-Administered Medications   Medication Dose Route Frequency Provider Last Rate Last Admin    haloperidol lactate (HALDOL) injection 2 mg  2 mg IntraMUSCular Q6H PRN Yolie Saldaña MD   2 mg at 10/19/22 0901    PARoxetine (PAXIL) tablet 30 mg  30 mg Oral Daily Craig Gosselin, MD   30 mg at 10/18/22 1851  melatonin disintegrating tablet 5 mg  5 mg Oral Nightly Lyle Delong MD   5 mg at 10/18/22 2044    insulin lispro (HUMALOG) injection vial 0-8 Units  0-8 Units SubCUTAneous TID  RAVEN Solorzano NP        insulin lispro (HUMALOG) injection vial 0-4 Units  0-4 Units SubCUTAneous Nightly RAVEN Solorzano NP        glucose chewable tablet 16 g  4 tablet Oral PRN RAVEN Solorzano NP        dextrose bolus 10% 125 mL  125 mL IntraVENous PRN RAVEN Solorzano NP        Or    dextrose bolus 10% 250 mL  250 mL IntraVENous PRN RAVEN Solorzano NP        glucagon (rDNA) injection 1 mg  1 mg SubCUTAneous PRN RAVEN Solorzano NP        dextrose 10 % infusion   IntraVENous Continuous PRRAVEN Batista NP        isosorbide mononitrate (IMDUR) extended release tablet 30 mg  30 mg Oral Daily Brea Flatness, MD   30 mg at 10/18/22 9323    metoprolol succinate (TOPROL XL) extended release tablet 25 mg  25 mg Oral Nightly Brea Flatness, MD   25 mg at 10/16/22 2047    atorvastatin (LIPITOR) tablet 20 mg  20 mg Oral Nightly Brea Flatness, MD   20 mg at 10/18/22 2044    ALPRAZolam (XANAX) tablet 0.25 mg  0.25 mg Oral TID Mao Almonte MD   0.25 mg at 10/18/22 2043    albuterol sulfate HFA (PROVENTIL;VENTOLIN;PROAIR) 108 (90 Base) MCG/ACT inhaler 2 puff  2 puff Inhalation 4x Daily PRN Mao Almonte MD        aspirin chewable tablet 81 mg  81 mg Oral Daily Mao Almonte MD   81 mg at 10/18/22 7445    acetaminophen (TYLENOL) tablet 650 mg  650 mg Oral Q4H PRN Mao Almonte MD        magnesium hydroxide (MILK OF MAGNESIA) 400 MG/5ML suspension 30 mL  30 mL Oral Daily PRN Mao Almonte MD   30 mL at 10/16/22 1217    nicotine polacrilex (COMMIT) lozenge 2 mg  2 mg Oral Q1H PRN Mao Almonte MD           Allergies:     Allergies   Allergen Reactions    Seroquel [Quetiapine] Other (See Comments)     lethargy  weak       Problem List:    Patient Active Problem List   Diagnosis Code    Chest pain R07.9    Hypertension I10    Anxiety F41.9    Recurrent major depressive disorder, in remission ([facility] [address] F33.40    Coronary artery disease involving native coronary artery of native heart without angina pectoris I25.10    Mixed obsessional thoughts and acts F42.2    Chronic gastritis without bleeding K29.50    Dyspepsia R10.13    Adenomatous polyp of sigmoid colon D12.5    Weakness R53.1    COVID-19 U07.1    Hyperlipidemia E78.5    Heart murmur R01.1    Generalized anxiety disorder F41.1    Dyspnea on exertion R06.09    ED (erectile dysfunction) N52.9    Benzodiazepine dependence (HCC) F13.20    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, F41.8    Panic attacks F41.0    Type 2 diabetes mellitus without complication, without long-term current use of insulin (HCC) E11.9    Major depression with psychotic features ([facility]) F32.3    Severe episode of recurrent major depressive disorder, with psychotic features ([facility] [address] F33.3    Tremor R25.1       Past Medical History:        Diagnosis Date    Anxiety     Chest pain 3/29/2018    Coronary artery disease involving native coronary artery of native heart without angina pectoris 2/15/2019    Diabetes mellitus ([facility] [address]     no meds    History of colon polyps     Hyperlipidemia     Hypertension     Type 2 diabetes mellitus without complication ([facility])     Vertigo        Past Surgical History:        Procedure Laterality Date    CARDIAC SURGERY  1973    PTCA     COLONOSCOPY      COLONOSCOPY N/A 10/17/2019    COLORECTAL CANCER SCREENING, HIGH RISK performed by Rekha Bah MD at [facility] OTHER SURGICAL HISTORY      patent foramen ovale    TONSILLECTOMY      UPPER GASTROINTESTINAL ENDOSCOPY N/A 8/1/2019    EGD ESOPHAGOGASTRODUODENOSCOPY performed by Rekha Bah MD at [address] [place] [address] History:    Social History     Tobacco Use    Smoking status: Never    Smokeless tobacco: Never   Substance Use Topics    Alcohol use: No                                Counseling given: Not Answered      Vital Signs (Current):   Vitals:    10/18/22 0802 10/18/22 2046 10/19/22 0815 10/19/22 1029   BP: 123/67 117/72 132/75 (!) 175/86   Pulse: 51 55 66 70   Resp:   16 16   Temp: 98.2 °F (36.8 °C)  99.4 °F (37.4 °C) 98 °F (36.7 °C)   TempSrc:    Temporal   SpO2: 97%  100% 100%   Weight:       Height:                                                  BP Readings from Last 3 Encounters:   10/19/22 (!) 175/86   10/12/22 127/67   09/13/22 (!) 157/87       NPO Status: Time of last liquid consumption: 2200                        Time of last solid consumption: 1700                        Date of last liquid consumption: 10/18/22                        Date of last solid food consumption: 10/18/22    BMI:   Wt Readings from Last 3 Encounters:   10/14/22 113 lb 12.8 oz (51.6 kg)   09/13/22 125 lb (56.7 kg)   08/29/22 120 lb (54.4 kg)     Body mass index is 18.94 kg/m².     CBC:   Lab Results   Component Value Date/Time    WBC 7.4 10/14/2022 08:43 AM    RBC 4.81 10/14/2022 08:43 AM    HGB 15.6 10/14/2022 08:43 AM    HCT 44.1 10/14/2022 08:43 AM    MCV 91.6 10/14/2022 08:43 AM    RDW 13.9 10/14/2022 08:43 AM     10/14/2022 08:43 AM       CMP:   Lab Results   Component Value Date/Time     10/14/2022 08:43 AM    K 3.4 10/14/2022 08:43 AM    K 4.2 09/09/2021 07:03 AM     10/14/2022 08:43 AM    CO2 18 10/14/2022 08:43 AM    BUN 16 10/14/2022 08:43 AM    CREATININE 0.85 10/14/2022 08:43 AM    GFRAA >60.0 10/14/2022 08:43 AM    LABGLOM >60.0 10/14/2022 08:43 AM    GLUCOSE 138 10/14/2022 08:43 AM    PROT 7.4 10/14/2022 08:43 AM    CALCIUM 9.7 10/14/2022 08:43 AM    BILITOT 1.7 10/14/2022 08:43 AM    ALKPHOS 85 10/14/2022 08:43 AM    AST 19 10/14/2022 08:43 AM    ALT 16 10/14/2022 08:43 AM       POC Tests:   Recent Labs 10/19/22  1036   POCGLU 126*       Coags:   Lab Results   Component Value Date/Time    PROTIME 14.0 10/03/2021 01:36 AM    INR 1.1 10/03/2021 01:36 AM    APTT 28.4 10/03/2021 01:36 AM       HCG (If Applicable): No results found for: PREGTESTUR, PREGSERUM, HCG, HCGQUANT     ABGs:   Lab Results   Component Value Date/Time    PHART 7.482 09/07/2021 06:38 PM    PO2ART 59 09/07/2021 06:38 PM    NKX6MHR 31 09/07/2021 06:38 PM    UMR5UQP 22.8 09/07/2021 06:38 PM    BEART -1 09/07/2021 06:38 PM    Y7LSWZMK 92 09/07/2021 06:38 PM        Type & Screen (If Applicable):  No results found for: LABABO, LABRH    Drug/Infectious Status (If Applicable):  No results found for: HIV, HEPCAB    COVID-19 Screening (If Applicable):   Lab Results   Component Value Date/Time    COVID19 Not Detected 10/14/2022 08:27 AM           Anesthesia Evaluation  Patient summary reviewed and Nursing notes reviewed no history of anesthetic complications:   Airway: Mallampati: II  TM distance: >3 FB   Neck ROM: full  Mouth opening: > = 3 FB   Dental: normal exam         Pulmonary:normal exam    (+) shortness of breath:                             Cardiovascular:Negative CV ROS  Exercise tolerance: good (>4 METS),   (+) hypertension:, CAD:, HENRIQUEZ:,       ECG reviewed               Beta Blocker:  Not on Beta Blocker      ROS comment: Normal sinus rhythm  Normal ECG  When compared with ECG of 15-OCT-2022 06:12,  ST elevation now present in Inferior leads  Non-specific change in ST segment in Lateral leads     Neuro/Psych:   (+) psychiatric history:depression/anxiety             GI/Hepatic/Renal: Neg GI/Hepatic/Renal ROS            Endo/Other: Negative Endo/Other ROS   (+) DiabetesType II DM, , .          Pt had PAT visit. Abdominal:             Vascular: negative vascular ROS. Other Findings:           Anesthesia Plan      general     ASA 3     (Mask)  Induction: intravenous.     MIPS: Postoperative opioids intended and Prophylactic antiemetics administered. Anesthetic plan and risks discussed with patient. Plan discussed with CRNA.     Attending anesthesiologist reviewed and agrees with Tay Garces DO   10/19/2022

## 2022-10-19 NOTE — PROGRESS NOTES
Pt reports headache is gone, reports rt jaw pain remains, sore throat feels like a scratch. Pt appears sleepy but states he is not sleepy, continues to respond to internal stimuli talking to himself., is now in bed trying to nap as encouraged. Pt ate few bites of lunch and 50%supplement, in the dinning room, refused offer to finish supplement later. Pt denies the urge to urinate.

## 2022-10-19 NOTE — PROGRESS NOTES
Patient returns from ECT to the floor via wheelchair. Patients vitals are taken WNL. Patients gag is checked and has returned. Patient is given meal tray and morning medications. Vitals will be checked per ECT protocol.  Electronically signed by Vanessa Lozano LPN on 55/17/9424 at 1:28 PM

## 2022-10-19 NOTE — PROGRESS NOTES
[facility] Neurology Daily Progress Note  Name: Krystina Jose  Age: 74 y.o. Gender: male  CodeStatus: Full Code  Allergies: Seroquel [Quetiapine]    Chief Complaint:Suicidal and Homicidal (For a month or more)    Primary Care Provider: Carmina Bumpers, MD  InpatientTreatment Team: Treatment Team: Attending Provider: Nandini Marquez MD; Consulting Physician: Dawson Garcia APRN - NP; Registered Nurse: Kosta Chew RN; Consulting Physician: Quiana Denson MD; Consulting Physician: Katty Maldonado DO; LPN: Pa Degroot LPN; Registered Nurse: Vipul Saunders, RN; Registered Nurse: Dimitrios Gaytan RN; LPN: Artie Jimenez LPN; Tech: Patricia Frias; FABIN: Lisa Vuong LPN  Admission Date: 10/14/2022      HPI   Pt seen and examined on behavioral health unit for myoclonus and dyskinesias. Currently alert and oriented x3, no acute distress, cooperative. No myoclonus noted currently. Patient is requiring one-on-one supervision. minimal Resting upper extremity tremors noted. Patient noted to have rigidity with distraction maneuvers. Bradykinesia with decrement on finger tap testing. Decreased blink. Orofacial dyskinesias persist.  Patient with ongoing hallucinations. Less dyskinesia      Vitals:    10/19/22 1226   BP: (!) 163/83   Pulse: 88   Resp: 18   Temp: 98.2 °F (36.8 °C)   SpO2: 98%        Review of Systems   Constitutional:  Negative for fever. HENT:  Negative for hearing loss and trouble swallowing. Eyes:  Negative for visual disturbance. Respiratory:  Negative for cough, chest tightness, shortness of breath and wheezing. Cardiovascular:  Negative for chest pain, palpitations and leg swelling. Gastrointestinal:  Negative for nausea and vomiting. Musculoskeletal:  Negative for gait problem. Skin:  Negative for color change and rash. Neurological:  Positive for tremors.  Negative for dizziness, seizures, syncope, facial asymmetry, speech difficulty, weakness, light-headedness, numbness and headaches. Psychiatric/Behavioral:  Negative for agitation, confusion and hallucinations. The patient is nervous/anxious. Physical Exam  Vitals and nursing note reviewed. Constitutional:       General: He is not in acute distress. Appearance: He is not diaphoretic. HENT:      Head: Normocephalic and atraumatic. Eyes:      Pupils: Pupils are equal, round, and reactive to light. Cardiovascular:      Rate and Rhythm: Normal rate and regular rhythm. Pulmonary:      Effort: Pulmonary effort is normal. No respiratory distress. Breath sounds: Normal breath sounds. Abdominal:      General: Bowel sounds are normal. There is no distension. Palpations: Abdomen is soft. Tenderness: There is no abdominal tenderness. Skin:     General: Skin is warm and dry. Neurological:      Mental Status: He is alert and oriented to person, place, and time. Cranial Nerves: No cranial nerve deficit. Sensory: No sensory deficit. Motor: Tremor and abnormal muscle tone present. No weakness, atrophy, seizure activity or pronator drift. Coordination: Coordination normal.     normal        Medications:  Reviewed    Infusion Medications:    dextrose       Scheduled Medications:    PARoxetine  30 mg Oral Daily    melatonin  5 mg Oral Nightly    insulin lispro  0-8 Units SubCUTAneous TID WC    insulin lispro  0-4 Units SubCUTAneous Nightly    isosorbide mononitrate  30 mg Oral Daily    metoprolol succinate  25 mg Oral Nightly    atorvastatin  20 mg Oral Nightly    ALPRAZolam  0.25 mg Oral TID    aspirin  81 mg Oral Daily     PRN Meds: haloperidol lactate, glucose, dextrose bolus **OR** dextrose bolus, glucagon (rDNA), dextrose, albuterol sulfate HFA, acetaminophen, magnesium hydroxide, nicotine polacrilex    Labs:   No results for input(s): WBC, HGB, HCT, PLT in the last 72 hours. No results for input(s): NA, K, CL, CO2, BUN, CREATININE, CALCIUM, PHOS in the last 72 hours.     Invalid input(s): MAGNES  No results for input(s): AST, ALT, BILIDIR, BILITOT, ALKPHOS in the last 72 hours. No results for input(s): INR in the last 72 hours. No results for input(s): [place] Angely in the last 72 hours. Urinalysis:   Lab Results   Component Value Date/Time    NITRU Negative 10/14/2022 08:15 AM    WBCUA 3-5 09/10/2021 03:45 PM    BACTERIA Negative 09/07/2021 06:30 PM    RBCUA 10-20 09/10/2021 03:45 PM    BLOODU Negative 10/14/2022 08:15 AM    SPECGRAV 1.010 10/14/2022 08:15 AM    GLUCOSEU Negative 10/14/2022 08:15 AM       Radiology:   Most recent    EEG No valid procedures specified. MRI of Brain No results found for this or any previous visit. Results for orders placed during the hospital encounter of 09/07/21    MRI brain without contrast    Narrative  MRI BRAIN WO CONTRAST : 9/8/2021    CLINICAL HISTORY:  encephalopathy . COMPARISON: Head CT 9/7/2021 and head MRI 2/1/2008. TECHNIQUE: Multiplanar MR imaging of the head was performed without contrast.      FINDINGS:    There is no infarct, intracranial hemorrhage, mass effect, midline shift, extra-axial collection, or hydrocephalus. Mild generalized cerebral volume loss is present, with mild patchy supratentorial white matter changes most consistent with chronic small vessel ischemic disease. Mucosal thickening predominantly within the maxillary sinuses is again noted. Impression  NO ACUTE INTRACRANIAL PROCESS IDENTIFIED. ATROPHIC AND INVOLUTIONAL CHANGES. MRA of the Head and Neck: No results found for this or any previous visit. No results found for this or any previous visit. No results found for this or any previous visit.                             CT of the Head: Results for orders placed during the hospital encounter of 09/07/21    CT Head WO Contrast    Narrative  CT HEAD WO CONTRAST    CLINICAL HISTORY:  covid +, confusion, r/o CVA    COMPARISON: CT HEAD WO CONTRAST    CLINICAL HISTORY: covid +, confusion,    COMPARISON: August 15, 2021 O 0738 hours    TECHNIQUE: Multiple unenhanced serial axial images of the brain from the vertex of the skull to the base of the skull were performed. FINDINGS: The ventricles are dilated. This is compensatory to the surrounding moderate generalized parenchymal volume loss. No mass. No midline shift. The cisterns are patent. There are white matter and periventricular changes most likely consistent  with chronic small vessel disease. No acute intra-axial or extra-axial findings. The visualized osseous structures are unremarkable. There is mucoperiosteal thickening of the frontal sinuses, patchy opacification of the ethmoids, mucoperiosteal thickening in the sphenoid and maxillary sinuses. There are probable bilateral enterostomies again noted. Impression  NO ACUTE INTRA-AXIAL OR EXTRA-AXIAL FINDINGS. All CT scans at this facility use dose modulation, iterative reconstruction, and/or weight based dosing when appropriate to reduce radiation dose to as low as reasonably achievable. TECHNIQUE: Multiple unenhanced serial axial images of the brain from the vertex of the skull to the base of the skull were performed. FINDINGS: The ventricles are dilated. This is compensatory to the surrounding moderate generalized parenchymal volume loss. No mass. No midline shift. The cisterns are patent. There are white matter and periventricular changes most likely consistent  with chronic small vessel disease. No acute intra-axial or extra-axial findings. The visualized osseous structures are unremarkable. The visualized portion of the paranasal sinuses, and mastoids are unremarkable. IMPRESSION:  CHANGES IN PARANASAL SINUSES DESCRIBED ABOVE WHICH HAVE INCREASED SINCE THE PRIOR EXAMINATION. CORRELATE CLINICALLY    NO ACUTE INTRA-AXIAL OR EXTRA-AXIAL FINDINGS.     IF SIGNS OR SYMPTOMS PERSIST THEN CONSIDER MRI TO FURTHER EVALUATE IF THERE ARE NO CONTRAINDICATIONS    All CT scans at this facility use dose modulation, iterative reconstruction, and/or weight based dosing when appropriate to reduce radiation dose to as low as reasonably achievable. No results found for this or any previous visit. No results found for this or any previous visit. Carotid duplex: No results found for this or any previous visit. No results found for this or any previous visit. Results for orders placed during the hospital encounter of 09/07/21    US CAROTID ARTERY BILATERAL    Narrative  EXAMINATION: CAROTID ULTRASOUND    CLINICAL HISTORY: 64-year-old with hypertension. He presents with transient confusion    FINDINGS: Duplex and color Doppler ultrasound were performed of the bilateral extracranial carotid systems. Velocity criteria and internal carotid artery stenoses are extrapolated from data as defined by the Society of Radiologist in [facility] 13 Radiology 2003; [id number]. Comparison made with a prior carotid ultrasound from 6/9/2008    RIGHT CAROTID SYSTEM: There is a small amount of heterogeneous calcified plaque in the bulb extending into the proximal ICA and ECA. . There are no elevations of peak systolic velocities or ICA/CCA velocity ratios. Velocities in the ICA range from 92 cm/s  proximal aspect, 88 cm/s mid aspect to 90 cm/s distal aspect. ICA/CCA velocity ratio is 0.8. By ultrasound criteria there is less than 50 percent diameter reduction of the right ICA. Peak systolic velocities in the proximal ECA and mid CCA are 117 and  109cm/s. There is antegrade flow in the right vertebral artery. LEFT CAROTID SYSTEM: There is a small amount of heterogeneous calcified plaque in the bulb. There are now mild elevations of peak systolic velocities in the proximal ICA Velocities in the ICA range from 155 cm/s proximal aspect, 94 cm/s mid aspect to 76  cm/s distal aspect. ICA/CCA velocity ratio is 1.1.  By ultrasound criteria there is 50-69% diameter reduction of the left ICA. Peak systolic velocities in the proximal ECA and mid CCA are 153 and 143cm/s. There is antegrade flow in the left vertebral  artery. Incidental note is made of a 1.2 cm round slightly hypoechoic solid right thyroid nodule    Impression  ATHEROSCLEROTIC CALCIFIED PLAQUE IN BOTH CAROTID SYSTEMS. BY VELOCITY CRITERIA THERE IS LESS THAN 50% DIAMETER REDUCTION OF THE RIGHT ICA AND 50-69% DIAMETER REDUCTION OF THE LEFT ICA WHICH HAS BEEN AN INTERVAL CHANGE. ANTEGRADE VERTEBRAL  FLOW      Echo No results found for this or any previous visit. Assessment/Plan:    10/16/22:  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 The cervical spine twitches notable distally in his hands. No other tremor types are noted. He also has a orofacial dyskinesia. In the first instance we will recommend an EEG before we consider any treatment options and the treatment options will be Keppra which we will consider keeping in mind that he has underlying psychiatric illness as well. Patient already is on benzo atropine which I recommend we hold for now. Pending on the results of the same will further advise    10/17/2022:  No myoclonic jerking noted on today's exam.  Nursing reports it recurs or worsens with anxiety. Patient continues on Xanax. EEG pending for today. Patient with some resting tremors of the bilateral upper extremities. Rigidity and bradykinesia noted. Orofacial dyskinesias persist.  Thyroid studies normal.  CK mildly elevated at 254. Further recommendations pending EEG results. I have personally performed a face to face diagnostic evaluation on this patient, reviewed all data and investigations, and am the sole provider of all clinical decisions on the neurological status of this patient. tremors, more myoclonic, d/c cogentin may have helped,  Will await eeg and consider keppra if myoclonic       10/19/2022:  Myoclonus, appears to be resolved. None on last 2 exams.   EEG was negative. We will continue to monitor. Patient with some parkinsonian features that will need to be followed on outpatient basis. I have personally performed a face to face diagnostic evaluation on this patient, reviewed all data and investigations, and am the sole provider of all clinical decisions on the neurological status of this patient. twitching better, EEG normal,  no need to treat for now,  watch 60 % time spent       Kian Aguirre MD, [place], American Board of Psychiatry & Neurology  Board Certified in Vascular Neurology  Board Certified in Neuromuscular Medicine  Certified in Neurorehabilitation           Collaborating physicians: Dr Kenisha Aguirre    Electronically signed by RAVEN Ralph CNP on 10/19/2022 at 12:59 PM

## 2022-10-19 NOTE — PROGRESS NOTES
Pt is becoming louder as he talks to himself, saying please in response to the voice,  talking constantly

## 2022-10-19 NOTE — PROGRESS NOTES
Obtained 1:1, pt is resting in bed, reports he didn't sleep well, reports anxiety #10, depression #9, remains feeling suicidal, reports he hears voices that are not normal that say I want to kill  I want to kill,pt expressed worry that he will kill his wife, pt is unable to contract for safey on the unit. pt denied pain. Walker at side, it was reported pt was just up to the bathroom  voided , reports Bm yesterday.

## 2022-10-19 NOTE — CARE COORDINATION
Group Therapy Note    Date: 10/19/2022  Start Time: 1100  End Time:  1130    Number of Participants: 7    Type of Group: Psychotherapy    Patient's Goal:  To participate in group therapy. Notes: Patient declined to attend psychoeducation group at 1100 despite encouragement by staff.      Discipline Responsible: /Counselor    FER Walton

## 2022-10-20 LAB
EKG ATRIAL RATE: 53 BPM
EKG P AXIS: 78 DEGREES
EKG P-R INTERVAL: 184 MS
EKG Q-T INTERVAL: 482 MS
EKG QRS DURATION: 92 MS
EKG QTC CALCULATION (BAZETT): 452 MS
EKG R AXIS: 72 DEGREES
EKG T AXIS: 82 DEGREES
EKG VENTRICULAR RATE: 53 BPM
GLUCOSE BLD-MCNC: 104 MG/DL (ref 70–99)
GLUCOSE BLD-MCNC: 132 MG/DL (ref 70–99)
GLUCOSE BLD-MCNC: 147 MG/DL (ref 70–99)
GLUCOSE BLD-MCNC: 188 MG/DL (ref 70–99)
PERFORMED ON: ABNORMAL

## 2022-10-20 PROCEDURE — 6370000000 HC RX 637 (ALT 250 FOR IP): Performed by: INTERNAL MEDICINE

## 2022-10-20 PROCEDURE — 6370000000 HC RX 637 (ALT 250 FOR IP): Performed by: PSYCHIATRY & NEUROLOGY

## 2022-10-20 PROCEDURE — 93010 ELECTROCARDIOGRAM REPORT: CPT | Performed by: INTERNAL MEDICINE

## 2022-10-20 PROCEDURE — 93005 ELECTROCARDIOGRAM TRACING: CPT | Performed by: PSYCHIATRY & NEUROLOGY

## 2022-10-20 PROCEDURE — 99232 SBSQ HOSP IP/OBS MODERATE 35: CPT | Performed by: PSYCHIATRY & NEUROLOGY

## 2022-10-20 PROCEDURE — 97116 GAIT TRAINING THERAPY: CPT

## 2022-10-20 PROCEDURE — 1240000000 HC EMOTIONAL WELLNESS R&B

## 2022-10-20 RX ORDER — TRAZODONE HYDROCHLORIDE 50 MG/1
50 TABLET ORAL NIGHTLY PRN
Status: DISCONTINUED | OUTPATIENT
Start: 2022-10-20 | End: 2022-10-25

## 2022-10-20 RX ADMIN — ALPRAZOLAM 0.25 MG: 0.5 TABLET ORAL at 21:42

## 2022-10-20 RX ADMIN — ALPRAZOLAM 0.25 MG: 0.5 TABLET ORAL at 14:34

## 2022-10-20 RX ADMIN — PAROXETINE 30 MG: 10 TABLET, FILM COATED ORAL at 09:19

## 2022-10-20 RX ADMIN — ASPIRIN 81 MG 81 MG: 81 TABLET ORAL at 09:19

## 2022-10-20 RX ADMIN — TRAZODONE HYDROCHLORIDE 50 MG: 50 TABLET ORAL at 21:42

## 2022-10-20 RX ADMIN — ISOSORBIDE MONONITRATE 30 MG: 60 TABLET, EXTENDED RELEASE ORAL at 09:19

## 2022-10-20 RX ADMIN — METOPROLOL SUCCINATE 25 MG: 25 TABLET, FILM COATED, EXTENDED RELEASE ORAL at 21:41

## 2022-10-20 RX ADMIN — ALPRAZOLAM 0.25 MG: 0.5 TABLET ORAL at 09:18

## 2022-10-20 RX ADMIN — ATORVASTATIN CALCIUM 20 MG: 20 TABLET, FILM COATED ORAL at 21:42

## 2022-10-20 NOTE — PROGRESS NOTES
Pt walked to cloths closet and sweater was given, pt walked down to dinning room via walker with steady fast paced gait , pt ate the chicken pieces out of his pasta lunch, and drank 70% of his supplement, pt refused groups today despite encouragement.   Pt sat by male peer in the dinning room as encouraged, was noted to be more relaxed, smiling,

## 2022-10-20 NOTE — PROGRESS NOTES
Patient offered orajel for c/o tooth pain earlier. Patient stated didn't have pain at this time. Patient encourage to let staff know if he needs it to let us know. Electronically signed by Maggy Mosley LPN on 91/87/1527 at 10:14 AM

## 2022-10-20 NOTE — PROGRESS NOTES
Obtained 1:1 after report. Pt is resting in bed states the voices are bothering him and that he is tired of the voices. Reports suicidal thoughts and thinks he would use a knife, also reports homicidal thoughts to kill his wife, pt states why are you asking me, it reminds me, explained to pt he will be asked every 12 hours and hopefully with the ect and meds he will not feel that way. Pt is noted to be talking to himself. Pt stated he will try not to hurt himself while here Pt reports there are lots of knives in the house. Pt stated depression #10.  Pt reports he will try to contact for saffety When asked  pt reports Im miserable  Piece of garbage,

## 2022-10-20 NOTE — PROGRESS NOTES
Pt reports his depression level is #9/10, & anxiety level is #10/10. Pt remains very quiet & does not engage unless spoken to. Pt continues to report \"bad thoughts\" about wanting to hurt wife, reports he would probably use a knife Pt also reports he has thoughts to harm himself. Pt eats all meals in DR, ambulated with walker, ate approx. 30% of meal. 1:1 safety sitter maintained.

## 2022-10-20 NOTE — PROGRESS NOTES
[facility] FOLLOW-UP NOTE     10/20/2022     Patient was seen and examined in person, Chart reviewed   Patient's case discussed with staff/team    Chief Complaint: depressed mood, suicidal and homicidal ideation    Interim History:   No change in symptoms  Pt report feeling the same  Pt still feel like hurting his wife  Intrusive thoughts are obsessive in nature  Depressed with hopeless feeling  Poor sleep last night  I want to tie my hand so that the pressure in my head does not make me to act     Appetite:   [] Normal/Unchanged  [] Increased  [x] Decreased      Sleep:       [] Normal/Unchanged  [] Fair       [x] Poor              Energy:    [] Normal/Unchanged  [] Increased  [x] Decreased        SI [x] Present  [] Absent    HI  [x]Present  [] Absent     Aggression:  [] yes  [x] no    Patient is [] able  [x] unable to CONTRACT FOR SAFETY     PAST MEDICAL/PSYCHIATRIC HISTORY:   Past Medical History:   Diagnosis Date    Anxiety     Chest pain 3/29/2018    Coronary artery disease involving native coronary artery of native heart without angina pectoris 2/15/2019    Diabetes mellitus ([facility] [address]     no meds    History of colon polyps     Hyperlipidemia     Hypertension     Type 2 diabetes mellitus without complication (HCC)     Vertigo        FAMILY/SOCIAL HISTORY:  Family History   Problem Relation Age of Onset    Heart Disease Maternal Aunt     Cancer Maternal Aunt     Colon Cancer Maternal Aunt      Social History     Socioeconomic History    Marital status:      Spouse name: Not on file    Number of children: Not on file    Years of education: Not on file    Highest education level: Not on file   Occupational History    Not on file   Tobacco Use    Smoking status: Never    Smokeless tobacco: Never   Vaping Use    Vaping Use: Never used   Substance and Sexual Activity    Alcohol use: No    Drug use: Never    Sexual activity: Not on file   Other Topics Concern    Not on file   Social History Narrative    Not on file     Social Determinants of Health     Financial Resource Strain: Not on file   Food Insecurity: Not on file   Transportation Needs: Not on file   Physical Activity: Not on file   Stress: Not on file   Social Connections: Not on file   Intimate Partner Violence: Not on file   Housing Stability: Not on file           ROS:  [x] All negative/unchanged except if checked.  Explain positive(checked items) below:  [] Constitutional  [] Eyes  [] Ear/Nose/Mouth/Throat  [] Respiratory  [] CV  [] GI  []   [] Musculoskeletal  [] Skin/Breast  [] Neurological  [] Endocrine  [] Heme/Lymph  [] Allergic/Immunologic    Explanation:     MEDICATIONS:    Current Facility-Administered Medications:     benzocaine (ORAJEL) 20 % mucosal gel, , Mouth/Throat, BID PRN, Meet Freeman, APRN - CNP    traZODone (DESYREL) tablet 50 mg, 50 mg, Oral, Nightly PRN, Moncho Liu MD    haloperidol lactate (HALDOL) injection 2 mg, 2 mg, IntraMUSCular, Q6H PRN, Moncho Liu MD, 2 mg at 10/19/22 0901    PARoxetine (PAXIL) tablet 30 mg, 30 mg, Oral, Daily, Libia Gutierrez MD, 30 mg at 10/20/22 0919    insulin lispro (HUMALOG) injection vial 0-8 Units, 0-8 Units, SubCUTAneous, TID WC, Mariella Hortensia, APRN - NP    insulin lispro (HUMALOG) injection vial 0-4 Units, 0-4 Units, SubCUTAneous, Nightly, Mariella Hortensia, APRN - NP    glucose chewable tablet 16 g, 4 tablet, Oral, PRN, Mariella Hortensia, APRN - NP    dextrose bolus 10% 125 mL, 125 mL, IntraVENous, PRN **OR** dextrose bolus 10% 250 mL, 250 mL, IntraVENous, PRN, [place] Hortensia, APRN - NP    glucagon (rDNA) injection 1 mg, 1 mg, SubCUTAneous, PRN, [place] Hortensia, APRN - NP    dextrose 10 % infusion, , IntraVENous, Continuous PRN, Mariella Hortensia, APRN - NP    isosorbide mononitrate (IMDUR) extended release tablet 30 mg, 30 mg, Oral, Daily, Krys George MD, 30 mg at 10/20/22 0919    metoprolol succinate (TOPROL XL) extended release tablet 25 mg, 25 mg, Oral, Nightly, Krys George MD, 25 mg at 10/19/22 2131    atorvastatin (LIPITOR) tablet 20 mg, 20 mg, Oral, Nightly, Kimber Trotter MD, 20 mg at 10/19/22 2132    ALPRAZolam (XANAX) tablet 0.25 mg, 0.25 mg, Oral, TID, Crissy Yip MD, 0.25 mg at 10/20/22 1434    albuterol sulfate HFA (PROVENTIL;VENTOLIN;PROAIR) 108 (90 Base) MCG/ACT inhaler 2 puff, 2 puff, Inhalation, 4x Daily PRN, Crissy Yip MD    aspirin chewable tablet 81 mg, 81 mg, Oral, Daily, Crissy Yip MD, 81 mg at 10/20/22 2762    acetaminophen (TYLENOL) tablet 650 mg, 650 mg, Oral, Q4H PRN, Crissy Yip MD, 650 mg at 10/19/22 1242    magnesium hydroxide (MILK OF MAGNESIA) 400 MG/5ML suspension 30 mL, 30 mL, Oral, Daily PRN, Crissy Yip MD, 30 mL at 10/16/22 1217    nicotine polacrilex (COMMIT) lozenge 2 mg, 2 mg, Oral, Q1H PRN, Crissy Yip MD      Examination:  /67   Pulse 65   Temp 98.5 °F (36.9 °C) (Oral)   Resp 18   Ht 5' 5\" (1.651 m)   Wt 113 lb 12.8 oz (51.6 kg) Comment: bed scale  SpO2 94%   BMI 18.94 kg/m²   Gait - steady  Medication side effects(SE): none reported    Mental Status Examination:    Level of consciousness:  within normal limits   Appearance:  poor grooming and poor hygiene  Behavior/Motor:  psychomotor retardation  Attitude toward examiner:  cooperative and poor eye contact  Speech:  slow, increased latency, and monotone   Mood: anxious, constricted, decreased range, and depressed  Affect:  mood congruent and anxious  Thought processes:  linear, goal directed, coherent, and slow   Thought content:  Obsessions/instrusive thoughts:  of harming his wife and himself  Homocidal ideation of harming his wife- probably obsessional  Suicidal Ideation:  with plan to stab himself  Delusions:  no evidence of delusions  Perceptual Disturbance:  denies any perceptual disturbance  Cognition:  oriented to person, place, and time   Concentration poor  Insight limited   Judgement limited     ASSESSMENT:   Patient symptoms are:  [] Well controlled  [] Improving  [] Worsening  [x] No change      Diagnosis:   Major Depressive Disorder, recurrent, severe with psychotic symptoms  OCD    LABS:    No results for input(s): WBC, HGB, PLT in the last 72 hours. No results for input(s): NA, K, CL, CO2, BUN, CREATININE, GLUCOSE in the last 72 hours. No results for input(s): BILITOT, ALKPHOS, AST, ALT in the last 72 hours. Lab Results   Component Value Date/Time    LABAMPH Neg 10/14/2022 08:15 AM    BARBSCNU Neg 10/14/2022 08:15 AM    LABBENZ Neg 10/14/2022 08:15 AM    LABMETH Neg 10/14/2022 08:15 AM    OPIATESCREENURINE Neg 10/14/2022 08:15 AM    PHENCYCLIDINESCREENURINE Neg 10/14/2022 08:15 AM    ETOH <10 10/14/2022 08:43 AM     Lab Results   Component Value Date/Time    TSH 3.230 10/14/2022 08:43 AM     No results found for: LITHIUM  No results found for: VALPROATE, CBMZ    RISK ASSESSMENT: high risk of suicide and ?homicide    Treatment Plan:  Reviewed current Medications with the patient. Will increase the Paxil. Cardiology consult for ECT clearance ordered  ECT tomorrow  Risks, benefits, side effects, drug-to-drug interactions and alternatives to treatment were discussed. Collateral information: pending   CD evaluation   Encourage patient to attend group and other milieu activities.   Discharge planning discussed with the patient and treatment team.    PSYCHOTHERAPY/COUNSELING:  [x] Therapeutic interview  [x] Supportive  [] CBT  [] Ongoing  [] Other    [x] Patient continues to need, on a daily basis, active treatment furnished directly by or requiring the supervision of inpatient psychiatric personnel      Anticipated Length of stay:             Electronically signed by Victoria Gonsales MD on 10/20/2022 at 6:54 PM

## 2022-10-20 NOTE — PROGRESS NOTES
Assumed 1:1 safety sitter for this pt. Pt appears to be in good spirits. Pt shaved with this writers supervision. Pt is resting in bed with eyes closed.

## 2022-10-20 NOTE — PROGRESS NOTES
Pt reports I don't have to go to the bathroom, pt was encouraged to try, pt urinated  large amount of yellow clear urin in toilet , heard good strong stream, pt is dangled at bed side now noted to be snacking on cookies. fluids were encouraged, pt uses walker well,1:1 at side. Pt is much calmer today, more relaxed. voices remain but don't seem to bothering pt.as bad,

## 2022-10-20 NOTE — PLAN OF CARE
Problem: Self Harm/Suicidality  Goal: Will have no self-injury during hospital stay  Description: INTERVENTIONS:  1. Q 30 MINUTES: Routine safety checks  2. Q SHIFT & PRN: Assess risk to determine if routine checks are adequate to maintain patient safety  25/18/9722 0639 by Dong Kidd RN  Outcome: Progressing  10/19/2022 2016 by Veronika Hill RN  Outcome: Progressing     Problem: Chronic Conditions and Co-morbidities  Goal: Patient's chronic conditions and co-morbidity symptoms are monitored and maintained or improved  74/72/4763 5027 by Dong Kidd RN  Outcome: Progressing  10/19/2022 2016 by Veronika Hill RN  Outcome: Progressing     Problem: Safety - Adult  Goal: Free from fall injury  21/79/0433 7378 by Dong Kidd RN  Outcome: Progressing  10/19/2022 2016 by Veronika Hill RN  Outcome: Progressing     Problem: ABCDS Injury Assessment  Goal: Absence of physical injury  09/10/2560 9221 by Dong Kidd RN  Outcome: Progressing  10/19/2022 2016 by Veronika Hill RN  Outcome: Progressing     Problem: Skin/Tissue Integrity  Goal: Absence of new skin breakdown  Description: 1. Monitor for areas of redness and/or skin breakdown  2. Assess vascular access sites hourly  3. Every 4-6 hours minimum:  Change oxygen saturation probe site  4. Every 4-6 hours:  If on nasal continuous positive airway pressure, respiratory therapy assess nares and determine need for appliance change or resting period. 77/73/8006 3845 by Dong Kidd RN  Outcome: Progressing  10/19/2022 2016 by Veronika Hill RN  Outcome: Progressing     Problem: Nutrition Deficit:  Goal: Optimize nutritional status  04/34/4500 1942 by Dong Kidd RN  Outcome: Progressing  10/19/2022 2016 by Veronika Hill RN  Outcome: Progressing     Problem: Risk for Physiologic Instability  Goal: B/P, SaO2, EKG, and respiratory status WDL  Description: INTERVENTIONS:  1. Monitor B/P, SaO2, EKG and respiratory function  2.  Notify physician of unstable condition/changes  44/13/7739 6311 by Caryl Barthel, RN  Outcome: Progressing  10/19/2022 2016 by James Swanson RN  Outcome: Progressing     Problem: Impaired Comfort  Goal: Patient reports pain level is manageable/acceptable  Description: INTERVENTIONS:  1. Provide emotional support and reassurance at all times to relieve anxiety  2. Position patient for comfort  3. Medicate PRN for pain relief  09/50/7124 5851 by Caryl Barthel, RN  Outcome: Progressing  10/19/2022 2016 by James Swanson RN  Outcome: Progressing     Problem: Deficient Knowledge  Goal: Pt/family demonstrate understanding of pre/post-procedure instructions  Description: INTERVENTIONS:  1. Provide pre-procedure instructions  2. Provide written and verbal post-procedural instructions  32/78/5583 0425 by Caryl Barthel, RN  Outcome: Progressing  10/19/2022 2016 by James Swanson RN  Outcome: Progressing     Problem: Risk for Injury  Goal: ARCHIVE-Patient remains free from injury  Description: INTERVENTIONS:  1. Universal precautions  2. Ensure bed, stretcher, or wheelchair are in locked position when not transporting  3. Position patient per procedure requirement  4. Maintain patent airway  5. Perform pre-op equipment checks  05/10/8362 4982 by Caryl Barthel, RN  Outcome: Progressing  10/19/2022 2016 by James Swanson RN  Outcome: Progressing     Problem: Pain  Goal: Verbalizes/displays adequate comfort level or baseline comfort level  22/15/7341 6414 by Caryl Barthel, RN  Outcome: Progressing  10/19/2022 2016 by James Swanson RN  Outcome: Progressing    Evening assessment completed. Pt resting comfortably in bed. Pt quiet, guarded, poor eye contact, visible in the unit during visiting hours. Visited with tucker Cummings then returned to room to rest.  Pt reported good sleep and appetite. Pt stated he has thoughts but does not articulate them. Pt reported LBM yesterday.  Pt could did not rate anxiety or depression but did say it was high d/t \"thoughts that come and go\". Pt denies SI/AVH, but does have thoughts of harming wife. Pt stated he is not upset with her and doesn't understand why he has these thoughts. Pt continues to be on a 1:1 for safety.

## 2022-10-20 NOTE — PROGRESS NOTES
Patient remains on a 1:1 for patient safety. Pt in bed telling this nurse about bad thoughts. Patient is encouraged to replace the bad thoughts with good thoughts, I.e vacation, children, etc. Patient understands and will try.  Antonia Dub

## 2022-10-20 NOTE — PROGRESS NOTES
Physical Therapy Med Surg Daily Treatment Note  Facility/Department: [address]  Room: [place] [id number]       NAME: Amisha Panda  :  (74 y.o.)  MRN: [id number]  CODE STATUS: Full Code    Date of Service: 10/20/2022    Patient Diagnosis(es): Severe episode of recurrent major depressive disorder, with psychotic features ([facility] [F33.3]  Major depression with psychotic features [facility]) [F32.3]   Chief Complaint   Patient presents with    Suicidal    Homicidal     For a month or more     Patient Active Problem List    Diagnosis Date Noted    Tremor 10/17/2022    Severe episode of recurrent major depressive disorder, with psychotic features ([facility] 10/16/2022    Major depression with psychotic features ([facility] 10/14/2022    Hyperlipidemia 2021    Heart murmur 2021    Dyspnea on exertion 2021    COVID-19 2021    Weakness 09/10/2021    Adenomatous polyp of sigmoid colon     Chronic gastritis without bleeding     Dyspepsia     Mixed obsessional thoughts and acts     Coronary artery disease involving native coronary artery of native heart without angina pectoris 02/15/2019    Generalized anxiety disorder 04/15/2018    Anxiety     Recurrent major depressive disorder, in remission ([address]     Chest pain 2018    Hypertension 2018    Benzodiazepine dependence ([address] 2017    Type 2 diabetes mellitus without complication, without long-term current use of insulin ([address] 2017    Insomnia secondary to depression with anxiety 2016    Panic attacks 2016    ED (erectile dysfunction) 2011        Past Medical History:   Diagnosis Date    Anxiety     Chest pain 3/29/2018    Coronary artery disease involving native coronary artery of native heart without angina pectoris 2/15/2019    Diabetes mellitus ([address]     no meds    History of colon polyps     Hyperlipidemia     Hypertension     Type 2 diabetes mellitus without complication ([address]     Vertigo      Past Surgical History: Procedure Laterality Date    CARDIAC SURGERY  1973    PTCA     COLONOSCOPY      COLONOSCOPY N/A 10/17/2019    COLORECTAL CANCER SCREENING, HIGH RISK performed by Marybeth Antoine MD at [facility]      OTHER SURGICAL HISTORY      patent foramen ovale    TONSILLECTOMY      UPPER GASTROINTESTINAL ENDOSCOPY N/A 8/1/2019    EGD ESOPHAGOGASTRODUODENOSCOPY performed by [facility] Antoine MD at [facility]       Chart Reviewed: Yes    Restrictions:  Restrictions/Precautions: Fall Risk (1:1)    SUBJECTIVE:   Subjective: I can walk with you    Pain  Pain: denies pain. OBJECTIVE:   Orientation  Overall Orientation Status: Within Functional Limits  Cognition  Overall Cognitive Status: WFL    Bed mobility  Supine to Sit: Independent  Scooting: Independent  Bed Mobility Comments: good ability to perform safely without need for cueing or assistance    Transfers  Sit to Stand: Supervision  Stand to Sit: Supervision  Comment: performed without AD, pt requires time to stabilize upon standing but steady once stable    Ambulation  Surface: Level tile  Device: No Device  Assistance: Contact guard assistance  Quality of Gait: step through pattern, inconsistent pacing. steady. Gait Deviations: Decreased step length;Decreased step height  Distance: 200'  Comments: Pt agreeable to attempting ambulation without use of an AD this session. Pt more unsteady without use of WW this session, but no LOB noted while performing. Pt reports not using an AD while ambulating at home                   PT Exercises  Exercise Treatment: Informal DGI performed, pt completes at score of 18/24. Activity Tolerance  Activity Tolerance: Patient tolerated treatment well          ASSESSMENT   Assessment: Pt able to tolerate gait without an AD this session without any LOB.  Pt with flat affect, however seems to be more engaged with treatment at hand this session compared to previous. Discharge Recommendations:  Continue to assess pending progress         Goals  Short Term Goals  Short Term Goal 1: SBA gait with appropriate device 50 feet  Short Term Goal 2: mejia tested at 40/56 for evidence of decreased falls risk  Short Term Goal 3: safest mobility device determined  Short Term Goal 4: indep with HEP    PLAN    General Plan: 1 time a day 3-6 times a week  Safety Devices  Type of Devices: Sitter present, Call light within reach, Left in bed (left sitting EOB with 1:1)     AMPAC (6 CLICK) BASIC MOBILITY  AM-PAC Inpatient Mobility Raw Score : 22     Therapy Time   Individual   Time In 1504   Time Out 1520   Minutes 16      BM/trsf- 2  Gait- [facility], 10/20/22 at 3:34 PM         Definitions for assistance levels  Independent = pt does not require any physical supervision or assistance from another person for activity completion. Device may be needed.   Stand by assistance = pt requires verbal cues or instructions from another person, close to but not touching, to perform the activity  Minimal assistance= pt performs 75% or more of the activity; assistance is required to complete the activity  Moderate assistance= pt performs 50% of the activity; assistance is required to complete the activity  Maximal assistance = pt performs 25% of the activity; assistance is required to complete the activity  Dependent = pt requires total physical assistance to accomplish the task

## 2022-10-20 NOTE — PROGRESS NOTES
Pt washed up at sink as requested, hair was washed by staff, all ect adhesive removed. Pt applied all clean cloths,pt is awaiting breakfast, pt stated he urinated this morning , doesn't have to go now.  Pt is aware of year and month, reoriented to date 25th,

## 2022-10-20 NOTE — PROGRESS NOTES
Remains with sitter. Niyah lpn had pt ambulate via walker to bathroom. Pt voided moderate amount of yellow urine. Pt does report his gums are bleeding,spit in commode. Small amount of light red blood noted.

## 2022-10-20 NOTE — PROGRESS NOTES
Pt is now resting in bed, laying on his side ,eyes open, attempted to work a word search where he circled one word then set aside. Nurse in to give am meds, pt expressed hard to swallow.

## 2022-10-20 NOTE — PROGRESS NOTES
Pt walked to dinning room with walker and 1:1 staff with a steady strong gait, noted to have eaten most of the 2 pancakes, is working on supplement drink, pt was observed to get irritated with what appeared to be the voices , responding to internal stimuli, pt stood with out warning headed back to his room, pt wanted to call his son to tell him not to take him to his home with is wife, pt denied having the urge to urinate, encouraged pt to try, pt voided large amount of yellow clear urin in the toilet with staff at side, pt is now dangled at bed side talking to himself.

## 2022-10-20 NOTE — PROGRESS NOTES
Pt is resting in bed laying on his side, talking to himself.  Appears to be responding to internal stimuli

## 2022-10-20 NOTE — PROGRESS NOTES
Pt agreed to go to DR for dinner, ate approx 30% of meal independently. After dinner pt visited with son for visiting hour. Pt sat in DR for a while longer & then returned to room and to bed. 1:1 saftey sitter maintained .

## 2022-10-20 NOTE — PLAN OF CARE
Patient is alert and oriented, he interacts only with staff upon approach. Patient is pleasant and cooperative. He comes to day room for meals only. He continues to have poor appetite. He has mild tremors in hands. Patient reports having a bowel movement yesterday. He complains that he still has the intrusive thoughts all the time. Patient has no other physical complaints at this time. Problem: Self Harm/Suicidality  Goal: Will have no self-injury during hospital stay  Description: INTERVENTIONS:  1. Q 30 MINUTES: Routine safety checks  2. Q SHIFT & PRN: Assess risk to determine if routine checks are adequate to maintain patient safety  Outcome: Progressing     Problem: Chronic Conditions and Co-morbidities  Goal: Patient's chronic conditions and co-morbidity symptoms are monitored and maintained or improved  Outcome: Progressing     Problem: Safety - Adult  Goal: Free from fall injury  Outcome: Progressing     Problem: ABCDS Injury Assessment  Goal: Absence of physical injury  Outcome: Progressing     Problem: Skin/Tissue Integrity  Goal: Absence of new skin breakdown  Description: 1. Monitor for areas of redness and/or skin breakdown  2. Assess vascular access sites hourly  3. Every 4-6 hours minimum:  Change oxygen saturation probe site  4. Every 4-6 hours:  If on nasal continuous positive airway pressure, respiratory therapy assess nares and determine need for appliance change or resting period. Outcome: Progressing     Problem: Nutrition Deficit:  Goal: Optimize nutritional status  Outcome: Progressing     Problem: Risk for Physiologic Instability  Goal: B/P, SaO2, EKG, and respiratory status WDL  Description: INTERVENTIONS:  1. Monitor B/P, SaO2, EKG and respiratory function  2. Notify physician of unstable condition/changes  Outcome: Progressing     Problem: Impaired Comfort  Goal: Patient reports pain level is manageable/acceptable  Description: INTERVENTIONS:  1.  Provide emotional support and reassurance at all times to relieve anxiety  2. Position patient for comfort  3. Medicate PRN for pain relief  Outcome: Progressing     Problem: Deficient Knowledge  Goal: Pt/family demonstrate understanding of pre/post-procedure instructions  Description: INTERVENTIONS:  1. Provide pre-procedure instructions  2. Provide written and verbal post-procedural instructions  Outcome: Progressing     Problem: Risk for Injury  Goal: ARCHIVE-Patient remains free from injury  Description: INTERVENTIONS:  1. Universal precautions  2. Ensure bed, stretcher, or wheelchair are in locked position when not transporting  3. Position patient per procedure requirement  4. Maintain patent airway  5.  Perform pre-op equipment checks  Outcome: Progressing     Problem: Pain  Goal: Verbalizes/displays adequate comfort level or baseline comfort level  Outcome: Progressing

## 2022-10-20 NOTE — PROGRESS NOTES
Pt. declined to attend the 0900 community meeting, despite staff encouragement.  Goal - \"To call my wife\" Electronically signed by Avi Ordonez, [address] on 10/20/2022 at 9:39 AM

## 2022-10-21 ENCOUNTER — ANESTHESIA (OUTPATIENT)
Dept: POSTOP/PACU | Age: 72
End: 2022-10-21

## 2022-10-21 ENCOUNTER — APPOINTMENT (OUTPATIENT)
Dept: POSTOP/PACU | Age: 72
DRG: 885 | End: 2022-10-21
Payer: MEDICARE

## 2022-10-21 ENCOUNTER — ANESTHESIA EVENT (OUTPATIENT)
Dept: POSTOP/PACU | Age: 72
End: 2022-10-21

## 2022-10-21 LAB
GLUCOSE BLD-MCNC: 115 MG/DL (ref 70–99)
GLUCOSE BLD-MCNC: 125 MG/DL (ref 70–99)
GLUCOSE BLD-MCNC: 131 MG/DL (ref 70–99)
GLUCOSE BLD-MCNC: 136 MG/DL (ref 70–99)
PERFORMED ON: ABNORMAL

## 2022-10-21 PROCEDURE — GZB4ZZZ OTHER ELECTROCONVULSIVE THERAPY: ICD-10-PCS | Performed by: PSYCHIATRY & NEUROLOGY

## 2022-10-21 PROCEDURE — 6370000000 HC RX 637 (ALT 250 FOR IP): Performed by: INTERNAL MEDICINE

## 2022-10-21 PROCEDURE — 3700000000 HC ANESTHESIA ATTENDED CARE

## 2022-10-21 PROCEDURE — 7100000001 HC PACU RECOVERY - ADDTL 15 MIN

## 2022-10-21 PROCEDURE — 90870 ELECTROCONVULSIVE THERAPY: CPT | Performed by: PSYCHIATRY & NEUROLOGY

## 2022-10-21 PROCEDURE — 6360000002 HC RX W HCPCS: Performed by: STUDENT IN AN ORGANIZED HEALTH CARE EDUCATION/TRAINING PROGRAM

## 2022-10-21 PROCEDURE — 90870 ELECTROCONVULSIVE THERAPY: CPT

## 2022-10-21 PROCEDURE — 6370000000 HC RX 637 (ALT 250 FOR IP): Performed by: PSYCHIATRY & NEUROLOGY

## 2022-10-21 PROCEDURE — 7100000000 HC PACU RECOVERY - FIRST 15 MIN

## 2022-10-21 PROCEDURE — 1240000000 HC EMOTIONAL WELLNESS R&B

## 2022-10-21 PROCEDURE — 2580000003 HC RX 258: Performed by: PSYCHIATRY & NEUROLOGY

## 2022-10-21 PROCEDURE — 2500000003 HC RX 250 WO HCPCS: Performed by: STUDENT IN AN ORGANIZED HEALTH CARE EDUCATION/TRAINING PROGRAM

## 2022-10-21 RX ORDER — PROPOFOL 10 MG/ML
INJECTION, EMULSION INTRAVENOUS PRN
Status: DISCONTINUED | OUTPATIENT
Start: 2022-10-21 | End: 2022-10-21 | Stop reason: SDUPTHER

## 2022-10-21 RX ORDER — SODIUM CHLORIDE 9 MG/ML
INJECTION, SOLUTION INTRAVENOUS CONTINUOUS
Status: DISCONTINUED | OUTPATIENT
Start: 2022-10-21 | End: 2022-11-06

## 2022-10-21 RX ORDER — GLYCOPYRROLATE 1 MG/5 ML
SYRINGE (ML) INTRAVENOUS PRN
Status: DISCONTINUED | OUTPATIENT
Start: 2022-10-21 | End: 2022-10-21 | Stop reason: SDUPTHER

## 2022-10-21 RX ORDER — SUCCINYLCHOLINE/SOD CL,ISO/PF 100 MG/5ML
SYRINGE (ML) INTRAVENOUS PRN
Status: DISCONTINUED | OUTPATIENT
Start: 2022-10-21 | End: 2022-10-21 | Stop reason: SDUPTHER

## 2022-10-21 RX ORDER — ONDANSETRON 2 MG/ML
INJECTION INTRAMUSCULAR; INTRAVENOUS PRN
Status: DISCONTINUED | OUTPATIENT
Start: 2022-10-21 | End: 2022-10-21 | Stop reason: SDUPTHER

## 2022-10-21 RX ORDER — RISPERIDONE 0.25 MG/1
0.25 TABLET ORAL NIGHTLY
Status: DISCONTINUED | OUTPATIENT
Start: 2022-10-21 | End: 2022-10-25

## 2022-10-21 RX ADMIN — Medication 0.2 MG: at 11:53

## 2022-10-21 RX ADMIN — ALPRAZOLAM 0.25 MG: 0.5 TABLET ORAL at 21:25

## 2022-10-21 RX ADMIN — Medication 30 MG: at 12:05

## 2022-10-21 RX ADMIN — RISPERIDONE 0.25 MG: 0.25 TABLET ORAL at 21:25

## 2022-10-21 RX ADMIN — ALPRAZOLAM 0.25 MG: 0.5 TABLET ORAL at 14:45

## 2022-10-21 RX ADMIN — PROPOFOL 60 MG: 10 INJECTION, EMULSION INTRAVENOUS at 12:05

## 2022-10-21 RX ADMIN — ASPIRIN 81 MG 81 MG: 81 TABLET ORAL at 13:11

## 2022-10-21 RX ADMIN — ATORVASTATIN CALCIUM 20 MG: 20 TABLET, FILM COATED ORAL at 21:25

## 2022-10-21 RX ADMIN — ISOSORBIDE MONONITRATE 30 MG: 60 TABLET, EXTENDED RELEASE ORAL at 13:12

## 2022-10-21 RX ADMIN — ONDANSETRON 4 MG: 2 INJECTION INTRAMUSCULAR; INTRAVENOUS at 11:53

## 2022-10-21 RX ADMIN — PAROXETINE 30 MG: 10 TABLET, FILM COATED ORAL at 13:12

## 2022-10-21 RX ADMIN — SODIUM CHLORIDE: 9 INJECTION, SOLUTION INTRAVENOUS at 11:11

## 2022-10-21 ASSESSMENT — PATIENT HEALTH QUESTIONNAIRE - PHQ9: SUM OF ALL RESPONSES TO PHQ QUESTIONS 1-9: 27

## 2022-10-21 ASSESSMENT — PAIN SCALES - GENERAL: PAINLEVEL_OUTOF10: 0

## 2022-10-21 NOTE — OP NOTE
Department of Psychiatry  Electroconvulsive Therapy Treatment Note        10/21/2022    PURPOSE FOR ECT:  Therapeutic NUMBER: 2    DIAGNOSIS:   Principal Problem:    Severe episode of recurrent major depressive disorder, with psychotic features ([facility]  Active Problems:    Tremor  Resolved Problems:    * No resolved hospital problems.  *      MEDICATIONS:    Current Facility-Administered Medications:     0.9 % sodium chloride infusion, , IntraVENous, Continuous, Jovita Castrejon MD, Last Rate: 100 mL/hr at 10/21/22 1111, New Bag at 10/21/22 1111    benzocaine (ORAJEL) 20 % mucosal gel, , Mouth/Throat, BID PRN, Ale Splinter, APRN - CNP    traZODone (DESYREL) tablet 50 mg, 50 mg, Oral, Nightly PRN, Jovita Castrejon MD, 50 mg at 10/20/22 2142    haloperidol lactate (HALDOL) injection 2 mg, 2 mg, IntraMUSCular, Q6H PRN, Jovita Castrejon MD, 2 mg at 10/19/22 0901    PARoxetine (PAXIL) tablet 30 mg, 30 mg, Oral, Daily, Sari Wang MD, 30 mg at 10/20/22 0919    insulin lispro (HUMALOG) injection vial 0-8 Units, 0-8 Units, SubCUTAneous, TID WC, RAVEN Randle NP    insulin lispro (HUMALOG) injection vial 0-4 Units, 0-4 Units, SubCUTAneous, Nightly, RAVEN Randle NP    glucose chewable tablet 16 g, 4 tablet, Oral, PRN, RAVEN Randle NP    dextrose bolus 10% 125 mL, 125 mL, IntraVENous, PRN **OR** dextrose bolus 10% 250 mL, 250 mL, IntraVENous, PRN, RAVEN Randle NP    glucagon (rDNA) injection 1 mg, 1 mg, SubCUTAneous, PRN, RAVEN Randle NP    dextrose 10 % infusion, , IntraVENous, Continuous PRN, RAVEN Randle NP    isosorbide mononitrate (IMDUR) extended release tablet 30 mg, 30 mg, Oral, Daily, Fernando Damon MD, 30 mg at 10/20/22 0919    metoprolol succinate (TOPROL XL) extended release tablet 25 mg, 25 mg, Oral, Nightly, Fernando Damon MD, 25 mg at 10/20/22 2141    atorvastatin (LIPITOR) tablet 20 mg, 20 mg, Oral, Nightly, Fernando Damon MD, 20 mg at 10/20/22 2142    ALPRAZolam (XANAX) tablet 0.25 mg, 0.25 mg, Oral, TID, Manny Interiano MD, 0.25 mg at 10/20/22 2142    albuterol sulfate HFA (PROVENTIL;VENTOLIN;PROAIR) 108 (90 Base) MCG/ACT inhaler 2 puff, 2 puff, Inhalation, 4x Daily PRN, Manny Interiano MD    aspirin chewable tablet 81 mg, 81 mg, Oral, Daily, Manny Interiano MD, 81 mg at 10/20/22 9511    acetaminophen (TYLENOL) tablet 650 mg, 650 mg, Oral, Q4H PRN, Manny Interiano MD, 650 mg at 10/19/22 1242    magnesium hydroxide (MILK OF MAGNESIA) 400 MG/5ML suspension 30 mL, 30 mL, Oral, Daily PRN, Manny Interiano MD, 30 mL at 10/16/22 1217    nicotine polacrilex (COMMIT) lozenge 2 mg, 2 mg, Oral, Q1H PRN, Manny Interiano MD    CHANGE IN PSYCHIATRY STATUS SINCE LAST ECT:   No change in symptoms    INFORMED CONSENT OBTAINED : YES    PRE ECT MEDICATION ADMINISTERED:   YES    NPO STATUS: Confirmed    ELECTRODE PLACEMENT : RUL    TIME OUT :  TEAM CONFIRMS THE CORRECT PATIENT, CORRECT PROCEDURE AND CORRECT SITE.     DEVICE PARAMETERS:   Charge- 172  PW - 0.3  Freq- 45  Duration- 8  EEG- 37  Motor-  35    VITALS:   Vitals:    10/21/22 1030   BP: 133/68   Pulse: 52   Resp: 18   Temp: 96.8 °F (36 °C)   SpO2: 66%         COMPLICATIONS: no      RECOMMENDATIONS FOR NEXT TREATMENT:  mon        PSYCHIATRIST:  Maria G Zavaleta MD

## 2022-10-21 NOTE — FLOWSHEET NOTE
Spoke with patient's son Trevin Jorge. Informed him more about ECT sessions. Son verbalized understanding. States he is OK with this option and will call if he has any questions/ concerns.

## 2022-10-21 NOTE — PROGRESS NOTES
Pt remains on 1:1 for pt safety. Pt able to turn and reposition self independently. Quiet and isolative. Denies having any needs at this time.

## 2022-10-21 NOTE — PROGRESS NOTES
Pt. did not attend the 1000 skill group due to being off the unit for a procedure.  Electronically signed by Giovanni Isbell on 10/21/2022 at 1:00 PM

## 2022-10-21 NOTE — ANESTHESIA POSTPROCEDURE EVALUATION
Department of Anesthesiology  Postprocedure Note    Patient: Trey Deleon  MRN: [id number]  YOB: 1950  Date of evaluation: 10/21/2022      Procedure Summary     Date: 10/21/22 Room / Location: [facility]    Anesthesia Start: 1205 Anesthesia Stop: 1213    Procedure: ECT W/ ANESTHESIA Diagnosis:     Scheduled Providers:  Responsible Provider: Drea Childress MD    Anesthesia Type: general ASA Status: 3          Anesthesia Type: No value filed.     Juan Phase I: Juan Score: 10    Juan Phase II:        Anesthesia Post Evaluation    Patient location during evaluation: bedside  Patient participation: complete - patient participated  Level of consciousness: awake and awake and alert  Pain score: 0  Airway patency: patent  Nausea & Vomiting: no nausea and no vomiting  Complications: no  Cardiovascular status: blood pressure returned to baseline and hemodynamically stable  Respiratory status: acceptable  Hydration status: euvolemic

## 2022-10-21 NOTE — ANESTHESIA PRE PROCEDURE
Department of Anesthesiology  Preprocedure Note       Name:  Mei Abrams   Age:  74 y.o.  :  1950                                          MRN:  [id number]         Date:  10/21/2022      Surgeon: * No surgeons listed *    Procedure: * No procedures listed *    Medications prior to admission:   Prior to Admission medications    Medication Sig Start Date End Date Taking? Authorizing Provider   mirtazapine (REMERON) 15 MG tablet Take 15 mg by mouth nightly   Yes Historical Provider, MD   ALPRAZolam (XANAX) 0.25 MG tablet Take 0.25 mg by mouth in the morning, at noon, and at bedtime.    Yes Historical Provider, MD   albuterol sulfate HFA (VENTOLIN HFA) 108 (90 Base) MCG/ACT inhaler Inhale 2 puffs into the lungs 4 times daily as needed for Wheezing 10/12/22   Daisy Pascual MD   isosorbide mononitrate (IMDUR) 30 MG extended release tablet TAKE 1 TABLET BY MOUTH DAILY 10/12/22   Rian Vega DO   ranolazine (RANEXA) 1000 MG extended release tablet TAKE 1 TABLET BY MOUTH TWICE DAILY 8/15/22   RAVEN Arciniega - CNP   escitalopram (LEXAPRO) 10 MG tablet Take 20 mg by mouth daily 11/3/21   Historical Provider, MD   aspirin 81 MG chewable tablet Take 1 tablet by mouth daily 10/29/21   Leanna Mackey MD   Blood Glucose Monitoring Suppl (ONETOUCH VERIO FLEX SYSTEM) w/Device KIT  18   Historical Provider, MD Montero Eder strip  18   Historical Provider, MD Estefani Lew LANC\[facility]\"" 17X [address] [facility]  18   Historical Provider, MD       Current medications:    Current Facility-Administered Medications   Medication Dose Route Frequency Provider Last Rate Last Admin    0.9 % sodium chloride infusion   IntraVENous Continuous Silvana Ocasio  mL/hr at 10/21/22 1111 New Bag at 10/21/22 1111    benzocaine (ORAJEL) 20 % mucosal gel   Mouth/Throat BID PRN RAVEN Mena CNP        traZODone (DESYREL) tablet 50 mg  50 mg Oral Nightly PRN Silvana Ocasio MD   50 mg at 10/20/22 7061    haloperidol lactate (HALDOL) injection 2 mg  2 mg IntraMUSCular Q6H PRN Home Mason MD   2 mg at 10/19/22 0901    PARoxetine (PAXIL) tablet 30 mg  30 mg Oral Daily Whit Hendricks MD   30 mg at 10/20/22 0919    insulin lispro (HUMALOG) injection vial 0-8 Units  0-8 Units SubCUTAneous TID WC Surinder Odilia, APRN - NP        insulin lispro (HUMALOG) injection vial 0-4 Units  0-4 Units SubCUTAneous Nightly Surinder Odilia, APRN - NP        glucose chewable tablet 16 g  4 tablet Oral PRN Surinder Odilia, APRN - NP        dextrose bolus 10% 125 mL  125 mL IntraVENous PRN Surinder Odilia, APRN - NP        Or    dextrose bolus 10% 250 mL  250 mL IntraVENous PRN Surinder Odilia, APRN - NP        glucagon (rDNA) injection 1 mg  1 mg SubCUTAneous PRN Surinder Odilia, APRN - NP        dextrose 10 % infusion   IntraVENous Continuous PRN Surinder Odilia, APRN - NP        isosorbide mononitrate (IMDUR) extended release tablet 30 mg  30 mg Oral Daily Jet Mcclain MD   30 mg at 10/20/22 0919    metoprolol succinate (TOPROL XL) extended release tablet 25 mg  25 mg Oral Nightly Jet Mcclain MD   25 mg at 10/20/22 2141    atorvastatin (LIPITOR) tablet 20 mg  20 mg Oral Nightly Jet Mcclain MD   20 mg at 10/20/22 2142    ALPRAZolam (XANAX) tablet 0.25 mg  0.25 mg Oral TID Home Mason MD   0.25 mg at 10/20/22 2142    albuterol sulfate HFA (PROVENTIL;VENTOLIN;PROAIR) 108 (90 Base) MCG/ACT inhaler 2 puff  2 puff Inhalation 4x Daily PRN Home Mason MD        aspirin chewable tablet 81 mg  81 mg Oral Daily Home Mason MD   81 mg at 10/20/22 0919    acetaminophen (TYLENOL) tablet 650 mg  650 mg Oral Q4H PRN Home Mason MD   650 mg at 10/19/22 1242    magnesium hydroxide (MILK OF MAGNESIA) 400 MG/5ML suspension 30 mL  30 mL Oral Daily PRN Home Mason MD   30 mL at 10/16/22 1217    nicotine polacrilex (COMMIT) lozenge 2 mg  2 mg Oral Q1H PRN Home Mason MD           Allergies: Allergies   Allergen Reactions    Seroquel [Quetiapine] Other (See Comments)     lethargy  weak       Problem List:    Patient Active Problem List   Diagnosis Code    Chest pain R07.9    Hypertension I10    Anxiety F41.9    [facility]urrent major depressive [facility] F33.40    Coronary artery disease involving native coronary artery of native heart without angina pectoris I25.10    Mixed obsessional thoughts and acts F42.2    Chronic gastritis without bleeding K29.50    Dyspepsia R10.13    Adenomatous polyp of sigmoid colon D12.5    Weakness R53.1    COVID-19 U07.1    Hyperlipidemia E78.5    Heart murmur R01.1    Generalized anxiety disorder F41.1    Dyspnea on exertion R06.09    ED (erectile dysfunction) N52.9    Benzodiazepine dependence ([facility]) F13.20    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, F41.8    Panic attacks F41.0    Type 2 diabetes mellitus without complication, without long-term current use of insulin (HCC) E11.9    Major depression with psychotic features ([facility]) F32.3    Severe episode of recurrent major depressive disorder, with psychotic features ([facility] F33.3    Tremor R25.1       Past Medical History:        Diagnosis Date    Anxiety     Chest pain 3/29/2018    Coronary artery disease involving native coronary artery of native heart without angina pectoris 2/15/2019    Diabetes mellitus ([facility]     no meds    History of colon polyps     Hyperlipidemia     Hypertension     Type 2 diabetes mellitus without complication ([facility])     Vertigo        Past Surgical History:        Procedure Laterality Date    CARDIAC SURGERY  1973    PTCA     COLONOSCOPY      COLONOSCOPY N/A 10/17/2019    COLORECTAL CANCER SCREENING, HIGH RISK performed by Rayo Murphy MD at [address] [place] [address]      OTHER SURGICAL HISTORY      patent foramen ovale    TONSILLECTOMY      UPPER GASTROINTESTINAL ENDOSCOPY N/A 8/1/2019    EGD ESOPHAGOGASTRODUODENOSCOPY performed by Aria Borrego MD at [address] [place] [address] History:    Social History     Tobacco Use    Smoking status: Never    Smokeless tobacco: Never   Substance Use Topics    Alcohol use: No                                Counseling given: Not Answered      Vital Signs (Current):   Vitals:    10/20/22 2141 10/21/22 0810 10/21/22 0912 10/21/22 1030   BP: 134/69 (!) 140/77  133/68   Pulse: 66 53 54 52   Resp:  18  18   Temp:  99.5 °F (37.5 °C)  96.8 °F (36 °C)   TempSrc:  Oral  Temporal   SpO2:    98%   Weight:    113 lb (51.3 kg)   Height:    5' 5\" (1.651 m)                                              BP Readings from Last 3 Encounters:   10/21/22 133/68   10/12/22 127/67   09/13/22 (!) 157/87       NPO Status: Time of last liquid consumption: 0000                        Time of last solid consumption: 0000                        Date of last liquid consumption: 10/21/22                        Date of last solid food consumption: 10/21/22    BMI:   Wt Readings from Last 3 Encounters:   10/21/22 113 lb (51.3 kg)   09/13/22 125 lb (56.7 kg)   08/29/22 120 lb (54.4 kg)     Body mass index is 18.8 kg/m².     CBC:   Lab Results   Component Value Date/Time    WBC 7.4 10/14/2022 08:43 AM    RBC 4.81 10/14/2022 08:43 AM    HGB 15.6 10/14/2022 08:43 AM    HCT 44.1 10/14/2022 08:43 AM    MCV 91.6 10/14/2022 08:43 AM    RDW 13.9 10/14/2022 08:43 AM     10/14/2022 08:43 AM       CMP:   Lab Results   Component Value Date/Time     10/14/2022 08:43 AM    K 3.4 10/14/2022 08:43 AM    K 4.2 09/09/2021 07:03 AM     10/14/2022 08:43 AM    CO2 18 10/14/2022 08:43 AM    BUN 16 10/14/2022 08:43 AM    CREATININE 0.85 10/14/2022 08:43 AM    GFRAA >60.0 10/14/2022 08:43 AM    LABGLOM >60.0 10/14/2022 08:43 AM    GLUCOSE 138 10/14/2022 08:43 AM    PROT 7.4 10/14/2022 08:43 AM    CALCIUM 9.7 10/14/2022 08:43 AM    BILITOT 1.7 10/14/2022 08:43 AM    ALKPHOS 85 10/14/2022 08:43 AM    AST 19 10/14/2022 08:43 AM    ALT 16 10/14/2022 08:43 AM       POC Tests:   Recent Labs     10/21/22  0624   POCGLU 115*       Coags:   Lab Results   Component Value Date/Time    PROTIME 14.0 10/03/2021 01:36 AM    INR 1.1 10/03/2021 01:36 AM    APTT 28.4 10/03/2021 01:36 AM       HCG (If Applicable): No results found for: Dez Grounds, HCG, HCGQUANT     ABGs:   Lab Results   Component Value Date/Time    PHART 7.482 09/07/2021 06:38 PM    PO2ART 59 09/07/2021 06:38 PM    GWW6VDB 31 09/07/2021 06:38 PM    ZZM5DKS 22.8 09/07/2021 06:38 PM    BEART -1 09/07/2021 06:38 PM    P1PYQNXF 92 09/07/2021 06:38 PM        Type & Screen (If Applicable):  No results found for: LABABO, LABRH    Drug/Infectious Status (If Applicable):  No results found for: HIV, HEPCAB    COVID-19 Screening (If Applicable):   Lab Results   Component Value Date/Time    COVID19 Not Detected 10/14/2022 08:27 AM           Anesthesia Evaluation  Patient summary reviewed and Nursing notes reviewed no history of anesthetic complications:   Airway: Mallampati: II  TM distance: >3 FB   Neck ROM: full  Mouth opening: > = 3 FB   Dental: normal exam         Pulmonary:Negative Pulmonary ROS and normal exam                               Cardiovascular:Negative CV ROS  Exercise tolerance: good (>4 METS),   (+) hypertension:, CAD: no interval change,       ECG reviewed               Beta Blocker:  Not on Beta Blocker         Neuro/Psych:   Negative Neuro/Psych ROS  (+) psychiatric history:depression/anxiety             GI/Hepatic/Renal: Neg GI/Hepatic/Renal ROS            Endo/Other: Negative Endo/Other ROS   (+) DiabetesType II DM, , .          Pt had PAT visit. Abdominal:             Vascular: negative vascular ROS. Other Findings:           Anesthesia Plan      general     ASA 3     (Mask assist)  Induction: intravenous. MIPS: Postoperative opioids intended and Prophylactic antiemetics administered.   Anesthetic plan and risks discussed with patient. Plan discussed with CRNA.     Attending anesthesiologist reviewed and agrees with Pre Eval content                Mikey Woods MD   10/21/2022

## 2022-10-21 NOTE — PROGRESS NOTES
Patient back from ECT    Vitals taken 137/71    pulse 64  Gag present  Medication and lunch given to patient

## 2022-10-21 NOTE — PLAN OF CARE
Problem: Self Harm/Suicidality  Goal: Will have no self-injury during hospital stay  Description: INTERVENTIONS:  1. Q 30 MINUTES: Routine safety checks  2. Q SHIFT & PRN: Assess risk to determine if routine checks are adequate to maintain patient safety  10/20/2022 2015 by Lexis Soto RN  Outcome: Progressing  10/20/2022 1253 by Alma Cameron RN  Outcome: Progressing     Problem: Chronic Conditions and Co-morbidities  Goal: Patient's chronic conditions and co-morbidity symptoms are monitored and maintained or improved  10/20/2022 2015 by Lexis Soto RN  Outcome: Progressing  10/20/2022 1253 by Alma Cameron RN  Outcome: Progressing     Problem: Safety - Adult  Goal: Free from fall injury  10/20/2022 2015 by Lexis Soto RN  Outcome: Progressing  10/20/2022 1253 by Alma Cameron RN  Outcome: Progressing     Problem: ABCDS Injury Assessment  Goal: Absence of physical injury  10/20/2022 2015 by Lexis Soto RN  Outcome: Progressing  10/20/2022 1253 by Alma Cameron RN  Outcome: Progressing     Problem: Skin/Tissue Integrity  Goal: Absence of new skin breakdown  Description: 1. Monitor for areas of redness and/or skin breakdown  2. Assess vascular access sites hourly  3. Every 4-6 hours minimum:  Change oxygen saturation probe site  4. Every 4-6 hours:  If on nasal continuous positive airway pressure, respiratory therapy assess nares and determine need for appliance change or resting period. 10/20/2022 2015 by Lexis Soto RN  Outcome: Progressing  10/20/2022 1253 by Alma Cameron RN  Outcome: Progressing     Problem: Nutrition Deficit:  Goal: Optimize nutritional status  10/20/2022 2015 by Lexis Soto RN  Outcome: Progressing  10/20/2022 1253 by Alma Cameron RN  Outcome: Progressing     Problem: Risk for Physiologic Instability  Goal: B/P, SaO2, EKG, and respiratory status WDL  Description: INTERVENTIONS:  1. Monitor B/P, SaO2, EKG and respiratory function  2.  Notify physician of unstable condition/changes  10/20/2022 2015 by Ivery Eisenmenger, RN  Outcome: Progressing  10/20/2022 1253 by Stacey Hercules RN  Outcome: Progressing     Problem: Impaired Comfort  Goal: Patient reports pain level is manageable/acceptable  Description: INTERVENTIONS:  1. Provide emotional support and reassurance at all times to relieve anxiety  2. Position patient for comfort  3. Medicate PRN for pain relief  10/20/2022 2015 by Ivery Eisenmenger, RN  Outcome: Progressing  10/20/2022 1253 by Stacey Hercules RN  Outcome: Progressing     Problem: Deficient Knowledge  Goal: Pt/family demonstrate understanding of pre/post-procedure instructions  Description: INTERVENTIONS:  1. Provide pre-procedure instructions  2. Provide written and verbal post-procedural instructions  10/20/2022 2015 by Ivery Eisenmenger, RN  Outcome: Progressing  10/20/2022 1253 by Stacey Hercules RN  Outcome: Progressing     Problem: Risk for Injury  Goal: ARCHIVE-Patient remains free from injury  Description: INTERVENTIONS:  1. Universal precautions  2. Ensure bed, stretcher, or wheelchair are in locked position when not transporting  3. Position patient per procedure requirement  4. Maintain patent airway  5.  Perform pre-op equipment checks  10/20/2022 2015 by Ivery Eisenmenger, RN  Outcome: Progressing  10/20/2022 1253 by Stacey Hercules RN  Outcome: Progressing     Problem: Pain  Goal: Verbalizes/displays adequate comfort level or baseline comfort level  10/20/2022 2015 by Ivery Eisenmenger, RN  Outcome: Progressing  10/20/2022 1253 by Stacey Hercules RN  Outcome: Progressing

## 2022-10-21 NOTE — PROGRESS NOTES
Pt. declined to attend the 0900 community meeting, despite staff encouragement.  GOAL : \" to feel better and have less intrusive thoughts\"  Electronically signed by Catrachito Ramsey on 10/21/2022 at 9:40 AM

## 2022-10-21 NOTE — H&P
Update History & Physical    The patient's History and Physical of 10 / 18 / 22 was reviewed with the patient and there were no significant changes. I examined the patient and there were no significant changes from the previous History and Physical.    Vitals:    10/21/22 1030   BP: 133/68   Pulse: 52   Resp: 18   Temp: 96.8 °F (36 °C)   SpO2: 98%     Principal Problem:    Major depression with psychotic features ([facility])  Active Problems:    Severe episode of recurrent major depressive disorder, with psychotic features ([facility] [facility] [address]    Tremor  Resolved Problems:    * No resolved hospital problems. *        Plan: The risk, benefits, expected outcome, and alternative to the recommended procedure have been discussed with the patient. Patient understands and wants to proceed with the procedure.     Electronically signed by Shweta Shafer MD on 10/21/22 at 11:54 AM [place]

## 2022-10-21 NOTE — PLAN OF CARE
Patient is alert and oriented, he walks with the assistance of a walker. Patient continues to be 1 on 1 supervision for fall risk. Patient is cooperative and somewhat irritable. Patient reports having thoughts of SI and HI, patient states that it is scary for him and the thoughts are intrusive. Patient states when he closes his eyes he sees scary images like a dream. He reports brenda anxiety and depression. Patient denies having any physical problems, he is a poor historian. Heart rate is slightly slow with apical rate 54. Patient is aware and accepting of ECT this morning. Problem: Self Harm/Suicidality  Goal: Will have no self-injury during hospital stay  Description: INTERVENTIONS:  1. Q 30 MINUTES: Routine safety checks  2. Q SHIFT & PRN: Assess risk to determine if routine checks are adequate to maintain patient safety  10/21/2022 0931 by Stuart Diaz RN  Outcome: Progressing  10/20/2022 2015 by Alyssa Yin RN  Outcome: Progressing     Problem: Chronic Conditions and Co-morbidities  Goal: Patient's chronic conditions and co-morbidity symptoms are monitored and maintained or improved  10/21/2022 0931 by Stuart Diaz RN  Outcome: Progressing  10/20/2022 2015 by Alyssa Yin RN  Outcome: Progressing     Problem: Safety - Adult  Goal: Free from fall injury  10/21/2022 0931 by Stuart Diaz RN  Outcome: Progressing  10/20/2022 2015 by Alyssa Yin RN  Outcome: Progressing     Problem: ABCDS Injury Assessment  Goal: Absence of physical injury  10/21/2022 0931 by Stuart Diaz RN  Outcome: Progressing  10/20/2022 2015 by Alyssa Yin RN  Outcome: Progressing     Problem: Skin/Tissue Integrity  Goal: Absence of new skin breakdown  Description: 1. Monitor for areas of redness and/or skin breakdown  2. Assess vascular access sites hourly  3. Every 4-6 hours minimum:  Change oxygen saturation probe site  4.   Every 4-6 hours:  If on nasal continuous positive airway pressure, respiratory therapy assess nares and determine need for appliance change or resting period. 10/21/2022 0931 by Tiffanie Edmondson RN  Outcome: Progressing  10/20/2022 2015 by Author Annita RN  Outcome: Progressing     Problem: Nutrition Deficit:  Goal: Optimize nutritional status  10/21/2022 0931 by Tiffanie Edmondson RN  Outcome: Progressing  10/20/2022 2015 by Author Annita RN  Outcome: Progressing     Problem: Risk for Physiologic Instability  Goal: B/P, SaO2, EKG, and respiratory status WDL  Description: INTERVENTIONS:  1. Monitor B/P, SaO2, EKG and respiratory function  2. Notify physician of unstable condition/changes  10/21/2022 0931 by Tiffanie Edmondson RN  Outcome: Progressing  10/20/2022 2015 by Author Annita RN  Outcome: Progressing     Problem: Impaired Comfort  Goal: Patient reports pain level is manageable/acceptable  Description: INTERVENTIONS:  1. Provide emotional support and reassurance at all times to relieve anxiety  2. Position patient for comfort  3. Medicate PRN for pain relief  10/21/2022 0931 by Tiffanie Edmondson RN  Outcome: Progressing  10/20/2022 2015 by Author Annita RN  Outcome: Progressing     Problem: Deficient Knowledge  Goal: Pt/family demonstrate understanding of pre/post-procedure instructions  Description: INTERVENTIONS:  1. Provide pre-procedure instructions  2. Provide written and verbal post-procedural instructions  10/21/2022 0931 by Tiffanie Edmondson RN  Outcome: Progressing  10/20/2022 2015 by Author Annita RN  Outcome: Progressing     Problem: Risk for Injury  Goal: ARCHIVE-Patient remains free from injury  Description: INTERVENTIONS:  1. Universal precautions  2. Ensure bed, stretcher, or wheelchair are in locked position when not transporting  3. Position patient per procedure requirement  4. Maintain patent airway  5.  Perform pre-op equipment checks  10/21/2022 0931 by Tiffanie Edmondson RN  Outcome: Progressing  10/20/2022 2015 by Author Annita RN  Outcome: Progressing     Problem: Pain  Goal: Verbalizes/displays adequate comfort level or baseline comfort level  10/21/2022 0931 by Nelson Arango RN  Outcome: Progressing  10/20/2022 2015 by Donta Nayak RN  Outcome: Progressing

## 2022-10-22 LAB
GLUCOSE BLD-MCNC: 109 MG/DL (ref 70–99)
GLUCOSE BLD-MCNC: 121 MG/DL (ref 70–99)
GLUCOSE BLD-MCNC: 129 MG/DL (ref 70–99)
GLUCOSE BLD-MCNC: 178 MG/DL (ref 70–99)
PERFORMED ON: ABNORMAL

## 2022-10-22 PROCEDURE — 6370000000 HC RX 637 (ALT 250 FOR IP): Performed by: PSYCHIATRY & NEUROLOGY

## 2022-10-22 PROCEDURE — 6370000000 HC RX 637 (ALT 250 FOR IP): Performed by: INTERNAL MEDICINE

## 2022-10-22 PROCEDURE — 1240000000 HC EMOTIONAL WELLNESS R&B

## 2022-10-22 RX ORDER — MECOBALAMIN 5000 MCG
5 TABLET,DISINTEGRATING ORAL NIGHTLY
Status: DISCONTINUED | OUTPATIENT
Start: 2022-10-22 | End: 2022-10-25

## 2022-10-22 RX ORDER — PAROXETINE HYDROCHLORIDE 20 MG/1
40 TABLET, FILM COATED ORAL DAILY
Status: DISCONTINUED | OUTPATIENT
Start: 2022-10-23 | End: 2022-11-07 | Stop reason: HOSPADM

## 2022-10-22 RX ADMIN — ASPIRIN 81 MG 81 MG: 81 TABLET ORAL at 09:37

## 2022-10-22 RX ADMIN — ALPRAZOLAM 0.25 MG: 0.5 TABLET ORAL at 09:38

## 2022-10-22 RX ADMIN — PAROXETINE 30 MG: 10 TABLET, FILM COATED ORAL at 09:37

## 2022-10-22 RX ADMIN — RISPERIDONE 0.25 MG: 0.25 TABLET ORAL at 21:07

## 2022-10-22 RX ADMIN — ATORVASTATIN CALCIUM 20 MG: 20 TABLET, FILM COATED ORAL at 21:07

## 2022-10-22 RX ADMIN — ALPRAZOLAM 0.25 MG: 0.5 TABLET ORAL at 21:06

## 2022-10-22 RX ADMIN — ISOSORBIDE MONONITRATE 30 MG: 60 TABLET, EXTENDED RELEASE ORAL at 09:37

## 2022-10-22 RX ADMIN — ACETAMINOPHEN 650 MG: 325 TABLET ORAL at 17:43

## 2022-10-22 RX ADMIN — ALPRAZOLAM 0.25 MG: 0.5 TABLET ORAL at 16:15

## 2022-10-22 RX ADMIN — Medication 5 MG: at 21:07

## 2022-10-22 RX ADMIN — METOPROLOL SUCCINATE 25 MG: 25 TABLET, FILM COATED, EXTENDED RELEASE ORAL at 21:07

## 2022-10-22 ASSESSMENT — PAIN SCALES - GENERAL: PAINLEVEL_OUTOF10: 5

## 2022-10-22 ASSESSMENT — PAIN DESCRIPTION - LOCATION: LOCATION: OTHER (COMMENT)

## 2022-10-22 ASSESSMENT — PAIN DESCRIPTION - ORIENTATION: ORIENTATION: LEFT;RIGHT

## 2022-10-22 ASSESSMENT — PAIN DESCRIPTION - DESCRIPTORS: DESCRIPTORS: ACHING

## 2022-10-22 NOTE — PROGRESS NOTES
Pt remains one to one for safety, this nurse sitting with pt at this time. Pt is resting in bed, occasional grunt noted. Pt staring at wall, pt encouraged to go to groups, pt refused.

## 2022-10-22 NOTE — PROGRESS NOTES
Pt reports depression & anxiety levels are #9/10, pt denies SI, but does admit to recurring intrusive thoughts to kill his wife. Pt started ECT tx's this week Pt's appetite appears to be improving, pt always says he is not hungry yet if taken to DR & tray placed in front of him, he usually consumes 30-50 % of meals. Pt does not go to groups & reports he feels uncomfortable being around a lot of people.

## 2022-10-22 NOTE — PLAN OF CARE
Problem: Self Harm/Suicidality  Goal: Will have no self-injury during hospital stay  Description: INTERVENTIONS:  1. Q 30 MINUTES: Routine safety checks  2. Q SHIFT & PRN: Assess risk to determine if routine checks are adequate to maintain patient safety  Outcome: Progressing     Problem: Chronic Conditions and Co-morbidities  Goal: Patient's chronic conditions and co-morbidity symptoms are monitored and maintained or improved  Outcome: Progressing     Problem: Safety - Adult  Goal: Free from fall injury  Outcome: Progressing     Problem: ABCDS Injury Assessment  Goal: Absence of physical injury  Outcome: Progressing     Problem: Skin/Tissue Integrity  Goal: Absence of new skin breakdown  Description: 1. Monitor for areas of redness and/or skin breakdown  2. Assess vascular access sites hourly  3. Every 4-6 hours minimum:  Change oxygen saturation probe site  4. Every 4-6 hours:  If on nasal continuous positive airway pressure, respiratory therapy assess nares and determine need for appliance change or resting period. Outcome: Progressing     Problem: Nutrition Deficit:  Goal: Optimize nutritional status  Outcome: Progressing     Patient has remained on Constant Observation. He isolates to bed only coming out to use the phone and for meals. He has limited initiated interaction , but is spontaneous in conversation. He continues to think about killing himself and his wife. He recalls no reason that he was angry with her, and is puzzled why these thoughts occurred. He does state that his wife has been depressed and is home with a care giver. Both of them have lost weight. He states the thoughts are a little different today and he hears his thoughts in a singing fashion. He reports back in about 1991 he had bad thoughts and they went away. Assured patient that our goal includes to help those difficult thoughts to again go away. Encouraged groups but patient declines.  Asked him to consider the same later or tomorrow. Some moaning while resting quietly.

## 2022-10-22 NOTE — PROGRESS NOTES
Patient did not attend group despite staff encouragement.  Electronically signed by Mayda Melchor on 10/22/2022 at 2:26 PM

## 2022-10-22 NOTE — PROGRESS NOTES
Pt is now laying bed eyes closed, noted to stay up longer in the dinning room, pt ate 90%, drank 100%supplement, , pt is noted to be brighter with staff, better eye contact, puzzles, books tv and music has all been offered and pt declined. when asked pt reports he just sits on the sofa at home and looks at his wife, pt was successful second time calling his wife, pt speaks in [address] to her . Pt is noted to be talking less to himself today, .

## 2022-10-22 NOTE — PROGRESS NOTES
Patient did not attend group despite staff encouragement.  Electronically signed by Holger Luque on 10/22/2022 at 12:20 PM

## 2022-10-22 NOTE — PROGRESS NOTES
[place] [address] NOTE     10/22/2022     Patient was seen and examined in person, Chart reviewed   Patient's case discussed with staff/team    Chief Complaint: depressed mood, suicidal and homicidal ideation    Interim History:   Patient said he was \"not feeling so good\". He said he was still depressed. He said he still had thoughts of suicide, and was \"scared of them\". He said he still has thoughts of harming his wife. He denied having any auditory hallucinations; denied visual hallucinations currently. He denied paranoia or other delusions.      Appetite:   [] Normal/Unchanged  [] Increased  [x] Decreased      Sleep:       [] Normal/Unchanged  [] Fair       [] Poor              Energy:    [] Normal/Unchanged  [] Increased  [x] Decreased        SI [x] Present  [] Absent    HI  [x]Present  [] Absent     Aggression:  [] yes  [x] no    Patient is [] able  [x] unable to CONTRACT FOR SAFETY     PAST MEDICAL/PSYCHIATRIC HISTORY:   Past Medical History:   Diagnosis Date    Anxiety     Chest pain 3/29/2018    Coronary artery disease involving native coronary artery of native heart without angina pectoris 2/15/2019    Diabetes mellitus ([facility] [address]     no meds    History of colon polyps     Hyperlipidemia     Hypertension     Type 2 diabetes mellitus without complication ([facility] [address]     Vertigo        FAMILY/SOCIAL HISTORY:  Family History   Problem Relation Age of Onset    Heart Disease Maternal Aunt     Cancer Maternal Aunt     Colon Cancer Maternal Aunt      Social History     Socioeconomic History    Marital status:      Spouse name: Not on file    Number of children: Not on file    Years of education: Not on file    Highest education level: Not on file   Occupational History    Not on file   Tobacco Use    Smoking status: Never    Smokeless tobacco: Never   Vaping Use    Vaping Use: Never used   Substance and Sexual Activity    Alcohol use: No    Drug use: Never    Sexual activity: Not on file   Other Topics Concern    Not on file   Social History Narrative    Not on file     Social Determinants of Health     Financial Resource Strain: Not on file   Food Insecurity: Not on file   Transportation Needs: Not on file   Physical Activity: Not on file   Stress: Not on file   Social Connections: Not on file   Intimate Partner Violence: Not on file   Housing Stability: Not on file           ROS:  [x] All negative/unchanged except if checked.  Explain positive(checked items) below:  [] Constitutional  [] Eyes  [] Ear/Nose/Mouth/Throat  [] Respiratory  [] CV  [] GI  []   [] Musculoskeletal  [] Skin/Breast  [] Neurological  [] Endocrine  [] Heme/Lymph  [] Allergic/Immunologic    Explanation:     MEDICATIONS:    Current Facility-Administered Medications:     0.9 % sodium chloride infusion, , IntraVENous, Continuous, Lester Freed MD, Last Rate: 100 mL/hr at 10/21/22 1111, New Bag at 10/21/22 1111    risperiDONE (RISPERDAL) tablet 0.25 mg, 0.25 mg, Oral, Nightly, Lester Freed MD, 0.25 mg at 10/21/22 2125    benzocaine (ORAJEL) 20 % mucosal gel, , Mouth/Throat, BID PRN, Letty Iqbal APRN - CNP    traZODone (DESYREL) tablet 50 mg, 50 mg, Oral, Nightly PRN, Lester Freed MD, 50 mg at 10/20/22 2142    haloperidol lactate (HALDOL) injection 2 mg, 2 mg, IntraMUSCular, Q6H PRN, Lester Freed MD, 2 mg at 10/19/22 0901    PARoxetine (PAXIL) tablet 30 mg, 30 mg, Oral, Daily, Palmer Edwards MD, 30 mg at 10/22/22 8294    insulin lispro (HUMALOG) injection vial 0-8 Units, 0-8 Units, SubCUTAneous, TID WC, Faye Radford APRN - NP    insulin lispro (HUMALOG) injection vial 0-4 Units, 0-4 Units, SubCUTAneous, Nightly, Faye Radford APRN - NP    glucose chewable tablet 16 g, 4 tablet, Oral, PRN, Faye Radford APRN - NP    dextrose bolus 10% 125 mL, 125 mL, IntraVENous, PRN **OR** dextrose bolus 10% 250 mL, 250 mL, IntraVENous, PRN, Fredna Malina, APRN - NP    glucagon (rDNA) injection 1 mg, 1 mg, SubCUTAneous, PRN, Sherrian Radha, APRN - NP    dextrose 10 % infusion, , IntraVENous, Continuous PRN, RAVEN Calderon NP    isosorbide mononitrate (IMDUR) extended release tablet 30 mg, 30 mg, Oral, Daily, Arlene Ramos MD, 30 mg at 10/22/22 5236    metoprolol succinate (TOPROL XL) extended release tablet 25 mg, 25 mg, Oral, Nightly, Arlene Ramos MD, 25 mg at 10/20/22 2141    atorvastatin (LIPITOR) tablet 20 mg, 20 mg, Oral, Nightly, Arlene Ramos MD, 20 mg at 10/21/22 2125    ALPRAZolam (XANAX) tablet 0.25 mg, 0.25 mg, Oral, TID, Nandini Marquez MD, 0.25 mg at 10/22/22 0938    albuterol sulfate HFA (PROVENTIL;VENTOLIN;PROAIR) 108 (90 Base) MCG/ACT inhaler 2 puff, 2 puff, Inhalation, 4x Daily PRN, Nandini Marquez MD    aspirin chewable tablet 81 mg, 81 mg, Oral, Daily, Nandini Marquez MD, 81 mg at 10/22/22 6398    acetaminophen (TYLENOL) tablet 650 mg, 650 mg, Oral, Q4H PRN, Nandini Marquez MD, 650 mg at 10/19/22 1242    magnesium hydroxide (MILK OF MAGNESIA) 400 MG/5ML suspension 30 mL, 30 mL, Oral, Daily PRN, Nandini Marquez MD, 30 mL at 10/16/22 1217    nicotine polacrilex (COMMIT) lozenge 2 mg, 2 mg, Oral, Q1H PRN, Nandini Marquez MD      Examination:  /60   Pulse 59   Temp 97.3 °F (36.3 °C)   Resp 16   Ht 5' 5\" (1.651 m)   Wt 113 lb (51.3 kg)   SpO2 99%   BMI 18.80 kg/m²   Gait - steady  Medication side effects(SE): none reported    Mental Status Examination:    Level of consciousness:  within normal limits   Appearance:  poor grooming and poor hygiene  Behavior/Motor:  psychomotor retardation  Attitude toward examiner:  cooperative and poor eye contact  Speech:  slow, increased latency, and monotone   Mood: anxious, constricted, decreased range, and depressed  Affect:  mood congruent and anxious  Thought processes:  linear, goal directed, coherent, and slow   Thought content:  Obsessions/instrusive thoughts:  of harming his wife and himself  Homocidal ideation of harming his wife- probably obsessional  Suicidal Ideation:  with plan to stab himself  Delusions:  no evidence of delusions  Perceptual Disturbance:  denies any perceptual disturbance  Cognition:  oriented to person, place, and time   Concentration poor  Insight limited   Judgement limited     ASSESSMENT:   Patient symptoms are:  [] Well controlled  [] Improving  [] Worsening  [x] No change      Diagnosis:   Major Depressive Disorder, recurrent, severe with psychotic symptoms  OCD    LABS:    No results for input(s): WBC, HGB, PLT in the last 72 hours. No results for input(s): NA, K, CL, CO2, BUN, CREATININE, GLUCOSE in the last 72 hours. No results for input(s): BILITOT, ALKPHOS, AST, ALT in the last 72 hours. Lab Results   Component Value Date/Time    LABAMPH Neg 10/14/2022 08:15 AM    BARBSCNU Neg 10/14/2022 08:15 AM    LABBENZ Neg 10/14/2022 08:15 AM    LABMETH Neg 10/14/2022 08:15 AM    OPIATESCREENURINE Neg 10/14/2022 08:15 AM    PHENCYCLIDINESCREENURINE Neg 10/14/2022 08:15 AM    ETOH <10 10/14/2022 08:43 AM     Lab Results   Component Value Date/Time    TSH 3.230 10/14/2022 08:43 AM     No results found for: LITHIUM  No results found for: VALPROATE, CBMZ    RISK ASSESSMENT: high risk of suicide and ?homicide    Treatment Plan:  Reviewed current Medications with the patient. sabiha Moya increase the Paxil to 40 mg/day. ECT Monday  Risks, benefits, side effects, drug-to-drug interactions and alternatives to treatment were discussed. Collateral information: pending   CD evaluation   Encourage patient to attend group and other milieu activities.   Discharge planning discussed with the patient and treatment team.    PSYCHOTHERAPY/COUNSELING:  [x] Therapeutic interview  [x] Supportive  [] CBT  [] Ongoing  [] Other    [x] Patient continues to need, on a daily basis, active treatment furnished directly by or requiring the supervision of inpatient psychiatric personnel      Anticipated Length of stay:             Electronically signed by Libia Gutierrez MD on 10/22/2022 at 3:19 PM

## 2022-10-22 NOTE — PROGRESS NOTES
Comprehensive Nutrition Assessment    Type and Reason for Visit:  Reassess    Nutrition Recommendations/Plan:   Continue General diet, add No concentrated sweets for gluc ose> 140  Provide diabetic oral supplement B & D  Add Fortified pudding @ L  Recommend weekly weights     Malnutrition Assessment:  Malnutrition Status: At risk for malnutrition (Comment) (10/17/22 1631)    Context:  Social/Environmental Circumstances     Findings of the 6 clinical characteristics of malnutrition:  Energy Intake:  Mild decrease in energy intake (Comment)  Weight Loss:  Greater than 10% over 6 months     Body Fat Loss:  Unable to assess     Muscle Mass Loss:  Unable to assess    Fluid Accumulation:  No significant fluid accumulation     Strength:  Not Performed    Nutrition Assessment:    Nutritional status remains inadequate, noted hx of significant weight loss > 10% x < 6 months, oral supplements to be modified    Nutrition Related Findings:    Hx : anxiety, depression, CAD, DM, COVID, \"Pt started ECT tx's this week Pt's appetite appears to be improving, pt always says he is not hungry yet if taken to DR & tray placed in front of him, he usually consumes 30-50 % of meals. \", no documentation of supplement intake, hx of DM noted, glucose < 140 without dietary restricions Wound Type: None       Current Nutrition Intake & Therapies:    Average Meal Intake: 26-50%  Average Supplements Intake: Unable to assess  ADULT DIET; Regular  ADULT ORAL NUTRITION SUPPLEMENT; Breakfast, Lunch; Diabetic Oral Supplement    Anthropometric Measures:  Height: 5' 5\" (165.1 cm)  Ideal Body Weight (IBW): 136 lbs (62 kg)    Admission Body Weight: 113 lb (51.3 kg)  Current Body Weight: 113 lb (51.3 kg),   83% IBW.  Weight Source: Bed Scale  Current BMI (kg/m2): 18.8  Usual Body Weight: 128 lb (58.1 kg) (5/22)  % Weight Change (Calculated): -11.7                    BMI Categories: Underweight (BMI less than 22) age over 72    Estimated Daily Nutrient Needs:  Energy Requirements Based On: Kcal/kg  Weight Used for Energy Requirements: Current  Energy (kcal/day): ~ 1550 @ 30 kcal/kg  Weight Used for Protein Requirements: Current  Protein (g/day): 67-77 gm (kg x 1.3-1.5)  Method Used for Fluid Requirements: 1 ml/kcal  Fluid (ml/day): ~1500 ml    Nutrition Diagnosis:   Inadequate oral intake related to cognitive or neurological impairment as evidenced by poor intake prior to admission  Unintended weight loss related to inadequate protein-energy intake as evidenced by weight loss, poor intake prior to admission    Nutrition Interventions:   Food and/or Nutrient Delivery: Continue Current Diet, Modify Oral Nutrition Supplement  Nutrition Education/Counseling: No recommendation at this time  Coordination of Nutrition Care: Continue to monitor while inpatient       Goals:  Previous Goal Met: No Progress toward Goal(s)  Goals: PO intake 75% or greater, other (specify)  Specify Other Goals: Glucose < 160, wt gain    Nutrition Monitoring and Evaluation:      Food/Nutrient Intake Outcomes: Food and Nutrient Intake, Supplement Intake  Physical Signs/Symptoms Outcomes: Biochemical Data, Weight, Meal Time Behavior    Discharge Planning:     Too soon to determine     Recatherine Del Rio RD, LD

## 2022-10-22 NOTE — PROGRESS NOTES
Pt. refused to attend the 0900 community meeting.  Electronically signed by Kerry Rivera on 10/22/2022 at 9:22 AM

## 2022-10-23 LAB
GLUCOSE BLD-MCNC: 103 MG/DL (ref 70–99)
GLUCOSE BLD-MCNC: 105 MG/DL (ref 70–99)
GLUCOSE BLD-MCNC: 124 MG/DL (ref 70–99)
GLUCOSE BLD-MCNC: 144 MG/DL (ref 70–99)
PERFORMED ON: ABNORMAL

## 2022-10-23 PROCEDURE — 1240000000 HC EMOTIONAL WELLNESS R&B

## 2022-10-23 PROCEDURE — 6370000000 HC RX 637 (ALT 250 FOR IP): Performed by: PSYCHIATRY & NEUROLOGY

## 2022-10-23 PROCEDURE — 6370000000 HC RX 637 (ALT 250 FOR IP): Performed by: INTERNAL MEDICINE

## 2022-10-23 RX ADMIN — ALPRAZOLAM 0.25 MG: 0.5 TABLET ORAL at 15:02

## 2022-10-23 RX ADMIN — ISOSORBIDE MONONITRATE 30 MG: 60 TABLET, EXTENDED RELEASE ORAL at 08:55

## 2022-10-23 RX ADMIN — ALPRAZOLAM 0.25 MG: 0.5 TABLET ORAL at 08:48

## 2022-10-23 RX ADMIN — Medication 5 MG: at 22:09

## 2022-10-23 RX ADMIN — ALPRAZOLAM 0.25 MG: 0.5 TABLET ORAL at 21:06

## 2022-10-23 RX ADMIN — ATORVASTATIN CALCIUM 20 MG: 20 TABLET, FILM COATED ORAL at 21:07

## 2022-10-23 RX ADMIN — ASPIRIN 81 MG 81 MG: 81 TABLET ORAL at 08:51

## 2022-10-23 RX ADMIN — PAROXETINE HYDROCHLORIDE 40 MG: 20 TABLET, FILM COATED ORAL at 08:51

## 2022-10-23 RX ADMIN — METOPROLOL SUCCINATE 25 MG: 25 TABLET, FILM COATED, EXTENDED RELEASE ORAL at 21:07

## 2022-10-23 RX ADMIN — RISPERIDONE 0.25 MG: 0.25 TABLET ORAL at 21:07

## 2022-10-23 NOTE — PROGRESS NOTES
Pt. declined to attend the 0900 community meeting, despite staff encouragement.   GOAL : \" to feel better\" Electronically signed by Elvira Mahajan on 10/23/2022 at 9:45 AM

## 2022-10-23 NOTE — PROGRESS NOTES
BEHAVIORAL HEALTH FOLLOW-UP NOTE     10/23/2022     Patient was seen and examined in person, Chart reviewed   Patient's case discussed with staff/team    Chief Complaint: depressed mood, suicidal and homicidal ideation    Interim History:   Patient said he was \"not feeling so good\". He said he had not slept (staff had reported though that he had slept for 7 hours). He said his appetite was poor. He said he was still depressed. He said he still had thoughts of suicide, and was \"scared of them\". He said he still has thoughts of harming his wife. He denied having any auditory hallucinations; denied visual hallucinations currently. He denied paranoia or other delusions.      Appetite:   [] Normal/Unchanged  [] Increased  [x] Decreased      Sleep:       [] Normal/Unchanged  [] Fair       [x] Poor              Energy:    [] Normal/Unchanged  [] Increased  [x] Decreased        SI [x] Present  [] Absent    HI  [x]Present  [] Absent     Aggression:  [] yes  [x] no    Patient is [] able  [x] unable to CONTRACT FOR SAFETY     PAST MEDICAL/PSYCHIATRIC HISTORY:   Past Medical History:   Diagnosis Date    Anxiety     Chest pain 3/29/2018    Coronary artery disease involving native coronary artery of native heart without angina pectoris 2/15/2019    Diabetes mellitus ([facility] [address]     no meds    History of colon polyps     Hyperlipidemia     Hypertension     [facility]     Vertigo        FAMILY/SOCIAL HISTORY:  Family History   Problem Relation Age of Onset    Heart Disease Maternal Aunt     Cancer Maternal Aunt     Colon Cancer Maternal Aunt      Social History     Socioeconomic History    Marital status:      Spouse name: Not on file    Number of children: Not on file    Years of education: Not on file    Highest education level: Not on file   Occupational History    Not on file   Tobacco Use    Smoking status: Never    Smokeless tobacco: Never   Vaping Use    Vaping Use: Never used Substance and Sexual Activity    Alcohol use: No    Drug use: Never    Sexual activity: Not on file   Other Topics Concern    Not on file   Social History Narrative    Not on file     Social Determinants of Health     Financial Resource Strain: Not on file   Food Insecurity: Not on file   Transportation Needs: Not on file   Physical Activity: Not on file   Stress: Not on file   Social Connections: Not on file   Intimate Partner Violence: Not on file   Housing Stability: Not on file           ROS:  [x] All negative/unchanged except if checked.  Explain positive(checked items) below:  [] Constitutional  [] Eyes  [] Ear/Nose/Mouth/Throat  [] Respiratory  [] CV  [] GI  []   [] Musculoskeletal  [] Skin/Breast  [] Neurological  [] Endocrine  [] Heme/Lymph  [] Allergic/Immunologic    Explanation:     MEDICATIONS:    Current Facility-Administered Medications:     melatonin disintegrating tablet 5 mg, 5 mg, Oral, Nightly, Jamar Harrison MD, 5 mg at 10/22/22 2107    PARoxetine (PAXIL) tablet 40 mg, 40 mg, Oral, Daily, Jamar Harrison MD, 40 mg at 10/23/22 0851    0.9 % sodium chloride infusion, , IntraVENous, Continuous, Yvonne Chatman MD, Last Rate: 100 mL/hr at 10/21/22 1111, New Bag at 10/21/22 1111    risperiDONE (RISPERDAL) tablet 0.25 mg, 0.25 mg, Oral, Nightly, Yvonne Chatman MD, 0.25 mg at 10/22/22 2107    benzocaine (ORAJEL) 20 % mucosal gel, , Mouth/Throat, BID PRN, Sami Spikes, APRN - CNP    traZODone (DESYREL) tablet 50 mg, 50 mg, Oral, Nightly PRN, Yvonne Chatman MD, 50 mg at 10/20/22 2142    haloperidol lactate (HALDOL) injection 2 mg, 2 mg, IntraMUSCular, Q6H PRN, Yvonne Chatman MD, 2 mg at 10/19/22 0901    insulin lispro (HUMALOG) injection vial 0-8 Units, 0-8 Units, SubCUTAneous, TID WC, Haze Bounds, APRN - NP    insulin lispro (HUMALOG) injection vial 0-4 Units, 0-4 Units, SubCUTAneous, Nightly, Haze Bounds, APRN - NP    glucose chewable tablet 16 g, 4 tablet, Oral, PRN, Juliette Olu Alberto, APRN - NP    dextrose bolus 10% 125 mL, 125 mL, IntraVENous, PRN **OR** dextrose bolus 10% 250 mL, 250 mL, IntraVENous, PRN, Darlis Leaks, APRN - NP    glucagon (rDNA) injection 1 mg, 1 mg, SubCUTAneous, PRN, Darlis Leaks, APRN - NP    dextrose 10 % infusion, , IntraVENous, Continuous PRN, Darlis Leaks, APRN - NP    isosorbide mononitrate (IMDUR) extended release tablet 30 mg, 30 mg, Oral, Daily, Holly Lowe MD, 30 mg at 10/23/22 0855    metoprolol succinate (TOPROL XL) extended release tablet 25 mg, 25 mg, Oral, Nightly, Holly Lowe MD, 25 mg at 10/22/22 2107    atorvastatin (LIPITOR) tablet 20 mg, 20 mg, Oral, Nightly, Holly Lowe MD, 20 mg at 10/22/22 2107    ALPRAZolam (XANAX) tablet 0.25 mg, 0.25 mg, Oral, TID, Abhay Mcgovern MD, 0.25 mg at 10/23/22 1502    albuterol sulfate HFA (PROVENTIL;VENTOLIN;PROAIR) 108 (90 Base) MCG/ACT inhaler 2 puff, 2 puff, Inhalation, 4x Daily PRN, Abhay Mcgovern MD    aspirin chewable tablet 81 mg, 81 mg, Oral, Daily, Abhay Mcgovern MD, 81 mg at 10/23/22 0851    acetaminophen (TYLENOL) tablet 650 mg, 650 mg, Oral, Q4H PRN, Abhay Mcgovern MD, 650 mg at 10/22/22 1743    magnesium hydroxide (MILK OF MAGNESIA) 400 MG/5ML suspension 30 mL, 30 mL, Oral, Daily PRN, Abhay Mcgovern MD, 30 mL at 10/16/22 1217    nicotine polacrilex (COMMIT) lozenge 2 mg, 2 mg, Oral, Q1H PRN, Abhay Mcgovern MD      Examination:  /62   Pulse 67   Temp 97.3 °F (36.3 °C)   Resp 16   Ht 5' 5\" (1.651 m)   Wt 113 lb (51.3 kg)   SpO2 99%   BMI 18.80 kg/m²   Gait - steady  Medication side effects(SE): none reported    Mental Status Examination:    Level of consciousness:  within normal limits   Appearance:  poor grooming and poor hygiene  Behavior/Motor:  psychomotor retardation  Attitude toward examiner:  cooperative and poor eye contact  Speech:  slow, increased latency, and monotone   Mood: anxious, constricted, decreased range, and depressed  Affect:  mood congruent and anxious  Thought processes: linear, goal directed, coherent, and slow   Thought content:  Obsessions/instrusive thoughts:  of harming his wife and himself  Homocidal ideation of harming his wife- probably obsessional  Suicidal Ideation:  with plan to stab himself  Delusions:  no evidence of delusions  Perceptual Disturbance:  denies any perceptual disturbance  Cognition:  oriented to person, place, and time   Concentration poor  Insight limited   Judgement limited     ASSESSMENT:   Patient symptoms are:  [] Well controlled  [] Improving  [] Worsening  [x] No change      Diagnosis:   Major Depressive Disorder, recurrent, severe with psychotic symptoms  OCD    LABS:    No results for input(s): WBC, HGB, PLT in the last 72 hours. No results for input(s): NA, K, CL, CO2, BUN, CREATININE, GLUCOSE in the last 72 hours. No results for input(s): BILITOT, ALKPHOS, AST, ALT in the last 72 hours. Lab Results   Component Value Date/Time    LABAMPH Neg 10/14/2022 08:15 AM    BARBSCNU Neg 10/14/2022 08:15 AM    LABBENZ Neg 10/14/2022 08:15 AM    LABMETH Neg 10/14/2022 08:15 AM    OPIATESCREENURINE Neg 10/14/2022 08:15 AM    PHENCYCLIDINESCREENURINE Neg 10/14/2022 08:15 AM    ETOH <10 10/14/2022 08:43 AM     Lab Results   Component Value Date/Time    TSH 3.230 10/14/2022 08:43 AM     No results found for: LITHIUM  No results found for: VALPROATE, CBMZ    RISK ASSESSMENT: high risk of suicide and ?homicide    Treatment Plan:  Reviewed current Medications with the patient. sabiha Moya increase the Paxil to 40 mg/day. ECT Monday  Risks, benefits, side effects, drug-to-drug interactions and alternatives to treatment were discussed. Collateral information: pending   CD evaluation   Encourage patient to attend group and other milieu activities.   Discharge planning discussed with the patient and treatment team.    PSYCHOTHERAPY/COUNSELING:  [x] Therapeutic interview  [x] Supportive  [] CBT  [] Ongoing  [] Other    [x] Patient continues to need, on a daily basis, active treatment furnished directly by or requiring the supervision of inpatient psychiatric personnel      Anticipated Length of stay:             Electronically signed by Craig Gosselin, MD on 10/23/2022 at 4:20 PM

## 2022-10-23 NOTE — PLAN OF CARE
Patient continues to isolate to room, remains 1:1 for safety. Does not attend groups and declined to ambulate unit. Did not come out for snack. Fleeting SI and HI directed at wife but expresses insight that \"he doesn't really want to do that and I don't like the voices telling me too\". Minimal other conversation with this writer. Rests quietly throughout shift. Patient has remained on Constant Observation. He isolates to bed only coming out to use the phone and for meals. He has limited initiated interaction , but is spontaneous in conversation. He continues to think about killing himself and his wife. He recalls no reason that he was angry with her, and is puzzled why these thoughts occurred. He does state that his wife has been depressed and is home with a care giver. Both of them have lost weight. He states the thoughts are a little different today and he hears his thoughts in a singing fashion. He reports back in about 1991 he had bad thoughts and they went away. Assured patient that our goal includes to help those difficult thoughts to again go away. Encouraged groups but patient declines. Asked him to consider the same later or tomorrow. Some moaning while resting quietly.                       Problem: Self Harm/Suicidality  Goal: Will have no self-injury during hospital stay  Description: INTERVENTIONS:  1. Q 30 MINUTES: Routine safety checks  2. Q SHIFT & PRN: Assess risk to determine if routine checks are adequate to maintain patient safety  10/22/2022 2309 by Akosua Olivares RN  Outcome: Progressing  10/22/2022 1650 by RAVEN Alberts - CNS  Outcome: Progressing     Problem: Chronic Conditions and Co-morbidities  Goal: Patient's chronic conditions and co-morbidity symptoms are monitored and maintained or improved  10/22/2022 2309 by Akosua Olivares RN  Outcome: Progressing  10/22/2022 1650 by RAVEN Alberts - CNS  Outcome: Progressing     Problem: Safety - Adult  Goal: Free from fall injury  10/22/2022 2309 by Kwabena Desai RN  Outcome: Progressing  10/22/2022 1650 by RAVEN Ortega  Outcome: Progressing     Problem: ABCDS Injury Assessment  Goal: Absence of physical injury  10/22/2022 2309 by Kwabena Desai RN  Outcome: Progressing  10/22/2022 1650 by RAVEN Ortega  Outcome: Progressing     Problem: Skin/Tissue Integrity  Goal: Absence of new skin breakdown  Description: 1. Monitor for areas of redness and/or skin breakdown  2. Assess vascular access sites hourly  3. Every 4-6 hours minimum:  Change oxygen saturation probe site  4. Every 4-6 hours:  If on nasal continuous positive airway pressure, respiratory therapy assess nares and determine need for appliance change or resting period. 10/22/2022 2309 by Kwabena Desai RN  Outcome: Progressing  10/22/2022 1650 by RAVEN Ortega  Outcome: Progressing     Problem: Nutrition Deficit:  Goal: Optimize nutritional status  10/22/2022 2309 by Kwabena Desai RN  Outcome: Progressing  10/22/2022 1650 by RAVEN Ortega  Outcome: Progressing     Problem: Risk for Physiologic Instability  Goal: B/P, SaO2, EKG, and respiratory status WDL  Description: INTERVENTIONS:  1. Monitor B/P, SaO2, EKG and respiratory function  2. Notify physician of unstable condition/changes  10/22/2022 2309 by Kwabena Desai RN  Outcome: Progressing  10/22/2022 1650 by RAVEN Ortega  Outcome: Progressing     Problem: Impaired Comfort  Goal: Patient reports pain level is manageable/acceptable  Description: INTERVENTIONS:  1. Provide emotional support and reassurance at all times to relieve anxiety  2. Position patient for comfort  3.  Medicate PRN for pain relief  10/22/2022 2309 by Kwabena Desai RN  Outcome: Progressing  10/22/2022 1650 by RAVEN Ortega  Outcome: Progressing     Problem: Deficient Knowledge  Goal: Pt/family demonstrate understanding of pre/post-procedure instructions  Description: INTERVENTIONS:  1. Provide pre-procedure instructions  2. Provide written and verbal post-procedural instructions  10/22/2022 2309 by Joya Neri RN  Outcome: Progressing  10/22/2022 1650 by RAVEN Card  Outcome: Progressing     Problem: Risk for Injury  Goal: ARCHIVE-Patient remains free from injury  Description: INTERVENTIONS:  1. Universal precautions  2. Ensure bed, stretcher, or wheelchair are in locked position when not transporting  3. Position patient per procedure requirement  4. Maintain patent airway  5. Perform pre-op equipment checks  10/22/2022 2309 by Joya Neri RN  Outcome: Progressing  10/22/2022 1650 by RAVEN Card  Outcome: Progressing     Problem: Pain  Goal: Verbalizes/displays adequate comfort level or baseline comfort level  10/22/2022 2309 by Joya Neri RN  Outcome: Progressing  10/22/2022 1650 by RAVEN Card  Outcome: Progressing     Problem: Anxiety  Goal: Will report anxiety at manageable levels  Description: INTERVENTIONS:  1. Administer medication as ordered  2. Teach and rehearse alternative coping skills  3. Provide emotional support with 1:1 interaction with staff  10/22/2022 2309 by Joya Neri RN  Outcome: Progressing  10/22/2022 1650 by RAVEN Card  Outcome: Progressing  Flowsheets (Taken 10/22/2022 1643)  Will report anxiety at manageable levels:   Administer medication as ordered   Provide emotional support with 1:1 interaction with staff   Teach and rehearse alternative coping skills     Problem: Coping  Goal: Pt/Family able to verbalize concerns and demonstrate effective coping strategies  Description: INTERVENTIONS:  1. Assist patient/family to identify coping skills, available support systems and cultural and spiritual values  2.  Provide emotional support, including active listening and acknowledgement of concerns of patient and caregivers  3. Reduce environmental stimuli, as able  4. Instruct patient/family in relaxation techniques, as appropriate  5. Assess for spiritual pain/suffering and initiate Spiritual Care, Psychosocial Clinical Specialist consults as needed  10/22/2022 2309 by Joya Neri RN  Outcome: Progressing  10/22/2022 1650 by RAVEN Card CNS  Outcome: Progressing  Flowsheets (Taken 10/22/2022 1643)  Patient/family able to verbalize anxieties, fears, and concerns, and demonstrate effective coping:   Assist patient/family to identify coping skills, available support systems and cultural and spiritual values   Provide emotional support, including active listening and acknowledgement of concerns of patient and caregivers   Reduce environmental stimuli, as able   Instruct patient/family in relaxation techniques, as appropriate     Problem: Confusion  Goal: Confusion, delirium, dementia, or psychosis is improved or at baseline  Description: INTERVENTIONS:  1. Assess for possible contributors to thought disturbance, including medications, impaired vision or hearing, underlying metabolic abnormalities, dehydration, psychiatric diagnoses, and notify attending LIP  2. [place] high risk fall precautions, as indicated  3. Provide frequent short contacts to provide reality reorientation, refocusing and direction  4. Decrease environmental stimuli, including noise as appropriate  5. Monitor and intervene to maintain adequate nutrition, hydration, elimination, sleep and activity  6. If unable to ensure safety without constant attention obtain sitter and review sitter guidelines with assigned personnel  7.  Initiate Psychosocial CNS and Spiritual Care consult, as indicated  10/22/2022 2309 by Joya Neri RN  Outcome: Progressing  10/22/2022 1650 by RAVEN Card CNS  Outcome: Progressing  Flowsheets (Taken 10/22/2022 1643)  Effect of thought disturbance (confusion, delirium, dementia, or psychosis) are managed with adequate functional status:   Assess for contributors to thought disturbance, including medications, impaired vision or hearing, underlying metabolic abnormalities, dehydration, psychiatric diagnoses, notify [facility] high risk fall precautions, as indicated   Provide frequent short contacts to provide reality reorientation, refocusing and direction     Problem: Depression/Self Harm  Goal: Effect of psychiatric condition will be minimized and patient will be protected from self harm  Description: INTERVENTIONS:  1. Assess impact of patient's symptoms on level of functioning, self care needs and offer support as indicated  2. Assess patient/family knowledge of depression, impact on illness and need for teaching  3. Provide emotional support, presence and reassurance  4. Assess for possible suicidal thoughts or ideation. If patient expresses suicidal thoughts or statements do not leave alone, initiate Suicide Precautions, move to a room close to the nursing station and obtain sitter  5. Initiate consults as appropriate with Mental Health Professional, Spiritual Care, Psychosocial CNS, and consider a recommendation to the LIP for a Psychiatric Consultation  10/22/2022 2309 by John Zuniga RN  Outcome: Progressing  10/22/2022 1650 by RAVEN Dale - CNS  Outcome: Progressing  Flowsheets  Taken 10/22/2022 1649  Effect of psychiatric condition will be minimized and patient will be protected from self harm:   Assess impact of patients symptoms on level of functioning, self care needs and offer support as indicated   Assess patient/family knowledge of depression, impact on illness and need for teaching   Provide emotional support, presence and reassurance   Assess for suicidal thoughts or ideation.  If patient expresses suicidal thoughts or statements do not leave alone, initiate Suicide Precautions, move near nurse station, obtain sitter  Taken 10/22/2022 1643  Effect of psychiatric condition will be minimized and patient will be protected from self harm:   Assess impact of patients symptoms on level of functioning, self care needs and offer support as indicated   Assess patient/family knowledge of depression, impact on illness and need for teaching   Provide emotional support, presence and reassurance

## 2022-10-23 NOTE — PROGRESS NOTES
Pt denies any suicidal thoughts, reports homicidal thoughts when asked to who pt states his wife. Pt denied voices, pt calls his thoughts are the ones that are bothering his this morning.

## 2022-10-23 NOTE — PROGRESS NOTES
Pt. refused to attend the 1000 skills group, despite staff encouragement. Remains on 1:1 with staff present for pts safety.  Electronically signed by Sayda Ayers on 10/23/2022 at 11:32 AM

## 2022-10-23 NOTE — PROGRESS NOTES
Pt is noted to be resting in bed, it is reported that pt ate breakfast in the dinning room, ate everything but the elaine, drank 100%supplement, pt reports depression #8, unable to give anxiety a number, explained and gave all am meds, xanax 0.25 pt states he doesn't have to urinate, but when asked to try pt walked to the bathroom and voided extra large amount of yel clear urin, all with a steady gait.

## 2022-10-23 NOTE — GROUP NOTE
Group Therapy Note    Date: 10/23/2022    Group Start Time: 1600  Group End Time: 1645  Group Topic: Healthy Living/Wellness    [id number]    Lillian Sheridan RN        Group Therapy Note    Attendees: 13/19       Patient's Goal:  Learning grounding techniques- handout given for reference    Status After Intervention:  Unchanged    Participation Level: Minimal    Participation Quality: Resistant      Speech:  mute      Thought Process/Content: Logical      Affective Functioning: Constricted/Restricted      Mood: euthymic      Level of consciousness:  Oriented x4      Response to Learning: Able to verbalize current knowledge/experience      Endings: None Reported    Modes of Intervention: Education, Support, and Socialization      Discipline Responsible: Registered Nurse      Signature:  Lillian Sheridan RN

## 2022-10-23 NOTE — PROGRESS NOTES
Pt is laying in bed explained and gave xanax 0.25,  Pt shrugged shoulders states this doesn't work for my anxiety,

## 2022-10-23 NOTE — PLAN OF CARE
Problem: Self Harm/Suicidality  Goal: Will have no self-injury during hospital stay  Description: INTERVENTIONS:  1. Q 30 MINUTES: Routine safety checks  2. Q SHIFT & PRN: Assess risk to determine if routine checks are adequate to maintain patient safety  Outcome: Progressing     Problem: Chronic Conditions and Co-morbidities  Goal: Patient's chronic conditions and co-morbidity symptoms are monitored and maintained or improved  Outcome: Progressing  Flowsheets (Taken 10/23/2022 6226)  Care Plan - Patient's Chronic Conditions and Co-Morbidity Symptoms are Monitored and Maintained or Improved: Monitor and assess patient's chronic conditions and comorbid symptoms for stability, deterioration, or improvement     Problem: Safety - Adult  Goal: Free from fall injury  Outcome: Progressing     Problem: ABCDS Injury Assessment  Goal: Absence of physical injury  Outcome: Progressing     Problem: Skin/Tissue Integrity  Goal: Absence of new skin breakdown  Description: 1. Monitor for areas of redness and/or skin breakdown  2. Assess vascular access sites hourly  3. Every 4-6 hours minimum:  Change oxygen saturation probe site  4. Every 4-6 hours:  If on nasal continuous positive airway pressure, respiratory therapy assess nares and determine need for appliance change or resting period. Outcome: Progressing     Patient isolates to his room with a 1:1 at bedside. He presents with a depressed affect and hopeless overtone. He initiates no interaction and responces are short. He described feeling similar to yesterday and no better. Reluctant to involve in groups or come out of room. No active SI, HI plan but continued intrusive thoughrts.

## 2022-10-23 NOTE — CARE COORDINATION
Group Therapy Note    Date: 10/23/2022  Start Time: 1100  End Time:  8673    Number of Participants: 10    Type of Group: Psychotherapy    Patient's Goal:  To participate in a goal oriented group. Notes: Patient declined to attend psychoeducation group at 1100 despite encouragement by staff.      Discipline Responsible: /Counselor    FER Webster

## 2022-10-24 ENCOUNTER — APPOINTMENT (OUTPATIENT)
Dept: POSTOP/PACU | Age: 72
DRG: 885 | End: 2022-10-24
Payer: MEDICARE

## 2022-10-24 ENCOUNTER — ANESTHESIA EVENT (OUTPATIENT)
Dept: POSTOP/PACU | Age: 72
End: 2022-10-24

## 2022-10-24 ENCOUNTER — ANESTHESIA (OUTPATIENT)
Dept: POSTOP/PACU | Age: 72
End: 2022-10-24

## 2022-10-24 PROBLEM — G20.C PARKINSONISM: Status: ACTIVE | Noted: 2022-10-24

## 2022-10-24 PROBLEM — G24.9 DYSKINESIA: Status: ACTIVE | Noted: 2022-10-24

## 2022-10-24 PROBLEM — G20 PARKINSONISM (HCC): Status: ACTIVE | Noted: 2022-10-24

## 2022-10-24 LAB
GLUCOSE BLD-MCNC: 106 MG/DL (ref 70–99)
GLUCOSE BLD-MCNC: 129 MG/DL (ref 70–99)
GLUCOSE BLD-MCNC: 129 MG/DL (ref 70–99)
GLUCOSE BLD-MCNC: 138 MG/DL (ref 70–99)
PERFORMED ON: ABNORMAL

## 2022-10-24 PROCEDURE — 6370000000 HC RX 637 (ALT 250 FOR IP): Performed by: INTERNAL MEDICINE

## 2022-10-24 PROCEDURE — GZB4ZZZ OTHER ELECTROCONVULSIVE THERAPY: ICD-10-PCS | Performed by: PSYCHIATRY & NEUROLOGY

## 2022-10-24 PROCEDURE — 97116 GAIT TRAINING THERAPY: CPT

## 2022-10-24 PROCEDURE — 7100000000 HC PACU RECOVERY - FIRST 15 MIN

## 2022-10-24 PROCEDURE — 2580000003 HC RX 258: Performed by: STUDENT IN AN ORGANIZED HEALTH CARE EDUCATION/TRAINING PROGRAM

## 2022-10-24 PROCEDURE — 1240000000 HC EMOTIONAL WELLNESS R&B

## 2022-10-24 PROCEDURE — 97535 SELF CARE MNGMENT TRAINING: CPT

## 2022-10-24 PROCEDURE — 2580000003 HC RX 258: Performed by: PSYCHIATRY & NEUROLOGY

## 2022-10-24 PROCEDURE — 7100000001 HC PACU RECOVERY - ADDTL 15 MIN

## 2022-10-24 PROCEDURE — 99232 SBSQ HOSP IP/OBS MODERATE 35: CPT | Performed by: NURSE PRACTITIONER

## 2022-10-24 PROCEDURE — 90870 ELECTROCONVULSIVE THERAPY: CPT | Performed by: PSYCHIATRY & NEUROLOGY

## 2022-10-24 PROCEDURE — 6370000000 HC RX 637 (ALT 250 FOR IP): Performed by: PSYCHIATRY & NEUROLOGY

## 2022-10-24 PROCEDURE — 6360000002 HC RX W HCPCS: Performed by: STUDENT IN AN ORGANIZED HEALTH CARE EDUCATION/TRAINING PROGRAM

## 2022-10-24 PROCEDURE — 3700000000 HC ANESTHESIA ATTENDED CARE

## 2022-10-24 PROCEDURE — 90870 ELECTROCONVULSIVE THERAPY: CPT

## 2022-10-24 PROCEDURE — 2500000003 HC RX 250 WO HCPCS: Performed by: STUDENT IN AN ORGANIZED HEALTH CARE EDUCATION/TRAINING PROGRAM

## 2022-10-24 RX ORDER — FLUMAZENIL 0.1 MG/ML
INJECTION INTRAVENOUS
Status: DISPENSED
Start: 2022-10-24 | End: 2022-10-24

## 2022-10-24 RX ORDER — SUCCINYLCHOLINE/SOD CL,ISO/PF 100 MG/5ML
SYRINGE (ML) INTRAVENOUS PRN
Status: DISCONTINUED | OUTPATIENT
Start: 2022-10-24 | End: 2022-10-24 | Stop reason: SDUPTHER

## 2022-10-24 RX ORDER — SODIUM CHLORIDE 9 MG/ML
INJECTION, SOLUTION INTRAVENOUS CONTINUOUS PRN
Status: DISCONTINUED | OUTPATIENT
Start: 2022-10-24 | End: 2022-10-24 | Stop reason: SDUPTHER

## 2022-10-24 RX ORDER — HALOPERIDOL 2 MG/1
2 TABLET ORAL EVERY 6 HOURS PRN
Status: DISCONTINUED | OUTPATIENT
Start: 2022-10-24 | End: 2022-11-07 | Stop reason: HOSPADM

## 2022-10-24 RX ORDER — GLYCOPYRROLATE 1 MG/5 ML
SYRINGE (ML) INTRAVENOUS PRN
Status: DISCONTINUED | OUTPATIENT
Start: 2022-10-24 | End: 2022-10-24 | Stop reason: SDUPTHER

## 2022-10-24 RX ORDER — ONDANSETRON 2 MG/ML
INJECTION INTRAMUSCULAR; INTRAVENOUS PRN
Status: DISCONTINUED | OUTPATIENT
Start: 2022-10-24 | End: 2022-10-24 | Stop reason: SDUPTHER

## 2022-10-24 RX ORDER — PROPOFOL 10 MG/ML
INJECTION, EMULSION INTRAVENOUS PRN
Status: DISCONTINUED | OUTPATIENT
Start: 2022-10-24 | End: 2022-10-24 | Stop reason: SDUPTHER

## 2022-10-24 RX ADMIN — ALPRAZOLAM 0.25 MG: 0.5 TABLET ORAL at 13:02

## 2022-10-24 RX ADMIN — PROPOFOL 100 MG: 10 INJECTION, EMULSION INTRAVENOUS at 11:46

## 2022-10-24 RX ADMIN — ASPIRIN 81 MG 81 MG: 81 TABLET ORAL at 13:02

## 2022-10-24 RX ADMIN — SODIUM CHLORIDE: 9 INJECTION, SOLUTION INTRAVENOUS at 11:43

## 2022-10-24 RX ADMIN — Medication 5 MG: at 21:12

## 2022-10-24 RX ADMIN — SODIUM CHLORIDE: 9 INJECTION, SOLUTION INTRAVENOUS at 11:39

## 2022-10-24 RX ADMIN — ISOSORBIDE MONONITRATE 30 MG: 60 TABLET, EXTENDED RELEASE ORAL at 13:02

## 2022-10-24 RX ADMIN — ONDANSETRON 4 MG: 2 INJECTION INTRAMUSCULAR; INTRAVENOUS at 11:45

## 2022-10-24 RX ADMIN — ALPRAZOLAM 0.25 MG: 0.5 TABLET ORAL at 21:12

## 2022-10-24 RX ADMIN — PAROXETINE HYDROCHLORIDE 40 MG: 20 TABLET, FILM COATED ORAL at 13:02

## 2022-10-24 RX ADMIN — ATORVASTATIN CALCIUM 20 MG: 20 TABLET, FILM COATED ORAL at 21:12

## 2022-10-24 RX ADMIN — Medication 30 MG: at 11:47

## 2022-10-24 RX ADMIN — RISPERIDONE 0.25 MG: 0.25 TABLET ORAL at 21:12

## 2022-10-24 RX ADMIN — Medication 0.2 MG: at 11:45

## 2022-10-24 ASSESSMENT — ENCOUNTER SYMPTOMS
TROUBLE SWALLOWING: 0
NAUSEA: 0
WHEEZING: 0
SHORTNESS OF BREATH: 0
SHORTNESS OF BREATH: 1
CHEST TIGHTNESS: 0
COUGH: 0
VOMITING: 0
COLOR CHANGE: 0

## 2022-10-24 ASSESSMENT — PATIENT HEALTH QUESTIONNAIRE - PHQ9: SUM OF ALL RESPONSES TO PHQ QUESTIONS 1-9: 27

## 2022-10-24 NOTE — PLAN OF CARE
Patient reports intense intrusive thoughts. Anxiety and depression are a 5/10. Patient seen out in dayroom for meals. 1:1 remains with patient. Patient states the thoughts to hurt his wife are getting to him. After ECT, they got very bad. Anxiety was 10/10. Was given scheduled xanax. Isolative to room  Problem: Self Harm/Suicidality  Goal: Will have no self-injury during hospital stay  Description: INTERVENTIONS:  1. Q 30 MINUTES: Routine safety checks  2. Q SHIFT & PRN: Assess risk to determine if routine checks are adequate to maintain patient safety  10/24/2022 1900 by Pema Peraza RN  Outcome: Progressing  10/24/2022 0637 by Melany Betancur RN  Outcome: Progressing     Problem: Chronic Conditions and Co-morbidities  Goal: Patient's chronic conditions and co-morbidity symptoms are monitored and maintained or improved  10/24/2022 1900 by Pema Peraza RN  Outcome: Progressing  10/24/2022 0637 by Melany Betancur RN  Outcome: Progressing     Problem: Safety - Adult  Goal: Free from fall injury  10/24/2022 1900 by Pema Peraza RN  Outcome: Progressing  10/24/2022 0637 by Melany Betancur RN  Outcome: Progressing     Problem: ABCDS Injury Assessment  Goal: Absence of physical injury  10/24/2022 1900 by Pema Peraza RN  Outcome: Progressing  10/24/2022 0637 by Melany Betancur RN  Outcome: Progressing     Problem: Skin/Tissue Integrity  Goal: Absence of new skin breakdown  Description: 1. Monitor for areas of redness and/or skin breakdown  2. Assess vascular access sites hourly  3. Every 4-6 hours minimum:  Change oxygen saturation probe site  4. Every 4-6 hours:  If on nasal continuous positive airway pressure, respiratory therapy assess nares and determine need for appliance change or resting period.   10/24/2022 1900 by Pema Peraza RN  Outcome: Progressing  10/24/2022 0637 by Melany Betancur RN  Outcome: Progressing     Problem: Nutrition Deficit:  Goal: Optimize nutritional status  10/24/2022 1900 by Shy Francisco, RN  Outcome: Progressing  10/24/2022 0637 by Anirudh Barker RN  Outcome: Progressing     Problem: Risk for Physiologic Instability  Goal: B/P, SaO2, EKG, and respiratory status WDL  Description: INTERVENTIONS:  1. Monitor B/P, SaO2, EKG and respiratory function  2. Notify physician of unstable condition/changes  10/24/2022 1900 by Shy Singh RN  Outcome: Progressing  10/24/2022 0637 by Anirudh Barker RN  Outcome: Progressing     Problem: Impaired Comfort  Goal: Patient reports pain level is manageable/acceptable  Description: INTERVENTIONS:  1. Provide emotional support and reassurance at all times to relieve anxiety  2. Position patient for comfort  3. Medicate PRN for pain relief  10/24/2022 1900 by Shy Singh RN  Outcome: Progressing  10/24/2022 0637 by Anirudh Barker RN  Outcome: Progressing     Problem: Deficient Knowledge  Goal: Pt/family demonstrate understanding of pre/post-procedure instructions  Description: INTERVENTIONS:  1. Provide pre-procedure instructions  2. Provide written and verbal post-procedural instructions  10/24/2022 1900 by Shy Singh RN  Outcome: Progressing  10/24/2022 0637 by Anirudh Barker RN  Outcome: Progressing     Problem: Risk for Injury  Goal: ARCHIVE-Patient remains free from injury  Description: INTERVENTIONS:  1. Universal precautions  2. Ensure bed, stretcher, or wheelchair are in locked position when not transporting  3. Position patient per procedure requirement  4. Maintain patent airway  5.  Perform pre-op equipment checks  10/24/2022 1900 by Shy Singh RN  Outcome: Progressing  10/24/2022 0637 by Anirudh Barker RN  Outcome: Progressing     Problem: Pain  Goal: Verbalizes/displays adequate comfort level or baseline comfort level  10/24/2022 1900 by Shy Singh RN  Outcome: Progressing  10/24/2022 0637 by Anirudh Barker RN  Outcome: Progressing     Problem: Anxiety  Goal: Will report anxiety at manageable levels  Description: INTERVENTIONS:  1. Administer medication as ordered  2. Teach and rehearse alternative coping skills  3. Provide emotional support with 1:1 interaction with staff  10/24/2022 1900 by Zonia Vargas RN  Outcome: Progressing  10/24/2022 0637 by Hemanth Tejada RN  Outcome: Progressing     Problem: Coping  Goal: Pt/Family able to verbalize concerns and demonstrate effective coping strategies  Description: INTERVENTIONS:  1. Assist patient/family to identify coping skills, available support systems and cultural and spiritual values  2. Provide emotional support, including active listening and acknowledgement of concerns of patient and caregivers  3. Reduce environmental stimuli, as able  4. Instruct patient/family in relaxation techniques, as appropriate  5. Assess for spiritual pain/suffering and initiate Spiritual Care, Psychosocial Clinical Specialist consults as needed  10/24/2022 1900 by Zonia Vargas RN  Outcome: Progressing  10/24/2022 0637 by Hemanth Tejada RN  Outcome: Progressing     Problem: Confusion  Goal: Confusion, delirium, dementia, or psychosis is improved or at baseline  Description: INTERVENTIONS:  1. Assess for possible contributors to thought disturbance, including medications, impaired vision or hearing, underlying metabolic abnormalities, dehydration, psychiatric diagnoses, and notify attending LIP  2. [place] high risk fall precautions, as indicated  3. Provide frequent short contacts to provide reality reorientation, refocusing and direction  4. Decrease environmental stimuli, including noise as appropriate  5. Monitor and intervene to maintain adequate nutrition, hydration, elimination, sleep and activity  6. If unable to ensure safety without constant attention obtain sitter and review sitter guidelines with assigned personnel  7.  Initiate Psychosocial CNS and Spiritual Care consult, as indicated  10/24/2022 1900 by Zonia Vargas RN  Outcome: Progressing  10/24/2022 0062 by Bernardino Lakhani Ronny Gonsalves RN  Outcome: Progressing     Problem: Depression/Self Harm  Goal: Effect of psychiatric condition will be minimized and patient will be protected from self harm  Description: INTERVENTIONS:  1. Assess impact of patient's symptoms on level of functioning, self care needs and offer support as indicated  2. Assess patient/family knowledge of depression, impact on illness and need for teaching  3. Provide emotional support, presence and reassurance  4. Assess for possible suicidal thoughts or ideation. If patient expresses suicidal thoughts or statements do not leave alone, initiate Suicide Precautions, move to a room close to the nursing station and obtain sitter  5. Initiate consults as appropriate with Mental Health Professional, Spiritual Care, Psychosocial CNS, and consider a recommendation to the LIP for a Psychiatric Consultation  10/24/2022 1900 by Jose Alberto Giron RN  Outcome: Progressing  10/24/2022 0637 by Hillary Vincent RN  Outcome: Progressing  Flowsheets (Taken 10/23/2022 2007 by Olga Lidia Link LPN)  Effect of psychiatric condition will be minimized and patient will be protected from self harm:   Assess impact of patients symptoms on level of functioning, self care needs and offer support as indicated   Assess patient/family knowledge of depression, impact on illness and need for teaching   Provide emotional support, presence and reassurance   Assess for suicidal thoughts or ideation. If patient expresses suicidal thoughts or statements do not leave alone, initiate Suicide Precautions, move near nurse station, obtain sitter   .

## 2022-10-24 NOTE — PROGRESS NOTES
[facility]ER[facility] MED SURG TREATMENT NOTE     Date: 10/24/2022  Patient Name: Lawrence Marin        MRN: [id number]  Account: [de-identified]   : 1950  (74 y.o.)  [facility]    Chart Review:    Restrictions  Restrictions/Precautions  Restrictions/Precautions: Fall Risk     Safety:  Safety Devices  Type of Devices:  (Patient left with Evaristowilfrid Hawthorne PTA and Kota PTA)    Patient's birthday verified: Yes    Subjective:     I need to talk to the doctor       Pain at start of treatment: No    Pain at end of treatment: No    Objective:    ADL Status:  ADL  Grooming: Supervision  Grooming Skilled Clinical Factors: in standing at sink  UE Dressing: Stand by assistance  UE Dressing Skilled Clinical Factors: doffing/donning button up shirt in standing  LE Dressing: Stand by assistance  LE Dressing Skilled Clinical Factors: doffing/donning B socks    Therapy key for assistance levels -   Independent/Mod I = Pt. is able to perform task with no assistance but may require a device   Stand by assistance = Pt. does not perform task at an independent level but does not need physical assistance, requires verbal cues  Minimal, Moderate, Maximal Assistance = Pt. requires physical assistance (25%, 50%, 75% assist from helper) for task but is able to actively participate in task   Dependent = Pt. requires total assistance with task and is not able to actively participate with task completion    Orientation Status:  Orientation  Overall Orientation Status: Within Functional Limits    Cognition Status:  Cognition  Overall Cognitive Status: WFL    Perception Status:  Perception  Overall Perceptual Status: WFL    Functional Mobility:  Patient ambulated to/from sink with No device at CGA level.  CGA 2° slight R lateral LOB    TFunctional Endurance:  Activity Tolerance  Activity Tolerance: Patient Tolerated treatment well    Treatment consisted of:    ADL training    Assessment/Discharge Disposition:          [employer]  How much help for putting on and taking off regular lower body clothing?: A Little  How much help for Bathing?: A Little  How much help for Toileting?: A Little  How much help for putting on and taking off regular upper body clothing?: A Little  How much help for taking care of personal grooming?: A Little  How much help for eating meals?: None  AM-PAC Inpatient Daily Activity Raw Score: 19  AM-PAC Inpatient ADL T-Scale Score : 40.22  ADL Inpatient CMS 0-100% Score: 42.8  ADL Inpatient CMS G-Code Modifier : CK    Plan:    Continue OT per POC    Patient Education:       Equipment recommendations:       Goals/Plan of care addressed during this session:        Patient Goal: Patient goals : \"I just want to go to a nursing home. \"    Improve [place] with ADLs    Therapy Time:   Individual Group Co-Treat   Time In 1310       Time Out 1325         Minutes 15                ADL/IADL training: 15 minutes    Electronically signed by:    KEN Pérez    10/24/2022, 1:41 PM

## 2022-10-24 NOTE — PROGRESS NOTES
Physical Therapy Missed Treatment   Facility/Department: [facility] MED SURG G027/X994-01    NAME: Bev Dowling  Patient Status:   : 1950 (74 y.o.)  MRN: [id number]  Account: [de-identified]  Gender: male        [] Patient Declines PT Treatment            [x] Patient Unavailable:     Pt currently in procedure and is not available for PT tx at this time. Will attempt PT Treatment again at earliest convenience.         Electronically signed by [place] CECILLE Hernandez on 10/24/22 at 11:42 AM EDT

## 2022-10-24 NOTE — OP NOTE
Department of Psychiatry  Electroconvulsive Therapy Treatment Note        10/24/2022    PURPOSE FOR ECT:  Therapeutic NUMBER: 3    DIAGNOSIS:   Principal Problem:    Severe episode of recurrent major depressive disorder, with psychotic features ([address]  Active Problems:    Tremor  Resolved Problems:    * No resolved hospital problems.  *      MEDICATIONS:    Current Facility-Administered Medications:     flumazenil (ROMAZICON) 0.5 MG/5ML injection, , , ,     melatonin disintegrating tablet 5 mg, 5 mg, Oral, Nightly, Louis Au MD, 5 mg at 10/23/22 2209    PARoxetine (PAXIL) tablet 40 mg, 40 mg, Oral, Daily, Louis Au MD, 40 mg at 10/23/22 0851    0.9 % sodium chloride infusion, , IntraVENous, Continuous, Adelita Espinoza MD, Last Rate: 100 mL/hr at 10/21/22 1111, New Bag at 10/21/22 1111    risperiDONE (RISPERDAL) tablet 0.25 mg, 0.25 mg, Oral, Nightly, Adelita Espinoza MD, 0.25 mg at 10/23/22 2107    benzocaine (ORAJEL) 20 % mucosal gel, , Mouth/Throat, BID PRN, RAVEN Dobbins CNP    traZODone (DESYREL) tablet 50 mg, 50 mg, Oral, Nightly PRN, Adelita Espinoza MD, 50 mg at 10/20/22 2142    haloperidol lactate (HALDOL) injection 2 mg, 2 mg, IntraMUSCular, Q6H PRN, Adelita Espinoza MD, 2 mg at 10/19/22 0901    insulin lispro (HUMALOG) injection vial 0-8 Units, 0-8 Units, SubCUTAneous, TID WC, RAVEN Juarez NP    insulin lispro (HUMALOG) injection vial 0-4 Units, 0-4 Units, SubCUTAneous, Nightly, RAVEN Juarez NP    glucose chewable tablet 16 g, 4 tablet, Oral, PRN, RAVEN Juarez - NP    dextrose bolus 10% 125 mL, 125 mL, IntraVENous, PRN **OR** dextrose bolus 10% 250 mL, 250 mL, IntraVENous, PRN, RAVEN Juarez - NP    glucagon (rDNA) injection 1 mg, 1 mg, SubCUTAneous, PRN, RAVEN Juarez NP    dextrose 10 % infusion, , IntraVENous, Continuous PRN, RAVEN Juarez NP    isosorbide mononitrate (IMDUR) extended release tablet 30 mg, 30 mg, Oral,

## 2022-10-24 NOTE — PROGRESS NOTES
Physical Therapy Med Surg Daily Treatment Note  Facility/Department: [address]  Room: [place] [address]       NAME: Trey Deleon  :  (74 y.o.)  MRN: [id number]  CODE STATUS: Full Code    Date of Service: 10/24/2022    Patient Diagnosis(es): Severe episode of recurrent major depressive disorder, with psychotic features ([facility] [F33.3]  Major depression with psychotic features [facility]) [F32.3]   Chief Complaint   Patient presents with    Suicidal    Homicidal     For a month or more     Patient Active Problem List    Diagnosis Date Noted    Parkinsonism ([facility] 10/24/2022    Dyskinesia 10/24/2022    Tremor 10/17/2022    Severe episode of recurrent major depressive disorder, with psychotic features ([facility] 10/16/2022    Hyperlipidemia 2021    Heart murmur 2021    Dyspnea on exertion 2021    COVID-19 2021    Weakness 09/10/2021    Adenomatous polyp of sigmoid colon     Chronic gastritis without bleeding     Dyspepsia     Mixed obsessional thoughts and acts     Coronary artery disease involving native coronary artery of native heart without angina pectoris 02/15/2019    Generalized anxiety disorder 04/15/2018    Anxiety     Recurrent major depressive disorder, in remission ([address]     Chest pain 2018    Hypertension 2018    Benzodiazepine dependence ([address] 2017    Type 2 diabetes mellitus without complication, without long-term current use of insulin ([address] 2017    Insomnia secondary to depression with anxiety 2016    Panic attacks 2016    ED (erectile dysfunction) 2011        Past Medical History:   Diagnosis Date    Anxiety     Chest pain 2018    Coronary artery disease involving native coronary artery of native heart without angina pectoris 02/15/2019    Diabetes mellitus ([address]     no meds    History of colon polyps     Hyperlipidemia     Hypertension     Type 2 diabetes mellitus without complication ([address]     Vertigo      Past Surgical History: Procedure Laterality Date    CARDIAC SURGERY  1973    PTCA     COLONOSCOPY      COLONOSCOPY N/A 10/17/2019    COLORECTAL CANCER SCREENING, HIGH RISK performed by Jorge Mayo MD at [facility]      OTHER SURGICAL HISTORY      patent foramen ovale    TONSILLECTOMY      UPPER GASTROINTESTINAL ENDOSCOPY N/A 8/1/2019    EGD ESOPHAGOGASTRODUODENOSCOPY performed by Jorge Mayo MD at [facility]       Chart Reviewed: Yes    Restrictions:  Restrictions/Precautions: Fall Risk    SUBJECTIVE:   Subjective: \"Okay\"    Pain  Pain: denies pain. OBJECTIVE:   Orientation  Overall Orientation Status: Within Functional Limits  Cognition  Overall Cognitive Status: WFL    Bed mobility  Bed Mobility Comments: N/T- pt up in chair with OT at beginning of tx and in common area at end of session. Nursing notified. Transfers  Sit to Stand: Supervision  Stand to Sit: Supervision  Comment: [employer] utilized this session d/t OT reporting slight LOB during session today. Pt demos good stability with WW, requires extra time to stabilize but otherwise steady    Ambulation  Surface: Level tile  Device: Rolling Walker  Assistance: Stand by assistance  Quality of Gait: step through, FF posture, occasional stop and go  Gait Deviations: Decreased step length;Decreased step height  Distance: 150 ft  Comments: [employer] utilized for improved stability. No LOB or instability noted, pt occasionally needing cues for [employer] management within environment. Activity Tolerance  Activity Tolerance: Patient tolerated treatment well  Vitals  O2 Device: None (Room air)          ASSESSMENT   Assessment: Pt demos improved stability with use of WW this session. Pt expresses he heard voices throughout session that he did not like. Pt left in common area with nursing per requests as he waits to speak with doctor.      Discharge Recommendations:  Continue to assess pending progress         Goals  Short Term Goals  Short Term Goal 1: SBA gait with appropriate device 50 feet  Short Term Goal 2: mejia tested at 40/56 for evidence of decreased falls risk  Short Term Goal 3: safest mobility device determined  Short Term Goal 4: indep with HEP    PLAN    General Plan: 1 time a day 3-6 times a week  Safety Devices  Type of Devices: Left in chair, Nurse notified (Pt left in common area with nurse.)     [facility] (6 CLICK) BASIC MOBILITY  AM-PAC Inpatient Mobility Raw Score : 22     Therapy Time   Individual   Time In 1326   Time Out 1336   Minutes 10      Gait- 9  BM/trsf- 1       Danny Cadet PTA, 10/24/22 at 2:25 PM         Definitions for assistance levels  Independent = pt does not require any physical supervision or assistance from another person for activity completion. Device may be needed.   Stand by assistance = pt requires verbal cues or instructions from another person, close to but not touching, to perform the activity  Minimal assistance= pt performs 75% or more of the activity; assistance is required to complete the activity  Moderate assistance= pt performs 50% of the activity; assistance is required to complete the activity  Maximal assistance = pt performs 25% of the activity; assistance is required to complete the activity  Dependent = pt requires total physical assistance to accomplish the task

## 2022-10-24 NOTE — PLAN OF CARE
Problem: Self Harm/Suicidality  Goal: Will have no self-injury during hospital stay  Description: INTERVENTIONS:  1. Q 30 MINUTES: Routine safety checks  2. Q SHIFT & PRN: Assess risk to determine if routine checks are adequate to maintain patient safety  Outcome: Progressing     Problem: Chronic Conditions and Co-morbidities  Goal: Patient's chronic conditions and co-morbidity symptoms are monitored and maintained or improved  Outcome: Progressing     Problem: Safety - Adult  Goal: Free from fall injury  Outcome: Progressing     Problem: ABCDS Injury Assessment  Goal: Absence of physical injury  Outcome: Progressing     Problem: Skin/Tissue Integrity  Goal: Absence of new skin breakdown  Description: 1. Monitor for areas of redness and/or skin breakdown  2. Assess vascular access sites hourly  3. Every 4-6 hours minimum:  Change oxygen saturation probe site  4. Every 4-6 hours:  If on nasal continuous positive airway pressure, respiratory therapy assess nares and determine need for appliance change or resting period. Outcome: Progressing     Problem: Nutrition Deficit:  Goal: Optimize nutritional status  Outcome: Progressing     Problem: Risk for Physiologic Instability  Goal: B/P, SaO2, EKG, and respiratory status WDL  Description: INTERVENTIONS:  1. Monitor B/P, SaO2, EKG and respiratory function  2. Notify physician of unstable condition/changes  Outcome: Progressing     Problem: Impaired Comfort  Goal: Patient reports pain level is manageable/acceptable  Description: INTERVENTIONS:  1. Provide emotional support and reassurance at all times to relieve anxiety  2. Position patient for comfort  3. Medicate PRN for pain relief  Outcome: Progressing     Problem: Deficient Knowledge  Goal: Pt/family demonstrate understanding of pre/post-procedure instructions  Description: INTERVENTIONS:  1. Provide pre-procedure instructions  2.  Provide written and verbal post-procedural instructions  Outcome: Progressing Problem: Risk for Injury  Goal: ARCHIVE-Patient remains free from injury  Description: INTERVENTIONS:  1. Universal precautions  2. Ensure bed, stretcher, or wheelchair are in locked position when not transporting  3. Position patient per procedure requirement  4. Maintain patent airway  5. Perform pre-op equipment checks  Outcome: Progressing     Problem: Pain  Goal: Verbalizes/displays adequate comfort level or baseline comfort level  Outcome: Progressing     Problem: Anxiety  Goal: Will report anxiety at manageable levels  Description: INTERVENTIONS:  1. Administer medication as ordered  2. Teach and rehearse alternative coping skills  3. Provide emotional support with 1:1 interaction with staff  Outcome: Progressing     Problem: Coping  Goal: Pt/Family able to verbalize concerns and demonstrate effective coping strategies  Description: INTERVENTIONS:  1. Assist patient/family to identify coping skills, available support systems and cultural and spiritual values  2. Provide emotional support, including active listening and acknowledgement of concerns of patient and caregivers  3. Reduce environmental stimuli, as able  4. Instruct patient/family in relaxation techniques, as appropriate  5. Assess for spiritual pain/suffering and initiate Spiritual Care, Psychosocial Clinical Specialist consults as needed  Outcome: Progressing     Problem: Confusion  Goal: Confusion, delirium, dementia, or psychosis is improved or at baseline  Description: INTERVENTIONS:  1. Assess for possible contributors to thought disturbance, including medications, impaired vision or hearing, underlying metabolic abnormalities, dehydration, psychiatric diagnoses, and notify attending LIP  2. [place] high risk fall precautions, as indicated  3. Provide frequent short contacts to provide reality reorientation, refocusing and direction  4. Decrease environmental stimuli, including noise as appropriate  5.  Monitor and intervene to maintain adequate nutrition, hydration, elimination, sleep and activity  6. If unable to ensure safety without constant attention obtain sitter and review sitter guidelines with assigned personnel  7. Initiate Psychosocial CNS and Spiritual Care consult, as indicated  Outcome: Progressing     Problem: Depression/Self Harm  Goal: Effect of psychiatric condition will be minimized and patient will be protected from self harm  Description: INTERVENTIONS:  1. Assess impact of patient's symptoms on level of functioning, self care needs and offer support as indicated  2. Assess patient/family knowledge of depression, impact on illness and need for teaching  3. Provide emotional support, presence and reassurance  4. Assess for possible suicidal thoughts or ideation. If patient expresses suicidal thoughts or statements do not leave alone, initiate Suicide Precautions, move to a room close to the nursing station and obtain sitter  5. Initiate consults as appropriate with Mental Health Professional, Spiritual Care, Psychosocial CNS, and consider a recommendation to the LIP for a Psychiatric Consultation  Outcome: Progressing  Flowsheets (Taken 10/23/2022 2007 by Kiana Jenkins LPN)  Effect of psychiatric condition will be minimized and patient will be protected from self harm:   Assess impact of patients symptoms on level of functioning, self care needs and offer support as indicated   Assess patient/family knowledge of depression, impact on illness and need for teaching   Provide emotional support, presence and reassurance   Assess for suicidal thoughts or ideation.  If patient expresses suicidal thoughts or statements do not leave alone, initiate Suicide Precautions, move near nurse station, obtain sitter

## 2022-10-24 NOTE — CARE COORDINATION
Group Therapy Note    Date: 10/24/2022  Start Time: 1430  End Time:  1500    Number of Participants: 4    Type of Group: Cognitive Skills    Patient's Goal:  To participate in mood management group. Notes: Patient declined to attend psychoeducation group at 1430 despite encouragement by staff.      Discipline Responsible: /Counselor    FER Cardenas

## 2022-10-24 NOTE — ANESTHESIA PRE PROCEDURE
Department of Anesthesiology  Preprocedure Note       Name:  Amisha Panda   Age:  74 y.o.  :  1950                                          MRN:  [id number]         Date:  10/24/2022      Surgeon: * No surgeons listed *    Procedure: * No procedures listed *    Medications prior to admission:   Prior to Admission medications    Medication Sig Start Date End Date Taking? Authorizing Provider   mirtazapine (REMERON) 15 MG tablet Take 15 mg by mouth nightly   Yes Historical Provider, MD   ALPRAZolam (XANAX) 0.25 MG tablet Take 0.25 mg by mouth in the morning, at noon, and at bedtime.    Yes Historical Provider, MD   albuterol sulfate HFA (VENTOLIN HFA) 108 (90 Base) MCG/ACT inhaler Inhale 2 puffs into the lungs 4 times daily as needed for Wheezing 10/12/22   Pavel Mahan MD   isosorbide mononitrate (IMDUR) 30 MG extended release tablet TAKE 1 TABLET BY MOUTH DAILY 10/12/22   Rian Vega DO   ranolazine (RANEXA) 1000 MG extended release tablet TAKE 1 TABLET BY MOUTH TWICE DAILY 8/15/22   Deion Perry, APRN - CNP   escitalopram (LEXAPRO) 10 MG tablet Take 20 mg by mouth daily 11/3/21   Historical Provider, MD   aspirin 81 MG chewable tablet Take 1 tablet by mouth daily 10/29/21   Keyanna Robledo MD   Blood Glucose Monitoring Suppl ([address]) w/Device KIT  18   Historical Provider, MD Leal Hudson strip  18   Historical Provider, MD Moreno Baisden LANCETS E [address] [facility]  18   Historical Provider, MD       Current medications:    Current Facility-Administered Medications   Medication Dose Route Frequency Provider Last Rate Last Admin    flumazenil (ROMAZICON) 0.5 MG/5ML injection             melatonin disintegrating tablet 5 mg  5 mg Oral Nightly Isabel Mtz MD   5 mg at 10/23/22 2209    PARoxetine (PAXIL) tablet 40 mg  40 mg Oral Daily Isabel Mtz MD   40 mg at 10/23/22 0851    0.9 % sodium chloride infusion   IntraVENous Continuous Kiersten Swift  mL/hr at 10/21/22 1111 New Bag at 10/21/22 1111    risperiDONE (RISPERDAL) tablet 0.25 mg  0.25 mg Oral Nightly Mary Alice Gonzalez MD   0.25 mg at 10/23/22 2107    benzocaine (ORAJEL) 20 % mucosal gel   Mouth/Throat BID PRN RAVEN Hui CNP        traZODone (DESYREL) tablet 50 mg  50 mg Oral Nightly PRN Mary Alice Gonzalez MD   50 mg at 10/20/22 2142    haloperidol lactate (HALDOL) injection 2 mg  2 mg IntraMUSCular Q6H PRN Mary Alice Gonzalez MD   2 mg at 10/19/22 0901    insulin lispro (HUMALOG) injection vial 0-8 Units  0-8 Units SubCUTAneous TID WC RAVEN Amaro NP        insulin lispro (HUMALOG) injection vial 0-4 Units  0-4 Units SubCUTAneous Nightly RAVEN Amaro NP        glucose chewable tablet 16 g  4 tablet Oral PRN RAVEN Amaro NP        dextrose bolus 10% 125 mL  125 mL IntraVENous PRN RAVEN Amaro NP        Or    dextrose bolus 10% 250 mL  250 mL IntraVENous PRN RAVEN Amaro NP        glucagon (rDNA) injection 1 mg  1 mg SubCUTAneous PRN RAVEN Amaro NP        dextrose 10 % infusion   IntraVENous Continuous PRN RAVEN Amaro NP        isosorbide mononitrate (IMDUR) extended release tablet 30 mg  30 mg Oral Daily Debbie Hernandez MD   30 mg at 10/23/22 0855    metoprolol succinate (TOPROL XL) extended release tablet 25 mg  25 mg Oral Nightly Debbie Hernandez MD   25 mg at 10/23/22 2107    atorvastatin (LIPITOR) tablet 20 mg  20 mg Oral Nightly Debbie Hernandez MD   20 mg at 10/23/22 2107    ALPRAZolam (XANAX) tablet 0.25 mg  0.25 mg Oral TID Mary Alice Gonzalez MD   0.25 mg at 10/23/22 2106    albuterol sulfate HFA (PROVENTIL;VENTOLIN;PROAIR) 108 (90 Base) MCG/ACT inhaler 2 puff  2 puff Inhalation 4x Daily PRN Mary Alice Gonzalez MD        aspirin chewable tablet 81 mg  81 mg Oral Daily Mary Alice Gonzalez MD   81 mg at 10/23/22 0851    acetaminophen (TYLENOL) tablet 650 mg  650 mg Oral Q4H PRN Mary Alice Gonzalez MD   650 mg at 10/22/22 1743    magnesium hydroxide (MILK OF MAGNESIA) 400 MG/5ML suspension 30 mL  30 mL Oral Daily PRN Padmini Moy MD   30 mL at 10/16/22 1217    nicotine polacrilex (COMMIT) lozenge 2 mg  2 mg Oral Q1H PRN Padmini Moy MD           Allergies:     Allergies   Allergen Reactions    Seroquel [Quetiapine] Other (See Comments)     lethargy  weak       Problem List:    Patient Active Problem List   Diagnosis Code    Chest pain R07.9    Hypertension I10    Anxiety F41.9    Recurrent major depressive disorder, in remission ([facility] [address] F33.40    Coronary artery disease involving native coronary artery of native heart without angina pectoris I25.10    Mixed obsessional thoughts and acts F42.2    Chronic gastritis without bleeding K29.50    Dyspepsia R10.13    Adenomatous polyp of sigmoid colon D12.5    Weakness R53.1    COVID-19 U07.1    Hyperlipidemia E78.5    Heart murmur R01.1    Generalized anxiety disorder F41.1    Dyspnea on exertion R06.09    ED (erectile dysfunction) N52.9    Benzodiazepine dependence (HCC) F13.20    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, F41.8    Panic attacks F41.0    Type 2 diabetes mellitus without complication, without long-term current use of insulin ([facility]) E11.9    Severe episode of recurrent major depressive disorder, with psychotic features ([facility] [address] F33.3    Tremor R25.1       Past Medical History:        Diagnosis Date    Anxiety     Chest pain 03/29/2018    Coronary artery disease involving native coronary artery of native heart without angina pectoris 02/15/2019    Diabetes mellitus ([facility] [address]     no meds    History of colon polyps     Hyperlipidemia     Hypertension     Type 2 diabetes mellitus without complication (HCC)     Vertigo        Past Surgical History:        Procedure Laterality Date    CARDIAC SURGERY  1973    PTCA     COLONOSCOPY      COLONOSCOPY N/A 10/17/2019    COLORECTAL CANCER SCREENING, HIGH RISK performed by Chica RODRIGUEZ Angelo Ivan MD at [facility] OTHER SURGICAL HISTORY      patent foramen ovale    TONSILLECTOMY      UPPER GASTROINTESTINAL ENDOSCOPY N/A 8/1/2019    EGD ESOPHAGOGASTRODUODENOSCOPY performed by Mathew Isaac MD at [facility]       Social History:    Social History     Tobacco Use    Smoking status: Never    Smokeless tobacco: Never   Substance Use Topics    Alcohol use: No                                Counseling given: Not Answered      Vital Signs (Current):   Vitals:    10/23/22 1845 10/23/22 2107 10/24/22 0922 10/24/22 1130   BP: 102/64 101/60 119/75 137/65   Pulse: 69 65 66 52   Resp: 16  18 18   Temp: 97.9 °F (36.6 °C)  98.5 °F (36.9 °C) 98 °F (36.7 °C)   TempSrc: Oral  Oral Temporal   SpO2: 99%  99% 100%   Weight:       Height:                                                  BP Readings from Last 3 Encounters:   10/24/22 137/65   10/12/22 127/67   09/13/22 (!) 157/87       NPO Status: Time of last liquid consumption: 0000                        Time of last solid consumption: 0000                        Date of last liquid consumption: 10/24/22                        Date of last solid food consumption: 10/24/22    BMI:   Wt Readings from Last 3 Encounters:   10/21/22 113 lb (51.3 kg)   09/13/22 125 lb (56.7 kg)   08/29/22 120 lb (54.4 kg)     Body mass index is 18.8 kg/m².     CBC:   Lab Results   Component Value Date/Time    WBC 7.4 10/14/2022 08:43 AM    RBC 4.81 10/14/2022 08:43 AM    HGB 15.6 10/14/2022 08:43 AM    HCT 44.1 10/14/2022 08:43 AM    MCV 91.6 10/14/2022 08:43 AM    RDW 13.9 10/14/2022 08:43 AM     10/14/2022 08:43 AM       CMP:   Lab Results   Component Value Date/Time     10/14/2022 08:43 AM    K 3.4 10/14/2022 08:43 AM    K 4.2 09/09/2021 07:03 AM     10/14/2022 08:43 AM    CO2 18 10/14/2022 08:43 AM    BUN 16 10/14/2022 08:43 AM    CREATININE 0.85 10/14/2022 08:43 AM    GFRAA >60.0 10/14/2022 08:43 AM    LABGLOM >60.0 10/14/2022 08:43 AM    GLUCOSE 138 10/14/2022 08:43 AM    PROT 7.4 10/14/2022 08:43 AM    CALCIUM 9.7 10/14/2022 08:43 AM    BILITOT 1.7 10/14/2022 08:43 AM    ALKPHOS 85 10/14/2022 08:43 AM    AST 19 10/14/2022 08:43 AM    ALT 16 10/14/2022 08:43 AM       POC Tests:   Recent Labs     10/24/22  0601   POCGLU 138*       Coags:   Lab Results   Component Value Date/Time    PROTIME 14.0 10/03/2021 01:36 AM    INR 1.1 10/03/2021 01:36 AM    APTT 28.4 10/03/2021 01:36 AM       HCG (If Applicable): No results found for: PREGTESTUR, PREGSERUM, HCG, HCGQUANT     ABGs:   Lab Results   Component Value Date/Time    PHART 7.482 09/07/2021 06:38 PM    PO2ART 59 09/07/2021 06:38 PM    MWB2VIO 31 09/07/2021 06:38 PM    ZZT9JNT 22.8 09/07/2021 06:38 PM    BEART -1 09/07/2021 06:38 PM    R4SAPFKW 92 09/07/2021 06:38 PM        Type & Screen (If Applicable):  No results found for: LABABO, LABRH    Drug/Infectious Status (If Applicable):  No results found for: HIV, HEPCAB    COVID-19 Screening (If Applicable):   Lab Results   Component Value Date/Time    COVID19 Not Detected 10/14/2022 08:27 AM           Anesthesia Evaluation  Patient summary reviewed and Nursing notes reviewed no history of anesthetic complications:   Airway: Mallampati: II  TM distance: >3 FB   Neck ROM: full  Mouth opening: > = 3 FB   Dental: normal exam         Pulmonary:normal exam    (+) shortness of breath:                             Cardiovascular:  Exercise tolerance: good (>4 METS),   (+) hypertension:, CAD:, HENRIQUEZ:,       ECG reviewed               Beta Blocker:  Not on Beta Blocker         Neuro/Psych:   (+) psychiatric history:            GI/Hepatic/Renal: Neg GI/Hepatic/Renal ROS            Endo/Other:    (+) DiabetesType II DM, , .          Pt had PAT visit. Abdominal:             Vascular: negative vascular ROS.          Other Findings:           Anesthesia Plan      general     ASA 3     (Mask)  Induction:

## 2022-10-24 NOTE — PROGRESS NOTES
[facility] Neurology Daily Progress Note  Name: Arbutus Essex  Age: 74 y.o. Gender: male  CodeStatus: Full Code  Allergies: Seroquel [Quetiapine]    Chief Complaint:Suicidal and Homicidal (For a month or more)    Primary Care Provider: Steve Skelton MD  InpatientTreatment Team: Treatment Team: Attending Provider: Tanmay Gibson MD; Consulting Physician: RAVEN Jordan - NP; Registered Nurse: Gracy Flores RN; Consulting Physician: Suri Ardon MD; Consulting Physician: Mayte Tomlin DO; Tech: [employer]; Tech: Cassie Byron; Registered Nurse: Geoffrey Gallagher, RN; Registered Nurse: Lesia Evans RN  Admission Date: 10/14/2022      HPI   Pt seen and examined on behavioral health unit for myoclonus and dyskinesias. Currently alert and oriented x3, no acute distress, cooperative. No myoclonus noted currently. Patient is requiring one-on-one supervision. minimal Resting upper extremity tremors noted. Patient noted to have rigidity with distraction maneuvers. Bradykinesia with decrement on finger tap testing. Decreased blink. Orofacial dyskinesias persist.  Patient with ongoing hallucinations. Appears in less emotional distress today. Vitals:    10/24/22 1401   BP: (!) 146/76   Pulse: 58   Resp: 18   Temp: 97.3 °F (36.3 °C)   SpO2: 100%        Review of Systems   Constitutional:  Negative for fever. HENT:  Negative for hearing loss and trouble swallowing. Eyes:  Negative for visual disturbance. Respiratory:  Negative for cough, chest tightness, shortness of breath and wheezing. Cardiovascular:  Negative for chest pain, palpitations and leg swelling. Gastrointestinal:  Negative for nausea and vomiting. Musculoskeletal:  Negative for gait problem. Skin:  Negative for color change and rash. Neurological:  Positive for tremors. Negative for dizziness, seizures, syncope, facial asymmetry, speech difficulty, weakness, light-headedness, numbness and headaches. Psychiatric/Behavioral:  Negative for agitation, confusion and hallucinations. The patient is nervous/anxious. Physical Exam  Vitals and nursing note reviewed. Constitutional:       General: He is not in acute distress. Appearance: He is not diaphoretic. HENT:      Head: Normocephalic and atraumatic. Eyes:      Pupils: Pupils are equal, round, and reactive to light. Cardiovascular:      Rate and Rhythm: Normal rate and regular rhythm. Pulmonary:      Effort: Pulmonary effort is normal. No respiratory distress. Breath sounds: Normal breath sounds. Abdominal:      General: Bowel sounds are normal. There is no distension. Palpations: Abdomen is soft. Tenderness: There is no abdominal tenderness. Skin:     General: Skin is warm and dry. Neurological:      Mental Status: He is alert and oriented to person, place, and time. Cranial Nerves: No cranial nerve deficit. Sensory: No sensory deficit. Motor: Tremor and abnormal muscle tone present. No weakness, atrophy, seizure activity or pronator drift.       Coordination: Coordination normal.     normal        Medications:  Reviewed    Infusion Medications:    sodium chloride 100 mL/hr at 10/24/22 1143    dextrose       Scheduled Medications:    flumazenil        melatonin  5 mg Oral Nightly    PARoxetine  40 mg Oral Daily    risperiDONE  0.25 mg Oral Nightly    insulin lispro  0-8 Units SubCUTAneous TID WC    insulin lispro  0-4 Units SubCUTAneous Nightly    isosorbide mononitrate  30 mg Oral Daily    metoprolol succinate  25 mg Oral Nightly    atorvastatin  20 mg Oral Nightly    ALPRAZolam  0.25 mg Oral TID    aspirin  81 mg Oral Daily     PRN Meds: benzocaine, traZODone, haloperidol lactate, glucose, dextrose bolus **OR** dextrose bolus, glucagon (rDNA), dextrose, albuterol sulfate HFA, acetaminophen, magnesium hydroxide, nicotine polacrilex    Labs:   No results for input(s): WBC, HGB, HCT, PLT in the last 72 hours. No results for input(s): NA, K, CL, CO2, BUN, CREATININE, CALCIUM, PHOS in the last 72 hours. Invalid input(s): MAGNES  No results for input(s): AST, ALT, BILIDIR, BILITOT, ALKPHOS in the last 72 hours. No results for input(s): INR in the last 72 hours. No results for input(s): Maximo Jorge in the last 72 hours. Urinalysis:   Lab Results   Component Value Date/Time    NITRU Negative 10/14/2022 08:15 AM    WBCUA 3-5 09/10/2021 03:45 PM    BACTERIA Negative 09/07/2021 06:30 PM    RBCUA 10-20 09/10/2021 03:45 PM    BLOODU Negative 10/14/2022 08:15 AM    SPECGRAV 1.010 10/14/2022 08:15 AM    GLUCOSEU Negative 10/14/2022 08:15 AM       Radiology:   Most recent    EEG No valid procedures specified. MRI of Brain No results found for this or any previous visit. Results for orders placed during the hospital encounter of 09/07/21    MRI brain without contrast    Narrative  MRI BRAIN WO CONTRAST : 9/8/2021    CLINICAL HISTORY:  encephalopathy . COMPARISON: Head CT 9/7/2021 and head MRI 2/1/2008. TECHNIQUE: Multiplanar MR imaging of the head was performed without contrast.      FINDINGS:    There is no infarct, intracranial hemorrhage, mass effect, midline shift, extra-axial collection, or hydrocephalus. Mild generalized cerebral volume loss is present, with mild patchy supratentorial white matter changes most consistent with chronic small vessel ischemic disease. Mucosal thickening predominantly within the maxillary sinuses is again noted. Impression  NO ACUTE INTRACRANIAL PROCESS IDENTIFIED. ATROPHIC AND INVOLUTIONAL CHANGES. MRA of the Head and Neck: No results found for this or any previous visit. No results found for this or any previous visit. No results found for this or any previous visit.                             CT of the Head: Results for orders placed during the hospital encounter of 09/07/21    CT Head WO Contrast    Narrative  CT HEAD WO CONTRAST    CLINICAL HISTORY:  covid +, confusion, r/o CVA    COMPARISON: CT HEAD WO CONTRAST    CLINICAL HISTORY:  covid +, confusion,    COMPARISON: August 15, 2021 O 0738 hours    TECHNIQUE: Multiple unenhanced serial axial images of the brain from the vertex of the skull to the base of the skull were performed. FINDINGS: The ventricles are dilated. This is compensatory to the surrounding moderate generalized parenchymal volume loss. No mass. No midline shift. The cisterns are patent. There are white matter and periventricular changes most likely consistent  with chronic small vessel disease. No acute intra-axial or extra-axial findings. The visualized osseous structures are unremarkable. There is mucoperiosteal thickening of the frontal sinuses, patchy opacification of the ethmoids, mucoperiosteal thickening in the sphenoid and maxillary sinuses. There are probable bilateral enterostomies again noted. Impression  NO ACUTE INTRA-AXIAL OR EXTRA-AXIAL FINDINGS. All CT scans at this facility use dose modulation, iterative reconstruction, and/or weight based dosing when appropriate to reduce radiation dose to as low as reasonably achievable. TECHNIQUE: Multiple unenhanced serial axial images of the brain from the vertex of the skull to the base of the skull were performed. FINDINGS: The ventricles are dilated. This is compensatory to the surrounding moderate generalized parenchymal volume loss. No mass. No midline shift. The cisterns are patent. There are white matter and periventricular changes most likely consistent  with chronic small vessel disease. No acute intra-axial or extra-axial findings. The visualized osseous structures are unremarkable. The visualized portion of the paranasal sinuses, and mastoids are unremarkable. IMPRESSION:  CHANGES IN PARANASAL SINUSES DESCRIBED ABOVE WHICH HAVE INCREASED SINCE THE PRIOR EXAMINATION.  CORRELATE CLINICALLY    NO ACUTE INTRA-AXIAL OR EXTRA-AXIAL FINDINGS. IF SIGNS OR SYMPTOMS PERSIST THEN CONSIDER MRI TO FURTHER EVALUATE IF THERE ARE NO CONTRAINDICATIONS    All CT scans at this facility use dose modulation, iterative reconstruction, and/or weight based dosing when appropriate to reduce radiation dose to as low as reasonably achievable. No results found for this or any previous visit. No results found for this or any previous visit. Carotid duplex: No results found for this or any previous visit. No results found for this or any previous visit. Results for orders placed during the hospital encounter of 09/07/21    US CAROTID ARTERY BILATERAL    Narrative  EXAMINATION: CAROTID ULTRASOUND    CLINICAL HISTORY: 64-year-old with hypertension. He presents with transient confusion    FINDINGS: Duplex and color Doppler ultrasound were performed of the bilateral extracranial carotid systems. Velocity criteria and internal carotid artery stenoses are extrapolated from data as defined by the Society of Radiologist in [facility] 13 Radiology 2003; 610;112-362. Comparison made with a prior carotid ultrasound from 6/9/2008    RIGHT CAROTID SYSTEM: There is a small amount of heterogeneous calcified plaque in the bulb extending into the proximal ICA and ECA. . There are no elevations of peak systolic velocities or ICA/CCA velocity ratios. Velocities in the ICA range from 92 cm/s  proximal aspect, 88 cm/s mid aspect to 90 cm/s distal aspect. ICA/CCA velocity ratio is 0.8. By ultrasound criteria there is less than 50 percent diameter reduction of the right ICA. Peak systolic velocities in the proximal ECA and mid CCA are 117 and  109cm/s. There is antegrade flow in the right vertebral artery. LEFT CAROTID SYSTEM: There is a small amount of heterogeneous calcified plaque in the bulb.  There are now mild elevations of peak systolic velocities in the proximal ICA Velocities in the ICA range from 155 cm/s proximal aspect, 94 cm/s mid aspect to 76  cm/s distal aspect. ICA/CCA velocity ratio is 1.1. By ultrasound criteria there is 50-69% diameter reduction of the left ICA. Peak systolic velocities in the proximal ECA and mid CCA are 153 and 143cm/s. There is antegrade flow in the left vertebral  artery. Incidental note is made of a 1.2 cm round slightly hypoechoic solid right thyroid nodule    Impression  ATHEROSCLEROTIC CALCIFIED PLAQUE IN BOTH CAROTID SYSTEMS. BY VELOCITY CRITERIA THERE IS LESS THAN 50% DIAMETER REDUCTION OF THE RIGHT ICA AND 50-69% DIAMETER REDUCTION OF THE LEFT ICA WHICH HAS BEEN AN INTERVAL CHANGE. ANTEGRADE VERTEBRAL  FLOW      Echo No results found for this or any previous visit. Assessment/Plan:    10/16/22:  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 The cervical spine twitches notable distally in his hands. No other tremor types are noted. He also has a orofacial dyskinesia. In the first instance we will recommend an EEG before we consider any treatment options and the treatment options will be Keppra which we will consider keeping in mind that he has underlying psychiatric illness as well. Patient already is on benzo atropine which I recommend we hold for now. Pending on the results of the same will further advise    10/17/2022:  No myoclonic jerking noted on today's exam.  Nursing reports it recurs or worsens with anxiety. Patient continues on Xanax. EEG pending for today. Patient with some resting tremors of the bilateral upper extremities. Rigidity and bradykinesia noted. Orofacial dyskinesias persist.  Thyroid studies normal.  CK mildly elevated at 254. Further recommendations pending EEG results. I have personally performed a face to face diagnostic evaluation on this patient, reviewed all data and investigations, and am the sole provider of all clinical decisions on the neurological status of this patient. tremors, more myoclonic, d/c cogentin may have helped,  Will await eeg and consider keppra if myoclonic       10/19/2022:  Myoclonus, appears to be resolved. None on last 2 exams. EEG was negative. We will continue to monitor. Patient with some parkinsonian features that will need to be followed on outpatient basis. I have personally performed a face to face diagnostic evaluation on this patient, reviewed all data and investigations, and am the sole provider of all clinical decisions on the neurological status of this patient. twitching better, EEG normal,  no need to treat for now,  watch 60 % time spent       10/24/22:  Myoclonus, resolved   Parkinsonian features, follow-up outpatient  Neurology to sign off. Call with questions or concerns.           Collaborating physicians: Dr Oma Lopez    Electronically signed by RAVEN Quiros CNP on 10/24/2022 at 2:21 PM

## 2022-10-24 NOTE — H&P
Update History & Physical    The patient's History and Physical of 10 / 18 / 22 was reviewed with the patient and there were no significant changes. I examined the patient and there were no significant changes from the previous History and Physical.    Vitals:    10/24/22 1130   BP: 137/65   Pulse: 52   Resp: 18   Temp: 98 °F (36.7 °C)   SpO2: 100%     Principal Problem:    Severe episode of recurrent major depressive disorder, with psychotic features ([address]  Active Problems:    Tremor  Resolved Problems:    * No resolved hospital problems. *        Plan: The risk, benefits, expected outcome, and alternative to the recommended procedure have been discussed with the patient. Patient understands and wants to proceed with the procedure.     Electronically signed by Dada Hill MD

## 2022-10-24 NOTE — PROGRESS NOTES
Behavioral Services                                              Medicare Re-Certification    I certify that the inpatient psychiatric hospital services furnished since the previous certification/re-certification were, and continue to be, medically necessary for;    [x] (1) Treatment which could reasonably be expected to improve the patient's condition,    [x] (2) Or for diagnostic study. Estimated length of stay/service 3-5    Plan for post-hospital care OP care    This patient continues to need, on a daily basis, active treatment furnished directly by or requiring the supervision of inpatient psychiatric personnel.     Electronically signed by Dada Hill MD on 10/24/2022 at 11:41 AM

## 2022-10-24 NOTE — FLOWSHEET NOTE
Pt resting in bed with a flat and sad affect. Pt came out today for dinner and ate 75% . Pt answers questions with one or two responses and does not engage in conversation. Pt endorses hearing voices to hurt his wife. pt is reluctant to attend groups despite encouragement and has no active SI.

## 2022-10-24 NOTE — PROGRESS NOTES
Pt. declined to attend the 0900 community meeting, despite staff encouragement.  Patient did not state a goal. Electronically signed by Richar Aguirre [address] on 10/24/2022 at 2:16 PM

## 2022-10-25 LAB
GLUCOSE BLD-MCNC: 126 MG/DL (ref 70–99)
GLUCOSE BLD-MCNC: 127 MG/DL (ref 70–99)
GLUCOSE BLD-MCNC: 143 MG/DL (ref 70–99)
GLUCOSE BLD-MCNC: 162 MG/DL (ref 70–99)
PERFORMED ON: ABNORMAL

## 2022-10-25 PROCEDURE — 99232 SBSQ HOSP IP/OBS MODERATE 35: CPT | Performed by: PSYCHIATRY & NEUROLOGY

## 2022-10-25 PROCEDURE — 6370000000 HC RX 637 (ALT 250 FOR IP): Performed by: INTERNAL MEDICINE

## 2022-10-25 PROCEDURE — 6370000000 HC RX 637 (ALT 250 FOR IP): Performed by: PSYCHIATRY & NEUROLOGY

## 2022-10-25 PROCEDURE — 1240000000 HC EMOTIONAL WELLNESS R&B

## 2022-10-25 RX ORDER — RISPERIDONE 0.25 MG/1
0.25 TABLET ORAL 2 TIMES DAILY
Status: DISCONTINUED | OUTPATIENT
Start: 2022-10-25 | End: 2022-10-29

## 2022-10-25 RX ORDER — CLONAZEPAM 0.5 MG/1
0.5 TABLET ORAL EVERY 12 HOURS
Status: DISCONTINUED | OUTPATIENT
Start: 2022-10-25 | End: 2022-11-07

## 2022-10-25 RX ORDER — TRAZODONE HYDROCHLORIDE 50 MG/1
50 TABLET ORAL NIGHTLY
Status: DISCONTINUED | OUTPATIENT
Start: 2022-10-25 | End: 2022-11-07 | Stop reason: HOSPADM

## 2022-10-25 RX ADMIN — ALPRAZOLAM 0.25 MG: 0.5 TABLET ORAL at 08:40

## 2022-10-25 RX ADMIN — ISOSORBIDE MONONITRATE 30 MG: 60 TABLET, EXTENDED RELEASE ORAL at 08:40

## 2022-10-25 RX ADMIN — ATORVASTATIN CALCIUM 20 MG: 20 TABLET, FILM COATED ORAL at 21:23

## 2022-10-25 RX ADMIN — PAROXETINE HYDROCHLORIDE 40 MG: 20 TABLET, FILM COATED ORAL at 08:40

## 2022-10-25 RX ADMIN — TRAZODONE HYDROCHLORIDE 50 MG: 50 TABLET ORAL at 21:24

## 2022-10-25 RX ADMIN — ASPIRIN 81 MG 81 MG: 81 TABLET ORAL at 08:40

## 2022-10-25 RX ADMIN — ACETAMINOPHEN 650 MG: 325 TABLET ORAL at 02:34

## 2022-10-25 RX ADMIN — RISPERIDONE 0.25 MG: 0.25 TABLET ORAL at 14:53

## 2022-10-25 RX ADMIN — METOPROLOL SUCCINATE 25 MG: 25 TABLET, FILM COATED, EXTENDED RELEASE ORAL at 21:23

## 2022-10-25 RX ADMIN — CLONAZEPAM 0.5 MG: 0.5 TABLET ORAL at 13:17

## 2022-10-25 RX ADMIN — RISPERIDONE 0.25 MG: 0.25 TABLET ORAL at 21:23

## 2022-10-25 ASSESSMENT — PAIN SCALES - GENERAL
PAINLEVEL_OUTOF10: 0
PAINLEVEL_OUTOF10: 6

## 2022-10-25 ASSESSMENT — PAIN DESCRIPTION - LOCATION: LOCATION: HEAD

## 2022-10-25 ASSESSMENT — PAIN DESCRIPTION - DESCRIPTORS: DESCRIPTORS: ACHING

## 2022-10-25 ASSESSMENT — PAIN DESCRIPTION - ORIENTATION: ORIENTATION: POSTERIOR

## 2022-10-25 NOTE — PROGRESS NOTES
Pt. declined to attend the 0900 community meeting, despite staff encouragement.  Goal - \"To stop my bad thoughts\" Electronically signed by LONNY Mann on 10/25/2022 at 9:42 AM

## 2022-10-25 NOTE — PROGRESS NOTES
Pt awake @ this time, up to BR to void. Pt c/o headache, # 5/10 pain level.,received Tylenol 650 mg po. Pt now sitting up @ bedside eating [employer] cookies. Pt appears to be thinking about d/c and where he will go as son informed him he cannot return home.

## 2022-10-25 NOTE — PROGRESS NOTES
Pt reports depression level is #8/10,anxiety level is # 9/10. Pt denies SI/ admits to recurring HI, but no intent. Pt denies AVH. Pt reports they are thoughts, not voices. Pt will ambulate to DR with much encouragement. If tray placed in front of him, will usually eat about 50% of meal. Pt does not attend groups. Pt has showered one time, otherwise washes up at sink in room.

## 2022-10-25 NOTE — PROGRESS NOTES
Pt is eating breakfast in the dinning room, by himself, 1:1 at side, explained and gave all am meds, pt reports anxiety #8 gave 0.25 xanax ,

## 2022-10-25 NOTE — PROGRESS NOTES
Leida Romeo [facility]. FOLLOW-UP NOTE       10/25/2022     Patient was seen and examined in person, Chart reviewed   Patient's case discussed with staff/team    Chief Complaint: Depression anxiety    Interim History:     Patient tolerated the procedure well  Is still complaining about dryness of mouth and difficulty swallowing secondary to the dryness of mouth  Patient report marked anxiety which is not getting better with Xanax 0.25 mg  Patient used to take Xanax 0.5 mg daily  He continues to have obsessive thoughts about harming his wife as well as feeling depressed  So far he has had 3 ECT treatment with minimal response  Appetite:   [x] Normal/Unchanged  [] Increased  [] Decreased      Sleep:       [] Normal/Unchanged  [x] Fair       [] Poor              Energy:    [] Normal/Unchanged  [] Increased  [x] Decreased        SI [x] Present  [] Absent    HI  [x]Present  [] Absent     Aggression:  [] yes  [x] no    Patient is [] able  [x] unable to CONTRACT FOR SAFETY     PAST MEDICAL/PSYCHIATRIC HISTORY:   Past Medical History:   Diagnosis Date    Anxiety     Chest pain 03/29/2018    Coronary artery disease involving native coronary artery of native heart without angina pectoris 02/15/2019    Diabetes mellitus ([facility] [address]     no meds    History of colon polyps     Hyperlipidemia     Hypertension     [facility]     Vertigo        FAMILY/SOCIAL HISTORY:  Family History   Problem Relation Age of Onset    Heart Disease Maternal Aunt     Cancer Maternal Aunt     Colon Cancer Maternal Aunt      Social History     Socioeconomic History    Marital status:      Spouse name: Not on file    Number of children: Not on file    Years of education: Not on file    Highest education level: Not on file   Occupational History    Not on file   Tobacco Use    Smoking status: Never    Smokeless tobacco: Never   Vaping Use    Vaping Use: Never used   Substance and Sexual Activity    Alcohol use: No    Drug use: Never    Sexual activity: Not on file   Other Topics Concern    Not on file   Social History Narrative    Not on file     Social Determinants of Health     Financial Resource Strain: Not on file   Food Insecurity: Not on file   Transportation Needs: Not on file   Physical Activity: Not on file   Stress: Not on file   Social Connections: Not on file   Intimate Partner Violence: Not on file   Housing Stability: Not on file           ROS:  [x] All negative/unchanged except if checked.  Explain positive(checked items) below:  [] Constitutional  [] Eyes  [] Ear/Nose/Mouth/Throat  [] Respiratory  [] CV  [] GI  []   [] Musculoskeletal  [] Skin/Breast  [] Neurological  [] Endocrine  [] Heme/Lymph  [] Allergic/Immunologic    Explanation:     MEDICATIONS:    Current Facility-Administered Medications:     risperiDONE (RISPERDAL) tablet 0.25 mg, 0.25 mg, Oral, BID, Tanmay Gibson MD    clonazePAM (KLONOPIN) tablet 0.5 mg, 0.5 mg, Oral, Q12H, Tanmay Gibson MD    traZODone (DESYREL) tablet 50 mg, 50 mg, Oral, Nightly, Tanmay Gibson MD    haloperidol (HALDOL) tablet 2 mg, 2 mg, Oral, Q6H PRN, Tanmay Gibson MD    PARoxetine (PAXIL) tablet 40 mg, 40 mg, Oral, Daily, Ke Velasquez MD, 40 mg at 10/25/22 0840    0.9 % sodium chloride infusion, , IntraVENous, Continuous, Tanmay Gibson MD, Last Rate: 100 mL/hr at 10/24/22 1143, New Bag at 10/24/22 1143    benzocaine (ORAJEL) 20 % mucosal gel, , Mouth/Throat, BID PRN, RAVEN Roe - CNP    insulin lispro (HUMALOG) injection vial 0-8 Units, 0-8 Units, SubCUTAneous, TID WC, Marysol Lopez APRN - NP    insulin lispro (HUMALOG) injection vial 0-4 Units, 0-4 Units, SubCUTAneous, Nightly, Marysol Lopez APRN - NP    glucose chewable tablet 16 g, 4 tablet, Oral, PRN, Marysol Lopez, APRN - NP    dextrose bolus 10% 125 mL, 125 mL, IntraVENous, PRN **OR** dextrose bolus 10% 250 mL, 250 mL, IntraVENous, PRN, RAVEN Nur NP    glucagon (rDNA) injection 1 mg, 1 mg, SubCUTAneous, PRN, RAVEN Nur NP    dextrose 10 % infusion, , IntraVENous, Continuous PRN, RAVEN Nur NP    isosorbide mononitrate (IMDUR) extended release tablet 30 mg, 30 mg, Oral, Daily, Jessica Lucas MD, 30 mg at 10/25/22 0840    metoprolol succinate (TOPROL XL) extended release tablet 25 mg, 25 mg, Oral, Nightly, Jessica Lucas MD, 25 mg at 10/23/22 2107    atorvastatin (LIPITOR) tablet 20 mg, 20 mg, Oral, Nightly, Jessica Lucas MD, 20 mg at 10/24/22 2112    albuterol sulfate HFA (PROVENTIL;VENTOLIN;PROAIR) 108 (90 Base) MCG/ACT inhaler 2 puff, 2 puff, Inhalation, 4x Daily PRN, Karon Weldon MD    aspirin chewable tablet 81 mg, 81 mg, Oral, Daily, Karon Weldon MD, 81 mg at 10/25/22 0840    acetaminophen (TYLENOL) tablet 650 mg, 650 mg, Oral, Q4H PRN, Karon Weldon MD, 650 mg at 10/25/22 0234    magnesium hydroxide (MILK OF MAGNESIA) 400 MG/5ML suspension 30 mL, 30 mL, Oral, Daily PRN, Karon Weldon MD, 30 mL at 10/16/22 1217    nicotine polacrilex (COMMIT) lozenge 2 mg, 2 mg, Oral, Q1H PRN, Karon Weldon MD      Examination:  BP (!) 149/73 Comment: RN notified  Pulse 71   Temp 98 °F (36.7 °C) (Oral)   Resp 18   Ht 5' 5\" (1.651 m)   Wt 113 lb (51.3 kg)   SpO2 98%   BMI 18.80 kg/m²   Gait - steady  Medication side effects(SE): no    Mental Status Examination:    Level of consciousness:  within normal limits   Appearance:  poor grooming and poor hygiene  Behavior/Motor:  psychomotor retardation  Attitude toward examiner:  cooperative  Speech:  slow   Mood: anxious and depressed  Affect:  flat and anxious  Thought processes:  slow   Thought content:  Suicidal Ideation:  active  Cognition:  oriented to person, place, and time   Concentration poor  Insight poor   Judgement poor     ASSESSMENT:   Patient symptoms are:  [] Well controlled  [] Improving  [] Worsening  [] No change      Diagnosis: Principal Problem:    Severe episode of recurrent major depressive disorder, with psychotic features ([address]  Active Problems:    Tremor    Parkinsonism ([facility] [address]    Dyskinesia  Resolved Problems:    * No resolved hospital problems. *      LABS:    No results for input(s): WBC, HGB, PLT in the last 72 hours. No results for input(s): NA, K, CL, CO2, BUN, CREATININE, GLUCOSE in the last 72 hours. No results for input(s): BILITOT, ALKPHOS, AST, ALT in the last 72 hours. Lab Results   Component Value Date/Time    LABAMPH Neg 10/14/2022 08:15 AM    BARBSCNU Neg 10/14/2022 08:15 AM    LABBENZ Neg 10/14/2022 08:15 AM    LABMETH Neg 10/14/2022 08:15 AM    OPIATESCREENURINE Neg 10/14/2022 08:15 AM    PHENCYCLIDINESCREENURINE Neg 10/14/2022 08:15 AM    ETOH <10 10/14/2022 08:43 AM     Lab Results   Component Value Date/Time    TSH 3.230 10/14/2022 08:43 AM     No results found for: LITHIUM  No results found for: VALPROATE, CBMZ    RISK ASSESSMENT: High risk of suicide    Treatment Plan:  Reviewed current Medications with the patient. \  ECT tomorrow  Risks, benefits, side effects, drug-to-drug interactions and alternatives to treatment were discussed. Collateral information: Pending  CD evaluatio  Encourage patient to attend group and other milieu activities.   Discharge planning discussed with the patient and treatment team.    PSYCHOTHERAPY/COUNSELING:  [x] Therapeutic interview  [x] Supportive  [] CBT  [] Ongoing  [] Other    [x] Patient continues to need, on a daily basis, active treatment furnished directly by or requiring the supervision of inpatient psychiatric personnel      Anticipated Length of stay:            Electronically signed by Chato Collins MD on 10/25/2022 at 12:42 PM 29-Jul-2019 07:08

## 2022-10-25 NOTE — PROGRESS NOTES
Assumed care of pt at [facility].  Remains with sitter for safety precautions Problem: DEPRESSION  Goal: Will be euthymic at discharge  Description: INTERVENTIONS:  - Administer medication as ordered  - Provide emotional support via 1:1 interaction with staff  - Encourage involvement in milieu/groups/activities  - Monitor for social isolation  Outcome: Progressing     Problem: ANXIETY  Goal: Will report anxiety at manageable levels  Description: INTERVENTIONS:  - Administer medication as ordered  - Teach and encourage coping skills  - Provide emotional support  - Assess patient/family for anxiety and ability to cope  Outcome: Progressing  Goal: By discharge: Patient will verbalize 2 strategies to deal with anxiety  Description: Interventions:  - Identify any obvious source/trigger to anxiety  - Staff will assist patient in applying identified coping technique/skills  - Encourage attendance of scheduled groups and activities  Outcome: Progressing     Problem: INVOLUNTARY ADMIT  Goal: Will cooperate with staff recommendations and doctor's orders and will demonstrate appropriate behavior  Description: INTERVENTIONS:  - Treat underlying conditions and offer medication as ordered  - Educate regarding involuntary admission procedures and rules  - Utilize positive consistent limit setting strategies to support patient and staff safety  Outcome: Progressing     Problem: SAFETY, RESTRAINT - VIOLENT/SELF-DESTRUCTIVE  Goal: Remains free of harm/injury from restraints (Restraint for Violent/Self-Destructive Behavior)  Description: INTERVENTIONS:  - Instruct patient/family regarding restraint use   - Assess and monitor physiologic and psychological status   - Provide interventions and comfort measures to meet assessed patient needs   - Ensure continuous in person monitoring is provided   - Identify and implement measures to help patient regain control  - Assess readiness for release of restraint  Outcome: Progressing  Goal: Returns to optimal restraint-free functioning  Description: INTERVENTIONS:  - Assess the patient's behavior and symptoms that indicate continued need for restraint  - Identify and implement measures to help patient regain control  - Assess readiness for release of restraint   Outcome: Progressing

## 2022-10-25 NOTE — PROGRESS NOTES
Pt. refused to attend the 1000 skills group, despite staff encouragement.  Electronically signed by Vero Mcgee, [address] on 10/25/2022 at 11:31 AM

## 2022-10-25 NOTE — PLAN OF CARE
Patient seen out in DR only for meals. Isolative to room during they day. Rates anxiety a 7/10. Rates depression a 0. States he is feeling good today. Reports \"physically feeling good, mentally not so good\". Patient has 1:1 in his room for safety. Eats about 50% of meals. Pt states he wants to find somewhere else to live after discharge. He does not feel safe going home. Problem: Self Harm/Suicidality  Goal: Will have no self-injury during hospital stay  Description: INTERVENTIONS:  1. Q 30 MINUTES: Routine safety checks  2. Q SHIFT & PRN: Assess risk to determine if routine checks are adequate to maintain patient safety  Outcome: Progressing     Problem: Chronic Conditions and Co-morbidities  Goal: Patient's chronic conditions and co-morbidity symptoms are monitored and maintained or improved  Outcome: Progressing     Problem: Safety - Adult  Goal: Free from fall injury  Outcome: Progressing     Problem: ABCDS Injury Assessment  Goal: Absence of physical injury  Outcome: Progressing     Problem: Skin/Tissue Integrity  Goal: Absence of new skin breakdown  Description: 1. Monitor for areas of redness and/or skin breakdown  2. Assess vascular access sites hourly  3. Every 4-6 hours minimum:  Change oxygen saturation probe site  4. Every 4-6 hours:  If on nasal continuous positive airway pressure, respiratory therapy assess nares and determine need for appliance change or resting period. Outcome: Progressing     Problem: Nutrition Deficit:  Goal: Optimize nutritional status  Outcome: Progressing     Problem: Risk for Physiologic Instability  Goal: B/P, SaO2, EKG, and respiratory status WDL  Description: INTERVENTIONS:  1. Monitor B/P, SaO2, EKG and respiratory function  2. Notify physician of unstable condition/changes  Outcome: Progressing     Problem: Impaired Comfort  Goal: Patient reports pain level is manageable/acceptable  Description: INTERVENTIONS:  1.  Provide emotional support and reassurance at all times to relieve anxiety  2. Position patient for comfort  3. Medicate PRN for pain relief  Outcome: Progressing     Problem: Deficient Knowledge  Goal: Pt/family demonstrate understanding of pre/post-procedure instructions  Description: INTERVENTIONS:  1. Provide pre-procedure instructions  2. Provide written and verbal post-procedural instructions  Outcome: Progressing     Problem: Risk for Injury  Goal: ARCHIVE-Patient remains free from injury  Description: INTERVENTIONS:  1. Universal precautions  2. Ensure bed, stretcher, or wheelchair are in locked position when not transporting  3. Position patient per procedure requirement  4. Maintain patent airway  5. Perform pre-op equipment checks  Outcome: Progressing     Problem: Pain  Goal: Verbalizes/displays adequate comfort level or baseline comfort level  Outcome: Progressing     Problem: Anxiety  Goal: Will report anxiety at manageable levels  Description: INTERVENTIONS:  1. Administer medication as ordered  2. Teach and rehearse alternative coping skills  3. Provide emotional support with 1:1 interaction with staff  Outcome: Progressing  Flowsheets (Taken 10/25/2022 2756)  Will report anxiety at manageable levels:   Administer medication as ordered   Provide emotional support with 1:1 interaction with staff   Teach and rehearse alternative coping skills     Problem: Coping  Goal: Pt/Family able to verbalize concerns and demonstrate effective coping strategies  Description: INTERVENTIONS:  1. Assist patient/family to identify coping skills, available support systems and cultural and spiritual values  2. Provide emotional support, including active listening and acknowledgement of concerns of patient and caregivers  3. Reduce environmental stimuli, as able  4. Instruct patient/family in relaxation techniques, as appropriate  5.  Assess for spiritual pain/suffering and initiate Spiritual Care, Psychosocial Clinical Specialist consults as needed  Outcome: Progressing  Flowsheets (Taken 10/25/2022 1712)  Patient/family able to verbalize anxieties, fears, and concerns, and demonstrate effective coping: Reduce environmental stimuli, as able     Problem: Confusion  Goal: Confusion, delirium, dementia, or psychosis is improved or at baseline  Description: INTERVENTIONS:  1. Assess for possible contributors to thought disturbance, including medications, impaired vision or hearing, underlying metabolic abnormalities, dehydration, psychiatric diagnoses, and notify attending LIP  2. Morris high risk fall precautions, as indicated  3. Provide frequent short contacts to provide reality reorientation, refocusing and direction  4. Decrease environmental stimuli, including noise as appropriate  5. Monitor and intervene to maintain adequate nutrition, hydration, elimination, sleep and activity  6. If unable to ensure safety without constant attention obtain sitter and review sitter guidelines with assigned personnel  7. Initiate Psychosocial CNS and Spiritual Care consult, as indicated  Outcome: Progressing     Problem: Depression/Self Harm  Goal: Effect of psychiatric condition will be minimized and patient will be protected from self harm  Description: INTERVENTIONS:  1. Assess impact of patient's symptoms on level of functioning, self care needs and offer support as indicated  2. Assess patient/family knowledge of depression, impact on illness and need for teaching  3. Provide emotional support, presence and reassurance  4. Assess for possible suicidal thoughts or ideation. If patient expresses suicidal thoughts or statements do not leave alone, initiate Suicide Precautions, move to a room close to the nursing station and obtain sitter  5.  Initiate consults as appropriate with Mental Health Professional, Spiritual Care, Psychosocial CNS, and consider a recommendation to the LIP for a Psychiatric Consultation  Outcome: Progressing

## 2022-10-26 ENCOUNTER — ANESTHESIA (OUTPATIENT)
Dept: POSTOP/PACU | Age: 72
End: 2022-10-26

## 2022-10-26 ENCOUNTER — APPOINTMENT (OUTPATIENT)
Dept: POSTOP/PACU | Age: 72
DRG: 885 | End: 2022-10-26
Payer: MEDICARE

## 2022-10-26 ENCOUNTER — ANESTHESIA EVENT (OUTPATIENT)
Dept: POSTOP/PACU | Age: 72
End: 2022-10-26

## 2022-10-26 LAB
GLUCOSE BLD-MCNC: 129 MG/DL (ref 70–99)
GLUCOSE BLD-MCNC: 152 MG/DL (ref 70–99)
GLUCOSE BLD-MCNC: 164 MG/DL (ref 70–99)
PERFORMED ON: ABNORMAL

## 2022-10-26 PROCEDURE — 6360000002 HC RX W HCPCS: Performed by: ANESTHESIOLOGY

## 2022-10-26 PROCEDURE — 90870 ELECTROCONVULSIVE THERAPY: CPT

## 2022-10-26 PROCEDURE — 7100000000 HC PACU RECOVERY - FIRST 15 MIN

## 2022-10-26 PROCEDURE — 7100000001 HC PACU RECOVERY - ADDTL 15 MIN

## 2022-10-26 PROCEDURE — 3700000000 HC ANESTHESIA ATTENDED CARE

## 2022-10-26 PROCEDURE — 1240000000 HC EMOTIONAL WELLNESS R&B

## 2022-10-26 PROCEDURE — 6370000000 HC RX 637 (ALT 250 FOR IP): Performed by: INTERNAL MEDICINE

## 2022-10-26 PROCEDURE — 6370000000 HC RX 637 (ALT 250 FOR IP): Performed by: PSYCHIATRY & NEUROLOGY

## 2022-10-26 PROCEDURE — GZB4ZZZ OTHER ELECTROCONVULSIVE THERAPY: ICD-10-PCS | Performed by: PSYCHIATRY & NEUROLOGY

## 2022-10-26 PROCEDURE — 90870 ELECTROCONVULSIVE THERAPY: CPT | Performed by: PSYCHIATRY & NEUROLOGY

## 2022-10-26 RX ORDER — SUCCINYLCHOLINE CHLORIDE 20 MG/ML
INJECTION INTRAMUSCULAR; INTRAVENOUS PRN
Status: DISCONTINUED | OUTPATIENT
Start: 2022-10-26 | End: 2022-10-26 | Stop reason: SDUPTHER

## 2022-10-26 RX ORDER — PROPOFOL 10 MG/ML
INJECTION, EMULSION INTRAVENOUS PRN
Status: DISCONTINUED | OUTPATIENT
Start: 2022-10-26 | End: 2022-10-26 | Stop reason: SDUPTHER

## 2022-10-26 RX ORDER — ONDANSETRON 2 MG/ML
4 INJECTION INTRAMUSCULAR; INTRAVENOUS
Status: ACTIVE | OUTPATIENT
Start: 2022-10-26 | End: 2022-10-27

## 2022-10-26 RX ORDER — LIDOCAINE HYDROCHLORIDE 10 MG/ML
1 INJECTION, SOLUTION EPIDURAL; INFILTRATION; INTRACAUDAL; PERINEURAL
Status: ACTIVE | OUTPATIENT
Start: 2022-10-26 | End: 2022-10-27

## 2022-10-26 RX ORDER — SODIUM CHLORIDE 9 MG/ML
INJECTION, SOLUTION INTRAVENOUS CONTINUOUS
Status: DISCONTINUED | OUTPATIENT
Start: 2022-10-26 | End: 2022-11-06

## 2022-10-26 RX ORDER — GLYCOPYRROLATE 0.2 MG/ML
INJECTION INTRAMUSCULAR; INTRAVENOUS PRN
Status: DISCONTINUED | OUTPATIENT
Start: 2022-10-26 | End: 2022-10-26 | Stop reason: SDUPTHER

## 2022-10-26 RX ADMIN — ATORVASTATIN CALCIUM 20 MG: 20 TABLET, FILM COATED ORAL at 21:16

## 2022-10-26 RX ADMIN — CLONAZEPAM 0.5 MG: 0.5 TABLET ORAL at 21:17

## 2022-10-26 RX ADMIN — METOPROLOL SUCCINATE 25 MG: 25 TABLET, FILM COATED, EXTENDED RELEASE ORAL at 21:37

## 2022-10-26 RX ADMIN — PAROXETINE HYDROCHLORIDE 40 MG: 20 TABLET, FILM COATED ORAL at 13:23

## 2022-10-26 RX ADMIN — ACETAMINOPHEN 650 MG: 325 TABLET ORAL at 18:56

## 2022-10-26 RX ADMIN — CLONAZEPAM 0.5 MG: 0.5 TABLET ORAL at 00:54

## 2022-10-26 RX ADMIN — PROPOFOL 70 MG: 10 INJECTION, EMULSION INTRAVENOUS at 11:31

## 2022-10-26 RX ADMIN — RISPERIDONE 0.25 MG: 0.25 TABLET ORAL at 13:23

## 2022-10-26 RX ADMIN — GLYCOPYRROLATE 0.2 MG: 0.2 INJECTION INTRAMUSCULAR; INTRAVENOUS at 11:27

## 2022-10-26 RX ADMIN — ASPIRIN 81 MG 81 MG: 81 TABLET ORAL at 13:23

## 2022-10-26 RX ADMIN — ISOSORBIDE MONONITRATE 30 MG: 60 TABLET, EXTENDED RELEASE ORAL at 13:23

## 2022-10-26 RX ADMIN — SUCCINYLCHOLINE CHLORIDE 30 MG: 20 INJECTION, SOLUTION INTRAMUSCULAR; INTRAVENOUS at 11:31

## 2022-10-26 RX ADMIN — TRAZODONE HYDROCHLORIDE 50 MG: 50 TABLET ORAL at 21:31

## 2022-10-26 RX ADMIN — RISPERIDONE 0.25 MG: 0.25 TABLET ORAL at 21:36

## 2022-10-26 ASSESSMENT — ENCOUNTER SYMPTOMS: SHORTNESS OF BREATH: 1

## 2022-10-26 ASSESSMENT — PAIN DESCRIPTION - DESCRIPTORS: DESCRIPTORS: ACHING

## 2022-10-26 ASSESSMENT — PAIN DESCRIPTION - LOCATION: LOCATION: HEAD

## 2022-10-26 ASSESSMENT — PAIN SCALES - GENERAL: PAINLEVEL_OUTOF10: 3

## 2022-10-26 ASSESSMENT — PATIENT HEALTH QUESTIONNAIRE - PHQ9: SUM OF ALL RESPONSES TO PHQ QUESTIONS 1-9: 27

## 2022-10-26 ASSESSMENT — PAIN DESCRIPTION - ORIENTATION: ORIENTATION: POSTERIOR

## 2022-10-26 NOTE — PROGRESS NOTES
Pt. was off the unit for a procedure and did not attend the 1000 skill group. Remains on 1:1 with staff for pts safety.  Electronically signed by Marla Rahman on 10/26/2022 at 11:30 AM

## 2022-10-26 NOTE — PLAN OF CARE
Patient returns from ECT. He is fatigued. He rates depression 7/10 and anxiety 5/10 on a 10 point scale where 10 is the worst.  Patient denies SI and AVH, however, he does admit to intrusive thoughts of killing or harming his wife. Patient has a moderate appetite. He reports sleeping well last night and is ready to nap now. Patient needs a shower, but states he is too tired right now. Patient is drowsy, but oriented. He maintains 1:1 for safety/homicidal ideations. He is encouraged to voice needs as they arise.       Problem: Self Harm/Suicidality  Goal: Will have no self-injury during hospital stay  Description: INTERVENTIONS:  1. Q 30 MINUTES: Routine safety checks  2. Q SHIFT & PRN: Assess risk to determine if routine checks are adequate to maintain patient safety  10/26/2022 1329 by Holden Landrum RN  Outcome: Progressing  10/26/2022 1328 by Holden Landrum RN  Outcome: Not Progressing     Problem: Chronic Conditions and Co-morbidities  Goal: Patient's chronic conditions and co-morbidity symptoms are monitored and maintained or improved  10/26/2022 1329 by Holden Landrum RN  Outcome: Progressing  10/26/2022 1328 by Holden Landrum RN  Outcome: Not Progressing  Flowsheets (Taken 10/26/2022 1323)  Care Plan - Patient's Chronic Conditions and Co-Morbidity Symptoms are Monitored and Maintained or Improved:   Monitor and assess patient's chronic conditions and comorbid symptoms for stability, deterioration, or improvement   Collaborate with multidisciplinary team to address chronic and comorbid conditions and prevent exacerbation or deterioration   Update acute care plan with appropriate goals if chronic or comorbid symptoms are exacerbated and prevent overall improvement and discharge     Problem: Safety - Adult  Goal: Free from fall injury  10/26/2022 1329 by Holden Landrum RN  Outcome: Progressing  10/26/2022 1328 by Holden Landrum RN  Outcome: Not Progressing     Problem: ABCDS Injury Assessment  Goal: Absence of physical injury  10/26/2022 1329 by Mert Saeed RN  Outcome: Progressing  10/26/2022 1328 by Mert Saeed RN  Outcome: Not Progressing     Problem: Skin/Tissue Integrity  Goal: Absence of new skin breakdown  Description: 1. Monitor for areas of redness and/or skin breakdown  2. Assess vascular access sites hourly  3. Every 4-6 hours minimum:  Change oxygen saturation probe site  4. Every 4-6 hours:  If on nasal continuous positive airway pressure, respiratory therapy assess nares and determine need for appliance change or resting period. 10/26/2022 1329 by Mert Saeed RN  Outcome: Progressing  10/26/2022 1328 by Mert Saeed RN  Outcome: Not Progressing     Problem: Nutrition Deficit:  Goal: Optimize nutritional status  10/26/2022 1329 by Mert Saeed RN  Outcome: Progressing  10/26/2022 1328 by Mert Saeed RN  Outcome: Not Progressing     Problem: Risk for Physiologic Instability  Goal: B/P, SaO2, EKG, and respiratory status WDL  Description: INTERVENTIONS:  1. Monitor B/P, SaO2, EKG and respiratory function  2. Notify physician of unstable condition/changes  10/26/2022 1329 by Mert Saeed RN  Outcome: Progressing  10/26/2022 1328 by Mert Saeed RN  Outcome: Not Progressing     Problem: Impaired Comfort  Goal: Patient reports pain level is manageable/acceptable  Description: INTERVENTIONS:  1. Provide emotional support and reassurance at all times to relieve anxiety  2. Position patient for comfort  3. Medicate PRN for pain relief  10/26/2022 1329 by Mert Saeed RN  Outcome: Progressing  10/26/2022 1328 by Mert Saeed RN  Outcome: Not Progressing     Problem: Deficient Knowledge  Goal: Pt/family demonstrate understanding of pre/post-procedure instructions  Description: INTERVENTIONS:  1. Provide pre-procedure instructions  2.  Provide written and verbal post-procedural instructions  10/26/2022 1329 by Mert Saeed RN  Outcome: Progressing  10/26/2022 1328 by Joshua Maldonado RN  Outcome: Not Progressing     Problem: Risk for Injury  Goal: ARCHIVE-Patient remains free from injury  Description: INTERVENTIONS:  1. Universal precautions  2. Ensure bed, stretcher, or wheelchair are in locked position when not transporting  3. Position patient per procedure requirement  4. Maintain patent airway  5. Perform pre-op equipment checks  10/26/2022 1329 by Joshua Maldonado RN  Outcome: Progressing  10/26/2022 1328 by Joshua Maldonado RN  Outcome: Not Progressing     Problem: Pain  Goal: Verbalizes/displays adequate comfort level or baseline comfort level  10/26/2022 1329 by Joshua Maldonado RN  Outcome: Progressing  10/26/2022 1328 by Joshua Maldonado RN  Outcome: Not Progressing     Problem: Anxiety  Goal: Will report anxiety at manageable levels  Description: INTERVENTIONS:  1. Administer medication as ordered  2. Teach and rehearse alternative coping skills  3. Provide emotional support with 1:1 interaction with staff  10/26/2022 1329 by Joshua Maldonado RN  Outcome: Progressing  10/26/2022 1328 by Joshua Maldonado RN  Outcome: Not Progressing  Flowsheets (Taken 10/26/2022 1323)  Will report anxiety at manageable levels:   Administer medication as ordered   Provide emotional support with 1:1 interaction with staff   Teach and rehearse alternative coping skills     Problem: Coping  Goal: Pt/Family able to verbalize concerns and demonstrate effective coping strategies  Description: INTERVENTIONS:  1. Assist patient/family to identify coping skills, available support systems and cultural and spiritual values  2. Provide emotional support, including active listening and acknowledgement of concerns of patient and caregivers  3. Reduce environmental stimuli, as able  4. Instruct patient/family in relaxation techniques, as appropriate  5.  Assess for spiritual pain/suffering and initiate Spiritual Care, Psychosocial Clinical Specialist consults as needed  10/26/2022 1329 by Joshua Maldonado RN  Outcome: Progressing  10/26/2022 1328 by Mikel Mckeon RN  Outcome: Not Progressing  Flowsheets (Taken 10/26/2022 1323)  Patient/family able to verbalize anxieties, fears, and concerns, and demonstrate effective coping:   Provide emotional support, including active listening and acknowledgement of concerns of patient and caregivers   Assist patient/family to identify coping skills, available support systems and cultural and spiritual values   Reduce environmental stimuli, as able   Instruct patient/family in relaxation techniques, as appropriate     Problem: Confusion  Goal: Confusion, delirium, dementia, or psychosis is improved or at baseline  Description: INTERVENTIONS:  1. Assess for possible contributors to thought disturbance, including medications, impaired vision or hearing, underlying metabolic abnormalities, dehydration, psychiatric diagnoses, and notify attending LIP  2. [place] high risk fall precautions, as indicated  3. Provide frequent short contacts to provide reality reorientation, refocusing and direction  4. Decrease environmental stimuli, including noise as appropriate  5. Monitor and intervene to maintain adequate nutrition, hydration, elimination, sleep and activity  6. If unable to ensure safety without constant attention obtain sitter and review sitter guidelines with assigned personnel  7.  Initiate Psychosocial CNS and Spiritual Care consult, as indicated  10/26/2022 1329 by Mikel Mckeon RN  Outcome: Progressing  10/26/2022 1328 by Mikel Mckeon RN  Outcome: Not Progressing  Flowsheets (Taken 10/26/2022 1323)  Effect of thought disturbance (confusion, delirium, dementia, or psychosis) are managed with adequate functional status:   Assess for contributors to thought disturbance, including medications, impaired vision or hearing, underlying metabolic abnormalities, dehydration, psychiatric diagnoses, notify [facility] high risk fall precautions, as indicated   Provide frequent short contacts to provide reality reorientation, refocusing and direction   Decrease environmental stimuli, including noise as appropriate   Monitor and intervene to maintain adequate nutrition, hydration, elimination, sleep and activity   If unable to ensure safety without constant attention obtain sitter and review sitter guidelines with assigned personnel     Problem: Depression/Self Harm  Goal: Effect of psychiatric condition will be minimized and patient will be protected from self harm  Description: INTERVENTIONS:  1. Assess impact of patient's symptoms on level of functioning, self care needs and offer support as indicated  2. Assess patient/family knowledge of depression, impact on illness and need for teaching  3. Provide emotional support, presence and reassurance  4. Assess for possible suicidal thoughts or ideation. If patient expresses suicidal thoughts or statements do not leave alone, initiate Suicide Precautions, move to a room close to the nursing station and obtain sitter  5. Initiate consults as appropriate with Mental Health Professional, Spiritual Care, Psychosocial CNS, and consider a recommendation to the LIP for a Psychiatric Consultation  10/26/2022 1329 by Leyla Charles RN  Outcome: Progressing  10/26/2022 1328 by Leyla Charles RN  Outcome: Not Progressing  Flowsheets  Taken 10/26/2022 1327  Effect of psychiatric condition will be minimized and patient will be protected from self harm:   Assess impact of patients symptoms on level of functioning, self care needs and offer support as indicated   Assess patient/family knowledge of depression, impact on illness and need for teaching   Provide emotional support, presence and reassurance   Assess for suicidal thoughts or ideation.  If patient expresses suicidal thoughts or statements do not leave alone, initiate Suicide Precautions, move near nurse station, obtain sitter  Taken 10/26/2022 1323  Effect of psychiatric condition will be minimized and patient will be protected from self harm:   Provide emotional support, presence and reassurance   Assess patient/family knowledge of depression, impact on illness and need for teaching   Assess impact of patients symptoms on level of functioning, self care needs and offer support as indicated   Assess for suicidal thoughts or ideation.  If patient expresses suicidal thoughts or statements do not leave alone, initiate Suicide Precautions, move near nurse station, obtain sitter

## 2022-10-26 NOTE — CARE COORDINATION
Patient asked to speak to this SW. He stated he has concerns about going home because he still has thought of killing his wife. Asked how he would kill his wife and patient said, \" Oh there are all  kinds of ways. \" I asked, such as? Patient said, \" With a knife, scissors, or a bottle. I could kill her any of those ways. \"   This writer will make Dr. Lina Layne aware.

## 2022-10-26 NOTE — PLAN OF CARE
Patient reclusive to room. Patient rates anxiety a 7/10. Rates depression a 0/10. Patient  1:1 in his room for safety. Reports adequate appetite and sleep. Patient denies SI/HI/AVH. No current complaint of pain or discomfort. No accidental falls observed or reported. Patient remains free from harm. Problem: Self Harm/Suicidality  Goal: Will have no self-injury during hospital stay  Description: INTERVENTIONS:  1. Q 30 MINUTES: Routine safety checks  2. Q SHIFT & PRN: Assess risk to determine if routine checks are adequate to maintain patient safety  10/25/2022 1959 by Patience Lopez RN  Outcome: Progressing     Problem: Chronic Conditions and Co-morbidities  Goal: Patient's chronic conditions and co-morbidity symptoms are monitored and maintained or improved  10/25/2022 1959 by Patience Lopez RN  Outcome: Progressing     Problem: Safety - Adult  Goal: Free from fall injury  10/25/2022 1959 by Patience Lopez RN  Outcome: Progressing     Problem: ABCDS Injury Assessment  Goal: Absence of physical injury  10/25/2022 1959 by Patience Lopez RN  Outcome: Progressing     Problem: Skin/Tissue Integrity  Goal: Absence of new skin breakdown  Description: 1. Monitor for areas of redness and/or skin breakdown  2. Assess vascular access sites hourly  3. Every 4-6 hours minimum:  Change oxygen saturation probe site  4. Every 4-6 hours:  If on nasal continuous positive airway pressure, respiratory therapy assess nares and determine need for appliance change or resting period. 10/25/2022 1959 by Patience Lopez RN  Outcome: Progressing     Problem: Skin/Tissue Integrity  Goal: Absence of new skin breakdown  Description: 1. Monitor for areas of redness and/or skin breakdown  2. Assess vascular access sites hourly  3. Every 4-6 hours minimum:  Change oxygen saturation probe site  4.   Every 4-6 hours:  If on nasal continuous positive airway pressure, respiratory therapy assess nares and determine need for appliance change or resting period. 10/25/2022 1959 by Maite Flynn, RN  Outcome: Progressing     Problem: Impaired Comfort  Goal: Patient reports pain level is manageable/acceptable  Description: INTERVENTIONS:  1. Provide emotional support and reassurance at all times to relieve anxiety  2. Position patient for comfort  3. Medicate PRN for pain relief  10/25/2022 1959 by Maite Flynn, RN  Outcome: Progressing     Problem: Deficient Knowledge  Goal: Pt/family demonstrate understanding of pre/post-procedure instructions  Description: INTERVENTIONS:  1. Provide pre-procedure instructions  2.  Provide written and verbal post-procedural instructions  10/25/2022 1959 by Maite Flynn, RN  Outcome: Progressing

## 2022-10-26 NOTE — PROGRESS NOTES
Explained and gave all  meds, pt reminded he had klonopin 0.5 at 1:00 yesterday and at 0054 signed off by Soledad Montoya and is due for 9pm dose tonight, as times were changed to 9&9. Pt denied pain, this staff nurse was in formed pt arrived back from ect 12:30 gag was present and lunch was given, vs obtained. 1:1 at bedside, pt was noted dozing off sitting in the dinning room after eating most of his lunch.

## 2022-10-26 NOTE — PROGRESS NOTES
Pt reports depression level is # 8/10, anxiety level is #6/10. Denies SI/AVH. Pt does admit to having intrusive thoughts about killing wife. Pt goes to DR for all meals, eats approx 50% of all meals.

## 2022-10-26 NOTE — PROGRESS NOTES
Pt. declined to attend the 0900 community meeting, despite staff encouragement.  GOAL : \" to talk to the [facility] chahal\" Electronically signed by Griffin Castillo on 10/26/2022 at 9:38 AM

## 2022-10-26 NOTE — ANESTHESIA PRE PROCEDURE
Department of Anesthesiology  Preprocedure Note       Name:  Aftab Isbell   Age:  74 y.o.  :  1950                                          MRN:  [id number]         Date:  10/26/2022      Surgeon: * No surgeons listed *    Procedure: * No procedures listed *    Medications prior to admission:   Prior to Admission medications    Medication Sig Start Date End Date Taking? Authorizing Provider   mirtazapine (REMERON) 15 MG tablet Take 15 mg by mouth nightly   Yes Historical Provider, MD   ALPRAZolam (XANAX) 0.25 MG tablet Take 0.25 mg by mouth in the morning, at noon, and at bedtime.    Yes Historical Provider, MD   albuterol sulfate HFA (VENTOLIN HFA) 108 (90 Base) MCG/ACT inhaler Inhale 2 puffs into the lungs 4 times daily as needed for Wheezing 10/12/22   Rut Hernandez MD   isosorbide mononitrate (IMDUR) 30 MG extended release tablet TAKE 1 TABLET BY MOUTH DAILY 10/12/22   Rian Vega DO   ranolazine (RANEXA) 1000 MG extended release tablet TAKE 1 TABLET BY MOUTH TWICE DAILY 8/15/22   Angie Perry, APRN - CNP   escitalopram (LEXAPRO) 10 MG tablet Take 20 mg by mouth daily 11/3/21   Historical Provider, MD   aspirin 81 MG chewable tablet Take 1 tablet by mouth daily 10/29/21   Gato Harvey MD   Blood Glucose Monitoring Suppl ([address]) w/Device KIT  18   Historical Provider, MD Rajinder Rivera strip  18   Historical Provider, MD Wyatt Restrepo [facility] [address] [facility]  18   Historical Provider, MD       Current medications:    Current Facility-Administered Medications   Medication Dose Route Frequency Provider Last Rate Last Admin    risperiDONE (RISPERDAL) tablet 0.25 mg  0.25 mg Oral BID Vicky Li MD   0.25 mg at 10/25/22 2123    clonazePAM (KLONOPIN) tablet 0.5 mg  0.5 mg Oral Q12H Vicky Li MD   0.5 mg at 10/26/22 0054    traZODone (DESYREL) tablet 50 mg  50 mg Oral Nightly Vicky Li MD   50 mg at 10/25/22 2124    haloperidol (HALDOL) tablet 2 mg  2 mg Oral Q6H PRN Jake Jonas MD        PARoxetine (PAXIL) tablet 40 mg  40 mg Oral Daily Ananda Maier MD   40 mg at 10/25/22 0840    0.9 % sodium chloride infusion   IntraVENous Continuous Jake Jonas  mL/hr at 10/24/22 1143 New Bag at 10/24/22 1143    benzocaine (ORAJEL) 20 % mucosal gel   Mouth/Throat BID PRN Dennie Pointer, APRN - CNP        insulin lispro (HUMALOG) injection vial 0-8 Units  0-8 Units SubCUTAneous TID WC Charlene Allen, APRN - NP        insulin lispro (HUMALOG) injection vial 0-4 Units  0-4 Units SubCUTAneous Nightly Randyel Bramehreen, APRN - NP        glucose chewable tablet 16 g  4 tablet Oral PRN Charlene Bramehreen, APRN - NP        dextrose bolus 10% 125 mL  125 mL IntraVENous PRN Randyel Bramehreen, APRN - NP        Or    dextrose bolus 10% 250 mL  250 mL IntraVENous PRN Randyel Bramehreen, APRN - NP        glucagon (rDNA) injection 1 mg  1 mg SubCUTAneous PRN Randyel Bramehreen, APRN - NP        dextrose 10 % infusion   IntraVENous Continuous PRN Charlene Bramehreen, APRN - NP        isosorbide mononitrate (IMDUR) extended release tablet 30 mg  30 mg Oral Daily Mushtaq Mayer MD   30 mg at 10/25/22 0840    metoprolol succinate (TOPROL XL) extended release tablet 25 mg  25 mg Oral Nightly Mushtaq Mayer MD   25 mg at 10/25/22 2123    atorvastatin (LIPITOR) tablet 20 mg  20 mg Oral Nightly Mushtaq Mayer MD   20 mg at 10/25/22 2123    albuterol sulfate HFA (PROVENTIL;VENTOLIN;PROAIR) 108 (90 Base) MCG/ACT inhaler 2 puff  2 puff Inhalation 4x Daily PRN Jake Jonas MD        aspirin chewable tablet 81 mg  81 mg Oral Daily Jake Jonas MD   81 mg at 10/25/22 0840    acetaminophen (TYLENOL) tablet 650 mg  650 mg Oral Q4H PRN Jake Jonas MD   650 mg at 10/25/22 0234    magnesium hydroxide (MILK OF MAGNESIA) 400 MG/5ML suspension 30 mL  30 mL Oral Daily PRN Jake Jonas MD   30 mL at 10/16/22 1217    nicotine polacrilex (COMMIT) lozenge 2 mg  2 mg Oral Q1H PRN Colin Infante MD           Allergies:     Allergies   Allergen Reactions    Seroquel [Quetiapine] Other (See Comments)     lethargy  weak       Problem List:    Patient Active Problem List   Diagnosis Code    Chest pain R07.9    Hypertension I10    Anxiety F41.9    Recurrent major depressive disorder, in remission ([facility] F33.40    Coronary artery disease involving native coronary artery of native heart without angina pectoris I25.10    Mixed obsessional thoughts and acts F42.2    Chronic gastritis without bleeding K29.50    Dyspepsia R10.13    Adenomatous polyp of sigmoid colon D12.5    Weakness R53.1    COVID-19 U07.1    Hyperlipidemia E78.5    Heart murmur R01.1    Generalized anxiety disorder F41.1    Dyspnea on exertion R06.09    ED (erectile dysfunction) N52.9    Benzodiazepine dependence (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) E11.9    Severe episode of recurrent major depressive disorder, with psychotic features ([facility] F33.3    Tremor R25.1    Parkinsonism ([facility] G20    Dyskinesia G24.9       Past Medical History:        Diagnosis Date    Anxiety     Chest pain 03/29/2018    Coronary artery disease involving native coronary artery of native heart without angina pectoris 02/15/2019    Diabetes mellitus ([facility]     no meds    History of colon polyps     Hyperlipidemia     Hypertension     Type 2 diabetes mellitus without complication ([facility])     Vertigo        Past Surgical History:        Procedure Laterality Date    CARDIAC SURGERY  1973    PTCA     COLONOSCOPY      COLONOSCOPY N/A 10/17/2019    COLORECTAL CANCER SCREENING, HIGH RISK performed by Marisa Niño MD at [address] [place] [address]      OTHER SURGICAL HISTORY      patent foramen ovale    TONSILLECTOMY      UPPER GASTROINTESTINAL ENDOSCOPY N/A 8/1/2019    EGD ESOPHAGOGASTRODUODENOSCOPY performed by Sandra Noel MD at [facility] History:    Social History     Tobacco Use    Smoking status: Never    Smokeless tobacco: Never   Substance Use Topics    Alcohol use: No                                Counseling given: Not Answered      Vital Signs (Current):   Vitals:    10/25/22 1638 10/25/22 2123 10/26/22 0850 10/26/22 1015   BP: 104/61 127/65 100/61 136/64   Pulse: 59 67 61 (!) 48   Resp: 20  18 10   Temp: 97.7 °F (36.5 °C)  98.2 °F (36.8 °C) 97.5 °F (36.4 °C)   TempSrc:   Oral Temporal   SpO2: 97%  98% 99%   Weight:       Height:                                                  BP Readings from Last 3 Encounters:   10/26/22 136/64   10/12/22 127/67   09/13/22 (!) 157/87       NPO Status: Time of last liquid consumption: 0000                        Time of last solid consumption: 0000                        Date of last liquid consumption: 10/26/22                        Date of last solid food consumption: 10/26/22    BMI:   Wt Readings from Last 3 Encounters:   10/21/22 113 lb (51.3 kg)   09/13/22 125 lb (56.7 kg)   08/29/22 120 lb (54.4 kg)     Body mass index is 18.8 kg/m².     CBC:   Lab Results   Component Value Date/Time    WBC 7.4 10/14/2022 08:43 AM    RBC 4.81 10/14/2022 08:43 AM    HGB 15.6 10/14/2022 08:43 AM    HCT 44.1 10/14/2022 08:43 AM    MCV 91.6 10/14/2022 08:43 AM    RDW 13.9 10/14/2022 08:43 AM     10/14/2022 08:43 AM       CMP:   Lab Results   Component Value Date/Time     10/14/2022 08:43 AM    K 3.4 10/14/2022 08:43 AM    K 4.2 09/09/2021 07:03 AM     10/14/2022 08:43 AM    CO2 18 10/14/2022 08:43 AM    BUN 16 10/14/2022 08:43 AM    CREATININE 0.85 10/14/2022 08:43 AM    GFRAA >60.0 10/14/2022 08:43 AM    LABGLOM >60.0 10/14/2022 08:43 AM    GLUCOSE 138 10/14/2022 08:43 AM    PROT 7.4 10/14/2022 08:43 AM    CALCIUM 9.7 10/14/2022 08:43 AM    BILITOT 1.7 10/14/2022 08:43 AM    ALKPHOS 85 10/14/2022 08:43 AM    AST 19 10/14/2022 08:43 AM    ALT 16 10/14/2022 08:43 AM       POC Tests:   Recent Labs     10/26/22  0638   POCGLU 129*       Coags:   Lab Results   Component Value Date/Time    PROTIME 14.0 10/03/2021 01:36 AM    INR 1.1 10/03/2021 01:36 AM    APTT 28.4 10/03/2021 01:36 AM       HCG (If Applicable): No results found for: PREGTESTUR, PREGSERUM, HCG, HCGQUANT     ABGs:   Lab Results   Component Value Date/Time    PHART 7.482 09/07/2021 06:38 PM    PO2ART 59 09/07/2021 06:38 PM    CAD7TNW 31 09/07/2021 06:38 PM    HBV5PHB 22.8 09/07/2021 06:38 PM    BEART -1 09/07/2021 06:38 PM    W2FADQBZ 92 09/07/2021 06:38 PM        Type & Screen (If Applicable):  No results found for: LABABO, LABRH    Drug/Infectious Status (If Applicable):  No results found for: HIV, HEPCAB    COVID-19 Screening (If Applicable):   Lab Results   Component Value Date/Time    COVID19 Not Detected 10/14/2022 08:27 AM           Anesthesia Evaluation  Patient summary reviewed and Nursing notes reviewed no history of anesthetic complications:   Airway: Mallampati: II  TM distance: >3 FB   Neck ROM: full  Mouth opening: > = 3 FB   Dental: normal exam         Pulmonary:normal exam    (+) shortness of breath:                             Cardiovascular:    (+) hypertension:, CAD:, HENRIQUEZ:,       ECG reviewed                        Neuro/Psych:   (+) psychiatric history:            GI/Hepatic/Renal: Neg GI/Hepatic/Renal ROS            Endo/Other:    (+) Diabetes, . Abdominal:             Vascular: negative vascular ROS. Other Findings:           Anesthesia Plan      MAC     ASA 3       Induction: intravenous. Anesthetic plan and risks discussed with patient. Plan discussed with CRNA.     Attending anesthesiologist reviewed and agrees with Preprocedure content                Alice Waggoner MD   10/26/2022

## 2022-10-26 NOTE — H&P
Update History & Physical    The patient's History and Physical of 10 / 18 / 22 was reviewed with the patient and there were no significant changes. I examined the patient and there were no significant changes from the previous History and Physical.    Vitals:    10/26/22 1015   BP: 136/64   Pulse: (!) 48   Resp: 10   Temp: 97.5 °F (36.4 °C)   SpO2: 99%     Principal Problem:    Severe episode of recurrent major depressive disorder, with psychotic features ([address]  Active Problems:    Tremor    Parkinsonism ([address]    Dyskinesia  Resolved Problems:    * No resolved hospital problems. *        Plan: The risk, benefits, expected outcome, and alternative to the recommended procedure have been discussed with the patient. Patient understands and wants to proceed with the procedure.     Electronically signed by Shruti Hoff MD

## 2022-10-26 NOTE — OP NOTE
Department of Psychiatry  Electroconvulsive Therapy Treatment Note        10/26/2022    PURPOSE FOR ECT:  Therapeutic NUMBER: 4    DIAGNOSIS:   Principal Problem:    Severe episode of recurrent major depressive disorder, with psychotic features ([facility]  Active Problems:    Tremor    Parkinsonism ([facility]    Dyskinesia  Resolved Problems:    * No resolved hospital problems.  *      MEDICATIONS:    Current Facility-Administered Medications:     0.9 % sodium chloride infusion, , IntraVENous, Continuous, Anju Soto MD    ondansetron (ZOFRAN) injection 4 mg, 4 mg, IntraVENous, Once PRN, Elfego Pimentel DO    lidocaine PF 1 % injection 1 mL, 1 mL, IntraDERmal, Once PRN, Kory Plascencia DO    risperiDONE (RISPERDAL) tablet 0.25 mg, 0.25 mg, Oral, BID, Anju Soto MD, 0.25 mg at 10/25/22 2123    clonazePAM (KLONOPIN) tablet 0.5 mg, 0.5 mg, Oral, Q12H, Anju Soto MD, 0.5 mg at 10/26/22 0054    traZODone (DESYREL) tablet 50 mg, 50 mg, Oral, Nightly, Anju Soto MD, 50 mg at 10/25/22 2124    haloperidol (HALDOL) tablet 2 mg, 2 mg, Oral, Q6H PRN, Anju Soto MD    PARoxetine (PAXIL) tablet 40 mg, 40 mg, Oral, Daily, Ovidio Burnette MD, 40 mg at 10/25/22 0840    0.9 % sodium chloride infusion, , IntraVENous, Continuous, Anju Soto MD, Last Rate: 100 mL/hr at 10/24/22 1143, New Bag at 10/24/22 1143    benzocaine (ORAJEL) 20 % mucosal gel, , Mouth/Throat, BID PRN, Tab Villegas, APRN - CNP    insulin lispro (HUMALOG) injection vial 0-8 Units, 0-8 Units, SubCUTAneous, TID WC, Jeff Mack APRN - NP    insulin lispro (HUMALOG) injection vial 0-4 Units, 0-4 Units, SubCUTAneous, Nightly, Jeff Mack, APRN - NP    glucose chewable tablet 16 g, 4 tablet, Oral, PRN, Jeff Mack APRN - NP    dextrose bolus 10% 125 mL, 125 mL, IntraVENous, PRN **OR** dextrose bolus 10% 250 mL, 250 mL, IntraVENous, PRN, RAVEN Cannon NP    glucagon (rDNA) injection 1 mg, 1 mg, SubCUTAneous, PRN, RAVEN Ding - NP    dextrose 10 % infusion, , IntraVENous, Continuous PRN, Dhruv Collins APRN - NOELLE    isosorbide mononitrate (IMDUR) extended release tablet 30 mg, 30 mg, Oral, Daily, Genet Fitzgerald MD, 30 mg at 10/25/22 0840    metoprolol succinate (TOPROL XL) extended release tablet 25 mg, 25 mg, Oral, Nightly, Genet Fitzgerald MD, 25 mg at 10/25/22 2123    atorvastatin (LIPITOR) tablet 20 mg, 20 mg, Oral, Nightly, Genet Fitzgerald MD, 20 mg at 10/25/22 2123    albuterol sulfate HFA (PROVENTIL;VENTOLIN;PROAIR) 108 (90 Base) MCG/ACT inhaler 2 puff, 2 puff, Inhalation, 4x Daily PRN, Griselda Sarkar MD    aspirin chewable tablet 81 mg, 81 mg, Oral, Daily, Griselda Sarkar MD, 81 mg at 10/25/22 0840    acetaminophen (TYLENOL) tablet 650 mg, 650 mg, Oral, Q4H PRN, Griselda Sarkar MD, 650 mg at 10/25/22 0234    magnesium hydroxide (MILK OF MAGNESIA) 400 MG/5ML suspension 30 mL, 30 mL, Oral, Daily PRN, Griselda Sarkar MD, 30 mL at 10/16/22 1217    nicotine polacrilex (COMMIT) lozenge 2 mg, 2 mg, Oral, Q1H PRN, Griselda Sarkar MD    Facility-Administered Medications Ordered in Other Encounters:     Glycopyrrolate injection, , IntraVENous, PRN, Rigo Orona MD, 0.2 mg at 10/26/22 1127    propofol injection, , IntraVENous, PRN, Rigo Orona MD, 70 mg at 10/26/22 1131    CHANGE IN PSYCHIATRY STATUS SINCE LAST ECT:   No change in symptoms    INFORMED CONSENT OBTAINED : YES    PRE ECT MEDICATION ADMINISTERED:   YES    NPO STATUS: Confirmed    ELECTRODE PLACEMENT : RUL    TIME OUT :  TEAM CONFIRMS THE CORRECT PATIENT, CORRECT PROCEDURE AND CORRECT SITE.     DEVICE PARAMETERS:   Charge- 192  PW - 0.3  Freq- 50  Duration- 8  EEG- 29  Motor-  22    VITALS:   Vitals:    10/26/22 1015   BP: 136/64   Pulse: (!) 48   Resp: 10   Temp: 97.5 °F (36.4 °C)   SpO2: 86%         COMPLICATIONS: no      RECOMMENDATIONS FOR NEXT TREATMENT:  fri        PSYCHIATRIST:  Arely Owusu MD

## 2022-10-26 NOTE — ANESTHESIA POSTPROCEDURE EVALUATION
Department of Anesthesiology  Postprocedure Note    Patient: Lawrence Marin  MRN: [id number]  YOB: 1950  Date of evaluation: 10/26/2022      Procedure Summary     Date: 10/26/22 [place] [facility]    Anesthesia Start: 1127 Anesthesia Stop:     Procedure: ECT W/ ANESTHESIA Diagnosis:     Scheduled Providers:  Responsible Provider: Cassandra Portillo MD    Anesthesia Type: MAC ASA Status: 3          Anesthesia Type: No value filed.     Juan Phase I: Juan Score: 10    Juan Phase II:        Anesthesia Post Evaluation    Patient location during evaluation: PACU  Patient participation: complete - patient participated  Level of consciousness: awake  Pain score: 0  Airway patency: patent  Nausea & Vomiting: no nausea  Complications: no  Cardiovascular status: hemodynamically stable  Respiratory status: acceptable  Hydration status: euvolemic

## 2022-10-27 LAB
GLUCOSE BLD-MCNC: 111 MG/DL (ref 70–99)
GLUCOSE BLD-MCNC: 120 MG/DL (ref 70–99)
GLUCOSE BLD-MCNC: 134 MG/DL (ref 70–99)
GLUCOSE BLD-MCNC: 175 MG/DL (ref 70–99)
PERFORMED ON: ABNORMAL

## 2022-10-27 PROCEDURE — 90833 PSYTX W PT W E/M 30 MIN: CPT | Performed by: PSYCHIATRY & NEUROLOGY

## 2022-10-27 PROCEDURE — 6370000000 HC RX 637 (ALT 250 FOR IP): Performed by: PSYCHIATRY & NEUROLOGY

## 2022-10-27 PROCEDURE — 97112 NEUROMUSCULAR REEDUCATION: CPT

## 2022-10-27 PROCEDURE — 1240000000 HC EMOTIONAL WELLNESS R&B

## 2022-10-27 PROCEDURE — 99232 SBSQ HOSP IP/OBS MODERATE 35: CPT | Performed by: PSYCHIATRY & NEUROLOGY

## 2022-10-27 PROCEDURE — 6370000000 HC RX 637 (ALT 250 FOR IP): Performed by: INTERNAL MEDICINE

## 2022-10-27 RX ADMIN — METOPROLOL SUCCINATE 25 MG: 25 TABLET, FILM COATED, EXTENDED RELEASE ORAL at 21:06

## 2022-10-27 RX ADMIN — ATORVASTATIN CALCIUM 20 MG: 20 TABLET, FILM COATED ORAL at 21:06

## 2022-10-27 RX ADMIN — TRAZODONE HYDROCHLORIDE 50 MG: 50 TABLET ORAL at 21:06

## 2022-10-27 RX ADMIN — PAROXETINE HYDROCHLORIDE 40 MG: 20 TABLET, FILM COATED ORAL at 09:02

## 2022-10-27 RX ADMIN — CLONAZEPAM 0.5 MG: 0.5 TABLET ORAL at 09:02

## 2022-10-27 RX ADMIN — RISPERIDONE 0.25 MG: 0.25 TABLET ORAL at 09:02

## 2022-10-27 RX ADMIN — CLONAZEPAM 0.5 MG: 0.5 TABLET ORAL at 21:06

## 2022-10-27 RX ADMIN — HALOPERIDOL 2 MG: 2 TABLET ORAL at 16:36

## 2022-10-27 RX ADMIN — ASPIRIN 81 MG 81 MG: 81 TABLET ORAL at 09:02

## 2022-10-27 RX ADMIN — ISOSORBIDE MONONITRATE 30 MG: 60 TABLET, EXTENDED RELEASE ORAL at 09:02

## 2022-10-27 RX ADMIN — RISPERIDONE 0.25 MG: 0.25 TABLET ORAL at 21:06

## 2022-10-27 NOTE — CARE COORDINATION
Spoke with patients son to update. Son stated that even if thought go away that the family decided it is safer to move their mom out of the home and in with [facility]. Patient will be living at home by himself. Patient is able to be independent but is not able to cook a lot. A home health aide for patient would be beneficial.     Son came yesterday to visit and stated that his father was very distraught and could not relax enough to speak.

## 2022-10-27 NOTE — CARE COORDINATION
10/27/22 @ 3: 15 pm      took over care as 1:1 sitter at 3:00 pm     Patient was anxious and began crying while talking with . He stated he is scared to discharge back home or to an assisted living facility as he does not trust himself to be alone. He stated that he is afraid he will hurt himself or fall down (outside) and no one would know if he needed help. He wants to discharge to a nursing home, where he will have 24/7 supervision. Patient later (4:15)  reported feeling anxious and scared, and requested something for anxiety. Patient was given haldol 2 mg (4:30)    Patient currently eating dinner in dayroom (5:30)   Reports feeling less anxious.

## 2022-10-27 NOTE — CARE COORDINATION
FAMILY COLLATERAL NOTE    Family/Support Name: Mary Carmen Cuevas #: [phone]  Relationship to Pt: son      Placed call to above to update son and discharge possible options of residency for discharge. No answer, left message requesting a return call.              Imer Hwang, [facility]

## 2022-10-27 NOTE — PROGRESS NOTES
Leida Romeo [facility]. FOLLOW-UP NOTE       10/27/2022     Patient was seen and examined in person, Chart reviewed   Patient's case discussed with staff/team    Chief Complaint: Depression anxiety    Interim History:     Patient had 4  treatment so far and is starting to notice some improvement in his mood  Patient continued to remain anxious and ruminating about the home situation  Wife who is chronically mentally ill and she has some physical illness too  Patient was her main caregiver until recently when he started having some extra help with the home health care.   Patient feel burnt out and burdened by his caregiving role  Patient is still obsessing about harming his wife and he does not want to return back home preferred to be going to nursing home  Patient does not have any family member to bring him for outpatient ECT either    Appetite:   [x] Normal/Unchanged  [] Increased  [] Decreased      Sleep:       [] Normal/Unchanged  [x] Fair       [] Poor              Energy:    [] Normal/Unchanged  [] Increased  [x] Decreased        SI [x] Present  [] Absent    HI  [x]Present  [] Absent     Aggression:  [] yes  [x] no    Patient is [] able  [x] unable to CONTRACT FOR SAFETY     PAST MEDICAL/PSYCHIATRIC HISTORY:   Past Medical History:   Diagnosis Date    Anxiety     Chest pain 03/29/2018    Coronary artery disease involving native coronary artery of native heart without angina pectoris 02/15/2019    Diabetes mellitus ([facility] [address]     no meds    History of colon polyps     Hyperlipidemia     Hypertension     Type 2 diabetes mellitus without complication ([facility] [address]     Vertigo        FAMILY/SOCIAL HISTORY:  Family History   Problem Relation Age of Onset    Heart Disease Maternal Aunt     Cancer Maternal Aunt     Colon Cancer Maternal Aunt      Social History     Socioeconomic History    Marital status:      Spouse name: Not on file    Number of children: Not on file    Years of education: Not on file    Highest education level: Not on file   Occupational History    Not on file   Tobacco Use    Smoking status: Never    Smokeless tobacco: Never   Vaping Use    Vaping Use: Never used   Substance and Sexual Activity    Alcohol use: No    Drug use: Never    Sexual activity: Not on file   Other Topics Concern    Not on file   Social History Narrative    Not on file     Social Determinants of Health     Financial Resource Strain: Not on file   Food Insecurity: Not on file   Transportation Needs: Not on file   Physical Activity: Not on file   Stress: Not on file   Social Connections: Not on file   Intimate Partner Violence: Not on file   Housing Stability: Not on file           ROS:  [x] All negative/unchanged except if checked.  Explain positive(checked items) below:  [] Constitutional  [] Eyes  [] Ear/Nose/Mouth/Throat  [] Respiratory  [] CV  [] GI  []   [] Musculoskeletal  [] Skin/Breast  [] Neurological  [] Endocrine  [] Heme/Lymph  [] Allergic/Immunologic    Explanation:     MEDICATIONS:    Current Facility-Administered Medications:     0.9 % sodium chloride infusion, , IntraVENous, Continuous, Griselda Sarkar MD    risperiDONE (RISPERDAL) tablet 0.25 mg, 0.25 mg, Oral, BID, Griselda Sarkar MD, 0.25 mg at 10/27/22 0902    clonazePAM (KLONOPIN) tablet 0.5 mg, 0.5 mg, Oral, Q12H, Griselda Sarkar MD, 0.5 mg at 10/27/22 0902    traZODone (DESYREL) tablet 50 mg, 50 mg, Oral, Nightly, Griselda Sarkar MD, 50 mg at 10/26/22 2131    haloperidol (HALDOL) tablet 2 mg, 2 mg, Oral, Q6H PRN, Griselda Sarkar MD    PARoxetine (PAXIL) tablet 40 mg, 40 mg, Oral, Daily, Alon Stoddard MD, 40 mg at 10/27/22 0902    0.9 % sodium chloride infusion, , IntraVENous, Continuous, Griselda Sarkar MD, Last Rate: 100 mL/hr at 10/24/22 1143, New Bag at 10/24/22 1143    benzocaine (ORAJEL) 20 % mucosal gel, , Mouth/Throat, BID PRN, RAVEN Wise - CNP    insulin lispro (HUMALOG) injection vial 0-8 Units, 0-8 Units, SubCUTAneous, TID WC, RAVEN Evans - NP    insulin lispro (HUMALOG) injection vial 0-4 Units, 0-4 Units, SubCUTAneous, Nightly, Skyler Hope APRN - NP    glucose chewable tablet 16 g, 4 tablet, Oral, PRN, Skyler Hope, APRN - NP    dextrose bolus 10% 125 mL, 125 mL, IntraVENous, PRN **OR** dextrose bolus 10% 250 mL, 250 mL, IntraVENous, PRN, Skyler Hope, APRN - NP    glucagon (rDNA) injection 1 mg, 1 mg, SubCUTAneous, PRN, Skyler Hope, APRN - NP    dextrose 10 % infusion, , IntraVENous, Continuous PRN, Skyler Hope APRVETO - NP    isosorbide mononitrate (IMDUR) extended release tablet 30 mg, 30 mg, Oral, Daily, Kimber Trotter MD, 30 mg at 10/27/22 4767    metoprolol succinate (TOPROL XL) extended release tablet 25 mg, 25 mg, Oral, Nightly, Kimber Trotter MD, 25 mg at 10/26/22 2137    atorvastatin (LIPITOR) tablet 20 mg, 20 mg, Oral, Nightly, Kimber Trotter MD, 20 mg at 10/26/22 2116    albuterol sulfate HFA (PROVENTIL;VENTOLIN;PROAIR) 108 (90 Base) MCG/ACT inhaler 2 puff, 2 puff, Inhalation, 4x Daily PRN, Crissy Yip MD    aspirin chewable tablet 81 mg, 81 mg, Oral, Daily, Crissy Yip MD, 81 mg at 10/27/22 9311    acetaminophen (TYLENOL) tablet 650 mg, 650 mg, Oral, Q4H PRN, Crissy Yip MD, 650 mg at 10/26/22 1856    magnesium hydroxide (MILK OF MAGNESIA) 400 MG/5ML suspension 30 mL, 30 mL, Oral, Daily PRN, Crissy Yip MD, 30 mL at 10/16/22 1217    nicotine polacrilex (COMMIT) lozenge 2 mg, 2 mg, Oral, Q1H PRN, Crissy Yip MD      Examination:  /73   Pulse 63   Temp 98.1 °F (36.7 °C) (Oral)   Resp 18   Ht 5' 5\" (1.651 m)   Wt 113 lb (51.3 kg)   SpO2 97%   BMI 18.80 kg/m²   Gait - steady  Medication side effects(SE): no    Mental Status Examination:    Level of consciousness:  within normal limits   Appearance:  poor grooming and poor hygiene  Behavior/Motor:  psychomotor retardation  Attitude toward examiner:  cooperative  Speech:  slow   Mood: anxious and depressed  Affect:  flat and anxious  Thought processes:  slow   Thought content:  Suicidal Ideation:  active  Cognition:  oriented to person, place, and time   Concentration poor  Insight poor   Judgement poor     ASSESSMENT:   Patient symptoms are:  [] Well controlled  [] Improving  [] Worsening  [] No change      Diagnosis:   Principal Problem:    Severe episode of recurrent major depressive disorder, with psychotic features ([facility] [address]  Active Problems:    Tremor    Parkinsonism ([facility] [address]    Dyskinesia  Resolved Problems:    * No resolved hospital problems. *      LABS:    No results for input(s): WBC, HGB, PLT in the last 72 hours. No results for input(s): NA, K, CL, CO2, BUN, CREATININE, GLUCOSE in the last 72 hours. No results for input(s): BILITOT, ALKPHOS, AST, ALT in the last 72 hours. Lab Results   Component Value Date/Time    LABAMPH Neg 10/14/2022 08:15 AM    BARBSCNU Neg 10/14/2022 08:15 AM    LABBENZ Neg 10/14/2022 08:15 AM    LABMETH Neg 10/14/2022 08:15 AM    OPIATESCREENURINE Neg 10/14/2022 08:15 AM    PHENCYCLIDINESCREENURINE Neg 10/14/2022 08:15 AM    ETOH <10 10/14/2022 08:43 AM     Lab Results   Component Value Date/Time    TSH 3.230 10/14/2022 08:43 AM     No results found for: LITHIUM  No results found for: VALPROATE, CBMZ    RISK ASSESSMENT: High risk of suicide    Treatment Plan:  Reviewed current Medications with the patient. ECT tomorrow  Risperdal increased 0.25 twice daily and Xanax changed to Klonopin  Risks, benefits, side effects, drug-to-drug interactions and alternatives to treatment were discussed. Collateral information: Pending  CD evaluatio  Encourage patient to attend group and other milieu activities.   Discharge planning discussed with the patient and treatment team.    PSYCHOTHERAPY/COUNSELING:  [x] Therapeutic interview  [x] Supportive  [] CBT  [] Ongoing  [] Other  Patient was seen 1:1 for 20 minutes, other than E&M time spent, focusing on      - coping skills techniques     - Anxiety management techniques discussed including deep breathing exercise and PMR     - discussing patients strength and weakness     - Focusing on negative cognition and maladaptive thoughts, which is feeding and maintaining the depression symptoms         [x] Patient continues to need, on a daily basis, active treatment furnished directly by or requiring the supervision of inpatient psychiatric personnel      Anticipated Length of stay:            Electronically signed by Raj Linares MD on 10/27/2022 at 1:32 PM

## 2022-10-27 NOTE — PROGRESS NOTES
Pt was given scheduled Klonopin. Pt will continue to monitor. Electronically signed by Hood Sherman LPN on 97/51/5556 at 9:09 AM

## 2022-10-27 NOTE — PROGRESS NOTES
Physical Therapy Med Surg Daily Treatment Note  Facility/Department: [address]  Room: [place] [address]       NAME: Marybeth Manuel  : [birth year] (74 y.o.)  MRN: [id number]  CODE STATUS: Full Code    Date of Service: 10/27/2022    Patient Diagnosis(es): Severe episode of recurrent major depressive disorder, with psychotic features ([facility] [address] [F33.3]  Major depression with psychotic features [facility]) [F32.3]   Chief Complaint   Patient presents with    Suicidal    Homicidal     For a month or more     Patient Active Problem List    Diagnosis Date Noted    Parkinsonism ([facility] [address] 10/24/2022    Dyskinesia 10/24/2022    Tremor 10/17/2022    Severe episode of recurrent major depressive disorder, with psychotic features ([facility] [address] 10/16/2022    Hyperlipidemia 2021    Heart murmur 2021    Dyspnea on exertion 2021    COVID-19 2021    Weakness 09/10/2021    Adenomatous polyp of sigmoid colon     Chronic gastritis without bleeding     Dyspepsia     Mixed obsessional thoughts and acts     Coronary artery disease involving native coronary artery of native heart without angina pectoris 02/15/2019    Generalized anxiety disorder 04/15/2018    Anxiety     Recurrent major depressive disorder, in remission ([address]     Chest pain 2018    Hypertension 2018    Benzodiazepine dependence ([address] 2017    Type 2 diabetes mellitus without complication, without long-term current use of insulin ([address] 2017    Insomnia secondary to depression with anxiety 2016    Panic attacks 2016    ED (erectile dysfunction) 2011        Past Medical History:   Diagnosis Date    Anxiety     Chest pain 2018    Coronary artery disease involving native coronary artery of native heart without angina pectoris 02/15/2019    Diabetes mellitus ([address]     no meds    History of colon polyps     Hyperlipidemia     Hypertension     Type 2 diabetes mellitus without complication ([address]     Vertigo      Past Surgical History: Procedure Laterality Date    CARDIAC SURGERY  1973    PTCA     COLONOSCOPY      COLONOSCOPY N/A 10/17/2019    COLORECTAL CANCER SCREENING, HIGH RISK performed by Marisa Niño MD at [facility]      OTHER SURGICAL HISTORY      patent foramen ovale    TONSILLECTOMY      UPPER GASTROINTESTINAL ENDOSCOPY N/A 8/1/2019    EGD ESOPHAGOGASTRODUODENOSCOPY performed by Marisa Niño MD at [facility]       Chart Reviewed: Yes  Family / Caregiver Present: No    Restrictions:  Restrictions/Precautions: Fall Risk    SUBJECTIVE:   Subjective: \"You want to check my balance? \"    Pain  Pain: denies pain. OBJECTIVE:        Bed mobility  Supine to Sit: Independent  Sit to Supine: Independent    Transfers  Sit to Stand: Independent  Stand to Sit: Independent    Ambulation  Comments: pt declines to complete after THOMAS test.               Activity Tolerance  Activity Tolerance: Patient tolerated treatment well          ASSESSMENT   Assessment: THOMAS balance assessment completed, pt scores 44/56 indicating low falls risk.  pt unwilling to ambulate this session but has previously been able to complete 200' with no AD and SBA     Discharge Recommendations:  Continue to assess pending progress         Goals  Short Term Goals  Short Term Goal 1: SBA gait with appropriate device 50 feet  Short Term Goal 2: thomas tested at 40/56 for evidence of decreased falls risk  Short Term Goal 3: safest mobility device determined  Short Term Goal 4: indep with HEP    PLAN    General Plan: 1 time a day 3-6 times a week  Safety Devices  Type of Devices: Left in bed, Sitter present     [facility] (6 CLICK) BASIC MOBILITY  AM-PAC Inpatient Mobility Raw Score : 23      Therapy Time   Individual   Time In 1430   Time Out 1442   Minutes 12      NM: Flores Rodas, PTA, 10/27/22 at 3:13 PM         Definitions for assistance levels  Independent = pt does not require any physical supervision or assistance from another person for activity completion. Device may be needed.   Stand by assistance = pt requires verbal cues or instructions from another person, close to but not touching, to perform the activity  Minimal assistance= pt performs 75% or more of the activity; assistance is required to complete the activity  Moderate assistance= pt performs 50% of the activity; assistance is required to complete the activity  Maximal assistance = pt performs 25% of the activity; assistance is required to complete the activity  Dependent = pt requires total physical assistance to accomplish the task

## 2022-10-27 NOTE — PROGRESS NOTES
Pt reports depression level is # 5/7, anxiety level is # 7/10. Pt denies SI/AVH. Pt does admit to HI.,reports he has recurring intrusive thoughts to kill wife,Pt is 1:1 continuous monitor for safety. Pt ambulates to DR for meals. Pt does not attend groups. Pt receives Trazadone 50 mg po for sleep.

## 2022-10-27 NOTE — PLAN OF CARE
Problem: Self Harm/Suicidality  Goal: Will have no self-injury during hospital stay  Description: INTERVENTIONS:  1. Q 30 MINUTES: Routine safety checks  2. Q SHIFT & PRN: Assess risk to determine if routine checks are adequate to maintain patient safety  Outcome: Progressing     Problem: Chronic Conditions and Co-morbidities  Goal: Patient's chronic conditions and co-morbidity symptoms are monitored and maintained or improved  Outcome: Progressing  Flowsheets (Taken 10/27/2022 2066)  Care Plan - Patient's Chronic Conditions and Co-Morbidity Symptoms are Monitored and Maintained or Improved:   Monitor and assess patient's chronic conditions and comorbid symptoms for stability, deterioration, or improvement   Collaborate with multidisciplinary team to address chronic and comorbid conditions and prevent exacerbation or deterioration     Problem: Safety - Adult  Goal: Free from fall injury  Outcome: Progressing     Problem: ABCDS Injury Assessment  Goal: Absence of physical injury  Outcome: Progressing     Problem: Skin/Tissue Integrity  Goal: Absence of new skin breakdown  Description: 1. Monitor for areas of redness and/or skin breakdown  2. Assess vascular access sites hourly  3. Every 4-6 hours minimum:  Change oxygen saturation probe site  4. Every 4-6 hours:  If on nasal continuous positive airway pressure, respiratory therapy assess nares and determine need for appliance change or resting period. Outcome: Satinder Stoll remains 1:1 with staff for safety. Affect is more reactive but often sad. He expresses continued thoughts to kill his wife. He expressed fear that he may never be able to go home because he would not want to act on harming his wife. He resists staying out of his room for even 15 minutes after finishing meals. He lays in bed, often with eyes closed. No hallucinatory behavior displayed. No motivation, and movements are slow. He is disheveled.

## 2022-10-27 NOTE — PROGRESS NOTES
Pt. refused to attend the 1000 skills group, despite staff encouragement. Remains on 1:1 with staff for pts safety.  Electronically signed by Yasmin Sims on 10/27/2022 at 11:54 AM

## 2022-10-27 NOTE — FLOWSHEET NOTE
This nurse took over care as 1:1 sitter. Pt expressing multiple times that he wants to go to a nursing home when he leaves here. States it will just be better long term. Ruminating about his conversations with others. Says \" everyone just wants me to go home. \" Pt asked if he would want to go to the nursing home if his bad thoughts left. Pt says \" yeah I think it is just better. \" Pt then noted to be somewhat uncooperative with physical therapy exam. Told PT that he could not use walker and would need to use wheelchair. Pt encouraged to remain honest with his abilities.

## 2022-10-27 NOTE — PROGRESS NOTES
Pt. declined to attend the 0900 community meeting, despite staff encouragement.   GOAL : \" to shower and maybe go to a group\" Electronically signed by Chetan Caldwell on 10/27/2022 at 11:33 AM

## 2022-10-28 ENCOUNTER — APPOINTMENT (OUTPATIENT)
Dept: POSTOP/PACU | Age: 72
DRG: 885 | End: 2022-10-28
Payer: MEDICARE

## 2022-10-28 ENCOUNTER — ANESTHESIA EVENT (OUTPATIENT)
Dept: POSTOP/PACU | Age: 72
End: 2022-10-28

## 2022-10-28 ENCOUNTER — ANESTHESIA (OUTPATIENT)
Dept: POSTOP/PACU | Age: 72
End: 2022-10-28

## 2022-10-28 LAB
GLUCOSE BLD-MCNC: 107 MG/DL (ref 70–99)
GLUCOSE BLD-MCNC: 112 MG/DL (ref 70–99)
GLUCOSE BLD-MCNC: 128 MG/DL (ref 70–99)
GLUCOSE BLD-MCNC: 88 MG/DL (ref 70–99)
PERFORMED ON: ABNORMAL
PERFORMED ON: NORMAL

## 2022-10-28 PROCEDURE — GZB4ZZZ OTHER ELECTROCONVULSIVE THERAPY: ICD-10-PCS | Performed by: PSYCHIATRY & NEUROLOGY

## 2022-10-28 PROCEDURE — 3700000000 HC ANESTHESIA ATTENDED CARE

## 2022-10-28 PROCEDURE — 6360000002 HC RX W HCPCS: Performed by: ANESTHESIOLOGY

## 2022-10-28 PROCEDURE — 90870 ELECTROCONVULSIVE THERAPY: CPT | Performed by: PSYCHIATRY & NEUROLOGY

## 2022-10-28 PROCEDURE — 6370000000 HC RX 637 (ALT 250 FOR IP): Performed by: INTERNAL MEDICINE

## 2022-10-28 PROCEDURE — 6370000000 HC RX 637 (ALT 250 FOR IP): Performed by: PSYCHIATRY & NEUROLOGY

## 2022-10-28 PROCEDURE — 7100000001 HC PACU RECOVERY - ADDTL 15 MIN

## 2022-10-28 PROCEDURE — 2500000003 HC RX 250 WO HCPCS: Performed by: ANESTHESIOLOGY

## 2022-10-28 PROCEDURE — 7100000000 HC PACU RECOVERY - FIRST 15 MIN

## 2022-10-28 PROCEDURE — 90870 ELECTROCONVULSIVE THERAPY: CPT

## 2022-10-28 PROCEDURE — 1240000000 HC EMOTIONAL WELLNESS R&B

## 2022-10-28 RX ORDER — GLYCOPYRROLATE 1 MG/5 ML
SYRINGE (ML) INTRAVENOUS PRN
Status: DISCONTINUED | OUTPATIENT
Start: 2022-10-28 | End: 2022-10-28 | Stop reason: SDUPTHER

## 2022-10-28 RX ORDER — SUCCINYLCHOLINE/SOD CL,ISO/PF 100 MG/5ML
SYRINGE (ML) INTRAVENOUS PRN
Status: DISCONTINUED | OUTPATIENT
Start: 2022-10-28 | End: 2022-10-28 | Stop reason: SDUPTHER

## 2022-10-28 RX ORDER — ONDANSETRON 2 MG/ML
INJECTION INTRAMUSCULAR; INTRAVENOUS PRN
Status: DISCONTINUED | OUTPATIENT
Start: 2022-10-28 | End: 2022-10-28 | Stop reason: SDUPTHER

## 2022-10-28 RX ORDER — PROPOFOL 10 MG/ML
INJECTION, EMULSION INTRAVENOUS PRN
Status: DISCONTINUED | OUTPATIENT
Start: 2022-10-28 | End: 2022-10-28 | Stop reason: SDUPTHER

## 2022-10-28 RX ADMIN — ONDANSETRON 4 MG: 2 INJECTION INTRAMUSCULAR; INTRAVENOUS at 11:30

## 2022-10-28 RX ADMIN — TRAZODONE HYDROCHLORIDE 50 MG: 50 TABLET ORAL at 20:07

## 2022-10-28 RX ADMIN — RISPERIDONE 0.25 MG: 0.25 TABLET ORAL at 14:23

## 2022-10-28 RX ADMIN — PROPOFOL 60 MG: 10 INJECTION, EMULSION INTRAVENOUS at 11:30

## 2022-10-28 RX ADMIN — CLONAZEPAM 0.5 MG: 0.5 TABLET ORAL at 14:24

## 2022-10-28 RX ADMIN — CLONAZEPAM 0.5 MG: 0.5 TABLET ORAL at 20:04

## 2022-10-28 RX ADMIN — ATORVASTATIN CALCIUM 20 MG: 20 TABLET, FILM COATED ORAL at 20:04

## 2022-10-28 RX ADMIN — RISPERIDONE 0.25 MG: 0.25 TABLET ORAL at 20:04

## 2022-10-28 RX ADMIN — PAROXETINE HYDROCHLORIDE 40 MG: 20 TABLET, FILM COATED ORAL at 14:23

## 2022-10-28 RX ADMIN — Medication 0.2 MG: at 11:30

## 2022-10-28 RX ADMIN — ISOSORBIDE MONONITRATE 30 MG: 60 TABLET, EXTENDED RELEASE ORAL at 14:23

## 2022-10-28 RX ADMIN — Medication 25 MG: at 11:31

## 2022-10-28 RX ADMIN — ASPIRIN 81 MG 81 MG: 81 TABLET ORAL at 14:23

## 2022-10-28 ASSESSMENT — PAIN SCALES - GENERAL
PAINLEVEL_OUTOF10: 0

## 2022-10-28 ASSESSMENT — PAIN DESCRIPTION - LOCATION
LOCATION: THROAT
LOCATION: THROAT

## 2022-10-28 ASSESSMENT — ENCOUNTER SYMPTOMS: SHORTNESS OF BREATH: 1

## 2022-10-28 ASSESSMENT — PATIENT HEALTH QUESTIONNAIRE - PHQ9: SUM OF ALL RESPONSES TO PHQ QUESTIONS 1-9: 27

## 2022-10-28 NOTE — PROGRESS NOTES
Patient did not attend the 1000 skills group due to getting ready to go off the unit for ECT.  Electronically signed by Dino Rodarte, [facility] on 10/28/2022 at 11:14 AM

## 2022-10-28 NOTE — PROGRESS NOTES
Pt has returned from ect , VS obtained GAG present, pt ate 80% lunch in the dinning room, 100% boost, pt stated he is going to wash at the sink. 1:1 continues.

## 2022-10-28 NOTE — ANESTHESIA POSTPROCEDURE EVALUATION
Department of Anesthesiology  Postprocedure Note    Patient: Gerri Cantrell  MRN: [id number]  YOB: 1950  Date of evaluation: 10/28/2022      Procedure Summary     Date: 10/28/22 Room / Location: [facility] PACU    Anesthesia Start: 1127 Anesthesia Stop: 1135    Procedure: ECT W/ ANESTHESIA Diagnosis:     Scheduled Providers:  Responsible Provider: Kenyatta Seymour MD    Anesthesia Type: general ASA Status: 3          Anesthesia Type: No value filed.     Juan Phase I: Juan Score: 6    Juan Phase II:        Anesthesia Post Evaluation    Patient location during evaluation: PACU  Patient participation: complete - patient participated  Level of consciousness: awake and alert  Airway patency: patent  Nausea & Vomiting: no vomiting and no nausea  Complications: no  Cardiovascular status: hemodynamically stable  Respiratory status: face mask  Hydration status: stable

## 2022-10-28 NOTE — OP NOTE
Department of Psychiatry  Electroconvulsive Therapy Treatment Note        10/28/2022    PURPOSE FOR ECT:  Therapeutic NUMBER: 5    DIAGNOSIS:   Principal Problem:    Severe episode of recurrent major depressive disorder, with psychotic features ([facility] [address]  Active Problems:    Tremor    Parkinsonism ([facility]    Dyskinesia  Resolved Problems:    * No resolved hospital problems.  *      MEDICATIONS:    Current Facility-Administered Medications:     0.9 % sodium chloride infusion, , IntraVENous, Continuous, Home Mason MD    risperiDONE (RISPERDAL) tablet 0.25 mg, 0.25 mg, Oral, BID, Home Mason MD, 0.25 mg at 10/27/22 2106    clonazePAM (KLONOPIN) tablet 0.5 mg, 0.5 mg, Oral, Q12H, Home Mason MD, 0.5 mg at 10/27/22 2106    traZODone (DESYREL) tablet 50 mg, 50 mg, Oral, Nightly, Home Mason MD, 50 mg at 10/27/22 2106    haloperidol (HALDOL) tablet 2 mg, 2 mg, Oral, Q6H PRN, Home Mason MD, 2 mg at 10/27/22 1636    PARoxetine (PAXIL) tablet 40 mg, 40 mg, Oral, Daily, Whit Hendricks MD, 40 mg at 10/27/22 0902    0.9 % sodium chloride infusion, , IntraVENous, Continuous, Home Mason MD, Last Rate: 100 mL/hr at 10/24/22 1143, New Bag at 10/24/22 1143    benzocaine (ORAJEL) 20 % mucosal gel, , Mouth/Throat, BID PRN, Gates Pacini, APRN - CNP    insulin lispro (HUMALOG) injection vial 0-8 Units, 0-8 Units, SubCUTAneous, TID WC, Surinder Odilia, APRN - NP    insulin lispro (HUMALOG) injection vial 0-4 Units, 0-4 Units, SubCUTAneous, Nightly, Surinder Odilia, APRN - NP    glucose chewable tablet 16 g, 4 tablet, Oral, PRN, Surinder Odilia, APRN - NP    dextrose bolus 10% 125 mL, 125 mL, IntraVENous, PRN **OR** dextrose bolus 10% 250 mL, 250 mL, IntraVENous, PRN, Surinder Odilia, APRN - NP    glucagon (rDNA) injection 1 mg, 1 mg, SubCUTAneous, PRN, Surinder Hartley APRN - NP    dextrose 10 % infusion, , IntraVENous, Continuous PRN, RAVEN Cueto - NP    isosorbide mononitrate (IMDUR) extended release tablet 30 mg, 30 mg, Oral, Daily, Laura Murray MD, 30 mg at 10/27/22 1866    metoprolol succinate (TOPROL XL) extended release tablet 25 mg, 25 mg, Oral, Nightly, Laura Murray MD, 25 mg at 10/27/22 2106    atorvastatin (LIPITOR) tablet 20 mg, 20 mg, Oral, Nightly, Laura Murray MD, 20 mg at 10/27/22 2106    albuterol sulfate HFA (PROVENTIL;VENTOLIN;PROAIR) 108 (90 Base) MCG/ACT inhaler 2 puff, 2 puff, Inhalation, 4x Daily PRN, Jd Boss MD    aspirin chewable tablet 81 mg, 81 mg, Oral, Daily, Jd Boss MD, 81 mg at 10/27/22 1331    acetaminophen (TYLENOL) tablet 650 mg, 650 mg, Oral, Q4H PRN, Jd Boss MD, 650 mg at 10/26/22 1856    magnesium hydroxide (MILK OF MAGNESIA) 400 MG/5ML suspension 30 mL, 30 mL, Oral, Daily PRN, Jd Boss MD, 30 mL at 10/16/22 1217    nicotine polacrilex (COMMIT) lozenge 2 mg, 2 mg, Oral, Q1H PRN, Jd Boss MD    CHANGE IN PSYCHIATRY STATUS SINCE LAST ECT:   Some improvement in mood    INFORMED CONSENT OBTAINED : YES    PRE ECT MEDICATION ADMINISTERED:   YES    NPO STATUS: Confirmed    ELECTRODE PLACEMENT : RUL    TIME OUT :  TEAM CONFIRMS THE CORRECT PATIENT, CORRECT PROCEDURE AND CORRECT SITE.     DEVICE PARAMETERS:   Charge- 192  PW - 0.3  Freq- 50  Duration- 8  EEG- 23  Motor-  22    VITALS:   Vitals:    10/28/22 1020   BP: 130/68   Pulse: 50   Resp: 16   Temp: 98.1 °F (36.7 °C)   SpO2: 57%         COMPLICATIONS: no      RECOMMENDATIONS FOR NEXT TREATMENT:  mon        PSYCHIATRIST:  Esteban Morales MD

## 2022-10-28 NOTE — ANESTHESIA PRE PROCEDURE
Department of Anesthesiology  Preprocedure Note       Name:  Arbutus Essex   Age:  74 y.o.  :  1950                                          MRN:  [id number]         Date:  10/28/2022      Surgeon: * No surgeons listed *    Procedure: * No procedures listed *    Medications prior to admission:   Prior to Admission medications    Medication Sig Start Date End Date Taking? Authorizing Provider   mirtazapine (REMERON) 15 MG tablet Take 15 mg by mouth nightly    Historical Provider, MD   ALPRAZolam (XANAX) 0.25 MG tablet Take 0.25 mg by mouth in the morning, at noon, and at bedtime. Historical Provider, MD   albuterol sulfate HFA (VENTOLIN HFA) 108 (90 Base) MCG/ACT inhaler Inhale 2 puffs into the lungs 4 times daily as needed for Wheezing 10/12/22   Radha Ramírez MD   isosorbide mononitrate (IMDUR) 30 MG extended release tablet TAKE 1 TABLET BY MOUTH DAILY 10/12/22   Rian Vega DO   ranolazine (RANEXA) 1000 MG extended release tablet TAKE 1 TABLET BY MOUTH TWICE DAILY 8/15/22   Micky Perry, APRN - CNP   escitalopram (LEXAPRO) 10 MG tablet Take 20 mg by mouth daily 11/3/21   Historical Provider, MD   aspirin 81 MG chewable tablet Take 1 tablet by mouth daily 10/29/21   Elsie Jefferson MD   Blood Glucose Monitoring Suppl ([address]) w/Device KIT  18   Historical Provider, MD Frank Ling strip  18   Historical Provider, MD Morena Wong [place] [id number] [place] [address]  18   Historical Provider, MD       Current medications:    No current facility-administered medications for this visit. No current outpatient medications on file.      Facility-Administered Medications Ordered in Other Visits   Medication Dose Route Frequency Provider Last Rate Last Admin    0.9 % sodium chloride infusion   IntraVENous Continuous Tanmay Gibson MD        risperiDONE (RISPERDAL) tablet 0.25 mg  0.25 mg Oral BID Tanmay Gibson MD   0.25 mg at 10/27/22 5945    clonazePAM (KLONOPIN) tablet 0.5 mg  0.5 mg Oral Q12H Carmen Lorenzo MD   0.5 mg at 10/27/22 2106    traZODone (DESYREL) tablet 50 mg  50 mg Oral Nightly Carmen Lorenzo MD   50 mg at 10/27/22 2106    haloperidol (HALDOL) tablet 2 mg  2 mg Oral Q6H PRN Carmen Lorenzo MD   2 mg at 10/27/22 1636    PARoxetine (PAXIL) tablet 40 mg  40 mg Oral Daily Long Sorto MD   40 mg at 10/27/22 0902    0.9 % sodium chloride infusion   IntraVENous Continuous Carmen Lorenzo  mL/hr at 10/24/22 1143 New Bag at 10/24/22 1143    benzocaine (ORAJEL) 20 % mucosal gel   Mouth/Throat BID PRN Olga Lidia Dumont, APRN - CNP        insulin lispro (HUMALOG) injection vial 0-8 Units  0-8 Units SubCUTAneous TID WC Isis Marques, APRN - NP        insulin lispro (HUMALOG) injection vial 0-4 Units  0-4 Units SubCUTAneous Nightly Isis Marques, APRN - NP        glucose chewable tablet 16 g  4 tablet Oral PRN Isis Marques, APRN - NP        dextrose bolus 10% 125 mL  125 mL IntraVENous PRN Isis Marques APRN - NP        Or    dextrose bolus 10% 250 mL  250 mL IntraVENous PRN Isis Marques, APRN - NP        glucagon (rDNA) injection 1 mg  1 mg SubCUTAneous PRN Isis Marques, APRN - NP        dextrose 10 % infusion   IntraVENous Continuous PRN Isis Marques, APRN - NP        isosorbide mononitrate (IMDUR) extended release tablet 30 mg  30 mg Oral Daily Enid Ferguson MD   30 mg at 10/27/22 0902    metoprolol succinate (TOPROL XL) extended release tablet 25 mg  25 mg Oral Nightly Enid Ferguson MD   25 mg at 10/27/22 2106    atorvastatin (LIPITOR) tablet 20 mg  20 mg Oral Nightly Enid Ferguson MD   20 mg at 10/27/22 2106    albuterol sulfate HFA (PROVENTIL;VENTOLIN;PROAIR) 108 (90 Base) MCG/ACT inhaler 2 puff  2 puff Inhalation 4x Daily PRN Carmen Lorenzo MD        aspirin chewable tablet 81 mg  81 mg Oral Daily Carmen Lorenzo MD   81 mg at 10/27/22 0902    acetaminophen (TYLENOL) tablet 650 mg  650 mg Oral Q4H PRN Ashutosh Looney Lisa Leyden, MD   650 mg at 10/26/22 1856    magnesium hydroxide (MILK OF MAGNESIA) 400 MG/5ML suspension 30 mL  30 mL Oral Daily PRN Abhay Mcgovern MD   30 mL at 10/16/22 1217    nicotine polacrilex (COMMIT) lozenge 2 mg  2 mg Oral Q1H PRN Abhay Mcgovern MD           Allergies:     Allergies   Allergen Reactions    Seroquel [Quetiapine] Other (See Comments)     lethargy  weak       Problem List:    Patient Active Problem List   Diagnosis Code    Chest pain R07.9    Hypertension I10    Anxiety F41.9    Recurrent major depressive disorder, in remission ([facility] F33.40    Coronary artery disease involving native coronary artery of native heart without angina pectoris I25.10    Mixed obsessional thoughts and acts F42.2    Chronic gastritis without bleeding K29.50    Dyspepsia R10.13    Adenomatous polyp of sigmoid colon D12.5    Weakness R53.1    COVID-19 U07.1    Hyperlipidemia E78.5    Heart murmur R01.1    Generalized anxiety disorder F41.1    Dyspnea on exertion R06.09    ED (erectile dysfunction) N52.9    Benzodiazepine dependence ([facility]) F13.20    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, F41.8    Panic attacks F41.0    Type 2 diabetes mellitus without complication, without long-term current use of insulin ([facility]) E11.9    Severe episode of recurrent major depressive disorder, with psychotic features ([facility] F33.3    Tremor R25.1    Parkinsonism ([facility] G20    Dyskinesia G24.9       Past Medical History:        Diagnosis Date    Anxiety     Chest pain 03/29/2018    Coronary artery disease involving native coronary artery of native heart without angina pectoris 02/15/2019    Diabetes mellitus ([facility]     no meds    History of colon polyps     Hyperlipidemia     Hypertension     Type 2 diabetes mellitus without complication ([facility])     Vertigo        Past Surgical History:        Procedure Laterality Date    CARDIAC SURGERY  1973    PTCA     COLONOSCOPY      COLONOSCOPY N/A 10/17/2019    COLORECTAL CANCER SCREENING, HIGH RISK performed by Alice Jean MD at [facility] OTHER SURGICAL HISTORY      patent foramen ovale    TONSILLECTOMY      UPPER GASTROINTESTINAL ENDOSCOPY N/A 8/1/2019    EGD ESOPHAGOGASTRODUODENOSCOPY performed by Alice Jean MD at [address] [place] [address] Drive History:    Social History     Tobacco Use    Smoking status: Never    Smokeless tobacco: Never   Substance Use Topics    Alcohol use: No                                Counseling given: Not Answered      Vital Signs (Current): There were no vitals filed for this visit.                                            BP Readings from Last 3 Encounters:   10/28/22 130/68   10/12/22 127/67   09/13/22 (!) 157/87       NPO Status:                                                                                 BMI:   Wt Readings from Last 3 Encounters:   10/21/22 113 lb (51.3 kg)   09/13/22 125 lb (56.7 kg)   08/29/22 120 lb (54.4 kg)     There is no height or weight on file to calculate BMI.    CBC:   Lab Results   Component Value Date/Time    WBC 7.4 10/14/2022 08:43 AM    RBC 4.81 10/14/2022 08:43 AM    HGB 15.6 10/14/2022 08:43 AM    HCT 44.1 10/14/2022 08:43 AM    MCV 91.6 10/14/2022 08:43 AM    RDW 13.9 10/14/2022 08:43 AM     10/14/2022 08:43 AM       CMP:   Lab Results   Component Value Date/Time     10/14/2022 08:43 AM    K 3.4 10/14/2022 08:43 AM    K 4.2 09/09/2021 07:03 AM     10/14/2022 08:43 AM    CO2 18 10/14/2022 08:43 AM    BUN 16 10/14/2022 08:43 AM    CREATININE 0.85 10/14/2022 08:43 AM    GFRAA >60.0 10/14/2022 08:43 AM    LABGLOM >60.0 10/14/2022 08:43 AM    GLUCOSE 138 10/14/2022 08:43 AM    PROT 7.4 10/14/2022 08:43 AM    CALCIUM 9.7 10/14/2022 08:43 AM    BILITOT 1.7 10/14/2022 08:43 AM    ALKPHOS 85 10/14/2022 08:43 AM    AST 19 10/14/2022 08:43 AM    ALT 16 10/14/2022 08:43 AM       POC Tests:   Recent Labs     10/28/22  0625   POCGLU 107*       Coags:   Lab Results   Component Value Date/Time    PROTIME 14.0 10/03/2021 01:36 AM    INR 1.1 10/03/2021 01:36 AM    APTT 28.4 10/03/2021 01:36 AM       HCG (If Applicable): No results found for: Madlyn Countess, HCG, HCGQUANT     ABGs:   Lab Results   Component Value Date/Time    PHART 7.482 09/07/2021 06:38 PM    PO2ART 59 09/07/2021 06:38 PM    PIC0HHV 31 09/07/2021 06:38 PM    EJW2KKV 22.8 09/07/2021 06:38 PM    BEART -1 09/07/2021 06:38 PM    X7DFSWKN 92 09/07/2021 06:38 PM        Type & Screen (If Applicable):  No results found for: LABABO, LABRH    Drug/Infectious Status (If Applicable):  No results found for: HIV, HEPCAB    COVID-19 Screening (If Applicable):   Lab Results   Component Value Date/Time    COVID19 Not Detected 10/14/2022 08:27 AM           Anesthesia Evaluation  Patient summary reviewed and Nursing notes reviewed no history of anesthetic complications:   Airway: Mallampati: II  TM distance: >3 FB   Neck ROM: full  Mouth opening: > = 3 FB   Dental: normal exam         Pulmonary:normal exam    (+) shortness of breath:                             Cardiovascular:  Exercise tolerance: good (>4 METS),   (+) hypertension:, CAD:, HENRIQUEZ:,       ECG reviewed               Beta Blocker:  Not on Beta Blocker         Neuro/Psych:   (+) psychiatric history:            GI/Hepatic/Renal: Neg GI/Hepatic/Renal ROS            Endo/Other:    (+) DiabetesType II DM, , .          Pt had PAT visit. Abdominal:             Vascular: negative vascular ROS. Other Findings:             Anesthesia Plan      general     ASA 3     (Mask)  Induction: intravenous. MIPS: Prophylactic antiemetics administered. Anesthetic plan and risks discussed with patient. Plan discussed with CRNA.     Attending anesthesiologist reviewed and agrees with Pre Eval content                Imani Dixon MD   10/28/2022

## 2022-10-28 NOTE — PROGRESS NOTES
Gave all am meds, klonopin 0.5 as ordered. Pt took all separately saying he has a dry throat with water, pt drank a apple juice as well, pitcher of water was given at bed side.

## 2022-10-28 NOTE — H&P
Update History & Physical    The patient's History and Physical of 10 / 18 / 22 was reviewed with the patient and there were no significant changes. I examined the patient and there were no significant changes from the previous History and Physical.    Vitals:    10/28/22 1020   BP: 130/68   Pulse: 50   Resp: 16   Temp: 98.1 °F (36.7 °C)   SpO2: 98%     Principal Problem:    Severe episode of recurrent major depressive disorder, with psychotic features ([address]  Active Problems:    Tremor    Parkinsonism ([address]    Dyskinesia  Resolved Problems:    * No resolved hospital problems. *        Plan: The risk, benefits, expected outcome, and alternative to the recommended procedure have been discussed with the patient. Patient understands and wants to proceed with the procedure.     Electronically signed by Sky Mitchell MD

## 2022-10-28 NOTE — PROGRESS NOTES
Pt. declined to attend the 0900 community meeting, despite staff encouragement.  Goal - \"To have ECT go well\" Electronically signed by Vero Mcgee, [address] on 10/28/2022 at 10:02 AM

## 2022-10-29 LAB
GLUCOSE BLD-MCNC: 119 MG/DL (ref 70–99)
GLUCOSE BLD-MCNC: 123 MG/DL (ref 70–99)
GLUCOSE BLD-MCNC: 125 MG/DL (ref 70–99)
GLUCOSE BLD-MCNC: 156 MG/DL (ref 70–99)
PERFORMED ON: ABNORMAL

## 2022-10-29 PROCEDURE — 6370000000 HC RX 637 (ALT 250 FOR IP): Performed by: PSYCHIATRY & NEUROLOGY

## 2022-10-29 PROCEDURE — 6370000000 HC RX 637 (ALT 250 FOR IP): Performed by: INTERNAL MEDICINE

## 2022-10-29 PROCEDURE — 1240000000 HC EMOTIONAL WELLNESS R&B

## 2022-10-29 RX ORDER — RISPERIDONE 0.5 MG/1
0.5 TABLET ORAL 2 TIMES DAILY
Status: DISCONTINUED | OUTPATIENT
Start: 2022-10-30 | End: 2022-10-29

## 2022-10-29 RX ORDER — RISPERIDONE 0.25 MG/1
0.25 TABLET ORAL 3 TIMES DAILY
Status: DISCONTINUED | OUTPATIENT
Start: 2022-10-29 | End: 2022-11-07

## 2022-10-29 RX ADMIN — TRAZODONE HYDROCHLORIDE 50 MG: 50 TABLET ORAL at 21:35

## 2022-10-29 RX ADMIN — RISPERIDONE 0.25 MG: 0.25 TABLET ORAL at 09:11

## 2022-10-29 RX ADMIN — CLONAZEPAM 0.5 MG: 0.5 TABLET ORAL at 09:11

## 2022-10-29 RX ADMIN — RISPERIDONE 0.25 MG: 0.25 TABLET ORAL at 21:35

## 2022-10-29 RX ADMIN — ACETAMINOPHEN 650 MG: 325 TABLET ORAL at 17:47

## 2022-10-29 RX ADMIN — ATORVASTATIN CALCIUM 20 MG: 20 TABLET, FILM COATED ORAL at 21:35

## 2022-10-29 RX ADMIN — CLONAZEPAM 0.5 MG: 0.5 TABLET ORAL at 21:35

## 2022-10-29 RX ADMIN — ASPIRIN 81 MG 81 MG: 81 TABLET ORAL at 09:11

## 2022-10-29 RX ADMIN — ISOSORBIDE MONONITRATE 30 MG: 60 TABLET, EXTENDED RELEASE ORAL at 09:56

## 2022-10-29 RX ADMIN — PAROXETINE HYDROCHLORIDE 40 MG: 20 TABLET, FILM COATED ORAL at 09:11

## 2022-10-29 ASSESSMENT — PAIN DESCRIPTION - ORIENTATION: ORIENTATION: LOWER

## 2022-10-29 ASSESSMENT — PAIN DESCRIPTION - LOCATION: LOCATION: HEAD

## 2022-10-29 ASSESSMENT — PAIN SCALES - GENERAL: PAINLEVEL_OUTOF10: 6

## 2022-10-29 ASSESSMENT — PAIN DESCRIPTION - DESCRIPTORS: DESCRIPTORS: PRESSURE

## 2022-10-29 NOTE — PLAN OF CARE
Patient is seen in his room. Patient came out to  to eat breakfast. Reports anxiety and depression a 8.5/10 where 10 is the highest. Patient reports continuing thoughts to harm his wife. Patient is upset that they are not going away. Pt is concerned about where he might live after discharge. Pt is encouraged to clean up in his room. Problem: Self Harm/Suicidality  Goal: Will have no self-injury during hospital stay  Description: INTERVENTIONS:  1. Q 30 MINUTES: Routine safety checks  2. Q SHIFT & PRN: Assess risk to determine if routine checks are adequate to maintain patient safety  10/29/2022 1101 by Twila Celis RN  Outcome: Progressing  10/28/2022 2122 by RAVEN Card  Outcome: Progressing     Problem: Chronic Conditions and Co-morbidities  Goal: Patient's chronic conditions and co-morbidity symptoms are monitored and maintained or improved  10/29/2022 1101 by Twila Celis RN  Outcome: Progressing  10/28/2022 2122 by RAVEN Card  Outcome: Progressing     Problem: Safety - Adult  Goal: Free from fall injury  10/29/2022 1101 by Twila Celis RN  Outcome: Progressing  10/28/2022 2122 by RAVEN Card  Outcome: Progressing     Problem: ABCDS Injury Assessment  Goal: Absence of physical injury  10/29/2022 1101 by Twila Celis RN  Outcome: Progressing  10/28/2022 2122 by RAVEN Card  Outcome: Progressing     Problem: Skin/Tissue Integrity  Goal: Absence of new skin breakdown  Description: 1. Monitor for areas of redness and/or skin breakdown  2. Assess vascular access sites hourly  3. Every 4-6 hours minimum:  Change oxygen saturation probe site  4. Every 4-6 hours:  If on nasal continuous positive airway pressure, respiratory therapy assess nares and determine need for appliance change or resting period.   10/29/2022 1101 by Twila Celis RN  Outcome: Progressing  10/28/2022 2122 by RAVEN Card  Outcome: Progressing     Problem: Nutrition Deficit:  Goal: Optimize nutritional status  10/29/2022 1101 by Leida Ewing RN  Outcome: Progressing  10/29/2022 1013 by Adrien Calderón RD, LD  Flowsheets (Taken 10/17/2022 1632)  Nutrient intake appropriate for improving, restoring, or maintaining nutritional needs:   Assess nutritional status and recommend course of action   Monitor oral intake, labs, and treatment plans   Recommend appropriate diets, oral nutritional supplements, and vitamin/mineral supplements  10/28/2022 2122 by RAVEN Jiménez  Outcome: Progressing     Problem: Risk for Physiologic Instability  Goal: B/P, SaO2, EKG, and respiratory status WDL  Description: INTERVENTIONS:  1. Monitor B/P, SaO2, EKG and respiratory function  2. Notify physician of unstable condition/changes  10/29/2022 1101 by Leida Ewing RN  Outcome: Progressing  10/28/2022 2122 by RAVEN Jiménez  Outcome: Progressing     Problem: Impaired Comfort  Goal: Patient reports pain level is manageable/acceptable  Description: INTERVENTIONS:  1. Provide emotional support and reassurance at all times to relieve anxiety  2. Position patient for comfort  3. Medicate PRN for pain relief  10/29/2022 1101 by Leida Ewing RN  Outcome: Progressing  10/28/2022 2122 by RAVEN Jiménez  Outcome: Progressing     Problem: Deficient Knowledge  Goal: Pt/family demonstrate understanding of pre/post-procedure instructions  Description: INTERVENTIONS:  1. Provide pre-procedure instructions  2. Provide written and verbal post-procedural instructions  10/29/2022 1101 by Leida Ewing RN  Outcome: Progressing  10/28/2022 2122 by RAVEN Jiménez  Outcome: Progressing     Problem: Risk for Injury  Goal: ARCHIVE-Patient remains free from injury  Description: INTERVENTIONS:  1. Universal precautions  2. Ensure bed, stretcher, or wheelchair are in locked position when not transporting  3. Position patient per procedure requirement  4.  Maintain patent airway  5. Perform pre-op equipment checks  10/29/2022 1101 by Zoila Brito RN  Outcome: Progressing  10/28/2022 2122 by RAVEN Reich  Outcome: Progressing     Problem: Pain  Goal: Verbalizes/displays adequate comfort level or baseline comfort level  10/29/2022 1101 by Zoila Brito RN  Outcome: Progressing  10/28/2022 2122 by RAVEN Reich  Outcome: Progressing     Problem: Anxiety  Goal: Will report anxiety at manageable levels  Description: INTERVENTIONS:  1. Administer medication as ordered  2. Teach and rehearse alternative coping skills  3. Provide emotional support with 1:1 interaction with staff  10/29/2022 1101 by Zoila Brito RN  Outcome: Progressing  10/28/2022 2122 by RAVEN Reich  Outcome: Progressing     Problem: Coping  Goal: Pt/Family able to verbalize concerns and demonstrate effective coping strategies  Description: INTERVENTIONS:  1. Assist patient/family to identify coping skills, available support systems and cultural and spiritual values  2. Provide emotional support, including active listening and acknowledgement of concerns of patient and caregivers  3. Reduce environmental stimuli, as able  4. Instruct patient/family in relaxation techniques, as appropriate  5. Assess for spiritual pain/suffering and initiate Spiritual Care, Psychosocial Clinical Specialist consults as needed  10/29/2022 1101 by Zoila Brito RN  Outcome: Progressing  10/28/2022 2122 by RAVEN Reich  Outcome: Progressing     Problem: Confusion  Goal: Confusion, delirium, dementia, or psychosis is improved or at baseline  Description: INTERVENTIONS:  1. Assess for possible contributors to thought disturbance, including medications, impaired vision or hearing, underlying metabolic abnormalities, dehydration, psychiatric diagnoses, and notify attending LIP  2. [place] high risk fall precautions, as indicated  3.  Provide frequent short contacts to provide reality reorientation, refocusing and direction  4. Decrease environmental stimuli, including noise as appropriate  5. Monitor and intervene to maintain adequate nutrition, hydration, elimination, sleep and activity  6. If unable to ensure safety without constant attention obtain sitter and review sitter guidelines with assigned personnel  7. Initiate Psychosocial CNS and Spiritual Care consult, as indicated  10/29/2022 1101 by Shy Singh RN  Outcome: Progressing  10/28/2022 2122 by RAVEN Gonzalez  Outcome: Progressing     Problem: Depression/Self Harm  Goal: Effect of psychiatric condition will be minimized and patient will be protected from self harm  Description: INTERVENTIONS:  1. Assess impact of patient's symptoms on level of functioning, self care needs and offer support as indicated  2. Assess patient/family knowledge of depression, impact on illness and need for teaching  3. Provide emotional support, presence and reassurance  4. Assess for possible suicidal thoughts or ideation. If patient expresses suicidal thoughts or statements do not leave alone, initiate Suicide Precautions, move to a room close to the nursing station and obtain sitter  5.  Initiate consults as appropriate with Mental Health Professional, Spiritual Care, Psychosocial CNS, and consider a recommendation to the LIP for a Psychiatric Consultation  10/29/2022 1101 by Shy Singh RN  Outcome: Progressing  10/28/2022 2122 by RAVEN Gonzalez  Outcome: Progressing

## 2022-10-29 NOTE — PLAN OF CARE
Problem: Self Harm/Suicidality  Goal: Will have no self-injury during hospital stay  Description: INTERVENTIONS:  1. Q 30 MINUTES: Routine safety checks  2. Q SHIFT & PRN: Assess risk to determine if routine checks are adequate to maintain patient safety  Outcome: Progressing     Problem: Chronic Conditions and Co-morbidities  Goal: Patient's chronic conditions and co-morbidity symptoms are monitored and maintained or improved  Outcome: Progressing     Problem: Safety - Adult  Goal: Free from fall injury  Outcome: Progressing     Problem: ABCDS Injury Assessment  Goal: Absence of physical injury  Outcome: Progressing     Problem: Skin/Tissue Integrity  Goal: Absence of new skin breakdown  Description: 1. Monitor for areas of redness and/or skin breakdown  2. Assess vascular access sites hourly  3. Every 4-6 hours minimum:  Change oxygen saturation probe site  4. Every 4-6 hours:  If on nasal continuous positive airway pressure, respiratory therapy assess nares and determine need for appliance change or resting period. Outcome: Progressing     Problem: Nutrition Deficit:  Goal: Optimize nutritional status Outcome:     Patient tolerated ECT well today. He was ambivalent following treatment. Affect remains flan and patient minimizes much change in his symptoms. He remains on 1:1 for safety. He reports speaking to wife yesterday. Still unsure how the situation will be managed and feels he should go to Nursing home so he does not harm his wife or himself. Seems to be insecure about his ability to care for self and does not want to be caring for wife. No talking to himself observed. Moves about unit with steady gait.

## 2022-10-29 NOTE — PROGRESS NOTES
Comprehensive Nutrition Assessment    Type and Reason for Visit:  Reassess    Nutrition Recommendations/Plan:   Continue No Concentrated Sweets    Increase diabetic ONS to TID     Malnutrition Assessment:  Malnutrition Status: At risk for malnutrition (Comment) (10/17/22 1631)    Context:  Social/Environmental Circumstances     Findings of the 6 clinical characteristics of malnutrition:  Energy Intake:  Mild decrease in energy intake (Comment)  Weight Loss:  Greater than 10% over 6 months     Body Fat Loss:  Unable to assess     Muscle Mass Loss:  Unable to assess    Fluid Accumulation:  No significant fluid accumulation     Strength:  Not Performed    Nutrition Assessment:    Nutritional status remains compromised, however noted pt ate ~ 80% of dinner and 100% of his glucerna for dinner yesterday. Will increase Diabetic ONS to TID    Nutrition Related Findings:    Hx : anxiety, depression, CAD, DM, COVID, \"Pt started ECT tx's this week Pt's appetite appears to be improving, pt always says he is not hungry yet if taken to dining room & tray placed in front of him, he usually consumes 30-50 % of meals. \",took ~80% D/100% ONS @ dinner 10/28. hx of DM noted, glucose < 140 without dietary restricions, last BM documented 10/22 Wound Type: None       Current Nutrition Intake & Therapies:    Average Meal Intake: 51-75%  Average Supplements Intake: 51-75%  ADULT DIET; Regular; No Concentrated sweets  ADULT ORAL NUTRITION SUPPLEMENT; Breakfast, Dinner; Diabetic Oral Supplement  ADULT ORAL NUTRITION SUPPLEMENT; Lunch; Fortified Pudding Oral Supplement    Anthropometric Measures:  Height: 5' 5\" (165.1 cm)  Ideal Body Weight (IBW): 136 lbs (62 kg)    Admission Body Weight: 113 lb (51.3 kg)  Current Body Weight: 113 lb (51.3 kg),   IBW.  Weight Source: Bed Scale  Current BMI (kg/m2): 18.8  Usual Body Weight: 128 lb (58.1 kg) (5/22)  % Weight Change (Calculated): -11.7                    BMI Categories: Underweight (BMI less than 25) age over 72    Estimated Daily Nutrient Needs:  Energy Requirements Based On: Kcal/kg  Weight Used for Energy Requirements: Current  Energy (kcal/day): ~ 1550 @ 30 kcal/kg  Weight Used for Protein Requirements: Current  Protein (g/day): 67-77 gm (kg x 1.3-1.5)  Method Used for Fluid Requirements: 1 ml/kcal  Fluid (ml/day): ~1500 ml    Nutrition Diagnosis:   Inadequate oral intake related to cognitive or neurological impairment as evidenced by poor intake prior to admission  Unintended weight loss related to inadequate protein-energy intake as evidenced by weight loss, poor intake prior to admission    Nutrition Interventions:   Food and/or Nutrient Delivery: Continue Current Diet, Modify Oral Nutrition Supplement (Continue No Concentrated Sweets  Increase diabetic ONS to TID)  Nutrition Education/Counseling: No recommendation at this time  Coordination of Nutrition Care: Continue to monitor while inpatient       Goals:  Previous Goal Met: Progressing toward Goal(s)  Goals: PO intake 75% or greater, other (specify)  Specify Other Goals: Glucose < 160, wt gain    Nutrition Monitoring and Evaluation:      Food/Nutrient Intake Outcomes: Food and Nutrient Intake, Supplement Intake  Physical Signs/Symptoms Outcomes: Biochemical Data, Weight, Meal Time Behavior    Discharge Planning:     Too soon to determine     Heaven Velásquez RD, LD

## 2022-10-29 NOTE — PLAN OF CARE
Nutrition Problem #1: Inadequate oral intake  Intervention: Food and/or Nutrient Delivery: Continue Current Diet, Modify Oral Nutrition Supplement (Continue No Concentrated Sweets  Increase diabetic ONS to TID)

## 2022-10-29 NOTE — PROGRESS NOTES
Leida Romeo [facility]. FOLLOW-UP NOTE       10/29/2022     Patient was seen and examined in person, Chart reviewed   Patient's case discussed with staff/team    Chief Complaint: Depression anxiety    Interim History:     Patient said he was feeling \"the same\". He said he was still having thoughts of suicide and homicide. He said he \"doesn't notice whether he slept or not\". He said his appetite was \"OK\"- especially breakfast; he said his mouth was too dry to chew on meat. He said he was still depressed and weak. He still has auditory hallucinations, and visual hallucinations of \"faces\". He said he was still paranoid, \"sometimes\".      Appetite:   [x] Normal/Unchanged  [] Increased  [] Decreased      Sleep:       [] Normal/Unchanged  [x] Fair       [] Poor              Energy:    [] Normal/Unchanged  [] Increased  [x] Decreased        SI [x] Present  [] Absent    HI  [x]Present  [] Absent     Aggression:  [] yes  [x] no    Patient is [] able  [x] unable to CONTRACT FOR SAFETY     PAST MEDICAL/PSYCHIATRIC HISTORY:   Past Medical History:   Diagnosis Date    Anxiety     Chest pain 03/29/2018    Coronary artery disease involving native coronary artery of native heart without angina pectoris 02/15/2019    Diabetes mellitus ([facility] [address]     no meds    History of colon polyps     Hyperlipidemia     Hypertension     Type 2 diabetes mellitus without complication ([facility]     Vertigo        FAMILY/SOCIAL HISTORY:  Family History   Problem Relation Age of Onset    Heart Disease Maternal Aunt     Cancer Maternal Aunt     Colon Cancer Maternal Aunt      Social History     Socioeconomic History    Marital status:      Spouse name: Not on file    Number of children: Not on file    Years of education: Not on file    Highest education level: Not on file   Occupational History    Not on file   Tobacco Use    Smoking status: Never    Smokeless tobacco: Never   Vaping Use    Vaping Use: Never used   Substance and Sexual Activity    Alcohol use: No    Drug use: Never    Sexual activity: Not on file   Other Topics Concern    Not on file   Social History Narrative    Not on file     Social Determinants of Health     Financial Resource Strain: Not on file   Food Insecurity: Not on file   Transportation Needs: Not on file   Physical Activity: Not on file   Stress: Not on file   Social Connections: Not on file   Intimate Partner Violence: Not on file   Housing Stability: Not on file           ROS:  [x] All negative/unchanged except if checked.  Explain positive(checked items) below:  [] Constitutional  [] Eyes  [] Ear/Nose/Mouth/Throat  [] Respiratory  [] CV  [] GI  []   [] Musculoskeletal  [] Skin/Breast  [] Neurological  [] Endocrine  [] Heme/Lymph  [] Allergic/Immunologic    Explanation:     MEDICATIONS:    Current Facility-Administered Medications:     0.9 % sodium chloride infusion, , IntraVENous, Continuous, Tanmay Gibson MD    risperiDONE (RISPERDAL) tablet 0.25 mg, 0.25 mg, Oral, BID, Tanmay Gibson MD, 0.25 mg at 10/29/22 0911    clonazePAM (KLONOPIN) tablet 0.5 mg, 0.5 mg, Oral, Q12H, Tanmay Gibson MD, 0.5 mg at 10/29/22 0911    traZODone (DESYREL) tablet 50 mg, 50 mg, Oral, Nightly, Tanmay Gibson MD, 50 mg at 10/28/22 2007    haloperidol (HALDOL) tablet 2 mg, 2 mg, Oral, Q6H PRN, Tanmay Gibson MD, 2 mg at 10/27/22 1636    PARoxetine (PAXIL) tablet 40 mg, 40 mg, Oral, Daily, Ke Velasquez MD, 40 mg at 10/29/22 0911    0.9 % sodium chloride infusion, , IntraVENous, Continuous, Tanmay Gibson MD, Last Rate: 100 mL/hr at 10/24/22 1143, New Bag at 10/24/22 1143    benzocaine (ORAJEL) 20 % mucosal gel, , Mouth/Throat, BID PRN, RAVEN Roe - CNP    insulin lispro (HUMALOG) injection vial 0-8 Units, 0-8 Units, SubCUTAneous, TID Marysol CAMPOS APRN - NP    insulin lispro (HUMALOG) injection vial 0-4 Units, 0-4 Units, SubCUTAneous, Nightly, Marysol Lopez APRN - NP    glucose chewable tablet 16 g, 4 tablet, Oral, PRN, Clorinda Pesa, APRN - NP    dextrose bolus 10% 125 mL, 125 mL, IntraVENous, PRN **OR** dextrose bolus 10% 250 mL, 250 mL, IntraVENous, PRN, Clorinda Pesa, APRN - NP    glucagon (rDNA) injection 1 mg, 1 mg, SubCUTAneous, PRN, Clorinda Pesa, APRN - NP    dextrose 10 % infusion, , IntraVENous, Continuous PRN, Clorinda Pesa, APRN - NP    isosorbide mononitrate (IMDUR) extended release tablet 30 mg, 30 mg, Oral, Daily, Tawnya Miguel MD, 30 mg at 10/29/22 6053    metoprolol succinate (TOPROL XL) extended release tablet 25 mg, 25 mg, Oral, Nightly, Tawnya Miguel MD, 25 mg at 10/27/22 2106    atorvastatin (LIPITOR) tablet 20 mg, 20 mg, Oral, Nightly, Tawnya Miguel MD, 20 mg at 10/28/22 2004    albuterol sulfate HFA (PROVENTIL;VENTOLIN;PROAIR) 108 (90 Base) MCG/ACT inhaler 2 puff, 2 puff, Inhalation, 4x Daily PRN, Joseph Shetty MD    aspirin chewable tablet 81 mg, 81 mg, Oral, Daily, Joseph Shetty MD, 81 mg at 10/29/22 7134    acetaminophen (TYLENOL) tablet 650 mg, 650 mg, Oral, Q4H PRN, Joseph Shetty MD, 650 mg at 10/26/22 6846    magnesium hydroxide (MILK OF MAGNESIA) 400 MG/5ML suspension 30 mL, 30 mL, Oral, Daily PRN, Joseph Shetty MD, 30 mL at 10/16/22 1217    nicotine polacrilex (COMMIT) lozenge 2 mg, 2 mg, Oral, Q1H PRN, Joseph Shetty MD      Examination:  /77   Pulse 58   Temp 98.1 °F (36.7 °C) (Oral)   Resp 16   Ht 5' 5\" (1.651 m)   Wt 113 lb (51.3 kg)   SpO2 98%   BMI 18.80 kg/m²   Gait - steady  Medication side effects(SE): no    Mental Status Examination:    Level of consciousness:  within normal limits   Appearance:  poor grooming and poor hygiene  Behavior/Motor:  psychomotor retardation  Attitude toward examiner:  cooperative  Speech:  slow   Mood: anxious and depressed  Affect:  flat and anxious  Thought processes:  slow   Thought content:  Suicidal Ideation:  active; still has homicidal (obsessive) ideation  Delusions: paranoia  Perception: auditory and visual hallucinations  Cognition:  oriented to person, place, and time   Concentration poor  Insight poor   Judgement poor     ASSESSMENT:   Patient symptoms are:  [] Well controlled  [] Improving  [] Worsening  [] No change      Diagnosis:   Principal Problem:    Severe episode of recurrent major depressive disorder, with psychotic features ([facility] [address]  Active Problems:    Tremor    Parkinsonism ([facility] [address]    Dyskinesia  Resolved Problems:    * No resolved hospital problems. *      LABS:    No results for input(s): WBC, HGB, PLT in the last 72 hours. No results for input(s): NA, K, CL, CO2, BUN, CREATININE, GLUCOSE in the last 72 hours. No results for input(s): BILITOT, ALKPHOS, AST, ALT in the last 72 hours. Lab Results   Component Value Date/Time    LABAMPH Neg 10/14/2022 08:15 AM    BARBSCNU Neg 10/14/2022 08:15 AM    LABBENZ Neg 10/14/2022 08:15 AM    LABMETH Neg 10/14/2022 08:15 AM    OPIATESCREENURINE Neg 10/14/2022 08:15 AM    PHENCYCLIDINESCREENURINE Neg 10/14/2022 08:15 AM    ETOH <10 10/14/2022 08:43 AM     Lab Results   Component Value Date/Time    TSH 3.230 10/14/2022 08:43 AM     No results found for: LITHIUM  No results found for: VALPROATE, CBMZ    RISK ASSESSMENT: High risk of suicide    Treatment Plan:  Reviewed current Medications with the patient. ECT Monday  Risperdal increased 0.25 mg tid daily   Risks, benefits, side effects, drug-to-drug interactions and alternatives to treatment were discussed. Collateral information: Pending  CD evaluatio  Encourage patient to attend group and other milieu activities.   Discharge planning discussed with the patient and treatment team.    PSYCHOTHERAPY/COUNSELING:  [x] Therapeutic interview  [x] Supportive  [] CBT  [] Ongoing  [] Other       [x] Patient continues to need, on a daily basis, active treatment furnished directly by or requiring the supervision of inpatient psychiatric personnel      Anticipated Length of stay:            Electronically signed by Craig Gosselin, MD on 10/29/2022 at 3:33 PM

## 2022-10-29 NOTE — PROGRESS NOTES
Pt. refused to attend the 1000 skills group, despite staff encouragement.  Electronically signed by Hetal Pena, [address] on 10/29/2022 at 11:28 AM

## 2022-10-30 LAB
GLUCOSE BLD-MCNC: 111 MG/DL (ref 70–99)
GLUCOSE BLD-MCNC: 116 MG/DL (ref 70–99)
GLUCOSE BLD-MCNC: 126 MG/DL (ref 70–99)
GLUCOSE BLD-MCNC: 137 MG/DL (ref 70–99)
PERFORMED ON: ABNORMAL

## 2022-10-30 PROCEDURE — 1240000000 HC EMOTIONAL WELLNESS R&B

## 2022-10-30 PROCEDURE — 6370000000 HC RX 637 (ALT 250 FOR IP): Performed by: INTERNAL MEDICINE

## 2022-10-30 PROCEDURE — 6370000000 HC RX 637 (ALT 250 FOR IP): Performed by: PSYCHIATRY & NEUROLOGY

## 2022-10-30 RX ADMIN — RISPERIDONE 0.25 MG: 0.25 TABLET ORAL at 09:10

## 2022-10-30 RX ADMIN — ASPIRIN 81 MG 81 MG: 81 TABLET ORAL at 09:10

## 2022-10-30 RX ADMIN — RISPERIDONE 0.25 MG: 0.25 TABLET ORAL at 14:22

## 2022-10-30 RX ADMIN — ATORVASTATIN CALCIUM 20 MG: 20 TABLET, FILM COATED ORAL at 21:00

## 2022-10-30 RX ADMIN — TRAZODONE HYDROCHLORIDE 50 MG: 50 TABLET ORAL at 21:00

## 2022-10-30 RX ADMIN — PAROXETINE HYDROCHLORIDE 40 MG: 20 TABLET, FILM COATED ORAL at 09:10

## 2022-10-30 RX ADMIN — METOPROLOL SUCCINATE 25 MG: 25 TABLET, FILM COATED, EXTENDED RELEASE ORAL at 21:00

## 2022-10-30 RX ADMIN — ISOSORBIDE MONONITRATE 30 MG: 60 TABLET, EXTENDED RELEASE ORAL at 09:10

## 2022-10-30 RX ADMIN — CLONAZEPAM 0.5 MG: 0.5 TABLET ORAL at 20:59

## 2022-10-30 RX ADMIN — CLONAZEPAM 0.5 MG: 0.5 TABLET ORAL at 09:10

## 2022-10-30 RX ADMIN — RISPERIDONE 0.25 MG: 0.25 TABLET ORAL at 21:00

## 2022-10-30 NOTE — PROGRESS NOTES
Pt reports depression & anxiety levels are both # 9/10. Pt denies SI/AVH. Pt does admit to wanting to kill wife, and is worried about where he will go after d/c from hospital. Pt isolates to room except for meals, comes out to dayroom for meals. Pt does not attend groups. Pt usually washes up at sink in room.

## 2022-10-30 NOTE — PROGRESS NOTES
[address] NOTE       10/30/2022     Patient was seen and examined in person, Chart reviewed   Patient's case discussed with staff/team    Chief Complaint: Depression anxiety    Interim History:     Patient said he was feeling \"not too good\" because he keeps getting \"these thoughts in my mind\". He said he was afraid to go to sleep because of the nightmares- \"this person hitting everybody\". He said his appetite was fair; he ate breakfast. He said he was still depressed and anxious. He still has suicidal and homicidal ideation. He denied paranoia or other delusions. He still has auditory hallucinations, and visual hallucinations of \"faces of people I know\".      Appetite:   [x] Normal/Unchanged  [] Increased  [] Decreased      Sleep:       [] Normal/Unchanged  [x] Fair       [] Poor              Energy:    [] Normal/Unchanged  [] Increased  [x] Decreased        SI [x] Present  [] Absent    HI  [x]Present  [] Absent     Aggression:  [] yes  [x] no    Patient is [] able  [x] unable to CONTRACT FOR SAFETY     PAST MEDICAL/PSYCHIATRIC HISTORY:   Past Medical History:   Diagnosis Date    Anxiety     Chest pain 03/29/2018    Coronary artery disease involving native coronary artery of native heart without angina pectoris 02/15/2019    Diabetes mellitus ([facility] [address]     no meds    History of colon polyps     Hyperlipidemia     Hypertension     Type 2 diabetes mellitus without complication ([facility] [address]     Vertigo        FAMILY/SOCIAL HISTORY:  Family History   Problem Relation Age of Onset    Heart Disease Maternal Aunt     Cancer Maternal Aunt     Colon Cancer Maternal Aunt      Social History     Socioeconomic History    Marital status:      Spouse name: Not on file    Number of children: Not on file    Years of education: Not on file    Highest education level: Not on file   Occupational History    Not on file   Tobacco Use    Smoking status: Never    Smokeless tobacco: Never   Vaping Use Vaping Use: Never used   Substance and Sexual Activity    Alcohol use: No    Drug use: Never    Sexual activity: Not on file   Other Topics Concern    Not on file   Social History Narrative    Not on file     Social Determinants of Health     Financial Resource Strain: Not on file   Food Insecurity: Not on file   Transportation Needs: Not on file   Physical Activity: Not on file   Stress: Not on file   Social Connections: Not on file   Intimate Partner Violence: Not on file   Housing Stability: Not on file           ROS:  [x] All negative/unchanged except if checked.  Explain positive(checked items) below:  [] Constitutional  [] Eyes  [] Ear/Nose/Mouth/Throat  [] Respiratory  [] CV  [] GI  []   [] Musculoskeletal  [] Skin/Breast  [] Neurological  [] Endocrine  [] Heme/Lymph  [] Allergic/Immunologic    Explanation:     MEDICATIONS:    Current Facility-Administered Medications:     risperiDONE (RISPERDAL) tablet 0.25 mg, 0.25 mg, Oral, TID, Balbir Gutierrez MD, 0.25 mg at 10/30/22 1422    0.9 % sodium chloride infusion, , IntraVENous, Continuous, Jovanni Lane MD    clonazePAM (KLONOPIN) tablet 0.5 mg, 0.5 mg, Oral, Q12H, Jovanni Lane MD, 0.5 mg at 10/30/22 0910    traZODone (DESYREL) tablet 50 mg, 50 mg, Oral, Nightly, Jovanni Lane MD, 50 mg at 10/29/22 2135    haloperidol (HALDOL) tablet 2 mg, 2 mg, Oral, Q6H PRN, Jovanni Lane MD, 2 mg at 10/27/22 1636    PARoxetine (PAXIL) tablet 40 mg, 40 mg, Oral, Daily, Balbir Gutierrez MD, 40 mg at 10/30/22 0910    0.9 % sodium chloride infusion, , IntraVENous, Continuous, Jovanni Lane MD, Last Rate: 100 mL/hr at 10/24/22 1143, New Bag at 10/24/22 1143    benzocaine (ORAJEL) 20 % mucosal gel, , Mouth/Throat, BID PRN, RAVEN Dorantes - CNP    insulin lispro (HUMALOG) injection vial 0-8 Units, 0-8 Units, SubCUTAneous, TID WC, Cyrena Too, APRN - NP    insulin lispro (HUMALOG) injection vial 0-4 Units, 0-4 Units, SubCUTAneous, Nightly, Rut Noah Hill - NP    glucose chewable tablet 16 g, 4 tablet, Oral, PRN, Hayden Ip, APRN - NP    dextrose bolus 10% 125 mL, 125 mL, IntraVENous, PRN **OR** dextrose bolus 10% 250 mL, 250 mL, IntraVENous, PRN, Hayden Ip, APRN - NP    glucagon (rDNA) injection 1 mg, 1 mg, SubCUTAneous, PRN, Hayden Ip, APRN - NP    dextrose 10 % infusion, , IntraVENous, Continuous PRN, Hayden Ip, APRN - NP    isosorbide mononitrate (IMDUR) extended release tablet 30 mg, 30 mg, Oral, Daily, Fabrice Worthy MD, 30 mg at 10/30/22 0910    metoprolol succinate (TOPROL XL) extended release tablet 25 mg, 25 mg, Oral, Nightly, Fabrice Worthy MD, 25 mg at 10/27/22 2106    atorvastatin (LIPITOR) tablet 20 mg, 20 mg, Oral, Nightly, Fabrice Worthy MD, 20 mg at 10/29/22 2135    albuterol sulfate HFA (PROVENTIL;VENTOLIN;PROAIR) 108 (90 Base) MCG/ACT inhaler 2 puff, 2 puff, Inhalation, 4x Daily PRN, Sakina Pearson MD    aspirin chewable tablet 81 mg, 81 mg, Oral, Daily, Sakina Pearson MD, 81 mg at 10/30/22 5958    acetaminophen (TYLENOL) tablet 650 mg, 650 mg, Oral, Q4H PRN, Sakina Pearson MD, 650 mg at 10/29/22 1747    magnesium hydroxide (MILK OF MAGNESIA) 400 MG/5ML suspension 30 mL, 30 mL, Oral, Daily PRN, Sakina Pearson MD, 30 mL at 10/16/22 1217    nicotine polacrilex (COMMIT) lozenge 2 mg, 2 mg, Oral, Q1H PRN, Sakina Pearson MD      Examination:  BP 96/62   Pulse 66   Temp 98.1 °F (36.7 °C) (Oral)   Resp 16   Ht 5' 5\" (1.651 m)   Wt 113 lb (51.3 kg)   SpO2 98%   BMI 18.80 kg/m²   Gait - steady  Medication side effects(SE): no    Mental Status Examination:    Level of consciousness:  within normal limits   Appearance: poor grooming and poor hygiene  Behavior/Motor: psychomotor retardation  Attitude toward examiner:  cooperative  Speech:  slow   Mood: anxious and depressed  Affect: flat and anxious  Thought processes:  slow   Thought content:  Suicidal Ideation:  active; still has homicidal (obsessive) ideation  Delusions: paranoia diminished  Perception: auditory and visual hallucinations  Cognition:  oriented to person, place, and time   Concentration poor  Insight poor   Judgement poor     ASSESSMENT:   Patient symptoms are:  [] Well controlled  [] Improving  [] Worsening  [x] No change      Diagnosis:   Principal Problem:    Severe episode of recurrent major depressive disorder, with psychotic features ([facility] [address]  Active Problems:    Tremor    Parkinsonism ([facility] [address]    Dyskinesia  Resolved Problems:    * No resolved hospital problems. *      LABS:    No results for input(s): WBC, HGB, PLT in the last 72 hours. No results for input(s): NA, K, CL, CO2, BUN, CREATININE, GLUCOSE in the last 72 hours. No results for input(s): BILITOT, ALKPHOS, AST, ALT in the last 72 hours. Lab Results   Component Value Date/Time    LABAMPH Neg 10/14/2022 08:15 AM    BARBSCNU Neg 10/14/2022 08:15 AM    LABBENZ Neg 10/14/2022 08:15 AM    LABMETH Neg 10/14/2022 08:15 AM    OPIATESCREENURINE Neg 10/14/2022 08:15 AM    PHENCYCLIDINESCREENURINE Neg 10/14/2022 08:15 AM    ETOH <10 10/14/2022 08:43 AM     Lab Results   Component Value Date/Time    TSH 3.230 10/14/2022 08:43 AM     No results found for: LITHIUM  No results found for: VALPROATE, CBMZ    RISK ASSESSMENT: High risk of suicide    Treatment Plan:  Reviewed current Medications with the patient. ECT tomorrow  Risperdal increased 0.25 mg tid   Risks, benefits, side effects, drug-to-drug interactions and alternatives to treatment were discussed. Collateral information: Pending  CD evaluatio  Encourage patient to attend group and other milieu activities.   Discharge planning discussed with the patient and treatment team.    PSYCHOTHERAPY/COUNSELING:  [x] Therapeutic interview  [x] Supportive  [] CBT  [] Ongoing  [] Other       [x] Patient continues to need, on a daily basis, active treatment furnished directly by or requiring the supervision of inpatient psychiatric personnel Anticipated Length of stay:            Electronically signed by Enrique Rogers MD on 10/30/2022 at 4:26 PM

## 2022-10-30 NOTE — PROGRESS NOTES
Pt. refused to attend the 1000 skills group, despite staff encouragement.  Electronically signed by Sabi Sanford, [address] on 10/30/2022 at 11:47 AM

## 2022-10-30 NOTE — PROGRESS NOTES
Pt. declined to attend the 0900 community meeting, despite staff encouragement.  Goal - \"To feel better\" Electronically signed by Mo Mcfarlane [address] on 10/30/2022 at 9:40 AM

## 2022-10-30 NOTE — PLAN OF CARE
Patient is isolative to room. Patient comes to DR to eat meals. Eats 100% of meals and reports good sleep. Remains having intrusive thoughts to hurt his wife. Pt states he is having more thoughts to hurt his kids and grandkids. Pt states he feels unsafe going to his home or staying with a family member. Patient rates anxiety and depression a 9/10 where 10 is the highest. Patient denies SI and hallucinations.

## 2022-10-30 NOTE — CARE COORDINATION
Group Therapy Note    Date: 10/30/2022  Start Time: 9612  End Time:  0699    Number of Participants: 11    Type of Group: Psychotherapy    Patient's Goal:  To participate in a goal oriented group. Notes: Patient declined to attend psychoeducation group at  despite encouragement by staff.      Discipline Responsible: /Counselor    FER Gay

## 2022-10-30 NOTE — PLAN OF CARE
Patient reclusive to room. Patient rates anxiety a 7/10. Rates depression a 0/10. Patient  1:1 in his room for safety. Reports adequate appetite and sleep. Patient denies SI/HI/AVH. No current complaint of pain or discomfort. No accidental falls observed or reported. Patient remains free from harm. Problem: Self Harm/Suicidality  Goal: Will have no self-injury during hospital stay  Description: INTERVENTIONS:  1. Q 30 MINUTES: Routine safety checks  2. Q SHIFT & PRN: Assess risk to determine if routine checks are adequate to maintain patient safety  10/29/2022 2053 by Loyda Giles RN  Outcome: Progressing     Problem: Chronic Conditions and Co-morbidities  Goal: Patient's chronic conditions and co-morbidity symptoms are monitored and maintained or improved  10/29/2022 2053 by Loyda Giles RN  Outcome: Progressing     Problem: Safety - Adult  Goal: Free from fall injury  10/29/2022 2053 by Loyda Giles RN  Outcome: Progressing     Problem: ABCDS Injury Assessment  Goal: Absence of physical injury  10/29/2022 2053 by Loyda Giles RN  Outcome: Progressing     Problem: Skin/Tissue Integrity  Goal: Absence of new skin breakdown  Description: 1. Monitor for areas of redness and/or skin breakdown  2. Assess vascular access sites hourly  3. Every 4-6 hours minimum:  Change oxygen saturation probe site  4. Every 4-6 hours:  If on nasal continuous positive airway pressure, respiratory therapy assess nares and determine need for appliance change or resting period. 10/29/2022 2053 by Loyda Giles RN  Outcome: Progressing     Problem: Anxiety  Goal: Will report anxiety at manageable levels  Description: INTERVENTIONS:  1. Administer medication as ordered  2. Teach and rehearse alternative coping skills  3.  Provide emotional support with 1:1 interaction with staff  10/29/2022 2053 by Loyda Giles RN  Outcome: Progressing     Problem: Coping  Goal: Pt/Family able to verbalize concerns and demonstrate effective coping strategies  Description: INTERVENTIONS:  1. Assist patient/family to identify coping skills, available support systems and cultural and spiritual values  2. Provide emotional support, including active listening and acknowledgement of concerns of patient and caregivers  3. Reduce environmental stimuli, as able  4. Instruct patient/family in relaxation techniques, as appropriate  5. Assess for spiritual pain/suffering and initiate Spiritual Care, Psychosocial Clinical Specialist consults as needed  10/29/2022 2053 by Gudelia Camejo RN  Outcome: Progressing     Problem: Depression/Self Harm  Goal: Effect of psychiatric condition will be minimized and patient will be protected from self harm  Description: INTERVENTIONS:  1. Assess impact of patient's symptoms on level of functioning, self care needs and offer support as indicated  2. Assess patient/family knowledge of depression, impact on illness and need for teaching  3. Provide emotional support, presence and reassurance  4. Assess for possible suicidal thoughts or ideation. If patient expresses suicidal thoughts or statements do not leave alone, initiate Suicide Precautions, move to a room close to the nursing station and obtain sitter  5.  Initiate consults as appropriate with Mental Health Professional, Spiritual Care, Psychosocial CNS, and consider a recommendation to the LIP for a Psychiatric Consultation  10/29/2022 2053 by Gudelia Camejo RN  Outcome: Progressing

## 2022-10-31 ENCOUNTER — ANESTHESIA (OUTPATIENT)
Dept: POSTOP/PACU | Age: 72
End: 2022-10-31

## 2022-10-31 ENCOUNTER — ANESTHESIA EVENT (OUTPATIENT)
Dept: POSTOP/PACU | Age: 72
End: 2022-10-31

## 2022-10-31 ENCOUNTER — APPOINTMENT (OUTPATIENT)
Dept: POSTOP/PACU | Age: 72
DRG: 885 | End: 2022-10-31
Payer: MEDICARE

## 2022-10-31 LAB
GLUCOSE BLD-MCNC: 109 MG/DL (ref 70–99)
GLUCOSE BLD-MCNC: 136 MG/DL (ref 70–99)
GLUCOSE BLD-MCNC: 161 MG/DL (ref 70–99)
PERFORMED ON: ABNORMAL

## 2022-10-31 PROCEDURE — 7100000000 HC PACU RECOVERY - FIRST 15 MIN

## 2022-10-31 PROCEDURE — 2500000003 HC RX 250 WO HCPCS: Performed by: ANESTHESIOLOGY

## 2022-10-31 PROCEDURE — 3700000000 HC ANESTHESIA ATTENDED CARE

## 2022-10-31 PROCEDURE — 90870 ELECTROCONVULSIVE THERAPY: CPT | Performed by: PSYCHIATRY & NEUROLOGY

## 2022-10-31 PROCEDURE — 2580000003 HC RX 258: Performed by: PSYCHIATRY & NEUROLOGY

## 2022-10-31 PROCEDURE — 90870 ELECTROCONVULSIVE THERAPY: CPT

## 2022-10-31 PROCEDURE — GZB4ZZZ OTHER ELECTROCONVULSIVE THERAPY: ICD-10-PCS | Performed by: PSYCHIATRY & NEUROLOGY

## 2022-10-31 PROCEDURE — 6370000000 HC RX 637 (ALT 250 FOR IP): Performed by: PSYCHIATRY & NEUROLOGY

## 2022-10-31 PROCEDURE — 7100000001 HC PACU RECOVERY - ADDTL 15 MIN

## 2022-10-31 PROCEDURE — 1240000000 HC EMOTIONAL WELLNESS R&B

## 2022-10-31 PROCEDURE — 6370000000 HC RX 637 (ALT 250 FOR IP): Performed by: INTERNAL MEDICINE

## 2022-10-31 PROCEDURE — 6360000002 HC RX W HCPCS: Performed by: ANESTHESIOLOGY

## 2022-10-31 RX ORDER — SUCCINYLCHOLINE/SOD CL,ISO/PF 100 MG/5ML
SYRINGE (ML) INTRAVENOUS PRN
Status: DISCONTINUED | OUTPATIENT
Start: 2022-10-31 | End: 2022-10-31 | Stop reason: SDUPTHER

## 2022-10-31 RX ORDER — ONDANSETRON 2 MG/ML
INJECTION INTRAMUSCULAR; INTRAVENOUS PRN
Status: DISCONTINUED | OUTPATIENT
Start: 2022-10-31 | End: 2022-10-31 | Stop reason: SDUPTHER

## 2022-10-31 RX ORDER — GLYCOPYRROLATE 1 MG/5 ML
SYRINGE (ML) INTRAVENOUS PRN
Status: DISCONTINUED | OUTPATIENT
Start: 2022-10-31 | End: 2022-10-31 | Stop reason: SDUPTHER

## 2022-10-31 RX ORDER — PROPOFOL 10 MG/ML
INJECTION, EMULSION INTRAVENOUS PRN
Status: DISCONTINUED | OUTPATIENT
Start: 2022-10-31 | End: 2022-10-31 | Stop reason: SDUPTHER

## 2022-10-31 RX ADMIN — PAROXETINE HYDROCHLORIDE 40 MG: 20 TABLET, FILM COATED ORAL at 14:00

## 2022-10-31 RX ADMIN — ONDANSETRON 4 MG: 2 INJECTION INTRAMUSCULAR; INTRAVENOUS at 12:10

## 2022-10-31 RX ADMIN — TRAZODONE HYDROCHLORIDE 50 MG: 50 TABLET ORAL at 21:31

## 2022-10-31 RX ADMIN — PROPOFOL 60 MG: 10 INJECTION, EMULSION INTRAVENOUS at 12:10

## 2022-10-31 RX ADMIN — CLONAZEPAM 0.5 MG: 0.5 TABLET ORAL at 14:00

## 2022-10-31 RX ADMIN — CLONAZEPAM 0.5 MG: 0.5 TABLET ORAL at 21:31

## 2022-10-31 RX ADMIN — SODIUM CHLORIDE: 9 INJECTION, SOLUTION INTRAVENOUS at 10:56

## 2022-10-31 RX ADMIN — ASPIRIN 81 MG 81 MG: 81 TABLET ORAL at 14:00

## 2022-10-31 RX ADMIN — RISPERIDONE 0.25 MG: 0.25 TABLET ORAL at 21:31

## 2022-10-31 RX ADMIN — ATORVASTATIN CALCIUM 20 MG: 20 TABLET, FILM COATED ORAL at 21:31

## 2022-10-31 RX ADMIN — Medication 0.2 MG: at 12:10

## 2022-10-31 RX ADMIN — Medication 30 MG: at 12:10

## 2022-10-31 RX ADMIN — RISPERIDONE 0.25 MG: 0.25 TABLET ORAL at 14:00

## 2022-10-31 ASSESSMENT — PATIENT HEALTH QUESTIONNAIRE - PHQ9: SUM OF ALL RESPONSES TO PHQ QUESTIONS 1-9: 27

## 2022-10-31 ASSESSMENT — ENCOUNTER SYMPTOMS: SHORTNESS OF BREATH: 1

## 2022-10-31 NOTE — ANESTHESIA PRE PROCEDURE
Department of Anesthesiology  Preprocedure Note       Name:  Reny Barrientos   Age:  74 y.o.  :  1950                                          MRN:  [id number]         Date:  10/31/2022      Surgeon: * No surgeons listed *    Procedure: * No procedures listed *    Medications prior to admission:   Prior to Admission medications    Medication Sig Start Date End Date Taking? Authorizing Provider   mirtazapine (REMERON) 15 MG tablet Take 15 mg by mouth nightly    Historical Provider, MD   ALPRAZolam (XANAX) 0.25 MG tablet Take 0.25 mg by mouth in the morning, at noon, and at bedtime. Historical Provider, MD   albuterol sulfate HFA (VENTOLIN HFA) 108 (90 Base) MCG/ACT inhaler Inhale 2 puffs into the lungs 4 times daily as needed for Wheezing 10/12/22   Boris Quevedo MD   isosorbide mononitrate (IMDUR) 30 MG extended release tablet TAKE 1 TABLET BY MOUTH DAILY 10/12/22   Rian Vega DO   ranolazine (RANEXA) 1000 MG extended release tablet TAKE 1 TABLET BY MOUTH TWICE DAILY 8/15/22   [facility] RAV[facility] Car[facility] - CNP   escitalopram (LEXAPRO) 10 MG tablet Take 20 mg by mouth daily 11/3/21   Historical Provider, MD   aspirin 81 MG chewable tablet Take 1 tablet by mouth daily 10/29/21   Toy Bajwa MD   Blood Glucose Monitoring Suppl ([address]) w/Device KIT  18   Historical Provider, MD Sawyer Hiss strip  18   Historical Provider, MD [place]ente  LANCETS 60G [address] [facility]  18   Historical Provider, MD       Current medications:    No current facility-administered medications for this visit. No current outpatient medications on file.      Facility-Administered Medications Ordered in Other Visits   Medication Dose Route Frequency Provider Last Rate Last Admin    risperiDONE (RISPERDAL) tablet 0.25 mg  0.25 mg Oral TID Lina Sanders MD   0.25 mg at 10/30/22 2100    0.9 % sodium chloride infusion   IntraVENous Continuous Carmen Mcintyre MD        clonazePAM Estephania Lau) tablet 0.5 mg  0.5 mg Oral Q12H Lester Freed MD   0.5 mg at 10/30/22 2059    traZODone (DESYREL) tablet 50 mg  50 mg Oral Nightly Lester Freed MD   50 mg at 10/30/22 2100    haloperidol (HALDOL) tablet 2 mg  2 mg Oral Q6H PRN Lester Freed MD   2 mg at 10/27/22 1636    PARoxetine (PAXIL) tablet 40 mg  40 mg Oral Daily Palmer Edwards MD   40 mg at 10/30/22 0910    0.9 % sodium chloride infusion   IntraVENous Continuous Lester Freed  mL/hr at 10/24/22 1143 New Bag at 10/24/22 1143    benzocaine (ORAJEL) 20 % mucosal gel   Mouth/Throat BID PRN Letty Iqbal, APRN - CNP        insulin lispro (HUMALOG) injection vial 0-8 Units  0-8 Units SubCUTAneous TID [facility], APRN - NP        insulin lispro (HUMALOG) injection vial 0-4 Units  0-4 Units SubCUTAneous Nightly [facility], APRN - NP        glucose chewable tablet 16 g  4 tablet Oral PRN [facility], APRN - NP        dextrose bolus 10% 125 mL  125 mL IntraVENous PRN [facility], APRN - NP        Or    dextrose bolus 10% 250 mL  250 mL IntraVENous PRN [facility], APRN - NP        glucagon (rDNA) injection 1 mg  1 mg SubCUTAneous PRN [facility], APRN - NP        dextrose 10 % infusion   IntraVENous Continuous PRN [facility], APRN - NP        isosorbide mononitrate (IMDUR) extended release tablet 30 mg  30 mg Oral Daily Sakshi Hope MD   30 mg at 10/30/22 7497    metoprolol succinate (TOPROL XL) extended release tablet 25 mg  25 mg Oral Nightly Sakshi Hope MD   25 mg at 10/30/22 2100    atorvastatin (LIPITOR) tablet 20 mg  20 mg Oral Nightly Sakshi Hope MD   20 mg at 10/30/22 2100    albuterol sulfate HFA (PROVENTIL;VENTOLIN;PROAIR) 108 (90 Base) MCG/ACT inhaler 2 puff  2 puff Inhalation 4x Daily PRN Lester Freed MD        aspirin chewable tablet 81 mg  81 mg Oral Daily Lester Freed MD   81 mg at 10/30/22 0910    acetaminophen (TYLENOL) tablet 650 mg  650 mg Oral Q4H PRN Raj Edwardo Maria M Jordan MD   650 mg at 10/29/22 1747    magnesium hydroxide (MILK OF MAGNESIA) 400 MG/5ML suspension 30 mL  30 mL Oral Daily PRN Yvonne Chatman MD   30 mL at 10/16/22 1217    nicotine polacrilex (COMMIT) lozenge 2 mg  2 mg Oral Q1H PRN Yvonne Chatman MD           Allergies:     Allergies   Allergen Reactions    Seroquel [Quetiapine] Other (See Comments)     lethargy  weak       Problem List:    Patient Active Problem List   Diagnosis Code    Chest pain R07.9    Hypertension I10    Anxiety F41.9    Recurrent major depressive disorder, in remission ([facility] F33.40    Coronary artery disease involving native coronary artery of native heart without angina pectoris I25.10    Mixed obsessional thoughts and acts F42.2    Chronic gastritis without bleeding K29.50    Dyspepsia R10.13    Adenomatous polyp of sigmoid colon D12.5    Weakness R53.1    COVID-19 U07.1    Hyperlipidemia E78.5    Heart murmur R01.1    Generalized anxiety disorder F41.1    Dyspnea on exertion R06.09    ED (erectile dysfunction) N52.9    Benzodiazepine dependence ([facility]) F13.20    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, F41.8    Panic attacks F41.0    Type 2 diabetes mellitus without complication, without long-term current use of insulin ([facility]) E11.9    Severe episode of recurrent major depressive disorder, with psychotic features ([facility] F33.3    Tremor R25.1    Parkinsonism ([facility] G20    Dyskinesia G24.9       Past Medical History:        Diagnosis Date    Anxiety     Chest pain 03/29/2018    Coronary artery disease involving native coronary artery of native heart without angina pectoris 02/15/2019    Diabetes mellitus ([facility]     no meds    History of colon polyps     Hyperlipidemia     Hypertension     Type 2 diabetes mellitus without complication ([facility])     Vertigo        Past Surgical History:        Procedure Laterality Date    CARDIAC SURGERY  1973    PTCA     COLONOSCOPY      COLONOSCOPY N/A 10/17/2019    COLORECTAL CANCER SCREENING, HIGH RISK performed by Arcenio Sanderson MD at [facility] OTHER SURGICAL HISTORY      patent foramen ovale    TONSILLECTOMY      UPPER GASTROINTESTINAL ENDOSCOPY N/A 8/1/2019    EGD ESOPHAGOGASTRODUODENOSCOPY performed by Arcenio Sanderson MD at [address] [place] [address] Drive History:    Social History     Tobacco Use    Smoking status: Never    Smokeless tobacco: Never   Substance Use Topics    Alcohol use: No                                Counseling given: Not Answered      Vital Signs (Current): There were no vitals filed for this visit.                                            BP Readings from Last 3 Encounters:   10/31/22 131/81   10/12/22 127/67   09/13/22 (!) 157/87       NPO Status:                                                                                 BMI:   Wt Readings from Last 3 Encounters:   10/31/22 113 lb (51.3 kg)   09/13/22 125 lb (56.7 kg)   08/29/22 120 lb (54.4 kg)     There is no height or weight on file to calculate BMI.    CBC:   Lab Results   Component Value Date/Time    WBC 7.4 10/14/2022 08:43 AM    RBC 4.81 10/14/2022 08:43 AM    HGB 15.6 10/14/2022 08:43 AM    HCT 44.1 10/14/2022 08:43 AM    MCV 91.6 10/14/2022 08:43 AM    RDW 13.9 10/14/2022 08:43 AM     10/14/2022 08:43 AM       CMP:   Lab Results   Component Value Date/Time     10/14/2022 08:43 AM    K 3.4 10/14/2022 08:43 AM    K 4.2 09/09/2021 07:03 AM     10/14/2022 08:43 AM    CO2 18 10/14/2022 08:43 AM    BUN 16 10/14/2022 08:43 AM    CREATININE 0.85 10/14/2022 08:43 AM    GFRAA >60.0 10/14/2022 08:43 AM    LABGLOM >60.0 10/14/2022 08:43 AM    GLUCOSE 138 10/14/2022 08:43 AM    PROT 7.4 10/14/2022 08:43 AM    CALCIUM 9.7 10/14/2022 08:43 AM    BILITOT 1.7 10/14/2022 08:43 AM    ALKPHOS 85 10/14/2022 08:43 AM    AST 19 10/14/2022 08:43 AM    ALT 16 10/14/2022 08:43 AM       POC Tests:   Recent Labs     10/31/22  0623   POCGLU 109*       Coags:   Lab Results   Component Value Date/Time    PROTIME 14.0 10/03/2021 01:36 AM    INR 1.1 10/03/2021 01:36 AM    APTT 28.4 10/03/2021 01:36 AM       HCG (If Applicable): No results found for: Claude Buoy, HCG, HCGQUANT     ABGs:   Lab Results   Component Value Date/Time    PHART 7.482 09/07/2021 06:38 PM    PO2ART 59 09/07/2021 06:38 PM    PII0CHV 31 09/07/2021 06:38 PM    HLG5HYY 22.8 09/07/2021 06:38 PM    BEART -1 09/07/2021 06:38 PM    A3IJTCRN 92 09/07/2021 06:38 PM        Type & Screen (If Applicable):  No results found for: LABABO, LABRH    Drug/Infectious Status (If Applicable):  No results found for: HIV, HEPCAB    COVID-19 Screening (If Applicable):   Lab Results   Component Value Date/Time    COVID19 Not Detected 10/14/2022 08:27 AM           Anesthesia Evaluation  Patient summary reviewed and Nursing notes reviewed no history of anesthetic complications:   Airway: Mallampati: II  TM distance: >3 FB   Neck ROM: full  Mouth opening: > = 3 FB   Dental: normal exam         Pulmonary:normal exam    (+) shortness of breath:                             Cardiovascular:  Exercise tolerance: good (>4 METS),   (+) hypertension:, CAD:, HENRIQUEZ:,       ECG reviewed               Beta Blocker:  Not on Beta Blocker         Neuro/Psych:   (+) psychiatric history:            GI/Hepatic/Renal: Neg GI/Hepatic/Renal ROS            Endo/Other:    (+) DiabetesType II DM, , .          Pt had PAT visit. Abdominal:             Vascular: negative vascular ROS. Other Findings:             Anesthesia Plan      general     ASA 3     (Mask)  Induction: intravenous. MIPS: Prophylactic antiemetics administered. Anesthetic plan and risks discussed with patient. Plan discussed with CRNA.     Attending anesthesiologist reviewed and agrees with Pre Eval content                Cynthia Curry MD   10/31/2022

## 2022-10-31 NOTE — CARE COORDINATION
FAMILY COLLATERAL NOTE    Family/Support Name: Kim Leigh  Contact #: [phone]  Relationship to Pt: son       Placed call to above to update. Made son aware that patient reported that he is not having suicidal thoughts currently but reports having thoughts of killing the grandkids. Discussed that discharge will be mid to end of the week. Sons will rotate days checking in on patient when home. One son works day shift and the other nights. Son does not know if patient has a PCP. Made him aware that family should call the PCP to talk about setting up a home health aide. Son said he could do this and asked if staff could ask his dad if he has a PCP.          Erna Greenwood, [facility]

## 2022-10-31 NOTE — ANESTHESIA POSTPROCEDURE EVALUATION
Department of Anesthesiology  Postprocedure Note    Patient: Marybeth Manuel  MRN: [id number]  YOB: 1950  Date of evaluation: 10/31/2022      Procedure Summary     Date: 10/31/22 Room / Location: [facility] PACU    Anesthesia Start: 1210 Anesthesia Stop: 1215    Procedure: ECT W/ ANESTHESIA Diagnosis:     Scheduled Providers:  Responsible Provider: Catrachito Nixon MD    Anesthesia Type: General ASA Status: 3          Anesthesia Type: General    Juan Phase I: Juan Score: 10    Juan Phase II:        Anesthesia Post Evaluation    Patient location during evaluation: bedside  Patient participation: complete - patient participated  Level of consciousness: awake and awake and alert  Airway patency: patent  Nausea & Vomiting: no nausea and no vomiting  Complications: no  Cardiovascular status: blood pressure returned to baseline and hemodynamically stable  Respiratory status: acceptable  Hydration status: euvolemic

## 2022-10-31 NOTE — OP NOTE
Department of Psychiatry  Electroconvulsive Therapy Treatment Note        10/31/2022    PURPOSE FOR ECT:  Therapeutic NUMBER: 6    DIAGNOSIS:   Principal Problem:    Severe episode of recurrent major depressive disorder, with psychotic features ([facility]  Active Problems:    Tremor    Parkinsonism ([facility]    Dyskinesia  Resolved Problems:    * No resolved hospital problems.  *      MEDICATIONS:    Current Facility-Administered Medications:     risperiDONE (RISPERDAL) tablet 0.25 mg, 0.25 mg, Oral, TID, Ovidio Burnette MD, 0.25 mg at 10/30/22 2100    0.9 % sodium chloride infusion, , IntraVENous, Continuous, Anju Soto MD, Last Rate: 100 mL/hr at 10/31/22 1056, New Bag at 10/31/22 1056    clonazePAM (KLONOPIN) tablet 0.5 mg, 0.5 mg, Oral, Q12H, Anju Soto MD, 0.5 mg at 10/30/22 2059    traZODone (DESYREL) tablet 50 mg, 50 mg, Oral, Nightly, Anju Soto MD, 50 mg at 10/30/22 2100    haloperidol (HALDOL) tablet 2 mg, 2 mg, Oral, Q6H PRN, Anju Soto MD, 2 mg at 10/27/22 1636    PARoxetine (PAXIL) tablet 40 mg, 40 mg, Oral, Daily, Ovidio Burnette MD, 40 mg at 10/30/22 0910    0.9 % sodium chloride infusion, , IntraVENous, Continuous, Anju Soto MD, Last Rate: 100 mL/hr at 10/24/22 1143, New Bag at 10/24/22 1143    benzocaine (ORAJEL) 20 % mucosal gel, , Mouth/Throat, BID PRN, Ettie Dinfernando, APRN - CNP    insulin lispro (HUMALOG) injection vial 0-8 Units, 0-8 Units, SubCUTAneous, TID WC, Jeff Mack, APRN - NP    insulin lispro (HUMALOG) injection vial 0-4 Units, 0-4 Units, SubCUTAneous, Nightly, Jeff Mack, APRN - NP    glucose chewable tablet 16 g, 4 tablet, Oral, PRN, Jeff Mack, APRN - NP    dextrose bolus 10% 125 mL, 125 mL, IntraVENous, PRN **OR** dextrose bolus 10% 250 mL, 250 mL, IntraVENous, PRN, Aguilar Pltiffanie, APRN - NP    glucagon (rDNA) injection 1 mg, 1 mg, SubCUTAneous, PRN, Jeff Mack, APRN - NP    dextrose 10 % infusion, , IntraVENous, Continuous PRN, Margo Jenkins, APRN - NP    isosorbide mononitrate (IMDUR) extended release tablet 30 mg, 30 mg, Oral, Daily, Vika Sin MD, 30 mg at 10/30/22 0038    metoprolol succinate (TOPROL XL) extended release tablet 25 mg, 25 mg, Oral, Nightly, Vika Sin MD, 25 mg at 10/30/22 2100    atorvastatin (LIPITOR) tablet 20 mg, 20 mg, Oral, Nightly, Vika Sin MD, 20 mg at 10/30/22 2100    albuterol sulfate HFA (PROVENTIL;VENTOLIN;PROAIR) 108 (90 Base) MCG/ACT inhaler 2 puff, 2 puff, Inhalation, 4x Daily PRN, Silvana Ocasio MD    aspirin chewable tablet 81 mg, 81 mg, Oral, Daily, Silvana Ocasio MD, 81 mg at 10/30/22 3119    acetaminophen (TYLENOL) tablet 650 mg, 650 mg, Oral, Q4H PRN, Silvana Ocasio MD, 650 mg at 10/29/22 1747    magnesium hydroxide (MILK OF MAGNESIA) 400 MG/5ML suspension 30 mL, 30 mL, Oral, Daily PRN, Silvana Ocasio MD, 30 mL at 10/16/22 1217    nicotine polacrilex (COMMIT) lozenge 2 mg, 2 mg, Oral, Q1H PRN, Silvana Ocasio MD    CHANGE IN PSYCHIATRY STATUS SINCE LAST ECT:   Some improvement in mood    INFORMED CONSENT OBTAINED : YES    PRE ECT MEDICATION ADMINISTERED:   YES    NPO STATUS: Confirmed    ELECTRODE PLACEMENT : RUL    TIME OUT :  TEAM CONFIRMS THE CORRECT PATIENT, CORRECT PROCEDURE AND CORRECT SITE.     DEVICE PARAMETERS:   Charge- 211  PW - 0.3  Freq- 55  Duration- 8  EEG- 27  Motor-  22    VITALS:   Vitals:    10/31/22 1045   BP: 131/81   Pulse: (!) 46   Resp: 16   Temp: 97.1 °F (36.2 °C)   SpO2: 13%         COMPLICATIONS: no      RECOMMENDATIONS FOR NEXT TREATMENT:  wed        PSYCHIATRIST:  Shweta Shafer MD

## 2022-10-31 NOTE — PROGRESS NOTES
Informed pt has returned from ect, vs obtained, gag present, pt ate well , drank supplement, pt denies pain.  Pt was set up to eat lunch,

## 2022-10-31 NOTE — FLOWSHEET NOTE
Pt was laying in his bed with the  at the bedside. Pt states \"When I hear the voices they just keep repeating themselves\". Pt want's to go to the Nursing home on discharge but states \" I can't go because I am able to walk\". Pt does struggle with thought's to harm others\". Pt remains a 1:1 for safety.

## 2022-10-31 NOTE — H&P
Update History & Physical    The patient's History and Physical of 10 / 18 / 22 was reviewed with the patient and there were no significant changes. I examined the patient and there were no significant changes from the previous History and Physical.    Vitals:    10/31/22 1045   BP: 131/81   Pulse: (!) 46   Resp: 16   Temp: 97.1 °F (36.2 °C)   SpO2: 97%     Principal Problem:    Severe episode of recurrent major depressive disorder, with psychotic features ([address]  Active Problems:    Tremor    Parkinsonism ([address]    Dyskinesia  Resolved Problems:    * No resolved hospital problems. *        Plan: The risk, benefits, expected outcome, and alternative to the recommended procedure have been discussed with the patient. Patient understands and wants to proceed with the procedure.     Electronically signed by Wil Yoo MD

## 2022-10-31 NOTE — CARE COORDINATION
Group Therapy Note    Date: 10/31/2022  Start Time: 6404  End Time:  1435    Number of Participants: 10    Type of Group: Cognitive Skills    Patient's Goal:  To participate in mood management group. Notes: Patient declined to attend psychoeducation group at [phone] despite encouragement by staff.      Discipline Responsible: /Counselor    FER Arroyo

## 2022-10-31 NOTE — PLAN OF CARE
Problem: Self Harm/Suicidality  Goal: Will have no self-injury during hospital stay  Description: INTERVENTIONS:  1. Q 30 MINUTES: Routine safety checks  2. Q SHIFT & PRN: Assess risk to determine if routine checks are adequate to maintain patient safety  Outcome: Progressing     Problem: Chronic Conditions and Co-morbidities  Goal: Patient's chronic conditions and co-morbidity symptoms are monitored and maintained or improved  Outcome: Progressing  Flowsheets (Taken 10/31/2022 8677)  Care Plan - Patient's Chronic Conditions and Co-Morbidity Symptoms are Monitored and Maintained or Improved:   Monitor and assess patient's chronic conditions and comorbid symptoms for stability, deterioration, or improvement   Collaborate with multidisciplinary team to address chronic and comorbid conditions and prevent exacerbation or deterioration     Problem: Safety - Adult  Goal: Free from fall injury  Outcome: Progressing     Problem: ABCDS Injury Assessment  Goal: Absence of physical injury  Outcome: Progressing     Problem: Skin/Tissue Integrity  Goal: Absence of new skin breakdown  Description: 1. Monitor for areas of redness and/or skin breakdown  2. Assess vascular access sites hourly  3. Every 4-6 hours minimum:  Change oxygen saturation probe site  4. Every 4-6 hours:  If on nasal continuous positive airway pressure, respiratory therapy assess nares and determine need for appliance change or resting period. Outcome: Progressing     Patient C/O cold symptoms with scratchy throat and nasal stuffiness. Initially he did not want to go to ECT, then agreed too. Tolerated it well. Remains 1:1 for safety. Denies intent to harm self, still struggles with thoughts to harm others. Does not want to return home.

## 2022-10-31 NOTE — CARE COORDINATION
Please call after 2pm to speak to patients son. Spoke with son Brinton Galeazzi, and daughter-in-law. Patients PCP is Dr. Vedia Lanes @ [phone]. The family is concerned about patient going home and wanted to know if there was another facility. Daughter-in-law stated that patient is a hypochondriac and has been in mental health places before. \"Patient uses pills to get though everything. He runs to the hospital all the time. He will call 911 as soon as he is back home and go right back to the hospital.\"   Patient does not have a good relationship with his wife. Patient has many bottles of pills at home with all different amounts in them. Family will remove all the bottles from the home and will purchase a pill box that is am afternoon and pm and family will fill the pill box weekly. Discussed with the family calling [address] to have patient assessed for assisted living and also suggested calling nursing home to ask if patient can be admitted from home.     Update Tuesday

## 2022-11-01 LAB
GLUCOSE BLD-MCNC: 102 MG/DL (ref 70–99)
GLUCOSE BLD-MCNC: 105 MG/DL (ref 70–99)
GLUCOSE BLD-MCNC: 118 MG/DL (ref 70–99)
GLUCOSE BLD-MCNC: 209 MG/DL (ref 70–99)
PERFORMED ON: ABNORMAL

## 2022-11-01 PROCEDURE — 97116 GAIT TRAINING THERAPY: CPT

## 2022-11-01 PROCEDURE — 99232 SBSQ HOSP IP/OBS MODERATE 35: CPT | Performed by: PSYCHIATRY & NEUROLOGY

## 2022-11-01 PROCEDURE — 6370000000 HC RX 637 (ALT 250 FOR IP): Performed by: PSYCHIATRY & NEUROLOGY

## 2022-11-01 PROCEDURE — 6370000000 HC RX 637 (ALT 250 FOR IP): Performed by: INTERNAL MEDICINE

## 2022-11-01 PROCEDURE — 1240000000 HC EMOTIONAL WELLNESS R&B

## 2022-11-01 PROCEDURE — 97535 SELF CARE MNGMENT TRAINING: CPT

## 2022-11-01 PROCEDURE — 6370000000 HC RX 637 (ALT 250 FOR IP): Performed by: NURSE PRACTITIONER

## 2022-11-01 RX ADMIN — ATORVASTATIN CALCIUM 20 MG: 20 TABLET, FILM COATED ORAL at 20:42

## 2022-11-01 RX ADMIN — RISPERIDONE 0.25 MG: 0.25 TABLET ORAL at 20:42

## 2022-11-01 RX ADMIN — ASPIRIN 81 MG 81 MG: 81 TABLET ORAL at 09:59

## 2022-11-01 RX ADMIN — TRAZODONE HYDROCHLORIDE 50 MG: 50 TABLET ORAL at 20:42

## 2022-11-01 RX ADMIN — CLONAZEPAM 0.5 MG: 0.5 TABLET ORAL at 20:42

## 2022-11-01 RX ADMIN — RISPERIDONE 0.25 MG: 0.25 TABLET ORAL at 09:59

## 2022-11-01 RX ADMIN — RISPERIDONE 0.25 MG: 0.25 TABLET ORAL at 14:03

## 2022-11-01 RX ADMIN — CLONAZEPAM 0.5 MG: 0.5 TABLET ORAL at 09:59

## 2022-11-01 RX ADMIN — ISOSORBIDE MONONITRATE 30 MG: 60 TABLET, EXTENDED RELEASE ORAL at 14:04

## 2022-11-01 RX ADMIN — PAROXETINE HYDROCHLORIDE 40 MG: 20 TABLET, FILM COATED ORAL at 09:59

## 2022-11-01 RX ADMIN — INSULIN LISPRO 2 UNITS: 100 INJECTION, SOLUTION INTRAVENOUS; SUBCUTANEOUS at 17:04

## 2022-11-01 NOTE — FLOWSHEET NOTE
Pt was laying in his bed with the  at his bedside. Pt has low motivation and states he is worried about going home with his wife there because of the homicidal thought's towards her. Pt does come out to the dayroom for meals and did smile at times during assessment.

## 2022-11-01 NOTE — PROGRESS NOTES
Assumed care at [address]. Pt isolates to room in bed despite encouragement. Remians with sitter for safety precautions.

## 2022-11-01 NOTE — PLAN OF CARE
Problem: Self Harm/Suicidality  Goal: Will have no self-injury during hospital stay  Description: INTERVENTIONS:  1. Q 30 MINUTES: Routine safety checks  2. Q SHIFT & PRN: Assess risk to determine if routine checks are adequate to maintain patient safety  11/1/2022 1148 by RAVEN Johns  Outcome: Progressing  11/1/2022 0024 by Hussein Bledsoe RN  Outcome: Progressing     Problem: Self Harm/Suicidality  Goal: Will have no self-injury during hospital stay  Description: INTERVENTIONS:  1. Q 30 MINUTES: Routine safety checks  2. Q SHIFT & PRN: Assess risk to determine if routine checks are adequate to maintain patient safety  11/1/2022 1148 by RAVEN Johns  Outcome: Progressing  11/1/2022 0024 by Hussein Bledsoe RN  Outcome: Progressing     Problem: Coping  Goal: Pt/Family able to verbalize concerns and demonstrate effective coping strategies  Description: INTERVENTIONS:  1. Assist patient/family to identify coping skills, available support systems and cultural and spiritual values  2. Provide emotional support, including active listening and acknowledgement of concerns of patient and caregivers  3. Reduce environmental stimuli, as able  4. Instruct patient/family in relaxation techniques, as appropriate  5. Assess for spiritual pain/suffering and initiate Spiritual Care, Psychosocial Clinical Specialist consults as needed  11/1/2022 1148 by RAVEN Johns  Outcome: Progressing  Flowsheets (Taken 11/1/2022 1131)  Patient/family able to verbalize anxieties, fears, and concerns, and demonstrate effective coping:   Assist patient/family to identify coping skills, available support systems and cultural and spiritual values   Provide emotional support, including active listening and acknowledgement of concerns of patient and caregivers   Reduce environmental stimuli, as able  11/1/2022 0024 by Hussein Bledsoe RN  Outcome: Not Progressing     Patient C/O not feeling good.  He has a raspy dry cough, and reports blood in his mucus. Affect is sad and depressed. He has some nasal congestion. Energy is low and appetite fair. He naps at intervals and remains 1:1 for safety. He expresses not wanting to go home. He denies SI but continues to think of harming others. No talking to himself or signs of hallucinations.

## 2022-11-01 NOTE — PROGRESS NOTES
Pt. refused to attend the 1000 skills group, despite staff encouragement.  Electronically signed by Sonia Crabtree, [address] on 11/1/2022 at 3:43 PM English

## 2022-11-01 NOTE — PROGRESS NOTES
Pt. declined to attend the 0900 community meeting, despite staff encouragement.  Patient did not state a goal. Electronically signed by Jennifer Jose, [address] on 11/1/2022 at 9:59 AM

## 2022-11-01 NOTE — PROGRESS NOTES
Pt walked back from the dinning room using his walker with a steady gait, voided large amount of urin in toliet after saying I don't have to go. Pt ate well at lunch , fluids were encouraged.

## 2022-11-01 NOTE — PROGRESS NOTES
Behavioral Services                                              Medicare Re-Certification    I certify that the inpatient psychiatric hospital services furnished since the previous certification/re-certification were, and continue to be, medically necessary for;    [x] (1) Treatment which could reasonably be expected to improve the patient's condition,    [x] (2) Or for diagnostic study. Estimated length of stay/service 3 days    Plan for post-hospital care OP care    This patient continues to need, on a daily basis, active treatment furnished directly by or requiring the supervision of inpatient psychiatric personnel.     Electronically signed by Timothy Parnell MD on 11/1/2022 at 12:42 PM                    [address] NOTE       11/1/2022     Patient was seen and examined in person, Chart reviewed   Patient's case discussed with staff/team    Chief Complaint: Depression anxiety    Interim History:     Patient report feeling better with mood  Feel the ECT is helping him  Want to complete the treatment  Pt is sleeping better  No active SI or HI  Pt does not have a ride for OP treatment  Want to go to assistant living facility  Wife moved out of the house- to stay with son    Appetite:   [x] Normal/Unchanged  [] Increased  [] Decreased      Sleep:       [] Normal/Unchanged  [x] Fair       [] Poor              Energy:    [] Normal/Unchanged  [] Increased  [x] Decreased        SI [x] Present  [] Absent    HI  [x]Present  [] Absent     Aggression:  [] yes  [x] no    Patient is [] able  [x] unable to CONTRACT FOR SAFETY     PAST MEDICAL/PSYCHIATRIC HISTORY:   Past Medical History:   Diagnosis Date    Anxiety     Chest pain 03/29/2018    Coronary artery disease involving native coronary artery of native heart without angina pectoris 02/15/2019    Diabetes mellitus ([facility] [address]     no meds    History of colon polyps     Hyperlipidemia     Hypertension     Type 2 diabetes mellitus without complication (HCC)     Vertigo        FAMILY/SOCIAL HISTORY:  Family History   Problem Relation Age of Onset    Heart Disease Maternal Aunt     Cancer Maternal Aunt     Colon Cancer Maternal Aunt      Social History     Socioeconomic History    Marital status:      Spouse name: Not on file    Number of children: Not on file    Years of education: Not on file    Highest education level: Not on file   Occupational History    Not on file   Tobacco Use    Smoking status: Never    Smokeless tobacco: Never   Vaping Use    Vaping Use: Never used   Substance and Sexual Activity    Alcohol use: No    Drug use: Never    Sexual activity: Not on file   Other Topics Concern    Not on file   Social History Narrative    Not on file     Social Determinants of Health     Financial Resource Strain: Not on file   Food Insecurity: Not on file   Transportation Needs: Not on file   Physical Activity: Not on file   Stress: Not on file   Social Connections: Not on file   Intimate Partner Violence: Not on file   Housing Stability: Not on file           ROS:  [x] All negative/unchanged except if checked.  Explain positive(checked items) below:  [] Constitutional  [] Eyes  [] Ear/Nose/Mouth/Throat  [] Respiratory  [] CV  [] GI  []   [] Musculoskeletal  [] Skin/Breast  [] Neurological  [] Endocrine  [] Heme/Lymph  [] Allergic/Immunologic    Explanation:     MEDICATIONS:    Current Facility-Administered Medications:     risperiDONE (RISPERDAL) tablet 0.25 mg, 0.25 mg, Oral, TID, Malcik Worley MD, 0.25 mg at 11/01/22 0959    0.9 % sodium chloride infusion, , IntraVENous, Continuous, Joseph Shetty MD, Last Rate: 100 mL/hr at 10/31/22 1056, New Bag at 10/31/22 1056    clonazePAM (KLONOPIN) tablet 0.5 mg, 0.5 mg, Oral, Q12H, Joseph Shetty MD, 0.5 mg at 11/01/22 0959    traZODone (DESYREL) tablet 50 mg, 50 mg, Oral, Nightly, Joseph Shetty MD, 50 mg at 10/31/22 2131    haloperidol (HALDOL) tablet 2 mg, 2 mg, Oral, Q6H PRN, Jake Jonas MD, 2 mg at 10/27/22 1636    PARoxetine (PAXIL) tablet 40 mg, 40 mg, Oral, Daily, Ananda Maier MD, 40 mg at 11/01/22 0959    0.9 % sodium chloride infusion, , IntraVENous, Continuous, Jake Jonas MD, Last Rate: 100 mL/hr at 10/24/22 1143, New Bag at 10/24/22 1143    benzocaine (ORAJEL) 20 % mucosal gel, , Mouth/Throat, BID PRN, Dennie Pointer, APRN - CNP    insulin lispro (HUMALOG) injection vial 0-8 Units, 0-8 Units, SubCUTAneous, TID WC, RAVEN Nielsen - NP    insulin lispro (HUMALOG) injection vial 0-4 Units, 0-4 Units, SubCUTAneous, Nightly, Charlene Allen APRN - NP    glucose chewable tablet 16 g, 4 tablet, Oral, PRN, Charlene Allen, APRN - NP    dextrose bolus 10% 125 mL, 125 mL, IntraVENous, PRN **OR** dextrose bolus 10% 250 mL, 250 mL, IntraVENous, PRN, Charlene Allen, APRN - NP    glucagon (rDNA) injection 1 mg, 1 mg, SubCUTAneous, PRN, Charlene Allen, APRN - NP    dextrose 10 % infusion, , IntraVENous, Continuous PRN, Charlene Allen APRN - NP    isosorbide mononitrate (IMDUR) extended release tablet 30 mg, 30 mg, Oral, Daily, Mushtaq Mayer MD, 30 mg at 10/30/22 0910    metoprolol succinate (TOPROL XL) extended release tablet 25 mg, 25 mg, Oral, Nightly, Mushtaq Mayer MD, 25 mg at 10/30/22 2100    atorvastatin (LIPITOR) tablet 20 mg, 20 mg, Oral, Nightly, Mushtaq Mayer MD, 20 mg at 10/31/22 2131    albuterol sulfate HFA (PROVENTIL;VENTOLIN;PROAIR) 108 (90 Base) MCG/ACT inhaler 2 puff, 2 puff, Inhalation, 4x Daily PRN, Jake Jonas MD    aspirin chewable tablet 81 mg, 81 mg, Oral, Daily, Jake Jonas MD, 81 mg at 11/01/22 0959    acetaminophen (TYLENOL) tablet 650 mg, 650 mg, Oral, Q4H PRN, Jake Jonas MD, 650 mg at 10/29/22 1747    magnesium hydroxide (MILK OF MAGNESIA) 400 MG/5ML suspension 30 mL, 30 mL, Oral, Daily PRN, Jake Jonas MD, 30 mL at 10/16/22 1217    nicotine polacrilex (COMMIT) lozenge 2 mg, 2 mg, Oral, Q1H PRN, Ludy Singleton Shilpi Moore MD      Examination:  BP (!) 105/53   Pulse 53   Temp 99 °F (37.2 °C)   Resp 18   Ht 5' 5\" (1.651 m)   Wt 113 lb (51.3 kg)   SpO2 97%   BMI 18.80 kg/m²   Gait - steady  Medication side effects(SE): no    Mental Status Examination:    Level of consciousness:  within normal limits   Appearance:  poor grooming and poor hygiene  Behavior/Motor:  psychomotor retardation  Attitude toward examiner:  cooperative  Speech:  slow   Mood: less anxious and depressed  Affect:  flat and anxious  Thought processes:  slow   Thought content:  Suicidal Ideation:  passive  Cognition:  oriented to person, place, and time   Concentration poor  Insight poor   Judgement poor     ASSESSMENT:   Patient symptoms are:  [] Well controlled  [] Improving  [] Worsening  [] No change      Diagnosis:   Principal Problem:    Severe episode of recurrent major depressive disorder, with psychotic features ([facility] [address]  Active Problems:    Tremor    [facility] ([facility]    Dyskinesia  Resolved Problems:    * No resolved hospital problems. *      LABS:    No results for input(s): WBC, HGB, PLT in the last 72 hours. No results for input(s): NA, K, CL, CO2, BUN, CREATININE, GLUCOSE in the last 72 hours. No results for input(s): BILITOT, ALKPHOS, AST, ALT in the last 72 hours. Lab Results   Component Value Date/Time    LABAMPH Neg 10/14/2022 08:15 AM    BARBSCNU Neg 10/14/2022 08:15 AM    LABBENZ Neg 10/14/2022 08:15 AM    LABMETH Neg 10/14/2022 08:15 AM    OPIATESCREENURINE Neg 10/14/2022 08:15 AM    PHENCYCLIDINESCREENURINE Neg 10/14/2022 08:15 AM    ETOH <10 10/14/2022 08:43 AM     Lab Results   Component Value Date/Time    TSH 3.230 10/14/2022 08:43 AM     No results found for: LITHIUM  No results found for: VALPROATE, CBMZ      Treatment Plan:  Reviewed current Medications with the patient.    ECT Friday and Monday and then discharge her  Pt need IP ECT treatment since he does not have a ride from home  Risks, benefits, side effects, drug-to-drug interactions and alternatives to treatment were discussed. Collateral information: Pending  CD evaluatio  Encourage patient to attend group and other milieu activities.   Discharge planning discussed with the patient and treatment team.    PSYCHOTHERAPY/COUNSELING:  [x] Therapeutic interview  [x] Supportive  [] CBT  [] Ongoing  [] Other  =         [x] Patient continues to need, on a daily basis, active treatment furnished directly by or requiring the supervision of inpatient psychiatric personnel      Anticipated Length of stay:            Electronically signed by Gui Escalona MD on 11/1/2022 at 12:43 PM

## 2022-11-01 NOTE — CARE COORDINATION
Called and spoke to patients son to update him and make him aware that the doctor placed a consult for home health care and this writer will contact the PCP tomorrow.

## 2022-11-01 NOTE — PROGRESS NOTES
[facility] OCCUPATIONAL THERAPY MED SURG TREATMENT NOTE     Date: 2022  Patient Name: Jeni Laura        MRN: [id number]  Account: [de-identified]   : 1950  (74 y.o.)  Room: [place] [address]    Chart Review:    Restrictions  Restrictions/Precautions  Restrictions/Precautions: Fall Risk  Position Activity Restriction  Other position/activity restrictions: 1:1     Safety:  Safety Devices  Type of Devices: Left in bed;Sitter present    Patient's birthday verified: Yes    Subjective:    Pain at start of treatment: No    Pain at end of treatment: No    Objective: Pt completed ADLs as follows. ADL Status:  Grooming: Stand by assistance  Grooming Skilled Clinical Factors: To complete oral care, hair care, and wash face while standing at sink  UE Bathing: None  LE Bathing: Stand by assistance;Verbal cueing  LE Bathing Skilled Clinical Factors: To wash filiberto areas in standing. VCs for initiation  UE Dressing: Setup  UE Dressing Skilled Clinical Factors: To doff/don button up sweater and safety hospital gown  LE Dressing: Contact guard assistance  LE Dressing Skilled Clinical Factors: To doff/don pants and bilateral socks. Pt able to manage LB clothing sitting EOB using figure four and CGA A to maintain sitting balance when struggling to don socks.    Toileting: Stand by assistance    Transfers:  Sit to stand: Stand by assistance  Stand to sit: Stand by assistance  Toilet Transfers  Toilet - Technique: Ambulating  Equipment Used: Grab bars  Toilet Transfer: Stand by assistance    Functional Mobility:  Device: FWW  Activity: To/From [employer]  Assistance Level: SBA     Cognition Status:  Overall Cognitive Status: WFL  Initiation: Requires cues for some  Sequencing: Requires cues for some    Therapy key for assistance levels -   Independent/Mod I = Pt. is able to perform task with no assistance but may require a device   Stand by assistance = Pt. does not perform task at an independent level but does not need physical assistance, requires verbal cues  Minimal, Moderate, Maximal Assistance = Pt. requires physical assistance (25%, 50%, 75% assist from helper) for task but is able to actively participate in task   Dependent = Pt. requires total assistance with task and is not able to actively participate with task completion    Functional Endurance:  Activity Tolerance  Activity Tolerance: Patient Tolerated treatment well    Treatment consisted of:  ADL training    Assessment/Discharge Disposition: Pt tolerated treatment well, however, noted to demonstrate increased signs and symptoms of fatigue and weakness partway throughout session. Pt required more frequent and prolonged rest breaks. Pt with difficulty maintaining dynamic sitting balance when sitting unsupported EOB to manage LB clothing. Pt would benefit from continued Occupational Therapy to increase strength, activity tolerance, [place] with functional transfers, and [place] with ADL/IADL completion. [employer]  How much help for putting on and taking off regular lower body clothing?: A Little  How much help for Bathing?: A Little  How much help for Toileting?: A Little  How much help for putting on and taking off regular upper body clothing?: A Little  How much help for taking care of personal grooming?: A Little  How much help for eating meals?: None  AM-PAC Inpatient Daily Activity Raw Score: 19  AM-PAC Inpatient ADL T-Scale Score : 40.22  ADL Inpatient CMS 0-100% Score: 42.8  ADL Inpatient CMS G-Code Modifier : CK    Plan:  Continue OT per POC    Patient Education:  Patient Education  Education Given To: Patient  Education Provided: Transfer Training;Role of Therapy  Education Method: Verbal  Barriers to Learning: None  Education Outcome: Verbalized understanding;Demonstrated understanding;Continued education needed    Equipment recommendations:  Continue to assess pending progress.      Goals/Plan of care addressed during this session:  Patient Goal: Patient goals : \"I just want to go to a nursing home. \"  Improve [place] with ADLs and Improve [place] with Functional Transfers    Therapy Time:   Individual Group Co-Treat   Time In 1127       Time Out 1156         Minutes 29         ADL/IADL trainin minutes     Electronically signed by KEN Clark on 2022 at 1:37 PM

## 2022-11-01 NOTE — PROGRESS NOTES
Physical Therapy Med Surg Daily Treatment Note  Facility/Department: [address]  Room: [place] [address]       NAME: Arley Sandifer  :  (74 y.o.)  MRN: [id number]  CODE STATUS: Full Code    Date of Service: 2022    Patient Diagnosis(es): Severe episode of recurrent major depressive disorder, with psychotic features ([facility] [address] [F33.3]  Major depression with psychotic features [facility]) [F32.3]   Chief Complaint   Patient presents with    Suicidal    Homicidal     For a month or more     Patient Active Problem List    Diagnosis Date Noted    Parkinsonism ([facility] [address] 10/24/2022    Dyskinesia 10/24/2022    Tremor 10/17/2022    Severe episode of recurrent major depressive disorder, with psychotic features ([facility] [address] 10/16/2022    Hyperlipidemia 2021    Heart murmur 2021    Dyspnea on exertion 2021    COVID-19 2021    Weakness 09/10/2021    Adenomatous polyp of sigmoid colon     Chronic gastritis without bleeding     Dyspepsia     Mixed obsessional thoughts and acts     Coronary artery disease involving native coronary artery of native heart without angina pectoris 02/15/2019    Generalized anxiety disorder 04/15/2018    Anxiety     Recurrent major depressive disorder, in remission ([address]     Chest pain 2018    Hypertension 2018    Benzodiazepine dependence ([address] 2017    Type 2 diabetes mellitus without complication, without long-term current use of insulin ([address] 2017    Insomnia secondary to depression with anxiety 2016    Panic attacks 2016    ED (erectile dysfunction) 2011        Past Medical History:   Diagnosis Date    Anxiety     Chest pain 2018    Coronary artery disease involving native coronary artery of native heart without angina pectoris 02/15/2019    Diabetes mellitus ([address]     no meds    History of colon polyps     Hyperlipidemia     Hypertension     Type 2 diabetes mellitus without complication ([address]     Vertigo      Past Surgical History: Procedure Laterality Date    CARDIAC SURGERY  1973    PTCA     COLONOSCOPY      COLONOSCOPY N/A 10/17/2019    COLORECTAL CANCER SCREENING, HIGH RISK performed by Beckey Gaucher, MD at [facility]      OTHER SURGICAL HISTORY      patent foramen ovale    TONSILLECTOMY      UPPER GASTROINTESTINAL ENDOSCOPY N/A 8/1/2019    EGD ESOPHAGOGASTRODUODENOSCOPY performed by Beckey Gaucher, MD at [facility]            Restrictions:  Restrictions/Precautions: Fall Risk  Position Activity Restriction  Other position/activity restrictions: 1:1    SUBJECTIVE:   Subjective: I'm very tired today. I have to make a phone call. Pain  Pain: Denies pain pre and post session     OBJECTIVE:   Orientation  Overall Orientation Status: Within Functional Limits  Cognition  Overall Cognitive Status: WFL    Bed mobility  Rolling to Left: Independent  Rolling to Right: Independent  Supine to Sit: Independent  Sit to Supine: Independent  Bed Mobility Comments: bed flat with minimal use of hand rails; Increased time to complete    Transfers  Sit to Stand: Supervision  Stand to Sit: Supervision  Comment: Good hand placement and technique with WW. Extra time to complete and stabilize but otherwise steady. Ambulation  Surface: Level tile  Device: Rolling Walker  Assistance: Stand by assistance  Quality of Gait: Reciproal pattern with shortened step length; occasional stop/starts, increased bracing on [employer]; vc's for posture and less bracing on [employer]  Gait Deviations: Decreased step length;Decreased step height;Slow Lizzy  Distance: 200'                              Activity Tolerance  Activity Tolerance: Patient tolerated treatment well;Patient limited by fatigue          ASSESSMENT   Assessment: Pt ambulated with [employer] and very slow pace. Pt reports being tired today. Pt declined to ambulate without device. \"I'm too tired today. \"     Discharge Recommendations:  Continue to assess pending progress         Goals  Short Term Goals  Short Term Goal 1: SBA gait with appropriate device 50 feet  Short Term Goal 2: mejia tested at 40/56 for evidence of decreased falls risk  Short Term Goal 3: safest mobility device determined  Short Term Goal 4: indep with HEP    PLAN    General Plan: 1 time a day 3-6 times a week  Safety Devices  Type of Devices: Left in bed, Sitter present     AMPA (6 CLICK) BASIC MOBILITY  AM-PAC Inpatient Mobility Raw Score : 21      Therapy Time   Individual   Time In 1512   Time Out 1530   Minutes 18      Gait - 15 mins  Bm/Trsf - 3 mins       Seda CECILLE López, 11/01/22 at 3:44 PM         Definitions for assistance levels  Independent = pt does not require any physical supervision or assistance from another person for activity completion. Device may be needed.   Stand by assistance = pt requires verbal cues or instructions from another person, close to but not touching, to perform the activity  Minimal assistance= pt performs 75% or more of the activity; assistance is required to complete the activity  Moderate assistance= pt performs 50% of the activity; assistance is required to complete the activity  Maximal assistance = pt performs 25% of the activity; assistance is required to complete the activity  Dependent = pt requires total physical assistance to accomplish the task

## 2022-11-02 LAB
GLUCOSE BLD-MCNC: 115 MG/DL (ref 70–99)
GLUCOSE BLD-MCNC: 147 MG/DL (ref 70–99)
GLUCOSE BLD-MCNC: 157 MG/DL (ref 70–99)
GLUCOSE BLD-MCNC: 159 MG/DL (ref 70–99)
PERFORMED ON: ABNORMAL

## 2022-11-02 PROCEDURE — 99232 SBSQ HOSP IP/OBS MODERATE 35: CPT | Performed by: PSYCHIATRY & NEUROLOGY

## 2022-11-02 PROCEDURE — 1240000000 HC EMOTIONAL WELLNESS R&B

## 2022-11-02 PROCEDURE — 6370000000 HC RX 637 (ALT 250 FOR IP): Performed by: INTERNAL MEDICINE

## 2022-11-02 PROCEDURE — 6370000000 HC RX 637 (ALT 250 FOR IP): Performed by: PSYCHIATRY & NEUROLOGY

## 2022-11-02 PROCEDURE — 6370000000 HC RX 637 (ALT 250 FOR IP): Performed by: NURSE PRACTITIONER

## 2022-11-02 RX ADMIN — RISPERIDONE 0.25 MG: 0.25 TABLET ORAL at 14:14

## 2022-11-02 RX ADMIN — ISOSORBIDE MONONITRATE 30 MG: 60 TABLET, EXTENDED RELEASE ORAL at 09:13

## 2022-11-02 RX ADMIN — ASPIRIN 81 MG 81 MG: 81 TABLET ORAL at 09:13

## 2022-11-02 RX ADMIN — ACETAMINOPHEN 650 MG: 325 TABLET ORAL at 14:14

## 2022-11-02 RX ADMIN — TRAZODONE HYDROCHLORIDE 50 MG: 50 TABLET ORAL at 20:49

## 2022-11-02 RX ADMIN — CLONAZEPAM 0.5 MG: 0.5 TABLET ORAL at 20:49

## 2022-11-02 RX ADMIN — ACETAMINOPHEN 650 MG: 325 TABLET ORAL at 10:31

## 2022-11-02 RX ADMIN — CLONAZEPAM 0.5 MG: 0.5 TABLET ORAL at 10:37

## 2022-11-02 RX ADMIN — ATORVASTATIN CALCIUM 20 MG: 20 TABLET, FILM COATED ORAL at 20:49

## 2022-11-02 RX ADMIN — NAPHAZOLINE HYDROCHLORIDE AND PHENIRAMINE MALEATE 1 DROP: .25; 3 SOLUTION/ DROPS OPHTHALMIC at 14:15

## 2022-11-02 RX ADMIN — NAPHAZOLINE HYDROCHLORIDE AND PHENIRAMINE MALEATE 1 DROP: .25; 3 SOLUTION/ DROPS OPHTHALMIC at 09:14

## 2022-11-02 RX ADMIN — RISPERIDONE 0.25 MG: 0.25 TABLET ORAL at 20:49

## 2022-11-02 RX ADMIN — RISPERIDONE 0.25 MG: 0.25 TABLET ORAL at 09:13

## 2022-11-02 RX ADMIN — PAROXETINE HYDROCHLORIDE 40 MG: 20 TABLET, FILM COATED ORAL at 09:13

## 2022-11-02 RX ADMIN — NAPHAZOLINE HYDROCHLORIDE AND PHENIRAMINE MALEATE 1 DROP: .25; 3 SOLUTION/ DROPS OPHTHALMIC at 02:31

## 2022-11-02 ASSESSMENT — PAIN DESCRIPTION - ORIENTATION
ORIENTATION: RIGHT
ORIENTATION: RIGHT

## 2022-11-02 ASSESSMENT — PAIN SCALES - GENERAL
PAINLEVEL_OUTOF10: 9
PAINLEVEL_OUTOF10: 8
PAINLEVEL_OUTOF10: 8

## 2022-11-02 ASSESSMENT — PAIN DESCRIPTION - LOCATION
LOCATION: EYE

## 2022-11-02 ASSESSMENT — PAIN DESCRIPTION - DESCRIPTORS: DESCRIPTORS: OTHER (COMMENT)

## 2022-11-02 NOTE — PROGRESS NOTES
[facility] FOLLOW-UP NOTE       11/2/2022     Patient was seen and examined in person, Chart reviewed   Patient's case discussed with staff/team    Chief Complaint: Depression anxiety    Interim History:     Patient continued to remain focused on the situation at home  Reluctantly willing to consider going back home with his wife moving to the son's place  Patient feels that the ECT is helping him and want to continue the treatment until it is done with  Patient remains on one-on-one  His next ECT treatment is due on Friday    Appetite:   [x] Normal/Unchanged  [] Increased  [] Decreased      Sleep:       [] Normal/Unchanged  [x] Fair       [] Poor              Energy:    [] Normal/Unchanged  [] Increased  [x] Decreased        SI [x] Present  [] Absent    HI  [x]Present  [] Absent     Aggression:  [] yes  [x] no    Patient is [] able  [x] unable to CONTRACT FOR SAFETY     PAST MEDICAL/PSYCHIATRIC HISTORY:   Past Medical History:   Diagnosis Date    Anxiety     Chest pain 03/29/2018    Coronary artery disease involving native coronary artery of native heart without angina pectoris 02/15/2019    Diabetes mellitus ([facility] [address]     no meds    History of colon polyps     Hyperlipidemia     Hypertension     Type 2 diabetes mellitus without complication (HCC)     Vertigo        FAMILY/SOCIAL HISTORY:  Family History   Problem Relation Age of Onset    Heart Disease Maternal Aunt     Cancer Maternal Aunt     Colon Cancer Maternal Aunt      Social History     Socioeconomic History    Marital status:      Spouse name: Not on file    Number of children: Not on file    Years of education: Not on file    Highest education level: Not on file   Occupational History    Not on file   Tobacco Use    Smoking status: Never    Smokeless tobacco: Never   Vaping Use    Vaping Use: Never used   Substance and Sexual Activity    Alcohol use: No    Drug use: Never    Sexual activity: Not on file Other Topics Concern    Not on file   Social History Narrative    Not on file     Social Determinants of Health     Financial Resource Strain: Not on file   Food Insecurity: Not on file   Transportation Needs: Not on file   Physical Activity: Not on file   Stress: Not on file   Social Connections: Not on file   Intimate Partner Violence: Not on file   Housing Stability: Not on file           ROS:  [x] All negative/unchanged except if checked.  Explain positive(checked items) below:  [] Constitutional  [] Eyes  [] Ear/Nose/Mouth/Throat  [] Respiratory  [] CV  [] GI  []   [] Musculoskeletal  [] Skin/Breast  [] Neurological  [] Endocrine  [] Heme/Lymph  [] Allergic/Immunologic    Explanation:     MEDICATIONS:    Current Facility-Administered Medications:     naphazoline-pheniramine (NAPHCON-A) 0.025-0.3 % ophthalmic solution 1 drop, 1 drop, Ophthalmic, Q4H PRN, Milagros Juan-Roche, APRN - CNP, 1 drop at 11/02/22 1415    risperiDONE (RISPERDAL) tablet 0.25 mg, 0.25 mg, Oral, TID, Ovidio Burnette MD, 0.25 mg at 11/02/22 1414    0.9 % sodium chloride infusion, , IntraVENous, Continuous, Anju Soto MD, Last Rate: 100 mL/hr at 10/31/22 1056, New Bag at 10/31/22 1056    clonazePAM (KLONOPIN) tablet 0.5 mg, 0.5 mg, Oral, Q12H, Anju Soto MD, 0.5 mg at 11/02/22 1037    traZODone (DESYREL) tablet 50 mg, 50 mg, Oral, Nightly, Anju Soto MD, 50 mg at 11/01/22 2042    haloperidol (HALDOL) tablet 2 mg, 2 mg, Oral, Q6H PRN, Anju Soto MD, 2 mg at 10/27/22 1636    PARoxetine (PAXIL) tablet 40 mg, 40 mg, Oral, Daily, Ovidio Burnette MD, 40 mg at 11/02/22 0913    0.9 % sodium chloride infusion, , IntraVENous, Continuous, Anju Soto MD, Last Rate: 100 mL/hr at 10/24/22 1143, New Bag at 10/24/22 1143    benzocaine (ORAJEL) 20 % mucosal gel, , Mouth/Throat, BID PRN, Ettie Dinning, APRN - CNP    insulin lispro (HUMALOG) injection vial 0-8 Units, 0-8 Units, SubCUTAneous, TID Jeff CAMPOS, APRN - NP, 2 Units at 11/01/22 1704    insulin lispro (HUMALOG) injection vial 0-4 Units, 0-4 Units, SubCUTAneous, Nightly, Hayden Ip, APRN - NP    glucose chewable tablet 16 g, 4 tablet, Oral, PRN, Hayden Ip, APRN - NP    dextrose bolus 10% 125 mL, 125 mL, IntraVENous, PRN **OR** dextrose bolus 10% 250 mL, 250 mL, IntraVENous, PRN, Hayden Ip, APRN - NP    glucagon (rDNA) injection 1 mg, 1 mg, SubCUTAneous, PRN, Hayden Ip, APRN - NP    dextrose 10 % infusion, , IntraVENous, Continuous PRN, Hayden Ip, APRN - NP    isosorbide mononitrate (IMDUR) extended release tablet 30 mg, 30 mg, Oral, Daily, Fabrice Worthy MD, 30 mg at 11/02/22 0913    metoprolol succinate (TOPROL XL) extended release tablet 25 mg, 25 mg, Oral, Nightly, Fabrice Worthy MD, 25 mg at 10/30/22 2100    atorvastatin (LIPITOR) tablet 20 mg, 20 mg, Oral, Nightly, Fabrice Worthy MD, 20 mg at 11/01/22 2042    albuterol sulfate HFA (PROVENTIL;VENTOLIN;PROAIR) 108 (90 Base) MCG/ACT inhaler 2 puff, 2 puff, Inhalation, 4x Daily PRN, Sakina Pearson MD    aspirin chewable tablet 81 mg, 81 mg, Oral, Daily, Sakina Pearson MD, 81 mg at 11/02/22 0913    acetaminophen (TYLENOL) tablet 650 mg, 650 mg, Oral, Q4H PRN, Sakina Pearson MD, 650 mg at 11/02/22 1414    magnesium hydroxide (MILK OF MAGNESIA) 400 MG/5ML suspension 30 mL, 30 mL, Oral, Daily PRN, Sakina Pearson MD, 30 mL at 10/16/22 1217    nicotine polacrilex (COMMIT) lozenge 2 mg, 2 mg, Oral, Q1H PRN, Sakina Pearson MD      Examination:  /63   Pulse 64   Temp 97.6 °F (36.4 °C)   Resp 16   Ht 5' 5\" (1.651 m)   Wt 113 lb (51.3 kg)   SpO2 97%   BMI 18.80 kg/m²   Gait - steady  Medication side effects(SE): no    Mental Status Examination:    Level of consciousness:  within normal limits   Appearance:  poor grooming and poor hygiene  Behavior/Motor:  psychomotor retardation  Attitude toward examiner:  cooperative  Speech:  slow   Mood: less anxious and depressed  Affect:  flat and anxious  Thought processes:  slow   Thought content:  Suicidal Ideation:  passive  Cognition:  oriented to person, place, and time   Concentration poor  Insight poor   Judgement poor     ASSESSMENT:   Patient symptoms are:  [] Well controlled  [] Improving  [] Worsening  [] No change      Diagnosis:   Principal Problem:    Severe episode of recurrent major depressive disorder, with psychotic features [facility]  Active Problems:    Tremor    Parkinsonism ([facility]    Dyskinesia  Resolved Problems:    * No resolved hospital problems. *      LABS:    No results for input(s): WBC, HGB, PLT in the last 72 hours. No results for input(s): NA, K, CL, CO2, BUN, CREATININE, GLUCOSE in the last 72 hours. No results for input(s): BILITOT, ALKPHOS, AST, ALT in the last 72 hours. Lab Results   Component Value Date/Time    LABAMPH Neg 10/14/2022 08:15 AM    BARBSCNU Neg 10/14/2022 08:15 AM    LABBENZ Neg 10/14/2022 08:15 AM    LABMETH Neg 10/14/2022 08:15 AM    OPIATESCREENURINE Neg 10/14/2022 08:15 AM    PHENCYCLIDINESCREENURINE Neg 10/14/2022 08:15 AM    ETOH <10 10/14/2022 08:43 AM     Lab Results   Component Value Date/Time    TSH 3.230 10/14/2022 08:43 AM     No results found for: LITHIUM  No results found for: VALPROATE, CBMZ      Treatment Plan:  Reviewed current Medications with the patient. ECT Friday and Monday and then discharge her  Pt need IP ECT treatment since he does not have a ride from home  Risks, benefits, side effects, drug-to-drug interactions and alternatives to treatment were discussed. Collateral information: Pending  CD evaluatio  Encourage patient to attend group and other milieu activities.   Discharge planning discussed with the patient and treatment team.    PSYCHOTHERAPY/COUNSELING:  [x] Therapeutic interview  [x] Supportive  [] CBT  [] Ongoing  [] Other  =         [x] Patient continues to need, on a daily basis, active treatment furnished directly by or requiring the supervision of inpatient psychiatric personnel      Anticipated Length of stay:            Electronically signed by Shruti Hoff MD on 11/2/2022 at 6:30 PM

## 2022-11-02 NOTE — PLAN OF CARE
Problem: Self Harm/Suicidality  Goal: Will have no self-injury during hospital stay  Description: INTERVENTIONS:  1. Q 30 MINUTES: Routine safety checks  2. Q SHIFT & PRN: Assess risk to determine if routine checks are adequate to maintain patient safety  11/2/2022 0026 by Leilani Loyd RN  Outcome: Progressing  11/1/2022 1148 by RAVEN Ruiz  Outcome: Progressing     Problem: Chronic Conditions and Co-morbidities  Goal: Patient's chronic conditions and co-morbidity symptoms are monitored and maintained or improved  11/2/2022 0026 by Leilani Loyd RN  Outcome: Progressing  11/1/2022 1148 by RAVEN Ruiz  Outcome: Progressing  Flowsheets (Taken 11/1/2022 1131)  Care Plan - Patient's Chronic Conditions and Co-Morbidity Symptoms are Monitored and Maintained or Improved:   Monitor and assess patient's chronic conditions and comorbid symptoms for stability, deterioration, or improvement   Collaborate with multidisciplinary team to address chronic and comorbid conditions and prevent exacerbation or deterioration   Update acute care plan with appropriate goals if chronic or comorbid symptoms are exacerbated and prevent overall improvement and discharge     Problem: Safety - Adult  Goal: Free from fall injury  11/2/2022 0026 by Leilani Loyd RN  Outcome: Progressing  11/1/2022 1148 by RAVEN Ruiz  Outcome: Progressing     Problem: ABCDS Injury Assessment  Goal: Absence of physical injury  11/2/2022 0026 by Leilani Loyd RN  Outcome: Progressing  11/1/2022 1148 by RAVEN Ruiz  Outcome: Progressing     Problem: Skin/Tissue Integrity  Goal: Absence of new skin breakdown  Description: 1. Monitor for areas of redness and/or skin breakdown  2. Assess vascular access sites hourly  3. Every 4-6 hours minimum:  Change oxygen saturation probe site  4.   Every 4-6 hours:  If on nasal continuous positive airway pressure, respiratory therapy assess nares and determine need for appliance change or resting period. 11/2/2022 0026 by Neetu Bliss RN  Outcome: Progressing  11/1/2022 1148 by RAVEN Churchill  Outcome: Progressing     Problem: Nutrition Deficit:  Goal: Optimize nutritional status  11/2/2022 0026 by Neetu Bliss RN  Outcome: Progressing  11/1/2022 1148 by RAVEN Churchill  Outcome: Progressing     Problem: Risk for Physiologic Instability  Goal: B/P, SaO2, EKG, and respiratory status WDL  Description: INTERVENTIONS:  1. Monitor B/P, SaO2, EKG and respiratory function  2. Notify physician of unstable condition/changes  11/2/2022 0026 by Neetu Bliss RN  Outcome: Progressing  11/1/2022 1148 by RAVEN Churchill  Outcome: Progressing     Problem: Impaired Comfort  Goal: Patient reports pain level is manageable/acceptable  Description: INTERVENTIONS:  1. Provide emotional support and reassurance at all times to relieve anxiety  2. Position patient for comfort  3. Medicate PRN for pain relief  11/2/2022 0026 by Neetu Bliss RN  Outcome: Progressing  11/1/2022 1148 by RAVEN Churchill  Outcome: Progressing     Problem: Deficient Knowledge  Goal: Pt/family demonstrate understanding of pre/post-procedure instructions  Description: INTERVENTIONS:  1. Provide pre-procedure instructions  2. Provide written and verbal post-procedural instructions  11/2/2022 0026 by Neetu Bliss RN  Outcome: Progressing  11/1/2022 1148 by RAVEN Churchill  Outcome: Progressing     Problem: Risk for Injury  Goal: ARCHIVE-Patient remains free from injury  Description: INTERVENTIONS:  1. Universal precautions  2. Ensure bed, stretcher, or wheelchair are in locked position when not transporting  3. Position patient per procedure requirement  4. Maintain patent airway  5.  Perform pre-op equipment checks  11/2/2022 0026 by Neetu Bliss RN  Outcome: Progressing  11/1/2022 1148 by RAVEN Hale  Outcome: Progressing     Problem: Pain  Goal: Verbalizes/displays adequate comfort level or baseline comfort level  11/2/2022 0026 by Xochilt Vazquez RN  Outcome: Progressing  11/1/2022 1148 by RAVEN Hale  Outcome: Progressing     Problem: Anxiety  Goal: Will report anxiety at manageable levels  Description: INTERVENTIONS:  1. Administer medication as ordered  2. Teach and rehearse alternative coping skills  3. Provide emotional support with 1:1 interaction with staff  11/2/2022 0026 by Xochilt Vazquez RN  Outcome: Progressing  11/1/2022 1148 by RAVEN Hale  Outcome: Progressing  Flowsheets (Taken 11/1/2022 1131)  Will report anxiety at manageable levels:   Administer medication as ordered   Provide emotional support with 1:1 interaction with staff   Teach and rehearse alternative coping skills     Problem: Coping  Goal: Pt/Family able to verbalize concerns and demonstrate effective coping strategies  Description: INTERVENTIONS:  1. Assist patient/family to identify coping skills, available support systems and cultural and spiritual values  2. Provide emotional support, including active listening and acknowledgement of concerns of patient and caregivers  3. Reduce environmental stimuli, as able  4. Instruct patient/family in relaxation techniques, as appropriate  5.  Assess for spiritual pain/suffering and initiate Spiritual Care, Psychosocial Clinical Specialist consults as needed  11/2/2022 0026 by Xochilt Vazquez RN  Outcome: Progressing  11/1/2022 1148 by RAVEN Hale  Outcome: Progressing  Flowsheets (Taken 11/1/2022 1131)  Patient/family able to verbalize anxieties, fears, and concerns, and demonstrate effective coping:   Assist patient/family to identify coping skills, available support systems and cultural and spiritual values   Provide emotional support, including active listening and acknowledgement of concerns of patient and caregivers   Reduce environmental stimuli, as able     Problem: Confusion  Goal: Confusion, delirium, dementia, or psychosis is improved or at baseline  Description: INTERVENTIONS:  1. Assess for possible contributors to thought disturbance, including medications, impaired vision or hearing, underlying metabolic abnormalities, dehydration, psychiatric diagnoses, and notify attending LIP  2. [place] high risk fall precautions, as indicated  3. Provide frequent short contacts to provide reality reorientation, refocusing and direction  4. Decrease environmental stimuli, including noise as appropriate  5. Monitor and intervene to maintain adequate nutrition, hydration, elimination, sleep and activity  6. If unable to ensure safety without constant attention obtain sitter and review sitter guidelines with assigned personnel  7. Initiate Psychosocial CNS and Spiritual Care consult, as indicated  11/2/2022 0026 by Branden Rosa RN  Outcome: Progressing  11/1/2022 1148 by RAVEN Gonzalez - CNS  Outcome: Progressing  Flowsheets (Taken 11/1/2022 1131)  Effect of thought disturbance (confusion, delirium, dementia, or psychosis) are managed with adequate functional status:   Assess for contributors to thought disturbance, including medications, impaired vision or hearing, underlying metabolic abnormalities, dehydration, psychiatric diagnoses, notify [facility] high risk fall precautions, as indicated   Provide frequent short contacts to provide reality reorientation, refocusing and direction   Decrease environmental stimuli, including noise as appropriate   Monitor and intervene to maintain adequate nutrition, hydration, elimination, sleep and activity     Problem: Depression/Self Harm  Goal: Effect of psychiatric condition will be minimized and patient will be protected from self harm  Description: INTERVENTIONS:  1.  Assess impact of patient's symptoms on level of functioning, self care needs and offer support as indicated  2. Assess patient/family knowledge of depression, impact on illness and need for teaching  3. Provide emotional support, presence and reassurance  4. Assess for possible suicidal thoughts or ideation. If patient expresses suicidal thoughts or statements do not leave alone, initiate Suicide Precautions, move to a room close to the nursing station and obtain sitter  5. Initiate consults as appropriate with Mental Health Professional, Spiritual Care, Psychosocial CNS, and consider a recommendation to the LIP for a Psychiatric Consultation  11/2/2022 0026 by Pastor Blanca RN  Outcome: Progressing  Flowsheets (Taken 11/1/2022 1947 by Ruth Esparza LPN)  Effect of psychiatric condition will be minimized and patient will be protected from self harm:   Assess impact of patients symptoms on level of functioning, self care needs and offer support as indicated   Assess patient/family knowledge of depression, impact on illness and need for teaching   Provide emotional support, presence and reassurance   Assess for suicidal thoughts or ideation.  If patient expresses suicidal thoughts or statements do not leave alone, initiate Suicide Precautions, move near nurse station, obtain sitter  11/1/2022 1148 by RAVEN Arreola - CNS  Outcome: Progressing  Flowsheets  Taken 11/1/2022 1148  Effect of psychiatric condition will be minimized and patient will be protected from self harm:   Assess impact of patients symptoms on level of functioning, self care needs and offer support as indicated   Assess patient/family knowledge of depression, impact on illness and need for teaching   Provide emotional support, presence and reassurance  Taken 11/1/2022 1131  Effect of psychiatric condition will be minimized and patient will be protected from self harm:   Assess impact of patients symptoms on level of functioning, self care needs and offer support as indicated Assess patient/family knowledge of depression, impact on illness and need for teaching   Provide emotional support, presence and reassurance

## 2022-11-02 NOTE — GROUP NOTE
Group Therapy Note    Date: 11/2/2022    Group Start Time: 1430  Group End Time: 3035  Group Topic: Healthy Living/Wellness    [id number]    Lorrie Garland        Group Therapy Note    Attendees: 11/15       Patient's Goal:  To say a positive self-affirmation. Notes:  Patient had difficulty identifying an affirmation.     Status After Intervention:  Improved    Participation Level: Interactive    Participation Quality: Appropriate and Sharing      Speech:  normal      Thought Process/Content: Logical      Affective Functioning: Constricted/Restricted      Mood: euthymic      Level of consciousness:  Alert      Response to Learning: Progressing to goal      Endings: None Reported    Modes of Intervention: Support      Discipline Responsible: [address], [employer] [place]      Signature:  Lorrie Garland

## 2022-11-02 NOTE — PROGRESS NOTES
Pt. declined to attend the 0900 community meeting, despite staff encouragement.  Goal - \"To feel better\" Electronically signed by LONNY Perez on 11/2/2022 at 9:55 AM

## 2022-11-02 NOTE — PROGRESS NOTES
Pt. refused to attend the 1000 skills group, despite staff encouragement.  Electronically signed by Karol Rossi, [address] on 11/2/2022 at 2:26 PM

## 2022-11-02 NOTE — CARE COORDINATION
Group Therapy Note    Date: 11/2/2022  Start Time: 9529  End Time:  1440    Number of Participants: 6    Type of Group: Cognitive Skills    Patient's Goal:  To participate in mood management group. Notes: Patient declined to attend psychoeducation group at [phone] despite encouragement by staff.      Discipline Responsible: /Counselor    FER Chávez

## 2022-11-02 NOTE — PROGRESS NOTES
Pt's sclera of right eye is reddened, itchy, pt c/o pain. No noted discharge from eye or crust around eye. Pt states he noticed his eye to be red when he got up to use the bathroom within the last hour. Encouraged pt not to rub eye. Pt states he cannot keep eye open d/t irritation and discomfort/pain, and new sensitivity to light. Pt denies scratching eye. Pt is requesting drops/ something to help with the irritation. Hospitalist notified, new orders received. Pt medicated per orders with PRN eye drops.

## 2022-11-02 NOTE — CARE COORDINATION
Called Dr. Spangler office to discuss home health care consult. There office closed at 12pm today.  Will call tomorrow am.

## 2022-11-02 NOTE — PLAN OF CARE
Pt remains on 1:1 for pt safety. Isolates to room. Pt did not go to ECT this morning. ECT is planned for Friday morning. Pt states he does not want to stop ECT. Pt engages in conversation appropriately and does not appear anxious. Rates anxiety 9/10 and depression 10/10. States his \"bad\" homicidal thoughts repeat in his head over and over again which is causing his anxiety and depression. Denies SI. Pt states he wants to go to an assisted living but is worried it will take too long to get in. Pt reports good appetite and good sleep. Complains that his right eye is burning. Pt's right eye is watering and appears irritated. Problem: Self Harm/Suicidality  Goal: Will have no self-injury during hospital stay  Description: INTERVENTIONS:  1. Q 30 MINUTES: Routine safety checks  2. Q SHIFT & PRN: Assess risk to determine if routine checks are adequate to maintain patient safety  11/2/2022 1046 by Yesenia Smith RN  Outcome: Progressing  11/2/2022 0026 by Indy Avitia RN  Outcome: Progressing     Problem: Chronic Conditions and Co-morbidities  Goal: Patient's chronic conditions and co-morbidity symptoms are monitored and maintained or improved  11/2/2022 1046 by Yesenia Smith RN  Outcome: Progressing  11/2/2022 0026 by Indy Avitia RN  Outcome: Progressing     Problem: Safety - Adult  Goal: Free from fall injury  11/2/2022 1046 by Yesenia Smith RN  Outcome: Progressing  11/2/2022 0026 by Indy Avitia RN  Outcome: Progressing     Problem: ABCDS Injury Assessment  Goal: Absence of physical injury  11/2/2022 1046 by Yesenia Smith RN  Outcome: Progressing  11/2/2022 0026 by Indy Avitia RN  Outcome: Progressing     Problem: Skin/Tissue Integrity  Goal: Absence of new skin breakdown  Description: 1. Monitor for areas of redness and/or skin breakdown  2. Assess vascular access sites hourly  3. Every 4-6 hours minimum:  Change oxygen saturation probe site  4.   Every 4-6 hours:  If on nasal continuous positive airway pressure, respiratory therapy assess nares and determine need for appliance change or resting period. 11/2/2022 1046 by Kirk Emery RN  Outcome: Progressing  11/2/2022 0026 by Prasanth Payne RN  Outcome: Progressing     Problem: Nutrition Deficit:  Goal: Optimize nutritional status  11/2/2022 1046 by Kirk Emery RN  Outcome: Progressing  11/2/2022 0026 by Prasanth Payne RN  Outcome: Progressing     Problem: Risk for Physiologic Instability  Goal: B/P, SaO2, EKG, and respiratory status WDL  Description: INTERVENTIONS:  1. Monitor B/P, SaO2, EKG and respiratory function  2. Notify physician of unstable condition/changes  11/2/2022 1046 by Kirk Emery RN  Outcome: Progressing  11/2/2022 0026 by Prasanth Payne RN  Outcome: Progressing     Problem: Impaired Comfort  Goal: Patient reports pain level is manageable/acceptable  Description: INTERVENTIONS:  1. Provide emotional support and reassurance at all times to relieve anxiety  2. Position patient for comfort  3. Medicate PRN for pain relief  11/2/2022 1046 by Kirk Emery RN  Outcome: Progressing  11/2/2022 0026 by Prasanth Payne RN  Outcome: Progressing     Problem: Deficient Knowledge  Goal: Pt/family demonstrate understanding of pre/post-procedure instructions  Description: INTERVENTIONS:  1. Provide pre-procedure instructions  2. Provide written and verbal post-procedural instructions  11/2/2022 1046 by Kirk Emery RN  Outcome: Progressing  11/2/2022 0026 by Prasanth Payne RN  Outcome: Progressing     Problem: Risk for Injury  Goal: ARCHIVE-Patient remains free from injury  Description: INTERVENTIONS:  1. Universal precautions  2. Ensure bed, stretcher, or wheelchair are in locked position when not transporting  3. Position patient per procedure requirement  4. Maintain patent airway  5.  Perform pre-op equipment checks  11/2/2022 1046 by Hanna Franklyn Singh RN  Outcome: Progressing  11/2/2022 0026 by Sagrario Gregg RN  Outcome: Progressing     Problem: Pain  Goal: Verbalizes/displays adequate comfort level or baseline comfort level  11/2/2022 1046 by Kosta Echols RN  Outcome: Progressing  11/2/2022 0026 by Sagrario Gregg RN  Outcome: Progressing     Problem: Anxiety  Goal: Will report anxiety at manageable levels  Description: INTERVENTIONS:  1. Administer medication as ordered  2. Teach and rehearse alternative coping skills  3. Provide emotional support with 1:1 interaction with staff  11/2/2022 1046 by Kosta Echols RN  Outcome: Progressing  11/2/2022 0026 by Sagrario Gregg RN  Outcome: Progressing     Problem: Coping  Goal: Pt/Family able to verbalize concerns and demonstrate effective coping strategies  Description: INTERVENTIONS:  1. Assist patient/family to identify coping skills, available support systems and cultural and spiritual values  2. Provide emotional support, including active listening and acknowledgement of concerns of patient and caregivers  3. Reduce environmental stimuli, as able  4. Instruct patient/family in relaxation techniques, as appropriate  5. Assess for spiritual pain/suffering and initiate Spiritual Care, Psychosocial Clinical Specialist consults as needed  11/2/2022 1046 by Kosta Echols RN  Outcome: Progressing  11/2/2022 0026 by Sagrario Gregg RN  Outcome: Progressing     Problem: Confusion  Goal: Confusion, delirium, dementia, or psychosis is improved or at baseline  Description: INTERVENTIONS:  1. Assess for possible contributors to thought disturbance, including medications, impaired vision or hearing, underlying metabolic abnormalities, dehydration, psychiatric diagnoses, and notify attending LIP  2. [place] high risk fall precautions, as indicated  3. Provide frequent short contacts to provide reality reorientation, refocusing and direction  4.  Decrease environmental stimuli, including noise as appropriate  5. Monitor and intervene to maintain adequate nutrition, hydration, elimination, sleep and activity  6. If unable to ensure safety without constant attention obtain sitter and review sitter guidelines with assigned personnel  7. Initiate Psychosocial CNS and Spiritual Care consult, as indicated  11/2/2022 1046 by Alberta Gardner RN  Outcome: Progressing  11/2/2022 0026 by Tad Madrid RN  Outcome: Progressing     Problem: Depression/Self Harm  Goal: Effect of psychiatric condition will be minimized and patient will be protected from self harm  Description: INTERVENTIONS:  1. Assess impact of patient's symptoms on level of functioning, self care needs and offer support as indicated  2. Assess patient/family knowledge of depression, impact on illness and need for teaching  3. Provide emotional support, presence and reassurance  4. Assess for possible suicidal thoughts or ideation. If patient expresses suicidal thoughts or statements do not leave alone, initiate Suicide Precautions, move to a room close to the nursing station and obtain sitter  5. Initiate consults as appropriate with Mental Health Professional, Spiritual Care, Psychosocial CNS, and consider a recommendation to the LIP for a Psychiatric Consultation  11/2/2022 1046 by Alberta Gardner RN  Outcome: Progressing  11/2/2022 0026 by Tad Madrid RN  Outcome: Progressing  Flowsheets (Taken 11/1/2022 1947 by Kwabena Muhammad LPN)  Effect of psychiatric condition will be minimized and patient will be protected from self harm:   Assess impact of patients symptoms on level of functioning, self care needs and offer support as indicated   Assess patient/family knowledge of depression, impact on illness and need for teaching   Provide emotional support, presence and reassurance   Assess for suicidal thoughts or ideation.  If patient expresses suicidal thoughts or statements do not leave alone, initiate Suicide Precautions, move near nurse station, obtain sitter

## 2022-11-03 LAB
GLUCOSE BLD-MCNC: 102 MG/DL (ref 70–99)
GLUCOSE BLD-MCNC: 135 MG/DL (ref 70–99)
GLUCOSE BLD-MCNC: 140 MG/DL (ref 70–99)
GLUCOSE BLD-MCNC: 93 MG/DL (ref 70–99)
PERFORMED ON: ABNORMAL
PERFORMED ON: NORMAL

## 2022-11-03 PROCEDURE — 6370000000 HC RX 637 (ALT 250 FOR IP): Performed by: PSYCHIATRY & NEUROLOGY

## 2022-11-03 PROCEDURE — 1240000000 HC EMOTIONAL WELLNESS R&B

## 2022-11-03 PROCEDURE — 99232 SBSQ HOSP IP/OBS MODERATE 35: CPT | Performed by: PSYCHIATRY & NEUROLOGY

## 2022-11-03 PROCEDURE — 6370000000 HC RX 637 (ALT 250 FOR IP): Performed by: INTERNAL MEDICINE

## 2022-11-03 RX ADMIN — ATORVASTATIN CALCIUM 20 MG: 20 TABLET, FILM COATED ORAL at 21:33

## 2022-11-03 RX ADMIN — RISPERIDONE 0.25 MG: 0.25 TABLET ORAL at 21:33

## 2022-11-03 RX ADMIN — ACETAMINOPHEN 650 MG: 325 TABLET ORAL at 09:10

## 2022-11-03 RX ADMIN — NAPHAZOLINE HYDROCHLORIDE AND PHENIRAMINE MALEATE 1 DROP: .25; 3 SOLUTION/ DROPS OPHTHALMIC at 13:49

## 2022-11-03 RX ADMIN — RISPERIDONE 0.25 MG: 0.25 TABLET ORAL at 09:10

## 2022-11-03 RX ADMIN — RISPERIDONE 0.25 MG: 0.25 TABLET ORAL at 13:49

## 2022-11-03 RX ADMIN — PAROXETINE HYDROCHLORIDE 40 MG: 20 TABLET, FILM COATED ORAL at 09:10

## 2022-11-03 RX ADMIN — METOPROLOL SUCCINATE 25 MG: 25 TABLET, FILM COATED, EXTENDED RELEASE ORAL at 21:34

## 2022-11-03 RX ADMIN — NAPHAZOLINE HYDROCHLORIDE AND PHENIRAMINE MALEATE 1 DROP: .25; 3 SOLUTION/ DROPS OPHTHALMIC at 09:09

## 2022-11-03 RX ADMIN — ASPIRIN 81 MG 81 MG: 81 TABLET ORAL at 09:10

## 2022-11-03 RX ADMIN — TRAZODONE HYDROCHLORIDE 50 MG: 50 TABLET ORAL at 21:33

## 2022-11-03 RX ADMIN — CLONAZEPAM 0.5 MG: 0.5 TABLET ORAL at 09:09

## 2022-11-03 RX ADMIN — ISOSORBIDE MONONITRATE 30 MG: 60 TABLET, EXTENDED RELEASE ORAL at 09:10

## 2022-11-03 RX ADMIN — CLONAZEPAM 0.5 MG: 0.5 TABLET ORAL at 21:33

## 2022-11-03 ASSESSMENT — PAIN SCALES - GENERAL: PAINLEVEL_OUTOF10: 5

## 2022-11-03 ASSESSMENT — PAIN DESCRIPTION - ORIENTATION: ORIENTATION: RIGHT

## 2022-11-03 ASSESSMENT — PAIN DESCRIPTION - LOCATION: LOCATION: EYE

## 2022-11-03 ASSESSMENT — PAIN DESCRIPTION - DESCRIPTORS: DESCRIPTORS: ACHING

## 2022-11-03 NOTE — CARE COORDINATION
Spoke to patients son, Sarah Wiggins regarding discharge plan. He stated he cannot take patient tomorrow as they are moving patients wife to their home on Sunday. Made son aware that [facility] is not able to order the home health but patients PCP is asking that the family call them and make an apptmt for patient to come in next week so the PCP can assess and write an order for home health care. Provided son with # to a home health care agency. Son said tomorrow he will call home health and PCP. Son would like updated after 2pm tomorrow.

## 2022-11-03 NOTE — PROGRESS NOTES
Pt is noted resting in his bed, explained and gave all am meds- klonopin 0.5 verified with pt, pt states he cant give his anxiety a number, gave tylenol and eye drop to rt eye, minimal redness noted, pt states the pain is better today.

## 2022-11-03 NOTE — PLAN OF CARE
Patient is alert and orient x 4, isolates to room with flat, sad affect. Patient reports feeling tired but states, \"I think I slept okay. \"  Patient reports normal appetite. Patient rates his anxiety and depression a 7 out of 10, with 10 being the worst.  Patient endorses having thoughts to harm wife, he states \"I do not want to have these thoughts but I can not help it. \"  Patient denies SI and hallucinations at this time.         Problem: Self Harm/Suicidality  Goal: Will have no self-injury during hospital stay  Description: INTERVENTIONS:  1. Q 30 MINUTES: Routine safety checks  2. Q SHIFT & PRN: Assess risk to determine if routine checks are adequate to maintain patient safety  11/3/2022 1220 by Brody Pittman RN  Outcome: Progressing  11/3/2022 0604 by Xochilt Vazquez RN  Outcome: Progressing     Problem: Chronic Conditions and Co-morbidities  Goal: Patient's chronic conditions and co-morbidity symptoms are monitored and maintained or improved  11/3/2022 1220 by Brody Pittman RN  Outcome: Progressing  Flowsheets (Taken 11/3/2022 1149)  Care Plan - Patient's Chronic Conditions and Co-Morbidity Symptoms are Monitored and Maintained or Improved:   Monitor and assess patient's chronic conditions and comorbid symptoms for stability, deterioration, or improvement   Collaborate with multidisciplinary team to address chronic and comorbid conditions and prevent exacerbation or deterioration   Update acute care plan with appropriate goals if chronic or comorbid symptoms are exacerbated and prevent overall improvement and discharge  11/3/2022 0604 by Xochilt Vazquez RN  Outcome: Progressing     Problem: Safety - Adult  Goal: Free from fall injury  11/3/2022 1220 by Brody Pittman RN  Outcome: Progressing  11/3/2022 0604 by Xochilt Vazquez RN  Outcome: Progressing     Problem: ABCDS Injury Assessment  Goal: Absence of physical injury  11/3/2022 1220 by Brody Pittman RN  Outcome: Progressing  11/3/2022 0604 by Vanessa Stephen RN  Outcome: Progressing     Problem: Skin/Tissue Integrity  Goal: Absence of new skin breakdown  Description: 1. Monitor for areas of redness and/or skin breakdown  2. Assess vascular access sites hourly  3. Every 4-6 hours minimum:  Change oxygen saturation probe site  4. Every 4-6 hours:  If on nasal continuous positive airway pressure, respiratory therapy assess nares and determine need for appliance change or resting period. 11/3/2022 1220 by Rose Cohen RN  Outcome: Progressing  11/3/2022 0604 by Vanessa Stephen RN  Outcome: Progressing     Problem: Nutrition Deficit:  Goal: Optimize nutritional status  11/3/2022 1220 by Rose Cohen RN  Outcome: Progressing  11/3/2022 0604 by Vanessa Stephen RN  Outcome: Progressing     Problem: Risk for Physiologic Instability  Goal: B/P, SaO2, EKG, and respiratory status WDL  Description: INTERVENTIONS:  1. Monitor B/P, SaO2, EKG and respiratory function  2. Notify physician of unstable condition/changes  11/3/2022 1220 by Rose Cohen RN  Outcome: Progressing  11/3/2022 0604 by Vanessa Stephen RN  Outcome: Progressing     Problem: Impaired Comfort  Goal: Patient reports pain level is manageable/acceptable  Description: INTERVENTIONS:  1. Provide emotional support and reassurance at all times to relieve anxiety  2. Position patient for comfort  3. Medicate PRN for pain relief  11/3/2022 1220 by Rose Cohen RN  Outcome: Progressing  11/3/2022 0604 by Vanessa Stephen RN  Outcome: Progressing     Problem: Deficient Knowledge  Goal: Pt/family demonstrate understanding of pre/post-procedure instructions  Description: INTERVENTIONS:  1. Provide pre-procedure instructions  2.  Provide written and verbal post-procedural instructions  11/3/2022 1220 by Rose Cohen RN  Outcome: Progressing  11/3/2022 0604 by Vanessa Stephen RN  Outcome: Progressing     Problem: Deficient Knowledge  Goal: Pt/family demonstrate understanding of pre/post-procedure instructions  Description: INTERVENTIONS:  1. Provide pre-procedure instructions  2. Provide written and verbal post-procedural instructions  11/3/2022 1220 by Branden Zheng RN  Outcome: Progressing  11/3/2022 0604 by Alo Barnett RN  Outcome: Progressing     Problem: Risk for Injury  Goal: ARCHIVE-Patient remains free from injury  Description: INTERVENTIONS:  1. Universal precautions  2. Ensure bed, stretcher, or wheelchair are in locked position when not transporting  3. Position patient per procedure requirement  4. Maintain patent airway  5. Perform pre-op equipment checks  11/3/2022 1220 by Branden Zheng RN  Outcome: Progressing  11/3/2022 0604 by Alo Barnett RN  Outcome: Progressing     Problem: Pain  Goal: Verbalizes/displays adequate comfort level or baseline comfort level  11/3/2022 1220 by Branden Zheng RN  Outcome: Progressing  11/3/2022 0604 by Alo Barnett RN  Outcome: Progressing     Problem: Anxiety  Goal: Will report anxiety at manageable levels  Description: INTERVENTIONS:  1. Administer medication as ordered  2. Teach and rehearse alternative coping skills  3. Provide emotional support with 1:1 interaction with staff  11/3/2022 1220 by Branden Zheng RN  Outcome: Progressing  Flowsheets (Taken 11/3/2022 1149)  Will report anxiety at manageable levels:   Administer medication as ordered   Teach and rehearse alternative coping skills   Provide emotional support with 1:1 interaction with staff  11/3/2022 0604 by Alo Barnett RN  Outcome: Progressing     Problem: Coping  Goal: Pt/Family able to verbalize concerns and demonstrate effective coping strategies  Description: INTERVENTIONS:  1. Assist patient/family to identify coping skills, available support systems and cultural and spiritual values  2.  Provide emotional support, including active listening and acknowledgement of concerns of patient and caregivers  3. Reduce environmental stimuli, as able  4. Instruct patient/family in relaxation techniques, as appropriate  5. Assess for spiritual pain/suffering and initiate Spiritual Care, Psychosocial Clinical Specialist consults as needed  11/3/2022 1220 by Nely Nava RN  Outcome: Progressing  Flowsheets (Taken 11/3/2022 1149)  Patient/family able to verbalize anxieties, fears, and concerns, and demonstrate effective coping:   Assist patient/family to identify coping skills, available support systems and cultural and spiritual values   Provide emotional support, including active listening and acknowledgement of concerns of patient and caregivers   Reduce environmental stimuli, as able   Instruct patient/family in relaxation techniques, as appropriate   Assess for spiritual pain/suffering and initiate Spiritual Care, Psychosocial Clinical Specialist consults as needed  11/3/2022 0604 by Branden Rosa RN  Outcome: Progressing     Problem: Confusion  Goal: Confusion, delirium, dementia, or psychosis is improved or at baseline  Description: INTERVENTIONS:  1. Assess for possible contributors to thought disturbance, including medications, impaired vision or hearing, underlying metabolic abnormalities, dehydration, psychiatric diagnoses, and notify attending LIP  2. [place] high risk fall precautions, as indicated  3. Provide frequent short contacts to provide reality reorientation, refocusing and direction  4. Decrease environmental stimuli, including noise as appropriate  5. Monitor and intervene to maintain adequate nutrition, hydration, elimination, sleep and activity  6. If unable to ensure safety without constant attention obtain sitter and review sitter guidelines with assigned personnel  7.  Initiate Psychosocial CNS and Spiritual Care consult, as indicated  11/3/2022 1220 by Nely Nava RN  Outcome: Progressing  Flowsheets (Taken 11/3/2022 1149)  Effect of thought disturbance (confusion, delirium, dementia, or psychosis) are managed with adequate functional status:   Assess for contributors to thought disturbance, including medications, impaired vision or hearing, underlying metabolic abnormalities, dehydration, psychiatric diagnoses, notify [facility] high risk fall precautions, as indicated   Provide frequent short contacts to provide reality reorientation, refocusing and direction   Decrease environmental stimuli, including noise as appropriate   Monitor and intervene to maintain adequate nutrition, hydration, elimination, sleep and activity   If unable to ensure safety without constant attention obtain sitter and review sitter guidelines with assigned personnel   Initiate Psychosocial Clinical Nurse Specialist and [facility] consult, as indicated  11/3/2022 0604 by Gilles Lincoln RN  Outcome: Progressing     Problem: Depression/Self Harm  Goal: Effect of psychiatric condition will be minimized and patient will be protected from self harm  Description: INTERVENTIONS:  1. Assess impact of patient's symptoms on level of functioning, self care needs and offer support as indicated  2. Assess patient/family knowledge of depression, impact on illness and need for teaching  3. Provide emotional support, presence and reassurance  4. Assess for possible suicidal thoughts or ideation. If patient expresses suicidal thoughts or statements do not leave alone, initiate Suicide Precautions, move to a room close to the nursing station and obtain sitter  5.  Initiate consults as appropriate with Mental Health Professional, Spiritual Care, Psychosocial CNS, and consider a recommendation to the LIP for a Psychiatric Consultation  11/3/2022 1220 by Alejandrina Mcdonald RN  Outcome: Progressing  Flowsheets (Taken 11/3/2022 1149)  Effect of psychiatric condition will be minimized and patient will be protected from self harm:   Assess impact of patients symptoms on level of functioning, self care needs and offer support as indicated   Assess patient/family knowledge of depression, impact on illness and need for teaching   Provide emotional support, presence and reassurance   Assess for suicidal thoughts or ideation.  If patient expresses suicidal thoughts or statements do not leave alone, initiate Suicide Precautions, move near nurse station, obtain sitter   Initiate consults as appropriate with Mental Health Professional, Spiritual Care, Psychosocial CNS, and consider a recommendation to the LIP for a Psychiatric Consultation  11/3/2022 0604 by Fatmata Weathers RN  Outcome: Progressing

## 2022-11-03 NOTE — PROGRESS NOTES
Pt. refused to attend the 1000 skills group, despite staff encouragement.  Electronically signed by [place], [address] on 11/3/2022 at 2:17 PM

## 2022-11-03 NOTE — PLAN OF CARE
Problem: Self Harm/Suicidality  Goal: Will have no self-injury during hospital stay  Description: INTERVENTIONS:  1. Q 30 MINUTES: Routine safety checks  2. Q SHIFT & PRN: Assess risk to determine if routine checks are adequate to maintain patient safety  Outcome: Progressing     Problem: Chronic Conditions and Co-morbidities  Goal: Patient's chronic conditions and co-morbidity symptoms are monitored and maintained or improved  Outcome: Progressing     Problem: Safety - Adult  Goal: Free from fall injury  Outcome: Progressing     Problem: ABCDS Injury Assessment  Goal: Absence of physical injury  Outcome: Progressing     Problem: Skin/Tissue Integrity  Goal: Absence of new skin breakdown  Description: 1. Monitor for areas of redness and/or skin breakdown  2. Assess vascular access sites hourly  3. Every 4-6 hours minimum:  Change oxygen saturation probe site  4. Every 4-6 hours:  If on nasal continuous positive airway pressure, respiratory therapy assess nares and determine need for appliance change or resting period. Outcome: Progressing     Problem: Nutrition Deficit:  Goal: Optimize nutritional status  Outcome: Progressing     Problem: Risk for Physiologic Instability  Goal: B/P, SaO2, EKG, and respiratory status WDL  Description: INTERVENTIONS:  1. Monitor B/P, SaO2, EKG and respiratory function  2. Notify physician of unstable condition/changes  Outcome: Progressing     Problem: Impaired Comfort  Goal: Patient reports pain level is manageable/acceptable  Description: INTERVENTIONS:  1. Provide emotional support and reassurance at all times to relieve anxiety  2. Position patient for comfort  3. Medicate PRN for pain relief  Outcome: Progressing     Problem: Deficient Knowledge  Goal: Pt/family demonstrate understanding of pre/post-procedure instructions  Description: INTERVENTIONS:  1. Provide pre-procedure instructions  2.  Provide written and verbal post-procedural instructions  Outcome: Progressing Problem: Risk for Injury  Goal: ARCHIVE-Patient remains free from injury  Description: INTERVENTIONS:  1. Universal precautions  2. Ensure bed, stretcher, or wheelchair are in locked position when not transporting  3. Position patient per procedure requirement  4. Maintain patent airway  5. Perform pre-op equipment checks  Outcome: Progressing     Problem: Pain  Goal: Verbalizes/displays adequate comfort level or baseline comfort level  Outcome: Progressing     Problem: Anxiety  Goal: Will report anxiety at manageable levels  Description: INTERVENTIONS:  1. Administer medication as ordered  2. Teach and rehearse alternative coping skills  3. Provide emotional support with 1:1 interaction with staff  Outcome: Progressing     Problem: Coping  Goal: Pt/Family able to verbalize concerns and demonstrate effective coping strategies  Description: INTERVENTIONS:  1. Assist patient/family to identify coping skills, available support systems and cultural and spiritual values  2. Provide emotional support, including active listening and acknowledgement of concerns of patient and caregivers  3. Reduce environmental stimuli, as able  4. Instruct patient/family in relaxation techniques, as appropriate  5. Assess for spiritual pain/suffering and initiate Spiritual Care, Psychosocial Clinical Specialist consults as needed  Outcome: Progressing     Problem: Confusion  Goal: Confusion, delirium, dementia, or psychosis is improved or at baseline  Description: INTERVENTIONS:  1. Assess for possible contributors to thought disturbance, including medications, impaired vision or hearing, underlying metabolic abnormalities, dehydration, psychiatric diagnoses, and notify attending LIP  2. [place] high risk fall precautions, as indicated  3. Provide frequent short contacts to provide reality reorientation, refocusing and direction  4. Decrease environmental stimuli, including noise as appropriate  5.  Monitor and intervene to maintain adequate nutrition, hydration, elimination, sleep and activity  6. If unable to ensure safety without constant attention obtain sitter and review sitter guidelines with assigned personnel  7. Initiate Psychosocial CNS and Spiritual Care consult, as indicated  Outcome: Progressing     Problem: Depression/Self Harm  Goal: Effect of psychiatric condition will be minimized and patient will be protected from self harm  Description: INTERVENTIONS:  1. Assess impact of patient's symptoms on level of functioning, self care needs and offer support as indicated  2. Assess patient/family knowledge of depression, impact on illness and need for teaching  3. Provide emotional support, presence and reassurance  4. Assess for possible suicidal thoughts or ideation. If patient expresses suicidal thoughts or statements do not leave alone, initiate Suicide Precautions, move to a room close to the nursing station and obtain sitter  5.  Initiate consults as appropriate with Mental Health Professional, Spiritual Care, Psychosocial CNS, and consider a recommendation to the LIP for a Psychiatric Consultation  Outcome: Progressing

## 2022-11-03 NOTE — PROGRESS NOTES
Pt reports depression & anxiety are both # 9/10. Denies SI/HI/AVH. Pt continues to have intrusive thoughts about harming wife. Pt continues to report he is not sleeping, though 1:1 staff reports he does sleep. Pt does not attend groups, comes out to DR for meals & eats approx 50% of meals.

## 2022-11-03 NOTE — PROGRESS NOTES
Kiana Bass from [address] [facility] home health called and stated they could not provide home care services for just an aide. Advised that pt would need home services through [facility].

## 2022-11-03 NOTE — PROGRESS NOTES
Pt. declined to attend the 0900 community meeting, despite staff encouragement.  Goal - \"To feel better\" Electronically signed by Richar Aguirre, [address] on 11/3/2022 at 9:46 AM

## 2022-11-03 NOTE — CARE COORDINATION
Spoke to Dr. Lindy Murguia office #[phone] to make aware that Melissa Edwards doctor will be consulting order for a home health aide and the PCP will need to continue it. They would like the family to bring patient in next week for an appointment. Wed they close at 15.

## 2022-11-03 NOTE — PROGRESS NOTES
[facility] [place] [facility] FOLLOW-UP NOTE       11/3/2022     Patient was seen and examined in person, Chart reviewed   Patient's case discussed with staff/team    Chief Complaint: Depression anxiety    Interim History:     Patient is making progress with his mood  Patient had received 7 ECT so far without any side effects  Patient is still secluding in his room and remain on one-to-one due to fall precaution  Patient is due to have his eighth ECT on Friday  Patient anxiety and depression stems from his wife being at home and he cannot care for her anymore  Spoke with his son who is planning to move his wife out of the house over the weekend  Home health care has been arranged for the patient and the family is planning to support him at his own place after discharge  Family cannot bring the patient for ECT and he might need 1 more treatment on Monday to complete the course of ECT    Appetite:   [x] Normal/Unchanged  [] Increased  [] Decreased      Sleep:       [] Normal/Unchanged  [x] Fair       [] Poor              Energy:    [] Normal/Unchanged  [] Increased  [x] Decreased        SI [x] Present  [] Absent    HI  [x]Present  [] Absent     Aggression:  [] yes  [x] no    Patient is [] able  [x] unable to CONTRACT FOR SAFETY     PAST MEDICAL/PSYCHIATRIC HISTORY:   Past Medical History:   Diagnosis Date    Anxiety     Chest pain 03/29/2018    Coronary artery disease involving native coronary artery of native heart without angina pectoris 02/15/2019    Diabetes mellitus ([facility]     no meds    History of colon polyps     Hyperlipidemia     Hypertension     Type 2 diabetes mellitus without complication ([facility]     Vertigo        FAMILY/SOCIAL HISTORY:  Family History   Problem Relation Age of Onset    Heart Disease Maternal Aunt     Cancer Maternal Aunt     Colon Cancer Maternal Aunt      Social History     Socioeconomic History    Marital status:      Spouse name: Not on file    Number of children: Not on file    Years of education: Not on file    Highest education level: Not on file   Occupational History    Not on file   Tobacco Use    Smoking status: Never    Smokeless tobacco: Never   Vaping Use    Vaping Use: Never used   Substance and Sexual Activity    Alcohol use: No    Drug use: Never    Sexual activity: Not on file   Other Topics Concern    Not on file   Social History Narrative    Not on file     Social Determinants of Health     Financial Resource Strain: Not on file   Food Insecurity: Not on file   Transportation Needs: Not on file   Physical Activity: Not on file   Stress: Not on file   Social Connections: Not on file   Intimate Partner Violence: Not on file   Housing Stability: Not on file           ROS:  [x] All negative/unchanged except if checked.  Explain positive(checked items) below:  [] Constitutional  [] Eyes  [] Ear/Nose/Mouth/Throat  [] Respiratory  [] CV  [] GI  []   [] Musculoskeletal  [] Skin/Breast  [] Neurological  [] Endocrine  [] Heme/Lymph  [] Allergic/Immunologic    Explanation:     MEDICATIONS:    Current Facility-Administered Medications:     naphazoline-pheniramine (NAPHCON-A) 0.025-0.3 % ophthalmic solution 1 drop, 1 drop, Ophthalmic, Q4H PRN, Nicodomenico Boledel-Roche, APRN - CNP, 1 drop at 11/03/22 0909    risperiDONE (RISPERDAL) tablet 0.25 mg, 0.25 mg, Oral, TID, Sari Wang MD, 0.25 mg at 11/03/22 0910    0.9 % sodium chloride infusion, , IntraVENous, Continuous, Jovita Castrejon MD, Last Rate: 100 mL/hr at 10/31/22 1056, New Bag at 10/31/22 1056    clonazePAM (KLONOPIN) tablet 0.5 mg, 0.5 mg, Oral, Q12H, Jovita Castrejon MD, 0.5 mg at 11/03/22 0909    traZODone (DESYREL) tablet 50 mg, 50 mg, Oral, Nightly, Jovita Castrejon MD, 50 mg at 11/02/22 2049    haloperidol (HALDOL) tablet 2 mg, 2 mg, Oral, Q6H PRN, Jovita Castrejon MD, 2 mg at 10/27/22 1636    PARoxetine (PAXIL) tablet 40 mg, 40 mg, Oral, Daily, Sari Wang MD, 40 mg at 11/03/22 8099 0.9 % sodium chloride infusion, , IntraVENous, Continuous, Maria Isabel Bella MD, Last Rate: 100 mL/hr at 10/24/22 1143, New Bag at 10/24/22 1143    benzocaine (ORAJEL) 20 % mucosal gel, , Mouth/Throat, BID PRN, Stefan Patel, APRN - CNP    insulin lispro (HUMALOG) injection vial 0-8 Units, 0-8 Units, SubCUTAneous, TID WC, Elisa Gaytan, APRN - NP, 2 Units at 11/01/22 1704    insulin lispro (HUMALOG) injection vial 0-4 Units, 0-4 Units, SubCUTAneous, Nightly, Elisa Gaytan, APRN - NP    glucose chewable tablet 16 g, 4 tablet, Oral, PRN, Elisa Persons, APRN - NP    dextrose bolus 10% 125 mL, 125 mL, IntraVENous, PRN **OR** dextrose bolus 10% 250 mL, 250 mL, IntraVENous, PRN, Elisa Persons, APRN - NP    glucagon (rDNA) injection 1 mg, 1 mg, SubCUTAneous, PRN, Elisa Persons, APRN - NP    dextrose 10 % infusion, , IntraVENous, Continuous PRN, Elisa Gaytan, APRN - NP    isosorbide mononitrate (IMDUR) extended release tablet 30 mg, 30 mg, Oral, Daily, Ling Delgado MD, 30 mg at 11/03/22 0910    metoprolol succinate (TOPROL XL) extended release tablet 25 mg, 25 mg, Oral, Nightly, Ling Delgado MD, 25 mg at 10/30/22 2100    atorvastatin (LIPITOR) tablet 20 mg, 20 mg, Oral, Nightly, Ling Delgado MD, 20 mg at 11/02/22 2049    albuterol sulfate HFA (PROVENTIL;VENTOLIN;PROAIR) 108 (90 Base) MCG/ACT inhaler 2 puff, 2 puff, Inhalation, 4x Daily PRN, Maria Isabel Bella MD    aspirin chewable tablet 81 mg, 81 mg, Oral, Daily, Maria Isabel Bella MD, 81 mg at 11/03/22 0910    acetaminophen (TYLENOL) tablet 650 mg, 650 mg, Oral, Q4H PRN, Maria Isabel Bella MD, 650 mg at 11/03/22 0910    magnesium hydroxide (MILK OF MAGNESIA) 400 MG/5ML suspension 30 mL, 30 mL, Oral, Daily PRN, Maria Isabel Bella MD, 30 mL at 10/16/22 1217    nicotine polacrilex (COMMIT) lozenge 2 mg, 2 mg, Oral, Q1H PRN, Maria Isabel Bella MD      Examination:  /79   Pulse 65   Temp 98.1 °F (36.7 °C) (Oral)   Resp 18   Ht 5' 5\" (1.651 m)   Wt 113 lb (51.3 kg)   SpO2 97%   BMI 18.80 kg/m²   Gait - steady  Medication side effects(SE): no    Mental Status Examination:    Level of consciousness:  within normal limits   Appearance:  poor grooming and poor hygiene  Behavior/Motor:  psychomotor retardation  Attitude toward examiner:  cooperative  Speech:  slow   Mood: less anxious and depressed  Affect:  flat and anxious  Thought processes:  slow   Thought content:  Suicidal Ideation:  passive  Cognition:  oriented to person, place, and time   Concentration poor  Insight poor   Judgement poor     ASSESSMENT:   Patient symptoms are:  [] Well controlled  [] Improving  [] Worsening  [] No change      Diagnosis:   Principal Problem:    Severe episode of recurrent major depressive disorder, with psychotic features ([facility]  Active Problems:    Tremor    [facility] ([facility]    Dyskinesia  Resolved Problems:    * No resolved hospital problems. *      LABS:    No results for input(s): WBC, HGB, PLT in the last 72 hours. No results for input(s): NA, K, CL, CO2, BUN, CREATININE, GLUCOSE in the last 72 hours. No results for input(s): BILITOT, ALKPHOS, AST, ALT in the last 72 hours. Lab Results   Component Value Date/Time    LABAMPH Neg 10/14/2022 08:15 AM    BARBSCNU Neg 10/14/2022 08:15 AM    LABBENZ Neg 10/14/2022 08:15 AM    LABMETH Neg 10/14/2022 08:15 AM    OPIATESCREENURINE Neg 10/14/2022 08:15 AM    PHENCYCLIDINESCREENURINE Neg 10/14/2022 08:15 AM    ETOH <10 10/14/2022 08:43 AM     Lab Results   Component Value Date/Time    TSH 3.230 10/14/2022 08:43 AM     No results found for: LITHIUM  No results found for: VALPROATE, CBMZ      Treatment Plan:  Reviewed current Medications with the patient.    ECT Friday and Monday and then discharge her  Pt need IP ECT treatment since he does not have a ride from home  Moreover family is trying to move his wife out of the house which was the main source of stress for the patient to return back home  Risks, benefits, side effects, drug-to-drug interactions and alternatives to treatment were discussed. Collateral information: Pending  CD evaluatio  Encourage patient to attend group and other milieu activities.   Discharge planning discussed with the patient and treatment team.    PSYCHOTHERAPY/COUNSELING:  [x] Therapeutic interview  [x] Supportive  [] CBT  [] Ongoing  [] Other  =         [x] Patient continues to need, on a daily basis, active treatment furnished directly by or requiring the supervision of inpatient psychiatric personnel      Anticipated Length of stay:            Electronically signed by Chato Collins MD on 11/3/2022 at 12:14 PM

## 2022-11-04 ENCOUNTER — ANESTHESIA EVENT (OUTPATIENT)
Dept: POSTOP/PACU | Age: 72
End: 2022-11-04

## 2022-11-04 ENCOUNTER — APPOINTMENT (OUTPATIENT)
Dept: POSTOP/PACU | Age: 72
DRG: 885 | End: 2022-11-04
Payer: MEDICARE

## 2022-11-04 ENCOUNTER — ANESTHESIA (OUTPATIENT)
Dept: POSTOP/PACU | Age: 72
End: 2022-11-04

## 2022-11-04 LAB
GLUCOSE BLD-MCNC: 103 MG/DL (ref 70–99)
GLUCOSE BLD-MCNC: 110 MG/DL (ref 70–99)
GLUCOSE BLD-MCNC: 126 MG/DL (ref 70–99)
GLUCOSE BLD-MCNC: 98 MG/DL (ref 70–99)
PERFORMED ON: ABNORMAL
PERFORMED ON: NORMAL

## 2022-11-04 PROCEDURE — 90870 ELECTROCONVULSIVE THERAPY: CPT

## 2022-11-04 PROCEDURE — 2500000003 HC RX 250 WO HCPCS: Performed by: STUDENT IN AN ORGANIZED HEALTH CARE EDUCATION/TRAINING PROGRAM

## 2022-11-04 PROCEDURE — 1240000000 HC EMOTIONAL WELLNESS R&B

## 2022-11-04 PROCEDURE — 6370000000 HC RX 637 (ALT 250 FOR IP): Performed by: INTERNAL MEDICINE

## 2022-11-04 PROCEDURE — GZB4ZZZ OTHER ELECTROCONVULSIVE THERAPY: ICD-10-PCS | Performed by: PSYCHIATRY & NEUROLOGY

## 2022-11-04 PROCEDURE — 6370000000 HC RX 637 (ALT 250 FOR IP): Performed by: PSYCHIATRY & NEUROLOGY

## 2022-11-04 PROCEDURE — 7100000000 HC PACU RECOVERY - FIRST 15 MIN

## 2022-11-04 PROCEDURE — 6360000002 HC RX W HCPCS: Performed by: STUDENT IN AN ORGANIZED HEALTH CARE EDUCATION/TRAINING PROGRAM

## 2022-11-04 PROCEDURE — 2580000003 HC RX 258: Performed by: STUDENT IN AN ORGANIZED HEALTH CARE EDUCATION/TRAINING PROGRAM

## 2022-11-04 PROCEDURE — 3700000000 HC ANESTHESIA ATTENDED CARE

## 2022-11-04 PROCEDURE — 2580000003 HC RX 258: Performed by: PSYCHIATRY & NEUROLOGY

## 2022-11-04 PROCEDURE — 90870 ELECTROCONVULSIVE THERAPY: CPT | Performed by: PSYCHIATRY & NEUROLOGY

## 2022-11-04 RX ORDER — GLYCOPYRROLATE 1 MG/5 ML
SYRINGE (ML) INTRAVENOUS PRN
Status: DISCONTINUED | OUTPATIENT
Start: 2022-11-04 | End: 2022-11-04 | Stop reason: SDUPTHER

## 2022-11-04 RX ORDER — SODIUM CHLORIDE 9 MG/ML
INJECTION, SOLUTION INTRAVENOUS CONTINUOUS
Status: DISCONTINUED | OUTPATIENT
Start: 2022-11-04 | End: 2022-11-06

## 2022-11-04 RX ORDER — ONDANSETRON 2 MG/ML
INJECTION INTRAMUSCULAR; INTRAVENOUS PRN
Status: DISCONTINUED | OUTPATIENT
Start: 2022-11-04 | End: 2022-11-04 | Stop reason: SDUPTHER

## 2022-11-04 RX ORDER — SUCCINYLCHOLINE/SOD CL,ISO/PF 100 MG/5ML
SYRINGE (ML) INTRAVENOUS PRN
Status: DISCONTINUED | OUTPATIENT
Start: 2022-11-04 | End: 2022-11-04 | Stop reason: SDUPTHER

## 2022-11-04 RX ORDER — PROPOFOL 10 MG/ML
INJECTION, EMULSION INTRAVENOUS PRN
Status: DISCONTINUED | OUTPATIENT
Start: 2022-11-04 | End: 2022-11-04 | Stop reason: SDUPTHER

## 2022-11-04 RX ORDER — SODIUM CHLORIDE 9 MG/ML
INJECTION, SOLUTION INTRAVENOUS CONTINUOUS PRN
Status: DISCONTINUED | OUTPATIENT
Start: 2022-11-04 | End: 2022-11-04 | Stop reason: SDUPTHER

## 2022-11-04 RX ADMIN — Medication 0.2 MG: at 12:00

## 2022-11-04 RX ADMIN — RISPERIDONE 0.25 MG: 0.25 TABLET ORAL at 21:26

## 2022-11-04 RX ADMIN — SODIUM CHLORIDE: 9 INJECTION, SOLUTION INTRAVENOUS at 11:27

## 2022-11-04 RX ADMIN — ACETAMINOPHEN 650 MG: 325 TABLET ORAL at 21:26

## 2022-11-04 RX ADMIN — TRAZODONE HYDROCHLORIDE 50 MG: 50 TABLET ORAL at 21:25

## 2022-11-04 RX ADMIN — ONDANSETRON 4 MG: 2 INJECTION INTRAMUSCULAR; INTRAVENOUS at 12:00

## 2022-11-04 RX ADMIN — PAROXETINE HYDROCHLORIDE 40 MG: 20 TABLET, FILM COATED ORAL at 13:19

## 2022-11-04 RX ADMIN — RISPERIDONE 0.25 MG: 0.25 TABLET ORAL at 13:56

## 2022-11-04 RX ADMIN — Medication 40 MG: at 12:02

## 2022-11-04 RX ADMIN — ASPIRIN 81 MG 81 MG: 81 TABLET ORAL at 13:20

## 2022-11-04 RX ADMIN — ISOSORBIDE MONONITRATE 30 MG: 60 TABLET, EXTENDED RELEASE ORAL at 13:19

## 2022-11-04 RX ADMIN — SODIUM CHLORIDE: 9 INJECTION, SOLUTION INTRAVENOUS at 11:34

## 2022-11-04 RX ADMIN — CLONAZEPAM 0.5 MG: 0.5 TABLET ORAL at 21:25

## 2022-11-04 RX ADMIN — METOPROLOL SUCCINATE 25 MG: 25 TABLET, FILM COATED, EXTENDED RELEASE ORAL at 21:25

## 2022-11-04 RX ADMIN — PROPOFOL 60 MG: 10 INJECTION, EMULSION INTRAVENOUS at 12:01

## 2022-11-04 RX ADMIN — ATORVASTATIN CALCIUM 20 MG: 20 TABLET, FILM COATED ORAL at 21:25

## 2022-11-04 ASSESSMENT — PATIENT HEALTH QUESTIONNAIRE - PHQ9: SUM OF ALL RESPONSES TO PHQ QUESTIONS 1-9: 27

## 2022-11-04 ASSESSMENT — PAIN DESCRIPTION - LOCATION: LOCATION: HEAD

## 2022-11-04 ASSESSMENT — PAIN SCALES - GENERAL: PAINLEVEL_OUTOF10: 0

## 2022-11-04 ASSESSMENT — PAIN DESCRIPTION - DESCRIPTORS: DESCRIPTORS: ACHING

## 2022-11-04 ASSESSMENT — PAIN - FUNCTIONAL ASSESSMENT: PAIN_FUNCTIONAL_ASSESSMENT: PREVENTS OR INTERFERES SOME ACTIVE ACTIVITIES AND ADLS

## 2022-11-04 ASSESSMENT — ENCOUNTER SYMPTOMS: SHORTNESS OF BREATH: 1

## 2022-11-04 NOTE — PLAN OF CARE
Patient remains isolative to room. Patient has good eye contact with this RN. Patient states he does not want to leave here. Patient was encouraged once he is discharged, he will feel better in his home. Patient states he is upset that he will not live with his wife due to intrusive thoughts but he loves her. He states his wife is upset with him about the situation with good reason. Patient comes out in DR for meals. Eating 75% of breakfast and lunch. Patient refuses to attend group due to \"making his depression worse\". Patient scores anxiety and depression an 8/10 where 10 is the highest. Denies SI and hallucinations. Problem: Self Harm/Suicidality  Goal: Will have no self-injury during hospital stay  Description: INTERVENTIONS:  1. Q 30 MINUTES: Routine safety checks  2. Q SHIFT & PRN: Assess risk to determine if routine checks are adequate to maintain patient safety  Outcome: Progressing     Problem: Chronic Conditions and Co-morbidities  Goal: Patient's chronic conditions and co-morbidity symptoms are monitored and maintained or improved  Outcome: Progressing     Problem: Safety - Adult  Goal: Free from fall injury  Outcome: Progressing     Problem: ABCDS Injury Assessment  Goal: Absence of physical injury  Outcome: Progressing     Problem: Skin/Tissue Integrity  Goal: Absence of new skin breakdown  Description: 1. Monitor for areas of redness and/or skin breakdown  2. Assess vascular access sites hourly  3. Every 4-6 hours minimum:  Change oxygen saturation probe site  4. Every 4-6 hours:  If on nasal continuous positive airway pressure, respiratory therapy assess nares and determine need for appliance change or resting period. Outcome: Progressing     Problem: Nutrition Deficit:  Goal: Optimize nutritional status  Outcome: Progressing     Problem: Risk for Physiologic Instability  Goal: B/P, SaO2, EKG, and respiratory status WDL  Description: INTERVENTIONS:  1.  Monitor B/P, SaO2, EKG and respiratory function  2. Notify physician of unstable condition/changes  Outcome: Progressing     Problem: Impaired Comfort  Goal: Patient reports pain level is manageable/acceptable  Description: INTERVENTIONS:  1. Provide emotional support and reassurance at all times to relieve anxiety  2. Position patient for comfort  3. Medicate PRN for pain relief  Outcome: Progressing     Problem: Deficient Knowledge  Goal: Pt/family demonstrate understanding of pre/post-procedure instructions  Description: INTERVENTIONS:  1. Provide pre-procedure instructions  2. Provide written and verbal post-procedural instructions  Outcome: Progressing     Problem: Risk for Injury  Goal: ARCHIVE-Patient remains free from injury  Description: INTERVENTIONS:  1. Universal precautions  2. Ensure bed, stretcher, or wheelchair are in locked position when not transporting  3. Position patient per procedure requirement  4. Maintain patent airway  5. Perform pre-op equipment checks  Outcome: Progressing     Problem: Pain  Goal: Verbalizes/displays adequate comfort level or baseline comfort level  Outcome: Progressing     Problem: Anxiety  Goal: Will report anxiety at manageable levels  Description: INTERVENTIONS:  1. Administer medication as ordered  2. Teach and rehearse alternative coping skills  3. Provide emotional support with 1:1 interaction with staff  Outcome: Progressing  Flowsheets (Taken 11/4/2022 1602)  Will report anxiety at manageable levels:   Provide emotional support with 1:1 interaction with staff   Administer medication as ordered   Teach and rehearse alternative coping skills     Problem: Coping  Goal: Pt/Family able to verbalize concerns and demonstrate effective coping strategies  Description: INTERVENTIONS:  1. Assist patient/family to identify coping skills, available support systems and cultural and spiritual values  2.  Provide emotional support, including active listening and acknowledgement of concerns of patient and caregivers  3. Reduce environmental stimuli, as able  4. Instruct patient/family in relaxation techniques, as appropriate  5. Assess for spiritual pain/suffering and initiate Spiritual Care, Psychosocial Clinical Specialist consults as needed  Outcome: Progressing     Problem: Confusion  Goal: Confusion, delirium, dementia, or psychosis is improved or at baseline  Description: INTERVENTIONS:  1. Assess for possible contributors to thought disturbance, including medications, impaired vision or hearing, underlying metabolic abnormalities, dehydration, psychiatric diagnoses, and notify attending LIP  2. [place] high risk fall precautions, as indicated  3. Provide frequent short contacts to provide reality reorientation, refocusing and direction  4. Decrease environmental stimuli, including noise as appropriate  5. Monitor and intervene to maintain adequate nutrition, hydration, elimination, sleep and activity  6. If unable to ensure safety without constant attention obtain sitter and review sitter guidelines with assigned personnel  7. Initiate Psychosocial CNS and Spiritual Care consult, as indicated  Outcome: Progressing     Problem: Depression/Self Harm  Goal: Effect of psychiatric condition will be minimized and patient will be protected from self harm  Description: INTERVENTIONS:  1. Assess impact of patient's symptoms on level of functioning, self care needs and offer support as indicated  2. Assess patient/family knowledge of depression, impact on illness and need for teaching  3. Provide emotional support, presence and reassurance  4. Assess for possible suicidal thoughts or ideation. If patient expresses suicidal thoughts or statements do not leave alone, initiate Suicide Precautions, move to a room close to the nursing station and obtain sitter  5.  Initiate consults as appropriate with Mental Health Professional, Spiritual Care, Psychosocial CNS, and consider a recommendation to the LIP for a Psychiatric Consultation  Outcome: Progressing

## 2022-11-04 NOTE — ANESTHESIA PRE PROCEDURE
Department of Anesthesiology  Preprocedure Note       Name:  Jeni Laura   Age:  74 y.o.  :  1950                                          MRN:  [id number]         Date:  2022      Surgeon: * No surgeons listed *    Procedure: * No procedures listed *    Medications prior to admission:   Prior to Admission medications    Medication Sig Start Date End Date Taking? Authorizing Provider   mirtazapine (REMERON) 15 MG tablet Take 15 mg by mouth nightly   Yes Historical Provider, MD   ALPRAZolam (XANAX) 0.25 MG tablet Take 0.25 mg by mouth in the morning, at noon, and at bedtime.    Yes Historical Provider, MD   albuterol sulfate HFA (VENTOLIN HFA) 108 (90 Base) MCG/ACT inhaler Inhale 2 puffs into the lungs 4 times daily as needed for Wheezing 10/12/22   Patience Diaz MD   isosorbide mononitrate (IMDUR) 30 MG extended release tablet TAKE 1 TABLET BY MOUTH DAILY 10/12/22   Rian Vega DO   ranolazine (RANEXA) 1000 MG extended release tablet TAKE 1 TABLET BY MOUTH TWICE DAILY 8/15/22   Kteurah Perry, APRN - CNP   escitalopram (LEXAPRO) 10 MG tablet Take 20 mg by mouth daily 11/3/21   Historical Provider, MD   aspirin 81 MG chewable tablet Take 1 tablet by mouth daily 10/29/21   Oralia Solorzano MD   Blood Glucose Monitoring Suppl ([address]) w/Device KIT  18   Historical Provider, MD Kamla Browne strip  18   Historical Provider, MD [facility] LANCETS 16S [address] [facility]  18   Historical Provider, MD       Current medications:    Current Facility-Administered Medications   Medication Dose Route Frequency Provider Last Rate Last Admin    0.9 % sodium chloride infusion   IntraVENous Continuous Padmini Moy  mL/hr at 22 1127 New Bag at 22 1127    naphazoline-pheniramine (NAPHCON-A) 0.025-0.3 % ophthalmic solution 1 drop  1 drop Right Eye Q4H PRN Padmini Moy MD        risperiDONE (RISPERDAL) tablet 0.25 mg  0.25 mg Oral TID Bernard Mckeon MD   0.25 mg at 11/03/22 2133    0.9 % sodium chloride infusion   IntraVENous Continuous Jovanni Lane  mL/hr at 10/31/22 1056 New Bag at 10/31/22 1056    clonazePAM (KLONOPIN) tablet 0.5 mg  0.5 mg Oral Q12H Jovanni Lane MD   0.5 mg at 11/03/22 2133    traZODone (DESYREL) tablet 50 mg  50 mg Oral Nightly Jovanni Lane MD   50 mg at 11/03/22 2133    haloperidol (HALDOL) tablet 2 mg  2 mg Oral Q6H PRN Jovanni Lane MD   2 mg at 10/27/22 1636    PARoxetine (PAXIL) tablet 40 mg  40 mg Oral Daily Balbir Gutierrez MD   40 mg at 11/03/22 0910    0.9 % sodium chloride infusion   IntraVENous Continuous Jovanni Lane  mL/hr at 10/24/22 1143 New Bag at 10/24/22 1143    benzocaine (ORAJEL) 20 % mucosal gel   Mouth/Throat BID PRN Eugene Quevedo, APRN - CNP        insulin lispro (HUMALOG) injection vial 0-8 Units  0-8 Units SubCUTAneous TID WC Jasmin Gage APRN - NP   2 Units at 11/01/22 1704    insulin lispro (HUMALOG) injection vial 0-4 Units  0-4 Units SubCUTAneous Nightly Jasmin Gage APRN - NP        glucose chewable tablet 16 g  4 tablet Oral PRN Jasmin Gage, APRN - NP        dextrose bolus 10% 125 mL  125 mL IntraVENous PRN Jasmin Gage APRN - NP        Or    dextrose bolus 10% 250 mL  250 mL IntraVENous PRN Jasmin Gage, APRN - NP        glucagon (rDNA) injection 1 mg  1 mg SubCUTAneous PRN Jasmin Gage APRN - NP        dextrose 10 % infusion   IntraVENous Continuous PRN Jasmin Gage APRN - NP        isosorbide mononitrate (IMDUR) extended release tablet 30 mg  30 mg Oral Daily Seth Valenzuela MD   30 mg at 11/03/22 0910    metoprolol succinate (TOPROL XL) extended release tablet 25 mg  25 mg Oral Nightly Seth Valenzuela MD   25 mg at 11/03/22 2134    atorvastatin (LIPITOR) tablet 20 mg  20 mg Oral Nightly Seth Valenzuela MD   20 mg at 11/03/22 2133    albuterol sulfate HFA (PROVENTIL;VENTOLIN;PROAIR) 108 (90 Base) MCG/ACT inhaler 2 puff  2 puff Inhalation 4x Daily PRN Lester Freed MD        aspirin chewable tablet 81 mg  81 mg Oral Daily Lester Freed MD   81 mg at 11/03/22 0910    acetaminophen (TYLENOL) tablet 650 mg  650 mg Oral Q4H PRN Lester Freed MD   650 mg at 11/03/22 0910    magnesium hydroxide (MILK OF MAGNESIA) 400 MG/5ML suspension 30 mL  30 mL Oral Daily PRN Lester Freed MD   30 mL at 10/16/22 1217    nicotine polacrilex (COMMIT) lozenge 2 mg  2 mg Oral Q1H PRN Lester rFeed MD           Allergies:     Allergies   Allergen Reactions    Seroquel [Quetiapine] Other (See Comments)     lethargy  weak       Problem List:    Patient Active Problem List   Diagnosis Code    Chest pain R07.9    Hypertension I10    Anxiety F41.9    Recurrent major depressive disorder, in [facility] F33.40    Coronary artery disease involving native coronary artery of native heart without angina pectoris I25.10    Mixed obsessional thoughts and acts F42.2    Chronic gastritis without bleeding K29.50    Dyspepsia R10.13    Adenomatous polyp of sigmoid colon D12.5    Weakness R53.1    COVID-19 U07.1    Hyperlipidemia E78.5    Heart murmur R01.1    Generalized anxiety disorder F41.1    Dyspnea on exertion R06.09    ED (erectile dysfunction) N52.9    Benzodiazepine dependence ([facility]) F13.20    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, F41.8    Panic attacks F41.0    Type 2 diabetes mellitus without complication, without long-term current use of insulin ([facility]) E11.9    Severe episode of recurrent major depressive disorder, with psychotic features ([facility] F33.3    Tremor R25.1    Parkinsonism ([facility] G20    Dyskinesia G24.9       Past Medical History:        Diagnosis Date    Anxiety     Chest pain 03/29/2018    Coronary artery disease involving native coronary artery of native heart without angina pectoris 02/15/2019    Diabetes mellitus ([facility]     no meds    History of colon polyps     Hyperlipidemia     Hypertension     Type 2 diabetes mellitus without complication (HCC)     Vertigo        Past Surgical History:        Procedure Laterality Date    CARDIAC SURGERY  1973    PTCA     COLONOSCOPY      COLONOSCOPY N/A 10/17/2019    COLORECTAL CANCER SCREENING, HIGH RISK performed by Lexie Perry MD at [address] [place] [address]      OTHER SURGICAL HISTORY      patent foramen ovale    TONSILLECTOMY      UPPER GASTROINTESTINAL ENDOSCOPY N/A 8/1/2019    EGD ESOPHAGOGASTRODUODENOSCOPY performed by Lexie Perry MD at [facility] History:    Social History     Tobacco Use    Smoking status: Never    Smokeless tobacco: Never   Substance Use Topics    Alcohol use: No                                Counseling given: Not Answered      Vital Signs (Current):   Vitals:    11/02/22 2050 11/03/22 0807 11/03/22 2134 11/04/22 1115   BP: (!) 96/57 137/79 119/78 (!) 127/56   Pulse: 58 65 69 52   Resp:  18  16   Temp:  98.1 °F (36.7 °C)  97 °F (36.1 °C)   TempSrc:  Oral  Temporal   SpO2:  97%  99%   Weight:       Height:                                                  BP Readings from Last 3 Encounters:   11/04/22 (!) 127/56   10/12/22 127/67   09/13/22 (!) 157/87       NPO Status: Time of last liquid consumption: 2000                        Time of last solid consumption: 2000                        Date of last liquid consumption: 11/03/22                        Date of last solid food consumption: 11/03/22    BMI:   Wt Readings from Last 3 Encounters:   10/31/22 113 lb (51.3 kg)   09/13/22 125 lb (56.7 kg)   08/29/22 120 lb (54.4 kg)     Body mass index is 18.8 kg/m².     CBC:   Lab Results   Component Value Date/Time    WBC 7.4 10/14/2022 08:43 AM    RBC 4.81 10/14/2022 08:43 AM    HGB 15.6 10/14/2022 08:43 AM    HCT 44.1 10/14/2022 08:43 AM    MCV 91.6 10/14/2022 08:43 AM    RDW 13.9 10/14/2022 08:43 AM     10/14/2022 08:43 AM       CMP:   Lab Results Component Value Date/Time     10/14/2022 08:43 AM    K 3.4 10/14/2022 08:43 AM    K 4.2 09/09/2021 07:03 AM     10/14/2022 08:43 AM    CO2 18 10/14/2022 08:43 AM    BUN 16 10/14/2022 08:43 AM    CREATININE 0.85 10/14/2022 08:43 AM    GFRAA >60.0 10/14/2022 08:43 AM    LABGLOM >60.0 10/14/2022 08:43 AM    GLUCOSE 138 10/14/2022 08:43 AM    PROT 7.4 10/14/2022 08:43 AM    CALCIUM 9.7 10/14/2022 08:43 AM    BILITOT 1.7 10/14/2022 08:43 AM    ALKPHOS 85 10/14/2022 08:43 AM    AST 19 10/14/2022 08:43 AM    ALT 16 10/14/2022 08:43 AM       POC Tests:   Recent Labs     11/04/22  0639   POCGLU 110*       Coags:   Lab Results   Component Value Date/Time    PROTIME 14.0 10/03/2021 01:36 AM    INR 1.1 10/03/2021 01:36 AM    APTT 28.4 10/03/2021 01:36 AM       HCG (If Applicable): No results found for: PREGTESTUR, PREGSERUM, HCG, HCGQUANT     ABGs:   Lab Results   Component Value Date/Time    PHART 7.482 09/07/2021 06:38 PM    PO2ART 59 09/07/2021 06:38 PM    QPS6FRM 31 09/07/2021 06:38 PM    BKX0ATC 22.8 09/07/2021 06:38 PM    BEART -1 09/07/2021 06:38 PM    F1AEFJHP 92 09/07/2021 06:38 PM        Type & Screen (If Applicable):  No results found for: LABABO, LABRH    Drug/Infectious Status (If Applicable):  No results found for: HIV, HEPCAB    COVID-19 Screening (If Applicable):   Lab Results   Component Value Date/Time    COVID19 Not Detected 10/14/2022 08:27 AM           Anesthesia Evaluation  Patient summary reviewed and Nursing notes reviewed no history of anesthetic complications:   Airway: Mallampati: II  TM distance: >3 FB   Neck ROM: full  Mouth opening: > = 3 FB   Dental: normal exam         Pulmonary:normal exam    (+) shortness of breath:                             Cardiovascular:  Exercise tolerance: good (>4 METS),   (+) hypertension:, CAD:, HENRIQUEZ:,       ECG reviewed               Beta Blocker:  Not on Beta Blocker      ROS comment: Sinus bradycardia  Otherwise normal ECG  When compared with ECG of 16-OCT-2022 05:54,  No significant change was found     Neuro/Psych:   (+) neuromuscular disease: Parkinson's disease, psychiatric history:            GI/Hepatic/Renal: Neg GI/Hepatic/Renal ROS            Endo/Other:    (+) DiabetesType II DM, , .          Pt had PAT visit. Abdominal:             Vascular: negative vascular ROS. Other Findings:           Anesthesia Plan      general     ASA 3     (Mask)  Induction: intravenous. MIPS: Prophylactic antiemetics administered. Anesthetic plan and risks discussed with patient. Plan discussed with CRNA.     Attending anesthesiologist reviewed and agrees with Tay Garces DO   11/4/2022

## 2022-11-04 NOTE — CARE COORDINATION
Spoke with son , Keshia Chadwick [phone] regarding outpt apptmts and discharge for Monday. PCP with Dr. Henny Hanson is scheduled for 11/9 at 9:40am for home health consult. Son will bring patient to [facility] for intake 11/11 at 8:30am     Son will be able to pick patient up at 5pm Monday. Son will call [phone] to have patient assessed for assisted living. Provided him the #. Please call him after 2pm Monday.

## 2022-11-04 NOTE — OP NOTE
Department of Psychiatry  Electroconvulsive Therapy Treatment Note        11/4/2022    PURPOSE FOR ECT:  Therapeutic NUMBER: 7    DIAGNOSIS:   Principal Problem:    Severe episode of recurrent major depressive disorder, with psychotic features ([facility]  Active Problems:    Tremor    Parkinsonism ([facility]    Dyskinesia  Resolved Problems:    * No resolved hospital problems.  *      MEDICATIONS:    Current Facility-Administered Medications:     0.9 % sodium chloride infusion, , IntraVENous, Continuous, Carmen Lorenzo MD, Last Rate: 100 mL/hr at 11/04/22 1127, New Bag at 11/04/22 1127    naphazoline-pheniramine (NAPHCON-A) 0.025-0.3 % ophthalmic solution 1 drop, 1 drop, Right Eye, Q4H PRN, Carmen Lorenzo MD    risperiDONE (RISPERDAL) tablet 0.25 mg, 0.25 mg, Oral, TID, Long Sorto MD, 0.25 mg at 11/03/22 2133    0.9 % sodium chloride infusion, , IntraVENous, Continuous, Carmen Lorenzo MD, Last Rate: 100 mL/hr at 10/31/22 1056, New Bag at 10/31/22 1056    clonazePAM (KLONOPIN) tablet 0.5 mg, 0.5 mg, Oral, Q12H, Carmen Lorenzo MD, 0.5 mg at 11/03/22 2133    traZODone (DESYREL) tablet 50 mg, 50 mg, Oral, Nightly, Carmen Lorenzo MD, 50 mg at 11/03/22 2133    haloperidol (HALDOL) tablet 2 mg, 2 mg, Oral, Q6H PRN, Carmen Lorenzo MD, 2 mg at 10/27/22 1636    PARoxetine (PAXIL) tablet 40 mg, 40 mg, Oral, Daily, Long Sorto MD, 40 mg at 11/03/22 0910    0.9 % sodium chloride infusion, , IntraVENous, Continuous, Carmen Lorenzo MD, Last Rate: 100 mL/hr at 10/24/22 1143, New Bag at 10/24/22 1143    benzocaine (ORAJEL) 20 % mucosal gel, , Mouth/Throat, BID PRN, Olga Lidia Dumont, APRN - CNP    insulin lispro (HUMALOG) injection vial 0-8 Units, 0-8 Units, SubCUTAneous, TID WC, Isis Marques, RAVEN Martell NP, 2 Units at 11/01/22 1704    insulin lispro (HUMALOG) injection vial 0-4 Units, 0-4 Units, SubCUTAneous, Nightly, Isis Marques, APRN - NP    glucose chewable tablet 16 g, 4 tablet, Oral, PRN, Isis Marques, APRN - NP    dextrose bolus 10% 125 mL, 125 mL, IntraVENous, PRN **OR** dextrose bolus 10% 250 mL, 250 mL, IntraVENous, PRN, Consuelo Gutiérrez, APRN - NP    glucagon (rDNA) injection 1 mg, 1 mg, SubCUTAneous, PRN, Consuelo Gutiérrez, APRN - NP    dextrose 10 % infusion, , IntraVENous, Continuous PRN, Consuelo Gutiérrez, APRN - NP    isosorbide mononitrate (IMDUR) extended release tablet 30 mg, 30 mg, Oral, Daily, Pratibha Rey MD, 30 mg at 11/03/22 0910    metoprolol succinate (TOPROL XL) extended release tablet 25 mg, 25 mg, Oral, Nightly, Pratibha Rey MD, 25 mg at 11/03/22 2134    atorvastatin (LIPITOR) tablet 20 mg, 20 mg, Oral, Nightly, Pratibha Rey MD, 20 mg at 11/03/22 2133    albuterol sulfate HFA (PROVENTIL;VENTOLIN;PROAIR) 108 (90 Base) MCG/ACT inhaler 2 puff, 2 puff, Inhalation, 4x Daily PRN, Sammi Nolasco MD    aspirin chewable tablet 81 mg, 81 mg, Oral, Daily, Sammi Nolasco MD, 81 mg at 11/03/22 0910    acetaminophen (TYLENOL) tablet 650 mg, 650 mg, Oral, Q4H PRN, Sammi Nolasco MD, 650 mg at 11/03/22 0910    magnesium hydroxide (MILK OF MAGNESIA) 400 MG/5ML suspension 30 mL, 30 mL, Oral, Daily PRN, Sammi Nolasco MD, 30 mL at 10/16/22 1217    nicotine polacrilex (COMMIT) lozenge 2 mg, 2 mg, Oral, Q1H PRN, Sammi Nolasco MD    Facility-Administered Medications Ordered in Other Encounters:     0.9 % sodium chloride infusion, , IntraVENous, Continuous PRN, Rosey Santoro DO, New Bag at 11/04/22 1134    glycopyrrolate (ROBINUL) injection, , IntraVENous, PRN, Ha Pimentel DO, 0.2 mg at 11/04/22 1200    ondansetron (ZOFRAN) injection, , IntraVENous, PRN, Ha Pimentel DO, 4 mg at 11/04/22 1200    propofol injection, , IntraVENous, PRN, Ha Pimentel DO, 60 mg at 11/04/22 1201    succinylcholine chloride (ANECTINE) injection, , IntraVENous, PRN, Ha Pimentel DO, 40 mg at 11/04/22 1202    CHANGE IN PSYCHIATRY STATUS SINCE LAST ECT:   Mood better    INFORMED CONSENT OBTAINED : YES    PRE ECT MEDICATION ADMINISTERED:   YES    NPO STATUS: Confirmed    ELECTRODE PLACEMENT : RUL    TIME OUT :  TEAM CONFIRMS THE CORRECT PATIENT, CORRECT PROCEDURE AND CORRECT SITE.     DEVICE PARAMETERS:   Charge- 230  PW - 0.3  Freq- 60  Duration- 8  EEG- 23  Motor-  20    VITALS:   Vitals:    11/04/22 1115   BP: (!) 127/56   Pulse: 52   Resp: 16   Temp: 97 °F (36.1 °C)   SpO2: 28%         COMPLICATIONS: no      RECOMMENDATIONS FOR NEXT TREATMENT:  completed ECT series        PSYCHIATRIST:  Oliver Hanley MD

## 2022-11-04 NOTE — PROGRESS NOTES
Physical Therapy Missed Treatment   Facility/Department: [facility] MED SURG [id number]    NAME: Marybeth Manuel    : [birth year] (74 y.o.)  MRN: [id number]    Account: [de-identified]  Gender: male    Chart reviewed, attempted PT at 18. Patient unavailable 2° to:    [] Hold per nsg request    [] Pt declined. [] Nsg notified   [] Other notified    [x] Pt. . off floor for test/procedure. ECT. [] Pt. Unavailable       Will attempt PT treatment again at earliest convenience.       Electronically signed by Beth Dominguez PTA on 22 at 11:16 AM EDT

## 2022-11-04 NOTE — PROGRESS NOTES
Nutrition Assessment     Type and Reason for Visit: Reassess    Nutrition Recommendations/Plan:   Continue current POC  Obtain bed scale weight to assess weight changes      Malnutrition Assessment:  Malnutrition Status: At risk for malnutrition (Comment)    Nutrition Assessment:  Nutritional status remains compromised, intakes noted ~50%. Continue current POC. Bed scale weight ordered to assess weight loss. Estimated Daily Nutrient Needs:  Energy (kcal):  ~ 1550 @ 30 kcal/kg Weight Used for Energy Requirements: Current     Protein (g):  67-77 gm (kg x 1.3-1.5) Weight Used for Protein Requirements: Current        Fluid (ml/day):  ~1500 ml Method Used for Fluid Requirements: 1 ml/kcal    Nutrition Related Findings:   Hx : anxiety, depression, CAD, DM, COVID, \"Pt started ECT tx's this week Pt's appetite appears to be improving, pt always says he is not hungry yet if taken to dining room & tray placed in front of him, he usually consumes 30-50 % of meals. \",took ~80% D/100% ONS @ dinner 10/28. hx of DM noted, glucose < 140 without dietary restricions, last BM documented 11/2 Wound Type: None    Current Nutrition Therapies:    ADULT DIET; Regular; No Concentrated sweets  ADULT ORAL NUTRITION SUPPLEMENT; Breakfast, Dinner; Diabetic Oral Supplement  ADULT ORAL NUTRITION SUPPLEMENT; Lunch;  Fortified Pudding Oral Supplement    Anthropometric Measures:  Height: 5' 5\" (165.1 cm)  Current Body Wt: 113 lb (51.3 kg)   BMI: 18.8    Nutrition Diagnosis:   Inadequate oral intake related to cognitive or neurological impairment as evidenced by poor intake prior to admission  Unintended weight loss related to inadequate protein-energy intake as evidenced by weight loss, poor intake prior to admission    Nutrition Interventions:   Food and/or Nutrient Delivery: Continue Current Diet, Modify Oral Nutrition Supplement (Continue No Concentrated Sweets  Increase diabetic ONS to TID)  Nutrition Education/Counseling: No recommendation at this time  Coordination of Nutrition Care: Continue to monitor while inpatient       Goals:  Previous Goal Met: Progressing toward Goal(s)  Goals: PO intake 75% or greater, other (specify)  Specify Other Goals: Glucose < 160, wt gain    Nutrition Monitoring and Evaluation:      Food/Nutrient Intake Outcomes: Food and Nutrient Intake, Supplement Intake  Physical Signs/Symptoms Outcomes: Biochemical Data, Weight, Meal Time Behavior    Discharge Planning:     Too soon to determine     Alyse Patel, MS, RD, LD

## 2022-11-04 NOTE — PROGRESS NOTES
Pt. declined to attend the 0900 community meeting, despite staff encouragement. Remains on 1:1 with staff prior to procedure.  GOAL : \" to go to ECT and feel better\" Electronically signed by Sayda Ayers on 11/4/2022 at 9:46 AM

## 2022-11-04 NOTE — PLAN OF CARE
Pt out on unit at beginning of shift. Gait steady. Isolated to room in bed. Pt is alert to self,, month,year,situation. Pt denies SI, A/V hallucinations. Reports obsessive/intrusive thoughts to harm wife and others. No specific plan or intent. Contracts for safety on the unit. Pt made aware will be NPO at 0400.  Declined to be woke for light breakfast   Problem: Self Harm/Suicidality  Goal: Will have no self-injury during hospital stay  Description: INTERVENTIONS:  1. Q 30 MINUTES: Routine safety checks  2. Q SHIFT & PRN: Assess risk to determine if routine checks are adequate to maintain patient safety  11/3/2022 2252 by Kar Gallardo RN  Outcome: Progressing  11/3/2022 1220 by Karina Mckenzie RN  Outcome: Progressing     Problem: Chronic Conditions and Co-morbidities  Goal: Patient's chronic conditions and co-morbidity symptoms are monitored and maintained or improved  11/3/2022 2252 by Kar Gallardo RN  Outcome: Progressing  11/3/2022 1220 by Karina Mckenzie RN  Outcome: Progressing  Flowsheets (Taken 11/3/2022 1149)  Care Plan - Patient's Chronic Conditions and Co-Morbidity Symptoms are Monitored and Maintained or Improved:   Monitor and assess patient's chronic conditions and comorbid symptoms for stability, deterioration, or improvement   Collaborate with multidisciplinary team to address chronic and comorbid conditions and prevent exacerbation or deterioration   Update acute care plan with appropriate goals if chronic or comorbid symptoms are exacerbated and prevent overall improvement and discharge     Problem: Safety - Adult  Goal: Free from fall injury  11/3/2022 2252 by Kar Gallardo RN  Outcome: Progressing  11/3/2022 1220 by Karina Mckenzie RN  Outcome: Progressing     Problem: ABCDS Injury Assessment  Goal: Absence of physical injury  11/3/2022 2252 by Kar Gallardo RN  Outcome: Progressing  11/3/2022 1220 by Karina Mckenzie RN  Outcome: Progressing     Problem: Skin/Tissue Integrity  Goal: Absence of new skin breakdown  Description: 1. Monitor for areas of redness and/or skin breakdown  2. Assess vascular access sites hourly  3. Every 4-6 hours minimum:  Change oxygen saturation probe site  4. Every 4-6 hours:  If on nasal continuous positive airway pressure, respiratory therapy assess nares and determine need for appliance change or resting period. 11/3/2022 2252 by Karine Nelson RN  Outcome: Progressing  11/3/2022 1220 by Courtney Gaxiola RN  Outcome: Progressing     Problem: Nutrition Deficit:  Goal: Optimize nutritional status  11/3/2022 2252 by Karine Nelson RN  Outcome: Progressing  11/3/2022 1220 by Courtney Gaxiola RN  Outcome: Progressing     Problem: Risk for Physiologic Instability  Goal: B/P, SaO2, EKG, and respiratory status WDL  Description: INTERVENTIONS:  1. Monitor B/P, SaO2, EKG and respiratory function  2. Notify physician of unstable condition/changes  11/3/2022 2252 by Karine Nelson RN  Outcome: Progressing  11/3/2022 1220 by Courtney Gaxiola RN  Outcome: Progressing     Problem: Impaired Comfort  Goal: Patient reports pain level is manageable/acceptable  Description: INTERVENTIONS:  1. Provide emotional support and reassurance at all times to relieve anxiety  2. Position patient for comfort  3. Medicate PRN for pain relief  11/3/2022 2252 by Karine Nelson RN  Outcome: Progressing  11/3/2022 1220 by Courtney Gaxiola RN  Outcome: Progressing     Problem: Deficient Knowledge  Goal: Pt/family demonstrate understanding of pre/post-procedure instructions  Description: INTERVENTIONS:  1. Provide pre-procedure instructions  2.  Provide written and verbal post-procedural instructions  11/3/2022 2252 by Karine Nelson RN  Outcome: Progressing  11/3/2022 1220 by Courtney Gaxiola RN  Outcome: Progressing     Problem: Risk for Injury  Goal: ARCHIVE-Patient remains free from injury  Description: INTERVENTIONS:  1. Universal precautions  2. Ensure bed, stretcher, or wheelchair are in locked position when not transporting  3. Position patient per procedure requirement  4. Maintain patent airway  5. Perform pre-op equipment checks  11/3/2022 2252 by Lashon Gross RN  Outcome: Progressing  11/3/2022 1220 by Dolly Jaimes RN  Outcome: Progressing     Problem: Pain  Goal: Verbalizes/displays adequate comfort level or baseline comfort level  11/3/2022 2252 by Lashon Gross RN  Outcome: Progressing  11/3/2022 1220 by Dolly Jaimes RN  Outcome: Progressing     Problem: Anxiety  Goal: Will report anxiety at manageable levels  Description: INTERVENTIONS:  1. Administer medication as ordered  2. Teach and rehearse alternative coping skills  3. Provide emotional support with 1:1 interaction with staff  11/3/2022 2252 by Lashon Gross RN  Outcome: Progressing  11/3/2022 1220 by Dolly Jaimes RN  Outcome: Progressing  Flowsheets (Taken 11/3/2022 1149)  Will report anxiety at manageable levels:   Administer medication as ordered   Teach and rehearse alternative coping skills   Provide emotional support with 1:1 interaction with staff     Problem: Coping  Goal: Pt/Family able to verbalize concerns and demonstrate effective coping strategies  Description: INTERVENTIONS:  1. Assist patient/family to identify coping skills, available support systems and cultural and spiritual values  2. Provide emotional support, including active listening and acknowledgement of concerns of patient and caregivers  3. Reduce environmental stimuli, as able  4. Instruct patient/family in relaxation techniques, as appropriate  5.  Assess for spiritual pain/suffering and initiate Spiritual Care, Psychosocial Clinical Specialist consults as needed  11/3/2022 2252 by Lashon Gross RN  Outcome: Progressing  11/3/2022 1220 by Dolly Jaimes RN  Outcome: Progressing  Flowsheets (Taken 11/3/2022 1149)  Patient/family able to verbalize anxieties, fears, and concerns, and demonstrate effective coping:   Assist patient/family to identify coping skills, available support systems and cultural and spiritual values   Provide emotional support, including active listening and acknowledgement of concerns of patient and caregivers   Reduce environmental stimuli, as able   Instruct patient/family in relaxation techniques, as appropriate   Assess for spiritual pain/suffering and initiate Spiritual Care, Psychosocial Clinical Specialist consults as needed     Problem: Confusion  Goal: Confusion, delirium, dementia, or psychosis is improved or at baseline  Description: INTERVENTIONS:  1. Assess for possible contributors to thought disturbance, including medications, impaired vision or hearing, underlying metabolic abnormalities, dehydration, psychiatric diagnoses, and notify attending LIP  2. [place] high risk fall precautions, as indicated  3. Provide frequent short contacts to provide reality reorientation, refocusing and direction  4. Decrease environmental stimuli, including noise as appropriate  5. Monitor and intervene to maintain adequate nutrition, hydration, elimination, sleep and activity  6. If unable to ensure safety without constant attention obtain sitter and review sitter guidelines with assigned personnel  7.  Initiate Psychosocial CNS and Spiritual Care consult, as indicated  11/3/2022 2252 by June Simental RN  Outcome: Progressing  11/3/2022 1220 by Nely Nava, RN  Outcome: Progressing  Flowsheets (Taken 11/3/2022 1149)  Effect of thought disturbance (confusion, delirium, dementia, or psychosis) are managed with adequate functional status:   Assess for contributors to thought disturbance, including medications, impaired vision or hearing, underlying metabolic abnormalities, dehydration, psychiatric diagnoses, notify Jeremías Castaneda high risk fall precautions, as indicated   Provide frequent short contacts to provide reality reorientation, refocusing and direction   Decrease environmental stimuli, including noise as appropriate   Monitor and intervene to maintain adequate nutrition, hydration, elimination, sleep and activity   If unable to ensure safety without constant attention obtain sitter and review sitter guidelines with assigned personnel   Initiate Psychosocial Clinical Nurse Specialist and Spiritual Care consult, as indicated     Problem: Depression/Self Harm  Goal: Effect of psychiatric condition will be minimized and patient will be protected from self harm  Description: INTERVENTIONS:  1. Assess impact of patient's symptoms on level of functioning, self care needs and offer support as indicated  2. Assess patient/family knowledge of depression, impact on illness and need for teaching  3. Provide emotional support, presence and reassurance  4. Assess for possible suicidal thoughts or ideation. If patient expresses suicidal thoughts or statements do not leave alone, initiate Suicide Precautions, move to a room close to the nursing station and obtain sitter  5. Initiate consults as appropriate with Mental Health Professional, Spiritual Care, Psychosocial CNS, and consider a recommendation to the LIP for a Psychiatric Consultation  11/3/2022 2252 by Milton Sykes RN  Outcome: Progressing  11/3/2022 1220 by Cassie Aldana RN  Outcome: Progressing  Flowsheets (Taken 11/3/2022 1149)  Effect of psychiatric condition will be minimized and patient will be protected from self harm:   Assess impact of patients symptoms on level of functioning, self care needs and offer support as indicated   Assess patient/family knowledge of depression, impact on illness and need for teaching   Provide emotional support, presence and reassurance   Assess for suicidal thoughts or ideation.  If patient expresses suicidal thoughts or statements do not leave alone, initiate Suicide Precautions, move near nurse station, obtain sitter   Initiate consults as appropriate with Mental Health Professional, Spiritual Care, Psychosocial CNS, and consider a recommendation to the LIP for a Psychiatric Consultation

## 2022-11-04 NOTE — PROGRESS NOTES
Physical Therapy Missed Treatment   Facility/Department: [facility] SURG Y151/U176-12    NAME: Marly Nichols    : [birth year] (74 y.o.)  MRN: [id number]    Account: [de-identified]  Gender: male      Patient has met all PT goals at this time and will be D/C from PT.     Rico Uribe, PT, 22 at 12:33 PM

## 2022-11-04 NOTE — PROGRESS NOTES
Pt. was off the unit for a procedure and did not attend the 1000 skill group.  Electronically signed by Marla Rahman on 11/4/2022 at 2:31 PM

## 2022-11-04 NOTE — ANESTHESIA POSTPROCEDURE EVALUATION
Department of Anesthesiology  Postprocedure Note    Patient: Andrés Truong  MRN: [id number]  YOB: 1950  Date of evaluation: 11/4/2022      Procedure Summary     Date: 11/04/22 [place] [facility] [place]    Anesthesia Start: 1200 Anesthesia Stop:     Procedure: ECT W/ ANESTHESIA Diagnosis:     Scheduled Providers:  Responsible Provider: Leroy Juarez DO    Anesthesia Type: general ASA Status: 3          Anesthesia Type: General    Juan Phase I: Juan Score: 10    Juan Phase II:        Anesthesia Post Evaluation    Patient location during evaluation: bedside  Patient participation: complete - patient participated  Level of consciousness: awake and awake and alert  Pain score: 0  Airway patency: patent  Nausea & Vomiting: no nausea and no vomiting  Complications: no  Cardiovascular status: blood pressure returned to baseline and hemodynamically stable  Respiratory status: acceptable  Hydration status: euvolemic

## 2022-11-04 NOTE — ANESTHESIA POSTPROCEDURE EVALUATION
Department of Anesthesiology  Postprocedure Note    Patient: Krystina Jose  MRN: [id number]  YOB: 1950  Date of evaluation: 11/4/2022      Procedure Summary     Date: 11/04/22 Room / Location[place] [facility] [place]    Anesthesia Start: 1200 Anesthesia Stop:     Procedure: ECT W/ ANESTHESIA Diagnosis:     Scheduled Providers:  Responsible Provider: Jackie Smith DO    Anesthesia Type: general ASA Status: 3          Anesthesia Type: Gen    Juan Phase I: Juan Score: 10    Juan Phase II:        Anesthesia Post Evaluation    Patient location during evaluation: bedside  Patient participation: complete - patient participated  Level of consciousness: awake and awake and alert  Pain score: 0  Airway patency: patent  Nausea & Vomiting: no nausea and no vomiting  Complications: no  Cardiovascular status: blood pressure returned to baseline and hemodynamically stable  Respiratory status: acceptable  Hydration status: euvolemic

## 2022-11-04 NOTE — H&P
Update History & Physical    The patient's History and Physical of 10 / 18 / 22 was reviewed with the patient and there were no significant changes. I examined the patient and there were no significant changes from the previous History and Physical.    Vitals:    11/04/22 1115   BP: (!) 127/56   Pulse: 52   Resp: 16   Temp: 97 °F (36.1 °C)   SpO2: 99%     Principal Problem:    Severe episode of recurrent major depressive disorder, with psychotic features ([address]  Active Problems:    Tremor    Parkinsonism ([address]    Dyskinesia  Resolved Problems:    * No resolved hospital problems. *        Plan: The risk, benefits, expected outcome, and alternative to the recommended procedure have been discussed with the patient. Patient understands and wants to proceed with the procedure.     Electronically signed by Arely Owusu MD

## 2022-11-05 LAB
GLUCOSE BLD-MCNC: 134 MG/DL (ref 70–99)
GLUCOSE BLD-MCNC: 140 MG/DL (ref 70–99)
GLUCOSE BLD-MCNC: 98 MG/DL (ref 70–99)
PERFORMED ON: ABNORMAL
PERFORMED ON: ABNORMAL
PERFORMED ON: NORMAL

## 2022-11-05 PROCEDURE — 1240000000 HC EMOTIONAL WELLNESS R&B

## 2022-11-05 PROCEDURE — 99232 SBSQ HOSP IP/OBS MODERATE 35: CPT | Performed by: PSYCHIATRY & NEUROLOGY

## 2022-11-05 PROCEDURE — 6370000000 HC RX 637 (ALT 250 FOR IP): Performed by: PSYCHIATRY & NEUROLOGY

## 2022-11-05 PROCEDURE — 6370000000 HC RX 637 (ALT 250 FOR IP): Performed by: INTERNAL MEDICINE

## 2022-11-05 RX ADMIN — PAROXETINE HYDROCHLORIDE 40 MG: 20 TABLET, FILM COATED ORAL at 09:47

## 2022-11-05 RX ADMIN — ATORVASTATIN CALCIUM 20 MG: 20 TABLET, FILM COATED ORAL at 21:30

## 2022-11-05 RX ADMIN — TRAZODONE HYDROCHLORIDE 50 MG: 50 TABLET ORAL at 21:31

## 2022-11-05 RX ADMIN — ASPIRIN 81 MG 81 MG: 81 TABLET ORAL at 09:47

## 2022-11-05 RX ADMIN — RISPERIDONE 0.25 MG: 0.25 TABLET ORAL at 14:46

## 2022-11-05 RX ADMIN — METOPROLOL SUCCINATE 25 MG: 25 TABLET, FILM COATED, EXTENDED RELEASE ORAL at 21:31

## 2022-11-05 RX ADMIN — CLONAZEPAM 0.5 MG: 0.5 TABLET ORAL at 09:47

## 2022-11-05 RX ADMIN — RISPERIDONE 0.25 MG: 0.25 TABLET ORAL at 21:30

## 2022-11-05 RX ADMIN — RISPERIDONE 0.25 MG: 0.25 TABLET ORAL at 09:47

## 2022-11-05 RX ADMIN — CLONAZEPAM 0.5 MG: 0.5 TABLET ORAL at 21:30

## 2022-11-05 ASSESSMENT — PAIN SCALES - WONG BAKER: WONGBAKER_NUMERICALRESPONSE: 0

## 2022-11-05 NOTE — PROGRESS NOTES
Pt. refused to attend the 1000 skills group, despite staff encouragement.   Electronically signed by Rodolfo Arias on 11/5/2022 at 12:14 PM

## 2022-11-05 NOTE — PROGRESS NOTES
Pt. declined to attend the 0900 community meeting, despite staff encouragement.   GOAL : \" to feel better\" Electronically signed by Coty Navarrete on 11/5/2022 at 12:13 PM

## 2022-11-05 NOTE — PROGRESS NOTES
Pt reports depresison & anxiety are both # 8/10., Pt denies SI/AVH. Pt does admit to HI, has recurring intrusive thoughts to harm his wife. Pt is reluctant to discuss posible d/c on monday. Pt keeps to self in room in bed most of the time, does go to dayroom for meals and usually eats approx. 50% of meals. Pt is able to take self to BR but sometimes needs reminding or will hold it for hours @ a time. Pt does sleep approx. 5-6 hrs each night, if asked pt reports he didn't sleep @ all. Pt does not go to groups.

## 2022-11-05 NOTE — PLAN OF CARE
Assessment done. Pt denies SI, A/VH and states he is homicidal towards his wife. Pt is not sure exactly why he is homicidal towards her but he fears that he will hurt her if she continues to live with him. States depression of 5 and anxiety in general of 7. Pt is resting calmly in bed, is alert and answering questions appropriately. Denies any needs at this time. Pt changed into gown and placed on the monitor   C/o vomiting that started at 2am   +nausea  +dizziness   Pt states his daughter was dx with COVID yesterday

## 2022-11-05 NOTE — PROGRESS NOTES
[facility] [place] [facility] FOLLOW-UP NOTE       11/5/2022     Patient was seen and examined in person, Chart reviewed   Patient's case discussed with staff/team    Chief Complaint: Depression anxiety    Interim History:     Completed ECT course  Wife leaving home today  Son will be picking him up on Monday  Less depressed  No thoughts of harming wife or others  Slept better last night  Gait better with walker  Appetite:   [x] Normal/Unchanged  [] Increased  [] Decreased      Sleep:       [] Normal/Unchanged  [x] Fair       [] Poor              Energy:    [x] Normal/Unchanged  [] Increased  [] Decreased        SI [] Present  [x] Absent    HI  []Present  [x] Absent     Aggression:  [] yes  [x] no    Patient is [x] able  [] unable to CONTRACT FOR SAFETY     PAST MEDICAL/PSYCHIATRIC HISTORY:   Past Medical History:   Diagnosis Date    Anxiety     Chest pain 03/29/2018    Coronary artery disease involving native coronary artery of native heart without angina pectoris 02/15/2019    Diabetes mellitus ([facility]     no meds    History of colon polyps     Hyperlipidemia     Hypertension     Type 2 diabetes mellitus without complication (HCC)     Vertigo        FAMILY/SOCIAL HISTORY:  Family History   Problem Relation Age of Onset    Heart Disease Maternal Aunt     Cancer Maternal Aunt     Colon Cancer Maternal Aunt      Social History     Socioeconomic History    Marital status:      Spouse name: Not on file    Number of children: Not on file    Years of education: Not on file    Highest education level: Not on file   Occupational History    Not on file   Tobacco Use    Smoking status: Never    Smokeless tobacco: Never   Vaping Use    Vaping Use: Never used   Substance and Sexual Activity    Alcohol use: No    Drug use: Never    Sexual activity: Not on file   Other Topics Concern    Not on file   Social History Narrative    Not on file     Social Determinants of Health     Financial Resource Strain: Not on file   Food Insecurity: Not on file   Transportation Needs: Not on file   Physical Activity: Not on file   Stress: Not on file   Social Connections: Not on file   Intimate Partner Violence: Not on file   Housing Stability: Not on file           ROS:  [x] All negative/unchanged except if checked.  Explain positive(checked items) below:  [] Constitutional  [] Eyes  [] Ear/Nose/Mouth/Throat  [] Respiratory  [] CV  [] GI  []   [] Musculoskeletal  [] Skin/Breast  [] Neurological  [] Endocrine  [] Heme/Lymph  [] Allergic/Immunologic    Explanation:     MEDICATIONS:    Current Facility-Administered Medications:     0.9 % sodium chloride infusion, , IntraVENous, Continuous, Anju Soto MD, Last Rate: 100 mL/hr at 11/04/22 1127, New Bag at 11/04/22 1127    naphazoline-pheniramine (NAPHCON-A) 0.025-0.3 % ophthalmic solution 1 drop, 1 drop, Right Eye, Q4H PRN, Anju Soto MD    risperiDONE (RISPERDAL) tablet 0.25 mg, 0.25 mg, Oral, TID, Ovidio Burnette MD, 0.25 mg at 11/05/22 0947    0.9 % sodium chloride infusion, , IntraVENous, Continuous, Anju Soto MD, Last Rate: 100 mL/hr at 10/31/22 1056, New Bag at 10/31/22 1056    clonazePAM (KLONOPIN) tablet 0.5 mg, 0.5 mg, Oral, Q12H, Anju Soto MD, 0.5 mg at 11/05/22 0947    traZODone (DESYREL) tablet 50 mg, 50 mg, Oral, Nightly, Anju Soto MD, 50 mg at 11/04/22 2125    haloperidol (HALDOL) tablet 2 mg, 2 mg, Oral, Q6H PRN, Anju Soto MD, 2 mg at 10/27/22 1636    PARoxetine (PAXIL) tablet 40 mg, 40 mg, Oral, Daily, Ovidio Burnette MD, 40 mg at 11/05/22 0947    0.9 % sodium chloride infusion, , IntraVENous, Continuous, Anju Soto MD, Last Rate: 100 mL/hr at 10/24/22 1143, New Bag at 10/24/22 1143    benzocaine (ORAJEL) 20 % mucosal gel, , Mouth/Throat, BID PRN, Tab Villegas, APRN - CNP    insulin lispro (HUMALOG) injection vial 0-8 Units, 0-8 Units, SubCUTAneous, TID Jeff CAMPOS, RAVEN - NP, 2 Units at 11/01/22 1704    insulin lispro (HUMALOG) injection vial 0-4 Units, 0-4 Units, SubCUTAneous, Nightly, RAVEN Randle NP    glucose chewable tablet 16 g, 4 tablet, Oral, PRN, RAVEN Randle NP    dextrose bolus 10% 125 mL, 125 mL, IntraVENous, PRN **OR** dextrose bolus 10% 250 mL, 250 mL, IntraVENous, PRN, RAVEN Randle NP    glucagon (rDNA) injection 1 mg, 1 mg, SubCUTAneous, PRN, RAVEN Randle NP    dextrose 10 % infusion, , IntraVENous, Continuous PRN, RAVEN Randle NP    isosorbide mononitrate (IMDUR) extended release tablet 30 mg, 30 mg, Oral, Daily, Fernando Damon MD, 30 mg at 11/04/22 1319    metoprolol succinate (TOPROL XL) extended release tablet 25 mg, 25 mg, Oral, Nightly, Fernando Damon MD, 25 mg at 11/04/22 2125    atorvastatin (LIPITOR) tablet 20 mg, 20 mg, Oral, Nightly, Fernando Damon MD, 20 mg at 11/04/22 2125    albuterol sulfate HFA (PROVENTIL;VENTOLIN;PROAIR) 108 (90 Base) MCG/ACT inhaler 2 puff, 2 puff, Inhalation, 4x Daily PRN, Jovita Castrejon MD    aspirin chewable tablet 81 mg, 81 mg, Oral, Daily, Jovita Castrejon MD, 81 mg at 11/05/22 0947    acetaminophen (TYLENOL) tablet 650 mg, 650 mg, Oral, Q4H PRN, Jovita Castrejon MD, 650 mg at 11/04/22 2126    magnesium hydroxide (MILK OF MAGNESIA) 400 MG/5ML suspension 30 mL, 30 mL, Oral, Daily PRN, Jovita Castrejon MD, 30 mL at 10/16/22 1217    nicotine polacrilex (COMMIT) lozenge 2 mg, 2 mg, Oral, Q1H PRN, Jovita Castrejon MD      Examination:  /73   Pulse 74   Temp 98.4 °F (36.9 °C)   Resp 16   Ht 5' 5\" (1.651 m)   Wt 128 lb 9.6 oz (58.3 kg)   SpO2 98%   BMI 21.40 kg/m²   Gait - steady  Medication side effects(SE): no    Mental Status Examination:    Level of consciousness:  within normal limits   Appearance:  better  Behavior/Motor:  psychomotor retardation  Attitude toward examiner:  cooperative  Speech:  slow   Mood: less anxious and depressed  Affect:  flat and anxious  Thought processes: slow   Thought content:  Suicidal Ideation:  denies  Cognition:  oriented to person, place, and time   Concentration better  Insight better   Judgement better     ASSESSMENT:   Patient symptoms are:  [] Well controlled  [x] Improving  [] Worsening  [] No change      Diagnosis:   Principal Problem:    Severe episode of recurrent major depressive disorder, with psychotic features ([facility] [address]  Active Problems:    Tremor    Parkinsonism ([facility] [address]    Dyskinesia  Resolved Problems:    * No resolved hospital problems. *      LABS:    No results for input(s): WBC, HGB, PLT in the last 72 hours. No results for input(s): NA, K, CL, CO2, BUN, CREATININE, GLUCOSE in the last 72 hours. No results for input(s): BILITOT, ALKPHOS, AST, ALT in the last 72 hours. Lab Results   Component Value Date/Time    LABAMPH Neg 10/14/2022 08:15 AM    BARBSCNU Neg 10/14/2022 08:15 AM    LABBENZ Neg 10/14/2022 08:15 AM    LABMETH Neg 10/14/2022 08:15 AM    OPIATESCREENURINE Neg 10/14/2022 08:15 AM    PHENCYCLIDINESCREENURINE Neg 10/14/2022 08:15 AM    ETOH <10 10/14/2022 08:43 AM     Lab Results   Component Value Date/Time    TSH 3.230 10/14/2022 08:43 AM     No results found for: LITHIUM  No results found for: VALPROATE, CBMZ      Treatment Plan:  Reviewed current Medications with the patient. Risks, benefits, side effects, drug-to-drug interactions and alternatives to treatment were discussed. Collateral information: reviewed  CD evaluatio  Encourage patient to attend group and other milieu activities.   Discharge planning discussed with the patient and treatment team.    PSYCHOTHERAPY/COUNSELING:  [x] Therapeutic interview  [x] Supportive  [] CBT  [] Ongoing  [] Other  =         [x] Patient continues to need, on a daily basis, active treatment furnished directly by or requiring the supervision of inpatient psychiatric personnel      Anticipated Length of stay:            Electronically signed by Brooks Isidro MD on 11/5/2022 at 10:32 AM

## 2022-11-06 LAB
GLUCOSE BLD-MCNC: 111 MG/DL (ref 70–99)
GLUCOSE BLD-MCNC: 142 MG/DL (ref 70–99)
GLUCOSE BLD-MCNC: 153 MG/DL (ref 70–99)
GLUCOSE BLD-MCNC: 87 MG/DL (ref 70–99)
PERFORMED ON: ABNORMAL
PERFORMED ON: NORMAL

## 2022-11-06 PROCEDURE — 6370000000 HC RX 637 (ALT 250 FOR IP): Performed by: PSYCHIATRY & NEUROLOGY

## 2022-11-06 PROCEDURE — 99231 SBSQ HOSP IP/OBS SF/LOW 25: CPT | Performed by: PSYCHIATRY & NEUROLOGY

## 2022-11-06 PROCEDURE — 90833 PSYTX W PT W E/M 30 MIN: CPT | Performed by: PSYCHIATRY & NEUROLOGY

## 2022-11-06 PROCEDURE — 1240000000 HC EMOTIONAL WELLNESS R&B

## 2022-11-06 RX ORDER — ONDANSETRON 2 MG/ML
4 INJECTION INTRAMUSCULAR; INTRAVENOUS
Status: DISCONTINUED | OUTPATIENT
Start: 2022-11-06 | End: 2022-11-06

## 2022-11-06 RX ORDER — SODIUM CHLORIDE 9 MG/ML
25 INJECTION, SOLUTION INTRAVENOUS PRN
Status: DISCONTINUED | OUTPATIENT
Start: 2022-11-06 | End: 2022-11-06

## 2022-11-06 RX ORDER — SODIUM CHLORIDE 0.9 % (FLUSH) 0.9 %
5-40 SYRINGE (ML) INJECTION PRN
Status: DISCONTINUED | OUTPATIENT
Start: 2022-11-06 | End: 2022-11-06

## 2022-11-06 RX ORDER — LIDOCAINE HYDROCHLORIDE 10 MG/ML
1 INJECTION, SOLUTION EPIDURAL; INFILTRATION; INTRACAUDAL; PERINEURAL
Status: DISCONTINUED | OUTPATIENT
Start: 2022-11-06 | End: 2022-11-06

## 2022-11-06 RX ORDER — SODIUM CHLORIDE 9 MG/ML
INJECTION, SOLUTION INTRAVENOUS CONTINUOUS
Status: DISCONTINUED | OUTPATIENT
Start: 2022-11-06 | End: 2022-11-06

## 2022-11-06 RX ORDER — DIPHENHYDRAMINE HYDROCHLORIDE 50 MG/ML
12.5 INJECTION INTRAMUSCULAR; INTRAVENOUS
Status: DISCONTINUED | OUTPATIENT
Start: 2022-11-06 | End: 2022-11-06

## 2022-11-06 RX ORDER — OXYCODONE HYDROCHLORIDE 5 MG/1
5 TABLET ORAL PRN
Status: DISCONTINUED | OUTPATIENT
Start: 2022-11-06 | End: 2022-11-06

## 2022-11-06 RX ORDER — OXYCODONE HYDROCHLORIDE 5 MG/1
10 TABLET ORAL PRN
Status: DISCONTINUED | OUTPATIENT
Start: 2022-11-06 | End: 2022-11-06

## 2022-11-06 RX ORDER — SODIUM CHLORIDE 0.9 % (FLUSH) 0.9 %
5-40 SYRINGE (ML) INJECTION EVERY 12 HOURS SCHEDULED
Status: DISCONTINUED | OUTPATIENT
Start: 2022-11-06 | End: 2022-11-06

## 2022-11-06 RX ORDER — METOCLOPRAMIDE HYDROCHLORIDE 5 MG/ML
10 INJECTION INTRAMUSCULAR; INTRAVENOUS
Status: DISCONTINUED | OUTPATIENT
Start: 2022-11-06 | End: 2022-11-06

## 2022-11-06 RX ORDER — FENTANYL CITRATE 50 UG/ML
50 INJECTION, SOLUTION INTRAMUSCULAR; INTRAVENOUS EVERY 10 MIN PRN
Status: DISCONTINUED | OUTPATIENT
Start: 2022-11-06 | End: 2022-11-06

## 2022-11-06 RX ADMIN — MAGNESIUM HYDROXIDE 30 ML: 400 SUSPENSION ORAL at 15:09

## 2022-11-06 RX ADMIN — RISPERIDONE 0.25 MG: 0.25 TABLET ORAL at 21:27

## 2022-11-06 RX ADMIN — RISPERIDONE 0.25 MG: 0.25 TABLET ORAL at 14:00

## 2022-11-06 RX ADMIN — CLONAZEPAM 0.5 MG: 0.5 TABLET ORAL at 10:20

## 2022-11-06 RX ADMIN — ASPIRIN 81 MG 81 MG: 81 TABLET ORAL at 10:20

## 2022-11-06 RX ADMIN — PAROXETINE HYDROCHLORIDE 40 MG: 20 TABLET, FILM COATED ORAL at 10:20

## 2022-11-06 RX ADMIN — NAPHAZOLINE HYDROCHLORIDE AND PHENIRAMINE MALEATE 1 DROP: .25; 3 SOLUTION/ DROPS OPHTHALMIC at 10:21

## 2022-11-06 RX ADMIN — ATORVASTATIN CALCIUM 20 MG: 20 TABLET, FILM COATED ORAL at 21:27

## 2022-11-06 RX ADMIN — CLONAZEPAM 0.5 MG: 0.5 TABLET ORAL at 21:27

## 2022-11-06 RX ADMIN — TRAZODONE HYDROCHLORIDE 50 MG: 50 TABLET ORAL at 21:27

## 2022-11-06 RX ADMIN — ISOSORBIDE MONONITRATE 30 MG: 60 TABLET, EXTENDED RELEASE ORAL at 10:20

## 2022-11-06 RX ADMIN — RISPERIDONE 0.25 MG: 0.25 TABLET ORAL at 10:20

## 2022-11-06 NOTE — CARE COORDINATION
Group Therapy Note    Date: 11/6/2022  Start Time: 1100  End Time:  4122    Number of Participants: 11    Type of Group: Psychotherapy    Patient's Goal:  To participate in a goal oriented group. Notes: Patient declined to attend psychoeducation group at 1100 despite encouragement by staff.      Discipline Responsible: /Counselor    FER Blum

## 2022-11-06 NOTE — PROGRESS NOTES
Explained and gave am meds, klonopin 0.5, eye drop to rt eye per request, pt is noted relaxing in the dinning room,

## 2022-11-06 NOTE — PROGRESS NOTES
Pt reports depression and anxiety levels are  i# 7/10, denies SI/AVH. Pt does admit to homocidal thoughts to kill wife, but has no kristyn. Pt isolates to room much of the time, does come out to day room for meals, does not attend groups.

## 2022-11-06 NOTE — PROGRESS NOTES
Pt. declined to attend the 0900 community meeting, despite staff encouragement.   GOAL : \" to get better\" Electronically signed by Geoffrey Chinchilla on 11/6/2022 at 9:41 AM

## 2022-11-06 NOTE — GROUP NOTE
Group Therapy Note    Date: 11/5/2022    Group Start Time: 2045  Group End Time: 2115  Group Topic: Wrap-Up    [facility]    Kavitha Schumacher RN    To attend wrap up group and state whether or not goal was met. Group Therapy Note    Attendees: 15/21       Patient's Goal:  Pt states he did not have a goal.     Notes:  Pt states he would like to ask the doctor for ambien for sleep.      Status After Intervention:  Unchanged    Participation Level: Minimal    Participation Quality: Appropriate and Sharing      Speech:  normal      Thought Process/Content: Perseverating      Affective Functioning: Flat      Mood: euthymic      Level of consciousness:  Alert and Oriented x4      Response to Learning: Capable of insight      Endings: None Reported    Modes of Intervention: Support      Discipline Responsible: Registered Nurse      Signature:  Kavitha Schumacher RN

## 2022-11-06 NOTE — PROGRESS NOTES
Pt. refused to attend the 1000 skills group, despite staff encouragement.   Electronically signed by Shantal Ordaz on 11/6/2022 at 12:28 PM

## 2022-11-06 NOTE — PLAN OF CARE
Problem: Self Harm/Suicidality  Goal: Will have no self-injury during hospital stay  Description: INTERVENTIONS:  1. Q 30 MINUTES: Routine safety checks  2. Q SHIFT & PRN: Assess risk to determine if routine checks are adequate to maintain patient safety  Outcome: Progressing     Problem: Chronic Conditions and Co-morbidities  Goal: Patient's chronic conditions and co-morbidity symptoms are monitored and maintained or improved  Outcome: Progressing  Flowsheets (Taken 11/6/2022 1350)  Care Plan - Patient's Chronic Conditions and Co-Morbidity Symptoms are Monitored and Maintained or Improved:   Monitor and assess patient's chronic conditions and comorbid symptoms for stability, deterioration, or improvement   Collaborate with multidisciplinary team to address chronic and comorbid conditions and prevent exacerbation or deterioration     Problem: Safety - Adult  Goal: Free from fall injury  Outcome: Progressing     Problem: ABCDS Injury Assessment  Goal: Absence of physical injury  Outcome: Progressing     Problem: Skin/Tissue Integrity  Goal: Absence of new skin breakdown  Description: 1. Monitor for areas of redness and/or skin breakdown  2. Assess vascular access sites hourly  3. Every 4-6 hours minimum:  Change oxygen saturation probe site  4. Every 4-6 hours:  If on nasal continuous positive airway pressure, respiratory therapy assess nares and determine need for appliance change or resting period. Outcome: Progressing     Problem: Nutrition Deficit:  Goal: Optimize nutritional status  Outcome: Progressing     Problem: Risk for Physiologic Instability  Goal: B/P, SaO2, EKG, and respiratory status WDL  Description: INTERVENTIONS:  1. Monitor B/P, SaO2, EKG and respiratory function  2. Notify physician of unstable condition/changes  Outcome: Progressing     Problem: Impaired Comfort  Goal: Patient reports pain level is manageable/acceptable  Description: INTERVENTIONS:  1.  Provide emotional support and reassurance at all times to relieve anxiety  2. Position patient for comfort  3. Medicate PRN for pain relief  Outcome: Progressing     Problem: Deficient Knowledge  Goal: Pt/family demonstrate understanding of pre/post-procedure instructions  Description: INTERVENTIONS:  1. Provide pre-procedure instructions  2. Provide written and verbal post-procedural instructions  Outcome: Progressing     Problem: Risk for Injury  Goal: ARCHIVE-Patient remains free from injury  Description: INTERVENTIONS:  1. Universal precautions  2. Ensure bed, stretcher, or wheelchair are in locked position when not transporting  3. Position patient per procedure requirement  4. Maintain patent airway  5. Perform pre-op equipment checks  Outcome: Progressing     Problem: Pain  Goal: Verbalizes/displays adequate comfort level or baseline comfort level  Outcome: Progressing     Problem: Anxiety  Goal: Will report anxiety at manageable levels  Description: INTERVENTIONS:  1. Administer medication as ordered  2. Teach and rehearse alternative coping skills  3. Provide emotional support with 1:1 interaction with staff  Outcome: Progressing  Flowsheets (Taken 11/6/2022 1350)  Will report anxiety at manageable levels:   Administer medication as ordered   Provide emotional support with 1:1 interaction with staff   Teach and rehearse alternative coping skills     Problem: Coping  Goal: Pt/Family able to verbalize concerns and demonstrate effective coping strategies  Description: INTERVENTIONS:  1. Assist patient/family to identify coping skills, available support systems and cultural and spiritual values  2. Provide emotional support, including active listening and acknowledgement of concerns of patient and caregivers  3. Reduce environmental stimuli, as able  4. Instruct patient/family in relaxation techniques, as appropriate  5.  Assess for spiritual pain/suffering and initiate Spiritual Care, Psychosocial Clinical Specialist consults as needed  Outcome: Progressing  Flowsheets (Taken 11/6/2022 1350)  Patient/family able to verbalize anxieties, fears, and concerns, and demonstrate effective coping:   Assist patient/family to identify coping skills, available support systems and cultural and spiritual values   Provide emotional support, including active listening and acknowledgement of concerns of patient and caregivers   Reduce environmental stimuli, as able     Problem: Confusion  Goal: Confusion, delirium, dementia, or psychosis is improved or at baseline  Description: INTERVENTIONS:  1. Assess for possible contributors to thought disturbance, including medications, impaired vision or hearing, underlying metabolic abnormalities, dehydration, psychiatric diagnoses, and notify attending LIP  2. [place] high risk fall precautions, as indicated  3. Provide frequent short contacts to provide reality reorientation, refocusing and direction  4. Decrease environmental stimuli, including noise as appropriate  5. Monitor and intervene to maintain adequate nutrition, hydration, elimination, sleep and activity  6. If unable to ensure safety without constant attention obtain sitter and review sitter guidelines with assigned personnel  7.  Initiate Psychosocial CNS and Spiritual Care consult, as indicated  Outcome: Progressing  Flowsheets (Taken 11/6/2022 1350)  Effect of thought disturbance (confusion, delirium, dementia, or psychosis) are managed with adequate functional status:   Assess for contributors to thought disturbance, including medications, impaired vision or hearing, underlying metabolic abnormalities, dehydration, psychiatric diagnoses, notify [facility] high risk fall precautions, as indicated   Provide frequent short contacts to provide reality reorientation, refocusing and direction   Decrease environmental stimuli, including noise as appropriate     Problem: Depression/Self Harm  Goal: Effect of psychiatric condition will be minimized and patient will be protected from self harm  Description: INTERVENTIONS:  1. Assess impact of patient's symptoms on level of functioning, self care needs and offer support as indicated  2. Assess patient/family knowledge of depression, impact on illness and need for teaching  3. Provide emotional support, presence and reassurance  4. Assess for possible suicidal thoughts or ideation. If patient expresses suicidal thoughts or statements do not leave alone, initiate Suicide Precautions, move to a room close to the nursing station and obtain sitter  5. Initiate consults as appropriate with Mental Health Professional, Spiritual Care, Psychosocial CNS, and consider a recommendation to the LIP for a Psychiatric Consultation  Outcome: Progressing  Flowsheets  Taken 11/6/2022 1356  Effect of psychiatric condition will be minimized and patient will be protected from self harm:   Assess impact of patients symptoms on level of functioning, self care needs and offer support as indicated   Assess patient/family knowledge of depression, impact on illness and need for teaching   Provide emotional support, presence and reassurance   Assess for suicidal thoughts or ideation. If patient expresses suicidal thoughts or statements do not leave alone, initiate Suicide Precautions, move near nurse station, obtain sitter  Taken 11/6/2022 1350  Effect of psychiatric condition will be minimized and patient will be protected from self harm:   Assess impact of patients symptoms on level of functioning, self care needs and offer support as indicated   Assess patient/family knowledge of depression, impact on illness and need for teaching   Provide emotional support, presence and reassurance   Assess for suicidal thoughts or ideation.  If patient expresses suicidal thoughts or statements do not leave alone, initiate Suicide Precautions, move near nurse station, obtain sitter     Darren Poon has a flat affect but was able to make a joke and smile spontaneously. He appears improved and reports feeling better. He acknowledges feeling nervous about plan to go home. He reports thoughts of harming his wife are able to be pushed away. He shares that she is already at the sons home. No current evidence of hallucinations. Denies thoughts of self harm. Ambulates with a steady gait.

## 2022-11-06 NOTE — PROGRESS NOTES
[facility]. FOLLOW-UP NOTE            Patient was seen and examined in person, Chart reviewed   Patient's case discussed with staff/team    Chief Complaint: Depression anxiety    Interim History:     Continue to make progress  Mood better  No thoughts of harming wife or others  Slept better last night  Gait better with walker  Appetite:   [x] Normal/Unchanged  [] Increased  [] Decreased      Sleep:       [] Normal/Unchanged  [x] Fair       [] Poor              Energy:    [x] Normal/Unchanged  [] Increased  [] Decreased        SI [] Present  [x] Absent    HI  []Present  [x] Absent     Aggression:  [] yes  [x] no    Patient is [x] able  [] unable to CONTRACT FOR SAFETY     PAST MEDICAL/PSYCHIATRIC HISTORY:   Past Medical History:   Diagnosis Date    Anxiety     Chest pain 03/29/2018    Coronary artery disease involving native coronary artery of native heart without angina pectoris 02/15/2019    Diabetes mellitus ([facility] [address]     no meds    History of colon polyps     Hyperlipidemia     Hypertension     Type 2 diabetes mellitus without complication (HCC)     Vertigo        FAMILY/SOCIAL HISTORY:  Family History   Problem Relation Age of Onset    Heart Disease Maternal Aunt     Cancer Maternal Aunt     Colon Cancer Maternal Aunt      Social History     Socioeconomic History    Marital status:      Spouse name: Not on file    Number of children: Not on file    Years of education: Not on file    Highest education level: Not on file   Occupational History    Not on file   Tobacco Use    Smoking status: Never    Smokeless tobacco: Never   Vaping Use    Vaping Use: Never used   Substance and Sexual Activity    Alcohol use: No    Drug use: Never    Sexual activity: Not on file   Other Topics Concern    Not on file   Social History Narrative    Not on file     Social Determinants of Health     Financial Resource Strain: Not on file   Food Insecurity: Not on file   Transportation Needs: Not on file   Physical Activity: Not on file   Stress: Not on file   Social Connections: Not on file   Intimate Partner Violence: Not on file   Housing Stability: Not on file           ROS:  [x] All negative/unchanged except if checked.  Explain positive(checked items) below:  [] Constitutional  [] Eyes  [] Ear/Nose/Mouth/Throat  [] Respiratory  [] CV  [] GI  []   [] Musculoskeletal  [] Skin/Breast  [] Neurological  [] Endocrine  [] Heme/Lymph  [] Allergic/Immunologic    Explanation:     MEDICATIONS:    Current Facility-Administered Medications:     clonazePAM (KLONOPIN) tablet 0.25 mg, 0.25 mg, Oral, Q12H, Abhay Mcgovern MD    risperiDONE (RISPERDAL) tablet 0.25 mg, 0.25 mg, Oral, BID, Abhay Mcgovern MD    docusate sodium (COLACE) capsule 100 mg, 100 mg, Oral, Daily, Abhay Mcgovern MD    naphazoline-pheniramine (NAPHCON-A) 0.025-0.3 % ophthalmic solution 1 drop, 1 drop, Right Eye, Q4H PRN, Abhay Mcgovern MD, 1 drop at 11/06/22 1021    traZODone (DESYREL) tablet 50 mg, 50 mg, Oral, Nightly, Abhay Mcgovern MD, 50 mg at 11/06/22 2127    haloperidol (HALDOL) tablet 2 mg, 2 mg, Oral, Q6H PRN, Abhay Mcgovern MD, 2 mg at 10/27/22 1636    PARoxetine (PAXIL) tablet 40 mg, 40 mg, Oral, Daily, Abhay Mcgovern MD, 40 mg at 11/07/22 0923    benzocaine (ORAJEL) 20 % mucosal gel, , Mouth/Throat, BID PRN, Abhay Mcgovern MD    insulin lispro (HUMALOG) injection vial 0-8 Units, 0-8 Units, SubCUTAneous, TID WC, Abhay Mcgovern MD, 2 Units at 11/01/22 1704    insulin lispro (HUMALOG) injection vial 0-4 Units, 0-4 Units, SubCUTAneous, Nightly, Abhay Mcgovern MD    glucose chewable tablet 16 g, 4 tablet, Oral, PRN, Abhay Mcgovern MD    dextrose bolus 10% 125 mL, 125 mL, IntraVENous, PRN **OR** dextrose bolus 10% 250 mL, 250 mL, IntraVENous, PRN, Abhay Mcgovern MD    glucagon (rDNA) injection 1 mg, 1 mg, SubCUTAneous, PRN, Abhay Mcgovern MD    dextrose 10 % infusion, , IntraVENous, Continuous PRN, Carmen Lorenzo MD    isosorbide mononitrate (IMDUR) extended release tablet 30 mg, 30 mg, Oral, Daily, Carmen Lorenzo MD, 30 mg at 11/07/22 9448    metoprolol succinate (TOPROL XL) extended release tablet 25 mg, 25 mg, Oral, Nightly, Carmen Lorenzo MD, 25 mg at 11/05/22 2131    atorvastatin (LIPITOR) tablet 20 mg, 20 mg, Oral, Nightly, Carmen Lorenzo MD, 20 mg at 11/06/22 2127    albuterol sulfate HFA (PROVENTIL;VENTOLIN;PROAIR) 108 (90 Base) MCG/ACT inhaler 2 puff, 2 puff, Inhalation, 4x Daily PRN, Carmen Lorenzo MD    aspirin chewable tablet 81 mg, 81 mg, Oral, Daily, Carmen Lorenzo MD, 81 mg at 11/07/22 7826    acetaminophen (TYLENOL) tablet 650 mg, 650 mg, Oral, Q4H PRN, Carmen Lorenzo MD, 650 mg at 11/04/22 2126    magnesium hydroxide (MILK OF MAGNESIA) 400 MG/5ML suspension 30 mL, 30 mL, Oral, Daily PRN, Carmen Lorenzo MD, 30 mL at 11/06/22 1509    nicotine polacrilex (COMMIT) lozenge 2 mg, 2 mg, Oral, Q1H PRN, Carmen Lorenzo MD      Examination:  /69   Pulse 52   Temp 98.2 °F (36.8 °C)   Resp 18   Ht 5' 5\" (1.651 m)   Wt 128 lb 9.6 oz (58.3 kg)   SpO2 98%   BMI 21.40 kg/m²   Gait - steady  Medication side effects(SE): no    Mental Status Examination:    Level of consciousness:  within normal limits   Appearance:  better  Behavior/Motor:  psychomotor retardation  Attitude toward examiner:  cooperative  Speech:  slow   Mood: less anxious and depressed  Affect:  flat and anxious  Thought processes:  slow   Thought content:  Suicidal Ideation:  denies  Cognition:  oriented to person, place, and time   Concentration better  Insight better   Judgement better     ASSESSMENT:   Patient symptoms are:  [] Well controlled  [x] Improving  [] Worsening  [] No change      Diagnosis:   Principal Problem:    Severe episode of recurrent major depressive disorder, with psychotic features ([facility]  Active Problems:    Tremor    Parkinsonism ([address]    Dyskinesia  Resolved Problems:    * No resolved hospital problems. *      LABS:    No results for input(s): WBC, HGB, PLT in the last 72 hours. No results for input(s): NA, K, CL, CO2, BUN, CREATININE, GLUCOSE in the last 72 hours. No results for input(s): BILITOT, ALKPHOS, AST, ALT in the last 72 hours. Lab Results   Component Value Date/Time    LABAMPH Neg 10/14/2022 08:15 AM    BARBSCNU Neg 10/14/2022 08:15 AM    LABBENZ Neg 10/14/2022 08:15 AM    LABMETH Neg 10/14/2022 08:15 AM    OPIATESCREENURINE Neg 10/14/2022 08:15 AM    PHENCYCLIDINESCREENURINE Neg 10/14/2022 08:15 AM    ETOH <10 10/14/2022 08:43 AM     Lab Results   Component Value Date/Time    TSH 3.230 10/14/2022 08:43 AM     No results found for: LITHIUM  No results found for: VALPROATE, CBMZ      Treatment Plan:  Reviewed current Medications with the patient. Risks, benefits, side effects, drug-to-drug interactions and alternatives to treatment were discussed. Collateral information: reviewed  CD evaluatio  Encourage patient to attend group and other milieu activities.   Discharge planning discussed with the patient and treatment team.    PSYCHOTHERAPY/COUNSELING:  [x] Therapeutic interview  [x] Supportive  [] CBT  [] Ongoing  [] Other    Patient was seen 1:1 for 20 minutes, other than E&M time spent, focusing on      - coping skills techniques     - Anxiety management techniques discussed including deep breathing exercise and PMR     - discussing patients strength and weaknes     - Focusing on negative cognition and maladaptive thoughts, which is feeding and maintaining the depression symptoms              [x] Patient continues to need, on a daily basis, active treatment furnished directly by or requiring the supervision of inpatient psychiatric personnel      Anticipated Length of stay: tomorrow            Electronically signed by Ebonie Regalado MD on 11/7/2022 at 9:44 AM

## 2022-11-07 VITALS
HEIGHT: 65 IN | OXYGEN SATURATION: 98 % | BODY MASS INDEX: 21.43 KG/M2 | TEMPERATURE: 98.4 F | SYSTOLIC BLOOD PRESSURE: 112 MMHG | HEART RATE: 58 BPM | WEIGHT: 128.6 LBS | DIASTOLIC BLOOD PRESSURE: 68 MMHG | RESPIRATION RATE: 18 BRPM

## 2022-11-07 LAB
GLUCOSE BLD-MCNC: 106 MG/DL (ref 70–99)
GLUCOSE BLD-MCNC: 123 MG/DL (ref 70–99)
PERFORMED ON: ABNORMAL
PERFORMED ON: ABNORMAL

## 2022-11-07 PROCEDURE — 6360000002 HC RX W HCPCS: Performed by: PSYCHIATRY & NEUROLOGY

## 2022-11-07 PROCEDURE — 6370000000 HC RX 637 (ALT 250 FOR IP): Performed by: PSYCHIATRY & NEUROLOGY

## 2022-11-07 PROCEDURE — 90686 IIV4 VACC NO PRSV 0.5 ML IM: CPT | Performed by: PSYCHIATRY & NEUROLOGY

## 2022-11-07 PROCEDURE — G0008 ADMIN INFLUENZA VIRUS VAC: HCPCS | Performed by: PSYCHIATRY & NEUROLOGY

## 2022-11-07 PROCEDURE — 99239 HOSP IP/OBS DSCHRG MGMT >30: CPT | Performed by: PSYCHIATRY & NEUROLOGY

## 2022-11-07 RX ORDER — TRAZODONE HYDROCHLORIDE 50 MG/1
50 TABLET ORAL NIGHTLY
Qty: 15 TABLET | Refills: 3 | Status: SHIPPED | OUTPATIENT
Start: 2022-11-07

## 2022-11-07 RX ORDER — CLONAZEPAM 0.5 MG/1
0.25 TABLET ORAL EVERY 12 HOURS
Status: DISCONTINUED | OUTPATIENT
Start: 2022-11-07 | End: 2022-11-07 | Stop reason: HOSPADM

## 2022-11-07 RX ORDER — DOCUSATE SODIUM 100 MG/1
100 CAPSULE, LIQUID FILLED ORAL DAILY
Status: DISCONTINUED | OUTPATIENT
Start: 2022-11-07 | End: 2022-11-07 | Stop reason: HOSPADM

## 2022-11-07 RX ORDER — PAROXETINE HYDROCHLORIDE 40 MG/1
40 TABLET, FILM COATED ORAL DAILY
Qty: 15 TABLET | Refills: 3 | Status: SHIPPED | OUTPATIENT
Start: 2022-11-08

## 2022-11-07 RX ORDER — METOPROLOL SUCCINATE 25 MG/1
25 TABLET, EXTENDED RELEASE ORAL NIGHTLY
Qty: 15 TABLET | Refills: 3 | Status: SHIPPED | OUTPATIENT
Start: 2022-11-07

## 2022-11-07 RX ORDER — ATORVASTATIN CALCIUM 20 MG/1
20 TABLET, FILM COATED ORAL NIGHTLY
Qty: 15 TABLET | Refills: 3 | Status: SHIPPED | OUTPATIENT
Start: 2022-11-07

## 2022-11-07 RX ORDER — RISPERIDONE 0.25 MG/1
0.25 TABLET ORAL 2 TIMES DAILY
Status: DISCONTINUED | OUTPATIENT
Start: 2022-11-07 | End: 2022-11-07 | Stop reason: HOSPADM

## 2022-11-07 RX ORDER — PSEUDOEPHEDRINE HCL 30 MG
100 TABLET ORAL DAILY
Qty: 15 CAPSULE | Refills: 1 | Status: SHIPPED | OUTPATIENT
Start: 2022-11-07

## 2022-11-07 RX ORDER — RISPERIDONE 0.25 MG/1
0.25 TABLET ORAL 2 TIMES DAILY
Qty: 30 TABLET | Refills: 3 | Status: SHIPPED | OUTPATIENT
Start: 2022-11-07

## 2022-11-07 RX ADMIN — CLONAZEPAM 0.5 MG: 0.5 TABLET ORAL at 09:24

## 2022-11-07 RX ADMIN — ISOSORBIDE MONONITRATE 30 MG: 60 TABLET, EXTENDED RELEASE ORAL at 09:24

## 2022-11-07 RX ADMIN — ASPIRIN 81 MG 81 MG: 81 TABLET ORAL at 09:24

## 2022-11-07 RX ADMIN — PAROXETINE HYDROCHLORIDE 40 MG: 20 TABLET, FILM COATED ORAL at 09:23

## 2022-11-07 RX ADMIN — RISPERIDONE 0.25 MG: 0.25 TABLET ORAL at 09:24

## 2022-11-07 RX ADMIN — INFLUENZA A VIRUS A/VICTORIA/2570/2019 IVR-215 (H1N1) ANTIGEN (PROPIOLACTONE INACTIVATED), INFLUENZA A VIRUS A/DARWIN/6/2021 IVR-227 (H3N2) ANTIGEN (PROPIOLACTONE INACTIVATED), INFLUENZA B VIRUS B/AUSTRIA/1359417/2021 BVR-26 ANTIGEN (PROPIOLACTONE INACTIVATED), INFLUENZA B VIRUS B/PHUKET/3073/2013 BVR-1B ANTIGEN (PROPIOLACTONE INACTIVATED) 0.5 ML: 15; 15; 15; 15 INJECTION, SOLUTION INTRAMUSCULAR at 14:26

## 2022-11-07 RX ADMIN — DOCUSATE SODIUM 100 MG: 100 CAPSULE, LIQUID FILLED ORAL at 11:32

## 2022-11-07 NOTE — DISCHARGE INSTRUCTIONS
Keep all follow up appointments, take medications as ordered, utilize positive supports, abstain from use of alcohol and drugs. If symptoms return or you feel at risk to yourself or others, please call 911, return the nearest emergency room, or call your local crisis hotline:  [facility]: [phone]: [phone] [address]: [phone]     Due to the [address] [facility] is not currently available. For assistance with quitting smoking please go to https://smokefree.gov. A prescription for an FDA-approved tobacco cessation medication was offered at discharge and the patient refused. Someone from [facility]. will be calling you tomorrow to follow up on your care. If you don't hear from us, give us a call! [phone].

## 2022-11-07 NOTE — DISCHARGE INSTR - DIET

## 2022-11-07 NOTE — PROGRESS NOTES
Pt. refused to attend the 1000 skills group, despite staff encouragement.  Electronically signed by Erasmo Niño, [address] on 11/7/2022 at 12:18 PM

## 2022-11-07 NOTE — CARE COORDINATION
Discharge instructions reviewed verbally and in writing including f/u appointments. Patient verbalizes understanding and signed as such. All belongings returned for discharge. Patient denies SI, HI, A/V hallucinations, mood is stable.     Discharged with family for transport home

## 2022-11-07 NOTE — DISCHARGE SUMMARY
DISCHARGE SUMMARY      Patient ID:  Lizandro Urbano  [id number]  74 y.o.  1950      Admit date: 10/14/2022    Discharge date and time: 11/7/2022    Admitting Physician: Moncho Liu MD     Discharge Physician: Dr Giovanny Anna MD    Admission Diagnoses: Severe episode of recurrent major depressive disorder, with psychotic features ([facility] [address] [F33.3]  Major depression with psychotic features ([address] [F32.3]    Admission Condition: poor    Discharged Condition: stable    Admission Circumstance:       Mr. Lizandro Urbano is a 74 y.o. male with a history of depression, who presented to the ER with c/o depressed mood and suicidal and homicidal ideation (with thoughts to stab his wife to death). In the ER, he also reported having had auditory hallucinations as voices telling him to kill himself. Per ER notes, \"65 year old male presents to ED reporting suicidal and homicidal ideations. He reports going suicidal thoughts for over one month, and recent thoughts to harm his wife. He reports thoughts are to stab himself and/or his wife. He reports he has bene hearing voices for over 4 years. He reports he hasn't told anyone. States he hears voices telling him to kill self. He reports he gets \"flashes\" of images in his head to stab his wife. States he sees the [facility], but hasn't told his doctor about this, for unknown reason. Reports he is on zanax, took a large amount of xanax last night both because he couldn't sleep, and in an attempt to harm himself. He reports he hasn't been eating or sleeping at home for 4 days. Ate 50% at bedside this morning. \"     HISTORY OF PRESENT ILLNESS:    Patient was interviewed in his room; he was laying in bed. The patient is a 74 y.o. male with significant past history of depression who presented to the ER with c/o suicidal and homicidal ideation, as above. When interviewed today, the patient said he had been \"trembling\" and \"can't sleep\".  He c/o having ongoing muscle jerking/spasming (which he actually displayed throughout the interview). He said he came to the hospital because he was \"very anxious\", and had \"bad thoughts\", of hurting his wife and himself. He said he had been afraid he might do something to his wife, but he actually does not want to hurt her; he also said he is bothered by his thoughts of harming his wife. He said he does have thoughts of suicide, \"sometimes\". He said he cannot fall asleep, \"every time I get depressed and anxious\". He said his appetite was 'not too good'. He admitted to having been feeling \"very depressed\" lately. He admitted to hearing voices, Annel Becerrall I was going to Hell\", but said he had last heard them \"5 years ago\". He also admitted to having had visual hallucinations, but only once, \"some time ago\", \"a little soldier coming into the room\", but he said this had not been recent. He said he had only been hearing voices when he felt depressed. He denied paranoia. He denied having had any other delusions. He denied having any thought broadcasting or insertion. Stressors: unclear     The patient is currently receiving care for the above psychiatric illness. Medications Prior to Admission:     Prescriptions Prior to Admission   Medications Prior to Admission: mirtazapine (REMERON) 15 MG tablet, Take 15 mg by mouth nightly  ALPRAZolam (XANAX) 0.25 MG tablet, Take 0.25 mg by mouth in the morning, at noon, and at bedtime.   docusate sodium (COLACE) 100 MG capsule, Take 1 capsule by mouth 2 times daily for 7 days  albuterol sulfate HFA (VENTOLIN HFA) 108 (90 Base) MCG/ACT inhaler, Inhale 2 puffs into the lungs 4 times daily as needed for Wheezing  isosorbide mononitrate (IMDUR) 30 MG extended release tablet, TAKE 1 TABLET BY MOUTH DAILY  ranolazine (RANEXA) 1000 MG extended release tablet, TAKE 1 TABLET BY MOUTH TWICE DAILY  escitalopram (LEXAPRO) 10 MG tablet, Take 20 mg by mouth daily  aspirin 81 MG chewable tablet, Take 1 tablet by mouth daily  Blood Glucose Monitoring Suppl ([address]) w/Device KIT,   ONETOUCH VERIO strip,   ONETOUCH DELICA LANCETS 46N MISC,         Compliance: reported to be good     Psychiatric Review of Systems       Depression: yes     Bernadette or Hypomania:  no     Panic Attacks:  no     Phobias:  no     Obsessions and Compulsions:  yes - the thoughts of killing his wife seem to be obsessive, since they are ego-dystonic     PTSD : denies     Hallucinations:  yes - he reported to me they had been years ago, but reported in the ER that they were more recent     Delusions:  no     Substance Abuse History:  ETOH: denies   Marijuana: denies  Opiates: denies  Other Drugs: denies        Past Psychiatric History:  Prior Diagnosis:  Major depressive disorder, recurrent  Psychiatrist: he said he is seeing 'Salud' at the [facility]  Therapist:no  Hospitalization: yes; he was hospitalized in various psychiatric wards, in [facility]  Hx of Suicidal Attempts: no  Hx of violence:  no  ECT: no  Previous discontinued Psychiatric Med Trials: n/a    PAST MEDICAL/PSYCHIATRIC HISTORY:   Past Medical History:   Diagnosis Date    Anxiety     Chest pain 03/29/2018    Coronary artery disease involving native coronary artery of native heart without angina pectoris 02/15/2019    Diabetes mellitus ([facility] [address]     no meds    History of colon polyps     Hyperlipidemia     Hypertension     Type 2 diabetes mellitus without complication ([facility] [address]     Vertigo        FAMILY/SOCIAL HISTORY:  Family History   Problem Relation Age of Onset    Heart Disease Maternal Aunt     Cancer Maternal Aunt     Colon Cancer Maternal Aunt      Social History     Socioeconomic History    Marital status:      Spouse name: Not on file    Number of children: Not on file    Years of education: Not on file    Highest education level: Not on file   Occupational History    Not on file   Tobacco Use    Smoking status: Never    Smokeless tobacco: Never   Vaping Use    Vaping Use: Never used   Substance and Sexual Activity    Alcohol use: No    Drug use: Never    Sexual activity: Not on file   Other Topics Concern    Not on file   Social History Narrative    Not on file     Social Determinants of Health     Financial Resource Strain: Not on file   Food Insecurity: Not on file   Transportation Needs: Not on file   Physical Activity: Not on file   Stress: Not on file   Social Connections: Not on file   Intimate Partner Violence: Not on file   Housing Stability: Not on file       MEDICATIONS:    Current Facility-Administered Medications:     clonazePAM (KLONOPIN) tablet 0.25 mg, 0.25 mg, Oral, Q12H, Yvonne Chatman MD    risperiDONE (RISPERDAL) tablet 0.25 mg, 0.25 mg, Oral, BID, Yvonne Chatman MD    docusate sodium (COLACE) capsule 100 mg, 100 mg, Oral, Daily, Yvonne Chatman MD    naphazoline-pheniramine (NAPHCON-A) 0.025-0.3 % ophthalmic solution 1 drop, 1 drop, Right Eye, Q4H PRN, Yvonne Chatman MD, 1 drop at 11/06/22 1021    traZODone (DESYREL) tablet 50 mg, 50 mg, Oral, Nightly, Yvonne Chatman MD, 50 mg at 11/06/22 2127    haloperidol (HALDOL) tablet 2 mg, 2 mg, Oral, Q6H PRN, Yvonne Chatman MD, 2 mg at 10/27/22 1636    PARoxetine (PAXIL) tablet 40 mg, 40 mg, Oral, Daily, Yvonne Chatman MD, 40 mg at 11/07/22 0923    benzocaine (ORAJEL) 20 % mucosal gel, , Mouth/Throat, BID PRN, Yvonne Chatman MD    insulin lispro (HUMALOG) injection vial 0-8 Units, 0-8 Units, SubCUTAneous, TID WC, Yvonne Chatman MD, 2 Units at 11/01/22 1704    insulin lispro (HUMALOG) injection vial 0-4 Units, 0-4 Units, SubCUTAneous, Nightly, Yvonne Chatman MD    glucose chewable tablet 16 g, 4 tablet, Oral, PRN, Yvonne Chatman MD    dextrose bolus 10% 125 mL, 125 mL, IntraVENous, PRN **OR** dextrose bolus 10% 250 mL, 250 mL, IntraVENous, PRN, Yvonne Chatman MD    glucagon (rDNA) injection 1 mg, 1 mg, SubCUTAneous, PRN, Yvonne Chatman MD    dextrose 10 % infusion, , IntraVENous, Continuous PRN, Maite Solano MD    isosorbide mononitrate (IMDUR) extended release tablet 30 mg, 30 mg, Oral, Daily, Maite Solano MD, 30 mg at 11/07/22 0924    metoprolol succinate (TOPROL XL) extended release tablet 25 mg, 25 mg, Oral, Nightly, Maite Solano MD, 25 mg at 11/05/22 2131    atorvastatin (LIPITOR) tablet 20 mg, 20 mg, Oral, Nightly, Maite Solano MD, 20 mg at 11/06/22 2127    albuterol sulfate HFA (PROVENTIL;VENTOLIN;PROAIR) 108 (90 Base) MCG/ACT inhaler 2 puff, 2 puff, Inhalation, 4x Daily PRN, Maite Solano MD    aspirin chewable tablet 81 mg, 81 mg, Oral, Daily, Maite Solano MD, 81 mg at 11/07/22 3707    acetaminophen (TYLENOL) tablet 650 mg, 650 mg, Oral, Q4H PRN, Maite Solano MD, 650 mg at 11/04/22 2126    magnesium hydroxide (MILK OF MAGNESIA) 400 MG/5ML suspension 30 mL, 30 mL, Oral, Daily PRN, Maite Solano MD, 30 mL at 11/06/22 1509    nicotine polacrilex (COMMIT) lozenge 2 mg, 2 mg, Oral, Q1H PRN, Maite Solano MD    Examination:  /69   Pulse 52   Temp 98.2 °F (36.8 °C)   Resp 18   Ht 5' 5\" (1.651 m)   Wt 128 lb 9.6 oz (58.3 kg)   SpO2 98%   BMI 21.40 kg/m²   Gait - steady    HOSPITAL COURSE[de-identified]  Following admission to the hospital, patient had a complete physical exam and blood work up  Patient was monitored closely with suicide precaution  Patient was started on medication as listed below  Was encouraged to participate in group and other milieu activity  Patient started to feel better with this combination of treatment. Significant progress in the symptoms since admission.     Mood better, with the score of 2/10 - bad  No AVH or paranoid thoughts  No Hopeless or worthless feeling  No active SI/HI  Appetite:  [x] Normal  [] Increased  [] Decreased    Sleep:       [x] Normal  [] Fair       [] Poor            Energy:    [x] Normal  [] Increased  [] Decreased     SI [] Present  [x] Absent  HI  []Present  [x] Absent   Aggression:  [] yes  [] no  Patient is [x] able  [] unable to CONTRACT FOR SAFETY   Medication side effects(SE):  [x] None(Psych. Meds.) [] Other    Pt received course of ECT and did well    Mental Status Examination on discharge:    Level of consciousness:  within normal limits   Appearance:  well-appearing  Behavior/Motor:  no abnormalities noted  Attitude toward examiner:  attentive and good eye contact  Speech:  spontaneous, normal rate and normal volume   Mood: euthymic  Affect:  mood congruent  Thought processes:  coherent   Thought content:  Suicidal Ideation:  denies suicidal ideation  Delusions:  no evidence of delusions  Perceptual Disturbance:  denies any perceptual disturbance  Cognition:  oriented to person, place, and time   Concentration intact  Memory intact  Insight good   Judgement fair   Fund of Knowledge adequate      ASSESSMENT:  Patient symptoms are:  [x] Well controlled  [x] Improving  [] Worsening  [] No change      Diagnosis:  Principal Problem:    Severe episode of recurrent major depressive disorder, with psychotic features ([facility] [address]  Active Problems:    Tremor    Parkinsonism ([facility] [address]    Dyskinesia  Resolved Problems:    * No resolved hospital problems. *      LABS:    No results for input(s): WBC, HGB, PLT in the last 72 hours. No results for input(s): NA, K, CL, CO2, BUN, CREATININE, GLUCOSE in the last 72 hours. No results for input(s): BILITOT, ALKPHOS, AST, ALT in the last 72 hours. Lab Results   Component Value Date/Time    LABAMPH Neg 10/14/2022 08:15 AM    BARBSCNU Neg 10/14/2022 08:15 AM    LABBENZ Neg 10/14/2022 08:15 AM    LABMETH Neg 10/14/2022 08:15 AM    OPIATESCREENURINE Neg 10/14/2022 08:15 AM    PHENCYCLIDINESCREENURINE Neg 10/14/2022 08:15 AM    ETOH <10 10/14/2022 08:43 AM     Lab Results   Component Value Date/Time    TSH 3.230 10/14/2022 08:43 AM     No results found for: LITHIUM  No results found for: VALPROATE, CBMZ    RISK ASSESSMENT AT DISCHARGE: Low risk for suicide and homicide.      Treatment Plan:  Reviewed current Medications with the patient. Education provided on the complaince with treatment. Risks, benefits, side effects, drug-to-drug interactions and alternatives to treatment were discussed. Encourage patient to attend outpatient follow up appointment and therapy. Patient was advised to call the outpatient provider, visit the nearest ED or call 911 if symptoms are not manageable. Patient's family member was contacted prior to the discharge.          Medication List        START taking these medications      atorvastatin 20 MG tablet  Commonly known as: LIPITOR  Take 1 tablet by mouth nightly     metoprolol succinate 25 MG extended release tablet  Commonly known as: TOPROL XL  Take 1 tablet by mouth at bedtime     PARoxetine 40 MG tablet  Commonly known as: PAXIL  Take 1 tablet by mouth daily  Start taking on: November 8, 2022     risperiDONE 0.25 MG tablet  Commonly known as: RISPERDAL  Take 1 tablet by mouth 2 times daily     traZODone 50 MG tablet  Commonly known as: DESYREL  Take 1 tablet by mouth nightly            CHANGE how you take these medications      docusate 100 MG Caps  Commonly known as: COLACE, DULCOLAX  Take 100 mg by mouth daily  What changed: when to take this            CONTINUE taking these medications      albuterol sulfate  (90 Base) MCG/ACT inhaler  Commonly known as: Ventolin HFA  Inhale 2 puffs into the lungs 4 times daily as needed for Wheezing     ALPRAZolam 0.25 MG tablet  Commonly known as: XANAX     aspirin 81 MG chewable tablet  Take 1 tablet by mouth daily     isosorbide mononitrate 30 MG extended release tablet  Commonly known as: IMDUR  TAKE 1 TABLET BY MOUTH DAILY     OneTouch Delica Lancets 15U Misc     OneTouch Verio Flex System w/Device Kit            STOP taking these medications      escitalopram 10 MG tablet  Commonly known as: LEXAPRO     mirtazapine 15 MG tablet  Commonly known as: REMERON     OneTouch Verio strip  Generic drug: blood glucose test strips     ranolazine 1000 MG extended release tablet  Commonly known as: RANEXA               Where to Get Your Medications        These medications were sent to [facility] [address], [place] - [address] [facility] [address] - P [phone] - F [phone]  [address], [facility] [address] [address]      Phone: [phone]   atorvastatin 20 MG tablet  docusate 100 MG Caps  metoprolol succinate 25 MG extended release tablet  PARoxetine 40 MG tablet  risperiDONE 0.25 MG tablet  traZODone 50 MG tablet           Reason for more than one antipsychotic:   [x] N/A  [] 3 failed monotherapy(drugs tried):  [] Cross over to a new antipsychotic  [] Taper to monotherapy from polypharmacy  [] Augmentation of Clozapine therapy due to treatment resistance to single therapy        TIME SPEND - 35 MINUTES TO COMPLETE THE EVALUATION, DISCHARGE SUMMARY, MEDICATION RECONCILIATION AND FOLLOW UP CARE     Signed:  Jackie Floyd MD  11/7/2022  9:42 AM

## 2022-11-07 NOTE — FLOWSHEET NOTE
Pt is visible out in the dayroom for meals. Pt walks with a walker and has a slow steady gait. Pt does smile spontaneously and endorses feeling better and a improved mood.

## 2022-11-07 NOTE — PROGRESS NOTES
Morning 62 Adore Robles attended the morning community meeting on 11/7/22. Topics discussed today     [x] Introduction  Day of the week and date  Mask distribution  Current mask requirements  [x]Teams  Explanation of  Green and Blue team criteria  Nurses assigned to each team for today  Explanation about green and blue paper  Date  Patient's Name  Patient's Nurse  Goals  [x] Visitation  Announce the visiting hours for the day  Announce which team is allowed to have visitors for the day  Review any updated Covid 19 requirements for visitors during visitation  Vaccine Card or negative Covid test within 48 hours of visit  State Identification  Patients are reminded to alert the  at least 1 hour before visitation   [x] Unit Orientation  Coffee use  Phone location and etiquette  Shower locations  Summers and dryer location and process  Common area expectations  Staff rounds expectation  [x] Meals   Educate patient to the menu  The patient is encouraged to fill out the menu to get preferences at mealtime  The patient is educated that if they do not fill out the menu, they will get the standard tray  The coffee pot is decaf, patient encouraged to order regular coffee from menu.   Educate patient to the meal process  Patient encouraged to eat snacks provided twice daily  Snacks may stay in patient room     [x] Discharge Process  Discharge expectations  Fill out the survey after discharge   [x] Hygiene  Daily showers encouraged  Showers availability discussed   Daily dressing encouraged  Discussed wearing street clothing  Education provided on where to place linens and clothing  Linens in the hamper  personal clothing does not go into the linen hamper  [x] Group   Patient encouraged to attend group provided  Time of Group Meetings discussed  Gentle reminder that attendance is a Physician order  [x] Movement  Chair exercises completed  Stretching completed  Notes: Goal - \"To go home\" Electronically signed by Richar Aguirre, [address] on 11/7/2022 at 9:59 AM

## 2023-01-02 ENCOUNTER — HOSPITAL ENCOUNTER (EMERGENCY)
Age: 73
Discharge: OTHER INSTITUTION | End: 2023-01-03
Attending: EMERGENCY MEDICINE
Payer: MEDICARE

## 2023-01-02 VITALS
WEIGHT: 120 LBS | HEART RATE: 72 BPM | DIASTOLIC BLOOD PRESSURE: 82 MMHG | OXYGEN SATURATION: 95 % | HEIGHT: 65 IN | BODY MASS INDEX: 19.99 KG/M2 | RESPIRATION RATE: 18 BRPM | TEMPERATURE: 98.1 F | SYSTOLIC BLOOD PRESSURE: 149 MMHG

## 2023-01-02 DIAGNOSIS — F33.2 SEVERE EPISODE OF RECURRENT MAJOR DEPRESSIVE DISORDER, WITHOUT PSYCHOTIC FEATURES (HCC): Primary | ICD-10-CM

## 2023-01-02 LAB
ACETAMINOPHEN LEVEL: <5 UG/ML (ref 10–30)
ALBUMIN SERPL-MCNC: 4.1 G/DL (ref 3.5–4.6)
ALP BLD-CCNC: 78 U/L (ref 35–104)
ALT SERPL-CCNC: 10 U/L (ref 0–41)
AMPHETAMINE SCREEN, URINE: NORMAL
ANION GAP SERPL CALCULATED.3IONS-SCNC: 11 MEQ/L (ref 9–15)
AST SERPL-CCNC: 10 U/L (ref 0–40)
BARBITURATE SCREEN URINE: NORMAL
BASOPHILS ABSOLUTE: 0 K/UL (ref 0–0.2)
BASOPHILS RELATIVE PERCENT: 0.4 %
BENZODIAZEPINE SCREEN, URINE: NORMAL
BILIRUB SERPL-MCNC: 0.7 MG/DL (ref 0.2–0.7)
BILIRUBIN URINE: NEGATIVE
BLOOD, URINE: NEGATIVE
BUN BLDV-MCNC: 17 MG/DL (ref 8–23)
CALCIUM SERPL-MCNC: 9.2 MG/DL (ref 8.5–9.9)
CANNABINOID SCREEN URINE: NORMAL
CHLORIDE BLD-SCNC: 102 MEQ/L (ref 95–107)
CHOLESTEROL, TOTAL: 101 MG/DL (ref 0–199)
CLARITY: ABNORMAL
CO2: 26 MEQ/L (ref 20–31)
COCAINE METABOLITE SCREEN URINE: NORMAL
COLOR: YELLOW
CREAT SERPL-MCNC: 0.91 MG/DL (ref 0.7–1.2)
EOSINOPHILS ABSOLUTE: 0.1 K/UL (ref 0–0.7)
EOSINOPHILS RELATIVE PERCENT: 0.8 %
ETHANOL PERCENT: NORMAL G/DL
ETHANOL: <10 MG/DL (ref 0–0.08)
FENTANYL SCREEN, URINE: NORMAL
GFR SERPL CREATININE-BSD FRML MDRD: >60 ML/MIN/{1.73_M2}
GLOBULIN: 2.3 G/DL (ref 2.3–3.5)
GLUCOSE BLD-MCNC: 139 MG/DL (ref 70–99)
GLUCOSE URINE: NEGATIVE MG/DL
HCT VFR BLD CALC: 41.4 % (ref 42–52)
HDLC SERPL-MCNC: 38 MG/DL (ref 40–59)
HEMOGLOBIN: 14.2 G/DL (ref 14–18)
KETONES, URINE: ABNORMAL MG/DL
LDL CHOLESTEROL CALCULATED: 42 MG/DL (ref 0–129)
LEUKOCYTE ESTERASE, URINE: NEGATIVE
LYMPHOCYTES ABSOLUTE: 0.6 K/UL (ref 1–4.8)
LYMPHOCYTES RELATIVE PERCENT: 9.9 %
Lab: NORMAL
MCH RBC QN AUTO: 31.7 PG (ref 27–31.3)
MCHC RBC AUTO-ENTMCNC: 34.3 % (ref 33–37)
MCV RBC AUTO: 92.2 FL (ref 79–92.2)
METHADONE SCREEN, URINE: NORMAL
MONOCYTES ABSOLUTE: 0.5 K/UL (ref 0.2–0.8)
MONOCYTES RELATIVE PERCENT: 7.3 %
NEUTROPHILS ABSOLUTE: 5.1 K/UL (ref 1.4–6.5)
NEUTROPHILS RELATIVE PERCENT: 81.6 %
NITRITE, URINE: NEGATIVE
OPIATE SCREEN URINE: NORMAL
OXYCODONE URINE: NORMAL
PDW BLD-RTO: 13.7 % (ref 11.5–14.5)
PH UA: 7 (ref 5–9)
PHENCYCLIDINE SCREEN URINE: NORMAL
PLATELET # BLD: 166 K/UL (ref 130–400)
POTASSIUM SERPL-SCNC: 4.2 MEQ/L (ref 3.4–4.9)
PROPOXYPHENE SCREEN: NORMAL
PROTEIN UA: NEGATIVE MG/DL
RBC # BLD: 4.48 M/UL (ref 4.7–6.1)
SALICYLATE, SERUM: <0.3 MG/DL (ref 15–30)
SARS-COV-2, NAAT: NOT DETECTED
SODIUM BLD-SCNC: 139 MEQ/L (ref 135–144)
SPECIFIC GRAVITY UA: 1.02 (ref 1–1.03)
TOTAL CK: 40 U/L (ref 0–190)
TOTAL PROTEIN: 6.4 G/DL (ref 6.3–8)
TRIGL SERPL-MCNC: 105 MG/DL (ref 0–150)
TSH SERPL DL<=0.05 MIU/L-ACNC: 1.59 UIU/ML (ref 0.44–3.86)
URINE REFLEX TO CULTURE: ABNORMAL
UROBILINOGEN, URINE: 1 E.U./DL
WBC # BLD: 6.2 K/UL (ref 4.8–10.8)

## 2023-01-02 PROCEDURE — 80053 COMPREHEN METABOLIC PANEL: CPT

## 2023-01-02 PROCEDURE — 6370000000 HC RX 637 (ALT 250 FOR IP): Performed by: PHYSICIAN ASSISTANT

## 2023-01-02 PROCEDURE — 36415 COLL VENOUS BLD VENIPUNCTURE: CPT

## 2023-01-02 PROCEDURE — 84443 ASSAY THYROID STIM HORMONE: CPT

## 2023-01-02 PROCEDURE — 80307 DRUG TEST PRSMV CHEM ANLYZR: CPT

## 2023-01-02 PROCEDURE — 85025 COMPLETE CBC W/AUTO DIFF WBC: CPT

## 2023-01-02 PROCEDURE — 87635 SARS-COV-2 COVID-19 AMP PRB: CPT

## 2023-01-02 PROCEDURE — 80143 DRUG ASSAY ACETAMINOPHEN: CPT

## 2023-01-02 PROCEDURE — 80061 LIPID PANEL: CPT

## 2023-01-02 PROCEDURE — 80179 DRUG ASSAY SALICYLATE: CPT

## 2023-01-02 PROCEDURE — 82077 ASSAY SPEC XCP UR&BREATH IA: CPT

## 2023-01-02 PROCEDURE — 82550 ASSAY OF CK (CPK): CPT

## 2023-01-02 PROCEDURE — 99285 EMERGENCY DEPT VISIT HI MDM: CPT

## 2023-01-02 PROCEDURE — 81003 URINALYSIS AUTO W/O SCOPE: CPT

## 2023-01-02 RX ORDER — HYDROXYZINE PAMOATE 50 MG/1
50 CAPSULE ORAL ONCE
Status: COMPLETED | OUTPATIENT
Start: 2023-01-02 | End: 2023-01-02

## 2023-01-02 RX ADMIN — HYDROXYZINE PAMOATE 50 MG: 50 CAPSULE ORAL at 15:00

## 2023-01-02 ASSESSMENT — PATIENT HEALTH QUESTIONNAIRE - PHQ9: SUM OF ALL RESPONSES TO PHQ QUESTIONS 1-9: 27

## 2023-01-02 ASSESSMENT — ENCOUNTER SYMPTOMS
VOMITING: 0
DIARRHEA: 0
SORE THROAT: 0
COUGH: 0
NAUSEA: 0
SHORTNESS OF BREATH: 0
ABDOMINAL PAIN: 0
BACK PAIN: 0

## 2023-01-02 ASSESSMENT — PAIN - FUNCTIONAL ASSESSMENT: PAIN_FUNCTIONAL_ASSESSMENT: NONE - DENIES PAIN

## 2023-01-02 NOTE — ED NOTES
Ate 100% dinner tray. Taking PO fluids fair. Voices no complaints. EKG done.  reviewed & no orders received.      Jessica Sterling RN  01/02/23 [id number]

## 2023-01-02 NOTE — ED NOTES
Patient placed with [address], spoke with [employer].       Hailee Obando RN  01/02/23 [id number] negative

## 2023-01-02 NOTE — ED NOTES
Lab notified of new orders. Spoke with [employer] [facility]. Chart to zone 2 and Dr. Liliana Salazar notified of new admit.       Frank Fraser RN  01/02/23 [id number]

## 2023-01-02 NOTE — ED NOTES
Tolerated blood draw well. Oriented to [place]. Verbalizes understanding.      Vladimir Ayala RN  01/02/23 5701
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
## 2023-01-02 NOTE — ED PROVIDER NOTES
[address] [facility] ED  eMERGENCYdEPARTMENT eNCOUnter      Pt Name: Julia Calixto  MRN: [id number]  [place] 1950  Date of evaluation: 1/2/2023  Timi Patterson MD    CHIEF COMPLAINT           HPI  Julia Calixto is a 74 y.o. male per chart review has a h/o DM II, HTN, hpl, CAD presents to the ED with depression, SI.  Pt notes gradual onset, severe, constant, worsening depression x 1 year. +SI. Pt tried to wrap a telephone cord around his neck this am.  +HI towards his wife. Pt denies AVH. ROS  Review of Systems   Constitutional:  Negative for activity change, chills and fever. HENT:  Negative for ear pain and sore throat. Eyes:  Negative for visual disturbance. Respiratory:  Negative for cough and shortness of breath. Cardiovascular:  Negative for chest pain, palpitations and leg swelling. Gastrointestinal:  Negative for abdominal pain, diarrhea, nausea and vomiting. Genitourinary:  Negative for dysuria. Musculoskeletal:  Negative for back pain. Skin:  Negative for rash. Neurological:  Negative for dizziness and weakness. Psychiatric/Behavioral:  Positive for decreased concentration, dysphoric mood, self-injury and suicidal ideas. Except as noted above the remainder of the review of systems was reviewed and negative.        PAST MEDICAL HISTORY     Past Medical History:   Diagnosis Date    Anxiety     Chest pain 03/29/2018    Coronary artery disease involving native coronary artery of native heart without angina pectoris 02/15/2019    Diabetes mellitus ([facility]     no meds    History of colon polyps     Hyperlipidemia     Hypertension     Type 2 diabetes mellitus without complication ([facility]     Vertigo          SURGICAL HISTORY       Past Surgical History:   Procedure Laterality Date    CARDIAC SURGERY  1973    PTCA     COLONOSCOPY      COLONOSCOPY N/A 10/17/2019    COLORECTAL CANCER SCREENING, HIGH RISK performed by Charles Weller MD at [address] [place] [address] ANGIOPLASTY WITH STENT PLACEMENT      EYE SURGERY      OTHER SURGICAL HISTORY      patent foramen ovale    TONSILLECTOMY      UPPER GASTROINTESTINAL ENDOSCOPY N/A 8/1/2019    EGD ESOPHAGOGASTRODUODENOSCOPY performed by Don Merchant MD at [address]       Previous Medications    ALBUTEROL SULFATE HFA (VENTOLIN HFA) 108 (90 BASE) MCG/ACT INHALER    Inhale 2 puffs into the lungs 4 times daily as needed for Wheezing    ALPRAZOLAM (XANAX) 0.25 MG TABLET    Take 0.25 mg by mouth in the morning, at noon, and at bedtime.     ASPIRIN 81 MG CHEWABLE TABLET    Take 1 tablet by mouth daily    ATORVASTATIN (LIPITOR) 20 MG TABLET    Take 1 tablet by mouth nightly    BLOOD GLUCOSE MONITORING SUPPL (ONETOUCH VERIO FLEX SYSTEM) W/DEVICE KIT        DOCUSATE SODIUM (COLACE, DULCOLAX) 100 MG CAPS    Take 100 mg by mouth daily    ISOSORBIDE MONONITRATE (IMDUR) 30 MG EXTENDED RELEASE TABLET    TAKE 1 TABLET BY MOUTH DAILY    METOPROLOL SUCCINATE (TOPROL XL) 25 MG EXTENDED RELEASE TABLET    Take 1 tablet by mouth at bedtime    [facility] DELICA LANCETS 19V MISC        PAROXETINE (PAXIL) 40 MG TABLET    Take 1 tablet by mouth daily    RISPERIDONE (RISPERDAL) 0.25 MG TABLET    Take 1 tablet by mouth 2 times daily    TRAZODONE (DESYREL) 50 MG TABLET    Take 1 tablet by mouth nightly       ALLERGIES     Seroquel [quetiapine]    FAMILY HISTORY       Family History   Problem Relation Age of Onset    Heart Disease Maternal Aunt     Cancer Maternal Aunt     Colon Cancer Maternal Aunt           SOCIAL HISTORY       Social History     Socioeconomic History    Marital status:      Spouse name: None    Number of children: None    Years of education: None    Highest education level: None   Tobacco Use    Smoking status: Never    Smokeless tobacco: Never   Vaping Use    Vaping Use: Never used   Substance and Sexual Activity    Alcohol use: No    Drug use: Never         PHYSICAL EXAM       ED Triage Vitals [01/02/23 1300]   BP Temp Temp Source Heart Rate Resp SpO2 Height Weight   (!) 153/75 98.3 °F (36.8 °C) Temporal 80 18 99 % 5' 5\" (1.651 m) 120 lb (54.4 kg)       Physical Exam  Vitals and nursing note reviewed. Constitutional:       Appearance: He is well-developed. HENT:      Head: Normocephalic. Right Ear: External ear normal.      Left Ear: External ear normal.   Eyes:      Conjunctiva/sclera: Conjunctivae normal.      Pupils: Pupils are equal, round, and reactive to light. Cardiovascular:      Rate and Rhythm: Normal rate and regular rhythm. Heart sounds: Normal heart sounds. Pulmonary:      Effort: Pulmonary effort is normal.      Breath sounds: Normal breath sounds. Abdominal:      General: Bowel sounds are normal. There is no distension. Palpations: Abdomen is soft. Tenderness: There is no abdominal tenderness. Musculoskeletal:         General: Normal range of motion. Cervical back: Normal range of motion and neck supple. Skin:     General: Skin is warm and dry. Neurological:      Mental Status: He is alert and oriented to person, place, and time. Psychiatric:      Comments: Flat affect         MDM  73 yo male presents to the ED with depression, SI/HI. Pt is afebrile, hemodynamically stable. Labs unremarkable. EKG shows NSR with HR 77, normal axis, normal intervals, no ST changes. UA, tox negative. Pt is medically clear for psych evaluation. Case discussed with Dr. Will Her at [facility] who accepted the pt. Pt transferred to [facility] in stable condition. FINAL IMPRESSION      1.  Severe episode of recurrent major depressive disorder, without psychotic features [facility])          DISPOSITION/PLAN   DISPOSITION Decision To Transfer 01/02/2023 03:40:27 PM        DISCHARGE MEDICATIONS:  [unfilled]         Kelly Hess MD(electronically signed)  Attending Emergency Physician            Kelly Hess MD  01/05/23 0989

## 2023-01-02 NOTE — ED NOTES
Patient reviewed with Dr. Kaci Patterson. Discussed events leading to admit, current assessment, previous history and labs. Received order to transfer patient to a jody-psych unit for treatment.       Linda Vega RN  01/02/23 7305

## 2023-01-02 NOTE — ED NOTES
Received a phone call from patient's son Jhonatan Mccann. Verbal consent provided by patient to speak with his son. Updated on plan of care at this time. Verbalizes understanding.       Ray Arias RN  01/02/23 3575

## 2023-01-02 NOTE — ED NOTES
Changed into [facility] clothing with no skin breakdown noted. No contraband found @ this time. Voices no complaints. Covid obtained & sent to lab. Tolerated procedure well. Urine specimen obtained & sent to lab.      Haylee Hilton RN  01/02/23 8755

## 2023-01-03 LAB
EKG ATRIAL RATE: 77 BPM
EKG P AXIS: 63 DEGREES
EKG P-R INTERVAL: 178 MS
EKG Q-T INTERVAL: 412 MS
EKG QRS DURATION: 90 MS
EKG QTC CALCULATION (BAZETT): 466 MS
EKG R AXIS: 69 DEGREES
EKG T AXIS: 66 DEGREES
EKG VENTRICULAR RATE: 77 BPM

## 2023-01-03 NOTE — ED NOTES
Patients belongings delivered to [facility]) truck.    Patient cooperative and agreeable to transport     Mayra Crarion RN  01/03/23 7603

## 2023-01-03 NOTE — ED NOTES
Accepting:  Dr. Cee Cordon [phone]  Bed assigned:  [facility] location: [address]     Ursula Yoder RN  01/02/23 6956

## 2023-01-03 NOTE — ED NOTES
Call received from [place] [address] at this time   Spoke with Juan Manuel De Paz RN from [place] [address] and states pt has been accepted from [facility] location  [facility] needs to have pink slip and EKG faxed to them at this time     Vargas Charles RN  01/02/23 2374

## 2023-01-03 NOTE — ED NOTES
[facility] called at this time to discuss pt ADLS ability and pt demeanor at this time  [facility] states that they have received packet from [facility]  Pt lying in bed a this time eyes closed  Pt has slow even breathes  Mardel Osgood, RN  01/02/23 2144       Mardel Osgood, RN  01/02/23 2144

## 2023-01-03 NOTE — ED NOTES
Information requested by [facility] sent at this time, Pink slip and EKG information sent     Kristen Jalloh RN  01/02/23 3465

## 2023-01-03 NOTE — ED NOTES
Up to bathroom. Preparing to transport via [facility]    Darwin Krishnan at [facility] called for patient belongings     Anaya Rendon [facility]  01/03/23 4303

## 2023-01-03 NOTE — ED NOTES
Patient appears to be  sleeping respirations are even and unlabored no distress noted at this time.      Michelle Mendez RN  01/02/23 2012

## 2023-01-12 PROBLEM — N18.9 CHRONIC KIDNEY DISEASE: Status: ACTIVE | Noted: 2023-01-12

## 2023-06-27 NOTE — PLAN OF CARE
Pt isolates to room. Remains on 1:1 for falls risk. Pt states he still has \"bad thoughts\" and taps his head. Pt states \"this is why I want to go to a nursing home so I can be away from her. I don't want to risk hurting her, I love her. \" Pt provided comfort and reassurance. Pt very preoccupied with intrusive thoughts. When asked if suicidal pt states \"I have all those kind of bad thoughts in my head. They won't stop. \" Pt has intrusive thoughts to harm wife with no intent. Denies hallucinations. Problem: Self Harm/Suicidality  Goal: Will have no self-injury during hospital stay  Description: INTERVENTIONS:  1. Q 30 MINUTES: Routine safety checks  2. Q SHIFT & PRN: Assess risk to determine if routine checks are adequate to maintain patient safety  10/17/2022 2026 by Sahra Collado RN  Outcome: Progressing  10/17/2022 0803 by Sahra Collado RN  Outcome: Progressing     Problem: Chronic Conditions and Co-morbidities  Goal: Patient's chronic conditions and co-morbidity symptoms are monitored and maintained or improved  10/17/2022 2026 by Sahra Collado RN  Outcome: Progressing  10/17/2022 0803 by Sahra Collado RN  Outcome: Progressing     Problem: Safety - Adult  Goal: Free from fall injury  10/17/2022 2026 by Sahra Collado RN  Outcome: Progressing  10/17/2022 0803 by Sahra Collado RN  Outcome: Progressing     Problem: ABCDS Injury Assessment  Goal: Absence of physical injury  10/17/2022 2026 by Sahra Collado RN  Outcome: Progressing  10/17/2022 0803 by Sahra Collado RN  Outcome: Progressing     Problem: Skin/Tissue Integrity  Goal: Absence of new skin breakdown  Description: 1. Monitor for areas of redness and/or skin breakdown  2. Assess vascular access sites hourly  3. Every 4-6 hours minimum:  Change oxygen saturation probe site  4. Every 4-6 hours:  If on nasal continuous positive airway pressure, respiratory therapy assess nares and determine need for appliance change or resting period.   10/17/2022 2026 by Sahra Collado RN  Outcome: Progressing  10/17/2022 0803 by Marizol Christy RN  Outcome: Progressing     Problem: Occupational Therapy - Adult  Goal: By Discharge: Performs self-care activities at highest level of function for planned discharge setting. See evaluation for individualized goals.   10/17/2022 1258 by Trevor Quiñones OTR/L  Outcome: Progressing     Problem: Nutrition Deficit:  Goal: Optimize nutritional status  10/17/2022 2026 by Marizol Christy RN  Outcome: Progressing  10/17/2022 1632 by Chalreen Bonds RD, LD  Flowsheets (Taken 10/17/2022 1632)  Nutrient intake appropriate for improving, restoring, or maintaining nutritional needs:   Assess nutritional status and recommend course of action   Monitor oral intake, labs, and treatment plans   Recommend appropriate diets, oral nutritional supplements, and vitamin/mineral supplements Unna Boot Text: An Unna boot was placed to help immobilize the limb and facilitate more rapid healing.

## 2024-04-19 ENCOUNTER — LAB REQUISITION (OUTPATIENT)
Dept: LAB | Facility: HOSPITAL | Age: 74
End: 2024-04-19
Payer: MEDICARE

## 2024-04-19 DIAGNOSIS — R53.1 WEAKNESS: ICD-10-CM

## 2024-04-19 DIAGNOSIS — I10 ESSENTIAL (PRIMARY) HYPERTENSION: ICD-10-CM

## 2024-04-19 DIAGNOSIS — R41.82 ALTERED MENTAL STATUS, UNSPECIFIED: ICD-10-CM

## 2024-04-19 LAB
ANION GAP SERPL CALC-SCNC: 24 MMOL/L (ref 10–20)
BASOPHILS # BLD AUTO: 0.03 X10*3/UL (ref 0–0.1)
BASOPHILS NFR BLD AUTO: 0.3 %
BUN SERPL-MCNC: 27 MG/DL (ref 6–23)
CALCIUM SERPL-MCNC: 9.7 MG/DL (ref 8.6–10.3)
CHLORIDE SERPL-SCNC: 102 MMOL/L (ref 98–107)
CO2 SERPL-SCNC: 17 MMOL/L (ref 21–32)
CREAT SERPL-MCNC: 1.18 MG/DL (ref 0.5–1.3)
EGFRCR SERPLBLD CKD-EPI 2021: 64 ML/MIN/1.73M*2
EOSINOPHIL # BLD AUTO: 0.01 X10*3/UL (ref 0–0.4)
EOSINOPHIL NFR BLD AUTO: 0.1 %
ERYTHROCYTE [DISTWIDTH] IN BLOOD BY AUTOMATED COUNT: 13.4 % (ref 11.5–14.5)
GLUCOSE SERPL-MCNC: 160 MG/DL (ref 74–99)
HCT VFR BLD AUTO: 46.7 % (ref 41–52)
HGB BLD-MCNC: 16.3 G/DL (ref 13.5–17.5)
IMM GRANULOCYTES # BLD AUTO: 0.04 X10*3/UL (ref 0–0.5)
IMM GRANULOCYTES NFR BLD AUTO: 0.4 % (ref 0–0.9)
LYMPHOCYTES # BLD AUTO: 0.97 X10*3/UL (ref 0.8–3)
LYMPHOCYTES NFR BLD AUTO: 9.2 %
MCH RBC QN AUTO: 31.6 PG (ref 26–34)
MCHC RBC AUTO-ENTMCNC: 34.9 G/DL (ref 32–36)
MCV RBC AUTO: 91 FL (ref 80–100)
MONOCYTES # BLD AUTO: 1.04 X10*3/UL (ref 0.05–0.8)
MONOCYTES NFR BLD AUTO: 9.9 %
NEUTROPHILS # BLD AUTO: 8.42 X10*3/UL (ref 1.6–5.5)
NEUTROPHILS NFR BLD AUTO: 80.1 %
NRBC BLD-RTO: 0 /100 WBCS (ref 0–0)
PLATELET # BLD AUTO: 203 X10*3/UL (ref 150–450)
POTASSIUM SERPL-SCNC: 4.5 MMOL/L (ref 3.5–5.3)
RBC # BLD AUTO: 5.16 X10*6/UL (ref 4.5–5.9)
SODIUM SERPL-SCNC: 138 MMOL/L (ref 136–145)
WBC # BLD AUTO: 10.5 X10*3/UL (ref 4.4–11.3)

## 2024-04-19 PROCEDURE — 80048 BASIC METABOLIC PNL TOTAL CA: CPT

## 2024-04-19 PROCEDURE — 85025 COMPLETE CBC W/AUTO DIFF WBC: CPT

## 2025-01-20 NOTE — PROGRESS NOTES
Pt is resting in bed, gave diet ginger ale and cookies for snack, pt is eating now,   pt requested throat keyanna. Detail Level: Detailed X Size Of Lesion In Cm (Optional): 0 Procedure To Be Performed At Next Visit: Shave Removal Introduction Text (Please End With A Colon): The following procedure was deferred:

## 2025-04-25 ENCOUNTER — OFFICE VISIT (OUTPATIENT)
Dept: GERIATRIC MEDICINE | Age: 75
End: 2025-04-25
Payer: MEDICARE

## 2025-04-25 DIAGNOSIS — I25.10 CORONARY ARTERY DISEASE INVOLVING NATIVE CORONARY ARTERY OF NATIVE HEART WITHOUT ANGINA PECTORIS: ICD-10-CM

## 2025-04-25 DIAGNOSIS — E11.9 TYPE 2 DIABETES MELLITUS WITHOUT COMPLICATION, WITHOUT LONG-TERM CURRENT USE OF INSULIN (HCC): ICD-10-CM

## 2025-04-25 DIAGNOSIS — E78.5 HYPERLIPIDEMIA, UNSPECIFIED HYPERLIPIDEMIA TYPE: ICD-10-CM

## 2025-04-25 DIAGNOSIS — R53.1 WEAKNESS: ICD-10-CM

## 2025-04-25 DIAGNOSIS — I10 PRIMARY HYPERTENSION: Primary | ICD-10-CM

## 2025-04-25 DIAGNOSIS — R06.02 SHORTNESS OF BREATH: ICD-10-CM

## 2025-04-25 PROCEDURE — 1124F ACP DISCUSS-NO DSCNMKR DOCD: CPT | Performed by: NURSE PRACTITIONER

## 2025-04-25 PROCEDURE — 99310 SBSQ NF CARE HIGH MDM 45: CPT | Performed by: NURSE PRACTITIONER

## 2025-04-25 RX ORDER — SENNOSIDES A AND B 8.6 MG/1
1 TABLET, FILM COATED ORAL 2 TIMES DAILY
COMMUNITY

## 2025-04-25 RX ORDER — BUSPIRONE HYDROCHLORIDE 5 MG/1
5 TABLET ORAL 3 TIMES DAILY
COMMUNITY

## 2025-04-25 RX ORDER — UREA 40 %
CREAM (GRAM) TOPICAL NIGHTLY
COMMUNITY

## 2025-04-25 RX ORDER — LORATADINE 10 MG/1
10 TABLET ORAL DAILY
COMMUNITY

## 2025-04-25 RX ORDER — POLYETHYLENE GLYCOL 3350 17 G/17G
17 POWDER, FOR SOLUTION ORAL DAILY
COMMUNITY

## 2025-04-25 RX ORDER — PHENOL 1.4 %
1 AEROSOL, SPRAY (ML) MUCOUS MEMBRANE NIGHTLY
COMMUNITY

## 2025-04-25 RX ORDER — DIVALPROEX SODIUM 500 MG/1
500 TABLET, FILM COATED, EXTENDED RELEASE ORAL 2 TIMES DAILY
COMMUNITY

## 2025-04-25 RX ORDER — ISOSORBIDE DINITRATE 30 MG/1
30 TABLET ORAL DAILY
COMMUNITY

## 2025-04-25 RX ORDER — NYSTATIN 100000 [USP'U]/G
POWDER TOPICAL 2 TIMES DAILY
COMMUNITY

## 2025-04-29 ASSESSMENT — ENCOUNTER SYMPTOMS
GASTROINTESTINAL NEGATIVE: 1
SHORTNESS OF BREATH: 1
CHEST TIGHTNESS: 1

## 2025-04-29 NOTE — PROGRESS NOTES
level: Not on file   Occupational History    Not on file   Tobacco Use    Smoking status: Never    Smokeless tobacco: Never   Vaping Use    Vaping status: Never Used   Substance and Sexual Activity    Alcohol use: No    Drug use: Never    Sexual activity: Not on file   Other Topics Concern    Not on file   Social History Narrative    Not on file     Social Drivers of Health     Financial Resource Strain: Not on file   Food Insecurity: Not on file   Transportation Needs: Not on file   Physical Activity: Not on file   Stress: Not on file   Social Connections: Not on file   Intimate Partner Violence: Not on file   Housing Stability: Not on file       Allergies: Seroquel [quetiapine]  NF MEDICATIONS REVIEWED AND ALLERGIES REVIEWED IN NF CHART    Review of Systems   Constitutional:  Positive for activity change.   Respiratory:  Positive for chest tightness and shortness of breath.    Gastrointestinal: Negative.    Musculoskeletal: Negative.    Neurological:  Positive for weakness.   All other systems reviewed and are negative.        Physical Exam  Constitutional:       Appearance: Normal appearance.   HENT:      Head: Normocephalic and atraumatic.   Cardiovascular:      Rate and Rhythm: Normal rate and regular rhythm.   Pulmonary:      Effort: Pulmonary effort is normal.      Breath sounds: Normal breath sounds.   Abdominal:      General: Bowel sounds are normal.      Palpations: Abdomen is soft.   Musculoskeletal:         General: No swelling or tenderness.   Neurological:      Mental Status: He is alert.      Motor: Weakness present.   Psychiatric:         Mood and Affect: Mood normal.         Behavior: Behavior normal.        Refer to detailed nursing notes for skin assessment       There were no vitals taken for this visit.  VS per facility records      ASSESSMENT:     Diagnosis Orders   1. Primary hypertension        2. Coronary artery disease involving native coronary artery of native heart without angina pectoris

## 2025-04-30 ENCOUNTER — OFFICE VISIT (OUTPATIENT)
Dept: GERIATRIC MEDICINE | Age: 75
End: 2025-04-30
Payer: MEDICARE

## 2025-04-30 DIAGNOSIS — G20.A2 PARKINSON'S DISEASE WITH FLUCTUATING MANIFESTATIONS, UNSPECIFIED WHETHER DYSKINESIA PRESENT (HCC): ICD-10-CM

## 2025-04-30 DIAGNOSIS — E11.9 TYPE 2 DIABETES MELLITUS WITHOUT COMPLICATION, WITHOUT LONG-TERM CURRENT USE OF INSULIN (HCC): ICD-10-CM

## 2025-04-30 DIAGNOSIS — I10 PRIMARY HYPERTENSION: Primary | ICD-10-CM

## 2025-04-30 PROCEDURE — 99309 SBSQ NF CARE MODERATE MDM 30: CPT | Performed by: INTERNAL MEDICINE

## 2025-04-30 PROCEDURE — 1124F ACP DISCUSS-NO DSCNMKR DOCD: CPT | Performed by: INTERNAL MEDICINE

## 2025-05-06 ENCOUNTER — OFFICE VISIT (OUTPATIENT)
Dept: GERIATRIC MEDICINE | Age: 75
End: 2025-05-06
Payer: MEDICARE

## 2025-05-06 DIAGNOSIS — E11.9 TYPE 2 DIABETES MELLITUS WITHOUT COMPLICATION, WITHOUT LONG-TERM CURRENT USE OF INSULIN (HCC): ICD-10-CM

## 2025-05-06 DIAGNOSIS — G20.A1 PARKINSON'S DISEASE WITHOUT DYSKINESIA OR FLUCTUATING MANIFESTATIONS (HCC): ICD-10-CM

## 2025-05-06 DIAGNOSIS — I10 PRIMARY HYPERTENSION: Primary | ICD-10-CM

## 2025-05-06 PROCEDURE — 1124F ACP DISCUSS-NO DSCNMKR DOCD: CPT | Performed by: INTERNAL MEDICINE

## 2025-05-06 PROCEDURE — 99309 SBSQ NF CARE MODERATE MDM 30: CPT | Performed by: INTERNAL MEDICINE

## 2025-05-29 NOTE — PROGRESS NOTES
SUBJECTIVE:  This 74-year-old seen for diabetes hypertension Parkinson's patient had baseline acute lightheadedness no new emesis no changes gallbladder habits no spasticity      ROS: Intermittent diarrhea  The rest of the 14 point ROS negative    PHYSICAL EXAM: VSS per facility record  Masklike facies pupils are reactive oral mucosa is moist chest with no crackles abdomen soft nontender extremity postsurgical pulse    ASSESSMENT & PLAN:   Diagnosis Orders   1. Primary hypertension        2. Type 2 diabetes mellitus without complication, without long-term current use of insulin ([facility])        3. Parkinson's disease with fluctuating manifestations, unspecified whether dyskinesia present ([facility])          Function stable at this time.  Has been on metoprolol has been on Depakote with stabilization and Glucophage function stable no symptomatic hypoglycemia            Past Medical History:   Diagnosis Date    Anxiety     Chest pain 03/29/2018    Coronary artery disease involving native coronary artery of native heart without angina pectoris 02/15/2019    Diabetes mellitus (HCC)     no meds    History of colon polyps     Hyperlipidemia     Hypertension     Type 2 diabetes mellitus without complication ([facility])     Vertigo          Past Surgical History:   Procedure Laterality Date    CARDIAC SURGERY  1973    PTCA     COLONOSCOPY      COLONOSCOPY N/A 10/17/2019    COLORECTAL CANCER SCREENING, HIGH RISK performed by Emily Schuler MD at [facility]    CORONARY ANGIOPLASTY WITH STENT PLACEMENT      EYE SURGERY      OTHER SURGICAL HISTORY      patent foramen ovale    TONSILLECTOMY      UPPER GASTROINTESTINAL ENDOSCOPY N/A 8/1/2019    EGD ESOPHAGOGASTRODUODENOSCOPY performed by Emily Schuler MD at [facility]         Current Outpatient Medications on File Prior to Visit   Medication Sig Dispense Refill    isosorbide dinitrate (ISORDIL) 30 MG tablet Take 1 tablet by mouth daily      busPIRone (BUSPAR) 5 MG tablet Take

## 2025-06-04 NOTE — PROGRESS NOTES
SUBJECTIVE:  74-year-old gentleman seen for diabetes hypertension prior tofunction stable acute nausea vomiting oral intake has been good no acute pain crisis      ROS: Denies chest pain  The rest of the 14 point ROS negative    PHYSICAL EXAM: VSS per facility record  Pupils reactive oral mucosa moist chest no crackles abdomen soft nontender    ASSESSMENT & PLAN:   Diagnosis Orders   1. Primary hypertension        2. Type 2 diabetes mellitus without complication, without long-term current use of insulin (HCC)        3. Parkinson's disease without dyskinesia or fluctuating manifestations ([facility])        Monitor blood sugars and for sports until follow-up A1c is negative.  Reorientation as able    Has been on Sinemet in the past currently limited          Past Medical History:   Diagnosis Date    Anxiety     Chest pain 03/29/2018    Coronary artery disease involving native coronary artery of native heart without angina pectoris 02/15/2019    Diabetes mellitus (HCC)     no meds    History of colon polyps     Hyperlipidemia     Hypertension     Type 2 diabetes mellitus without complication (HCC)     Vertigo          Past Surgical History:   Procedure Laterality Date    CARDIAC SURGERY  1973    PTCA     COLONOSCOPY      COLONOSCOPY N/A 10/17/2019    COLORECTAL CANCER SCREENING, HIGH RISK performed by Emily Schuler MD at [facility]    CORONARY ANGIOPLASTY WITH STENT PLACEMENT      EYE SURGERY      OTHER SURGICAL HISTORY      patent foramen ovale    TONSILLECTOMY      UPPER GASTROINTESTINAL ENDOSCOPY N/A 8/1/2019    EGD ESOPHAGOGASTRODUODENOSCOPY performed by Emily Schuler MD at [facility]         Current Outpatient Medications on File Prior to Visit   Medication Sig Dispense Refill    isosorbide dinitrate (ISORDIL) 30 MG tablet Take 1 tablet by mouth daily      busPIRone (BUSPAR) 5 MG tablet Take 1 tablet by mouth 3 times daily Indications: Feeling Anxious      divalproex (DEPAKOTE ER) 500 MG extended

## 2025-06-17 ENCOUNTER — OFFICE VISIT (OUTPATIENT)
Dept: GERIATRIC MEDICINE | Age: 75
End: 2025-06-17

## 2025-06-17 DIAGNOSIS — E78.5 HYPERLIPIDEMIA, UNSPECIFIED HYPERLIPIDEMIA TYPE: ICD-10-CM

## 2025-06-17 DIAGNOSIS — G20.A1 PARKINSON'S DISEASE WITHOUT DYSKINESIA OR FLUCTUATING MANIFESTATIONS (HCC): Primary | ICD-10-CM

## 2025-06-17 DIAGNOSIS — I10 PRIMARY HYPERTENSION: ICD-10-CM

## 2025-06-25 ASSESSMENT — ENCOUNTER SYMPTOMS
GASTROINTESTINAL NEGATIVE: 1
RESPIRATORY NEGATIVE: 1

## 2025-06-25 NOTE — PROGRESS NOTES
[address] [place] [address]    6/17/2025    Jb Rios  is a 74 y.o. in the  being seen for    Chief Complaint   Patient presents with    1 Month Follow-Up       Remains at [facility] for long-term care.  Patient is being seen today for monthly follow-up.  Patient resting in room at time of visit.  No complaints of pain.  No recent fever or fall reported.  No nausea vomiting diarrhea constipation.          Past Medical History:   Diagnosis Date    Anxiety     Chest pain 03/29/2018    Coronary artery disease involving native coronary artery of native heart without angina pectoris 02/15/2019    Diabetes mellitus (HCC)     no meds    History of colon polyps     Hyperlipidemia     Hypertension     Type 2 diabetes mellitus without complication (HCC)     Vertigo      Past Surgical History:   Procedure Laterality Date    CARDIAC SURGERY  1973    PTCA     COLONOSCOPY      COLONOSCOPY N/A 10/17/2019    COLORECTAL CANCER SCREENING, HIGH RISK performed by Emily Schuler MD at [facility]    CORONARY ANGIOPLASTY WITH STENT PLACEMENT      EYE SURGERY      OTHER SURGICAL HISTORY      patent foramen ovale    TONSILLECTOMY      UPPER GASTROINTESTINAL ENDOSCOPY N/A 8/1/2019    EGD ESOPHAGOGASTRODUODENOSCOPY performed by Emily Schuler MD at [facility]     Family History   Problem Relation Age of Onset    Heart Disease Maternal Aunt     Cancer Maternal Aunt     Colon Cancer Maternal Aunt      Social History     Socioeconomic History    Marital status:      Spouse name: Not on file    Number of children: Not on file    Years of education: Not on file    Highest education level: Not on file   Occupational History    Not on file   Tobacco Use    Smoking status: Never    Smokeless tobacco: Never   Vaping Use    Vaping status: Never Used   Substance and Sexual Activity    Alcohol use: No    Drug use: Never    Sexual activity: Not on file   Other Topics Concern    Not on file

## 2025-06-27 ENCOUNTER — HOSPITAL ENCOUNTER (OUTPATIENT)
Age: 75
Setting detail: OBSERVATION
Discharge: NURSING, REHAB OR LONG-TERM CARE | End: 2025-06-28
Attending: INTERNAL MEDICINE | Admitting: STUDENT IN AN ORGANIZED HEALTH CARE EDUCATION/TRAINING PROGRAM
Payer: MEDICARE

## 2025-06-27 ENCOUNTER — APPOINTMENT (OUTPATIENT)
Dept: GENERAL RADIOLOGY | Age: 75
End: 2025-06-27
Payer: MEDICARE

## 2025-06-27 ENCOUNTER — APPOINTMENT (OUTPATIENT)
Dept: MRI IMAGING | Age: 75
End: 2025-06-27
Attending: STUDENT IN AN ORGANIZED HEALTH CARE EDUCATION/TRAINING PROGRAM
Payer: MEDICARE

## 2025-06-27 ENCOUNTER — APPOINTMENT (OUTPATIENT)
Dept: CT IMAGING | Age: 75
End: 2025-06-27
Payer: MEDICARE

## 2025-06-27 DIAGNOSIS — R41.82 ALTERED MENTAL STATUS, UNSPECIFIED ALTERED MENTAL STATUS TYPE: Primary | ICD-10-CM

## 2025-06-27 PROBLEM — R47.1 DYSARTHRIA: Status: ACTIVE | Noted: 2025-06-27

## 2025-06-27 LAB
ALBUMIN SERPL-MCNC: 4 G/DL (ref 3.5–4.6)
ALP SERPL-CCNC: 61 U/L (ref 35–104)
ALT SERPL-CCNC: 13 U/L (ref 0–41)
AMMONIA PLAS-SCNC: 30 UMOL/L (ref 16–60)
ANION GAP SERPL CALCULATED.3IONS-SCNC: 15 MEQ/L (ref 9–15)
APTT PPP: 27.1 SEC (ref 24.4–36.8)
AST SERPL-CCNC: 11 U/L (ref 0–40)
BASE EXCESS VENOUS: -4 (ref -3–3)
BASOPHILS # BLD: 0.1 K/UL (ref 0–0.2)
BASOPHILS NFR BLD: 0.5 %
BILIRUB SERPL-MCNC: 0.6 MG/DL (ref 0.2–0.7)
BILIRUB UR QL STRIP: NEGATIVE
BNP BLD-MCNC: 755 PG/ML
BUN SERPL-MCNC: 16 MG/DL (ref 8–23)
CALCIUM IONIZED: 1.17 MMOL/L (ref 1.12–1.32)
CALCIUM SERPL-MCNC: 9 MG/DL (ref 8.5–9.9)
CHLORIDE SERPL-SCNC: 95 MEQ/L (ref 95–107)
CHOLEST SERPL-MCNC: 128 MG/DL (ref 0–199)
CHP ED QC CHECK: NORMAL
CLARITY UR: CLEAR
CO2 SERPL-SCNC: 20 MEQ/L (ref 20–31)
COLOR UR: YELLOW
CREAT SERPL-MCNC: 0.83 MG/DL (ref 0.7–1.2)
EKG ATRIAL RATE: 108 BPM
EKG DIAGNOSIS: NORMAL
EKG P AXIS: 55 DEGREES
EKG P-R INTERVAL: 172 MS
EKG Q-T INTERVAL: 354 MS
EKG QRS DURATION: 90 MS
EKG QTC CALCULATION (BAZETT): 474 MS
EKG R AXIS: 47 DEGREES
EKG T AXIS: 66 DEGREES
EKG VENTRICULAR RATE: 108 BPM
EOSINOPHIL # BLD: 0 K/UL (ref 0–0.7)
EOSINOPHIL NFR BLD: 0.2 %
ERYTHROCYTE [DISTWIDTH] IN BLOOD BY AUTOMATED COUNT: 13.6 % (ref 11.5–14.5)
ESTIMATED AVERAGE GLUCOSE: 117 MG/DL
GLOBULIN SER CALC-MCNC: 2.9 G/DL (ref 2.3–3.5)
GLUCOSE BLD-MCNC: 131 MG/DL (ref 70–99)
GLUCOSE BLD-MCNC: 134 MG/DL (ref 70–99)
GLUCOSE BLD-MCNC: 153 MG/DL
GLUCOSE BLD-MCNC: 153 MG/DL (ref 70–99)
GLUCOSE BLD-MCNC: 185 MG/DL (ref 70–99)
GLUCOSE SERPL-MCNC: 171 MG/DL (ref 70–99)
GLUCOSE UR STRIP-MCNC: NEGATIVE MG/DL
HBA1C MFR BLD: 5.7 % (ref 4–6)
HCO3 VENOUS: 20.2 MMOL/L (ref 23–29)
HCT VFR BLD AUTO: 43.6 % (ref 42–52)
HCT VFR BLD AUTO: 45 % (ref 41–53)
HDLC SERPL-MCNC: 40 MG/DL (ref 40–59)
HGB BLD CALC-MCNC: 15.4 GM/DL (ref 13.5–17.5)
HGB BLD-MCNC: 15.3 G/DL (ref 14–18)
HGB UR QL STRIP: NEGATIVE
INR PPP: 1
KETONES UR STRIP-MCNC: NEGATIVE MG/DL
LACTATE: 1.94 MMOL/L (ref 0.4–2)
LDLC SERPL CALC-MCNC: 65 MG/DL (ref 0–129)
LEUKOCYTE ESTERASE UR QL STRIP: NEGATIVE
LYMPHOCYTES # BLD: 0.8 K/UL (ref 1–4.8)
LYMPHOCYTES NFR BLD: 8.1 %
MAGNESIUM SERPL-MCNC: 1.6 MG/DL (ref 1.7–2.4)
MCH RBC QN AUTO: 30.3 PG (ref 27–31.3)
MCHC RBC AUTO-ENTMCNC: 35.1 % (ref 33–37)
MCV RBC AUTO: 86.3 FL (ref 79–92.2)
MONOCYTES # BLD: 0.7 K/UL (ref 0.2–0.8)
MONOCYTES NFR BLD: 6.4 %
NEUTROPHILS # BLD: 8.6 K/UL (ref 1.4–6.5)
NEUTS SEG NFR BLD: 84.6 %
NITRITE UR QL STRIP: NEGATIVE
O2 SAT, VEN: 95 %
PCO2 VENOUS: 32 MM HG (ref 40–50)
PERFORMED ON: ABNORMAL
PERFORMED ON: NORMAL
PH UR STRIP: 8.5 [PH] (ref 5–9)
PH VENOUS: 7.41 (ref 7.32–7.42)
PLATELET # BLD AUTO: 163 K/UL (ref 130–400)
PO2 VENOUS: 71 MM HG
POC CHLORIDE: 99 MEQ/L (ref 99–110)
POC CREATININE: 0.7 MG/DL (ref 0.8–1.3)
POC CREATININE: 0.9 MG/DL (ref 0.8–1.3)
POC SAMPLE TYPE: ABNORMAL
POC SAMPLE TYPE: NORMAL
POTASSIUM SERPL-SCNC: 4 MEQ/L (ref 3.5–5.1)
POTASSIUM SERPL-SCNC: 4.3 MEQ/L (ref 3.4–4.9)
PROT SERPL-MCNC: 6.9 G/DL (ref 6.3–8)
PROT UR STRIP-MCNC: ABNORMAL MG/DL
PROTHROMBIN TIME: 14.1 SEC (ref 12.3–14.9)
RBC # BLD AUTO: 5.05 M/UL (ref 4.7–6.1)
SODIUM BLD-SCNC: 125 MEQ/L (ref 136–145)
SODIUM SERPL-SCNC: 130 MEQ/L (ref 135–144)
SP GR UR STRIP: 1.02 (ref 1–1.03)
TCO2 CALC VENOUS: 21 MMOL/L
TRIGL SERPL-MCNC: 114 MG/DL (ref 0–150)
TROPONIN, HIGH SENSITIVITY: 13 NG/L (ref 0–19)
TROPONIN, HIGH SENSITIVITY: 14 NG/L (ref 0–19)
URINE REFLEX TO CULTURE: ABNORMAL
UROBILINOGEN UR STRIP-ACNC: 1 E.U./DL
VALPROATE SERPL-MCNC: 45.7 UG/ML (ref 50–100)
WBC # BLD AUTO: 10.2 K/UL (ref 4.8–10.8)

## 2025-06-27 PROCEDURE — 70496 CT ANGIOGRAPHY HEAD: CPT

## 2025-06-27 PROCEDURE — 85730 THROMBOPLASTIN TIME PARTIAL: CPT

## 2025-06-27 PROCEDURE — 6370000000 HC RX 637 (ALT 250 FOR IP): Performed by: INTERNAL MEDICINE

## 2025-06-27 PROCEDURE — G0378 HOSPITAL OBSERVATION PER HR: HCPCS

## 2025-06-27 PROCEDURE — 82140 ASSAY OF AMMONIA: CPT

## 2025-06-27 PROCEDURE — 36600 WITHDRAWAL OF ARTERIAL BLOOD: CPT

## 2025-06-27 PROCEDURE — 2500000003 HC RX 250 WO HCPCS: Performed by: STUDENT IN AN ORGANIZED HEALTH CARE EDUCATION/TRAINING PROGRAM

## 2025-06-27 PROCEDURE — 96372 THER/PROPH/DIAG INJ SC/IM: CPT

## 2025-06-27 PROCEDURE — 71045 X-RAY EXAM CHEST 1 VIEW: CPT

## 2025-06-27 PROCEDURE — 84132 ASSAY OF SERUM POTASSIUM: CPT

## 2025-06-27 PROCEDURE — 70498 CT ANGIOGRAPHY NECK: CPT

## 2025-06-27 PROCEDURE — 85610 PROTHROMBIN TIME: CPT

## 2025-06-27 PROCEDURE — 84484 ASSAY OF TROPONIN QUANT: CPT

## 2025-06-27 PROCEDURE — 82330 ASSAY OF CALCIUM: CPT

## 2025-06-27 PROCEDURE — 83605 ASSAY OF LACTIC ACID: CPT

## 2025-06-27 PROCEDURE — 80061 LIPID PANEL: CPT

## 2025-06-27 PROCEDURE — 84295 ASSAY OF SERUM SODIUM: CPT

## 2025-06-27 PROCEDURE — 80164 ASSAY DIPROPYLACETIC ACD TOT: CPT

## 2025-06-27 PROCEDURE — 83880 ASSAY OF NATRIURETIC PEPTIDE: CPT

## 2025-06-27 PROCEDURE — 83036 HEMOGLOBIN GLYCOSYLATED A1C: CPT

## 2025-06-27 PROCEDURE — 6370000000 HC RX 637 (ALT 250 FOR IP): Performed by: STUDENT IN AN ORGANIZED HEALTH CARE EDUCATION/TRAINING PROGRAM

## 2025-06-27 PROCEDURE — 85014 HEMATOCRIT: CPT

## 2025-06-27 PROCEDURE — 85025 COMPLETE CBC W/AUTO DIFF WBC: CPT

## 2025-06-27 PROCEDURE — 70551 MRI BRAIN STEM W/O DYE: CPT

## 2025-06-27 PROCEDURE — 6360000004 HC RX CONTRAST MEDICATION: Performed by: INTERNAL MEDICINE

## 2025-06-27 PROCEDURE — 82803 BLOOD GASES ANY COMBINATION: CPT

## 2025-06-27 PROCEDURE — 82565 ASSAY OF CREATININE: CPT

## 2025-06-27 PROCEDURE — 82435 ASSAY OF BLOOD CHLORIDE: CPT

## 2025-06-27 PROCEDURE — 36415 COLL VENOUS BLD VENIPUNCTURE: CPT

## 2025-06-27 PROCEDURE — 83735 ASSAY OF MAGNESIUM: CPT

## 2025-06-27 PROCEDURE — 93005 ELECTROCARDIOGRAM TRACING: CPT | Performed by: INTERNAL MEDICINE

## 2025-06-27 PROCEDURE — 80053 COMPREHEN METABOLIC PANEL: CPT

## 2025-06-27 PROCEDURE — 6360000002 HC RX W HCPCS: Performed by: STUDENT IN AN ORGANIZED HEALTH CARE EDUCATION/TRAINING PROGRAM

## 2025-06-27 PROCEDURE — 81003 URINALYSIS AUTO W/O SCOPE: CPT

## 2025-06-27 PROCEDURE — 99285 EMERGENCY DEPT VISIT HI MDM: CPT

## 2025-06-27 PROCEDURE — 70450 CT HEAD/BRAIN W/O DYE: CPT

## 2025-06-27 RX ORDER — SODIUM CHLORIDE 0.9 % (FLUSH) 0.9 %
5-40 SYRINGE (ML) INJECTION EVERY 12 HOURS SCHEDULED
Status: DISCONTINUED | OUTPATIENT
Start: 2025-06-27 | End: 2025-06-28 | Stop reason: HOSPADM

## 2025-06-27 RX ORDER — ALBUTEROL SULFATE 90 UG/1
2 INHALANT RESPIRATORY (INHALATION) 4 TIMES DAILY PRN
Status: DISCONTINUED | OUTPATIENT
Start: 2025-06-27 | End: 2025-06-28 | Stop reason: HOSPADM

## 2025-06-27 RX ORDER — METOPROLOL TARTRATE 25 MG/1
25 TABLET, FILM COATED ORAL NIGHTLY
Status: DISCONTINUED | OUTPATIENT
Start: 2025-06-27 | End: 2025-06-28 | Stop reason: HOSPADM

## 2025-06-27 RX ORDER — SODIUM CHLORIDE 9 MG/ML
INJECTION, SOLUTION INTRAVENOUS PRN
Status: DISCONTINUED | OUTPATIENT
Start: 2025-06-27 | End: 2025-06-28 | Stop reason: HOSPADM

## 2025-06-27 RX ORDER — BUSPIRONE HYDROCHLORIDE 5 MG/1
5 TABLET ORAL 3 TIMES DAILY
Status: DISCONTINUED | OUTPATIENT
Start: 2025-06-27 | End: 2025-06-28 | Stop reason: HOSPADM

## 2025-06-27 RX ORDER — ISOSORBIDE DINITRATE 20 MG/1
30 TABLET ORAL DAILY
Status: DISCONTINUED | OUTPATIENT
Start: 2025-06-28 | End: 2025-06-28 | Stop reason: HOSPADM

## 2025-06-27 RX ORDER — MECOBALAMIN 5000 MCG
10 TABLET,DISINTEGRATING ORAL NIGHTLY
Status: DISCONTINUED | OUTPATIENT
Start: 2025-06-27 | End: 2025-06-28 | Stop reason: HOSPADM

## 2025-06-27 RX ORDER — ROSUVASTATIN CALCIUM 40 MG/1
40 TABLET, COATED ORAL NIGHTLY
Status: DISCONTINUED | OUTPATIENT
Start: 2025-06-27 | End: 2025-06-27

## 2025-06-27 RX ORDER — SODIUM CHLORIDE 0.9 % (FLUSH) 0.9 %
5-40 SYRINGE (ML) INJECTION PRN
Status: DISCONTINUED | OUTPATIENT
Start: 2025-06-27 | End: 2025-06-28 | Stop reason: HOSPADM

## 2025-06-27 RX ORDER — POLYETHYLENE GLYCOL 3350 17 G/17G
17 POWDER, FOR SOLUTION ORAL DAILY PRN
Status: DISCONTINUED | OUTPATIENT
Start: 2025-06-27 | End: 2025-06-28 | Stop reason: HOSPADM

## 2025-06-27 RX ORDER — IOPAMIDOL 755 MG/ML
75 INJECTION, SOLUTION INTRAVASCULAR
Status: COMPLETED | OUTPATIENT
Start: 2025-06-27 | End: 2025-06-27

## 2025-06-27 RX ORDER — ENOXAPARIN SODIUM 100 MG/ML
40 INJECTION SUBCUTANEOUS DAILY
Status: DISCONTINUED | OUTPATIENT
Start: 2025-06-27 | End: 2025-06-28 | Stop reason: HOSPADM

## 2025-06-27 RX ORDER — ONDANSETRON 2 MG/ML
4 INJECTION INTRAMUSCULAR; INTRAVENOUS EVERY 6 HOURS PRN
Status: DISCONTINUED | OUTPATIENT
Start: 2025-06-27 | End: 2025-06-28 | Stop reason: HOSPADM

## 2025-06-27 RX ORDER — DEXTROSE MONOHYDRATE 100 MG/ML
INJECTION, SOLUTION INTRAVENOUS CONTINUOUS PRN
Status: DISCONTINUED | OUTPATIENT
Start: 2025-06-27 | End: 2025-06-28 | Stop reason: HOSPADM

## 2025-06-27 RX ORDER — SERTRALINE HYDROCHLORIDE 25 MG/1
25 TABLET, FILM COATED ORAL DAILY
COMMUNITY

## 2025-06-27 RX ORDER — ATORVASTATIN CALCIUM 20 MG/1
20 TABLET, FILM COATED ORAL NIGHTLY
Status: DISCONTINUED | OUTPATIENT
Start: 2025-06-27 | End: 2025-06-28 | Stop reason: HOSPADM

## 2025-06-27 RX ORDER — CLOPIDOGREL BISULFATE 75 MG/1
150 TABLET ORAL ONCE
Status: COMPLETED | OUTPATIENT
Start: 2025-06-27 | End: 2025-06-27

## 2025-06-27 RX ORDER — ASPIRIN 81 MG/1
324 TABLET, CHEWABLE ORAL ONCE
Status: COMPLETED | OUTPATIENT
Start: 2025-06-27 | End: 2025-06-27

## 2025-06-27 RX ORDER — DOCUSATE SODIUM 100 MG/1
100 CAPSULE, LIQUID FILLED ORAL DAILY
Status: DISCONTINUED | OUTPATIENT
Start: 2025-06-27 | End: 2025-06-28 | Stop reason: HOSPADM

## 2025-06-27 RX ORDER — ONDANSETRON 4 MG/1
4 TABLET, ORALLY DISINTEGRATING ORAL EVERY 8 HOURS PRN
Status: DISCONTINUED | OUTPATIENT
Start: 2025-06-27 | End: 2025-06-28 | Stop reason: HOSPADM

## 2025-06-27 RX ORDER — DIVALPROEX SODIUM 250 MG/1
500 TABLET, FILM COATED, EXTENDED RELEASE ORAL 2 TIMES DAILY
Status: DISCONTINUED | OUTPATIENT
Start: 2025-06-27 | End: 2025-06-28 | Stop reason: HOSPADM

## 2025-06-27 RX ORDER — ASPIRIN 81 MG/1
81 TABLET, CHEWABLE ORAL DAILY
Status: DISCONTINUED | OUTPATIENT
Start: 2025-06-28 | End: 2025-06-28 | Stop reason: HOSPADM

## 2025-06-27 RX ORDER — CETIRIZINE HYDROCHLORIDE 10 MG/1
10 TABLET ORAL DAILY
Status: DISCONTINUED | OUTPATIENT
Start: 2025-06-28 | End: 2025-06-28 | Stop reason: HOSPADM

## 2025-06-27 RX ORDER — METOPROLOL TARTRATE 25 MG/1
TABLET, FILM COATED ORAL NIGHTLY
COMMUNITY

## 2025-06-27 RX ORDER — GLUCAGON 1 MG/ML
1 KIT INJECTION PRN
Status: DISCONTINUED | OUTPATIENT
Start: 2025-06-27 | End: 2025-06-28 | Stop reason: HOSPADM

## 2025-06-27 RX ORDER — INSULIN LISPRO 100 [IU]/ML
0-8 INJECTION, SOLUTION INTRAVENOUS; SUBCUTANEOUS
Status: DISCONTINUED | OUTPATIENT
Start: 2025-06-27 | End: 2025-06-28 | Stop reason: HOSPADM

## 2025-06-27 RX ADMIN — ASPIRIN 324 MG: 81 TABLET, CHEWABLE ORAL at 11:24

## 2025-06-27 RX ADMIN — ENOXAPARIN SODIUM 40 MG: 100 INJECTION SUBCUTANEOUS at 17:18

## 2025-06-27 RX ADMIN — CLOPIDOGREL BISULFATE 150 MG: 75 TABLET, FILM COATED ORAL at 11:23

## 2025-06-27 RX ADMIN — DIVALPROEX SODIUM 500 MG: 250 TABLET, EXTENDED RELEASE ORAL at 20:41

## 2025-06-27 RX ADMIN — BUSPIRONE HYDROCHLORIDE 5 MG: 5 TABLET ORAL at 20:42

## 2025-06-27 RX ADMIN — IOPAMIDOL 75 ML: 755 INJECTION, SOLUTION INTRAVENOUS at 09:08

## 2025-06-27 RX ADMIN — METOPROLOL TARTRATE 25 MG: 25 TABLET, FILM COATED ORAL at 20:42

## 2025-06-27 RX ADMIN — Medication 10 MG: at 20:42

## 2025-06-27 RX ADMIN — ATORVASTATIN CALCIUM 20 MG: 20 TABLET, FILM COATED ORAL at 20:42

## 2025-06-27 RX ADMIN — Medication 10 ML: at 21:00

## 2025-06-27 ASSESSMENT — LIFESTYLE VARIABLES
HOW MANY STANDARD DRINKS CONTAINING ALCOHOL DO YOU HAVE ON A TYPICAL DAY: PATIENT DOES NOT DRINK
HOW OFTEN DO YOU HAVE A DRINK CONTAINING ALCOHOL: NEVER

## 2025-06-27 ASSESSMENT — PAIN - FUNCTIONAL ASSESSMENT: PAIN_FUNCTIONAL_ASSESSMENT: NONE - DENIES PAIN

## 2025-06-27 NOTE — FLOWSHEET NOTE
Per Chantell from [facility], the patient's nurse will fax([phone]) over med list     [address](son) was updated on patients condition

## 2025-06-27 NOTE — H&P
DEPARTMENT OF HOSPITAL MEDICINE    HISTORY AND PHYSICAL EXAM    PATIENT NAME:  Jb Rios    MRN:  [id number]  SERVICE DATE:  6/27/2025   SERVICE TIME:  6:39 PM    Primary Care Physician: George Campbell MD     SUBJECTIVE  CHIEF COMPLAINT:  Altered Mental Status       Altered Mental Status          Jb Rios is a 74 y.o., male with PMH dementia, DM, HTN, HLD who  presents to the emergency department with chief complaint of Altered Mental Status    History obtained from chart review and in talking with ED provider as patient cannot say why he is here. Patient presenting from nursing facility and the nurse at the facility noted the patient was less responsive than normal. Per nurse from the nursing facility and EMS the patient is normally able to walk and talk. The patient speaks both English and [place]. He apparently was dysarthric earlier today and slightly \"slower\" than normal. For me patient was oriented to person, year. Son at bedside feels his speech sounds normal during my evaluation and that cognition is near baseline, though feels he might be mildly \"slower\" than his normal.       PAST MEDICAL HISTORY:    Past Medical History:   Diagnosis Date    Anxiety     Chest pain 03/29/2018    Coronary artery disease involving native coronary artery of native heart without angina pectoris 02/15/2019    Diabetes mellitus (HCC)     no meds    History of colon polyps     Hyperlipidemia     Hypertension     Type 2 diabetes mellitus without complication (HCC)     Vertigo       PAST SURGICAL HISTORY:    Past Surgical History:   Procedure Laterality Date    CARDIAC SURGERY  1973    PTCA     COLONOSCOPY      COLONOSCOPY N/A 10/17/2019    COLORECTAL CANCER SCREENING, HIGH RISK performed by Emily Schuler MD at [facility]    CORONARY ANGIOPLASTY WITH STENT PLACEMENT      EYE SURGERY      OTHER SURGICAL HISTORY      patent foramen ovale    TONSILLECTOMY      UPPER GASTROINTESTINAL ENDOSCOPY N/A 8/1/2019    EGD

## 2025-06-27 NOTE — ED NOTES
Patient not answering all questions upon arrival, unable to complete all triage screening questions.

## 2025-06-27 NOTE — ACP (ADVANCE CARE PLANNING)
Advance Care Planning   Healthcare Decision Maker:    Primary Decision Maker: HernandezDomenico - Child - [phone]    Primary Decision Maker: GabrielEva - Spouse - [phone]    Secondary Decision Maker: wagner hernandez - Child - [phone]    Click here to complete Healthcare Decision Makers including selection of the Healthcare Decision Maker Relationship (ie \"Primary\").  Today we documented Decision Maker(s) consistent with ACP documents on file.       Electronically signed by Keren Cummings, RN, BSN on 6/27/2025 at 2:46 PM

## 2025-06-27 NOTE — ED NOTES
Patient brought in from [facility] due to altered mental status. BAT called upon arrival and physician assessment. Reported unknown last known well. Reported that patient is usually a \"walkie talkie\" patient, this am patient was incontinent when they usually were not. Reported patient slow to respond. Reported patient stating \"no\" to everything when they reported usually respond appropriately. Patient is alert, slow to respond. BG on arrival 153. Patient taken to CT by EMS with this RN.

## 2025-06-27 NOTE — PROGRESS NOTES
[facility]   Facility/Department: [address] ORTHO TELE  Speech Language Pathology    Jb Reedz  1950  [id number]    Date: 6/27/2025      Speech Therapy attempted to see Jb ORANTES Gabriel on this date for a/an:    Clinical Bedside Swallow Evaluation and Speech Language Evaluation    Pt was unable to be seen due to:   Patient is currently off unit, in MRI.  Discussed with Kristen HALL.        Electronically signed by JESSICA Romo on 6/27/25 at 2:30 PM EDT

## 2025-06-27 NOTE — ED PROVIDER NOTES
Date    CARDIAC SURGERY  1973    PTCA     COLONOSCOPY      COLONOSCOPY N/A 10/17/2019    COLORECTAL CANCER SCREENING, HIGH RISK performed by Emily Schuler MD at [facility]    CORONARY ANGIOPLASTY WITH STENT PLACEMENT      EYE SURGERY      OTHER SURGICAL HISTORY      patent foramen ovale    TONSILLECTOMY      UPPER GASTROINTESTINAL ENDOSCOPY N/A 8/1/2019    EGD ESOPHAGOGASTRODUODENOSCOPY performed by Emily Schuler MD at [facility]         CURRENT MEDICATIONS       Current Discharge Medication List        CONTINUE these medications which have NOT CHANGED    Details   metoprolol tartrate (LOPRESSOR) 25 MG tablet       sertraline (ZOLOFT) 25 MG tablet Take 1 tablet by mouth daily      isosorbide dinitrate (ISORDIL) 30 MG tablet Take 1 tablet by mouth daily      busPIRone (BUSPAR) 5 MG tablet Take 1 tablet by mouth 3 times daily Indications: Feeling Anxious      divalproex (DEPAKOTE ER) 500 MG extended release tablet Take 1 tablet by mouth 2 times daily Indications: Schizoaffective Disorder      polyethylene glycol (GLYCOLAX) 17 GM/SCOOP powder Take 17 g by mouth daily Indications: Constipation      loratadine (CLARITIN) 10 MG tablet Take 1 tablet by mouth daily Indications: Allergy      Melatonin 10 MG TABS Take 1 tablet by mouth at bedtime Indications: Trouble Sleeping      metFORMIN (GLUCOPHAGE) 500 MG tablet Take 1 tablet by mouth 2 times daily (with meals) Indications: Type 2 Diabetes      nystatin (MYCOSTATIN) 010439 UNIT/GM powder Apply topically 2 times daily Indications: Rash Apply topically 2 times daily to groin.      senna (SENOKOT) 8.6 MG tablet Take 1 tablet by mouth 2 times daily Indications: Constipation      Urea (CARMOL) 40 % cream Apply topically nightly Apply to dry skin of feet      traZODone (DESYREL) 50 MG tablet Take 1 tablet by mouth nightly  Qty: 15 tablet, Refills: 3      atorvastatin (LIPITOR) 20 MG tablet Take 1 tablet by mouth nightly  Qty: 15 tablet, Refills: 3     -- --      Height Weight         -- --             Physical Exam  Vitals and nursing note reviewed.   Constitutional:       General: He is not in acute distress.     Appearance: He is not ill-appearing.   HENT:      Head: Normocephalic and atraumatic.      Mouth/Throat:      Mouth: Mucous membranes are moist.      Pharynx: Oropharynx is clear.   Eyes:      Extraocular Movements: Extraocular movements intact.      Pupils: Pupils are equal, round, and reactive to light.   Cardiovascular:      Rate and Rhythm: Normal rate and regular rhythm.      Pulses: Normal pulses.      Heart sounds: Normal heart sounds.   Pulmonary:      Effort: Pulmonary effort is normal.      Breath sounds: Normal breath sounds.   Abdominal:      General: There is no distension.      Palpations: Abdomen is soft.      Tenderness: There is no abdominal tenderness.   Musculoskeletal:      Cervical back: Normal range of motion and neck supple.      Right lower leg: No edema.      Left lower leg: No edema.   Skin:     General: Skin is warm and dry.      Capillary Refill: Capillary refill takes less than 2 seconds.   Neurological:      Mental Status: He is alert. He is disoriented.      Cranial Nerves: Dysarthria present.      Motor: Motor function is intact.      Comments: Patient closes eyes on command but will only intermittently squeeze his hand on command.  Patient disoriented.  Patient with significant dysarthria.  Patient was able to stand and pivot for EMS.  Moving all extremities.    NIH 6   Psychiatric:         Speech: He is noncommunicative.         DIAGNOSTIC RESULTS     EKG: All EKG's are interpreted by the Emergency Department Physician who either signs or Co-signs this chart in the absence of a cardiologist.        RADIOLOGY:   Non-plain film images such as CT, Ultrasound and MRI are read by the radiologist. Plain radiographic images are visualized and preliminarily interpreted by the emergency physician with the below

## 2025-06-27 NOTE — CARE COORDINATION
Case Management Assessment  Initial Evaluation    Date/Time of Evaluation: 6/27/2025 2:47 PM  Assessment Completed by: Keren Cummings, RN, BSN    If patient is discharged prior to next notation, then this note serves as note for discharge by case management.    Patient Name: Jb Rios                   YOB: 1950  Diagnosis: Dysarthria [R47.1]  Altered mental status, unspecified altered mental status type [R41.82]                   Date / Time: 6/27/2025  8:10 AM    Patient Admission Status: Observation   Readmission Risk (Low < 19, Mod (19-27), High > 27): No data recorded  Current PCP: George Campbell MD  PCP verified by CM? Yes (DR WALKER @ SNF)    Chart Reviewed: Yes      History Provided by: Child/Family, Physician, Medical Record (PT'S SON DOMENICO)  Patient Orientation: Unable to Assess, Other (see comment) (HOSPITAL IN WITH PT SON PROVIDED INFORMATION PER SON A&O X 2-3 IS BASELINE)    Patient Cognition: Other (see comment) (SEE ABOVE)    Hospitalization in the last 30 days (Readmission):  No    If yes, Readmission Assessment in  Navigator will be completed.    Advance Directives:      Code Status: Full Code   Patient's Primary Decision Maker is: Legal Next of Kin    Primary Decision Maker: Domenico Rios - Child - [phone]    Primary Decision Maker: Eva Rios - Spouse - [phone]    Secondary Decision Maker: rioswagner - Child - [phone]    Discharge Planning:    Patient lives with: Other (Comment) (LTC STATUS) Type of Home: Long-Term Care  Primary Care Giver: Other (Comment) (SNF STATUS)  Patient Support Systems include: Children, Other (Comment) (LTC STATUS)   Current Financial resources: Medicaid, Medicare  Current community resources: ECF/Home Care  Current services prior to admission: Skilled Nursing Facility, Durable Medical Equipment, Other (Comment) (LTC STATUS)            Current DME: Walker, Wheelchair            Type of Home Care services:  None    ADLS  Prior

## 2025-06-28 ENCOUNTER — HOSPITAL ENCOUNTER (EMERGENCY)
Age: 75
Discharge: HOME | End: 2025-06-29
Payer: MEDICARE

## 2025-06-28 VITALS
HEART RATE: 77 BPM | DIASTOLIC BLOOD PRESSURE: 74 MMHG | WEIGHT: 153.8 LBS | TEMPERATURE: 98.1 F | RESPIRATION RATE: 18 BRPM | HEIGHT: 67 IN | SYSTOLIC BLOOD PRESSURE: 127 MMHG | OXYGEN SATURATION: 98 % | BODY MASS INDEX: 24.14 KG/M2
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
## 2025-06-28 DIAGNOSIS — S09.90XA CLOSED HEAD INJURY, INITIAL ENCOUNTER: ICD-10-CM

## 2025-06-28 LAB
ANION GAP SERPL CALCULATED.3IONS-SCNC: 11 MEQ/L (ref 9–15)
BUN SERPL-MCNC: 13 MG/DL (ref 8–23)
CALCIUM SERPL-MCNC: 8.8 MG/DL (ref 8.5–9.9)
CHLORIDE SERPL-SCNC: 94 MEQ/L (ref 95–107)
CO2 SERPL-SCNC: 23 MEQ/L (ref 20–31)
CREAT SERPL-MCNC: 0.77 MG/DL (ref 0.7–1.2)
ERYTHROCYTE [DISTWIDTH] IN BLOOD BY AUTOMATED COUNT: 13.7 % (ref 11.5–14.5)
GLUCOSE BLD-MCNC: 110 MG/DL (ref 70–99)
GLUCOSE BLD-MCNC: 128 MG/DL (ref 70–99)
GLUCOSE BLD-MCNC: 132 MG/DL (ref 70–99)
GLUCOSE SERPL-MCNC: 119 MG/DL (ref 70–99)
HCT VFR BLD AUTO: 43.2 % (ref 42–52)
HGB BLD-MCNC: 15.3 G/DL (ref 14–18)
MCH RBC QN AUTO: 30.2 PG (ref 27–31.3)
MCHC RBC AUTO-ENTMCNC: 35.4 % (ref 33–37)
MCV RBC AUTO: 85.4 FL (ref 79–92.2)
PERFORMED ON: ABNORMAL
PLATELET # BLD AUTO: 136 K/UL (ref 130–400)
POTASSIUM SERPL-SCNC: 4.2 MEQ/L (ref 3.4–4.9)
RBC # BLD AUTO: 5.06 M/UL (ref 4.7–6.1)
SODIUM SERPL-SCNC: 128 MEQ/L (ref 135–144)
WBC # BLD AUTO: 6.8 K/UL (ref 4.8–10.8)

## 2025-06-28 PROCEDURE — G0378 HOSPITAL OBSERVATION PER HR: HCPCS

## 2025-06-28 PROCEDURE — 96372 THER/PROPH/DIAG INJ SC/IM: CPT

## 2025-06-28 PROCEDURE — 2500000003 HC RX 250 WO HCPCS: Performed by: STUDENT IN AN ORGANIZED HEALTH CARE EDUCATION/TRAINING PROGRAM

## 2025-06-28 PROCEDURE — 99284 EMERGENCY DEPT VISIT MOD MDM: CPT

## 2025-06-28 PROCEDURE — 36415 COLL VENOUS BLD VENIPUNCTURE: CPT

## 2025-06-28 PROCEDURE — 97162 PT EVAL MOD COMPLEX 30 MIN: CPT

## 2025-06-28 PROCEDURE — 6360000002 HC RX W HCPCS: Performed by: STUDENT IN AN ORGANIZED HEALTH CARE EDUCATION/TRAINING PROGRAM

## 2025-06-28 PROCEDURE — 85027 COMPLETE CBC AUTOMATED: CPT

## 2025-06-28 PROCEDURE — 6370000000 HC RX 637 (ALT 250 FOR IP): Performed by: STUDENT IN AN ORGANIZED HEALTH CARE EDUCATION/TRAINING PROGRAM

## 2025-06-28 PROCEDURE — 92610 EVALUATE SWALLOWING FUNCTION: CPT

## 2025-06-28 PROCEDURE — 80048 BASIC METABOLIC PNL TOTAL CA: CPT

## 2025-06-28 PROCEDURE — 99222 1ST HOSP IP/OBS MODERATE 55: CPT | Performed by: PSYCHIATRY & NEUROLOGY

## 2025-06-28 RX ORDER — ACETAMINOPHEN 325 MG/1
650 TABLET ORAL EVERY 4 HOURS PRN
Status: DISCONTINUED | OUTPATIENT
Start: 2025-06-28 | End: 2025-06-28 | Stop reason: HOSPADM

## 2025-06-28 RX ORDER — CLOPIDOGREL BISULFATE 75 MG/1
75 TABLET ORAL DAILY
Status: DISCONTINUED | OUTPATIENT
Start: 2025-06-28 | End: 2025-06-28 | Stop reason: HOSPADM

## 2025-06-28 RX ORDER — CLOPIDOGREL BISULFATE 75 MG/1
75 TABLET ORAL DAILY
DISCHARGE
Start: 2025-06-28

## 2025-06-28 RX ADMIN — DOCUSATE SODIUM 100 MG: 100 CAPSULE, LIQUID FILLED ORAL at 09:24

## 2025-06-28 RX ADMIN — DIVALPROEX SODIUM 500 MG: 250 TABLET, EXTENDED RELEASE ORAL at 09:23

## 2025-06-28 RX ADMIN — CLOPIDOGREL BISULFATE 75 MG: 75 TABLET, FILM COATED ORAL at 13:40

## 2025-06-28 RX ADMIN — CETIRIZINE HYDROCHLORIDE 10 MG: 10 TABLET, FILM COATED ORAL at 09:24

## 2025-06-28 RX ADMIN — BUSPIRONE HYDROCHLORIDE 5 MG: 5 TABLET ORAL at 09:24

## 2025-06-28 RX ADMIN — ACETAMINOPHEN 650 MG: 325 TABLET ORAL at 10:44

## 2025-06-28 RX ADMIN — ASPIRIN 81 MG: 81 TABLET, CHEWABLE ORAL at 09:23

## 2025-06-28 RX ADMIN — BUSPIRONE HYDROCHLORIDE 5 MG: 5 TABLET ORAL at 13:40

## 2025-06-28 RX ADMIN — Medication 10 ML: at 09:30

## 2025-06-28 RX ADMIN — ISOSORBIDE DINITRATE 30 MG: 20 TABLET ORAL at 09:22

## 2025-06-28 RX ADMIN — ENOXAPARIN SODIUM 40 MG: 100 INJECTION SUBCUTANEOUS at 09:27

## 2025-06-28 ASSESSMENT — PAIN - FUNCTIONAL ASSESSMENT: PAIN_FUNCTIONAL_ASSESSMENT: 0-10

## 2025-06-28 ASSESSMENT — PAIN SCALES - GENERAL
PAINLEVEL_OUTOF10: 0
PAINLEVEL_OUTOF10: 6
PAINLEVEL_OUTOF10: 4

## 2025-06-28 ASSESSMENT — PAIN DESCRIPTION - LOCATION: LOCATION: HEAD

## 2025-06-28 NOTE — PROGRESS NOTES
Physical Therapy Med Surg Initial Assessment  Facility/Department: [address] ORTHO TELE  Room: [facility]/[place] [address]       NAME: Jb Rios  : 1950 (74 y.o.)  MRN: [id number]  CODE STATUS: Full Code    Date of Service: 2025    Patient Diagnosis(es): Dysarthria [R47.1]  Altered mental status, unspecified altered mental status type [R41.82]   Chief Complaint   Patient presents with    Altered Mental Status     Patient Active Problem List    Diagnosis Date Noted    Chronic kidney disease 2023    Parkinsonism (HCC) 10/24/2022    Dyskinesia 10/24/2022    Tremor 10/17/2022    Severe episode of recurrent major depressive disorder, with psychotic features (HCC) 10/16/2022    Dysarthria 2025    Hyperlipidemia 2021    Heart murmur 2021    Dyspnea on exertion 2021    COVID-19 2021    Weakness 09/10/2021    Adenomatous polyp of sigmoid colon     Chronic gastritis without bleeding     Dyspepsia     Mixed obsessional thoughts and acts     Coronary artery disease involving native coronary artery of native heart without angina pectoris 02/15/2019    Generalized anxiety disorder 04/15/2018    Anxiety     Recurrent major depressive disorder, in remission     Chest pain 2018    Hypertension 2018    Benzodiazepine dependence (HCC) 2017    Type 2 diabetes mellitus without complication, without long-term current use of insulin (HCC) 2017    Insomnia secondary to depression with anxiety 2016    Panic attacks 2016    ED (erectile dysfunction) 2011        Past Medical History:   Diagnosis Date    Anxiety     Chest pain 2018    Coronary artery disease involving native coronary artery of native heart without angina pectoris 02/15/2019    Diabetes mellitus (HCC)     no meds    History of colon polyps     Hyperlipidemia     Hypertension     Type 2 diabetes mellitus without complication (HCC)     Vertigo      Past Surgical History:   Procedure Laterality Date

## 2025-06-28 NOTE — PLAN OF CARE
Therapy evaluation completed.  Please see daily notes and/or progress notes for details related to planned treatment interventions, goals and functional performance.     Electronically signed by Laura Taylor PT on 6/28/2025 at 3:10 PM

## 2025-06-28 NOTE — DISCHARGE SUMMARY
Hospital Medicine Discharge Summary    Jb Rios  :  1950  MRN:  [id number]    Admit date:  2025  Discharge date:  2025    Admitting Physician:  Joseph More MD  Primary Care Physician:  George Campbell MD      Chief Complaint   Patient presents with    Altered Mental Status     Hospital Course:     74 y.o., male with PMH dementia, DM, HTN, HLD who  presents to the emergency department with chief complaint of Altered Mental Status. Providers at his facility said he was dysarthric and cognitively seemed slower on day of admission. Dysarthria had resolved by time of admission. He was worked up for CVA and CT/CTA/MRI were largely unrevealing. LDL at goal. On day of discharge had baseline neuro exam and no complaints noted. SLP recommended minced/moist diet. Our neurologist evaluated patient and recommended start Plavix for likely TIA. He will follow up with neurology outpatient in 2-4 weeks.         Exam on discharge:   BP (!) 140/85   Pulse 69   Temp 97.9 °F (36.6 °C) (Oral)   Resp 18   Ht 1.702 m (5' 7\")   Wt 69.8 kg (153 lb 12.8 oz)   SpO2 99%   BMI 24.09 kg/m²   General appearance: No apparent distress, appears stated age and cooperative.  HEENT: Pupils equal, round, and reactive to light. Conjunctivae/corneas clear.  Neck: Supple, with full range of motion. No jugular venous distention. Trachea midline.  Respiratory:  Normal respiratory effort. Clear to auscultation, bilaterally without Rales/Wheezes/Rhonchi.  Cardiovascular: Regular rate and rhythm with normal S1/S2 without murmurs, rubs or gallops.  Abdomen: Soft, non-tender, non-distended with normal bowel sounds.  Musculoskeletal: No clubbing, cyanosis or edema bilaterally.  Full range of motion without deformity.  Skin: Skin color, texture, turgor normal.  No rashes or lesions.  Neuro: Non Focal. Symetrical motor and tone. Limited comprehension, Alert,awake and oriented to person, place only. . NL CN. Symetrical tone and

## 2025-06-28 NOTE — PLAN OF CARE
Problem: Discharge Planning  Goal: Discharge to home or other facility with appropriate resources  6/27/2025 2140 by Sheyla Ibarra RN  Outcome: Progressing  6/27/2025 1552 by Kristen Varghese RN  Outcome: Progressing     Problem: Chronic Conditions and Co-morbidities  Goal: Patient's chronic conditions and co-morbidity symptoms are monitored and maintained or improved  6/27/2025 2140 by Sheyla Ibarra RN  Outcome: Progressing  6/27/2025 1552 by Kristen Varghese RN  Outcome: Progressing     Problem: Safety - Adult  Goal: Free from fall injury  6/27/2025 2140 by Sheyla Ibarra RN  Outcome: Progressing  6/27/2025 1552 by Kristen Varghese RN  Outcome: Progressing     Problem: Skin/Tissue Integrity  Goal: Skin integrity remains intact  Description: 1.  Monitor for areas of redness and/or skin breakdown  2.  Assess vascular access sites hourly  3.  Every 4-6 hours minimum:  Change oxygen saturation probe site  4.  Every 4-6 hours:  If on nasal continuous positive airway pressure, respiratory therapy assess nares and determine need for appliance change or resting period  6/27/2025 2140 by Sheyla Ibarra RN  Outcome: Progressing  6/27/2025 1552 by Kristen Varghese RN  Outcome: Progressing

## 2025-06-28 NOTE — PROGRESS NOTES
[facility]   Facility/Department: [address] TELE  Speech Language Pathology  Clinical Bedside Swallow Evaluation    NAME:Jb Rios  : 1950 (74 y.o.)   [x]   confirmed    MRN: [id number]  ROOM: [place] [address]  ADMISSION DATE: 2025  PATIENT DIAGNOSIS(ES): Dysarthria [R47.1]  Altered mental status, unspecified altered mental status type [R41.82]  Chief Complaint   Patient presents with    Altered Mental Status     Patient Active Problem List    Diagnosis Date Noted    Chronic kidney disease 2023    Parkinsonism (HCC) 10/24/2022    Dyskinesia 10/24/2022    Tremor 10/17/2022    Severe episode of recurrent major depressive disorder, with psychotic features (HCC) 10/16/2022    Dysarthria 2025    Hyperlipidemia 2021    Heart murmur 2021    Dyspnea on exertion 2021    COVID-19 2021    Weakness 09/10/2021    Adenomatous polyp of sigmoid colon     Chronic gastritis without bleeding     Dyspepsia     Mixed obsessional thoughts and acts     Coronary artery disease involving native coronary artery of native heart without angina pectoris 02/15/2019    Generalized anxiety disorder 04/15/2018    Anxiety     Recurrent major depressive disorder, in remission     Chest pain 2018    Hypertension 2018    Benzodiazepine dependence (HCC) 2017    Type 2 diabetes mellitus without complication, without long-term current use of insulin (HCC) 2017    Insomnia secondary to depression with anxiety 2016    Panic attacks 2016    ED (erectile dysfunction) 2011     Past Medical History:   Diagnosis Date    Anxiety     Chest pain 2018    Coronary artery disease involving native coronary artery of native heart without angina pectoris 02/15/2019    Diabetes mellitus (HCC)     no meds    History of colon polyps     Hyperlipidemia     Hypertension     Type 2 diabetes mellitus without complication (HCC)     Vertigo      Past Surgical History:  as same as above ISABEL Colón in pt room when SLP assessed pain. Notified he will be receiving tylenol for headache.    Dysphagia Outcomes Severity Scale  Dysphagia Outcome Severity Scale: Level 4: Mild moderate dysphagia- Intermittent supervision/cueing. One - two diet consistencies restricted    Therapy Time  SLP Individual Minutes  Time In: 0936  Time Out: 1000  Minutes: 24              Signature: Electronically signed by MICHAEL RAND, JESSICA on 6/28/2025 at 12:47 PM

## 2025-06-28 NOTE — PROGRESS NOTES
Stroke Education provided to patient and the following topics were discussed    1. Patients personal risk factors for stroke are diabetes mellitus, hyperlipidemia, and hypertension    2. Warning signs of Stroke:        * Sudden numbness or weakness of the face, arm or leg, especially on one side of          The body            * Sudden confusion, trouble speaking or understanding        * Sudden trouble seeing in one or both eyes        * Sudden trouble walking, dizziness, loss of balance or coordination        * Sudden severe headache with no known cause      3. Importance of activation Emergency Medical Services ( 9-1-1 ) immediately if experience any warning signs of stroke.    4. Be sure and schedule a follow-up appointment with your primary care doctor or any specialists as instructed.     5. You must take medicine every day to treat your risk factors for stroke.  Be sure to take your medicines exactly as your doctor tells you: no more, no less.  Know what your medicines are for , what they do.  Anti-thrombotics /anticoagulants can help prevent strokes.  You are taking the following medicine(s)  asa, plavix     6.  Smoking and second-hand smoke greatly increase your risk of stroke, cardiovascular disease and death. Smoking history never    7. Information provided was [facility] Stroke Education Binder    8. Documentation of teaching completed in Patient Education Activity and on Care Plan with teaching response noted?  yes

## 2025-06-28 NOTE — DISCHARGE INSTR - COC
Continuity of Care Form    Patient Name: Jb Rios   :  1950  MRN:  [id number]    Admit date:  2025  Discharge date:  25    Code Status Order: Full Code   Advance Directives:     Admitting Physician:  Joseph More MD  PCP: George Campbell MD    Discharging Nurse:   Discharging Hospital Unit/Room#: W286/W286-01  Discharging Unit Phone Number: [phone]    Emergency Contact:   Extended Emergency Contact Information  Primary Emergency Contact: Domenico Rios   [facility]  Home Phone: [phone]  Work Phone: [phone]  Mobile Phone: [phone]  Relation: Child  Secondary Emergency Contact: marywagner  Mobile Phone: [phone]  Relation: Child    Past Surgical History:  Past Surgical History:   Procedure Laterality Date    CARDIAC SURGERY  1973    PTCA     COLONOSCOPY      COLONOSCOPY N/A 10/17/2019    COLORECTAL CANCER SCREENING, HIGH RISK performed by Emily Schuler MD at [facility]    CORONARY ANGIOPLASTY WITH STENT PLACEMENT      EYE SURGERY      OTHER SURGICAL HISTORY      patent foramen ovale    TONSILLECTOMY      UPPER GASTROINTESTINAL ENDOSCOPY N/A 2019    EGD ESOPHAGOGASTRODUODENOSCOPY performed by Emily Schuler MD at [facility]       Immunization History:   Immunization History   Administered Date(s) Administered    COVID-19, PFIZER Bivalent, DO NOT Dilute, (age 12y+), IM, 30 mcg/0.3 mL 2023    COVID-19, PFIZER PURPLE top, DILUTE for use, (age 12 y+), 30mcg/0.3mL 01/15/2021, 2021    Influenza, FLUARIX, FLULAVAL, FLUZONE (age 6 mo+) and AFLURIA, (age 3 y+), Quadv PF, 0.5mL 2022    Influenza, FLUCELVAX, (age 6 mo+), MDCK, Quadv MDV, 0.5mL 2019    Influenza, FLUZONE High Dose, (age 65 y+), IM, Trivalent PF, 0.5mL 11/10/2016, 2017    Pneumococcal, PCV-13, PREVNAR 13, (age 6w+), IM, 0.5mL 2018       Active Problems:  Patient Active Problem List   Diagnosis Code    Chest pain R07.9    Hypertension I10     information and transfer of Jb Rios  is necessary for the continuing treatment of the diagnosis listed and that he requires Skilled Nursing Facility for greater 30 days.     Update Admission H&P: No change in H&P    PHYSICIAN SIGNATURE:  Electronically signed by Joseph More MD on 6/28/25 at 1:20 PM EDT

## 2025-06-28 NOTE — CONSULTS
[facility] Consult Note  Name: Jb Rios  Age: 74 y.o.  Gender: male  CodeStatus: Full Code  Allergies: Seroquel [Quetiapine]    Chief Complaint:Altered Mental Status    Primary Care Provider: George Campbell MD  InpatientTreatment Team: Treatment Team:   Joseph More MD Patel, Dhruv R, MD Rosso, Jennifer A, RN Vance, Kelly, RN Hipp, Lisa, RN Scali, Gayle, RN  Admission Date: 6/27/2025      HPI     Patient is a 74-year-old male with a past medical history of dementia, diabetes, hypertension and hyperlipidemia who did present to the emergency department with chief complaint of altered mental status and questionable dysarthria.  Patient presented from subacute nursing facility and in talking with the ED provider patient was having difficulty remembering why he was here but also nursing facility did state he did appear to be slurring his words and was less responsive than his baseline.  Patient did have a CT scan done of his head which did not show any evidence of any acute bleed no mass lesions no structural lesions no early CT sign changes are seen to suggest any acute ischemia.  Patient also had CT angiography which failed to demonstrate any large vessel or small vessel atherostenosis noted.  This morning patient is at or near his baseline.    Pt seen and examined for neuro follow up.  NO Headache, double vision, blurry vision, difficulty with speech, difficulty with swallowing, weakness, numbness, pain, nausea, vomiting, choking, neck pain, dizziness         Allergies   Allergen Reactions    Seroquel [Quetiapine] Other (See Comments)     lethargy  weak       Patient Active Problem List   Diagnosis    Chest pain    Hypertension    Anxiety    Recurrent major depressive disorder, in remission    Coronary artery disease involving native coronary artery of native heart without angina pectoris    Mixed obsessional thoughts and acts    Chronic gastritis without bleeding    Dyspepsia    Adenomatous polyp  6/27/2025 8:16 am    TECHNIQUE:  CT of the head was performed without the administration of intravenous  contrast. Automated exposure control, iterative reconstruction, and/or weight  based adjustment of the mA/kV was utilized to reduce the radiation dose to as  low as reasonably achievable.    COMPARISON:  MR brain 09/08/2021 and cranial CT 09/07/2021    HISTORY:  ORDERING SYSTEM PROVIDED HISTORY: Stroke  TECHNOLOGIST PROVIDED HISTORY:  Has a \"code stroke\" or \"stroke alert\" been called?->Yes  Reason for exam:->Stroke  Decision Support Exception - unselect if not a suspected or confirmed  emergency medical condition->Emergency Medical Condition (MA)  What reading provider will be dictating this exam?->CRC    FINDINGS:  There is mottle or noise artifact.  Allowing for this, satisfactory  gray-white matter differentiation.  There is age-appropriate cerebral  atrophy.  No hydrocephalus.    No intracranial mass, hemorrhage, or acute parenchymal abnormality.  Posterior fossa structures show no acute disease.  No suspicious extra-axial  fluid collection.  Bilateral carotid calcifications are present.    The orbits show no acute disease.  Normal right mastoid.  The left mastoid  air cells are poorly developed and there is a small left mastoid effusion.  There is chronic paranasal sinusitis which is improved prior exam.  There is  prior intranasal surgery with bilateral middle meatal antrostomies which  remain patent.    Impression  1.  No intracranial hemorrhage or acute intracranial disease identified.    2.  If symptoms warrant, further correlation with MR exam may be of benefit.    3.  Chronic paranasal sinusitis, improved from prior exam.  Prior intranasal  surgery.    STROKE PROTOCOL: Stroke protocol performed and CT report provided by core  team radiology to the ER referring service upon exam completion.  The  findings were sent to the Radiology Results Communication Center at 8:33 am  on 6/27/2025 to be communicated

## 2025-06-28 NOTE — PROGRESS NOTES
Report called to Virgil BLAKE at [facility]. Questions answered. Patient will be going to room 1110. He is scheduled for  at 17:00. He was seen and cleared by neurology and attending to d/c back to facility.

## 2025-06-29 ENCOUNTER — APPOINTMENT (OUTPATIENT)
Dept: CT IMAGING | Age: 75
End: 2025-06-29
Payer: MEDICARE

## 2025-06-29 VITALS
DIASTOLIC BLOOD PRESSURE: 71 MMHG | HEART RATE: 68 BPM | WEIGHT: 153 LBS | TEMPERATURE: 98 F | RESPIRATION RATE: 18 BRPM | HEIGHT: 66 IN | BODY MASS INDEX: 24.59 KG/M2 | SYSTOLIC BLOOD PRESSURE: 120 MMHG | OXYGEN SATURATION: 99 %

## 2025-06-29 PROCEDURE — 72125 CT NECK SPINE W/O DYE: CPT

## 2025-06-29 PROCEDURE — 70450 CT HEAD/BRAIN W/O DYE: CPT

## 2025-06-29 ASSESSMENT — PAIN - FUNCTIONAL ASSESSMENT
PAIN_FUNCTIONAL_ASSESSMENT: NONE - DENIES PAIN
PAIN_FUNCTIONAL_ASSESSMENT: NONE - DENIES PAIN

## 2025-06-29 NOTE — ED NOTES
Report given to Kasia HALL. Made aware of report not being called to [facility] due to 0800  and patient is in assisted living.

## 2025-06-29 NOTE — DISCHARGE INSTRUCTIONS
Imaging of head and neck were negative.  Return for new or worsening symptoms. Go slowly from sitting to standing

## 2025-06-29 NOTE — ED PROVIDER NOTES
[facility] EMERGENCY DEPARTMENT  eMERGENCYdEPARTMENT eNCOUnter        Pt Name: Jb Rios  MRN: [id number]  Birthdate 1950of evaluation: 6/28/2025  Provider:Bart Payne PA-C  12:12 AM EDT    CHIEF COMPLAINT       Chief Complaint   Patient presents with    Fall     Mechanical, denies loc, dizziness, and lightheadedness. No thinners noted nursing home papers.     Headache     5-6/10           HISTORY OF PRESENT ILLNESS  (Location/Symptom, Timing/Onset, Context/Setting, Quality, Duration, Modifying Factors, Severity.)   bJ Rios is a 74 y.o. male who presents to the emergency department      Patient presents emergency department by EMS after mechanical fall.  He was reaching forward to put on his slippers when he fell forward striking his head.  He denies any neck pain reports a mild headache where is a small contusion.  Does have a history of Parkinson's dyskinesia.  States he was on the floor for no more than 10 minutes.  He denies any other injury from the fall.  He has ambulated since this occurred.  The fall happened 5 hours prior to arrival at his nursing facility.          Nursing Notes were reviewed and I agree.    REVIEW OF SYSTEMS    (2-9 systems for level 4, 10 or more for level 5)     Review of Systems   All other systems reviewed and are negative.       as noted above the remainder of the review of systems was reviewed and negative.       PAST MEDICAL HISTORY     Past Medical History:   Diagnosis Date    Anxiety     Chest pain 03/29/2018    Coronary artery disease involving native coronary artery of native heart without angina pectoris 02/15/2019    Diabetes mellitus (HCC)     no meds    History of colon polyps     Hyperlipidemia     Hypertension     Type 2 diabetes mellitus without complication (HCC)     Vertigo          SURGICAL HISTORY       Past Surgical History:   Procedure Laterality Date    CARDIAC SURGERY  1973    PTCA     COLONOSCOPY      COLONOSCOPY N/A 10/17/2019    COLORECTAL

## 2025-06-30 ENCOUNTER — OFFICE VISIT (OUTPATIENT)
Dept: GERIATRIC MEDICINE | Age: 75
End: 2025-06-30
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
## 2025-06-30 DIAGNOSIS — R53.1 WEAKNESS: ICD-10-CM

## 2025-06-30 DIAGNOSIS — G20.A1 PARKINSON'S DISEASE WITHOUT DYSKINESIA OR FLUCTUATING MANIFESTATIONS (HCC): ICD-10-CM

## 2025-07-01 ASSESSMENT — ENCOUNTER SYMPTOMS
RESPIRATORY NEGATIVE: 1
GASTROINTESTINAL NEGATIVE: 1

## 2025-07-01 NOTE — PROGRESS NOTES
[address] [place] [address]    6/30/2025    Jb Rios  is a 74 y.o. in the  being seen for    Chief Complaint   Patient presents with    Follow-up       Remains at [facility] for longterm care.  Patient sent to ED on 6/27 for altered mental status.  Per hospital records patient was worked up for CVA and CT/CTA/MRI were negative.  Patient was evaluated by neurology who recommended Plavix for likely TIA and outpatient follow-up in 2 to 4 weeks.  Patient return to nursing home.  Patient was then sent back out to ED on 6/28 after a fall with head injury.  Imaging of the head and neck was negative.  Patient again returned to nursing home for long-term care.    At time of visit patient resting in bed.  He reports feeling tired today.  He is alert and oriented x 3.  No complaints of pain.  No nausea vomiting diarrhea or constipation.              Past Medical History:   Diagnosis Date    Anxiety     Chest pain 03/29/2018    Coronary artery disease involving native coronary artery of native heart without angina pectoris 02/15/2019    Diabetes mellitus (HCC)     no meds    History of colon polyps     Hyperlipidemia     Hypertension     Type 2 diabetes mellitus without complication (HCC)     Vertigo      Past Surgical History:   Procedure Laterality Date    CARDIAC SURGERY  1973    PTCA     COLONOSCOPY      COLONOSCOPY N/A 10/17/2019    COLORECTAL CANCER SCREENING, HIGH RISK performed by Emily Schuler MD at [facility]    CORONARY ANGIOPLASTY WITH STENT PLACEMENT      EYE SURGERY      OTHER SURGICAL HISTORY      patent foramen ovale    TONSILLECTOMY      UPPER GASTROINTESTINAL ENDOSCOPY N/A 8/1/2019    EGD ESOPHAGOGASTRODUODENOSCOPY performed by Emily Schuler MD at [facility]     Family History   Problem Relation Age of Onset    Heart Disease Maternal Aunt     Cancer Maternal Aunt     Colon Cancer Maternal Aunt      Social History     Socioeconomic History    Marital

## 2025-07-18 ENCOUNTER — OFFICE VISIT (OUTPATIENT)
Dept: GERIATRIC MEDICINE | Age: 75
End: 2025-07-18

## 2025-07-18 DIAGNOSIS — I10 PRIMARY HYPERTENSION: ICD-10-CM

## 2025-07-18 DIAGNOSIS — E11.9 TYPE 2 DIABETES MELLITUS WITHOUT COMPLICATION, WITHOUT LONG-TERM CURRENT USE OF INSULIN (HCC): Primary | ICD-10-CM

## 2025-07-18 DIAGNOSIS — G20.C PARKINSONISM, UNSPECIFIED PARKINSONISM TYPE (HCC): ICD-10-CM

## 2025-08-07 ENCOUNTER — OFFICE VISIT (OUTPATIENT)
Dept: GERIATRIC MEDICINE | Age: 75
End: 2025-08-07

## 2025-08-07 DIAGNOSIS — E11.9 TYPE 2 DIABETES MELLITUS WITHOUT COMPLICATION, WITHOUT LONG-TERM CURRENT USE OF INSULIN (HCC): ICD-10-CM

## 2025-08-07 DIAGNOSIS — G20.A1 PARKINSON'S DISEASE WITHOUT DYSKINESIA OR FLUCTUATING MANIFESTATIONS (HCC): Primary | ICD-10-CM

## 2025-08-07 DIAGNOSIS — I10 PRIMARY HYPERTENSION: ICD-10-CM

## 2025-08-11 ENCOUNTER — OFFICE VISIT (OUTPATIENT)
Dept: GERIATRIC MEDICINE | Age: 75
End: 2025-08-11

## 2025-08-11 DIAGNOSIS — G20.A1 PARKINSON'S DISEASE WITHOUT DYSKINESIA OR FLUCTUATING MANIFESTATIONS (HCC): Primary | ICD-10-CM

## 2025-08-11 DIAGNOSIS — I25.10 CORONARY ARTERY DISEASE INVOLVING NATIVE CORONARY ARTERY OF NATIVE HEART WITHOUT ANGINA PECTORIS: ICD-10-CM

## 2025-08-11 DIAGNOSIS — E11.9 TYPE 2 DIABETES MELLITUS WITHOUT COMPLICATION, WITHOUT LONG-TERM CURRENT USE OF INSULIN (HCC): ICD-10-CM

## 2025-08-14 ASSESSMENT — ENCOUNTER SYMPTOMS
RESPIRATORY NEGATIVE: 1
GASTROINTESTINAL NEGATIVE: 1

## 2025-08-21 ASSESSMENT — ENCOUNTER SYMPTOMS
RESPIRATORY NEGATIVE: 1
GASTROINTESTINAL NEGATIVE: 1